# Patient Record
Sex: FEMALE | Race: WHITE | NOT HISPANIC OR LATINO | Employment: OTHER | URBAN - METROPOLITAN AREA
[De-identification: names, ages, dates, MRNs, and addresses within clinical notes are randomized per-mention and may not be internally consistent; named-entity substitution may affect disease eponyms.]

---

## 2017-03-09 PROBLEM — E78.5 HYPERLIPIDEMIA: Status: ACTIVE | Noted: 2017-03-09

## 2017-03-09 PROBLEM — R07.89 ATYPICAL CHEST PAIN: Status: ACTIVE | Noted: 2017-03-09

## 2017-03-09 PROBLEM — I10 HYPERTENSION: Status: ACTIVE | Noted: 2017-03-09

## 2017-05-18 ENCOUNTER — OFFICE VISIT (OUTPATIENT)
Dept: CARDIOLOGY | Facility: CLINIC | Age: 77
End: 2017-05-18

## 2017-05-18 VITALS
DIASTOLIC BLOOD PRESSURE: 90 MMHG | HEIGHT: 64 IN | BODY MASS INDEX: 28.58 KG/M2 | SYSTOLIC BLOOD PRESSURE: 180 MMHG | WEIGHT: 167.4 LBS | HEART RATE: 80 BPM

## 2017-05-18 DIAGNOSIS — R07.89 ATYPICAL CHEST PAIN: ICD-10-CM

## 2017-05-18 DIAGNOSIS — E78.5 HYPERLIPIDEMIA, UNSPECIFIED HYPERLIPIDEMIA TYPE: ICD-10-CM

## 2017-05-18 DIAGNOSIS — R03.0 ELEVATED BLOOD PRESSURE READING WITHOUT DIAGNOSIS OF HYPERTENSION: ICD-10-CM

## 2017-05-18 DIAGNOSIS — I10 ESSENTIAL HYPERTENSION: ICD-10-CM

## 2017-05-18 DIAGNOSIS — I10 ESSENTIAL HYPERTENSION: Primary | ICD-10-CM

## 2017-05-18 PROCEDURE — 99213 OFFICE O/P EST LOW 20 MIN: CPT | Performed by: INTERNAL MEDICINE

## 2017-05-18 RX ORDER — ASCORBIC ACID 500 MG
500 TABLET ORAL DAILY
COMMUNITY
End: 2017-09-07

## 2017-05-18 RX ORDER — AMLODIPINE BESYLATE 2.5 MG/1
2.5 TABLET ORAL DAILY
Qty: 90 TABLET | Refills: 3 | Status: SHIPPED | OUTPATIENT
Start: 2017-05-18 | End: 2017-09-07 | Stop reason: SDUPTHER

## 2017-05-18 RX ORDER — ALENDRONATE SODIUM 70 MG/1
70 TABLET ORAL WEEKLY
COMMUNITY
Start: 2017-03-01 | End: 2018-06-12

## 2017-05-18 RX ORDER — HYDROCHLOROTHIAZIDE 12.5 MG/1
12.5 CAPSULE, GELATIN COATED ORAL DAILY
Qty: 90 CAPSULE | Refills: 3 | Status: SHIPPED | OUTPATIENT
Start: 2017-05-18 | End: 2018-05-15 | Stop reason: SDUPTHER

## 2017-05-18 RX ORDER — LOSARTAN POTASSIUM 100 MG/1
TABLET ORAL DAILY
COMMUNITY
Start: 2017-04-19 | End: 2017-09-07

## 2017-05-19 DIAGNOSIS — I10 ESSENTIAL HYPERTENSION: ICD-10-CM

## 2017-05-19 DIAGNOSIS — R07.89 ATYPICAL CHEST PAIN: ICD-10-CM

## 2017-05-19 DIAGNOSIS — E78.5 HYPERLIPIDEMIA, UNSPECIFIED HYPERLIPIDEMIA TYPE: ICD-10-CM

## 2017-05-23 RX ORDER — PRAVASTATIN SODIUM 80 MG/1
80 TABLET ORAL DAILY
Qty: 90 TABLET | Refills: 3 | Status: SHIPPED | OUTPATIENT
Start: 2017-05-23 | End: 2017-09-07

## 2017-09-07 ENCOUNTER — OFFICE VISIT (OUTPATIENT)
Dept: CARDIOLOGY | Facility: CLINIC | Age: 77
End: 2017-09-07

## 2017-09-07 VITALS
SYSTOLIC BLOOD PRESSURE: 162 MMHG | HEART RATE: 97 BPM | BODY MASS INDEX: 26.98 KG/M2 | DIASTOLIC BLOOD PRESSURE: 88 MMHG | HEIGHT: 64 IN | WEIGHT: 158 LBS

## 2017-09-07 DIAGNOSIS — I10 ESSENTIAL HYPERTENSION: ICD-10-CM

## 2017-09-07 DIAGNOSIS — E78.5 DYSLIPIDEMIA: Primary | ICD-10-CM

## 2017-09-07 DIAGNOSIS — R07.89 ATYPICAL CHEST PAIN: ICD-10-CM

## 2017-09-07 PROCEDURE — 99214 OFFICE O/P EST MOD 30 MIN: CPT | Performed by: INTERNAL MEDICINE

## 2017-09-07 RX ORDER — LOSARTAN POTASSIUM 50 MG/1
50 TABLET ORAL DAILY
Qty: 90 TABLET | Refills: 1 | Status: SHIPPED | OUTPATIENT
Start: 2017-09-07 | End: 2017-09-07 | Stop reason: SDUPTHER

## 2017-09-07 RX ORDER — LOSARTAN POTASSIUM 50 MG/1
50 TABLET ORAL DAILY
Qty: 90 TABLET | Refills: 1 | Status: SHIPPED | OUTPATIENT
Start: 2017-09-07 | End: 2018-05-15 | Stop reason: SDUPTHER

## 2017-09-07 RX ORDER — AMLODIPINE BESYLATE 5 MG/1
5 TABLET ORAL DAILY
Qty: 90 TABLET | Refills: 1 | Status: SHIPPED | OUTPATIENT
Start: 2017-09-07 | End: 2017-09-07 | Stop reason: SDUPTHER

## 2017-09-07 RX ORDER — AMLODIPINE BESYLATE 5 MG/1
5 TABLET ORAL DAILY
Qty: 90 TABLET | Refills: 1 | Status: SHIPPED | OUTPATIENT
Start: 2017-09-07 | End: 2018-06-12

## 2017-09-07 RX ORDER — ROSUVASTATIN CALCIUM 10 MG/1
10 TABLET, COATED ORAL DAILY
Qty: 90 TABLET | Refills: 1 | Status: SHIPPED | OUTPATIENT
Start: 2017-09-07 | End: 2018-09-22 | Stop reason: SDUPTHER

## 2017-09-07 RX ORDER — ROSUVASTATIN CALCIUM 10 MG/1
10 TABLET, COATED ORAL DAILY
Qty: 90 TABLET | Refills: 1 | Status: SHIPPED | OUTPATIENT
Start: 2017-09-07 | End: 2018-06-12

## 2017-09-07 RX ORDER — MELATONIN
1000 DAILY
COMMUNITY
End: 2019-01-10 | Stop reason: SDUPTHER

## 2017-09-07 NOTE — PROGRESS NOTES
Subjective:     Encounter Date:09/07/2017 - [place] [facility]      Patient ID: Sheree Hernandez is a 77 y.o.  white female, housewife/retired , from [place], [place].     INTERNIST: Terell Burciaga MD  PREVIOUS CARDIOLOGIST: Efren Healy MD, [facility]    Chief Complaint:   Chief Complaint   Patient presents with   • Hypertension     Problem List:  1. 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 Remote normal coronary CTA calcium score (0), January 2010.  b. Remote acceptable echocardiogram with mild TR and diastolic LV dysfunction, November 2015.  c. Acceptable nuclear stress test with low probability of obstructive disease, LVEF of 0.79, February 2016.  d. Residual CCS class 1 angina pectoris/NYHA class 2 exertional dyspnea and fatigue.  2. Hypertension - probably essential with recent hypertensive blood pressure readings.  3. Hyperlipidemia with uncontrolled hypercholesterolemia; 10-year risk 46.1%, 16.6% with treatment.  4. Indigestion - probable GERD syndrome.  5. Remote presyncope.  a. Acceptable head CT scan without contrast and junctional rhythm (November 2015 with [facility] evaluation)  b. Negative acceptable tilt table test study.  c. Acceptable 24 hour ambulatory blood pressure monitor.  d. Acceptable 48 hour Holter monitor  e. No recurrence.  6. Past surgical history of hysterectomy.      Allergies   Allergen Reactions   • Pravastatin Myalgia         Current Outpatient Prescriptions:   •  alendronate (FOSAMAX) 70 MG tablet, 1 (One) Time Per Week., Disp: , Rfl:   •  amLODIPine (NORVASC) 2.5 MG tablet, Take 1 tablet by mouth Daily., Disp: 90 tablet, Rfl: 3  •  cholecalciferol (VITAMIN D3) 1000 units tablet, Take 1,000 Units by mouth Daily., Disp: , Rfl:   •  hydrochlorothiazide (MICROZIDE) 12.5 MG capsule, Take 1 capsule by mouth Daily., Disp: 90 capsule, Rfl: 3  •  magnesium oxide (MAGOX) 400 (241.3 MG) MG tablet tablet, Take 400 mg by mouth Daily., Disp: , Rfl:   •  Omega-3  "Fatty Acids (OMEGA 3 PO), Take  by mouth Daily., Disp: , Rfl:     History of Present Illness Patient returns for scheduled followup after a 4-month hiatus. She states that she has done well since last being seen in our office.  She notes that she checks her blood pressure at home, and she says that the bottom number has never been over 100, that it is usually around 75.  She notes that she forgot to take her blood pressure medicine for a few days, and her blood pressure was still approximately 130/70 even without the medicine.  She is told that that is why we wanted her to wear the 24-hour ambulatory blood pressure monitor.  She states that she exercises 3-5 times a week; she has been walking on the treadmill for 20 minutes, walking 1 mile.  She has no chest pain, tightness, or pressure or shortness of breath with her activities.  The patient states that she does not sleep well at night, but she thinks that \"it's because of my sleeping habits.\"  She will sometimes drink tea or water before she goes to bed.  She says that she will feel tired before going to bed, but then as soon as her head hits the pillow, she is wide awake.  She feels like taking a nap at about 5:30 or 6:00 in the afternoon, but she does not nap; she will typically go to bed around 10:30 or 11:00 PM.  We found lab studies in her chart that she had drawn in May 2017, and these are reviewed with her in the office today (see below).  Her lipids are elevated, and she states that she had trouble with her legs aching with the pravastatin, so she has not been taking it.  She is advised of her risk score and is told that there are multiple statins that she can take to try to reduce her lipid levels.  Patient otherwise denies chest pain, shortness of breath, PND, edema, palpitations, syncope or presyncope at this time.        ROS   Obtained and otherwise negative except as outlined in problem list and HPI.    Procedures       Objective:       Vitals:    " "09/07/17 1413 09/07/17 1418 09/07/17 1434   BP: 170/94 (!) 147/103 162/88   BP Location: Left arm Left arm Right arm   Patient Position: Sitting Standing Sitting   Pulse: 91 97    Weight: 158 lb (71.7 kg)     Height: 64\" (162.6 cm)       Body mass index is 27.12 kg/(m^2).   Last weight:  167 lbs.    Physical Exam   Constitutional: She is oriented to person, place, and time. She appears well-developed and well-nourished.   Neck: No JVD present. Carotid bruit is not present. No thyromegaly present.   Cardiovascular: Regular rhythm, S1 normal and S2 normal.  Exam reveals gallop and S4. Exam reveals no S3 and no friction rub.    Murmur heard.   Medium-pitched early systolic murmur is present with a grade of 2/6  at the lower left sternal border  Pulses:       Carotid pulses are 1+ on the right side, and 1+ on the left side.       Radial pulses are 1+ on the right side, and 1+ on the left side.        Femoral pulses are 1+ on the right side, and 1+ on the left side.       Popliteal pulses are 1+ on the right side, and 1+ on the left side.        Dorsalis pedis pulses are 1+ on the right side, and 1+ on the left side.        Posterior tibial pulses are 1+ on the right side, and 1+ on the left side.   Pulmonary/Chest: Effort normal and breath sounds normal. She has no wheezes. She has no rhonchi. She has no rales.   Abdominal: Soft. She exhibits no mass. There is no hepatosplenomegaly. There is no tenderness. There is no guarding.   Lymphadenopathy:     She has no cervical adenopathy.   Neurological: She is alert and oriented to person, place, and time.   Skin: Skin is warm, dry and intact. No rash noted.   Vitals reviewed.      Lab Review: 05/18/2017 (reviewed with patient by letter and in office today)  · CMP - sodium 143, potassium 3.8, chloride 110, CO2 - 29, glucose 93, BUN 19, creatinine 1.0, estimated GFR 57, albumin 3.7, calcium 8.5, total bilirubin 0.6, normal liver function tests  · FLP - total cholesterol 227, " triglycerides 118, HDL-C 78, LDL-C 125  · BNP - 273    Lab Results   Component Value Date    GLUCOSE 72 11/09/2015    BUN 24 (H) 11/09/2015    CREATININE 0.9 11/09/2015    CO2 29 11/09/2015    CALCIUM 9.9 11/09/2015    ALBUMIN 4.8 11/09/2015    AST 33 11/09/2015    ALT 34 11/09/2015       Lab Results   Component Value Date    WBC 12.17 (H) 11/09/2015    HGB 15.8 (H) 11/09/2015    HCT 47.2 (H) 11/09/2015    MCV 90.1 11/09/2015     11/09/2015         Assessment:   Overall continued acceptable course with no interim cardiopulmonary complaints with suboptimal functional status with uncontrolled blood pressure readings and uncontrolled dyslipidemia (46.1% ACC/AHA 10-year cardiovascular risk untreated; 16.6% treated). She did not undergo a 24-hour ambulatory blood pressure monitor and has been noncompliant with statin drug therapy.  We will defer additional diagnostic or therapeutic intervention from a cardiac perspective at this time other than to attempt to treat her dyslipidemia and hypertension.       Diagnosis Plan   1. Dyslipidemia  Initiate rosuvastatin 10 mg daily   2. Essential hypertension  amLODIPine (NORVASC) 5 MG tablet   3. Atypical chest pain  amLODIPine (NORVASC) 5 MG tablet          Plan:         1. Patient to continue current medications and close follow up with the above providers with the following changes:  A. Initiate rosuvastatin 10 mg daily  B. Initiate losartan 50 mg daily  C. Increase amlodipine dose from 2.5 mg to 5 mg daily  2. She is to follow up with Dr. Burciaga in November 2017 with strong consideration of influenza immunization.  3. Tentative cardiology follow up in January 2018, or patient may return sooner PRN.       Transcribed by Diana Frank for Dr. Davidson Sam at 2:24 PM on 09/07/2017    IDavidson MD, [facility], personally performed the services described in this documentation as scribed by the above named individual in my presence, and it is both accurate and  complete. At 2:53 PM on 09/07/2017

## 2018-05-11 ENCOUNTER — TELEPHONE (OUTPATIENT)
Dept: FAMILY MEDICINE CLINIC | Facility: CLINIC | Age: 78
End: 2018-05-11

## 2018-05-11 NOTE — TELEPHONE ENCOUNTER
Called because our notes say that has had a colonoscopy in 2011, but there are no records of it in her chart. Called patients Gastroenterologist and they stated that their records are sent off, so we would not be able to retreive those notes. Would like to know if she would possible have any records of the colonoscopy. Patient did not answer, so I left a voicemail.

## 2018-05-15 ENCOUNTER — LAB (OUTPATIENT)
Dept: LAB | Facility: HOSPITAL | Age: 78
End: 2018-05-15

## 2018-05-15 ENCOUNTER — TELEPHONE (OUTPATIENT)
Dept: FAMILY MEDICINE CLINIC | Facility: CLINIC | Age: 78
End: 2018-05-15

## 2018-05-15 ENCOUNTER — OFFICE VISIT (OUTPATIENT)
Dept: FAMILY MEDICINE CLINIC | Facility: CLINIC | Age: 78
End: 2018-05-15

## 2018-05-15 VITALS
HEART RATE: 82 BPM | BODY MASS INDEX: 29.02 KG/M2 | SYSTOLIC BLOOD PRESSURE: 164 MMHG | WEIGHT: 170 LBS | OXYGEN SATURATION: 98 % | HEIGHT: 64 IN | DIASTOLIC BLOOD PRESSURE: 92 MMHG

## 2018-05-15 DIAGNOSIS — E55.9 VITAMIN D DEFICIENCY: ICD-10-CM

## 2018-05-15 DIAGNOSIS — R53.83 FATIGUE, UNSPECIFIED TYPE: ICD-10-CM

## 2018-05-15 DIAGNOSIS — Z00.00 MEDICARE ANNUAL WELLNESS VISIT, SUBSEQUENT: Primary | ICD-10-CM

## 2018-05-15 DIAGNOSIS — R07.89 ATYPICAL CHEST PAIN: ICD-10-CM

## 2018-05-15 DIAGNOSIS — Z00.00 MEDICARE ANNUAL WELLNESS VISIT, SUBSEQUENT: ICD-10-CM

## 2018-05-15 DIAGNOSIS — Z12.11 SCREEN FOR COLON CANCER: ICD-10-CM

## 2018-05-15 DIAGNOSIS — I10 ESSENTIAL HYPERTENSION: ICD-10-CM

## 2018-05-15 DIAGNOSIS — M85.80 OSTEOPENIA, UNSPECIFIED LOCATION: ICD-10-CM

## 2018-05-15 DIAGNOSIS — E78.5 HYPERLIPIDEMIA, UNSPECIFIED HYPERLIPIDEMIA TYPE: ICD-10-CM

## 2018-05-15 DIAGNOSIS — Z78.0 MENOPAUSE: ICD-10-CM

## 2018-05-15 DIAGNOSIS — E78.5 DYSLIPIDEMIA: ICD-10-CM

## 2018-05-15 LAB
25(OH)D3 SERPL-MCNC: 27.6 NG/ML
ALBUMIN SERPL-MCNC: 4.5 G/DL (ref 3.2–4.8)
ALBUMIN/GLOB SERPL: 1.9 G/DL (ref 1.5–2.5)
ALP SERPL-CCNC: 93 U/L (ref 25–100)
ALT SERPL W P-5'-P-CCNC: 20 U/L (ref 7–40)
ANION GAP SERPL CALCULATED.3IONS-SCNC: 5 MMOL/L (ref 3–11)
ARTICHOKE IGE QN: 210 MG/DL (ref 0–130)
AST SERPL-CCNC: 19 U/L (ref 0–33)
BILIRUB SERPL-MCNC: 0.6 MG/DL (ref 0.3–1.2)
BUN BLD-MCNC: 17 MG/DL (ref 9–23)
BUN/CREAT SERPL: 21.3 (ref 7–25)
CALCIUM SPEC-SCNC: 9.4 MG/DL (ref 8.7–10.4)
CHLORIDE SERPL-SCNC: 109 MMOL/L (ref 99–109)
CHOLEST SERPL-MCNC: 299 MG/DL (ref 0–200)
CO2 SERPL-SCNC: 29 MMOL/L (ref 20–31)
CREAT BLD-MCNC: 0.8 MG/DL (ref 0.6–1.3)
DEPRECATED RDW RBC AUTO: 49.4 FL (ref 37–54)
ERYTHROCYTE [DISTWIDTH] IN BLOOD BY AUTOMATED COUNT: 15 % (ref 11.3–14.5)
GFR SERPL CREATININE-BSD FRML MDRD: 69 ML/MIN/1.73
GLOBULIN UR ELPH-MCNC: 2.4 GM/DL
GLUCOSE BLD-MCNC: 96 MG/DL (ref 70–100)
HCT VFR BLD AUTO: 45.3 % (ref 34.5–44)
HDLC SERPL-MCNC: 64 MG/DL (ref 40–60)
HGB BLD-MCNC: 14.8 G/DL (ref 11.5–15.5)
MCH RBC QN AUTO: 29.6 PG (ref 27–31)
MCHC RBC AUTO-ENTMCNC: 32.7 G/DL (ref 32–36)
MCV RBC AUTO: 90.6 FL (ref 80–99)
PLATELET # BLD AUTO: 256 10*3/MM3 (ref 150–450)
PMV BLD AUTO: 12.8 FL (ref 6–12)
POTASSIUM BLD-SCNC: 4.3 MMOL/L (ref 3.5–5.5)
PROT SERPL-MCNC: 6.9 G/DL (ref 5.7–8.2)
RBC # BLD AUTO: 5 10*6/MM3 (ref 3.89–5.14)
SODIUM BLD-SCNC: 143 MMOL/L (ref 132–146)
TRIGL SERPL-MCNC: 162 MG/DL (ref 0–150)
TSH SERPL DL<=0.05 MIU/L-ACNC: 2.72 MIU/ML (ref 0.35–5.35)
WBC NRBC COR # BLD: 7.34 10*3/MM3 (ref 3.5–10.8)

## 2018-05-15 PROCEDURE — 85027 COMPLETE CBC AUTOMATED: CPT

## 2018-05-15 PROCEDURE — 84443 ASSAY THYROID STIM HORMONE: CPT

## 2018-05-15 PROCEDURE — 90732 PPSV23 VACC 2 YRS+ SUBQ/IM: CPT | Performed by: INTERNAL MEDICINE

## 2018-05-15 PROCEDURE — 36415 COLL VENOUS BLD VENIPUNCTURE: CPT

## 2018-05-15 PROCEDURE — G0009 ADMIN PNEUMOCOCCAL VACCINE: HCPCS | Performed by: INTERNAL MEDICINE

## 2018-05-15 PROCEDURE — 82306 VITAMIN D 25 HYDROXY: CPT

## 2018-05-15 PROCEDURE — G0439 PPPS, SUBSEQ VISIT: HCPCS | Performed by: INTERNAL MEDICINE

## 2018-05-15 PROCEDURE — 80053 COMPREHEN METABOLIC PANEL: CPT

## 2018-05-15 PROCEDURE — 80061 LIPID PANEL: CPT

## 2018-05-15 RX ORDER — HYDROCHLOROTHIAZIDE 12.5 MG/1
12.5 CAPSULE, GELATIN COATED ORAL DAILY
Qty: 90 CAPSULE | Refills: 3 | Status: SHIPPED | OUTPATIENT
Start: 2018-05-15 | End: 2018-06-12

## 2018-05-15 RX ORDER — LOSARTAN POTASSIUM 50 MG/1
50 TABLET ORAL DAILY
Qty: 90 TABLET | Refills: 3 | Status: SHIPPED | OUTPATIENT
Start: 2018-05-15 | End: 2018-10-04 | Stop reason: SDUPTHER

## 2018-05-15 NOTE — TELEPHONE ENCOUNTER
Pt was asked when last Bone Density was and she is unsure.  Not noted in her chart.    Please call and advise patient.

## 2018-05-15 NOTE — PATIENT INSTRUCTIONS
Add back hydrochlorothiazide 12.5mg once daily  Continue losartan 50mg daily.  Bring readings and cuff to next visit in 4weeks.  Labs today  Schedule bone density.      Medicare Wellness  Personal Prevention Plan of Service     Date of Office Visit:  05/15/2018  Encounter Provider:  Tammy Rm DO  Place of Service:  [facility]  Patient Name: Sheree Hernandez  :  1940    As part of the Medicare Wellness portion of your visit today, we are providing you with this personalized preventive plan of services (PPPS). This plan is based upon recommendations of the [place] Preventive Services Task Force (USPSTF) and the Advisory Committee on Immunization Practices (ACIP).    This lists the preventive care services that should be considered, and provides dates of when you are due. Items listed as completed are up-to-date and do not require any further intervention.    Health Maintenance   Topic Date Due   • TDAP/TD VACCINES (1 - Tdap) 1958   • PNEUMOCOCCAL VACCINES (65+ LOW/MEDIUM RISK) (2 of 2 - PCV13) 2014   • MEDICARE ANNUAL WELLNESS  2017   • ZOSTER VACCINE  2017   • DXA SCAN  05/15/2018   • LIPID PANEL  2018   • INFLUENZA VACCINE  2018       Orders Placed This Encounter   Procedures   • DEXA Bone Density Axial     Standing Status:   Future     Standing Expiration Date:   5/15/2019     Order Specific Question:   Reason for Exam:     Answer:   screening for osteoporosis   • Comprehensive Metabolic Panel     Standing Status:   Future     Standing Expiration Date:   5/15/2019   • Lipid Panel     Standing Status:   Future     Standing Expiration Date:   5/15/2019   • Vitamin D 25 Hydroxy     Standing Status:   Future     Standing Expiration Date:   5/15/2019   • CBC No Differential     Standing Status:   Future     Standing Expiration Date:   5/15/2019   • TSH     Standing Status:   Future     Standing Expiration Date:   5/15/2019   • POCT FECAL  OCCULT BLOOD BY IMMUNOASSAY     Standing Status:   Future     Standing Expiration Date:   5/15/2019       Return in about 4 weeks (around 6/12/2018), or f/u bp.

## 2018-05-15 NOTE — PROGRESS NOTES
QUICK REFERENCE INFORMATION:  The ABCs of the Annual Wellness Visit    Subsequent Medicare Wellness Visit    HEALTH RISK ASSESSMENT    1940    Recent Hospitalizations:  No hospitalization(s) within the last year..        Current Medical Providers:  Patient Care Team:  Tammy Rm DO as PCP - General (Internal Medicine)  Davidson Sam MD as Consulting Physician (Cardiology)        Smoking Status:  History   Smoking Status   • Never Smoker   Smokeless Tobacco   • Never Used       Alcohol Consumption:  History   Alcohol Use   • Yes     Comment: occassionally        Depression Screen:   PHQ-2/PHQ-9 Depression Screening 5/15/2018   Little interest or pleasure in doing things 0   Feeling down, depressed, or hopeless 0   Total Score 0       Health Habits and Functional and Cognitive Screening:  Functional & Cognitive Status 5/15/2018   Do you have difficulty preparing food and eating? No   Do you have difficulty bathing yourself, getting dressed or grooming yourself? No   Do you have difficulty using the toilet? No   Do you have difficulty moving around from place to place? No   Do you have trouble with steps or getting out of a bed or a chair? No   In the past year have you fallen or experienced a near fall? No   Current Diet Well Balanced Diet   Dental Exam Up to date   Eye Exam Up to date   Exercise (times per week) 4 times per week   Current Exercise Activities Include Walking   Do you need help using the phone?  No   Are you deaf or do you have serious difficulty hearing?  No   Do you need help with transportation? No   Do you need help shopping? No   Do you need help preparing meals?  No   Do you need help with housework?  No   Do you need help with laundry? No   Do you need help taking your medications? No   Do you need help managing money? No   Do you ever drive or ride in a car without wearing a seat belt? No   Have you felt unusual stress, anger or loneliness in the last month? No   Who do you live with?  Alone   If you need help, do you have trouble finding someone available to you? No   Have you been bothered in the last four weeks by sexual problems? No   Do you have difficulty concentrating, remembering or making decisions? No           Does the patient have evidence of cognitive impairment? No    Aspirin use counseling: Does not need ASA (and currently is not on it)      Recent Lab Results:  CMP:  Lab Results   Component Value Date    BUN 24 (H) 11/09/2015    CREATININE 0.9 11/09/2015     11/09/2015    K 3.7 11/09/2015    CO2 29 11/09/2015    CALCIUM 9.9 11/09/2015    ALBUMIN 4.8 11/09/2015    BILITOT 0.4 11/09/2015    ALKPHOS 145 (H) 11/09/2015    AST 33 11/09/2015    ALT 34 11/09/2015   Visual Acuity:  No exam data present   Eye exam utd    Age-appropriate Screening Schedule:  Refer to the list below for future screening recommendations based on patient's age, sex and/or medical conditions. Orders for these recommended tests are listed in the plan section. The patient has been provided with a written plan.    Health Maintenance   Topic Date Due   • TDAP/TD VACCINES (1 - Tdap) 07/28/1959   • PNEUMOCOCCAL VACCINES (65+ LOW/MEDIUM RISK) (2 of 2 - PCV13) 05/17/2014   • ZOSTER VACCINE  05/18/2017   • DXA SCAN  05/15/2018   • LIPID PANEL  05/18/2018   • INFLUENZA VACCINE  08/01/2018        Subjective   History of Present Illness    Sheree Hernandez is a 77 y.o. female who presents for an Subsequent Wellness Visit.    The following portions of the patient's history were reviewed and updated as appropriate: allergies, current medications, past family history, past medical history, past social history, past surgical history and problem list.    Outpatient Medications Prior to Visit   Medication Sig Dispense Refill   • cholecalciferol (VITAMIN D3) 1000 units tablet Take 1,000 Units by mouth Daily.     • magnesium oxide (MAGOX) 400 (241.3 MG) MG tablet tablet Take 400 mg by mouth Daily.     • Omega-3 Fatty Acids  (OMEGA 3 PO) Take 1 tablet by mouth Daily.     • losartan (COZAAR) 50 MG tablet Take 1 tablet by mouth Daily. 90 tablet 1   • alendronate (FOSAMAX) 70 MG tablet Take 70 mg by mouth 1 (One) Time Per Week.     • amLODIPine (NORVASC) 5 MG tablet Take 1 tablet by mouth Daily. 90 tablet 1   • rosuvastatin (CRESTOR) 10 MG tablet Take 1 tablet by mouth Daily. 90 tablet 1   • hydrochlorothiazide (MICROZIDE) 12.5 MG capsule Take 1 capsule by mouth Daily. 90 capsule 3     No facility-administered medications prior to visit.        Patient Active Problem List   Diagnosis   • Hypertension   • Atypical chest pain   • Dyslipidemia   • Osteopenia   • MANAN (stress urinary incontinence, female)       Advance Care Planning:  has an advance directive - a copy HAS NOT been provided    Identification of Risk Factors:  Risk factors include: weight  and cardiovascular risk.    Review of Systems   Review of Systems   General: intermittent fatigue, no fever/chills, unintentional wt loss, malaise, night sweats  Skin: no rash, no hives, no lesions,   Eyes: no visual disturbance   Heme: no brusing, no bleeding  ENT: no hearing loss, no dizziness, no nosebleed, no hoarseness  Endocrine: , no polyuria, polyphagia, polydipsia, no heat or cold intolerance  GI: no nausea, no vomiting, no diarrhea, no constipation, no bleeding, no pain  : no dysuria, no urinary frequency,, no hematuria, or incontinence  Extremities: no edema, , no claudication  Cardiac: no chest pain, no palpitations, no orthopnea, no PND  Respiratory: no cough, no sputum, no wheezing, no sob , no hemoptysis  Neuro: no headache, no seizure,, no paresthesias or weakness  Psych: no anxiety, no depression  MS: intermittent right upper ext discomfort at rest, resolves on own, sporadic  No issues when shoveling/ exercising/ wts        Compared to one year ago, the patient feels her physical health is the same.  Compared to one year ago, the patient feels her mental health is the  "same.    Objective     Physical Exam    Vitals:    05/15/18 1015   BP: 164/92   Pulse: 82   SpO2: 98%   Weight: 77.1 kg (170 lb)   Height: 162.6 cm (64\")       Patient's Body mass index is 29.18 kg/m². BMI is above normal parameters. Recommendations include: exercise counseling and nutrition counseling.    Gen: well appearing in nad, no resp effort  Eyes: conjunctiva clear, perrl, eomi  ENT: mmm, no thyromegaly, no lymphadenopathy  CV: s1, s2 reg no m/r/g, no bruits, no jvd  No peripheral edema, pedal pulses intact  Resp:  clear b/l no w/r/r  Breast: no skin changes, nipple inversion, axillary adenopathy, or breast mass bilaterally  GI:  soft nt/nd  Pelvic exam: normal external genitalia, vulva, vagina.  Skin: no clubbing or cyanosis  Neuro: no focal deficits.      Assessment/Plan   Patient Self-Management and Personalized Health Advice  The patient has been provided with information about: diet, exercise and prevention of cardiac or vascular disease and preventive services including:   · Bone densitometry screening, Colorectal cancer screening, fecal occult blood test, Counseling for cardiovascular disease risk reduction, Diabetes screening, see lab orders, Exercise counseling provided, Fall Risk assessment done, Pneumococcal vaccine .    Visit Diagnoses:    ICD-10-CM ICD-9-CM   1. Medicare annual wellness visit, subsequent Z00.00 V70.0   2. Osteopenia, unspecified location M85.80 733.90   3. Vitamin D deficiency E55.9 268.9   4. Essential hypertension I10 401.9   5. Dyslipidemia E78.5 272.4   6. Hyperlipidemia, unspecified hyperlipidemia type E78.5 272.4   7. Atypical chest pain R07.89 786.59   8. Menopause Z78.0 627.2   9. Fatigue, unspecified type R53.83 780.79   10. Screen for colon cancer Z12.11 V76.51     Pneumovax given.  Labs and bmd ordered  Orders Placed This Encounter   Procedures   • DEXA Bone Density Axial     Standing Status:   Future     Standing Expiration Date:   5/15/2019     Order Specific " Question:   Reason for Exam:     Answer:   screening for osteoporosis   • Comprehensive Metabolic Panel     Standing Status:   Future     Standing Expiration Date:   5/15/2019   • Lipid Panel     Standing Status:   Future     Standing Expiration Date:   5/15/2019   • Vitamin D 25 Hydroxy     Standing Status:   Future     Standing Expiration Date:   5/15/2019   • CBC No Differential     Standing Status:   Future     Standing Expiration Date:   5/15/2019   • TSH     Standing Status:   Future     Standing Expiration Date:   5/15/2019   • POCT FECAL OCCULT BLOOD BY IMMUNOASSAY     Standing Status:   Future     Standing Expiration Date:   5/15/2019       Outpatient Encounter Prescriptions as of 5/15/2018   Medication Sig Dispense Refill   • cholecalciferol (VITAMIN D3) 1000 units tablet Take 1,000 Units by mouth Daily.     • losartan (COZAAR) 50 MG tablet Take 1 tablet by mouth Daily. 90 tablet 3   • magnesium oxide (MAGOX) 400 (241.3 MG) MG tablet tablet Take 400 mg by mouth Daily.     • Omega-3 Fatty Acids (OMEGA 3 PO) Take 1 tablet by mouth Daily.     • [DISCONTINUED] losartan (COZAAR) 50 MG tablet Take 1 tablet by mouth Daily. 90 tablet 1   • alendronate (FOSAMAX) 70 MG tablet Take 70 mg by mouth 1 (One) Time Per Week.     • amLODIPine (NORVASC) 5 MG tablet Take 1 tablet by mouth Daily. 90 tablet 1   • hydrochlorothiazide (MICROZIDE) 12.5 MG capsule Take 1 capsule by mouth Daily. 90 capsule 3   • rosuvastatin (CRESTOR) 10 MG tablet Take 1 tablet by mouth Daily. 90 tablet 1   • [DISCONTINUED] hydrochlorothiazide (MICROZIDE) 12.5 MG capsule Take 1 capsule by mouth Daily. 90 capsule 3     No facility-administered encounter medications on file as of 5/15/2018.        Reviewed use of high risk medication in the elderly: not applicable  Reviewed for potential of harmful drug interactions in the elderly: not applicable    Follow Up:  Return in about 4 weeks (around 6/12/2018), or f/u bp.     An After Visit Summary and PPPS  with all of these plans were given to the patient.

## 2018-05-22 ENCOUNTER — HOSPITAL ENCOUNTER (OUTPATIENT)
Dept: BONE DENSITY | Facility: HOSPITAL | Age: 78
Discharge: HOME | End: 2018-05-22
Admitting: INTERNAL MEDICINE

## 2018-05-22 DIAGNOSIS — Z78.0 MENOPAUSE: ICD-10-CM

## 2018-05-22 DIAGNOSIS — M85.80 OSTEOPENIA, UNSPECIFIED LOCATION: ICD-10-CM

## 2018-05-22 PROCEDURE — 77080 DXA BONE DENSITY AXIAL: CPT

## 2018-05-30 ENCOUNTER — CLINICAL SUPPORT (OUTPATIENT)
Dept: FAMILY MEDICINE CLINIC | Facility: CLINIC | Age: 78
End: 2018-05-30

## 2018-05-30 DIAGNOSIS — Z12.11 ENCOUNTER FOR SCREENING FECAL OCCULT BLOOD TESTING: Primary | ICD-10-CM

## 2018-05-30 LAB
DEVELOPER EXPIRATION DATE: NORMAL
DEVELOPER LOT NUMBER: NORMAL
EXPIRATION DATE: NORMAL
FECAL OCCULT BLOOD SCREEN, POC: NEGATIVE
Lab: NORMAL
NEGATIVE CONTROL: NEGATIVE
POSITIVE CONTROL: POSITIVE

## 2018-05-30 PROCEDURE — 82270 OCCULT BLOOD FECES: CPT | Performed by: INTERNAL MEDICINE

## 2018-06-12 ENCOUNTER — OFFICE VISIT (OUTPATIENT)
Dept: FAMILY MEDICINE CLINIC | Facility: CLINIC | Age: 78
End: 2018-06-12

## 2018-06-12 ENCOUNTER — HOSPITAL ENCOUNTER (OUTPATIENT)
Dept: GENERAL RADIOLOGY | Facility: HOSPITAL | Age: 78
Discharge: HOME | End: 2018-06-12
Attending: INTERNAL MEDICINE | Admitting: INTERNAL MEDICINE

## 2018-06-12 VITALS
OXYGEN SATURATION: 96 % | WEIGHT: 170.8 LBS | DIASTOLIC BLOOD PRESSURE: 92 MMHG | HEIGHT: 64 IN | BODY MASS INDEX: 29.16 KG/M2 | SYSTOLIC BLOOD PRESSURE: 142 MMHG | HEART RATE: 70 BPM

## 2018-06-12 DIAGNOSIS — I10 ESSENTIAL HYPERTENSION: Primary | ICD-10-CM

## 2018-06-12 DIAGNOSIS — E78.5 HYPERLIPIDEMIA, UNSPECIFIED HYPERLIPIDEMIA TYPE: ICD-10-CM

## 2018-06-12 DIAGNOSIS — R06.00 DYSPNEA, UNSPECIFIED TYPE: ICD-10-CM

## 2018-06-12 DIAGNOSIS — E78.5 DYSLIPIDEMIA: ICD-10-CM

## 2018-06-12 PROCEDURE — 71046 X-RAY EXAM CHEST 2 VIEWS: CPT

## 2018-06-12 PROCEDURE — 93000 ELECTROCARDIOGRAM COMPLETE: CPT | Performed by: INTERNAL MEDICINE

## 2018-06-12 PROCEDURE — 99214 OFFICE O/P EST MOD 30 MIN: CPT | Performed by: INTERNAL MEDICINE

## 2018-06-12 RX ORDER — HYDROCHLOROTHIAZIDE 25 MG/1
25 TABLET ORAL DAILY
Qty: 90 TABLET | Refills: 3 | Status: SHIPPED | OUTPATIENT
Start: 2018-06-12 | End: 2018-09-11 | Stop reason: SDUPTHER

## 2018-06-12 NOTE — PATIENT INSTRUCTIONS
Trial allergy pill: claritin, zyrtec, or allegra 1 tab daily for 2-3wks.    Increase your hydrochlorothiazide from 12.5mg to 25mg daily (you can take 2 tabs of your current dose until they are gone)  Continue losartan    email me in a week with readings.        Report to lab in about 4wks .  Fast.

## 2018-06-12 NOTE — PROGRESS NOTES
"77F here for f/u of htn.  Brings in cuff and home readings.  Cuff checked against ours and accurate.  Home readings variable: 130s/ 70s to 160s/ 90s.  Compliant with 2 meds: hctz 12.5mg daily and losartan 50mg daily.  Had been on amlodpine 5mg in past and felt it was not effective, so stopped this med at some point.      Pt denies h/a, dizziness, cp,  pnd, orthopnea, edema, palpitations, claudication.  Admits to dyspnea on exertion, her baseline.    The following portions of the patient's history were reviewed and updated as appropriate: allergies, current medications, past family history, past medical history, past social history, past surgical history and problem list.      /92 (BP Location: Left arm, Patient Position: Sitting)   Pulse 70   Ht 162.6 cm (64.02\")   Wt 77.5 kg (170 lb 12.8 oz)   SpO2 96%   BMI 29.30 kg/m²   Gen: well appearing in nad, no resp effort  Eyes: conjunctiva clear, perrl, eomi  ENT: mmm, no thyromegaly, no lymphadenopathy  CV: s1, s2 reg no m/r, + S4, no bruits, no jvd  No peripheral edema, pedal pulses intact  Resp:  clear b/l no w/r/r  GI:  soft nt/nd  Skin: no clubbing or cyanosis  Neuro: no focal deficits.      ECG 12 Lead  Date/Time: 6/18/2018 10:01 AM  Performed by: KWAME RIVAS  Authorized by: KWAME RIVAS   Rhythm: sinus bradycardia  Clinical impression: normal ECG              A/P    1. Essential hypertension    2. Dyspnea, unspecified type    3. Dyslipidemia    4. Hyperlipidemia, unspecified hyperlipidemia type    5. Atypical chest pain      Problems Addressed this Visit        Cardiovascular and Mediastinum    Hypertension - Primary    Relevant Medications    hydrochlorothiazide (HYDRODIURIL) 25 MG tablet    Other Relevant Orders    Basic metabolic panel       Nervous and Auditory    Atypical chest pain       Other    Dyslipidemia    Relevant Orders    Lipid panel      Other Visit Diagnoses     Dyspnea, unspecified type        Relevant Orders    XR Chest 2 View    " "CC: Left knee scope post op 6 weeks, doing well, pain improved, still having difficulty going up steps    DATE OF PROCEDURE: 8/23/17     PREOPERATIVE DIAGNOSES:   1. Left knee medial meniscus tear.   2. Left knee chondromalacia     POSTOPERATIVE DIAGNOSES:   1. Left knee medial meniscus tear.   2. Left knee chondromalacia     PROCEDURES:   1. Left knee arthroscopic partial medial meniscectomy  2. Left knee arthroscopic chondroplasty     SURGEON: Tr Hung M.D.      PE:    /73   Pulse 103   Ht 5' 4" (1.626 m)   Wt 93 kg (205 lb)   BMI 35.19 kg/m²      Left knee:    Incision clean/dry/intact  No sign of infection  Mild swelling  Compartments soft  Neurovascular status intact in extremity    FROM  Good quad strength, overall moderate quad and calf atrophy  No effusion  No tenderness over medial or lateral joint lines, mild ttp over incisions      Assessment:  6 weeks s/p left knee scope    Plan:    1.  Continue physical therapy and home exercise program for quad, VMO, hip abductor strengthening. 6 weeks additionally    2.  Refill tramadol.    3.  Return to clinic 6 weeks.    4. Work status - sedentary work only, no climbing or kneeling or crawling, no more than 10 lbs lifting    " Hyperlipidemia, unspecified hyperlipidemia type            Will check labs ordered and cxr  Increase hctz to 25mg  F/u 4wks  Pt to email me bp readings in a week

## 2018-09-11 ENCOUNTER — OFFICE VISIT (OUTPATIENT)
Dept: FAMILY MEDICINE CLINIC | Facility: CLINIC | Age: 78
End: 2018-09-11

## 2018-09-11 ENCOUNTER — LAB (OUTPATIENT)
Dept: LAB | Facility: HOSPITAL | Age: 78
End: 2018-09-11

## 2018-09-11 VITALS
HEIGHT: 64 IN | HEART RATE: 74 BPM | DIASTOLIC BLOOD PRESSURE: 88 MMHG | SYSTOLIC BLOOD PRESSURE: 152 MMHG | BODY MASS INDEX: 29.37 KG/M2 | WEIGHT: 172 LBS | OXYGEN SATURATION: 98 %

## 2018-09-11 DIAGNOSIS — E61.1 IRON DEFICIENCY: ICD-10-CM

## 2018-09-11 DIAGNOSIS — Z23 NEED FOR INFLUENZA VACCINATION: ICD-10-CM

## 2018-09-11 DIAGNOSIS — Z13.0 SCREENING, IRON DEFICIENCY ANEMIA: ICD-10-CM

## 2018-09-11 DIAGNOSIS — Z13.29 SCREENING FOR THYROID DISORDER: ICD-10-CM

## 2018-09-11 DIAGNOSIS — R53.83 FATIGUE, UNSPECIFIED TYPE: ICD-10-CM

## 2018-09-11 DIAGNOSIS — E55.9 VITAMIN D DEFICIENCY: ICD-10-CM

## 2018-09-11 DIAGNOSIS — E78.5 DYSLIPIDEMIA: ICD-10-CM

## 2018-09-11 DIAGNOSIS — R06.02 SHORTNESS OF BREATH: ICD-10-CM

## 2018-09-11 DIAGNOSIS — I10 ESSENTIAL HYPERTENSION: Primary | ICD-10-CM

## 2018-09-11 LAB
25(OH)D3 SERPL-MCNC: 20.7 NG/ML
ARTICHOKE IGE QN: 125 MG/DL (ref 0–130)
BASOPHILS # BLD AUTO: 0.04 10*3/MM3 (ref 0–0.2)
BASOPHILS NFR BLD AUTO: 0.5 % (ref 0–1)
CHOLEST SERPL-MCNC: 205 MG/DL (ref 0–200)
DEPRECATED RDW RBC AUTO: 50.1 FL (ref 37–54)
EOSINOPHIL # BLD AUTO: 0.18 10*3/MM3 (ref 0–0.3)
EOSINOPHIL NFR BLD AUTO: 2.3 % (ref 0–3)
ERYTHROCYTE [DISTWIDTH] IN BLOOD BY AUTOMATED COUNT: 14.7 % (ref 11.3–14.5)
HCT VFR BLD AUTO: 48.3 % (ref 34.5–44)
HDLC SERPL-MCNC: 63 MG/DL (ref 40–60)
HGB BLD-MCNC: 15.4 G/DL (ref 11.5–15.5)
IMM GRANULOCYTES # BLD: 0.03 10*3/MM3 (ref 0–0.03)
IMM GRANULOCYTES NFR BLD: 0.4 % (ref 0–0.6)
IRON 24H UR-MRATE: 99 MCG/DL (ref 50–175)
IRON SATN MFR SERPL: 32 % (ref 15–50)
LYMPHOCYTES # BLD AUTO: 1.83 10*3/MM3 (ref 0.6–4.8)
LYMPHOCYTES NFR BLD AUTO: 23.6 % (ref 24–44)
MCH RBC QN AUTO: 29.8 PG (ref 27–31)
MCHC RBC AUTO-ENTMCNC: 31.9 G/DL (ref 32–36)
MCV RBC AUTO: 93.4 FL (ref 80–99)
MONOCYTES # BLD AUTO: 0.54 10*3/MM3 (ref 0–1)
MONOCYTES NFR BLD AUTO: 7 % (ref 0–12)
NEUTROPHILS # BLD AUTO: 5.13 10*3/MM3 (ref 1.5–8.3)
NEUTROPHILS NFR BLD AUTO: 66.2 % (ref 41–71)
PLATELET # BLD AUTO: 249 10*3/MM3 (ref 150–450)
PMV BLD AUTO: 13.3 FL (ref 6–12)
RBC # BLD AUTO: 5.17 10*6/MM3 (ref 3.89–5.14)
TIBC SERPL-MCNC: 313 MCG/DL (ref 250–450)
TRIGL SERPL-MCNC: 167 MG/DL (ref 0–150)
TSH SERPL DL<=0.05 MIU/L-ACNC: 2.89 MIU/ML (ref 0.35–5.35)
VIT B12 BLD-MCNC: 1244 PG/ML (ref 211–911)
WBC NRBC COR # BLD: 7.75 10*3/MM3 (ref 3.5–10.8)

## 2018-09-11 PROCEDURE — 82306 VITAMIN D 25 HYDROXY: CPT

## 2018-09-11 PROCEDURE — 36415 COLL VENOUS BLD VENIPUNCTURE: CPT

## 2018-09-11 PROCEDURE — 84443 ASSAY THYROID STIM HORMONE: CPT

## 2018-09-11 PROCEDURE — 99214 OFFICE O/P EST MOD 30 MIN: CPT | Performed by: INTERNAL MEDICINE

## 2018-09-11 PROCEDURE — 90662 IIV NO PRSV INCREASED AG IM: CPT | Performed by: INTERNAL MEDICINE

## 2018-09-11 PROCEDURE — 82607 VITAMIN B-12: CPT

## 2018-09-11 PROCEDURE — 85025 COMPLETE CBC W/AUTO DIFF WBC: CPT

## 2018-09-11 PROCEDURE — 83550 IRON BINDING TEST: CPT

## 2018-09-11 PROCEDURE — 83540 ASSAY OF IRON: CPT

## 2018-09-11 PROCEDURE — G0008 ADMIN INFLUENZA VIRUS VAC: HCPCS | Performed by: INTERNAL MEDICINE

## 2018-09-11 PROCEDURE — 80061 LIPID PANEL: CPT

## 2018-09-11 RX ORDER — HYDROCHLOROTHIAZIDE 25 MG/1
12.5 TABLET ORAL DAILY
Qty: 90 TABLET | Refills: 3 | Status: SHIPPED | OUTPATIENT
Start: 2018-09-11 | End: 2018-09-11 | Stop reason: SDUPTHER

## 2018-09-11 NOTE — PROGRESS NOTES
78F w/ h/o htn, dyslipidemia is here for f/u .        Htn - on losartan and remained on hctz 12.5mg daily - home readings well controlled mostly.    Ongoing shortness of breath issue - 6mo stable - sporadic at rest or on exertion, sporadic.  Needs to take a deep breath and resolves on own.  No cp.  No palpitaitons/ racing heart, no lightheadedness, no cough.  CXR , labs , ekg within nl 6/18.  Trialed claritin and no difference.  No pnd, orthopnea, no peripheral edema.  -does exercise - walks daily 20-30 min at track at gym, sometimes on treadmill, works out on weight machines - with no sob symptoms.  Does admit to waking one night and feeling she needed to get a deep breath.  She has gained wt over past year.    Had normal/ negative echo, cardiac stress test with imaging in 2016.    Hyperlipidemia - started on rosuvastatin every other day and tolerating thus far.    Patient Active Problem List   Diagnosis   • Hypertension   • Atypical chest pain   • Dyslipidemia   • Osteopenia   • MANAN (stress urinary incontinence, female)      Past Surgical History:   Procedure Laterality Date   • HYSTERECTOMY      age 46 - ovarian cyst, endometriosis,  jorge/bso   • ROTATOR CUFF REPAIR Left         Current Outpatient Prescriptions:   •  cholecalciferol (VITAMIN D3) 1000 units tablet, Take 1,000 Units by mouth Daily., Disp: , Rfl:   •  hydrochlorothiazide (HYDRODIURIL) 25 MG tablet, Take 0.5 tablets by mouth Daily., Disp: 90 tablet, Rfl: 3  •  losartan (COZAAR) 50 MG tablet, Take 1 tablet by mouth Daily., Disp: 90 tablet, Rfl: 3  •  magnesium oxide (MAGOX) 400 (241.3 MG) MG tablet tablet, Take 400 mg by mouth Daily., Disp: , Rfl:   •  Omega-3 Fatty Acids (OMEGA 3 PO), Take 1 tablet by mouth Daily., Disp: , Rfl:    Allergies   Allergen Reactions   • Pravastatin Myalgia     Social History     Social History   • Marital status:      Spouse name: N/A   • Number of children: 2   • Years of education: N/A     Occupational History   •  "Not on file.     Social History Main Topics   • Smoking status: Never Smoker   • Smokeless tobacco: Never Used   • Alcohol use Yes      Comment: occassionally    • Drug use: No   • Sexual activity: Defer     Other Topics Concern   • Not on file     Social History Narrative    5/18:     since 2013    2 kids:    Best Hernandez - [place]    Tiana Olvera [place], [place] - medical POA    2 gk    Hobbies: volunteers [employer], involved in [employer]    Exercise: yes 2-5d/wk    Dental: utd    Eye: utd      Family History   Problem Relation Age of Onset   • Heart attack Mother       /88   Pulse 74   Ht 162.6 cm (64\")   Wt 78 kg (172 lb)   SpO2 98%   BMI 29.52 kg/m²   Rpt bp similar  Gen: well appearing in nad, no resp effort  Eyes: conjunctiva clear, perrl, eomi  ENT: mmm, no thyromegaly, no lymphadenopathy  CV: s1, s2 reg no m/r/g, no bruits, no jvd  No peripheral edema, pedal pulses intact  Resp:  clear b/l no w/r/r  GI:  soft nt/nd  Skin: no clubbing or cyanosis  Neuro: no focal deficits.    Lab Results   Component Value Date    GLUCOSE 96 05/15/2018    BUN 17 05/15/2018    CREATININE 0.80 05/15/2018    EGFRIFNONA 69 05/15/2018    BCR 21.3 05/15/2018    K 4.3 05/15/2018    CO2 29.0 05/15/2018    CALCIUM 9.4 05/15/2018    ALBUMIN 4.50 05/15/2018    AST 19 05/15/2018    ALT 20 05/15/2018     Lab Results   Component Value Date    WBC 7.34 05/15/2018    HGB 14.8 05/15/2018    HCT 45.3 (H) 05/15/2018    MCV 90.6 05/15/2018     05/15/2018     Lab Results   Component Value Date    CHOL 299 (H) 05/15/2018    TRIG 162 (H) 05/15/2018    HDL 64 (H) 05/15/2018     (H) 05/15/2018       Lab Results   Component Value Date    TSH 2.722 05/15/2018         A/P    Problems Addressed this Visit        Cardiovascular and Mediastinum    Hypertension - Primary - high in office, high at home past 2d, previously home readings at goal on losartan 50mg and hctz 12.5mg daily - no med change today, continue home monitoring " and f/u in 4wks    Relevant Medications    hydrochlorothiazide (HYDRODIURIL) 25 MG tablet       Other    Dyslipidemia - cont statin, advance to daily use, check lipids today    Relevant Orders    Lipid panel      Other Visit Diagnoses     Need for influenza vaccination    - given    Shortness of breath    - ? Related to wt gain - counseled on wt, bmi, exercise, diet, goals ofr wt loss; offered nutrition, pt declines and will focus/ work on wt loss on her own  -will trial antihistamine zyrtec or allergra in case allergy related  -advised f/u with her cardiologist, although doubt cardiac cause  -consider further pulmonary testing - pfts - would r/o obstructive disease and may be c/w restricitive disease related to wt.    Relevant Orders    TSH    CBC w AUTO Differential    Vitamin B12    Vitamin D 25 Hydroxy    Iron and TIBC    Screening for thyroid disorder        Relevant Orders    TSH    Vitamin D deficiency        Relevant Orders    Vitamin D 25 Hydroxy    Screening, iron deficiency anemia        Relevant Orders    Iron and TIBC    Fatigue, unspecified type        Relevant Orders    TSH    Iron and TIBC    Iron deficiency - rule out        Relevant Orders    Iron and TIBC

## 2018-10-04 ENCOUNTER — OFFICE VISIT (OUTPATIENT)
Dept: CARDIOLOGY | Facility: CLINIC | Age: 78
End: 2018-10-04

## 2018-10-04 VITALS
HEART RATE: 86 BPM | HEIGHT: 64 IN | DIASTOLIC BLOOD PRESSURE: 77 MMHG | WEIGHT: 165 LBS | BODY MASS INDEX: 28.17 KG/M2 | SYSTOLIC BLOOD PRESSURE: 156 MMHG

## 2018-10-04 DIAGNOSIS — R07.89 ATYPICAL CHEST PAIN: ICD-10-CM

## 2018-10-04 DIAGNOSIS — I10 ESSENTIAL HYPERTENSION: ICD-10-CM

## 2018-10-04 DIAGNOSIS — R06.02 SOB (SHORTNESS OF BREATH): Primary | ICD-10-CM

## 2018-10-04 DIAGNOSIS — E78.5 DYSLIPIDEMIA: ICD-10-CM

## 2018-10-04 PROCEDURE — 99214 OFFICE O/P EST MOD 30 MIN: CPT | Performed by: INTERNAL MEDICINE

## 2018-10-04 RX ORDER — ALBUTEROL SULFATE 90 UG/1
2 AEROSOL, METERED RESPIRATORY (INHALATION) EVERY 4 HOURS PRN
Qty: 1 INHALER | Refills: 5 | Status: SHIPPED | OUTPATIENT
Start: 2018-10-04 | End: 2019-05-31

## 2018-10-04 RX ORDER — ROSUVASTATIN CALCIUM 10 MG/1
10 TABLET, COATED ORAL EVERY OTHER DAY
Status: ON HOLD | COMMUNITY
End: 2019-01-18

## 2018-10-04 RX ORDER — LOSARTAN POTASSIUM 100 MG/1
100 TABLET ORAL DAILY
Qty: 90 TABLET | Refills: 3 | Status: SHIPPED | OUTPATIENT
Start: 2018-10-04 | End: 2019-03-29 | Stop reason: SDUPTHER

## 2018-10-04 NOTE — PROGRESS NOTES
Subjective:     Encounter Date:10/04/2018 - [facility]    Patient ID: Sheree Hernandez is a 78 y.o.  white female, housewife/retired , from [place], [place].     FORMER INTERNIST: Terell Burciaga MD  CURRENT INTERNIST:  Tammy Rm DO  PREVIOUS CARDIOLOGIST: Efren Healy MD, [facility]    Chief Complaint:   Chief Complaint   Patient presents with   • Atypical chest pain   • Shortness of Breath   • Hypertension     Problem List:  1. 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 Remote normal coronary CTA calcium score (0), January 2010.  b. Remote acceptable echocardiogram with mild TR and diastolic LV dysfunction, November 2015.  c. Acceptable nuclear stress test with low probability of obstructive disease, LVEF of 0.79, February 2016.  d. Residual CCS class 1 angina pectoris/NYHA class 2 exertional dyspnea and fatigue.  2. Hypertension - probably essential with recent hypertensive blood pressure readings.  3. Hyperlipidemia with uncontrolled hypercholesterolemia; 10-year risk 46.1%, 16.6% with treatment.  4. Indigestion - probable GERD syndrome.  5. Remote presyncope.  a. Acceptable head CT scan without contrast and junctional rhythm (November 2015 with [facility] evaluation)  b. Negative acceptable tilt table test study.  c. Acceptable 24 hour ambulatory blood pressure monitor.  d. Acceptable 48 hour Holter monitor  e. No recurrence.  6. Past surgical history of hysterectomy.      Allergies   Allergen Reactions   • Pravastatin Myalgia         Current Outpatient Prescriptions:   •  Calcium-Magnesium-Vitamin D (CALCIUM 500 PO), Take 1 tablet by mouth Daily., Disp: , Rfl:   •  cholecalciferol (VITAMIN D3) 1000 units tablet, Take 1,000 Units by mouth Daily., Disp: , Rfl:   •  hydrochlorothiazide (HYDRODIURIL) 25 MG tablet, Take 0.5 tablets by mouth Daily. (Patient taking differently: Take 25 mg by mouth Daily.), Disp: 90 tablet, Rfl: 3  •  losartan (COZAAR) 50 MG tablet, Take 1 tablet  "by mouth Daily., Disp: 90 tablet, Rfl: 3  •  magnesium oxide (MAGOX) 400 (241.3 MG) MG tablet tablet, Take 400 mg by mouth Daily., Disp: , Rfl:   •  Omega-3 Fatty Acids (OMEGA 3 PO), Take 1 tablet by mouth Daily., Disp: , Rfl:   •  rosuvastatin (CRESTOR) 10 MG tablet, Take 10 mg by mouth Every Other Day., Disp: , Rfl:     HISTORY OF PRESENT ILLNESS: Patient returns for followup after a 13-month hiatus. She states that over the past 6 months, she has noticed more shortness of breath.  At first, she was attributing the shortness of breath to allergies.  She denies any fever, chills, nasal congestion, edema, palpitations, presyncope, syncope, chest pressure, or sputum production.  She has felt some tightness around her ribs on both sides.  She denies any orthopnea or difficulties sleeping. She just had laboratory testing with her new physician with no signs of anemia.  Her chest x-ray was nominal. She brought her blood pressure log with her today for us to review.  Most of her values were around 130-140 systolic.  She has had intermittent episodes of systolic blood pressures in the 150s-160s.  Patient otherwise denies chest pain, unrelieved shortness of breath, PND, edema, palpitations, syncope or presyncope at this time on limited activity.      Review of Systems   Respiratory: Positive for shortness of breath, sleep disturbances due to breathing and wheezing.    Musculoskeletal: Positive for neck pain and stiffness.   Gastrointestinal: Positive for bloating.      Obtained and otherwise negative except as outlined in problem list and HPI.    Procedures       Objective:       Vitals:    10/04/18 1354 10/04/18 1402   BP: 156/70 156/77   BP Location: Left arm Left arm   Patient Position: Sitting Standing   Pulse: 73 86   Weight: 74.8 kg (165 lb)    Height: 162.6 cm (64\")    recheck blood pressure right arm sitting was 150/84  Body mass index is 28.32 kg/m².   Last weight:  158 lbs.    Physical Exam   Constitutional: She " is oriented to person, place, and time. She appears well-developed and well-nourished.   Neck: No JVD present. Carotid bruit is not present. No thyromegaly present.   Cardiovascular: Regular rhythm, S1 normal, S2 normal and normal heart sounds.  Exam reveals no gallop, no S3 and no friction rub.    No murmur heard.  Pulses:       Dorsalis pedis pulses are 2+ on the right side, and 2+ on the left side.        Posterior tibial pulses are 2+ on the right side, and 2+ on the left side.   Pulmonary/Chest: Effort normal. She has decreased breath sounds. She has no wheezes. She has no rhonchi. She has no rales.   Abdominal: Soft. She exhibits no mass. There is no hepatosplenomegaly. There is no tenderness. There is no guarding.   Bowel sounds audible x4   Musculoskeletal: Normal range of motion. She exhibits no edema.   Lymphadenopathy:     She has no cervical adenopathy.   Neurological: She is alert and oriented to person, place, and time.   Skin: Skin is warm, dry and intact. No rash noted.   Vitals reviewed.        Lab Review:   Lab Results   Component Value Date    GLUCOSE 96 05/15/2018    BUN 17 05/15/2018    CREATININE 0.80 05/15/2018    EGFRIFNONA 69 05/15/2018    BCR 21.3 05/15/2018    CO2 29.0 05/15/2018    CALCIUM 9.4 05/15/2018    ALBUMIN 4.50 05/15/2018    AST 19 05/15/2018    ALT 20 05/15/2018   Sodium - 143  Potassium - 4.3  Chloride - 109    Lab Results   Component Value Date    WBC 7.75 09/11/2018    HGB 15.4 09/11/2018    HCT 48.3 (H) 09/11/2018    MCV 93.4 09/11/2018     09/11/2018       Lab Results   Component Value Date    TSH 2.892 09/11/2018       Lab Results   Component Value Date    CHOL 205 (H) 09/11/2018    CHOL 299 (H) 05/15/2018     Lab Results   Component Value Date    TRIG 167 (H) 09/11/2018    TRIG 162 (H) 05/15/2018     Lab Results   Component Value Date    HDL 63 (H) 09/11/2018    HDL 64 (H) 05/15/2018     Lab Results   Component Value Date     09/11/2018     (H)  05/15/2018     09/11/2018:  · Iron - 99  · TIBC - 313  · Iron saturation - 32%  · Vitamin D - 20.7  · Vitamin B12 - 1,244    Chest x-ray 6/12/18: No evidence of active chest disease      Assessment:   Overall continued acceptable course with no interim cardiopulmonary complaints with fair functional status. We will order an echocardiogram to assess her shortness of breath and a BNP and d-dimer to rule out heart failure or clot formation as well as CPK and sed rate.  We will provide her with an albuterol inhaler to use when necessary for increased shortness of breath.  Her hypertension is uncontrolled, and we will increase her losartan to 100 mg daily. She had an acceptable stress test 2 years ago.     Diagnosis Plan   1. SOB (shortness of breath)  Echocardiogram, albuterol inhaler, BNP, d dimer, CPK, sed rate   2. Atypical chest pain  Stable   3. Essential hypertension  Increase losartan to 100 mg daily   4. Dyslipidemia  Improved in last 6 months          Plan:         1. Patient to continue current medications and close follow up with the above providers other than to alter her losartan to 100 mg daily and obtain the following results:  A. BNP, D-dimer, CPK, sed rate  B. Echocardiogram  2. Tentative cardiology follow up in January 2019, or patient may return sooner PRN.  3. Albuterol inhaler  4. Increase losartan to 100 mg daily       Scribed for Davidson Sam MD by Zayda Guevara, APRN. 10/4/2018  2:26 PM    IDavidson MD, [facility], personally performed the services described in this documentation as scribed by the above named individual in my presence, and it is both accurate and complete. At 3:29 PM on 10/04/2018

## 2018-10-09 ENCOUNTER — RESULTS ENCOUNTER (OUTPATIENT)
Dept: CARDIOLOGY | Facility: CLINIC | Age: 78
End: 2018-10-09

## 2018-10-09 DIAGNOSIS — R06.02 SOB (SHORTNESS OF BREATH): ICD-10-CM

## 2018-10-24 ENCOUNTER — LAB (OUTPATIENT)
Dept: LAB | Facility: HOSPITAL | Age: 78
End: 2018-10-24

## 2018-10-24 ENCOUNTER — HOSPITAL ENCOUNTER (OUTPATIENT)
Dept: CARDIOLOGY | Facility: HOSPITAL | Age: 78
Discharge: HOME | End: 2018-10-24
Attending: INTERNAL MEDICINE | Admitting: INTERNAL MEDICINE

## 2018-10-24 VITALS — WEIGHT: 170 LBS | HEIGHT: 64 IN | BODY MASS INDEX: 29.02 KG/M2

## 2018-10-24 DIAGNOSIS — R07.89 ATYPICAL CHEST PAIN: ICD-10-CM

## 2018-10-24 DIAGNOSIS — I10 ESSENTIAL HYPERTENSION: ICD-10-CM

## 2018-10-24 DIAGNOSIS — R06.02 SOB (SHORTNESS OF BREATH): ICD-10-CM

## 2018-10-24 DIAGNOSIS — E78.5 DYSLIPIDEMIA: ICD-10-CM

## 2018-10-24 LAB
BNP SERPL-MCNC: 58 PG/ML (ref 0–100)
CK SERPL-CCNC: 84 U/L (ref 26–174)
D DIMER PPP FEU-MCNC: 1.19 MG/L (FEU) (ref 0–0.5)
ERYTHROCYTE [SEDIMENTATION RATE] IN BLOOD: 23 MM/HR (ref 0–30)

## 2018-10-24 PROCEDURE — 83880 ASSAY OF NATRIURETIC PEPTIDE: CPT | Performed by: INTERNAL MEDICINE

## 2018-10-24 PROCEDURE — 93306 TTE W/DOPPLER COMPLETE: CPT

## 2018-10-24 PROCEDURE — 93306 TTE W/DOPPLER COMPLETE: CPT | Performed by: INTERNAL MEDICINE

## 2018-10-24 PROCEDURE — 85379 FIBRIN DEGRADATION QUANT: CPT

## 2018-10-24 PROCEDURE — 36415 COLL VENOUS BLD VENIPUNCTURE: CPT | Performed by: INTERNAL MEDICINE

## 2018-10-24 PROCEDURE — 82550 ASSAY OF CK (CPK): CPT

## 2018-10-24 PROCEDURE — 85652 RBC SED RATE AUTOMATED: CPT

## 2018-10-25 LAB
BH CV ECHO MEAS - AO ROOT AREA (BSA CORRECTED): 1.5
BH CV ECHO MEAS - AO ROOT AREA: 6.2 CM^2
BH CV ECHO MEAS - AO ROOT DIAM: 2.8 CM
BH CV ECHO MEAS - BSA(HAYCOCK): 1.9 M^2
BH CV ECHO MEAS - BSA: 1.8 M^2
BH CV ECHO MEAS - BZI_BMI: 29.2 KILOGRAMS/M^2
BH CV ECHO MEAS - BZI_METRIC_HEIGHT: 162.6 CM
BH CV ECHO MEAS - BZI_METRIC_WEIGHT: 77.1 KG
BH CV ECHO MEAS - EDV(CUBED): 35.9 ML
BH CV ECHO MEAS - EDV(MOD-SP2): 30 ML
BH CV ECHO MEAS - EDV(MOD-SP4): 38 ML
BH CV ECHO MEAS - EDV(TEICH): 44.1 ML
BH CV ECHO MEAS - EF(CUBED): 67.5 %
BH CV ECHO MEAS - EF(MOD-BP): 64 %
BH CV ECHO MEAS - EF(MOD-SP2): 63.3 %
BH CV ECHO MEAS - EF(MOD-SP4): 60.5 %
BH CV ECHO MEAS - EF(TEICH): 60.3 %
BH CV ECHO MEAS - ESV(CUBED): 11.7 ML
BH CV ECHO MEAS - ESV(MOD-SP2): 11 ML
BH CV ECHO MEAS - ESV(MOD-SP4): 15 ML
BH CV ECHO MEAS - ESV(TEICH): 17.5 ML
BH CV ECHO MEAS - FS: 31.2 %
BH CV ECHO MEAS - IVS/LVPW: 1
BH CV ECHO MEAS - IVSD: 1.2 CM
BH CV ECHO MEAS - LA DIMENSION: 3.2 CM
BH CV ECHO MEAS - LA/AO: 1.1
BH CV ECHO MEAS - LAD MAJOR: 5.2 CM
BH CV ECHO MEAS - LAT PEAK E' VEL: 11.6 CM/SEC
BH CV ECHO MEAS - LATERAL E/E' RATIO: 8.7
BH CV ECHO MEAS - LV DIASTOLIC VOL/BSA (35-75): 20.8 ML/M^2
BH CV ECHO MEAS - LV MASS(C)D: 116.8 GRAMS
BH CV ECHO MEAS - LV MASS(C)DI: 64 GRAMS/M^2
BH CV ECHO MEAS - LV SYSTOLIC VOL/BSA (12-30): 8.2 ML/M^2
BH CV ECHO MEAS - LVIDD: 3.3 CM
BH CV ECHO MEAS - LVIDS: 2.3 CM
BH CV ECHO MEAS - LVLD AP2: 6 CM
BH CV ECHO MEAS - LVLD AP4: 6.6 CM
BH CV ECHO MEAS - LVLS AP2: 5.6 CM
BH CV ECHO MEAS - LVLS AP4: 5.1 CM
BH CV ECHO MEAS - LVPWD: 1.2 CM
BH CV ECHO MEAS - MED PEAK E' VEL: 5.6 CM/SEC
BH CV ECHO MEAS - MEDIAL E/E' RATIO: 18.1
BH CV ECHO MEAS - MV A MAX VEL: 99.2 CM/SEC
BH CV ECHO MEAS - MV DEC SLOPE: 278 CM/SEC^2
BH CV ECHO MEAS - MV DEC TIME: 0.19 SEC
BH CV ECHO MEAS - MV E MAX VEL: 101 CM/SEC
BH CV ECHO MEAS - MV E/A: 1
BH CV ECHO MEAS - PA ACC SLOPE: 523.5 CM/SEC^2
BH CV ECHO MEAS - PA ACC TIME: 0.14 SEC
BH CV ECHO MEAS - PA PR(ACCEL): 15.8 MMHG
BH CV ECHO MEAS - PULM DIAS VEL: 48.9 CM/SEC
BH CV ECHO MEAS - PULM S/D: 1.4
BH CV ECHO MEAS - PULM SYS VEL: 67.6 CM/SEC
BH CV ECHO MEAS - RAP SYSTOLE: 5 MMHG
BH CV ECHO MEAS - RVDD: 2.3 CM
BH CV ECHO MEAS - RVSP: 24 MMHG
BH CV ECHO MEAS - SI(CUBED): 13.3 ML/M^2
BH CV ECHO MEAS - SI(MOD-SP2): 10.4 ML/M^2
BH CV ECHO MEAS - SI(MOD-SP4): 12.6 ML/M^2
BH CV ECHO MEAS - SI(TEICH): 14.6 ML/M^2
BH CV ECHO MEAS - SV(CUBED): 24.2 ML
BH CV ECHO MEAS - SV(MOD-SP2): 19 ML
BH CV ECHO MEAS - SV(MOD-SP4): 23 ML
BH CV ECHO MEAS - SV(TEICH): 26.6 ML
BH CV ECHO MEAS - TAPSE (>1.6): 2.1 CM2
BH CV ECHO MEAS - TR MAX PG: 19 MMHG
BH CV ECHO MEAS - TR MAX VEL: 220 CM/SEC
BH CV ECHO MEASUREMENTS AVERAGE E/E' RATIO: 11.74
BH CV VAS BP RIGHT ARM: NORMAL MMHG
BH CV XLRA - RV BASE: 2.9 CM
BH CV XLRA - RV LENGTH: 5.6 CM
BH CV XLRA - RV MID: 2.4 CM
BH CV XLRA - TDI S': 10.9 CM/SEC
LEFT ATRIUM VOLUME INDEX: 20.8 ML/M^2
LV EF 2D ECHO EST: 60 %
MAXIMAL PREDICTED HEART RATE: 141 BPM
STRESS TARGET HR: 120 BPM

## 2018-10-31 ENCOUNTER — LAB (OUTPATIENT)
Dept: LAB | Facility: HOSPITAL | Age: 78
End: 2018-10-31

## 2018-10-31 ENCOUNTER — HOSPITAL ENCOUNTER (OUTPATIENT)
Dept: CT IMAGING | Facility: HOSPITAL | Age: 78
Discharge: HOME | End: 2018-10-31
Attending: INTERNAL MEDICINE | Admitting: INTERNAL MEDICINE

## 2018-10-31 ENCOUNTER — OFFICE VISIT (OUTPATIENT)
Dept: FAMILY MEDICINE CLINIC | Facility: CLINIC | Age: 78
End: 2018-10-31

## 2018-10-31 VITALS
OXYGEN SATURATION: 98 % | DIASTOLIC BLOOD PRESSURE: 86 MMHG | WEIGHT: 171 LBS | HEART RATE: 71 BPM | BODY MASS INDEX: 29.19 KG/M2 | HEIGHT: 64 IN | SYSTOLIC BLOOD PRESSURE: 124 MMHG

## 2018-10-31 DIAGNOSIS — R06.02 SOB (SHORTNESS OF BREATH): Primary | ICD-10-CM

## 2018-10-31 DIAGNOSIS — R06.02 SOB (SHORTNESS OF BREATH): ICD-10-CM

## 2018-10-31 DIAGNOSIS — R79.89 ELEVATED D-DIMER: ICD-10-CM

## 2018-10-31 DIAGNOSIS — I10 ESSENTIAL HYPERTENSION: ICD-10-CM

## 2018-10-31 DIAGNOSIS — D75.1 POLYCYTHEMIA: ICD-10-CM

## 2018-10-31 LAB
ALBUMIN SERPL-MCNC: 4.57 G/DL (ref 3.2–4.8)
ALBUMIN/GLOB SERPL: 2.3 G/DL (ref 1.5–2.5)
ALP SERPL-CCNC: 121 U/L (ref 25–100)
ALT SERPL W P-5'-P-CCNC: 47 U/L (ref 7–40)
ANION GAP SERPL CALCULATED.3IONS-SCNC: 4 MMOL/L (ref 3–11)
AST SERPL-CCNC: 34 U/L (ref 0–33)
BASOPHILS # BLD AUTO: 0.05 10*3/MM3 (ref 0–0.2)
BASOPHILS NFR BLD AUTO: 0.7 % (ref 0–1)
BILIRUB SERPL-MCNC: 0.7 MG/DL (ref 0.3–1.2)
BUN BLD-MCNC: 17 MG/DL (ref 9–23)
BUN/CREAT SERPL: 20 (ref 7–25)
CALCIUM SPEC-SCNC: 9.4 MG/DL (ref 8.7–10.4)
CHLORIDE SERPL-SCNC: 107 MMOL/L (ref 99–109)
CO2 SERPL-SCNC: 29 MMOL/L (ref 20–31)
CREAT BLD-MCNC: 0.85 MG/DL (ref 0.6–1.3)
DEPRECATED RDW RBC AUTO: 50.1 FL (ref 37–54)
EOSINOPHIL # BLD AUTO: 0.16 10*3/MM3 (ref 0–0.3)
EOSINOPHIL NFR BLD AUTO: 2.3 % (ref 0–3)
ERYTHROCYTE [DISTWIDTH] IN BLOOD BY AUTOMATED COUNT: 14.8 % (ref 11.3–14.5)
GFR SERPL CREATININE-BSD FRML MDRD: 65 ML/MIN/1.73
GLOBULIN UR ELPH-MCNC: 2 GM/DL
GLUCOSE BLD-MCNC: 101 MG/DL (ref 70–100)
HCT VFR BLD AUTO: 46.3 % (ref 34.5–44)
HGB BLD-MCNC: 15.1 G/DL (ref 11.5–15.5)
IMM GRANULOCYTES # BLD: 0.03 10*3/MM3 (ref 0–0.03)
IMM GRANULOCYTES NFR BLD: 0.4 % (ref 0–0.6)
LYMPHOCYTES # BLD AUTO: 1.78 10*3/MM3 (ref 0.6–4.8)
LYMPHOCYTES NFR BLD AUTO: 25 % (ref 24–44)
MCH RBC QN AUTO: 30.2 PG (ref 27–31)
MCHC RBC AUTO-ENTMCNC: 32.6 G/DL (ref 32–36)
MCV RBC AUTO: 92.6 FL (ref 80–99)
MONOCYTES # BLD AUTO: 0.69 10*3/MM3 (ref 0–1)
MONOCYTES NFR BLD AUTO: 9.7 % (ref 0–12)
NEUTROPHILS # BLD AUTO: 4.43 10*3/MM3 (ref 1.5–8.3)
NEUTROPHILS NFR BLD AUTO: 62.3 % (ref 41–71)
PLATELET # BLD AUTO: 266 10*3/MM3 (ref 150–450)
PMV BLD AUTO: 13.3 FL (ref 6–12)
POTASSIUM BLD-SCNC: 4.3 MMOL/L (ref 3.5–5.5)
PROT SERPL-MCNC: 6.6 G/DL (ref 5.7–8.2)
RBC # BLD AUTO: 5 10*6/MM3 (ref 3.89–5.14)
SODIUM BLD-SCNC: 140 MMOL/L (ref 132–146)
WBC NRBC COR # BLD: 7.11 10*3/MM3 (ref 3.5–10.8)

## 2018-10-31 PROCEDURE — 99214 OFFICE O/P EST MOD 30 MIN: CPT | Performed by: INTERNAL MEDICINE

## 2018-10-31 PROCEDURE — 85025 COMPLETE CBC W/AUTO DIFF WBC: CPT | Performed by: INTERNAL MEDICINE

## 2018-10-31 PROCEDURE — 36415 COLL VENOUS BLD VENIPUNCTURE: CPT

## 2018-10-31 PROCEDURE — 71275 CT ANGIOGRAPHY CHEST: CPT

## 2018-10-31 PROCEDURE — 0 IOPAMIDOL PER 1 ML: Performed by: INTERNAL MEDICINE

## 2018-10-31 PROCEDURE — 80053 COMPREHEN METABOLIC PANEL: CPT | Performed by: INTERNAL MEDICINE

## 2018-10-31 RX ORDER — HYDROCHLOROTHIAZIDE 25 MG/1
25 TABLET ORAL DAILY
Qty: 90 TABLET | Refills: 1 | Status: SHIPPED | OUTPATIENT
Start: 2018-10-31 | End: 2019-10-22 | Stop reason: SDUPTHER

## 2018-10-31 RX ADMIN — IOPAMIDOL 75 ML: 755 INJECTION, SOLUTION INTRAVENOUS at 14:27

## 2018-10-31 NOTE — PATIENT INSTRUCTIONS
For the sob - labs today, ct chest ordered    For the elevated blood pressure - increase the hydrochlorothiazide  To 25mg daily.  Can use up what you have by doubling up.    Your red blood cell count is mildly elevated - polycythemia    Take flonase and claritin daily

## 2018-10-31 NOTE — PROGRESS NOTES
78F here for f/u sob.  Going on per pt x months, first discussed with me at our 9/11/18 appointment.    -6mo h/o feeling she can not get a full deep breath.  No worse with exercise or rest.  Now states constant.  But waxes and wanes.  No cough, no wheezing.  Albuterol inhaler did not make a difference.  Did try claritin without change.  Denies cp, palpitaitons/ reacing heart.    Denies congestion, pnd.  She has much anxiety over these sx.    -htn - losartan increase to 100mg , brings readings and 1/2 are still over goal 140s-150 systolic.    -seen by Dr. Sam - echo fairly normal, did show mild LVH  ddimer last week was positive      Answers for HPI/ROS submitted by the patient on 10/29/2018   Shortness of breath  Chronicity: chronic  Onset: more than 1 month ago  Frequency: every few minutes  Progression since onset: waxing and waning  Episode duration: 10 minutes  abdominal pain: No  chest pain: Yes  claudication: No  coryza: Yes  ear pain: No  fever: No  headaches: No  hemoptysis: No  leg pain: No  leg swelling: No  neck pain: Yes  orthopnea: Yes  PND: No  rash: No  rhinorrhea: Yes  sore throat: No  sputum production: Yes  swollen glands: No  syncope: No  vomiting: No  wheezing: Yes  Aggravating factors: occupational exposure, any activity      Patient Active Problem List   Diagnosis   • Hypertension   • Atypical chest pain   • Dyslipidemia   • Osteopenia   • MANAN (stress urinary incontinence, female)   • SOB (shortness of breath)      Past Surgical History:   Procedure Laterality Date   • HYSTERECTOMY      age 46 - ovarian cyst, endometriosis,  jorge/bso   • ROTATOR CUFF REPAIR Left         Current Outpatient Prescriptions:   •  albuterol (PROVENTIL HFA;VENTOLIN HFA) 108 (90 Base) MCG/ACT inhaler, Inhale 2 puffs Every 4 (Four) Hours As Needed for Wheezing., Disp: 1 inhaler, Rfl: 5  •  Calcium-Magnesium-Vitamin D (CALCIUM 500 PO), Take 1 tablet by mouth Daily., Disp: , Rfl:   •  cholecalciferol (VITAMIN D3) 1000  "units tablet, Take 1,000 Units by mouth Daily., Disp: , Rfl:   •  hydrochlorothiazide (HYDRODIURIL) 25 MG tablet, Take 1 tablet by mouth Daily., Disp: 90 tablet, Rfl: 1  •  losartan (COZAAR) 100 MG tablet, Take 1 tablet by mouth Daily., Disp: 90 tablet, Rfl: 3  •  magnesium oxide (MAGOX) 400 (241.3 MG) MG tablet tablet, Take 400 mg by mouth Daily., Disp: , Rfl:   •  Omega-3 Fatty Acids (OMEGA 3 PO), Take 1 tablet by mouth Daily., Disp: , Rfl:   •  rosuvastatin (CRESTOR) 10 MG tablet, Take 10 mg by mouth Every Other Day., Disp: , Rfl:   No current facility-administered medications for this visit.    Allergies   Allergen Reactions   • Pravastatin Myalgia     Social History     Social History   • Marital status:      Spouse name: N/A   • Number of children: 2   • Years of education: N/A     Occupational History   • Not on file.     Social History Main Topics   • Smoking status: Never Smoker   • Smokeless tobacco: Never Used   • Alcohol use Yes      Comment: occas   • Drug use: No   • Sexual activity: Defer     Other Topics Concern   • Not on file     Social History Narrative    5/18:     since 2013    2 kids:    [facility]    Tiana Olvera [place], [place] - [facility] POA    2 gk    Hobbies: volunteers [employer], involved in [employer]    Exercise: yes 2-5d/wk    Dental: utd    Eye: utd      Family History   Problem Relation Age of Onset   • Heart attack Mother    • No Known Problems Father       /86   Pulse 71   Ht 162 cm (63.78\")   Wt 77.6 kg (171 lb)   SpO2 98%   BMI 29.55 kg/m²   Gen: well appearing in nad, no resp effort  Eyes: conjunctiva clear, perrl, eomi  ENT: mmm, no thyromegaly, no lymphadenopathy  CV: s1, s2 reg no m/r/g, no bruits, no jvd  No peripheral edema, pedal pulses intact  Resp:  clear b/l no w/r/r  GI:  soft nt/nd  Skin: no clubbing or cyanosis  Neuro: no focal deficits.        A/P    1. SOB (shortness of breath)    2. Elevated d-dimer    3. Polycythemia        Ongoing " sob issue unclear cause  Given elevated ddimer will move on to cta chest stat  Cmp, cbc today  F/u with me in 1wk sooner as needed  If all negative, will get full pfts and pulmonary referral.    Htn - increase hctz to 25mg daily    F/u 1wk.

## 2018-11-01 DIAGNOSIS — R06.02 SOB (SHORTNESS OF BREATH): Primary | ICD-10-CM

## 2018-11-05 ENCOUNTER — HOSPITAL ENCOUNTER (OUTPATIENT)
Dept: PULMONOLOGY | Facility: HOSPITAL | Age: 78
Discharge: HOME | End: 2018-11-05
Attending: INTERNAL MEDICINE | Admitting: INTERNAL MEDICINE

## 2018-11-05 DIAGNOSIS — R06.02 SOB (SHORTNESS OF BREATH): ICD-10-CM

## 2018-11-05 PROCEDURE — 94729 DIFFUSING CAPACITY: CPT | Performed by: INTERNAL MEDICINE

## 2018-11-05 PROCEDURE — 94010 BREATHING CAPACITY TEST: CPT | Performed by: INTERNAL MEDICINE

## 2018-11-05 PROCEDURE — 94010 BREATHING CAPACITY TEST: CPT

## 2018-11-05 PROCEDURE — 94729 DIFFUSING CAPACITY: CPT

## 2018-11-05 PROCEDURE — 94727 GAS DIL/WSHOT DETER LNG VOL: CPT

## 2018-11-05 PROCEDURE — 94727 GAS DIL/WSHOT DETER LNG VOL: CPT | Performed by: INTERNAL MEDICINE

## 2018-11-12 ENCOUNTER — TELEPHONE (OUTPATIENT)
Dept: FAMILY MEDICINE CLINIC | Facility: CLINIC | Age: 78
End: 2018-11-12

## 2018-11-12 DIAGNOSIS — R06.02 SOB (SHORTNESS OF BREATH): Primary | ICD-10-CM

## 2018-11-13 NOTE — TELEPHONE ENCOUNTER
Pt called back to discuss lab results , to discuss lab results. Pt stated that she understood. Pt stated that Dr Sam considered wanting to do a bilateral lower extremity duplex venous study if she continued to have SOB. she states that is still having SOB and She wants to know if you recommend her to have the test.

## 2018-11-13 NOTE — TELEPHONE ENCOUNTER
Please read her the letters sent on 10/31/ and 11/7 regarding labs and pulmonary function testing.  She should have gotten notification in my chart and have been able to read letter.  Print letters and send them if she does not have access.  Overall labs stable and ok - a minimal elevation in one liver enzyme and slight elevation in her hematocrit (red blood cells) that are not concerning - but that I will suggest we follow up on in about 6 months.    Tammy

## 2018-11-13 NOTE — TELEPHONE ENCOUNTER
Called pt, explained that Dr. Rm has put the order in and that someone from referral will be in contact with her within the week. Pt understood.

## 2018-11-16 ENCOUNTER — HOSPITAL ENCOUNTER (OUTPATIENT)
Dept: CARDIOLOGY | Facility: HOSPITAL | Age: 78
Discharge: HOME | End: 2018-11-16
Attending: INTERNAL MEDICINE | Admitting: INTERNAL MEDICINE

## 2018-11-16 VITALS — WEIGHT: 171 LBS | BODY MASS INDEX: 29.19 KG/M2 | HEIGHT: 64 IN

## 2018-11-16 DIAGNOSIS — R06.02 SOB (SHORTNESS OF BREATH): ICD-10-CM

## 2018-11-16 LAB
BH CV ECHO MEAS - BSA(HAYCOCK): 1.9 M^2
BH CV ECHO MEAS - BSA: 1.8 M^2
BH CV ECHO MEAS - BZI_BMI: 29.4 KILOGRAMS/M^2
BH CV ECHO MEAS - BZI_METRIC_HEIGHT: 162.6 CM
BH CV ECHO MEAS - BZI_METRIC_WEIGHT: 77.6 KG
BH CV LOWER VASCULAR LEFT COMMON FEMORAL AUGMENT: NORMAL
BH CV LOWER VASCULAR LEFT COMMON FEMORAL COMPRESS: NORMAL
BH CV LOWER VASCULAR LEFT COMMON FEMORAL PHASIC: NORMAL
BH CV LOWER VASCULAR LEFT COMMON FEMORAL SPONT: NORMAL
BH CV LOWER VASCULAR LEFT DISTAL FEMORAL AUGMENT: NORMAL
BH CV LOWER VASCULAR LEFT DISTAL FEMORAL COMPRESS: NORMAL
BH CV LOWER VASCULAR LEFT DISTAL FEMORAL PHASIC: NORMAL
BH CV LOWER VASCULAR LEFT DISTAL FEMORAL SPONT: NORMAL
BH CV LOWER VASCULAR LEFT GASTRONEMIUS COMPRESS: NORMAL
BH CV LOWER VASCULAR LEFT GREATER SAPH AK COMPRESS: NORMAL
BH CV LOWER VASCULAR LEFT GREATER SAPH BK COMPRESS: NORMAL
BH CV LOWER VASCULAR LEFT LESSER SAPH COMPRESS: NORMAL
BH CV LOWER VASCULAR LEFT MID FEMORAL AUGMENT: NORMAL
BH CV LOWER VASCULAR LEFT MID FEMORAL COMPRESS: NORMAL
BH CV LOWER VASCULAR LEFT MID FEMORAL PHASIC: NORMAL
BH CV LOWER VASCULAR LEFT MID FEMORAL SPONT: NORMAL
BH CV LOWER VASCULAR LEFT PERONEAL AUGMENT: NORMAL
BH CV LOWER VASCULAR LEFT PERONEAL COMPRESS: NORMAL
BH CV LOWER VASCULAR LEFT POPLITEAL AUGMENT: NORMAL
BH CV LOWER VASCULAR LEFT POPLITEAL COMPRESS: NORMAL
BH CV LOWER VASCULAR LEFT POPLITEAL PHASIC: NORMAL
BH CV LOWER VASCULAR LEFT POPLITEAL SPONT: NORMAL
BH CV LOWER VASCULAR LEFT POSTERIOR TIBIAL AUGMENT: NORMAL
BH CV LOWER VASCULAR LEFT POSTERIOR TIBIAL COMPRESS: NORMAL
BH CV LOWER VASCULAR LEFT PROXIMAL FEMORAL AUGMENT: NORMAL
BH CV LOWER VASCULAR LEFT PROXIMAL FEMORAL COMPRESS: NORMAL
BH CV LOWER VASCULAR LEFT PROXIMAL FEMORAL PHASIC: NORMAL
BH CV LOWER VASCULAR LEFT PROXIMAL FEMORAL SPONT: NORMAL
BH CV LOWER VASCULAR LEFT SAPHENOFEMORAL JUNCTION AUGMENT: NORMAL
BH CV LOWER VASCULAR LEFT SAPHENOFEMORAL JUNCTION COMPRESS: NORMAL
BH CV LOWER VASCULAR LEFT SAPHENOFEMORAL JUNCTION PHASIC: NORMAL
BH CV LOWER VASCULAR LEFT SAPHENOFEMORAL JUNCTION SPONT: NORMAL
BH CV LOWER VASCULAR RIGHT COMMON FEMORAL AUGMENT: NORMAL
BH CV LOWER VASCULAR RIGHT COMMON FEMORAL COMPRESS: NORMAL
BH CV LOWER VASCULAR RIGHT COMMON FEMORAL PHASIC: NORMAL
BH CV LOWER VASCULAR RIGHT COMMON FEMORAL SPONT: NORMAL
BH CV LOWER VASCULAR RIGHT DISTAL FEMORAL AUGMENT: NORMAL
BH CV LOWER VASCULAR RIGHT DISTAL FEMORAL COMPRESS: NORMAL
BH CV LOWER VASCULAR RIGHT DISTAL FEMORAL PHASIC: NORMAL
BH CV LOWER VASCULAR RIGHT DISTAL FEMORAL SPONT: NORMAL
BH CV LOWER VASCULAR RIGHT GASTRONEMIUS COMPRESS: NORMAL
BH CV LOWER VASCULAR RIGHT GREATER SAPH AK COMPRESS: NORMAL
BH CV LOWER VASCULAR RIGHT GREATER SAPH BK COMPRESS: NORMAL
BH CV LOWER VASCULAR RIGHT LESSER SAPH COMPRESS: NORMAL
BH CV LOWER VASCULAR RIGHT MID FEMORAL AUGMENT: NORMAL
BH CV LOWER VASCULAR RIGHT MID FEMORAL COMPRESS: NORMAL
BH CV LOWER VASCULAR RIGHT MID FEMORAL PHASIC: NORMAL
BH CV LOWER VASCULAR RIGHT MID FEMORAL SPONT: NORMAL
BH CV LOWER VASCULAR RIGHT PERONEAL AUGMENT: NORMAL
BH CV LOWER VASCULAR RIGHT PERONEAL COMPRESS: NORMAL
BH CV LOWER VASCULAR RIGHT POPLITEAL AUGMENT: NORMAL
BH CV LOWER VASCULAR RIGHT POPLITEAL COMPRESS: NORMAL
BH CV LOWER VASCULAR RIGHT POPLITEAL PHASIC: NORMAL
BH CV LOWER VASCULAR RIGHT POPLITEAL SPONT: NORMAL
BH CV LOWER VASCULAR RIGHT POSTERIOR TIBIAL AUGMENT: NORMAL
BH CV LOWER VASCULAR RIGHT POSTERIOR TIBIAL COMPRESS: NORMAL
BH CV LOWER VASCULAR RIGHT PROXIMAL FEMORAL AUGMENT: NORMAL
BH CV LOWER VASCULAR RIGHT PROXIMAL FEMORAL COMPRESS: NORMAL
BH CV LOWER VASCULAR RIGHT PROXIMAL FEMORAL PHASIC: NORMAL
BH CV LOWER VASCULAR RIGHT PROXIMAL FEMORAL SPONT: NORMAL
BH CV LOWER VASCULAR RIGHT SAPHENOFEMORAL JUNCTION AUGMENT: NORMAL
BH CV LOWER VASCULAR RIGHT SAPHENOFEMORAL JUNCTION COMPRESS: NORMAL
BH CV LOWER VASCULAR RIGHT SAPHENOFEMORAL JUNCTION PHASIC: NORMAL
BH CV LOWER VASCULAR RIGHT SAPHENOFEMORAL JUNCTION SPONT: NORMAL

## 2018-11-16 PROCEDURE — 93970 EXTREMITY STUDY: CPT | Performed by: INTERNAL MEDICINE

## 2018-11-16 PROCEDURE — 93970 EXTREMITY STUDY: CPT

## 2018-12-06 ENCOUNTER — TELEPHONE (OUTPATIENT)
Dept: FAMILY MEDICINE CLINIC | Facility: CLINIC | Age: 78
End: 2018-12-06

## 2018-12-06 NOTE — TELEPHONE ENCOUNTER
Called and spoke to pt she advised that she was not sure what we had found out about it, she stated she talked to Dr Rm about doing one and one had already been done and that she has one scheduled in December and was advised it was the same test that and she is confused as to why she is repeating it a month, advised pt would send information to Dr Rm and as soon as she responds I will call her back, pt stated that if she doesn't get a chance to answer her phone that it is ok to leave a voicemail, advised pt I would do that for her, pt voiced understanding and appreciation

## 2018-12-06 NOTE — TELEPHONE ENCOUNTER
Pt called about confusion she was having related to a pulmonary test     Pt requesting a call back

## 2018-12-07 NOTE — TELEPHONE ENCOUNTER
Called pt advised her the appt on 12/10/18 is with the pulmonologist to assess her ongoing sob, pt voiced understanding and appreciation for following up with her

## 2018-12-07 NOTE — TELEPHONE ENCOUNTER
She had pulmonary function testing done that was normal  She was referred to meet with a pulmonologist because of her ongoing sob - that appt is in 12/10/18 - it is not for another test.    Tammy

## 2018-12-10 ENCOUNTER — OFFICE VISIT (OUTPATIENT)
Dept: PULMONOLOGY | Facility: CLINIC | Age: 78
End: 2018-12-10

## 2018-12-10 VITALS
OXYGEN SATURATION: 98 % | HEART RATE: 70 BPM | BODY MASS INDEX: 29.19 KG/M2 | TEMPERATURE: 98.5 F | DIASTOLIC BLOOD PRESSURE: 80 MMHG | HEIGHT: 64 IN | WEIGHT: 171 LBS | SYSTOLIC BLOOD PRESSURE: 140 MMHG

## 2018-12-10 DIAGNOSIS — J30.9 ALLERGIC RHINITIS, UNSPECIFIED SEASONALITY, UNSPECIFIED TRIGGER: ICD-10-CM

## 2018-12-10 DIAGNOSIS — I10 ESSENTIAL HYPERTENSION: ICD-10-CM

## 2018-12-10 DIAGNOSIS — R06.02 SOB (SHORTNESS OF BREATH): Primary | ICD-10-CM

## 2018-12-10 PROCEDURE — 99215 OFFICE O/P EST HI 40 MIN: CPT | Performed by: NURSE PRACTITIONER

## 2018-12-10 RX ORDER — FLUTICASONE PROPIONATE 50 MCG
2 SPRAY, SUSPENSION (ML) NASAL DAILY
Qty: 9.9 ML | Refills: 6 | Status: SHIPPED | OUTPATIENT
Start: 2018-12-10 | End: 2019-05-31

## 2018-12-10 NOTE — PROGRESS NOTES
[facility] Pulmonary Initial Evaluation    CHIEF COMPLAINT    Shortness of breath    Referral by:  Tammy Rm MD    HISTORY OF PRESENT ILLNESS    Sheree Hernandez is a 78 y.o.female here today for evaluation of her shortness of breath.  She states that her shortness of air started around February of this previous year.  She was in transition from primary care providers and saw Dr. Rm in May for the first time.  She has been followed by Dr. Rm and has had a thorough workup for her dyspnea.  She was referred to our office for further evaluation.  She is a lifetime nonsmoker.    She states that her dyspnea is worse with exertion, however quickly recovers at rest.  She is a very active person and works out 2-3 times per week at a local gym.  She states that she has not been able to do some of her normal physical activity due to her shortness of breath.  She states that she tires more easily with activity.  She denies any chest pain or palpitations with activity.  She denies hemoptysis.  She denies lower extremity edema.  She has had ongoing hypertension over the past year as well.  Her medications were adjusted by her PCP and she was recommended to follow up with her cardiologist.  She saw Dr. Sam in October.  He ordered an echocardiogram(results below), and stated that her blood pressure was running too high.  He adjusted her blood pressure medications.  She had a negative stress test in 2016.  She was given an albuterol inhaler to use with activity by Dr. Sam, and has uses occasionally but doesn't think it helps    She had a positive d-dimer at the end of October in a CTA of her chest which revealed no acute pulmonary process and no pulmonary embolism.  Her most recent lab work revealed no abnormalities as well.  She does complain of daily nasal drainage had been trialed on Claritin but she quit taking this due to it not being effective.  She denies sputum production.  She denies having any allergy  symptoms.    She denies having reflux symptoms.  She states she occasionally will have reflux but does not take anything on a daily basis.  She is up-to-date on her current vaccinations.  She states she is concerned about her shortness of breath with activity and states that it makes her anxious as well.  She denies fever, chills, sputum production, hemoptysis, chest pain or palpitations.  She does state that she is occasionally tired throughout the day, and could take frequent naps if she allowed herself to do so.  She states she does not have sleep apnea.  She states she had PFTs performed at the hospital a month ago and did not want to do full testing today.    Patient Active Problem List   Diagnosis   • Hypertension   • Atypical chest pain   • Dyslipidemia   • Osteopenia   • MANAN (stress urinary incontinence, female)   • SOB (shortness of breath)   • Allergic rhinitis       Allergies   Allergen Reactions   • Pravastatin Myalgia       Current Outpatient Medications:   •  albuterol (PROVENTIL HFA;VENTOLIN HFA) 108 (90 Base) MCG/ACT inhaler, Inhale 2 puffs Every 4 (Four) Hours As Needed for Wheezing., Disp: 1 inhaler, Rfl: 5  •  Calcium-Magnesium-Vitamin D (CALCIUM 500 PO), Take 1 tablet by mouth Daily., Disp: , Rfl:   •  Cholecalciferol (VITAMIN D) 1000 units tablet, Take 1,000 Units by mouth Daily., Disp: , Rfl:   •  cholecalciferol (VITAMIN D3) 1000 units tablet, Take 1,000 Units by mouth Daily., Disp: , Rfl:   •  hydrochlorothiazide (HYDRODIURIL) 25 MG tablet, Take 1 tablet by mouth Daily., Disp: 90 tablet, Rfl: 1  •  losartan (COZAAR) 100 MG tablet, Take 1 tablet by mouth Daily., Disp: 90 tablet, Rfl: 3  •  magnesium oxide (MAGOX) 400 (241.3 MG) MG tablet tablet, Take 400 mg by mouth Daily., Disp: , Rfl:   •  Omega-3 Fatty Acids (OMEGA 3 PO), Take 1 tablet by mouth Daily., Disp: , Rfl:   •  rosuvastatin (CRESTOR) 10 MG tablet, Take 10 mg by mouth Every Other Day., Disp: , Rfl:   •  fluticasone (FLONASE) 50  "MCG/ACT nasal spray, 2 sprays into the nostril(s) as directed by provider Daily., Disp: 9.9 mL, Rfl: 6  MEDICATION LIST AND ALLERGIES REVIEWED.    Social History     Tobacco Use   • Smoking status: Never Smoker   • Smokeless tobacco: Never Used   • Tobacco comment: Exposed to secondhand smoke   Substance Use Topics   • Alcohol use: Yes     Types: 3 Glasses of wine per week     Comment: Maybe every 6 months   • Drug use: No       FAMILY AND SOCIAL HISTORY REVIEWED.    Review of Systems   Constitutional: Negative for activity change, appetite change, fatigue, fever and unexpected weight change.   HENT: Positive for rhinorrhea. Negative for congestion, postnasal drip, sinus pressure, sore throat and voice change.    Eyes: Negative for visual disturbance.   Respiratory: Positive for cough and shortness of breath. Negative for chest tightness and wheezing.    Cardiovascular: Negative for chest pain, palpitations and leg swelling.   Gastrointestinal: Negative for abdominal distention, abdominal pain, nausea and vomiting.   Endocrine: Negative for cold intolerance and heat intolerance.   Genitourinary: Negative for difficulty urinating and urgency.   Musculoskeletal: Negative for arthralgias, back pain and neck pain.   Skin: Negative for color change and pallor.   Allergic/Immunologic: Negative for environmental allergies and food allergies.   Neurological: Negative for dizziness, syncope, weakness and light-headedness.   Hematological: Negative for adenopathy. Does not bruise/bleed easily.   Psychiatric/Behavioral: Negative for agitation and behavioral problems.   .    /80   Pulse 70   Temp 98.5 °F (36.9 °C)   Ht 162.6 cm (64\")   Wt 77.6 kg (171 lb)   SpO2 98% Comment: resting, room air  BMI 29.35 kg/m²     Immunization History   Administered Date(s) Administered   • Flu Vaccine High Dose PF 65YR+ 11/03/2017, 09/11/2018   • Pneumococcal Polysaccharide (PPSV23) 05/17/2013, 12/06/2016, 05/15/2018   • TD " Preservative Free 11/09/2016       Physical Exam   Constitutional: She is oriented to person, place, and time. She appears well-developed and well-nourished.   HENT:   Head: Normocephalic and atraumatic.   Eyes: Pupils are equal, round, and reactive to light.   Neck: Normal range of motion. Neck supple. No thyromegaly present.   Cardiovascular: Normal rate, regular rhythm, normal heart sounds and intact distal pulses. Exam reveals no gallop and no friction rub.   No murmur heard.  Pulmonary/Chest: Effort normal and breath sounds normal. No respiratory distress. She has no wheezes. She has no rales. She exhibits no tenderness.   Abdominal: Soft. Bowel sounds are normal. There is no tenderness.   Musculoskeletal: Normal range of motion.   Lymphadenopathy:     She has no cervical adenopathy.   Neurological: She is alert and oriented to person, place, and time.   Skin: Skin is warm and dry. Capillary refill takes less than 2 seconds. She is not diaphoretic.   Psychiatric: She has a normal mood and affect. Her behavior is normal.   Nursing note and vitals reviewed.        RESULTS    PFTS performed at the hospital on 11/5:  FVC 2.84 110% predicted, FEV1 2.13 115% predicted, FEV1/FVC 75% predicted, TLC 4.09 86% predicted, DLCO 88% predicted.  No obstruction, mild restriction, and slightly reduced DLCO.    Ct Angiogram Chest With & Without Contrast    Result Date: 10/31/2018  Negative CT angiogram of the chest. There is no evidence of pulmonary embolus.  D:  10/31/2018 E:  10/31/2018   This report was finalized on 10/31/2018 3:09 PM by Dr. Bunny Branch MD.      ECHO:  10/24/18  Interpretation Summary     · Left ventricular systolic function is normal. Estimated EF = 60%.  · Left ventricular wall thickness is consistent with mild concentric hypertrophy.  · Left ventricular diastolic dysfunction (grade I) consistent with impaired relaxation.  · RVSP(TR) 24 mmHg  · 4X3 cm liver cyst noted incidentally.            PROBLEM  LIST    Problem List Items Addressed This Visit        Cardiovascular and Mediastinum    Hypertension       Respiratory    SOB (shortness of breath) - Primary    Allergic rhinitis    Relevant Medications    fluticasone (FLONASE) 50 MCG/ACT nasal spray            DISCUSSION    Mrs. Hernandez was here for an initial evaluation of her shortness of breath.  She has had a thorough workup by her cardiologist and her primary care provider.  Her PFTs performed a month ago did not reveal any obstruction and mild restriction.  With these findings and this doesn't explain her shortness of breath.  Her allergy symptoms could be contributing to her shortness of breath, I advised her to start an allergy tablet and a nasal spray for at least 30 days.  She is willing to start both of these, she has Zyrtec at home and will start this today.  Her CTA did not show any interstitial lung disease, fibrosis or abnormal nodules.  I do not suspect dyspnea being related to her lung function.    Based on her echo report, her diastolic dysfunction could be causing her shortness of breath.  She could also have shortness of breath due to her hypertension and she follows Dr. Sam for this.  She has had a normal BNP, and does not show any anemia based on her recent testing.  I will send for an overnight oximetry will test to see how she will she oxygenates at night.  If she qualifies we will set her up for oxygen.  She may possibly need a sleep study in the future due to her daytime fatigue and hypersomnolence.    I feel that anxiety could be playing a role in her shortness of breath as well, as she recovers quickly with rest.  Anxiety could also be causing her blood pressure to be elevated.   I advised her to continue her exercise, and to not push herself too much.  Staying active and is very important for her at her age.    She will follow-up in one month or sooner if her symptoms worsen.  I will call her after I receive the overnight oximetry  test.  She will call if she has any additional questions.  I spent 45 minutes with the patient. I spent > 50% percent of this time counseling and discussing diagnosis, prognosis, diagnostic testing, evaluation, current status, treatment options and management.    Heidi Zelaya, APRN  12/10/56719:41 PM  Electronically signed     Please note that portions of this note were completed with a voice recognition program. Efforts were made to edit the dictations, but occasionally words are mistranscribed.      CC: Tammy Rm, DO

## 2019-01-08 ENCOUNTER — PATIENT MESSAGE (OUTPATIENT)
Dept: CARDIOLOGY | Facility: CLINIC | Age: 79
End: 2019-01-08

## 2019-01-10 ENCOUNTER — TRANSCRIBE ORDERS (OUTPATIENT)
Dept: ADMINISTRATIVE | Facility: HOSPITAL | Age: 79
End: 2019-01-10

## 2019-01-10 ENCOUNTER — OFFICE VISIT (OUTPATIENT)
Dept: PULMONOLOGY | Facility: CLINIC | Age: 79
End: 2019-01-10

## 2019-01-10 VITALS
HEIGHT: 64 IN | WEIGHT: 172.8 LBS | OXYGEN SATURATION: 97 % | DIASTOLIC BLOOD PRESSURE: 70 MMHG | BODY MASS INDEX: 29.5 KG/M2 | TEMPERATURE: 97.8 F | HEART RATE: 79 BPM | SYSTOLIC BLOOD PRESSURE: 128 MMHG

## 2019-01-10 DIAGNOSIS — J30.9 ALLERGIC RHINITIS, UNSPECIFIED SEASONALITY, UNSPECIFIED TRIGGER: ICD-10-CM

## 2019-01-10 DIAGNOSIS — R94.39 ABNORMAL STRESS TEST: Primary | ICD-10-CM

## 2019-01-10 DIAGNOSIS — R06.02 SOB (SHORTNESS OF BREATH): Primary | ICD-10-CM

## 2019-01-10 DIAGNOSIS — R94.39 ABNORMAL STRESS TEST: ICD-10-CM

## 2019-01-10 PROCEDURE — 99214 OFFICE O/P EST MOD 30 MIN: CPT | Performed by: INTERNAL MEDICINE

## 2019-01-10 RX ORDER — MONTELUKAST SODIUM 10 MG/1
10 TABLET ORAL NIGHTLY
Qty: 90 TABLET | Refills: 3 | Status: SHIPPED | OUTPATIENT
Start: 2019-01-10 | End: 2019-05-31

## 2019-01-10 NOTE — PROGRESS NOTES
Subjective   Sheree Hernandez is a 78 y.o. female is here today for follow-up.she is seen follow-up complaints of dyspnea on exertion.  Her primary care physician is Dr. Rm.  She is also been seen by Dr. Gonzalez.    History of Present Illness  Patient was last seen in this clinic December 10 by Ms. Zelaya.  Patient is complaining significant dyspnea particularly with exertion.  She had been seen by cardiology in October echocardiogram showed some slight diastolic dysfunction.  She had hypertension.  She had received an albuterol inhaler without benefit.  CT angiography of her chest in October also was negative for pulmonary embolism.  Since her last visit she's been doing fairly well.  She has been complaining occasional wheezing and dyspnea but says she's walking 1-2 miles per day.  She saw Dr. Gonzalez who did a stress test early in the week and apparently had an abnormality and she is to have a cardiac catheterization next week.    She complains of occasional snoring.  She has awakened gasping for breath.  She complains of having nasal congestion almost every morning on arising.  She says she's usually however feels rested in the morning.  In denies having morning headache.  She will fall asleep if sitting quietly during the day.  She denies any problems while driving.  She has dyspnea after climbing 2 flights of steps.  She sometimes has to stop and rest when walking but denies any chest pain.  She is not thought inhalers helped her symptoms previously.  Past Medical History:   Diagnosis Date   • Asthma    • Atypical chest pain 3/9/2017   • Chronic constipation    • Fracture, pelvis closed (CMS/[facility])    • Hyperlipidemia 3/9/2017   • Hypertension 3/9/2017   • Low bone mass     with elevated FRAX core   • Near syncope     negative cardiovascular workup    • Plantar fasciitis     Chronic   • PVC's (premature ventricular contractions)    • Senile keratosis     Multiple   • MANAN (stress urinary incontinence, female)    •  Syncope, near     with negative cardiovascular workup       Past Surgical History:   Procedure Laterality Date   • HYSTERECTOMY      age 46 - ovarian cyst, endometriosis,  jorge/bso   • ROTATOR CUFF REPAIR Left            Current Outpatient Medications:   •  albuterol (PROVENTIL HFA;VENTOLIN HFA) 108 (90 Base) MCG/ACT inhaler, Inhale 2 puffs Every 4 (Four) Hours As Needed for Wheezing., Disp: 1 inhaler, Rfl: 5  •  Calcium-Magnesium-Vitamin D (CALCIUM 500 PO), Take 1 tablet by mouth Daily., Disp: , Rfl:   •  Cholecalciferol (VITAMIN D) 1000 units tablet, Take 1,000 Units by mouth Daily., Disp: , Rfl:   •  fluticasone (FLONASE) 50 MCG/ACT nasal spray, 2 sprays into the nostril(s) as directed by provider Daily., Disp: 9.9 mL, Rfl: 6  •  hydrochlorothiazide (HYDRODIURIL) 25 MG tablet, Take 1 tablet by mouth Daily., Disp: 90 tablet, Rfl: 1  •  losartan (COZAAR) 100 MG tablet, Take 1 tablet by mouth Daily., Disp: 90 tablet, Rfl: 3  •  magnesium oxide (MAGOX) 400 (241.3 MG) MG tablet tablet, Take 400 mg by mouth Daily., Disp: , Rfl:   •  Omega-3 Fatty Acids (OMEGA 3 PO), Take 1 tablet by mouth Daily., Disp: , Rfl:   •  rosuvastatin (CRESTOR) 10 MG tablet, Take 10 mg by mouth Every Other Day., Disp: , Rfl:   •  montelukast (SINGULAIR) 10 MG tablet, Take 1 tablet by mouth Every Night., Disp: 90 tablet, Rfl: 3    Allergies   Allergen Reactions   • Pravastatin Myalgia       The following portions of the patient's history were reviewed and updated as appropriate: allergies, current medications and problem list.    Review of Systems   Constitutional: Negative.    HENT: Positive for congestion and voice change.    Eyes: Negative.    Respiratory: Positive for shortness of breath and wheezing.    Cardiovascular: Negative.    Gastrointestinal: Negative.    Endocrine: Negative.    Genitourinary: Negative.    Musculoskeletal: Positive for joint swelling and neck stiffness.   Skin: Negative.    Allergic/Immunologic: Negative.   "  Neurological: Negative.    Hematological: Negative.    Psychiatric/Behavioral: Negative.    [place] score today is only 6/24    Objective     /70   Pulse 79   Temp 97.8 °F (36.6 °C)   Ht 162.6 cm (64\")   Wt 78.4 kg (172 lb 12.8 oz)   SpO2 97% Comment: resting, room air  BMI 29.66 kg/m²     Physical Exam   Constitutional: She is oriented to person, place, and time. She appears well-developed and well-nourished.   She is overweight.   HENT:   Head: Normocephalic and atraumatic.   She has nasal airway narrowing and Mallampati class for anatomy.  She has mild retrognathia.   Eyes: EOM are normal. Pupils are equal, round, and reactive to light.   Neck: Normal range of motion. Neck supple.   Cardiovascular: Normal rate, regular rhythm and normal heart sounds.   Pulmonary/Chest: Effort normal and breath sounds normal.   Abdominal: Soft. Bowel sounds are normal.   Musculoskeletal: Normal range of motion. She exhibits no edema.   Neurological: She is alert and oriented to person, place, and time.   Skin: Skin is warm and dry.   Psychiatric: She has a normal mood and affect. Her behavior is normal.   Pulmonary function tests when previously checked were fairly unremarkable.      Assessment/Plan   Sheree was seen today for follow-up.    Diagnoses and all orders for this visit:    SOB (shortness of breath)    Allergic rhinitis, unspecified seasonality, unspecified trigger  -     montelukast (SINGULAIR) 10 MG tablet; Take 1 tablet by mouth Every Night.    Abnormal stress test    Patient has apparently been found to have an abnormal cardiac stress test..  She is scheduled for cardiac catheterization.  This may well be just to a cause for her dyspnea with exertion.  Pulmonary workup is been somewhat  Unremarkable.  SHe does seem to have allergic rhinitis.  We will try her on montelukast 10 mg by mouth in the evening see if this helps with a sensation.  She has a history of occasional snoring but denies being noted to " have apneas. The main wish to consider evaluation for sleep-disordered breathing on that later date. She is return then in 6 weeks.  We will reassess situation that time.  She is to contact us earlier symptoms worsen.             Mihir Damian MD [facility]  Sleep Medicine  Pulmonary and Critical Care Medicine      01/10/19  12:32 PM

## 2019-01-10 NOTE — PATIENT INSTRUCTIONS
Allergic Rhinitis, Adult  Allergic rhinitis is an allergic reaction that affects the mucous membrane inside the nose. It causes sneezing, a runny or stuffy nose, and the feeling of mucus going down the back of the throat (postnasal drip). Allergic rhinitis can be mild to severe.  There are two types of allergic rhinitis:  · Seasonal. This type is also called hay fever. It happens only during certain seasons.  · Perennial. This type can happen at any time of the year.    What are the causes?  This condition happens when the body's defense system (immune system) responds to certain harmless substances called allergens as though they were germs.   Seasonal allergic rhinitis is triggered by pollen, which can come from grasses, trees, and weeds. Perennial allergic rhinitis may be caused by:  · House dust mites.  · Pet dander.  · Mold spores.    What are the signs or symptoms?  Symptoms of this condition include:  · Sneezing.  · Runny or stuffy nose (nasal congestion).  · Postnasal drip.  · Itchy nose.  · Tearing of the eyes.  · Trouble sleeping.  · Daytime sleepiness.    How is this diagnosed?  This condition may be diagnosed based on:  · Your medical history.  · A physical exam.  · Tests to check for related conditions, such as:  ? Asthma.  ? Pink eye.  ? Ear infection.  ? Upper respiratory infection.  · Tests to find out which allergens trigger your symptoms. These may include skin or blood tests.    How is this treated?  There is no cure for this condition, but treatment can help control symptoms. Treatment may include:  · Taking medicines that block allergy symptoms, such as antihistamines. Medicine may be given as a shot, nasal spray, or pill.  · Avoiding the allergen.  · Desensitization. This treatment involves getting ongoing shots until your body becomes less sensitive to the allergen. This treatment may be done if other treatments do not help.  · If taking medicine and avoiding the allergen does not work, new,  stronger medicines may be prescribed.    Follow these instructions at home:  · Find out what you are allergic to. Common allergens include smoke, dust, and pollen.  · Avoid the things you are allergic to. These are some things you can do to help avoid allergens:  ? Replace carpet with wood, tile, or vinyl matthew. Carpet can trap dander and dust.  ? Do not smoke. Do not allow smoking in your home.  ? Change your heating and air conditioning filter at least once a month.  ? During allergy season:  § Keep windows closed as much as possible.  § Plan outdoor activities when pollen counts are lowest. This is usually during the evening hours.  § When coming indoors, change clothing and shower before sitting on furniture or bedding.  · Take over-the-counter and prescription medicines only as told by your health care provider.  · Keep all follow-up visits as told by your health care provider. This is important.  Contact a health care provider if:  · You have a fever.  · You develop a persistent cough.  · You make whistling sounds when you breathe (you wheeze).  · Your symptoms interfere with your normal daily activities.  Get help right away if:  · You have shortness of breath.  Summary  · This condition can be managed by taking medicines as directed and avoiding allergens.  · Contact your health care provider if you develop a persistent cough or fever.  · During allergy season, keep windows closed as much as possible.  This information is not intended to replace advice given to you by your health care provider. Make sure you discuss any questions you have with your health care provider.  Document Released: 09/12/2002 Document Revised: 01/25/2018 Document Reviewed: 01/25/2018  [employer]vier Interactive Patient Education © 2018 [employer]

## 2019-01-17 RX ORDER — ROSUVASTATIN CALCIUM 10 MG/1
TABLET, COATED ORAL
Qty: 90 TABLET | Refills: 0 | Status: SHIPPED | OUTPATIENT
Start: 2019-01-17 | End: 2019-04-07 | Stop reason: SDUPTHER

## 2019-01-18 ENCOUNTER — HOSPITAL ENCOUNTER (OUTPATIENT)
Facility: HOSPITAL | Age: 79
Setting detail: HOSPITAL OUTPATIENT SURGERY
Discharge: HOME | End: 2019-01-18
Attending: INTERNAL MEDICINE | Admitting: INTERNAL MEDICINE

## 2019-01-18 VITALS
TEMPERATURE: 98 F | HEART RATE: 70 BPM | HEIGHT: 64 IN | DIASTOLIC BLOOD PRESSURE: 68 MMHG | BODY MASS INDEX: 28.98 KG/M2 | RESPIRATION RATE: 16 BRPM | WEIGHT: 169.75 LBS | SYSTOLIC BLOOD PRESSURE: 139 MMHG | OXYGEN SATURATION: 97 %

## 2019-01-18 DIAGNOSIS — R94.39 ABNORMAL STRESS TEST: ICD-10-CM

## 2019-01-18 LAB
ANION GAP SERPL CALCULATED.3IONS-SCNC: 10 MMOL/L (ref 3–11)
BUN BLD-MCNC: 26 MG/DL (ref 9–23)
BUN/CREAT SERPL: 26.5 (ref 7–25)
CALCIUM SPEC-SCNC: 9.5 MG/DL (ref 8.7–10.4)
CHLORIDE SERPL-SCNC: 105 MMOL/L (ref 99–109)
CO2 SERPL-SCNC: 25 MMOL/L (ref 20–31)
CREAT BLD-MCNC: 0.98 MG/DL (ref 0.6–1.3)
DEPRECATED RDW RBC AUTO: 46.4 FL (ref 37–54)
ERYTHROCYTE [DISTWIDTH] IN BLOOD BY AUTOMATED COUNT: 14 % (ref 11.3–14.5)
GFR SERPL CREATININE-BSD FRML MDRD: 55 ML/MIN/1.73
GLUCOSE BLD-MCNC: 111 MG/DL (ref 70–100)
HCT VFR BLD AUTO: 43.8 % (ref 34.5–44)
HGB BLD-MCNC: 14.8 G/DL (ref 11.5–15.5)
MCH RBC QN AUTO: 30.6 PG (ref 27–31)
MCHC RBC AUTO-ENTMCNC: 33.8 G/DL (ref 32–36)
MCV RBC AUTO: 90.5 FL (ref 80–99)
PLATELET # BLD AUTO: 280 10*3/MM3 (ref 150–450)
PMV BLD AUTO: 12.5 FL (ref 6–12)
POTASSIUM BLD-SCNC: 3.4 MMOL/L (ref 3.5–5.5)
RBC # BLD AUTO: 4.84 10*6/MM3 (ref 3.89–5.14)
SODIUM BLD-SCNC: 140 MMOL/L (ref 132–146)
WBC NRBC COR # BLD: 10.19 10*3/MM3 (ref 3.5–10.8)

## 2019-01-18 PROCEDURE — 85027 COMPLETE CBC AUTOMATED: CPT | Performed by: INTERNAL MEDICINE

## 2019-01-18 PROCEDURE — C1769 GUIDE WIRE: HCPCS | Performed by: INTERNAL MEDICINE

## 2019-01-18 PROCEDURE — 80048 BASIC METABOLIC PNL TOTAL CA: CPT | Performed by: INTERNAL MEDICINE

## 2019-01-18 PROCEDURE — 36415 COLL VENOUS BLD VENIPUNCTURE: CPT

## 2019-01-18 PROCEDURE — 0 IOPAMIDOL PER 1 ML: Performed by: INTERNAL MEDICINE

## 2019-01-18 PROCEDURE — C1894 INTRO/SHEATH, NON-LASER: HCPCS | Performed by: INTERNAL MEDICINE

## 2019-01-18 PROCEDURE — 93458 L HRT ARTERY/VENTRICLE ANGIO: CPT | Performed by: INTERNAL MEDICINE

## 2019-01-18 PROCEDURE — 25010000002 HEPARIN (PORCINE) PER 1000 UNITS: Performed by: INTERNAL MEDICINE

## 2019-01-18 PROCEDURE — 25010000002 FENTANYL CITRATE (PF) 100 MCG/2ML SOLUTION: Performed by: INTERNAL MEDICINE

## 2019-01-18 PROCEDURE — 25010000002 MIDAZOLAM PER 1 MG: Performed by: INTERNAL MEDICINE

## 2019-01-18 RX ORDER — LIDOCAINE HYDROCHLORIDE 10 MG/ML
INJECTION, SOLUTION EPIDURAL; INFILTRATION; INTRACAUDAL; PERINEURAL AS NEEDED
Status: DISCONTINUED | OUTPATIENT
Start: 2019-01-18 | End: 2019-01-18 | Stop reason: HOSPADM

## 2019-01-18 RX ORDER — TEMAZEPAM 7.5 MG/1
7.5 CAPSULE ORAL NIGHTLY PRN
Status: DISCONTINUED | OUTPATIENT
Start: 2019-01-18 | End: 2019-01-18 | Stop reason: HOSPADM

## 2019-01-18 RX ORDER — MIDAZOLAM HYDROCHLORIDE 1 MG/ML
INJECTION INTRAMUSCULAR; INTRAVENOUS AS NEEDED
Status: DISCONTINUED | OUTPATIENT
Start: 2019-01-18 | End: 2019-01-18 | Stop reason: HOSPADM

## 2019-01-18 RX ORDER — MORPHINE SULFATE 2 MG/ML
1 INJECTION, SOLUTION INTRAMUSCULAR; INTRAVENOUS EVERY 4 HOURS PRN
Status: DISCONTINUED | OUTPATIENT
Start: 2019-01-18 | End: 2019-01-18 | Stop reason: HOSPADM

## 2019-01-18 RX ORDER — ALPRAZOLAM 0.25 MG/1
0.25 TABLET ORAL 3 TIMES DAILY PRN
Status: DISCONTINUED | OUTPATIENT
Start: 2019-01-18 | End: 2019-01-18 | Stop reason: HOSPADM

## 2019-01-18 RX ORDER — FENTANYL CITRATE 50 UG/ML
INJECTION, SOLUTION INTRAMUSCULAR; INTRAVENOUS AS NEEDED
Status: DISCONTINUED | OUTPATIENT
Start: 2019-01-18 | End: 2019-01-18 | Stop reason: HOSPADM

## 2019-01-18 RX ORDER — ACETAMINOPHEN 325 MG/1
650 TABLET ORAL EVERY 4 HOURS PRN
Status: DISCONTINUED | OUTPATIENT
Start: 2019-01-18 | End: 2019-01-18 | Stop reason: HOSPADM

## 2019-01-18 RX ORDER — HYDROCODONE BITARTRATE AND ACETAMINOPHEN 5; 325 MG/1; MG/1
1 TABLET ORAL EVERY 4 HOURS PRN
Status: DISCONTINUED | OUTPATIENT
Start: 2019-01-18 | End: 2019-01-18 | Stop reason: HOSPADM

## 2019-01-18 RX ORDER — SODIUM CHLORIDE 9 MG/ML
250 INJECTION, SOLUTION INTRAVENOUS CONTINUOUS
Status: ACTIVE | OUTPATIENT
Start: 2019-01-18 | End: 2019-01-18

## 2019-01-18 RX ORDER — ASPIRIN 325 MG
325 TABLET, DELAYED RELEASE (ENTERIC COATED) ORAL DAILY
Status: DISCONTINUED | OUTPATIENT
Start: 2019-01-18 | End: 2019-01-18 | Stop reason: HOSPADM

## 2019-01-18 RX ORDER — NALOXONE HCL 0.4 MG/ML
0.4 VIAL (ML) INJECTION
Status: DISCONTINUED | OUTPATIENT
Start: 2019-01-18 | End: 2019-01-18 | Stop reason: HOSPADM

## 2019-01-18 RX ADMIN — ASPIRIN 325 MG: 325 TABLET, DELAYED RELEASE ORAL at 07:08

## 2019-01-18 NOTE — H&P
Pre-Cardiac Catheterization Report  Cardiovascular Laboratory  [facility]      Patient:  Sheree Hernandez  :  1940  PCP:  Tammy Rm DO  PHONE:  [phone]    DATE: 2019    BRIEF HPI:  Sheree Hernandez is a 78 y.o. female with hypertension and hypercholesterolemia.  She is complaining of increasing shortness of breath which occurs daily for the past year.  She states it is moderate in severity lasting minutes and associated with dyspnea on exertion, fatigue, and edema.  She states that her symptoms increase with exertion and are decreased with rest.  She recently underwent a stress test in our office that was abnormal.  She now presents for left heart catheterization with possible intervention.    Cardiac Risk Factors:  advanced age (older than 55 for men, 65 for women), dyslipidemia, family history of premature cardiovascular disease, hypertension    Anginal class in last 2 weeks:  CCS class I    CHF Class in last 2 weeks:  NYHA Class II    Cardiogenic shock:  no    Cardiac arrest <24 hours:  yes    Stress test within last 6 months:   no   Details:    Previous cardiac catheterization:  no  Details:     Previous CABG:  no  Details:      Allergies:     IV contrast allergy:  no  Allergies   Allergen Reactions   • Pravastatin Myalgia       MEDICATIONS:  Prior to Admission medications    Medication Sig Start Date End Date Taking? Authorizing Provider   Calcium-Magnesium-Vitamin D (CALCIUM 500 PO) Take 1 tablet by mouth Daily.   Yes ProviderJami MD   Cholecalciferol (VITAMIN D) 1000 units tablet Take 1,000 Units by mouth Daily. 16  Yes ProviderJami MD   fluticasone (FLONASE) 50 MCG/ACT nasal spray 2 sprays into the nostril(s) as directed by provider Daily. 12/10/18  Yes Heidi Zelaya APRN   hydrochlorothiazide (HYDRODIURIL) 25 MG tablet Take 1 tablet by mouth Daily.  Patient taking differently: Take 50 mg by mouth Daily. 10/31/18  Yes Tammy Rm DO   losartan  (COZAAR) 100 MG tablet Take 1 tablet by mouth Daily. 10/4/18  Yes Davidson Sam MD   magnesium oxide (MAGOX) 400 (241.3 MG) MG tablet tablet Take 400 mg by mouth Daily.   Yes    montelukast (SINGULAIR) 10 MG tablet Take 1 tablet by mouth Every Night. 1/10/19  Yes Mihir Damian MD   Omega-3 Fatty Acids (OMEGA 3 PO) Take 1 tablet by mouth Daily.   Yes    rosuvastatin (CRESTOR) 10 MG tablet TAKE 1 TABLET DAILY 1/17/19  Yes Davidson Sam MD   rosuvastatin (CRESTOR) 10 MG tablet Take 10 mg by mouth Every Other Day.  1/18/19 Yes ProviderJami MD   albuterol (PROVENTIL HFA;VENTOLIN HFA) 108 (90 Base) MCG/ACT inhaler Inhale 2 puffs Every 4 (Four) Hours As Needed for Wheezing. 10/4/18   Davidson Sam MD       Past medical & surgical history, social and family history reviewed in the electronic medical record.    ROS:  Cardiovascular ROS: positive for - dyspnea on exertion, edema and shortness of breath    Physical Exam:    Vitals:   Vitals:    01/18/19 0620   BP: 143/65   Pulse:    Resp:    Temp:    SpO2:           01/18/19  0618   Weight: 77 kg (169 lb 12.1 oz)       General Appearance:    Alert, cooperative, in no acute distress   Head:    Normocephalic, without obvious abnormality, atraumatic   Eyes:            Lids and lashes normal, conjunctivae and sclerae normal, no   icterus, no pallor, corneas clear, PERRLA   Ears:    Ears appear intact with no abnormalities noted   Throat:      Neck:   No adenopathy, supple, trachea midline, no thyromegaly, no     carotid bruit, no JVD   Back:     No kyphosis present, no scoliosis present, range of motion normal   Lungs:     Clear to auscultation,respirations regular, even and                   unlabored    Heart:    Regular rhythm and normal rate, normal S1 and S2, no            murmur, no gallop, no rub, no click   Breast Exam:    Deferred   Abdomen:     Normal bowel sounds, no masses, no organomegaly, soft        non-tender, non-distended, no guarding, no  rebound                 tenderness   Genitalia:    Deferred   Extremities:   Moves all extremities well, no edema, no cyanosis, no              redness   Pulses:   Pulses palpable and equal bilaterally   Skin:   No bleeding, bruising or rash   Lymph nodes:      Neurologic:   Cranial nerves 2 - 12 grossly intact, sensation intact     Barbaeu Test:  Left: Normal  (oxymetric Allens) Right: Not Assessed         Results from last 7 days   Lab Units 01/18/19  0617   WBC 10*3/mm3 10.19   HEMOGLOBIN g/dL 14.8   HEMATOCRIT % 43.8   PLATELETS 10*3/mm3 280     Lab Results   Component Value Date    TRIG 167 (H) 09/11/2018    HDL 63 (H) 09/11/2018    AST 34 (H) 10/31/2018    ALT 47 (H) 10/31/2018           Impression      · Abnormal stress test    Plan     · Procedure to perform: [facility]  · Planned access:  Left radial artery      PETER Ceballos  01/18/19  6:52 AM

## 2019-02-14 ENCOUNTER — OFFICE VISIT (OUTPATIENT)
Dept: PULMONOLOGY | Facility: CLINIC | Age: 79
End: 2019-02-14

## 2019-02-14 VITALS
WEIGHT: 177.38 LBS | HEART RATE: 73 BPM | TEMPERATURE: 98.3 F | SYSTOLIC BLOOD PRESSURE: 156 MMHG | DIASTOLIC BLOOD PRESSURE: 90 MMHG | OXYGEN SATURATION: 98 % | HEIGHT: 64 IN | RESPIRATION RATE: 18 BRPM | BODY MASS INDEX: 30.28 KG/M2

## 2019-02-14 DIAGNOSIS — R06.02 SOB (SHORTNESS OF BREATH): Primary | ICD-10-CM

## 2019-02-14 DIAGNOSIS — J30.9 ALLERGIC RHINITIS, UNSPECIFIED SEASONALITY, UNSPECIFIED TRIGGER: ICD-10-CM

## 2019-02-14 PROCEDURE — 99213 OFFICE O/P EST LOW 20 MIN: CPT | Performed by: NURSE PRACTITIONER

## 2019-02-14 RX ORDER — AZITHROMYCIN 250 MG/1
TABLET, FILM COATED ORAL
Qty: 6 TABLET | Refills: 0 | Status: SHIPPED | OUTPATIENT
Start: 2019-02-14 | End: 2019-05-31

## 2019-02-14 NOTE — PROGRESS NOTES
[facility], [place] Pulmonary Follow up    CHIEF COMPLAINT    Shortness of breath    HISTORY OF PRESENT ILLNESS    Sheree Hernandez is a 78 y.o.female here today for follow up of shortness of breath.  She was last seen in our office in January by Dr. Damian.  She had been originally referred to our office for shortness of breath by her PCP.  She had an extensive work-up for shortness of breath in our office without any significant cause.  She had followed up with Dr. Gonzalez since her first visit and was found to have an abnormal stress test.  She was set up for a [facility] on January 18th and did not require any interventions.  She had normal EF of 60%, and mild CAD.      She denies fever, chills, sputum production, hemoptysis, weight loss, night sweats, chest pain or palpitations.  She continues to have shortness of breath with activity.  She recovers quickly with rest.  She remains active and goes to the gym regularly.  She has not been in the last couple of weeks due to feeling poorly.  She has been congested for two weeks and has sinus pressure.  She states she has noticed an increase in nasal drainage as well. She was started on Singulair at her last visit and has not noticed any change in her drainage.  She denies reflux symptoms.      She is concerned that she has a physical problem with her lungs.  She had an appointment scheduled for later but felt she couldn't wait for her appointment and wanted to be seen.      Patient Active Problem List   Diagnosis   • Hypertension   • Atypical chest pain   • Dyslipidemia   • Osteopenia   • MANAN (stress urinary incontinence, female)   • SOB (shortness of breath)   • Allergic rhinitis   • Abnormal stress test       Allergies   Allergen Reactions   • Pravastatin Myalgia       Current Outpatient Medications:   •  albuterol (PROVENTIL HFA;VENTOLIN HFA) 108 (90 Base) MCG/ACT inhaler, Inhale 2 puffs Every 4 (Four) Hours As Needed for Wheezing., Disp: 1 inhaler, Rfl: 5  •  Calcium-Magnesium-Vitamin  D (CALCIUM 500 PO), Take 1 tablet by mouth Daily., Disp: , Rfl:   •  Cholecalciferol (VITAMIN D) 1000 units tablet, Take 1,000 Units by mouth Daily., Disp: , Rfl:   •  fluticasone (FLONASE) 50 MCG/ACT nasal spray, 2 sprays into the nostril(s) as directed by provider Daily., Disp: 9.9 mL, Rfl: 6  •  hydrochlorothiazide (HYDRODIURIL) 25 MG tablet, Take 1 tablet by mouth Daily. (Patient taking differently: Take 50 mg by mouth Daily.), Disp: 90 tablet, Rfl: 1  •  losartan (COZAAR) 100 MG tablet, Take 1 tablet by mouth Daily., Disp: 90 tablet, Rfl: 3  •  magnesium oxide (MAGOX) 400 (241.3 MG) MG tablet tablet, Take 400 mg by mouth Daily., Disp: , Rfl:   •  montelukast (SINGULAIR) 10 MG tablet, Take 1 tablet by mouth Every Night., Disp: 90 tablet, Rfl: 3  •  Omega-3 Fatty Acids (OMEGA 3 PO), Take 1 tablet by mouth Daily., Disp: , Rfl:   •  rosuvastatin (CRESTOR) 10 MG tablet, TAKE 1 TABLET DAILY, Disp: 90 tablet, Rfl: 0  •  azithromycin (ZITHROMAX) 250 MG tablet, Take 2 by mouth today then 1 daily for 4 days, Disp: 6 tablet, Rfl: 0  MEDICATION LIST AND ALLERGIES REVIEWED.    Social History     Tobacco Use   • Smoking status: Never Smoker   • Smokeless tobacco: Never Used   • Tobacco comment: Exposed to secondhand smoke   Substance Use Topics   • Alcohol use: Yes     Types: 3 Glasses of wine per week     Comment: Maybe every 6 months   • Drug use: No       FAMILY AND SOCIAL HISTORY REVIEWED.    Review of Systems   Constitutional: Negative for activity change, appetite change, fatigue, fever and unexpected weight change.   HENT: Positive for congestion, postnasal drip and rhinorrhea. Negative for sinus pressure, sore throat and voice change.    Eyes: Negative for visual disturbance.   Respiratory: Positive for cough and shortness of breath. Negative for chest tightness and wheezing.    Cardiovascular: Negative for chest pain, palpitations and leg swelling.   Gastrointestinal: Negative for abdominal distention, abdominal  "pain, nausea and vomiting.   Endocrine: Negative for cold intolerance and heat intolerance.   Genitourinary: Negative for difficulty urinating and urgency.   Musculoskeletal: Negative for arthralgias, back pain and neck pain.   Skin: Negative for color change and pallor.   Allergic/Immunologic: Negative for environmental allergies and food allergies.   Neurological: Negative for dizziness, syncope, weakness and light-headedness.   Hematological: Negative for adenopathy. Does not bruise/bleed easily.   Psychiatric/Behavioral: Negative for agitation and behavioral problems.   .    /90 (BP Location: Left arm, Patient Position: Sitting, Cuff Size: Adult)   Pulse 73   Temp 98.3 °F (36.8 °C)   Resp 18   Ht 162.6 cm (64\")   Wt 80.5 kg (177 lb 6 oz)   SpO2 98% Comment: room air at rest  BMI 30.45 kg/m²     Immunization History   Administered Date(s) Administered   • Flu Vaccine High Dose PF 65YR+ 11/03/2017, 09/11/2018   • Pneumococcal Polysaccharide (PPSV23) 05/17/2013, 12/06/2016, 05/15/2018   • TD Preservative Free 11/09/2016       Physical Exam   Constitutional: She is oriented to person, place, and time. She appears well-developed and well-nourished.   HENT:   Head: Normocephalic and atraumatic.   Eyes: Pupils are equal, round, and reactive to light.   Neck: Normal range of motion. Neck supple. No thyromegaly present.   Cardiovascular: Normal rate, regular rhythm, normal heart sounds and intact distal pulses. Exam reveals no gallop and no friction rub.   No murmur heard.  Pulmonary/Chest: Effort normal and breath sounds normal. No respiratory distress. She has no wheezes. She has no rales. She exhibits no tenderness.   Abdominal: Soft. Bowel sounds are normal. There is no tenderness.   Musculoskeletal: Normal range of motion.   Lymphadenopathy:     She has no cervical adenopathy.   Neurological: She is alert and oriented to person, place, and time.   Skin: Skin is warm and dry. Capillary refill takes less " than 2 seconds. She is not diaphoretic.   Psychiatric: She has a normal mood and affect. Her behavior is normal.   Nursing note and vitals reviewed.        RESULTS    PROBLEM LIST    Problem List Items Addressed This Visit        Respiratory    SOB (shortness of breath) - Primary    Allergic rhinitis    Relevant Orders    Ambulatory Referral to ENT (Otolaryngology)            DISCUSSION    Mrs. Hernandez was here for a follow-up of her shortness of breath.  She has had a full work-up with cardiology and pulmonology.  I discussed with her that her PFT's are normal and she has a normal CT scan of her Chest.  She does not have a physical reason for her shortness of breath.  Her shortness of breath could be related to deconditioning.  She states she has gained about 20 pounds and is not as active as she was.  I encouraged her to increase her physical activity.      She states she has had difficulty with her sinuses and would like to see an ENT for a possible blockage in her sinuses.  I will place this referral today.      I encouraged her to use flonase and singulair together for 30 days and see if her congestion is better.  I will call in an antibiotic for her acute sinusitis.      She will follow-up 3 months or sooner if her symptoms worsen.  I spent 15 minutes with the patient. I spent > 50% percent of this time counseling and discussing diagnosis, prognosis, diagnostic testing, evaluation, current status, treatment options and management.    Heidi Zelaya, LEDY  02/14/201912:00 PM  Electronically signed     Please note that portions of this note were completed with a voice recognition program. Efforts were made to edit the dictations, but occasionally words are mistranscribed.      CC: Tammy Rm, DO

## 2019-03-29 ENCOUNTER — TELEPHONE (OUTPATIENT)
Dept: FAMILY MEDICINE CLINIC | Facility: CLINIC | Age: 79
End: 2019-03-29

## 2019-03-29 DIAGNOSIS — I10 ESSENTIAL HYPERTENSION: Primary | ICD-10-CM

## 2019-03-29 RX ORDER — LOSARTAN POTASSIUM 100 MG/1
100 TABLET ORAL DAILY
Qty: 90 TABLET | Refills: 3 | Status: SHIPPED | OUTPATIENT
Start: 2019-03-29 | End: 2019-10-22 | Stop reason: SDUPTHER

## 2019-03-29 NOTE — TELEPHONE ENCOUNTER
Pt's losartan was recalled, she confirmed it with the pharmacy.  It was a 90 day supply   Does the pt need an appointment or can we put in a prescription for the medication?

## 2019-04-08 RX ORDER — ROSUVASTATIN CALCIUM 10 MG/1
TABLET, COATED ORAL
Qty: 90 TABLET | Refills: 1 | Status: SHIPPED | OUTPATIENT
Start: 2019-04-08 | End: 2019-10-22 | Stop reason: SDUPTHER

## 2019-05-31 ENCOUNTER — OFFICE VISIT (OUTPATIENT)
Dept: PULMONOLOGY | Facility: CLINIC | Age: 79
End: 2019-05-31

## 2019-05-31 VITALS
HEIGHT: 64 IN | TEMPERATURE: 98.4 F | BODY MASS INDEX: 28.51 KG/M2 | HEART RATE: 80 BPM | SYSTOLIC BLOOD PRESSURE: 140 MMHG | OXYGEN SATURATION: 98 % | WEIGHT: 167 LBS | DIASTOLIC BLOOD PRESSURE: 80 MMHG

## 2019-05-31 DIAGNOSIS — J30.9 ALLERGIC RHINITIS, UNSPECIFIED SEASONALITY, UNSPECIFIED TRIGGER: ICD-10-CM

## 2019-05-31 DIAGNOSIS — R06.09 DYSPNEA ON EXERTION: Primary | ICD-10-CM

## 2019-05-31 PROBLEM — M85.80 OSTEOPENIA: Status: RESOLVED | Noted: 2018-05-15 | Resolved: 2019-05-31

## 2019-05-31 PROCEDURE — 99213 OFFICE O/P EST LOW 20 MIN: CPT | Performed by: INTERNAL MEDICINE

## 2019-05-31 PROCEDURE — 94618 PULMONARY STRESS TESTING: CPT | Performed by: INTERNAL MEDICINE

## 2019-05-31 RX ORDER — AZELASTINE 1 MG/ML
2 SPRAY, METERED NASAL 2 TIMES DAILY
COMMUNITY
End: 2019-10-15

## 2019-05-31 RX ORDER — GUAIFENESIN AND PSEUDOEPHEDRINE HYDROCHLORIDE 600; 60 MG/1; MG/1
TABLET, EXTENDED RELEASE ORAL DAILY
Refills: 3 | COMMUNITY
Start: 2019-05-06 | End: 2019-10-15

## 2019-05-31 NOTE — PROGRESS NOTES
Subjective:     Chief Complaint:   Chief Complaint   Patient presents with   • Follow-up   • Shortness of Breath       HPI:    Sheree Hernandez is a 78 y.o. female here for follow-up of dyspnea on exertion    She has been seen in the past in our office by Dr. Damian as well as by Heidi VILLAFANA.  She was evaluated for dyspnea on exertion.    She had a negative work-up including normal PFTs and a normal CT of the chest including angiograms for pulmonary embolism.  Cardiac catheterization revealed mild, nonobstructive CAD and a normal systolic ejection fraction of 60%.  Echocardiogram did reveal grade 1 diastolic dysfunction and mild LVH.    She does have chronic nasal congestion and has seen Dr. Pardo and is on Mucinex and inhaled antihistamines.    She previously was more active with a regular exercise regimen but has fallen off of that for several reasons including surgery.  She has gained some weight over the last year but most recently has lost about 5 pounds.    She has no chronic cough wheezing or chest symptoms.  She has no history of smoking.    Further details:    General symptoms:  - no fever  - weight loss of 5 lbs  - no edema    Chest symptoms:  - no chest pain  - normal/appropriate cough    Sputum:  - normal  - denies hemoptysis    Upper airway:  - chronic congestion    Reflux symptoms:  - no reflux symptoms    Exercise tolerance:  - exercise limitation at 2 flight(s) of stairs    Recent imaging:  - no additional    Current medications are:   Current Outpatient Medications:   •  azelastine (ASTELIN) 0.1 % nasal spray, 2 sprays into the nostril(s) as directed by provider 2 (Two) Times a Day. Use in each nostril as directed, Disp: , Rfl:   •  Calcium-Magnesium-Vitamin D (CALCIUM 500 PO), Take 1 tablet by mouth Daily., Disp: , Rfl:   •  hydrochlorothiazide (HYDRODIURIL) 25 MG tablet, Take 1 tablet by mouth Daily. (Patient taking differently: Take 50 mg by mouth Daily.), Disp: 90 tablet, Rfl: 1  •   losartan (COZAAR) 100 MG tablet, Take 1 tablet by mouth Daily., Disp: 90 tablet, Rfl: 3  •  MUCINEX D  MG per 12 hr tablet, Take  by mouth Daily., Disp: , Rfl: 3  •  rosuvastatin (CRESTOR) 10 MG tablet, TAKE 1 TABLET DAILY, Disp: 90 tablet, Rfl: 1  •  Omega-3 Fatty Acids (OMEGA 3 PO), Take 1 tablet by mouth Daily., Disp: , Rfl: .      The patient's relevant past medical, surgical, family and social history were reviewed and updated in Epic as appropriate.     ROS:    Review of Systems  ROS as documented in patient questionnaire unless as noted otherwise    Objective:    Physical Exam   Constitutional: She is oriented to person, place, and time. She appears well-developed and well-nourished.   HENT:   Head: Normocephalic and atraumatic.   Mouth/Throat: Oropharynx is clear and moist.   Neck: Neck supple. No thyromegaly present.   Cardiovascular: Normal rate and regular rhythm. Exam reveals no gallop and no friction rub.   No murmur heard.  Pulmonary/Chest: Effort normal. No respiratory distress. She has no wheezes. She has no rales.   Musculoskeletal: She exhibits no edema.   Neurological: She is alert and oriented to person, place, and time.   Skin: Skin is warm and dry.   Psychiatric: She has a normal mood and affect. Her behavior is normal.   Vitals reviewed.      Diagnostics:     PFT:  - no additional    CXR:  - no additional    Exercise oximetry: Normal saturations.  Able to walk 1300 feet with a Loulou scale of 2.    Assessment:    Problem List Items Addressed This Visit        Pulmonary Problems    Dyspnea on exertion - Primary    Relevant Orders    Walking Oximetry (Completed)    Allergic rhinitis          There is no evidence of pulmonary limitation to her exercise capacity.  She has normal PFTs, normal exercise oximetry, and a normal chest CT.  She has no bronchial or chest symptoms on a regular basis and is a lifelong non-smoker.    I think her exercise capacity is limited by deconditioning and some  degree of diastolic dysfunction.    Plan:     1. I went over all of her studies and reassured her that I do not think that she has a pulmonary limitation to exercise and, as such, would not be benefited by any pulmonary medication.  2. Her only option, I believe, to improve her exercise capacity is to redouble her efforts towards a judicious exercise regimen.  We talked about ways she could do that from an aerobic standpoint.  3. I told her I would be happy to see her in the future if the need arises.    Discussed in detail with the patient.  She will call prior to her follow up visit for any new problems.    Signed by  Edvin Tracy MD

## 2019-09-16 ENCOUNTER — OFFICE VISIT (OUTPATIENT)
Dept: FAMILY MEDICINE CLINIC | Facility: CLINIC | Age: 79
End: 2019-09-16

## 2019-09-16 ENCOUNTER — LAB (OUTPATIENT)
Dept: LAB | Facility: HOSPITAL | Age: 79
End: 2019-09-16

## 2019-09-16 VITALS
DIASTOLIC BLOOD PRESSURE: 80 MMHG | WEIGHT: 160 LBS | OXYGEN SATURATION: 98 % | SYSTOLIC BLOOD PRESSURE: 112 MMHG | BODY MASS INDEX: 27.31 KG/M2 | HEIGHT: 64 IN | TEMPERATURE: 98.1 F | HEART RATE: 85 BPM

## 2019-09-16 DIAGNOSIS — R06.02 SHORTNESS OF BREATH: ICD-10-CM

## 2019-09-16 DIAGNOSIS — R53.83 FATIGUE, UNSPECIFIED TYPE: Primary | ICD-10-CM

## 2019-09-16 DIAGNOSIS — E55.9 VITAMIN D DEFICIENCY: ICD-10-CM

## 2019-09-16 DIAGNOSIS — R53.83 FATIGUE, UNSPECIFIED TYPE: ICD-10-CM

## 2019-09-16 DIAGNOSIS — R79.9 ABNORMAL FINDING OF BLOOD CHEMISTRY: ICD-10-CM

## 2019-09-16 DIAGNOSIS — R10.13 EPIGASTRIC PAIN: ICD-10-CM

## 2019-09-16 DIAGNOSIS — G47.19 EXCESSIVE DAYTIME SLEEPINESS: ICD-10-CM

## 2019-09-16 DIAGNOSIS — I10 ESSENTIAL HYPERTENSION: ICD-10-CM

## 2019-09-16 LAB
25(OH)D3 SERPL-MCNC: 41.5 NG/ML (ref 30–100)
ALBUMIN SERPL-MCNC: 4.1 G/DL (ref 3.5–5.2)
ALBUMIN/GLOB SERPL: 1.3 G/DL
ALP SERPL-CCNC: 158 U/L (ref 39–117)
ALT SERPL W P-5'-P-CCNC: 119 U/L (ref 1–33)
ANION GAP SERPL CALCULATED.3IONS-SCNC: 13.8 MMOL/L (ref 5–15)
AST SERPL-CCNC: 108 U/L (ref 1–32)
BACTERIA UR QL AUTO: NORMAL /HPF
BASOPHILS # BLD AUTO: 0.03 10*3/MM3 (ref 0–0.2)
BASOPHILS NFR BLD AUTO: 0.4 % (ref 0–1.5)
BILIRUB SERPL-MCNC: 0.5 MG/DL (ref 0.2–1.2)
BILIRUB UR QL STRIP: NEGATIVE
BUN BLD-MCNC: 25 MG/DL (ref 8–23)
BUN/CREAT SERPL: 22.9 (ref 7–25)
CALCIUM SPEC-SCNC: 9.5 MG/DL (ref 8.6–10.5)
CHLORIDE SERPL-SCNC: 97 MMOL/L (ref 98–107)
CLARITY UR: CLEAR
CO2 SERPL-SCNC: 26.2 MMOL/L (ref 22–29)
COLOR UR: YELLOW
CREAT BLD-MCNC: 1.09 MG/DL (ref 0.57–1)
DEPRECATED RDW RBC AUTO: 49.3 FL (ref 37–54)
EOSINOPHIL # BLD AUTO: 0.22 10*3/MM3 (ref 0–0.4)
EOSINOPHIL NFR BLD AUTO: 3.1 % (ref 0.3–6.2)
ERYTHROCYTE [DISTWIDTH] IN BLOOD BY AUTOMATED COUNT: 14.4 % (ref 12.3–15.4)
GFR SERPL CREATININE-BSD FRML MDRD: 48 ML/MIN/1.73
GLOBULIN UR ELPH-MCNC: 3.1 GM/DL
GLUCOSE BLD-MCNC: 97 MG/DL (ref 65–99)
GLUCOSE UR STRIP-MCNC: NEGATIVE MG/DL
HBA1C MFR BLD: 5.93 % (ref 4.8–5.6)
HCT VFR BLD AUTO: 47.2 % (ref 34–46.6)
HGB BLD-MCNC: 15.1 G/DL (ref 12–15.9)
HGB UR QL STRIP.AUTO: NEGATIVE
HYALINE CASTS UR QL AUTO: NORMAL /LPF
IMM GRANULOCYTES # BLD AUTO: 0.03 10*3/MM3 (ref 0–0.05)
IMM GRANULOCYTES NFR BLD AUTO: 0.4 % (ref 0–0.5)
KETONES UR QL STRIP: NEGATIVE
LEUKOCYTE ESTERASE UR QL STRIP.AUTO: ABNORMAL
LIPASE SERPL-CCNC: 45 U/L (ref 13–60)
LYMPHOCYTES # BLD AUTO: 1.5 10*3/MM3 (ref 0.7–3.1)
LYMPHOCYTES NFR BLD AUTO: 21 % (ref 19.6–45.3)
MCH RBC QN AUTO: 29.8 PG (ref 26.6–33)
MCHC RBC AUTO-ENTMCNC: 32 G/DL (ref 31.5–35.7)
MCV RBC AUTO: 93.1 FL (ref 79–97)
MONOCYTES # BLD AUTO: 0.67 10*3/MM3 (ref 0.1–0.9)
MONOCYTES NFR BLD AUTO: 9.4 % (ref 5–12)
NEUTROPHILS # BLD AUTO: 4.69 10*3/MM3 (ref 1.7–7)
NEUTROPHILS NFR BLD AUTO: 65.7 % (ref 42.7–76)
NITRITE UR QL STRIP: NEGATIVE
NRBC BLD AUTO-RTO: 0 /100 WBC (ref 0–0.2)
PH UR STRIP.AUTO: 6 [PH] (ref 5–8)
PLATELET # BLD AUTO: 257 10*3/MM3 (ref 140–450)
PMV BLD AUTO: 13.4 FL (ref 6–12)
POTASSIUM BLD-SCNC: 4.5 MMOL/L (ref 3.5–5.2)
PROT SERPL-MCNC: 7.2 G/DL (ref 6–8.5)
PROT UR QL STRIP: NEGATIVE
RBC # BLD AUTO: 5.07 10*6/MM3 (ref 3.77–5.28)
RBC # UR: NORMAL /HPF
REF LAB TEST METHOD: NORMAL
SODIUM BLD-SCNC: 137 MMOL/L (ref 136–145)
SP GR UR STRIP: 1.01 (ref 1–1.03)
SQUAMOUS #/AREA URNS HPF: NORMAL /HPF
T4 FREE SERPL-MCNC: 1.42 NG/DL (ref 0.93–1.7)
TSH SERPL DL<=0.05 MIU/L-ACNC: 4.01 UIU/ML (ref 0.27–4.2)
UROBILINOGEN UR QL STRIP: ABNORMAL
WBC NRBC COR # BLD: 7.14 10*3/MM3 (ref 3.4–10.8)
WBC UR QL AUTO: NORMAL /HPF

## 2019-09-16 PROCEDURE — 83690 ASSAY OF LIPASE: CPT

## 2019-09-16 PROCEDURE — 83036 HEMOGLOBIN GLYCOSYLATED A1C: CPT

## 2019-09-16 PROCEDURE — 82306 VITAMIN D 25 HYDROXY: CPT

## 2019-09-16 PROCEDURE — 80053 COMPREHEN METABOLIC PANEL: CPT

## 2019-09-16 PROCEDURE — 84439 ASSAY OF FREE THYROXINE: CPT

## 2019-09-16 PROCEDURE — 83013 H PYLORI (C-13) BREATH: CPT

## 2019-09-16 PROCEDURE — 99214 OFFICE O/P EST MOD 30 MIN: CPT | Performed by: INTERNAL MEDICINE

## 2019-09-16 PROCEDURE — 84443 ASSAY THYROID STIM HORMONE: CPT

## 2019-09-16 PROCEDURE — 81001 URINALYSIS AUTO W/SCOPE: CPT

## 2019-09-16 PROCEDURE — 85025 COMPLETE CBC W/AUTO DIFF WBC: CPT

## 2019-09-16 RX ORDER — NAPROXEN 500 MG/1
TABLET ORAL
COMMUNITY
Start: 2019-07-29 | End: 2019-10-15

## 2019-09-16 NOTE — PROGRESS NOTES
Chief Complaint:  Shortness of breath    HPI:  Sheree Hernandez is a 79 y.o. female who presents today for establish care follow-up shortness of breath.  Patient reports he has had some testing done by previous doctors.  She has daily symptoms of shortness of breath and fatigue minimal activity.  She reports she does not sleep well at night.  She sometimes wakes herself up from snoring.  She reports she feels relatively okay in the morning but fatigued with any kind of physical activity.  She does feel excessively sleepy throughout the day most days.  Denies any morning headache.  The symptoms have been ongoing for several years.  She has a history of hypertension currently taking medication as prescribed.  She feels like her blood pressure is too low at times.  Blood pressure typically 110s to 120 systolic at home.  She reports epigastric pain ongoing for the last month.  Not typically associated with meals.  She rates the pain a 5-6 out of 10.  Denies any prior gallbladder surgery.    ROS:  Constitutional: no fevers, night sweats or unexplained weight loss  Eyes: no vision changes  ENT: no runny nose, ear pain, sore throat  Cardio: no chest pain, palpitations  Pulm: no shortness of breath, wheezing, or cough  GI: + abdominal pain - changes in bowel movements  : no difficulty urinating  MSK: no difficulty ambulating, no joint pain  Neuro: no weakness, dizziness or headache  Psych: no trouble sleeping  Endo: no change in appetite      Past Medical History:   Diagnosis Date   • Asthma    • Atypical chest pain 3/9/2017   • Chronic constipation    • Fracture, pelvis closed (CMS/HCC)    • Hyperlipidemia 3/9/2017   • Hypertension 3/9/2017   • Low bone mass     with elevated FRAX core   • Near syncope     negative cardiovascular workup    • Plantar fasciitis     Chronic   • PVC's (premature ventricular contractions)    • Senile keratosis     Multiple   • MANAN (stress urinary incontinence, female)    • Syncope, near     with  negative cardiovascular workup      Family History   Problem Relation Age of Onset   • Heart attack Mother    • Heart failure Mother    • No Known Problems Father       Social History     Socioeconomic History   • Marital status:      Spouse name: Not on file   • Number of children: 2   • Years of education: Not on file   • Highest education level: Not on file   Tobacco Use   • Smoking status: Never Smoker   • Smokeless tobacco: Never Used   • Tobacco comment: Exposed to secondhand smoke   Substance and Sexual Activity   • Alcohol use: Yes     Types: 3 Glasses of wine per week     Comment: Maybe every 6 months   • Drug use: No   • Sexual activity: No   Social History Narrative    5/18:     since 2013    2 kids:    Best Hernandez [facility]    Tiana Olvera [facility], [place] - medical POA    2 gk    Hobbies: volunteers [employer], involved in Sh[employer]    Exercise: yes 2-5d/wk    Dental: utd    Eye: utd      Allergies   Allergen Reactions   • Pravastatin Myalgia      Immunization History   Administered Date(s) Administered   • Flu Vaccine High Dose PF 65YR+ 11/03/2017, 09/11/2018   • Pneumococcal Polysaccharide (PPSV23) 05/17/2013, 12/06/2016, 05/15/2018   • TD Preservative Free 11/09/2016        PE:  Vitals:    09/16/19 0915   BP: 112/80   Pulse: 85   Temp: 98.1 °F (36.7 °C)   SpO2: 98%        Gen Appearance: NAD  HEENT: Normocephalic, PERRLA, no thyromegaly, trache midline  Heart: RRR, normal S1 and S2, no murmur  Lungs: CTA b/l, no wheezing, no crackles  Abdomen: Soft, non-tender, non-distended, no guarding and BSx4  MSK: Moves all extremities well, normal gait, no peripheral edema  Pulses: Palpable and equal b/l  Lymph nodes: No palpable lymphadenopathy   Neuro: No focal deficits      Current Outpatient Medications   Medication Sig Dispense Refill   • Calcium-Magnesium-Vitamin D (CALCIUM 500 PO) Take 1 tablet by mouth Daily.     • hydrochlorothiazide (HYDRODIURIL) 25 MG tablet Take 1 tablet by mouth Daily.  (Patient taking differently: Take 50 mg by mouth Daily.) 90 tablet 1   • losartan (COZAAR) 100 MG tablet Take 1 tablet by mouth Daily. 90 tablet 3   • naproxen (NAPROSYN) 500 MG tablet      • Omega-3 Fatty Acids (OMEGA 3 PO) Take 1 tablet by mouth Daily.     • rosuvastatin (CRESTOR) 10 MG tablet TAKE 1 TABLET DAILY 90 tablet 1   • azelastine (ASTELIN) 0.1 % nasal spray 2 sprays into the nostril(s) as directed by provider 2 (Two) Times a Day. Use in each nostril as directed     • MUCINEX D  MG per 12 hr tablet Take  by mouth Daily.  3     No current facility-administered medications for this visit.         Sheree was seen today for establish care, shortness of breath and abdominal pain.    Diagnoses and all orders for this visit:    Fatigue, unspecified type  -     CBC & Differential; Future  -     Comprehensive Metabolic Panel; Future  -     TSH+Free T4; Future  -     Urinalysis With Culture If Indicated -; Future  -     Vitamin D 25 Hydroxy; Future  I suspect this may be more fatigue rather than shortness of breath.  She has had extensive testing done including echocardiogram, stress test, cath, CT angiogram of chest, pulmonary function testing.  All of which were normal.  Checking sleep study for excessive daytime sleepiness.  Checking for metabolic cause as well.  If this normal could try reducing statin or reducing blood pressure medication.  Shortness of breath  I suspect this may be fatigue more than shortness of breath.  Checking sleep study to rule out sleep apnea.  Epigastric pain  -     Lipase; Future  -     H. Pylori Breath Test - Breath, Lung; Future  Nontender on exam today.  Checking blood work and explore testing.  She has a history of elevated liver enzymes and alkaline phosphatase.  Continues to be elevated today would recommend liver and gallbladder ultrasound.  Essential hypertension  -     CBC & Differential; Future  -     Comprehensive Metabolic Panel; Future  -     Hemoglobin A1c;  Future  -     TSH+Free T4; Future  -     Urinalysis With Culture If Indicated -; Future  Well-controlled today.  Continue current medication.  Abnormal finding of blood chemistry   -     Hemoglobin A1c; Future    Vitamin D deficiency  -     Vitamin D 25 Hydroxy; Future    Excessive daytime sleepiness  -     Home Sleep Study; Future         Return in about 4 weeks (around 10/14/2019).     Please note that portions of this document were completed with a voice recognition program. Efforts were made to edit the dictations, but occasionally words are mis-transcribed.

## 2019-09-17 LAB — UREA BREATH TEST QL: NEGATIVE

## 2019-09-20 DIAGNOSIS — R94.4 DECREASED CREATININE CLEARANCE: ICD-10-CM

## 2019-09-20 DIAGNOSIS — R74.8 ELEVATED LIVER ENZYMES: ICD-10-CM

## 2019-09-20 DIAGNOSIS — Z13.6 ENCOUNTER FOR LIPID SCREENING FOR CARDIOVASCULAR DISEASE: Primary | ICD-10-CM

## 2019-09-20 DIAGNOSIS — R10.11 RIGHT UPPER QUADRANT ABDOMINAL PAIN: ICD-10-CM

## 2019-09-20 DIAGNOSIS — Z13.220 ENCOUNTER FOR LIPID SCREENING FOR CARDIOVASCULAR DISEASE: Primary | ICD-10-CM

## 2019-09-26 ENCOUNTER — HOSPITAL ENCOUNTER (OUTPATIENT)
Dept: ULTRASOUND IMAGING | Facility: HOSPITAL | Age: 79
Discharge: HOME | End: 2019-09-26
Admitting: INTERNAL MEDICINE

## 2019-09-26 ENCOUNTER — LAB (OUTPATIENT)
Dept: LAB | Facility: HOSPITAL | Age: 79
End: 2019-09-26

## 2019-09-26 DIAGNOSIS — Z13.220 ENCOUNTER FOR LIPID SCREENING FOR CARDIOVASCULAR DISEASE: ICD-10-CM

## 2019-09-26 DIAGNOSIS — R94.4 DECREASED CREATININE CLEARANCE: ICD-10-CM

## 2019-09-26 DIAGNOSIS — Z13.6 ENCOUNTER FOR LIPID SCREENING FOR CARDIOVASCULAR DISEASE: ICD-10-CM

## 2019-09-26 DIAGNOSIS — R10.11 RIGHT UPPER QUADRANT ABDOMINAL PAIN: ICD-10-CM

## 2019-09-26 DIAGNOSIS — R74.8 ELEVATED LIVER ENZYMES: ICD-10-CM

## 2019-09-26 LAB
ALBUMIN SERPL-MCNC: 4.6 G/DL (ref 3.5–5.2)
ALBUMIN/GLOB SERPL: 1.9 G/DL
ALP SERPL-CCNC: 127 U/L (ref 39–117)
ALT SERPL W P-5'-P-CCNC: 25 U/L (ref 1–33)
ANION GAP SERPL CALCULATED.3IONS-SCNC: 13.8 MMOL/L (ref 5–15)
AST SERPL-CCNC: 25 U/L (ref 1–32)
BILIRUB SERPL-MCNC: 0.4 MG/DL (ref 0.2–1.2)
BUN BLD-MCNC: 19 MG/DL (ref 8–23)
BUN/CREAT SERPL: 20.2 (ref 7–25)
CALCIUM SPEC-SCNC: 9.2 MG/DL (ref 8.6–10.5)
CHLORIDE SERPL-SCNC: 105 MMOL/L (ref 98–107)
CHOLEST SERPL-MCNC: 239 MG/DL (ref 0–200)
CO2 SERPL-SCNC: 26.2 MMOL/L (ref 22–29)
CREAT BLD-MCNC: 0.94 MG/DL (ref 0.57–1)
GFR SERPL CREATININE-BSD FRML MDRD: 57 ML/MIN/1.73
GLOBULIN UR ELPH-MCNC: 2.4 GM/DL
GLUCOSE BLD-MCNC: 84 MG/DL (ref 65–99)
HDLC SERPL-MCNC: 56 MG/DL (ref 40–60)
LDLC SERPL CALC-MCNC: 161 MG/DL (ref 0–100)
LDLC/HDLC SERPL: 2.87 {RATIO}
POTASSIUM BLD-SCNC: 4.3 MMOL/L (ref 3.5–5.2)
PROT SERPL-MCNC: 7 G/DL (ref 6–8.5)
SODIUM BLD-SCNC: 145 MMOL/L (ref 136–145)
TRIGL SERPL-MCNC: 111 MG/DL (ref 0–150)
VLDLC SERPL-MCNC: 22.2 MG/DL (ref 5–40)

## 2019-09-26 PROCEDURE — 36415 COLL VENOUS BLD VENIPUNCTURE: CPT

## 2019-09-26 PROCEDURE — 80061 LIPID PANEL: CPT

## 2019-09-26 PROCEDURE — 80053 COMPREHEN METABOLIC PANEL: CPT

## 2019-09-26 PROCEDURE — 76705 ECHO EXAM OF ABDOMEN: CPT

## 2019-10-09 ENCOUNTER — HOSPITAL ENCOUNTER (OUTPATIENT)
Dept: SLEEP MEDICINE | Facility: HOSPITAL | Age: 79
Discharge: HOME | End: 2019-10-09
Admitting: INTERNAL MEDICINE

## 2019-10-09 VITALS
OXYGEN SATURATION: 98 % | DIASTOLIC BLOOD PRESSURE: 65 MMHG | WEIGHT: 167 LBS | BODY MASS INDEX: 28.51 KG/M2 | SYSTOLIC BLOOD PRESSURE: 139 MMHG | HEART RATE: 74 BPM | HEIGHT: 64 IN

## 2019-10-09 DIAGNOSIS — G47.19 EXCESSIVE DAYTIME SLEEPINESS: ICD-10-CM

## 2019-10-09 PROCEDURE — 95806 SLEEP STUDY UNATT&RESP EFFT: CPT | Performed by: INTERNAL MEDICINE

## 2019-10-09 PROCEDURE — 95806 SLEEP STUDY UNATT&RESP EFFT: CPT

## 2019-10-15 ENCOUNTER — OFFICE VISIT (OUTPATIENT)
Dept: FAMILY MEDICINE CLINIC | Facility: CLINIC | Age: 79
End: 2019-10-15

## 2019-10-15 VITALS
OXYGEN SATURATION: 97 % | DIASTOLIC BLOOD PRESSURE: 74 MMHG | WEIGHT: 163 LBS | HEART RATE: 75 BPM | SYSTOLIC BLOOD PRESSURE: 128 MMHG | RESPIRATION RATE: 16 BRPM | BODY MASS INDEX: 27.83 KG/M2 | HEIGHT: 64 IN

## 2019-10-15 DIAGNOSIS — R53.83 FATIGUE, UNSPECIFIED TYPE: ICD-10-CM

## 2019-10-15 DIAGNOSIS — I10 ESSENTIAL HYPERTENSION: ICD-10-CM

## 2019-10-15 DIAGNOSIS — E78.5 HYPERLIPIDEMIA, UNSPECIFIED HYPERLIPIDEMIA TYPE: ICD-10-CM

## 2019-10-15 DIAGNOSIS — R06.02 SHORTNESS OF BREATH: ICD-10-CM

## 2019-10-15 DIAGNOSIS — G47.33 OBSTRUCTIVE SLEEP APNEA: Primary | ICD-10-CM

## 2019-10-15 PROCEDURE — G0008 ADMIN INFLUENZA VIRUS VAC: HCPCS | Performed by: INTERNAL MEDICINE

## 2019-10-15 PROCEDURE — 99214 OFFICE O/P EST MOD 30 MIN: CPT | Performed by: INTERNAL MEDICINE

## 2019-10-15 PROCEDURE — 90662 IIV NO PRSV INCREASED AG IM: CPT | Performed by: INTERNAL MEDICINE

## 2019-10-15 NOTE — PROGRESS NOTES
Chief Complaint:  Shortness of breath    HPI:  Sheree Hernandez is a 79 y.o. female who presents today for follow-up of fatigue and shortness of breath and also chronic medical conditions.  Daytime sleepiness and shortness of breath that she would like to review recent sleep study results.  She continues to have shortness of breath on exertion.  She has chronic daytime fatigue as well.  History unchanged from prior visit.  Hypertension-well-controlled on losartan daily.  Taking medication as prescribed.  Hyperlipidemia-currently taking Crestor daily.  Denies myalgias.  ROS:  Constitutional: no fevers, night sweats or unexplained weight loss  Eyes: no vision changes  ENT: no runny nose, ear pain, sore throat  Cardio: no chest pain, palpitations  Pulm: no shortness of breath, wheezing, or cough  GI: no abdominal pain or changes in bowel movements  : no difficulty urinating  MSK: no difficulty ambulating, no joint pain  Neuro: no weakness, dizziness or headache  Psych: no trouble sleeping  Endo: no change in appetite      Past Medical History:   Diagnosis Date   • Asthma    • Atypical chest pain 3/9/2017   • Chronic constipation    • Fracture, pelvis closed (CMS/HCC)    • Hyperlipidemia 3/9/2017   • Hypertension 3/9/2017   • Low bone mass     with elevated FRAX core   • Near syncope     negative cardiovascular workup    • Plantar fasciitis     Chronic   • PVC's (premature ventricular contractions)    • Senile keratosis     Multiple   • MANAN (stress urinary incontinence, female)    • Syncope, near     with negative cardiovascular workup      Family History   Problem Relation Age of Onset   • Heart attack Mother    • Heart failure Mother    • No Known Problems Father       Social History     Socioeconomic History   • Marital status:      Spouse name: Not on file   • Number of children: 2   • Years of education: Not on file   • Highest education level: Not on file   Tobacco Use   • Smoking status: Never Smoker   •  Smokeless tobacco: Never Used   • Tobacco comment: Exposed to secondhand smoke   Substance and Sexual Activity   • Alcohol use: Yes     Types: 3 Glasses of wine per week     Comment: Maybe every 6 months   • Drug use: No   • Sexual activity: No   Social History Narrative    5/18:     since 2013    2 kids:    Best Hernandez - [place]    Tiana Olvera [place], [place] - medical POA    2 gk    Hobbies: volunteers [employer], involved in [employer]    Exercise: yes 2-5d/wk    Dental: utd    Eye: utd      Allergies   Allergen Reactions   • Pravastatin Myalgia      Immunization History   Administered Date(s) Administered   • Flu Vaccine High Dose PF 65YR+ 11/03/2017, 09/11/2018, 10/15/2019   • Pneumococcal Polysaccharide (PPSV23) 05/17/2013, 12/06/2016, 05/15/2018   • TD Preservative Free 11/09/2016        PE:  Vitals:    10/15/19 0838   BP: 128/74   Pulse: 75   Resp: 16   SpO2: 97%      Body mass index is 27.96 kg/m².    Gen Appearance: NAD  HEENT: Normocephalic, PERRLA, no thyromegaly, trache midline  Heart: RRR, normal S1 and S2, no murmur  Lungs: CTA b/l, no wheezing, no crackles  Abdomen: Soft, non-tender, non-distended, no guarding and BSx4  MSK: Moves all extremities well, normal gait, no peripheral edema  Pulses: Palpable and equal b/l  Lymph nodes: No palpable lymphadenopathy   Neuro: No focal deficits      Current Outpatient Medications   Medication Sig Dispense Refill   • Calcium-Magnesium-Vitamin D (CALCIUM 500 PO) Take 1 tablet by mouth Daily.     • hydrochlorothiazide (HYDRODIURIL) 25 MG tablet Take 1 tablet by mouth Daily. (Patient taking differently: Take 50 mg by mouth Daily.) 90 tablet 1   • losartan (COZAAR) 100 MG tablet Take 1 tablet by mouth Daily. 90 tablet 3   • Omega-3 Fatty Acids (OMEGA 3 PO) Take 1 tablet by mouth Daily.     • rosuvastatin (CRESTOR) 10 MG tablet TAKE 1 TABLET DAILY 90 tablet 1     No current facility-administered medications for this visit.         Sheree was seen today for  fatigue.    Diagnoses and all orders for this visit:    Obstructive sleep apnea  -     Ambulatory Referral to Sleep Medicine  Home sleep test consistent with obstructive sleep apnea.  This is likely the cause of her fatigue.  She has had an extensive work-up for shortness of breath in the past.  Recommend treatment for BONNIE.  Will refer to sleep medicine.  If she still has shortness of breath after being treated for sleep apnea will further work-up.  All signs stable here today.  97% on room air.  Fatigue, unspecified type  See above.  Shortness of breath  See above.  Essential hypertension  Well-controlled.  Continue current medication.  Hyperlipidemia, unspecified hyperlipidemia type  Discussed increased LDL with patient on previous blood work.  Recommend diet changes over the next year.  She has an allergy to pravastatin.  If her LDL is still elevated at next years check would recommend increasing Crestor.  Other orders  -     FluZone High Dose 65YR+ ([id number])         No Follow-up on file.     Please note that portions of this document were completed with a voice recognition program. Efforts were made to edit the dictations, but occasionally words are mis-transcribed.

## 2019-10-22 ENCOUNTER — TELEPHONE (OUTPATIENT)
Dept: FAMILY MEDICINE CLINIC | Facility: CLINIC | Age: 79
End: 2019-10-22

## 2019-10-22 DIAGNOSIS — I10 ESSENTIAL HYPERTENSION: ICD-10-CM

## 2019-10-22 RX ORDER — ROSUVASTATIN CALCIUM 10 MG/1
10 TABLET, COATED ORAL DAILY
Qty: 90 TABLET | Refills: 1 | Status: CANCELLED | OUTPATIENT
Start: 2019-10-22

## 2019-10-22 RX ORDER — ROSUVASTATIN CALCIUM 10 MG/1
TABLET, COATED ORAL
Qty: 90 TABLET | Refills: 4 | OUTPATIENT
Start: 2019-10-22

## 2019-10-22 RX ORDER — LOSARTAN POTASSIUM 100 MG/1
TABLET ORAL
Qty: 90 TABLET | Refills: 4 | OUTPATIENT
Start: 2019-10-22

## 2019-10-22 RX ORDER — ROSUVASTATIN CALCIUM 10 MG/1
10 TABLET, COATED ORAL DAILY
Qty: 90 TABLET | Refills: 3 | Status: SHIPPED | OUTPATIENT
Start: 2019-10-22 | End: 2021-11-05

## 2019-10-22 RX ORDER — LOSARTAN POTASSIUM 100 MG/1
100 TABLET ORAL DAILY
Qty: 90 TABLET | Refills: 3 | Status: CANCELLED | OUTPATIENT
Start: 2019-10-22

## 2019-10-22 RX ORDER — LOSARTAN POTASSIUM 100 MG/1
100 TABLET ORAL DAILY
Qty: 90 TABLET | Refills: 3 | Status: SHIPPED | OUTPATIENT
Start: 2019-10-22 | End: 2020-07-22 | Stop reason: SDUPTHER

## 2019-10-22 NOTE — TELEPHONE ENCOUNTER
Pt is being followed by Dr Gonzalez now, no longer with Dr Sam. Called pt, notified her that one of her current doctors would have to refill her RXs.

## 2019-10-22 NOTE — TELEPHONE ENCOUNTER
WERE YOU PLANNING ON SENDING PATIENT TO PULMONOLOGY?    ALL MEDICATIONS SENT TO PATIENT PHARMACY EXCEPT HCTZ, OKAY TO SEND IN TO PATIENT PHARMACY?

## 2019-10-22 NOTE — TELEPHONE ENCOUNTER
Patient called for a refill on Hydrochlorothiazide, also asking for refill on Losartan which was previously written by [facility] Dr. Sam but she no longer sees him and would like to see if  can continue this medication for her. She also thinks she is due for a refill on Rosuvastatin. All 3 medications to go through [facility].

## 2019-10-22 NOTE — TELEPHONE ENCOUNTER
Patient would also like to follow up on referral to Pulm. States Man told her he would work on getting her in with a Pulmonologist at her visit last week and has not heard back. I do not see an open referral to give her any information.

## 2019-10-24 RX ORDER — HYDROCHLOROTHIAZIDE 25 MG/1
25 TABLET ORAL DAILY
Qty: 90 TABLET | Refills: 1 | Status: SHIPPED | OUTPATIENT
Start: 2019-10-24 | End: 2020-07-22 | Stop reason: SDUPTHER

## 2020-01-30 ENCOUNTER — CONSULT (OUTPATIENT)
Dept: SLEEP MEDICINE | Facility: HOSPITAL | Age: 80
End: 2020-01-30

## 2020-01-30 VITALS
DIASTOLIC BLOOD PRESSURE: 70 MMHG | SYSTOLIC BLOOD PRESSURE: 148 MMHG | OXYGEN SATURATION: 98 % | HEART RATE: 79 BPM | WEIGHT: 165.4 LBS | HEIGHT: 64 IN | BODY MASS INDEX: 28.24 KG/M2

## 2020-01-30 DIAGNOSIS — G47.33 MILD OBSTRUCTIVE SLEEP APNEA: ICD-10-CM

## 2020-01-30 DIAGNOSIS — R53.82 CHRONIC FATIGUE: ICD-10-CM

## 2020-01-30 DIAGNOSIS — R06.09 DYSPNEA ON EXERTION: Primary | ICD-10-CM

## 2020-01-30 PROCEDURE — 99214 OFFICE O/P EST MOD 30 MIN: CPT | Performed by: INTERNAL MEDICINE

## 2020-01-30 NOTE — PROGRESS NOTES
Subjective:     Chief Complaint:   Chief Complaint   Patient presents with   • Sleeping Problem       HPI:    Sheree Hernandez is a 79 y.o. female here for follow-up of of a recent diagnosis of obstructive sleep apnea.    I have seen her in the past.  This was last year in the pulmonary clinic.  She was being evaluated for dyspnea on exertion.  She had a fairly comprehensive negative work-up including a negative CT angiogram, normal PFTs, and normal exercise oximetry.    She complains of excessive fatigue and has some daytime somnolence with an [place] sleepiness scale of 9.  She has difficulty falling asleep on occasion as well as a difficulty staying asleep on occasion.  She will typically awaken twice per night.    At the time of her last visit to the pulmonary clinic she was encouraged to continue a regular exercise regimen but she has not done this as she did not see the positive effects.    Current medications are:   Current Outpatient Medications:   •  Calcium-Magnesium-Vitamin D (CALCIUM 500 PO), Take 1 tablet by mouth Daily., Disp: , Rfl:   •  hydroCHLOROthiazide (HYDRODIURIL) 25 MG tablet, Take 1 tablet by mouth Daily., Disp: 90 tablet, Rfl: 1  •  losartan (COZAAR) 100 MG tablet, Take 1 tablet by mouth Daily., Disp: 90 tablet, Rfl: 3  •  Omega-3 Fatty Acids (OMEGA 3 PO), Take 1 tablet by mouth Daily., Disp: , Rfl:   •  rosuvastatin (CRESTOR) 10 MG tablet, Take 1 tablet by mouth Daily., Disp: 90 tablet, Rfl: 3.      The patient's relevant past medical, surgical, family and social history were reviewed and updated in Epic as appropriate.     ROS:    Review of Systems   Constitutional: Positive for fatigue.   Respiratory: Positive for shortness of breath.    Psychiatric/Behavioral: Positive for sleep disturbance.         Objective:    Physical Exam   Constitutional: She is oriented to person, place, and time. She appears well-developed and well-nourished.   HENT:   Head: Normocephalic and atraumatic.    Mouth/Throat: Oropharynx is clear and moist.   Neck: Neck supple. No thyromegaly present.   Cardiovascular: Normal rate and regular rhythm. Exam reveals no gallop and no friction rub.   No murmur heard.  Pulmonary/Chest: Effort normal. No respiratory distress. She has no wheezes. She has no rales.   Musculoskeletal: She exhibits no edema.   Neurological: She is alert and oriented to person, place, and time.   Skin: Skin is warm and dry.   Psychiatric: She has a normal mood and affect. Her behavior is normal.   Vitals reviewed.        Assessment:    Problem List Items Addressed This Visit        Pulmonary Problems    Dyspnea on exertion - Primary    Mild obstructive sleep apnea       Other    Chronic fatigue        79-year-old female with dyspnea on exertion and a comprehensive negative pulmonary work-up last year.  She does have chronic fatigue/daytime sleepiness and a home sleep apnea test was consistent with mild obstructive sleep apnea.  She was referred to me for consideration of treatment for this.    I discussed the results of the home sleep study with her in detail.  We discussed the potential relationship between untreated obstructive sleep apnea and her daytime fatigue.  She seemed rather unconvinced that a sleep disorder was the cause of her problems and did not think she would be able to use CPAP as she has multiple friends that could not tolerate it.      Plan:     1. I encouraged her to try CPAP on a trial basis and she was amenable to that.  We can then see if that has a positive effect on her daytime symptoms.  I have significant reservations about her ability to comply with therapy.  2. We will see her in follow-up in the sleep center once she has had a chance to try CPAP therapy    Discussed in detail with the patient.  She will call prior to her follow up visit for any new problems.    Level of Risk Moderate due to: two stable chronic illnesses    Signed by  Edvin Tracy MD

## 2020-03-31 ENCOUNTER — TELEMEDICINE (OUTPATIENT)
Dept: SLEEP MEDICINE | Facility: HOSPITAL | Age: 80
End: 2020-03-31

## 2020-03-31 DIAGNOSIS — G47.33 OSA (OBSTRUCTIVE SLEEP APNEA): Primary | ICD-10-CM

## 2020-03-31 PROCEDURE — 99212 OFFICE O/P EST SF 10 MIN: CPT | Performed by: NURSE PRACTITIONER

## 2020-03-31 NOTE — PROGRESS NOTES
Chief Complaint:   Chief Complaint   Patient presents with   • Follow-up       HPI:    Sheree Hernandez is a 79 y.o. female here for follow-up of sleep apnea.  Patient was seen here 1/30/2020 with complains of excessive fatigue, daytime somnolence, and recent diagnosis of sleep apnea.  Patient is sleeping 7 to 8 hours nightly and does not feel refreshed upon awakening.  Patient states she has been worked up recently for shortness of breath by pulmonary as well as cardiology with both giving benign exams.  Patient states she does not feel any better wearing CPAP and this does make her feel worse.  Her sleep is worse when wearing CPAP.  Patient has tried several different nasal mask was without relief.  Patient does wish to discontinue CPAP use but is amenable to trying oral mandibular advancement device.  I have reiterated the consequences of untreated sleep apnea.  This appointment was done by telemedicine.  [place] score 8/24.      Current medications are:   Current Outpatient Medications:   •  Calcium-Magnesium-Vitamin D (CALCIUM 500 PO), Take 1 tablet by mouth Daily., Disp: , Rfl:   •  hydroCHLOROthiazide (HYDRODIURIL) 25 MG tablet, Take 1 tablet by mouth Daily., Disp: 90 tablet, Rfl: 1  •  losartan (COZAAR) 100 MG tablet, Take 1 tablet by mouth Daily., Disp: 90 tablet, Rfl: 3  •  Omega-3 Fatty Acids (OMEGA 3 PO), Take 1 tablet by mouth Daily., Disp: , Rfl:   •  rosuvastatin (CRESTOR) 10 MG tablet, Take 1 tablet by mouth Daily., Disp: 90 tablet, Rfl: 3.      The patient's relevant past medical, surgical, family and social history were reviewed and updated in Epic as appropriate.       Review of Systems   Respiratory: Positive for apnea and shortness of breath.    Psychiatric/Behavioral: Positive for sleep disturbance.   All other systems reviewed and are negative.        Objective:    Physical Exam   Constitutional: She is oriented to person, place, and time. She appears well-developed and well-nourished.    HENT:   Head: Normocephalic and atraumatic.   Pulmonary/Chest: Effort normal.   Neurological: She is alert and oriented to person, place, and time.   Psychiatric: She has a normal mood and affect. Her behavior is normal. Judgment and thought content normal.     31/33 days of use.  Greater than 4-hour use 75.8%.  90% pressure 9.6.  AHI of 3.6.    ASSESSMENT/PLAN    Sheree was seen today for follow-up.    Diagnoses and all orders for this visit:    BONNIE (obstructive sleep apnea)            1. Counseled patient regarding multimodal approach with healthy nutrition, healthy sleep, regular physical activity, social activities, counseling, and medications. Encouraged to practice lateral  sleep position. Avoid alcohol and sedatives close to bedtime.  2. Discontinued CPAP therapy and will be mailing in order for oral mandibular advancement device to patient's home.  I have put patient back in 6 months due to coronavirus and dentists  3.  is being closed at this time.  Patient will call should she have this completed sooner.  After verbal consent obtained patient has been seen with using video visit.  I have reviewed the results of my evaluation and impression and discussed my recommendations in detail with the patient.      Signed by  LEDY Dawson    March 31, 2020      CC: Timi Conway,           No ref. provider found

## 2020-06-16 ENCOUNTER — OFFICE VISIT (OUTPATIENT)
Dept: FAMILY MEDICINE CLINIC | Facility: CLINIC | Age: 80
End: 2020-06-16

## 2020-06-16 ENCOUNTER — LAB (OUTPATIENT)
Dept: LAB | Facility: HOSPITAL | Age: 80
End: 2020-06-16

## 2020-06-16 VITALS
HEIGHT: 64 IN | HEART RATE: 66 BPM | SYSTOLIC BLOOD PRESSURE: 140 MMHG | DIASTOLIC BLOOD PRESSURE: 80 MMHG | WEIGHT: 167.5 LBS | OXYGEN SATURATION: 98 % | BODY MASS INDEX: 28.6 KG/M2

## 2020-06-16 DIAGNOSIS — I10 ESSENTIAL HYPERTENSION: ICD-10-CM

## 2020-06-16 DIAGNOSIS — R79.9 ABNORMAL FINDING OF BLOOD CHEMISTRY, UNSPECIFIED: ICD-10-CM

## 2020-06-16 DIAGNOSIS — E78.5 HYPERLIPIDEMIA, UNSPECIFIED HYPERLIPIDEMIA TYPE: ICD-10-CM

## 2020-06-16 DIAGNOSIS — R53.82 CHRONIC FATIGUE: ICD-10-CM

## 2020-06-16 DIAGNOSIS — G47.33 MILD OBSTRUCTIVE SLEEP APNEA: ICD-10-CM

## 2020-06-16 DIAGNOSIS — E55.9 VITAMIN D DEFICIENCY, UNSPECIFIED: ICD-10-CM

## 2020-06-16 DIAGNOSIS — R53.82 CHRONIC FATIGUE: Primary | ICD-10-CM

## 2020-06-16 DIAGNOSIS — R06.02 SHORTNESS OF BREATH: ICD-10-CM

## 2020-06-16 LAB
ALBUMIN SERPL-MCNC: 4.6 G/DL (ref 3.5–5.2)
ALBUMIN/GLOB SERPL: 1.6 G/DL
ALP SERPL-CCNC: 122 U/L (ref 39–117)
ALT SERPL W P-5'-P-CCNC: 20 U/L (ref 1–33)
ANION GAP SERPL CALCULATED.3IONS-SCNC: 12 MMOL/L (ref 5–15)
AST SERPL-CCNC: 22 U/L (ref 1–32)
BACTERIA UR QL AUTO: ABNORMAL /HPF
BILIRUB SERPL-MCNC: 0.5 MG/DL (ref 0.2–1.2)
BILIRUB UR QL STRIP: NEGATIVE
BUN BLD-MCNC: 20 MG/DL (ref 8–23)
BUN/CREAT SERPL: 21.5 (ref 7–25)
CALCIUM SPEC-SCNC: 9.4 MG/DL (ref 8.6–10.5)
CHLORIDE SERPL-SCNC: 104 MMOL/L (ref 98–107)
CHOLEST SERPL-MCNC: 194 MG/DL (ref 0–200)
CLARITY UR: CLEAR
CO2 SERPL-SCNC: 23 MMOL/L (ref 22–29)
COLOR UR: YELLOW
CREAT BLD-MCNC: 0.93 MG/DL (ref 0.57–1)
CRP SERPL-MCNC: 0.33 MG/DL (ref 0–0.5)
FERRITIN SERPL-MCNC: 246 NG/ML (ref 13–150)
GFR SERPL CREATININE-BSD FRML MDRD: 58 ML/MIN/1.73
GLOBULIN UR ELPH-MCNC: 2.9 GM/DL
GLUCOSE BLD-MCNC: 96 MG/DL (ref 65–99)
GLUCOSE UR STRIP-MCNC: NEGATIVE MG/DL
HDLC SERPL-MCNC: 59 MG/DL (ref 40–60)
HGB UR QL STRIP.AUTO: NEGATIVE
HYALINE CASTS UR QL AUTO: ABNORMAL /LPF
IRON 24H UR-MRATE: 88 MCG/DL (ref 37–145)
IRON SATN MFR SERPL: 24 % (ref 20–50)
KETONES UR QL STRIP: NEGATIVE
LDLC SERPL CALC-MCNC: 107 MG/DL (ref 0–100)
LDLC/HDLC SERPL: 1.81 {RATIO}
LEUKOCYTE ESTERASE UR QL STRIP.AUTO: ABNORMAL
NITRITE UR QL STRIP: NEGATIVE
PH UR STRIP.AUTO: 5.5 [PH] (ref 5–8)
POTASSIUM BLD-SCNC: 4.4 MMOL/L (ref 3.5–5.2)
PROT SERPL-MCNC: 7.5 G/DL (ref 6–8.5)
PROT UR QL STRIP: NEGATIVE
RBC # UR: ABNORMAL /HPF
REF LAB TEST METHOD: ABNORMAL
SODIUM BLD-SCNC: 139 MMOL/L (ref 136–145)
SP GR UR STRIP: 1.02 (ref 1–1.03)
SQUAMOUS #/AREA URNS HPF: ABNORMAL /HPF
T4 FREE SERPL-MCNC: 1.35 NG/DL (ref 0.93–1.7)
TIBC SERPL-MCNC: 368 MCG/DL (ref 298–536)
TRANSFERRIN SERPL-MCNC: 247 MG/DL (ref 200–360)
TRIGL SERPL-MCNC: 141 MG/DL (ref 0–150)
TSH SERPL DL<=0.05 MIU/L-ACNC: 2.68 UIU/ML (ref 0.27–4.2)
UROBILINOGEN UR QL STRIP: ABNORMAL
VLDLC SERPL-MCNC: 28.2 MG/DL (ref 5–40)
WBC UR QL AUTO: ABNORMAL /HPF

## 2020-06-16 PROCEDURE — 84439 ASSAY OF FREE THYROXINE: CPT

## 2020-06-16 PROCEDURE — 82306 VITAMIN D 25 HYDROXY: CPT

## 2020-06-16 PROCEDURE — 86140 C-REACTIVE PROTEIN: CPT

## 2020-06-16 PROCEDURE — 36415 COLL VENOUS BLD VENIPUNCTURE: CPT

## 2020-06-16 PROCEDURE — 85025 COMPLETE CBC W/AUTO DIFF WBC: CPT

## 2020-06-16 PROCEDURE — 80061 LIPID PANEL: CPT

## 2020-06-16 PROCEDURE — 82746 ASSAY OF FOLIC ACID SERUM: CPT

## 2020-06-16 PROCEDURE — 99214 OFFICE O/P EST MOD 30 MIN: CPT | Performed by: INTERNAL MEDICINE

## 2020-06-16 PROCEDURE — 82728 ASSAY OF FERRITIN: CPT

## 2020-06-16 PROCEDURE — 81001 URINALYSIS AUTO W/SCOPE: CPT

## 2020-06-16 PROCEDURE — 80053 COMPREHEN METABOLIC PANEL: CPT

## 2020-06-16 PROCEDURE — 84466 ASSAY OF TRANSFERRIN: CPT

## 2020-06-16 PROCEDURE — 82607 VITAMIN B-12: CPT

## 2020-06-16 PROCEDURE — 85652 RBC SED RATE AUTOMATED: CPT

## 2020-06-16 PROCEDURE — 84443 ASSAY THYROID STIM HORMONE: CPT

## 2020-06-16 PROCEDURE — 83036 HEMOGLOBIN GLYCOSYLATED A1C: CPT

## 2020-06-16 PROCEDURE — 83540 ASSAY OF IRON: CPT

## 2020-06-16 NOTE — PROGRESS NOTES
Chief Complaint   Patient presents with   • Fatigue   • Shortness of Breath       HPI:  Sheree Hernandez is a 79 y.o. female who presents today for follow-up fatigue and shortness of breath.  Patient is frustrated that she has had symptoms for the past 2 to 3 years without any answer.  She did recently established with sleep medicine due to obstructive sleep apnea.  She did not tolerate CPAP well and she reports that it did not improve her symptoms so she discontinued it.  She was advised to follow-up with an alternate mandibular device although she has not done this yet.  She continues to have daily symptoms.  She would like to review recent blood work and testing and discuss further management.    ROS:  Constitutional: no fevers, night sweats or unexplained weight loss  Eyes: no vision changes  ENT: no runny nose, ear pain, sore throat  Cardio: no chest pain, palpitations  Pulm: + shortness of breath, -wheezing, or cough  GI: no abdominal pain or changes in bowel movements  : no difficulty urinating  MSK: no difficulty ambulating, no joint pain  Neuro: no weakness, dizziness or headache  Psych: no trouble sleeping  Endo: no change in appetite      Past Medical History:   Diagnosis Date   • Asthma    • Atypical chest pain 3/9/2017   • Chronic constipation    • Fracture, pelvis closed (CMS/[facility])    • Hyperlipidemia 3/9/2017   • Hypertension 3/9/2017   • Low bone mass     with elevated FRAX core   • Mild obstructive sleep apnea 1/30/2020   • Near syncope     negative cardiovascular workup    • Plantar fasciitis     Chronic   • PVC's (premature ventricular contractions)    • Senile keratosis     Multiple   • MANAN (stress urinary incontinence, female)    • Syncope, near     with negative cardiovascular workup      Family History   Problem Relation Age of Onset   • Heart attack Mother    • Heart failure Mother    • No Known Problems Father       Social History     Socioeconomic History   • Marital status:       Spouse name: Not on file   • Number of children: 2   • Years of education: Not on file   • Highest education level: Not on file   Tobacco Use   • Smoking status: Never Smoker   • Smokeless tobacco: Never Used   • Tobacco comment: Exposed to secondhand smoke   Substance and Sexual Activity   • Alcohol use: Yes     Types: 3 Glasses of wine per week     Comment: Maybe every 6 months   • Drug use: No   • Sexual activity: Never   Social History Narrative    5/18:     since 2013    2 kids:    Best Hernandez [facility]    Tiana Olvera [place], [place] - medical POA    2     Hobbies: volunteers [employer], involved in [employer]    Exercise: yes 2-5d/wk    Dental: utd    Eye: utd      Allergies   Allergen Reactions   • Pravastatin Myalgia      Immunization History   Administered Date(s) Administered   • Fluzone High Dose =>65 Years ([employer] ONLY) 11/03/2017, 09/11/2018, 10/15/2019   • Pneumococcal Polysaccharide (PPSV23) 05/17/2013, 12/06/2016, 05/15/2018   • TD Preservative Free 11/09/2016        PE:  Vitals:    06/16/20 1108   BP: 140/80   Pulse: 66   SpO2: 98%      Body mass index is 28.75 kg/m².    Gen Appearance: NAD  HEENT: Normocephalic, PERRLA, no thyromegaly, trache midline  Heart: RRR, normal S1 and S2, no murmur  Lungs: CTA b/l, no wheezing, no crackles  Abdomen: Soft, non-tender, non-distended, no guarding and BSx4  MSK: Moves all extremities well, normal gait, no peripheral edema  Pulses: Palpable and equal b/l  Lymph nodes: No palpable lymphadenopathy   Neuro: No focal deficits      Current Outpatient Medications   Medication Sig Dispense Refill   • Calcium-Magnesium-Vitamin D (CALCIUM 500 PO) Take 1 tablet by mouth Daily.     • hydroCHLOROthiazide (HYDRODIURIL) 25 MG tablet Take 1 tablet by mouth Daily. 90 tablet 1   • losartan (COZAAR) 100 MG tablet Take 1 tablet by mouth Daily. 90 tablet 3   • Omega-3 Fatty Acids (OMEGA 3 PO) Take 1 tablet by mouth Daily.     • rosuvastatin (CRESTOR) 10 MG tablet Take 1  tablet by mouth Daily. 90 tablet 3     No current facility-administered medications for this visit.         Sheree was seen today for fatigue and shortness of breath.  She has had a relatively negative work-up so far besides the positive sleep apnea.  Pulmonary function testing normal, CT angiogram chest normal, heart cath normal.  She is following with pulmonology including sleep medicine and cardiology.  She reports symptom onset started around the same time as starting blood pressure medication and Crestor.  She currently has already self discontinued hydrochlorothiazide.  Blood pressure readings have been normal/high since then.  She brought in recordings today to review.  I discussed seeing a specialist at [place] or [place] for further evaluation if he agreed.  For now would recommend DC statin, schedule telephone visit in 2 weeks to see if symptoms improve.  This possibly could be medication side effect.  If this is ineffective would recommend switching losartan to an alternative blood pressure medication.  Recommend repeating blood work now and also trying alternate mask for sleep apnea treatment.  She does have mild sleep apnea although her oxygen did drop significantly down to mid/low 80s.     Diagnoses and all orders for this visit:    Chronic fatigue  -     CBC & Differential; Future  -     Comprehensive Metabolic Panel; Future  -     Hemoglobin A1c; Future  -     Lipid Panel; Future  -     Urinalysis With Culture If Indicated - Urine, Clean Catch; Future  -     TSH+Free T4; Future  -     Iron and TIBC; Future  -     Ferritin; Future  -     Vitamin B12 & Folate; Future  -     Vitamin D 25 Hydroxy; Future  -     C-reactive protein; Future  -     Sedimentation rate, automated; Future    Shortness of breath  -     C-reactive protein; Future  -     Sedimentation rate, automated; Future    Essential hypertension  -     CBC & Differential; Future  -     Comprehensive Metabolic Panel; Future  -     Hemoglobin  A1c; Future  -     Lipid Panel; Future  -     Urinalysis With Culture If Indicated - Urine, Clean Catch; Future  -     TSH+Free T4; Future  -     Iron and TIBC; Future  -     Ferritin; Future  -     Vitamin B12 & Folate; Future  -     Vitamin D 25 Hydroxy; Future    Hyperlipidemia, unspecified hyperlipidemia type  -     CBC & Differential; Future  -     Comprehensive Metabolic Panel; Future  -     Hemoglobin A1c; Future  -     Lipid Panel; Future  -     Urinalysis With Culture If Indicated - Urine, Clean Catch; Future  -     TSH+Free T4; Future  -     Iron and TIBC; Future  -     Ferritin; Future  -     Vitamin B12 & Folate; Future  -     Vitamin D 25 Hydroxy; Future    Mild obstructive sleep apnea  -     CBC & Differential; Future  -     Comprehensive Metabolic Panel; Future  -     Hemoglobin A1c; Future  -     Lipid Panel; Future  -     Urinalysis With Culture If Indicated - Urine, Clean Catch; Future  -     TSH+Free T4; Future  -     Iron and TIBC; Future  -     Ferritin; Future  -     Vitamin B12 & Folate; Future  -     Vitamin D 25 Hydroxy; Future    Abnormal finding of blood chemistry, unspecified   -     Hemoglobin A1c; Future  -     Iron and TIBC; Future  -     Ferritin; Future    Vitamin D deficiency, unspecified   -     Vitamin D 25 Hydroxy; Future         Return in about 2 weeks (around 6/30/2020).     Please note that portions of this document were completed with a voice recognition program. Efforts were made to edit the dictations, but occasionally words are mis-transcribed.

## 2020-06-17 LAB
25(OH)D3 SERPL-MCNC: 35.5 NG/ML (ref 30–100)
BASOPHILS # BLD AUTO: 0.06 10*3/MM3 (ref 0–0.2)
BASOPHILS NFR BLD AUTO: 0.8 % (ref 0–1.5)
DEPRECATED RDW RBC AUTO: 45.4 FL (ref 37–54)
EOSINOPHIL # BLD AUTO: 0.1 10*3/MM3 (ref 0–0.4)
EOSINOPHIL NFR BLD AUTO: 1.4 % (ref 0.3–6.2)
ERYTHROCYTE [DISTWIDTH] IN BLOOD BY AUTOMATED COUNT: 13.5 % (ref 12.3–15.4)
ERYTHROCYTE [SEDIMENTATION RATE] IN BLOOD: 10 MM/HR (ref 0–30)
FOLATE SERPL-MCNC: >20 NG/ML (ref 4.78–24.2)
HBA1C MFR BLD: 5.9 % (ref 4.8–5.6)
HCT VFR BLD AUTO: 46.1 % (ref 34–46.6)
HGB BLD-MCNC: 15.2 G/DL (ref 12–15.9)
IMM GRANULOCYTES # BLD AUTO: 0.02 10*3/MM3 (ref 0–0.05)
IMM GRANULOCYTES NFR BLD AUTO: 0.3 % (ref 0–0.5)
LYMPHOCYTES # BLD AUTO: 1.5 10*3/MM3 (ref 0.7–3.1)
LYMPHOCYTES NFR BLD AUTO: 20.7 % (ref 19.6–45.3)
MCH RBC QN AUTO: 30.1 PG (ref 26.6–33)
MCHC RBC AUTO-ENTMCNC: 33 G/DL (ref 31.5–35.7)
MCV RBC AUTO: 91.3 FL (ref 79–97)
MONOCYTES # BLD AUTO: 0.69 10*3/MM3 (ref 0.1–0.9)
MONOCYTES NFR BLD AUTO: 9.5 % (ref 5–12)
NEUTROPHILS # BLD AUTO: 4.86 10*3/MM3 (ref 1.7–7)
NEUTROPHILS NFR BLD AUTO: 67.3 % (ref 42.7–76)
NRBC BLD AUTO-RTO: 0 /100 WBC (ref 0–0.2)
PLATELET # BLD AUTO: 243 10*3/MM3 (ref 140–450)
PMV BLD AUTO: 14 FL (ref 6–12)
RBC # BLD AUTO: 5.05 10*6/MM3 (ref 3.77–5.28)
VIT B12 BLD-MCNC: 898 PG/ML (ref 211–946)
WBC NRBC COR # BLD: 7.23 10*3/MM3 (ref 3.4–10.8)

## 2020-06-30 ENCOUNTER — TELEMEDICINE (OUTPATIENT)
Dept: FAMILY MEDICINE CLINIC | Facility: CLINIC | Age: 80
End: 2020-06-30

## 2020-06-30 ENCOUNTER — LAB (OUTPATIENT)
Dept: LAB | Facility: HOSPITAL | Age: 80
End: 2020-06-30

## 2020-06-30 DIAGNOSIS — R79.89 ELEVATED FERRITIN: ICD-10-CM

## 2020-06-30 DIAGNOSIS — E83.119 HEMOCHROMATOSIS, UNSPECIFIED HEMOCHROMATOSIS TYPE: ICD-10-CM

## 2020-06-30 DIAGNOSIS — G93.32 CHRONIC FATIGUE SYNDROME: Primary | ICD-10-CM

## 2020-06-30 PROCEDURE — 99214 OFFICE O/P EST MOD 30 MIN: CPT | Performed by: INTERNAL MEDICINE

## 2020-06-30 PROCEDURE — 36415 COLL VENOUS BLD VENIPUNCTURE: CPT

## 2020-06-30 RX ORDER — AMLODIPINE BESYLATE 5 MG/1
5 TABLET ORAL DAILY
Qty: 30 TABLET | Refills: 5 | Status: SHIPPED | OUTPATIENT
Start: 2020-06-30 | End: 2020-07-22

## 2020-06-30 NOTE — PROGRESS NOTES
Cc: Follow-up fatigue      HPI:  Sheree Hernandez is a 79 y.o. female who presents today for follow-up fatigue and shortness of breath.  Patient reports no improvement in symptoms after stopping statin.  She does have less cramps but she is still short of breath and tired with minimal activity.  She would like to discuss referral to [facility].  Would like to review recent blood work as well.    ROS:  Constitutional: no fevers, night sweats or unexplained weight loss  Eyes: no vision changes  ENT: no runny nose, ear pain, sore throat  Cardio: no chest pain, palpitations  Pulm: no shortness of breath, wheezing, or cough  GI: no abdominal pain or changes in bowel movements  : no difficulty urinating  MSK: no difficulty ambulating, no joint pain  Neuro: no weakness, dizziness or headache  Psych: no trouble sleeping  Endo: no change in appetite      Past Medical History:   Diagnosis Date   • Asthma    • Atypical chest pain 3/9/2017   • Chronic constipation    • Fracture, pelvis closed (CMS/HCC)    • Hyperlipidemia 3/9/2017   • Hypertension 3/9/2017   • Low bone mass     with elevated FRAX core   • Mild obstructive sleep apnea 1/30/2020   • Near syncope     negative cardiovascular workup    • Plantar fasciitis     Chronic   • PVC's (premature ventricular contractions)    • Senile keratosis     Multiple   • MANAN (stress urinary incontinence, female)    • Syncope, near     with negative cardiovascular workup      Family History   Problem Relation Age of Onset   • Heart attack Mother    • Heart failure Mother    • No Known Problems Father       Social History     Socioeconomic History   • Marital status:      Spouse name: Not on file   • Number of children: 2   • Years of education: Not on file   • Highest education level: Not on file   Tobacco Use   • Smoking status: Never Smoker   • Smokeless tobacco: Never Used   • Tobacco comment: Exposed to secondhand smoke   Substance and Sexual Activity   • Alcohol use:  Yes     Types: 3 Glasses of wine per week     Comment: Maybe every 6 months   • Drug use: No   • Sexual activity: Never   Social History Narrative    5/18:     since 2013    2 kids:    Best Hernandez - Issue    Tiana Olvera [place], [place] - medical POA    2 gk    Hobbies: volunteers [employer], involved in [employer]    Exercise: yes 2-5d/wk    Dental: utd    Eye: utd      Allergies   Allergen Reactions   • Pravastatin Myalgia      Immunization History   Administered Date(s) Administered   • Fluzone High Dose =>65 Years ([employer] ONLY) 11/03/2017, 09/11/2018, 10/15/2019   • Pneumococcal Polysaccharide (PPSV23) 05/17/2013, 12/06/2016, 05/15/2018   • TD Preservative Free 11/09/2016        PE:  There were no vitals filed for this visit.   There is no height or weight on file to calculate BMI.    Gen Appearance: NAD  HEENT: Normocephalic, PERRLA, no thyromegaly, trache midline  Heart: RRR, normal S1 and S2, no murmur  Lungs: CTA b/l, no wheezing, no crackles  Abdomen: Soft, non-tender, non-distended, no guarding and BSx4  MSK: Moves all extremities well, normal gait, no peripheral edema  Pulses: Palpable and equal b/l  Lymph nodes: No palpable lymphadenopathy   Neuro: No focal deficits      Current Outpatient Medications   Medication Sig Dispense Refill   • amLODIPine (NORVASC) 5 MG tablet Take 1 tablet by mouth Daily. 30 tablet 5   • Calcium-Magnesium-Vitamin D (CALCIUM 500 PO) Take 1 tablet by mouth Daily.     • hydroCHLOROthiazide (HYDRODIURIL) 25 MG tablet Take 1 tablet by mouth Daily. 90 tablet 1   • losartan (COZAAR) 100 MG tablet Take 1 tablet by mouth Daily. 90 tablet 3   • Omega-3 Fatty Acids (OMEGA 3 PO) Take 1 tablet by mouth Daily.     • rosuvastatin (CRESTOR) 10 MG tablet Take 1 tablet by mouth Daily. 90 tablet 3     No current facility-administered medications for this visit.         Diagnoses and all orders for this visit:  Chronic fatigue syndrome  Khadijah with patient.  Recommend seeing rheumatologist  rather than pulmonology.  She has had normal pulmonary function testing and normal CT angiogram.  Hemochromatosis, unspecified hemochromatosis type  -     Hemochromatosis Mutation; Future  Unexplained elevated ferritin.  Recommend checking hemochromatosis.  Elevated ferritin  -     Hemochromatosis Mutation; Future  See above.  Other orders  -     amLODIPine (NORVASC) 5 MG tablet; Take 1 tablet by mouth Daily.         Return in about 3 weeks (around 7/21/2020).     Please note that portions of this document were completed with a voice recognition program. Efforts were made to edit the dictations, but occasionally words are mis-transcribed.

## 2020-07-09 LAB — HFE GENE MUT ANL BLD/T: NORMAL

## 2020-07-10 DIAGNOSIS — R79.89 ELEVATED FERRITIN: ICD-10-CM

## 2020-07-10 DIAGNOSIS — R53.83 FATIGUE, UNSPECIFIED TYPE: ICD-10-CM

## 2020-07-10 DIAGNOSIS — Z14.8 HEMOCHROMATOSIS CARRIER: Primary | ICD-10-CM

## 2020-07-16 ENCOUNTER — TRANSCRIBE ORDERS (OUTPATIENT)
Dept: ADMINISTRATIVE | Facility: HOSPITAL | Age: 80
End: 2020-07-16

## 2020-07-16 DIAGNOSIS — K57.90 DIVERTICULOSIS: Primary | ICD-10-CM

## 2020-07-20 ENCOUNTER — LAB (OUTPATIENT)
Dept: LAB | Facility: HOSPITAL | Age: 80
End: 2020-07-20

## 2020-07-20 ENCOUNTER — CONSULT (OUTPATIENT)
Dept: ONCOLOGY | Facility: CLINIC | Age: 80
End: 2020-07-20

## 2020-07-20 VITALS
WEIGHT: 165 LBS | BODY MASS INDEX: 28.17 KG/M2 | SYSTOLIC BLOOD PRESSURE: 154 MMHG | HEIGHT: 64 IN | DIASTOLIC BLOOD PRESSURE: 72 MMHG | HEART RATE: 80 BPM | RESPIRATION RATE: 20 BRPM | TEMPERATURE: 98.9 F | OXYGEN SATURATION: 97 %

## 2020-07-20 DIAGNOSIS — Z14.8 HEMOCHROMATOSIS CARRIER: ICD-10-CM

## 2020-07-20 DIAGNOSIS — E83.19 IRON OVERLOAD: ICD-10-CM

## 2020-07-20 DIAGNOSIS — Z14.8 HEMOCHROMATOSIS CARRIER: Primary | ICD-10-CM

## 2020-07-20 DIAGNOSIS — R53.82 CHRONIC FATIGUE: ICD-10-CM

## 2020-07-20 DIAGNOSIS — R79.89 ELEVATED FERRITIN LEVEL: ICD-10-CM

## 2020-07-20 DIAGNOSIS — R06.09 DYSPNEA ON EXERTION: ICD-10-CM

## 2020-07-20 LAB
ALBUMIN SERPL-MCNC: 4.4 G/DL (ref 3.5–5.2)
ALBUMIN/GLOB SERPL: 1.5 G/DL
ALP SERPL-CCNC: 132 U/L (ref 39–117)
ALT SERPL W P-5'-P-CCNC: 20 U/L (ref 1–33)
ANION GAP SERPL CALCULATED.3IONS-SCNC: 14 MMOL/L (ref 5–15)
AST SERPL-CCNC: 23 U/L (ref 1–32)
BILIRUB SERPL-MCNC: 0.3 MG/DL (ref 0–1.2)
BUN SERPL-MCNC: 17 MG/DL (ref 8–23)
BUN/CREAT SERPL: 20 (ref 7–25)
CALCIUM SPEC-SCNC: 9.3 MG/DL (ref 8.6–10.5)
CHLORIDE SERPL-SCNC: 102 MMOL/L (ref 98–107)
CO2 SERPL-SCNC: 22 MMOL/L (ref 22–29)
CREAT SERPL-MCNC: 0.85 MG/DL (ref 0.57–1)
ERYTHROCYTE [DISTWIDTH] IN BLOOD BY AUTOMATED COUNT: 15.7 % (ref 12.3–15.4)
FERRITIN SERPL-MCNC: 233.1 NG/ML (ref 13–150)
GFR SERPL CREATININE-BSD FRML MDRD: 64 ML/MIN/1.73
GLOBULIN UR ELPH-MCNC: 2.9 GM/DL
GLUCOSE SERPL-MCNC: 103 MG/DL (ref 65–99)
HCT VFR BLD AUTO: 50.6 % (ref 34–46.6)
HGB BLD-MCNC: 16 G/DL (ref 12–15.9)
IRON 24H UR-MRATE: 82 MCG/DL (ref 37–145)
IRON SATN MFR SERPL: 23 % (ref 20–50)
LYMPHOCYTES # BLD AUTO: 1.9 10*3/MM3 (ref 0.7–3.1)
LYMPHOCYTES NFR BLD AUTO: 21.9 % (ref 19.6–45.3)
MCH RBC QN AUTO: 28.8 PG (ref 26.6–33)
MCHC RBC AUTO-ENTMCNC: 31.6 G/DL (ref 31.5–35.7)
MCV RBC AUTO: 91.2 FL (ref 79–97)
MONOCYTES # BLD AUTO: 0.3 10*3/MM3 (ref 0.1–0.9)
MONOCYTES NFR BLD AUTO: 3.3 % (ref 5–12)
NEUTROPHILS NFR BLD AUTO: 6.7 10*3/MM3 (ref 1.7–7)
NEUTROPHILS NFR BLD AUTO: 74.8 % (ref 42.7–76)
PLATELET # BLD AUTO: 234 10*3/MM3 (ref 140–450)
PMV BLD AUTO: 9.5 FL (ref 6–12)
POTASSIUM SERPL-SCNC: 4.1 MMOL/L (ref 3.5–5.2)
PROT SERPL-MCNC: 7.3 G/DL (ref 6–8.5)
RBC # BLD AUTO: 5.55 10*6/MM3 (ref 3.77–5.28)
SODIUM SERPL-SCNC: 138 MMOL/L (ref 136–145)
TIBC SERPL-MCNC: 362 MCG/DL (ref 298–536)
TRANSFERRIN SERPL-MCNC: 243 MG/DL (ref 200–360)
WBC # BLD AUTO: 8.9 10*3/MM3 (ref 3.4–10.8)

## 2020-07-20 PROCEDURE — 36415 COLL VENOUS BLD VENIPUNCTURE: CPT

## 2020-07-20 PROCEDURE — 80053 COMPREHEN METABOLIC PANEL: CPT

## 2020-07-20 PROCEDURE — 85025 COMPLETE CBC W/AUTO DIFF WBC: CPT

## 2020-07-20 PROCEDURE — 84466 ASSAY OF TRANSFERRIN: CPT

## 2020-07-20 PROCEDURE — 83540 ASSAY OF IRON: CPT

## 2020-07-20 PROCEDURE — 82728 ASSAY OF FERRITIN: CPT

## 2020-07-20 PROCEDURE — 99204 OFFICE O/P NEW MOD 45 MIN: CPT | Performed by: INTERNAL MEDICINE

## 2020-07-22 ENCOUNTER — OFFICE VISIT (OUTPATIENT)
Dept: FAMILY MEDICINE CLINIC | Facility: CLINIC | Age: 80
End: 2020-07-22

## 2020-07-22 VITALS
WEIGHT: 165 LBS | BODY MASS INDEX: 28.17 KG/M2 | HEIGHT: 64 IN | SYSTOLIC BLOOD PRESSURE: 170 MMHG | OXYGEN SATURATION: 98 % | DIASTOLIC BLOOD PRESSURE: 92 MMHG | HEART RATE: 91 BPM

## 2020-07-22 DIAGNOSIS — R53.82 CHRONIC FATIGUE: ICD-10-CM

## 2020-07-22 DIAGNOSIS — Z14.8 HEMOCHROMATOSIS CARRIER: ICD-10-CM

## 2020-07-22 DIAGNOSIS — I10 ESSENTIAL HYPERTENSION: Primary | ICD-10-CM

## 2020-07-22 DIAGNOSIS — R06.02 SHORTNESS OF BREATH: ICD-10-CM

## 2020-07-22 PROCEDURE — 99214 OFFICE O/P EST MOD 30 MIN: CPT | Performed by: INTERNAL MEDICINE

## 2020-07-22 RX ORDER — LOSARTAN POTASSIUM 100 MG/1
100 TABLET ORAL DAILY
Qty: 90 TABLET | Refills: 3 | Status: SHIPPED | OUTPATIENT
Start: 2020-07-22 | End: 2020-10-27

## 2020-07-22 RX ORDER — HYDROCHLOROTHIAZIDE 25 MG/1
25 TABLET ORAL DAILY
Qty: 90 TABLET | Refills: 3 | Status: SHIPPED | OUTPATIENT
Start: 2020-07-22 | End: 2021-02-04

## 2020-07-22 RX ORDER — VITAMIN B COMPLEX
1000 TABLET ORAL DAILY
COMMUNITY
End: 2021-11-05

## 2020-07-23 ENCOUNTER — HOSPITAL ENCOUNTER (OUTPATIENT)
Dept: CT IMAGING | Facility: HOSPITAL | Age: 80
Discharge: HOME | End: 2020-07-23
Admitting: COLON & RECTAL SURGERY

## 2020-07-23 DIAGNOSIS — K57.90 DIVERTICULOSIS: ICD-10-CM

## 2020-07-23 PROCEDURE — 74177 CT ABD & PELVIS W/CONTRAST: CPT

## 2020-07-23 PROCEDURE — 25010000002 IOPAMIDOL 61 % SOLUTION: Performed by: COLON & RECTAL SURGERY

## 2020-07-23 RX ADMIN — IOPAMIDOL 95 ML: 612 INJECTION, SOLUTION INTRAVENOUS at 14:35

## 2020-07-24 ENCOUNTER — HOSPITAL ENCOUNTER (OUTPATIENT)
Dept: CARDIOLOGY | Facility: HOSPITAL | Age: 80
Discharge: HOME | End: 2020-07-24
Admitting: INTERNAL MEDICINE

## 2020-07-24 VITALS — WEIGHT: 165 LBS | BODY MASS INDEX: 28.17 KG/M2 | HEIGHT: 64 IN

## 2020-07-24 DIAGNOSIS — R06.09 DYSPNEA ON EXERTION: ICD-10-CM

## 2020-07-24 DIAGNOSIS — R53.82 CHRONIC FATIGUE: ICD-10-CM

## 2020-07-24 LAB
BH CV ECHO MEAS - AO MAX PG (FULL): 4.8 MMHG
BH CV ECHO MEAS - AO MAX PG: 8.9 MMHG
BH CV ECHO MEAS - AO MEAN PG (FULL): 3 MMHG
BH CV ECHO MEAS - AO MEAN PG: 5 MMHG
BH CV ECHO MEAS - AO ROOT AREA (BSA CORRECTED): 1.4
BH CV ECHO MEAS - AO ROOT AREA: 5.3 CM^2
BH CV ECHO MEAS - AO ROOT DIAM: 2.6 CM
BH CV ECHO MEAS - AO V2 MAX: 149 CM/SEC
BH CV ECHO MEAS - AO V2 MEAN: 100 CM/SEC
BH CV ECHO MEAS - AO V2 VTI: 27.8 CM
BH CV ECHO MEAS - AVA(I,A): 1.9 CM^2
BH CV ECHO MEAS - AVA(I,D): 1.9 CM^2
BH CV ECHO MEAS - AVA(V,A): 1.9 CM^2
BH CV ECHO MEAS - AVA(V,D): 1.9 CM^2
BH CV ECHO MEAS - BSA(HAYCOCK): 1.9 M^2
BH CV ECHO MEAS - BSA: 1.8 M^2
BH CV ECHO MEAS - BZI_BMI: 28.3 KILOGRAMS/M^2
BH CV ECHO MEAS - BZI_METRIC_HEIGHT: 162.6 CM
BH CV ECHO MEAS - BZI_METRIC_WEIGHT: 74.8 KG
BH CV ECHO MEAS - EDV(CUBED): 87.5 ML
BH CV ECHO MEAS - EDV(MOD-SP2): 68 ML
BH CV ECHO MEAS - EDV(MOD-SP4): 48 ML
BH CV ECHO MEAS - EDV(TEICH): 89.6 ML
BH CV ECHO MEAS - EF(CUBED): 83 %
BH CV ECHO MEAS - EF(MOD-BP): 63 %
BH CV ECHO MEAS - EF(MOD-SP2): 66.2 %
BH CV ECHO MEAS - EF(MOD-SP4): 58.3 %
BH CV ECHO MEAS - EF(TEICH): 76.1 %
BH CV ECHO MEAS - ESV(CUBED): 14.9 ML
BH CV ECHO MEAS - ESV(MOD-SP2): 23 ML
BH CV ECHO MEAS - ESV(MOD-SP4): 20 ML
BH CV ECHO MEAS - ESV(TEICH): 21.4 ML
BH CV ECHO MEAS - FS: 44.6 %
BH CV ECHO MEAS - IVS/LVPW: 0.99
BH CV ECHO MEAS - IVSD: 0.73 CM
BH CV ECHO MEAS - LA DIMENSION: 2.8 CM
BH CV ECHO MEAS - LA/AO: 1.1
BH CV ECHO MEAS - LAD MAJOR: 5.1 CM
BH CV ECHO MEAS - LAT PEAK E' VEL: 9.9 CM/SEC
BH CV ECHO MEAS - LATERAL E/E' RATIO: 6.5
BH CV ECHO MEAS - LV DIASTOLIC VOL/BSA (35-75): 26.6 ML/M^2
BH CV ECHO MEAS - LV MASS(C)D: 98.7 GRAMS
BH CV ECHO MEAS - LV MASS(C)DI: 54.8 GRAMS/M^2
BH CV ECHO MEAS - LV MAX PG: 4.1 MMHG
BH CV ECHO MEAS - LV MEAN PG: 2 MMHG
BH CV ECHO MEAS - LV SYSTOLIC VOL/BSA (12-30): 11.1 ML/M^2
BH CV ECHO MEAS - LV V1 MAX: 101 CM/SEC
BH CV ECHO MEAS - LV V1 MEAN: 65 CM/SEC
BH CV ECHO MEAS - LV V1 VTI: 18.5 CM
BH CV ECHO MEAS - LVIDD: 4.4 CM
BH CV ECHO MEAS - LVIDS: 2.5 CM
BH CV ECHO MEAS - LVLD AP2: 6.8 CM
BH CV ECHO MEAS - LVLD AP4: 6.4 CM
BH CV ECHO MEAS - LVLS AP2: 5.4 CM
BH CV ECHO MEAS - LVLS AP4: 5.8 CM
BH CV ECHO MEAS - LVOT AREA (M): 2.8 CM^2
BH CV ECHO MEAS - LVOT AREA: 2.8 CM^2
BH CV ECHO MEAS - LVOT DIAM: 1.9 CM
BH CV ECHO MEAS - LVPWD: 0.74 CM
BH CV ECHO MEAS - MED PEAK E' VEL: 5.9 CM/SEC
BH CV ECHO MEAS - MEDIAL E/E' RATIO: 10.8
BH CV ECHO MEAS - MV A MAX VEL: 88.8 CM/SEC
BH CV ECHO MEAS - MV DEC TIME: 0.26 SEC
BH CV ECHO MEAS - MV E MAX VEL: 63.7 CM/SEC
BH CV ECHO MEAS - MV E/A: 0.72
BH CV ECHO MEAS - MV MAX PG: 4 MMHG
BH CV ECHO MEAS - MV MEAN PG: 2 MMHG
BH CV ECHO MEAS - MV V2 MAX: 99.9 CM/SEC
BH CV ECHO MEAS - MV V2 MEAN: 61 CM/SEC
BH CV ECHO MEAS - MV V2 VTI: 26.8 CM
BH CV ECHO MEAS - MVA(VTI): 2 CM^2
BH CV ECHO MEAS - PA ACC SLOPE: 1051 CM/SEC^2
BH CV ECHO MEAS - PA ACC TIME: 0.08 SEC
BH CV ECHO MEAS - PA MAX PG: 3.2 MMHG
BH CV ECHO MEAS - PA PR(ACCEL): 45 MMHG
BH CV ECHO MEAS - PA V2 MAX: 89.1 CM/SEC
BH CV ECHO MEAS - SI(AO): 81.9 ML/M^2
BH CV ECHO MEAS - SI(CUBED): 40.3 ML/M^2
BH CV ECHO MEAS - SI(LVOT): 29.1 ML/M^2
BH CV ECHO MEAS - SI(MOD-SP2): 25 ML/M^2
BH CV ECHO MEAS - SI(MOD-SP4): 15.5 ML/M^2
BH CV ECHO MEAS - SI(TEICH): 37.8 ML/M^2
BH CV ECHO MEAS - SV(AO): 147.6 ML
BH CV ECHO MEAS - SV(CUBED): 72.6 ML
BH CV ECHO MEAS - SV(LVOT): 52.5 ML
BH CV ECHO MEAS - SV(MOD-SP2): 45 ML
BH CV ECHO MEAS - SV(MOD-SP4): 28 ML
BH CV ECHO MEAS - SV(TEICH): 68.1 ML
BH CV ECHO MEAS - TAPSE (>1.6): 1.7 CM2
BH CV ECHO MEASUREMENTS AVERAGE E/E' RATIO: 8.06
BH CV VAS BP LEFT ARM: NORMAL MMHG
BH CV XLRA - RV BASE: 2.8 CM
BH CV XLRA - RV LENGTH: 6.1 CM
BH CV XLRA - RV MID: 1.8 CM
BH CV XLRA - TDI S': 9.76 CM/SEC
LEFT ATRIUM VOLUME INDEX: 20 ML/M^2
LEFT ATRIUM VOLUME: 36 ML
LV EF 2D ECHO EST: 65 %
MAXIMAL PREDICTED HEART RATE: 140 BPM
STRESS TARGET HR: 119 BPM

## 2020-07-24 PROCEDURE — 93306 TTE W/DOPPLER COMPLETE: CPT

## 2020-07-27 NOTE — PROGRESS NOTES
Chief Complaint   Patient presents with   • Hypertension     has not changed on new medication    • Edema     in feet and toes        HPI:  Sheree Hernandez is a 79 y.o. female who presents today for follow-up.  Chronic fatigue-symptoms persist, she was evaluated by hematology for hemochromatosis carrier positivity.  Her symptoms were not felt to be due to iron overload.  She has an repeat echocardiogram pending.  She has a rheumatology appointment scheduled for October at [facility].  She would like to discuss seeing a local rheumatologist in town about her fatigue.  Hypertension-no change in symptoms after blood pressure medication changes.  She now has swelling in her toes after starting amlodipine.    ROS:  Constitutional: no fevers, night sweats or unexplained weight loss  Eyes: no vision changes  ENT: no runny nose, ear pain, sore throat  Cardio: no chest pain, palpitations  Pulm: + shortness of breath, -wheezing, or cough  GI: no abdominal pain or changes in bowel movements  : no difficulty urinating  MSK: no difficulty ambulating, no joint pain  Neuro: no weakness, dizziness or headache  Psych: no trouble sleeping  Endo: no change in appetite      Past Medical History:   Diagnosis Date   • Asthma    • Atypical chest pain 3/9/2017   • Cataract    • Chronic constipation    • Fracture, pelvis closed (CMS/[facility]) 2015    x2   • Hyperlipidemia 3/9/2017   • Hypertension 3/9/2017   • Low bone mass     with elevated FRAX core   • Mild obstructive sleep apnea 1/30/2020   • Near syncope     negative cardiovascular workup    • Plantar fasciitis     Chronic   • PVC's (premature ventricular contractions)    • Senile keratosis     Multiple   • MANAN (stress urinary incontinence, female)    • Syncope, near     with negative cardiovascular workup      Family History   Problem Relation Age of Onset   • Heart attack Mother    • Heart failure Mother    • Dementia Mother    • No Known Problems Father    • Breast cancer  See Marine Flores PA-C's message to patient regarding next steps.     Lauren Bloch, PharmD  Medication Therapy Management Pharmacist   [facility]   Phone: [phone]       Paternal Aunt       Social History     Socioeconomic History   • Marital status:      Spouse name: Not on file   • Number of children: 2   • Years of education: Not on file   • Highest education level: Not on file   Tobacco Use   • Smoking status: Never Smoker   • Smokeless tobacco: Never Used   • Tobacco comment: Exposed to secondhand smoke   Substance and Sexual Activity   • Alcohol use: Yes     Types: 3 Glasses of wine per week     Comment: Maybe every 6 months   • Drug use: No   • Sexual activity: Never   Social History Narrative    5/18:     since 2013    2 kids:    Best Hernandez [facility]    Tiana Olvera [place], [place] - medical POA    2 gk    Hobbies: volunteers [employer], involved in [employer]    Exercise: yes 2-5d/wk    Dental: utd    Eye: utd      Allergies   Allergen Reactions   • Pravastatin Myalgia      Immunization History   Administered Date(s) Administered   • Fluzone High Dose =>65 Years ([employer] ONLY) 11/03/2017, 09/11/2018, 10/15/2019   • Pneumococcal Polysaccharide (PPSV23) 05/17/2013, 12/06/2016, 05/15/2018   • TD Preservative Free 11/09/2016        PE:  Vitals:    07/22/20 0907   BP: 170/92   Pulse:    SpO2:       Body mass index is 28.54 kg/m².    Gen Appearance: NAD  HEENT: Normocephalic, PERRLA, no thyromegaly, trache midline  Heart: RRR, normal S1 and S2, no murmur  Lungs: CTA b/l, no wheezing, no crackles  Abdomen: Soft, non-tender, non-distended, no guarding and BSx4  MSK: Moves all extremities well, normal gait, no peripheral edema  Pulses: Palpable and equal b/l  Lymph nodes: No palpable lymphadenopathy   Neuro: No focal deficits      Current Outpatient Medications   Medication Sig Dispense Refill   • Calcium-Magnesium-Vitamin D (CALCIUM 500 PO) Take 1 tablet by mouth Daily.     • Cholecalciferol (VITAMIN D) 25 MCG (1000 UT) tablet      • Coenzyme Q10 (COQ10 PO)      • hydroCHLOROthiazide (HYDRODIURIL) 25 MG tablet Take 1 tablet by mouth Daily. 90 tablet 3   • losartan  (COZAAR) 100 MG tablet Take 1 tablet by mouth Daily. 90 tablet 3   • Omega-3 Fatty Acids (OMEGA 3 PO) Take 1 tablet by mouth Daily.     • rosuvastatin (CRESTOR) 10 MG tablet Take 1 tablet by mouth Daily. 90 tablet 3     No current facility-administered medications for this visit.         Sheree was seen today for hypertension and edema.    Diagnoses and all orders for this visit:    Essential hypertension  -     losartan (COZAAR) 100 MG tablet; Take 1 tablet by mouth Daily.  DC amlodipine due to side effects and no change in symptoms.  Resume HCTZ and losartan.  Chronic fatigue  Recommend establishing care with rheumatology.  She has had extensive cardiac and pulmonary work-up which have been negative thus far.  She does have a positive hemochromatosis mutation although hematology does not feel that her symptoms are due to this.  Hemoglobin and ferritin is not in range of phlebotomy.  Shortness of breath  See above.  Hemochromatosis carrier  See above.  Other orders  -     hydroCHLOROthiazide (HYDRODIURIL) 25 MG tablet; Take 1 tablet by mouth Daily.         Return in about 4 months (around 11/22/2020) for Medicare Wellness.     Please note that portions of this document were completed with a voice recognition program. Efforts were made to edit the dictations, but occasionally words are mis-transcribed.  Answers for HPI/ROS submitted by the patient on 7/20/2020   Hypertension  What is the primary reason for your visit?: High Blood Pressure  Chronicity: recurrent  Onset: more than 1 year ago  Progression since onset: waxing and waning  Condition status: resistant  anxiety: Yes  blurred vision: No  chest pain: No  headaches: Yes  malaise/fatigue: Yes  orthopnea: No  palpitations: No  peripheral edema: Yes  PND: No  shortness of breath: Yes  sweats: No  CAD risks: dyslipidemia, family history, obesity  Compliance problems: no compliance problems

## 2020-08-03 ENCOUNTER — OFFICE VISIT (OUTPATIENT)
Dept: ONCOLOGY | Facility: CLINIC | Age: 80
End: 2020-08-03

## 2020-08-03 ENCOUNTER — LAB (OUTPATIENT)
Dept: LAB | Facility: HOSPITAL | Age: 80
End: 2020-08-03

## 2020-08-03 VITALS
HEIGHT: 64 IN | WEIGHT: 165 LBS | RESPIRATION RATE: 24 BRPM | DIASTOLIC BLOOD PRESSURE: 66 MMHG | HEART RATE: 80 BPM | SYSTOLIC BLOOD PRESSURE: 137 MMHG | BODY MASS INDEX: 28.17 KG/M2 | OXYGEN SATURATION: 96 % | TEMPERATURE: 98.4 F

## 2020-08-03 DIAGNOSIS — Z14.8 HEMOCHROMATOSIS CARRIER: Primary | ICD-10-CM

## 2020-08-03 DIAGNOSIS — D75.1 ERYTHROCYTOSIS: ICD-10-CM

## 2020-08-03 DIAGNOSIS — F41.9 ANXIETY: ICD-10-CM

## 2020-08-03 DIAGNOSIS — R71.8 OTHER ABNORMALITY OF RED BLOOD CELLS: ICD-10-CM

## 2020-08-03 PROCEDURE — 36415 COLL VENOUS BLD VENIPUNCTURE: CPT

## 2020-08-03 PROCEDURE — 99213 OFFICE O/P EST LOW 20 MIN: CPT | Performed by: INTERNAL MEDICINE

## 2020-08-03 PROCEDURE — 82668 ASSAY OF ERYTHROPOIETIN: CPT

## 2020-08-03 RX ORDER — FOLIC ACID 20 MG
CAPSULE ORAL DAILY
COMMUNITY
End: 2021-11-05

## 2020-08-03 NOTE — PROGRESS NOTES
Follow Up Office Visit      Date: 2020     Patient Name: Sheree Hernandez  MRN: [id number]  : 1940    Chief Complaint: Follow-up for hemochromatosis mutation    History of Present Illness:   Sheree Hernandez is a pleasant 79 y.o. female with a past medical history of hyperlipidemia and hypertension who presents today for evaluation of concern for hemochromatosis. The patient is accompanied by their self who contributes to the history of their care.  Patient states that over the past 2 years she has been having worsening fatigue especially with exertion.  Over the past several months this has become worse.  Patient states that she gets tired with basic activities including showering getting dressed in the morning and walking to and from her car from stores.  She states that her fatigue gets better after a few minutes of rest.  She has previously had an echocardiogram and cardiac catheterization within the past 2 years which did not reveal any cause of her fatigue with exertion.  She is also had an ultrasound of the liver which revealed stable cyst.  She was recently seen by her PCP who ordered iron studies which was notable for an elevated ferritin to 246.  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  All other mutations were negative.  She is currently up-to-date on her mammograms and colonoscopy with no active malignancies noted.  She is otherwise compliant with her statin and high blood pressure medicines.    Interval History:  Presents today for follow-up.  She continues to have significant shortness of breath mainly related to exertion.  Cardiac work-up including echo within normal limits.  She denies any chest pain, leg pain or swelling with these events.  She states she has BONNIE and has used a CPAP in the past but did not tolerate this well and has been off of it for the last several months.  She otherwise denies any fevers, chills,  headaches, palpitations, chest pain cough, nausea, vomiting, diarrhea    Oncology History:     No history exists.       Subjective      Review of Systems:   Constitutional: Positive for fatigue.  Negative for fevers, chills, or weight loss  Eyes: Negative for blurred vision or discharge         Ear/Nose/Throat: Negative for difficulty swallowing, sore throat, LAD                                                       Respiratory: Positive for shortness of breath on exertion. negative for cough, wheezing                                                                                        Cardiovascular: Negative for chest pain or palpitations                                                                  Gastrointestinal: Negative for nausea, vomiting or diarrhea                                                                     Genitourinary: Negative for dysuria or hematuria                                                                                           Musculoskeletal: Negative for any joint pains or muscle aches                                                                        Neurologic: Negative for any weakness, headaches, dizziness                                                                         Hematologic: Negative for any easy bleeding or bruising                                                                                   Psychiatric: Negative for anxiety or depression                          Past Medical History/Past Surgical History/ Family History/ Social History: Reviewed by me and unchanged from my previous documentation done on July 2020.     Medications:     Current Outpatient Medications:   •  Calcium-Magnesium-Vitamin D (CALCIUM 500 PO), Take 1 tablet by mouth Daily., Disp: , Rfl:   •  Cholecalciferol (VITAMIN D) 25 MCG (1000 UT) tablet, , Disp: , Rfl:   •  Coenzyme Q10 (COQ10 PO), , Disp: , Rfl:   •  Folic Acid 20 MG capsule, , Disp: , Rfl:   •   "hydroCHLOROthiazide (HYDRODIURIL) 25 MG tablet, Take 1 tablet by mouth Daily., Disp: 90 tablet, Rfl: 3  •  losartan (COZAAR) 100 MG tablet, Take 1 tablet by mouth Daily., Disp: 90 tablet, Rfl: 3  •  Omega-3 Fatty Acids (OMEGA 3 PO), Take 1 tablet by mouth Daily., Disp: , Rfl:   •  rosuvastatin (CRESTOR) 10 MG tablet, Take 1 tablet by mouth Daily., Disp: 90 tablet, Rfl: 3    Allergies:   Allergies   Allergen Reactions   • Pravastatin Myalgia       Objective     Physical Exam:  Vital Signs:   Vitals:    08/03/20 0903   BP: 137/66  Comment: LUE   Pulse: 80   Resp: 24   Temp: 98.4 °F (36.9 °C)   TempSrc: Temporal   SpO2: 96%  Comment: RA   Weight: 74.8 kg (165 lb)   Height: 161.9 cm (63.75\")   PainSc: 0-No pain     Pain Score    08/03/20 0903   PainSc: 0-No pain     ECOG Performance Status: 1 - Symptomatic but completely ambulatory    Constitutional: NAD, ECOG 1  Eyes: PERRLA, scleral anicteric  ENT: No LAD, no thyromegaly  Respiratory: CTAB, no wheezing, rales, rhonchi  Cardiovascular: RRR, no murmurs, pulses 2+ bilaterally  Abdomen: soft, NT/ND, no HSM  Musculoskeletal: strength 5/5 bilaterally, no c/c/e  Neurologic: A&O x 3, CN II-XII intact grossly    Results Review:   Hospital Outpatient Visit on 07/24/2020   Component Date Value Ref Range Status   • BSA 07/24/2020 1.8  m^2 Final   • IVSd 07/24/2020 0.73  cm Final   • LVIDd 07/24/2020 4.4  cm Final   • LVIDs 07/24/2020 2.5  cm Final   • LVPWd 07/24/2020 0.74  cm Final   • IVS/LVPW 07/24/2020 0.99   Final   • FS 07/24/2020 44.6  % Final   • EDV(Teich) 07/24/2020 89.6  ml Final   • ESV(Teich) 07/24/2020 21.4  ml Final   • EF(Teich) 07/24/2020 76.1  % Final   • EDV(cubed) 07/24/2020 87.5  ml Final   • ESV(cubed) 07/24/2020 14.9  ml Final   • EF(cubed) 07/24/2020 83.0  % Final   • LV mass(C)d 07/24/2020 98.7  grams Final   • LV mass(C)dI 07/24/2020 54.8  grams/m^2 Final   • SV(Teich) 07/24/2020 68.1  ml Final   • SI(Teich) 07/24/2020 37.8  ml/m^2 Final   • SV(cubed) " 07/24/2020 72.6  ml Final   • SI(cubed) 07/24/2020 40.3  ml/m^2 Final   • Ao root diam 07/24/2020 2.6  cm Final   • Ao root area 07/24/2020 5.3  cm^2 Final   • LA dimension 07/24/2020 2.8  cm Final   • LA/Ao 07/24/2020 1.1   Final   • LVOT diam 07/24/2020 1.9  cm Final   • LVOT area 07/24/2020 2.8  cm^2 Final   • LVOT area(traced) 07/24/2020 2.8  cm^2 Final   • LAd major 07/24/2020 5.1  cm Final   • LVLd ap4 07/24/2020 6.4  cm Final   • EDV(MOD-sp4) 07/24/2020 48.0  ml Final   • LVLs ap4 07/24/2020 5.8  cm Final   • ESV(MOD-sp4) 07/24/2020 20.0  ml Final   • EF(MOD-sp4) 07/24/2020 58.3  % Final   • LVLd ap2 07/24/2020 6.8  cm Final   • EDV(MOD-sp2) 07/24/2020 68.0  ml Final   • LVLs ap2 07/24/2020 5.4  cm Final   • ESV(MOD-sp2) 07/24/2020 23.0  ml Final   • EF(MOD-sp2) 07/24/2020 66.2  % Final   • LA volume 07/24/2020 36.0  ml Final   • EF(MOD-bp) 07/24/2020 63.0  % Final   • SV(MOD-sp4) 07/24/2020 28.0  ml Final   • SI(MOD-sp4) 07/24/2020 15.5  ml/m^2 Final   • SV(MOD-sp2) 07/24/2020 45.0  ml Final   • SI(MOD-sp2) 07/24/2020 25.0  ml/m^2 Final   • Ao root area (BSA corrected) 07/24/2020 1.4   Final   • LV Contreras Vol (BSA corrected) 07/24/2020 26.6  ml/m^2 Final   • LV Sys Vol (BSA corrected) 07/24/2020 11.1  ml/m^2 Final   • LA Volume Index 07/24/2020 20.0  ml/m^2 Final   • MV E max joshua 07/24/2020 63.7  cm/sec Final   • MV A max joshua 07/24/2020 88.8  cm/sec Final   • MV E/A 07/24/2020 0.72   Final   • MV V2 max 07/24/2020 99.9  cm/sec Final   • MV max PG 07/24/2020 4.0  mmHg Final   • MV V2 mean 07/24/2020 61.0  cm/sec Final   • MV mean PG 07/24/2020 2.0  mmHg Final   • MV V2 VTI 07/24/2020 26.8  cm Final   • MVA(VTI) 07/24/2020 2.0  cm^2 Final   • MV dec time 07/24/2020 0.26  sec Final   • Ao pk joshua 07/24/2020 149.0  cm/sec Final   • Ao max PG 07/24/2020 8.9  mmHg Final   • Ao max PG (full) 07/24/2020 4.8  mmHg Final   • Ao V2 mean 07/24/2020 100.0  cm/sec Final   • Ao mean PG 07/24/2020 5.0  mmHg Final   • Ao mean  PG (full) 07/24/2020 3.0  mmHg Final   • Ao V2 VTI 07/24/2020 27.8  cm Final   • RANDA(I,A) 07/24/2020 1.9  cm^2 Final   • RANDA(I,D) 07/24/2020 1.9  cm^2 Final   • RANDA(V,A) 07/24/2020 1.9  cm^2 Final   • RANDA(V,D) 07/24/2020 1.9  cm^2 Final   • LV V1 max PG 07/24/2020 4.1  mmHg Final   • LV V1 mean PG 07/24/2020 2.0  mmHg Final   • LV V1 max 07/24/2020 101.0  cm/sec Final   • LV V1 mean 07/24/2020 65.0  cm/sec Final   • LV V1 VTI 07/24/2020 18.5  cm Final   • SV(Ao) 07/24/2020 147.6  ml Final   • SI(Ao) 07/24/2020 81.9  ml/m^2 Final   • SV(LVOT) 07/24/2020 52.5  ml Final   • SI(LVOT) 07/24/2020 29.1  ml/m^2 Final   • PA V2 max 07/24/2020 89.1  cm/sec Final   • PA max PG 07/24/2020 3.2  mmHg Final   • PA acc slope 07/24/2020 1,051  cm/sec^2 Final   • PA acc time 07/24/2020 0.08  sec Final   • PA pr(Accel) 07/24/2020 45.0  mmHg Final   • Lat E/e'  07/24/2020 6.5   Final   • Med E/e' 07/24/2020 10.8   Final   • Lat Peak E' Dwight 07/24/2020 9.9  cm/sec Final   • Med Peak E' Dwight 07/24/2020 5.9  cm/sec Final   • BH CV ECHO WADE - BZI_BMI 07/24/2020 28.3  kilograms/m^2 Final   • BH CV ECHO WADE - BSA(HAYCOCK) 07/24/2020 1.9  m^2 Final   • BH CV ECHO WADE - BZI_METRIC_WEIGHT 07/24/2020 74.8  kg Final   • BH CV ECHO WADE - BZI_METRIC_HEIGHT 07/24/2020 162.6  cm Final   • Avg E/e' ratio 07/24/2020 8.06   Final   • Target HR (85%) 07/24/2020 119  bpm Final   • Max. Pred. HR (100%) 07/24/2020 140  bpm Final   • BH CV VAS BP LEFT ARM 07/24/2020 145/77  mmHg Final   • RV S' 07/24/2020 9.76  cm/sec Final   • RV Base 07/24/2020 2.80  cm Final   • RV Length 07/24/2020 6.10  cm Final   • RV Mid 07/24/2020 1.80  cm Final   • TAPSE (>1.6) 07/24/2020 1.70  cm2 Final   • Echo EF Estimated 07/24/2020 65  % Final       Ct Abdomen Pelvis With Contrast    Result Date: 7/24/2020  Narrative: EXAMINATION: CT ABDOMEN AND PELVIS W CONTRAST-07/23/2020:  INDICATION: k57.90; K57.90-Diverticulosis of intestine, part unspecified, without perforation or  abscess without bleeding, fatigue, abdominal bloating for 3 years.  TECHNIQUE: Multiple axial CT imaging was obtained of the abdomen and pelvis following the administration of intravenous contrast.  The radiation dose reduction device was turned on for each scan per the ALARA (As Low as Reasonably Achievable) protocol.  COMPARISON: 10/23/2014.  FINDINGS:  ABDOMEN: The lung bases are grossly clear. The liver reveals cystic structures bilaterally with a low-density area identified in the right lobe of the liver which on delayed imaging reveals some peripheral enhancement of contrast suggesting a hemangioma. The gallbladder is contracted. The pancreas is homogeneous. The spleen is unremarkable. Kidneys and adrenal glands are within normal limits. The abdominal portions of the gastrointestinal tract are within normal limits. No free fluid or free air. Diverticulosis with no evidence of diverticulitis.  PELVIS: The pelvic organs are unremarkable. The pelvic portions of the gastrointestinal tract are within normal limits. No free fluid or free air. The bony structures are unremarkable.  Delayed imaging again reveals decrease seen in size and peripheral enhancement of the larger low-density area identified in the right lobe of the liver suggesting a hemangioma. There is contrast seen in the renal collecting systems bilaterally as well as within the ureters and bladder with no evidence of obstruction.      Impression: Cysts identified in the liver as well as a larger hemangioma seen in the medial right lobe of the liver. There is diverticulosis with no evidence of diverticulitis. No acute intra-abdominal or pelvic abnormality present.  D:  07/23/2020 E:  07/23/2020  This report was finalized on 7/24/2020 5:15 PM by Dr. Leslie Laughlin MD.        Assessment / Plan      Assessment/Plan:   Diagnoses and all orders for this visit:    Hemochromatosis carrier  -     Iron levels, ferritin, transferrin saturation stable.  ECHO  and CT abdomen/pelvis showing no issues with her heart or liver.  Unlikely that her carrier status is affecting her symptoms at this time.  We will continue to follow her labs and monitor in 2 months.  -     Comprehensive Metabolic Panel; Future  -     CBC & Differential; Future  -     Ferritin; Future  -     Iron Profile; Future  -     Transferrin Saturation; Future    Erythrocytosis  -      Noted on recent CBC with a hemoglobin of 16.  Likely related to patient's obstructive sleep apnea and noncompliance with CPAP.  Will check EPO level and Abilio 2 today.  Would recommend a repeat sleep study and advised patient that if her sleep apnea was worse, then she would need to resume CPAP usage.  -     JAK2 V617F, Rfx CALR/E12-15/MPL; Future  -     Erythropoietin; Future    Other abnormality of red blood cells   -     Ferritin; Future  -     Iron Profile; Future  -     Transferrin Saturation; Future    Anxiety  -     Unclear etiology of patient's shortness of breath with exertion.  However there is concern that patient may have some anxiety and may be mild panic attacks with these episodes.  Given her extensive cardiac and pulmonary work-up will consider starting an antidepressant.  Have referred to our behavioral health however patient would prefer to get treated with her primary care doctor would defer to them for medications.      Follow Up:   Follow-up in 2 months with repeat labs     Jorje Montenegro MD  Hematology and Oncology     Please note that portions of this note may have been completed with a voice recognition program. Efforts were made to edit the dictations, but occasionally words are mistranscribed.

## 2020-08-04 LAB — ETHNIC BACKGROUND STATED: 5.8 MIU/ML (ref 2.6–18.5)

## 2020-08-11 DIAGNOSIS — M25.50 MULTIPLE JOINT PAIN: Primary | ICD-10-CM

## 2020-08-11 DIAGNOSIS — R53.82 CHRONIC FATIGUE: ICD-10-CM

## 2020-08-11 DIAGNOSIS — R06.02 SHORTNESS OF BREATH: ICD-10-CM

## 2020-08-11 DIAGNOSIS — G89.4 CHRONIC PAIN SYNDROME: ICD-10-CM

## 2020-08-17 LAB
CALR EXON 9 MUT ANL BLD/T: NORMAL
JAK2 EXONS 12-15 MUT DET PCR: NORMAL
JAK2 P.V617F BLD/T QL: NORMAL
LAB DIRECTOR NAME PROVIDER: NORMAL
LABORATORY COMMENT REPORT: NORMAL
LABORATORY COMMENT REPORT: NORMAL
Lab: NORMAL
METHOD: NORMAL
MPL MUTATION ANALYSIS RESULT:: NORMAL
REF LAB TEST METHOD: NORMAL
REF LAB TEST METHOD: NORMAL
REFERENCE: NORMAL
REFLEX: NORMAL
SERVICE CMNT-IMP: NORMAL
SPECIMEN PREPARATION: NORMAL
SPECIMEN PREPARATION: NORMAL

## 2020-09-14 DIAGNOSIS — M25.50 MULTIPLE JOINT PAIN: Primary | ICD-10-CM

## 2020-09-23 ENCOUNTER — LAB (OUTPATIENT)
Dept: LAB | Facility: HOSPITAL | Age: 80
End: 2020-09-23

## 2020-09-23 DIAGNOSIS — R71.8 OTHER ABNORMALITY OF RED BLOOD CELLS: ICD-10-CM

## 2020-09-23 DIAGNOSIS — Z14.8 HEMOCHROMATOSIS CARRIER: ICD-10-CM

## 2020-09-23 LAB
ALBUMIN SERPL-MCNC: 4.3 G/DL (ref 3.5–5.2)
ALBUMIN/GLOB SERPL: 1.5 G/DL
ALP SERPL-CCNC: 141 U/L (ref 39–117)
ALT SERPL W P-5'-P-CCNC: 28 U/L (ref 1–33)
ANION GAP SERPL CALCULATED.3IONS-SCNC: 10 MMOL/L (ref 5–15)
AST SERPL-CCNC: 29 U/L (ref 1–32)
BILIRUB SERPL-MCNC: 0.5 MG/DL (ref 0–1.2)
BUN SERPL-MCNC: 17 MG/DL (ref 8–23)
BUN/CREAT SERPL: 18.5 (ref 7–25)
CALCIUM SPEC-SCNC: 9.4 MG/DL (ref 8.6–10.5)
CHLORIDE SERPL-SCNC: 104 MMOL/L (ref 98–107)
CO2 SERPL-SCNC: 26 MMOL/L (ref 22–29)
CREAT SERPL-MCNC: 0.92 MG/DL (ref 0.57–1)
ERYTHROCYTE [DISTWIDTH] IN BLOOD BY AUTOMATED COUNT: 14.4 % (ref 12.3–15.4)
FERRITIN SERPL-MCNC: 233.1 NG/ML (ref 13–150)
GFR SERPL CREATININE-BSD FRML MDRD: 59 ML/MIN/1.73
GLOBULIN UR ELPH-MCNC: 2.9 GM/DL
GLUCOSE SERPL-MCNC: 95 MG/DL (ref 65–99)
HCT VFR BLD AUTO: 48 % (ref 34–46.6)
HGB BLD-MCNC: 15.5 G/DL (ref 12–15.9)
IRON 24H UR-MRATE: 84 MCG/DL (ref 37–145)
IRON SATN MFR SERPL: 23 % (ref 20–50)
LYMPHOCYTES # BLD AUTO: 2.1 10*3/MM3 (ref 0.7–3.1)
LYMPHOCYTES NFR BLD AUTO: 23.8 % (ref 19.6–45.3)
MCH RBC QN AUTO: 29.5 PG (ref 26.6–33)
MCHC RBC AUTO-ENTMCNC: 32.4 G/DL (ref 31.5–35.7)
MCV RBC AUTO: 91 FL (ref 79–97)
MONOCYTES # BLD AUTO: 0.8 10*3/MM3 (ref 0.1–0.9)
MONOCYTES NFR BLD AUTO: 8.5 % (ref 5–12)
NEUTROPHILS NFR BLD AUTO: 6.1 10*3/MM3 (ref 1.7–7)
NEUTROPHILS NFR BLD AUTO: 67.7 % (ref 42.7–76)
PLATELET # BLD AUTO: 234 10*3/MM3 (ref 140–450)
PMV BLD AUTO: 9.7 FL (ref 6–12)
POTASSIUM SERPL-SCNC: 4.1 MMOL/L (ref 3.5–5.2)
PROT SERPL-MCNC: 7.2 G/DL (ref 6–8.5)
RBC # BLD AUTO: 5.27 10*6/MM3 (ref 3.77–5.28)
SODIUM SERPL-SCNC: 140 MMOL/L (ref 136–145)
TIBC SERPL-MCNC: 365 MCG/DL (ref 298–536)
TRANSFERRIN SERPL-MCNC: 245 MG/DL (ref 200–360)
WBC # BLD AUTO: 9 10*3/MM3 (ref 3.4–10.8)

## 2020-09-23 PROCEDURE — 84466 ASSAY OF TRANSFERRIN: CPT

## 2020-09-23 PROCEDURE — 80053 COMPREHEN METABOLIC PANEL: CPT

## 2020-09-23 PROCEDURE — 83540 ASSAY OF IRON: CPT

## 2020-09-23 PROCEDURE — 36415 COLL VENOUS BLD VENIPUNCTURE: CPT

## 2020-09-23 PROCEDURE — 85025 COMPLETE CBC W/AUTO DIFF WBC: CPT

## 2020-09-23 PROCEDURE — 82728 ASSAY OF FERRITIN: CPT

## 2020-09-28 ENCOUNTER — OFFICE VISIT (OUTPATIENT)
Dept: ONCOLOGY | Facility: CLINIC | Age: 80
End: 2020-09-28

## 2020-09-28 VITALS
HEIGHT: 64 IN | RESPIRATION RATE: 16 BRPM | BODY MASS INDEX: 28.68 KG/M2 | TEMPERATURE: 97.5 F | HEART RATE: 84 BPM | SYSTOLIC BLOOD PRESSURE: 181 MMHG | DIASTOLIC BLOOD PRESSURE: 84 MMHG | WEIGHT: 168 LBS | OXYGEN SATURATION: 97 %

## 2020-09-28 DIAGNOSIS — Z14.8 HEMOCHROMATOSIS CARRIER: Primary | ICD-10-CM

## 2020-09-28 DIAGNOSIS — R71.8 OTHER ABNORMALITY OF RED BLOOD CELLS: ICD-10-CM

## 2020-09-28 PROCEDURE — 99214 OFFICE O/P EST MOD 30 MIN: CPT | Performed by: INTERNAL MEDICINE

## 2020-09-28 NOTE — PROGRESS NOTES
Follow Up Office Visit      Date: 2020     Patient Name: Sheree Hernandez  MRN: [id number]  : 1940  Chief Complaint:  Follow-up for heterozygous hemochromatosis mutation     History of Present Illness: Sheree Hernandez is a pleasant 79 y.o. female with a past medical history of hyperlipidemia and hypertension who presents today for evaluation of concern for hemochromatosis. The patient is accompanied by their self who contributes to the history of their care.  Patient states that over the past 2 years she has been having worsening fatigue especially with exertion.  Over the past several months this has become worse.  Patient states that she gets tired with basic activities including showering getting dressed in the morning and walking to and from her car from stores.  She states that her fatigue gets better after a few minutes of rest.  She has previously had an echocardiogram and cardiac catheterization within the past 2 years which did not reveal any cause of her fatigue with exertion.  She is also had an ultrasound of the liver which revealed stable cyst.  She was recently seen by her PCP who ordered iron studies which was notable for an elevated ferritin to 246.  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  All other mutations were negative.  She is currently up-to-date on her mammograms and colonoscopy with no active malignancies noted.  She is otherwise compliant with her statin and high blood pressure medicines.    Interval History:  Presents to clinic for follow-up.  Overall stable.  Continues to have shortness of breath and fatigue especially with exertion.  Extensive pulmonary cardiac work-up without evidence of disease.  Patient states she is constantly worried about something being wrong.  Has some arthritic pains for which she is seeing rheumatology in the next week or 2.     Oncology History:    Oncology/Hematology History    No  history exists.       Subjective      Review of Systems:   Constitutional: Negative for fevers, chills, or weight loss  Eyes: Negative for blurred vision or discharge         Ear/Nose/Throat: Negative for difficulty swallowing, sore throat, LAD                                                       Respiratory: Negative for cough, SOA, wheezing                                                                                        Cardiovascular: Negative for chest pain or palpitations                                                                  Gastrointestinal: Negative for nausea, vomiting or diarrhea                                                                     Genitourinary: Negative for dysuria or hematuria                                                                                           Musculoskeletal: Negative for any joint pains or muscle aches                                                                        Neurologic: Negative for any weakness, headaches, dizziness                                                                         Hematologic: Negative for any easy bleeding or bruising                                                                                   Psychiatric: Negative for anxiety or depression                          Past Medical History/Past Surgical History/ Family History/ Social History: Reviewed by me and unchanged from my previous documentation done on July 2020.     Medications:     Current Outpatient Medications:   •  Calcium-Magnesium-Vitamin D (CALCIUM 500 PO), Take 1 tablet by mouth Daily., Disp: , Rfl:   •  Cholecalciferol (VITAMIN D) 25 MCG (1000 UT) tablet, , Disp: , Rfl:   •  Coenzyme Q10 (COQ10 PO), , Disp: , Rfl:   •  Folic Acid 20 MG capsule, , Disp: , Rfl:   •  hydroCHLOROthiazide (HYDRODIURIL) 25 MG tablet, Take 1 tablet by mouth Daily., Disp: 90 tablet, Rfl: 3  •  losartan (COZAAR) 100 MG tablet, Take 1 tablet by mouth Daily., Disp: 90  "tablet, Rfl: 3  •  Omega-3 Fatty Acids (OMEGA 3 PO), Take 1 tablet by mouth Daily., Disp: , Rfl:   •  rosuvastatin (CRESTOR) 10 MG tablet, Take 1 tablet by mouth Daily., Disp: 90 tablet, Rfl: 3    Allergies:   Allergies   Allergen Reactions   • Pravastatin Myalgia       Objective     Physical Exam:  Vital Signs:   Vitals:    09/28/20 0951   BP: (!) 181/84   Pulse: 84   Resp: 16   Temp: 97.5 °F (36.4 °C)   TempSrc: Temporal   SpO2: 97%   Weight: 76.2 kg (168 lb)   Height: 161.9 cm (63.74\")   PainSc: 0-No pain     Pain Score    09/28/20 0951   PainSc: 0-No pain     ECOG Performance Status: 0 - Asymptomatic    Constitutional: NAD, ECOG 0  Eyes: PERRLA, scleral anicteric  ENT: No LAD, no thyromegaly  Respiratory: CTAB, no wheezing, rales, rhonchi  Cardiovascular: RRR, no murmurs, pulses 2+ bilaterally  Abdomen: soft, NT/ND, no HSM  Musculoskeletal: strength 5/5 bilaterally, no c/c/e  Neurologic: A&O x 3, CN II-XII intact grossly    Results Review:   Lab on 09/23/2020   Component Date Value Ref Range Status   • Glucose 09/23/2020 95  65 - 99 mg/dL Final   • BUN 09/23/2020 17  8 - 23 mg/dL Final   • Creatinine 09/23/2020 0.92  0.57 - 1.00 mg/dL Final   • Sodium 09/23/2020 140  136 - 145 mmol/L Final   • Potassium 09/23/2020 4.1  3.5 - 5.2 mmol/L Final    Slight hemolysis detected by analyzer. Results may be affected.   • Chloride 09/23/2020 104  98 - 107 mmol/L Final   • CO2 09/23/2020 26.0  22.0 - 29.0 mmol/L Final   • Calcium 09/23/2020 9.4  8.6 - 10.5 mg/dL Final   • Total Protein 09/23/2020 7.2  6.0 - 8.5 g/dL Final   • Albumin 09/23/2020 4.30  3.50 - 5.20 g/dL Final   • ALT (SGPT) 09/23/2020 28  1 - 33 U/L Final   • AST (SGOT) 09/23/2020 29  1 - 32 U/L Final   • Alkaline Phosphatase 09/23/2020 141* 39 - 117 U/L Final   • Total Bilirubin 09/23/2020 0.5  0.0 - 1.2 mg/dL Final   • eGFR Non African Amer 09/23/2020 59* >60 mL/min/1.73 Final   • Globulin 09/23/2020 2.9  gm/dL Final   • A/G Ratio 09/23/2020 1.5  g/dL Final "   • BUN/Creatinine Ratio 09/23/2020 18.5  7.0 - 25.0 Final   • Anion Gap 09/23/2020 10.0  5.0 - 15.0 mmol/L Final   • Ferritin 09/23/2020 233.10* 13.00 - 150.00 ng/mL Final   • Iron 09/23/2020 84  37 - 145 mcg/dL Final   • Iron Saturation 09/23/2020 23  20 - 50 % Final   • Transferrin 09/23/2020 245  200 - 360 mg/dL Final   • TIBC 09/23/2020 365  298 - 536 mcg/dL Final   • WBC 09/23/2020 9.00  3.40 - 10.80 10*3/mm3 Final   • RBC 09/23/2020 5.27  3.77 - 5.28 10*6/mm3 Final   • Hemoglobin 09/23/2020 15.5  12.0 - 15.9 g/dL Final   • Hematocrit 09/23/2020 48.0* 34.0 - 46.6 % Final   • RDW 09/23/2020 14.4  12.3 - 15.4 % Final   • MCV 09/23/2020 91.0  79.0 - 97.0 fL Final   • MCH 09/23/2020 29.5  26.6 - 33.0 pg Final   • MCHC 09/23/2020 32.4  31.5 - 35.7 g/dL Final   • MPV 09/23/2020 9.7  6.0 - 12.0 fL Final   • Platelets 09/23/2020 234  140 - 450 10*3/mm3 Final   • Neutrophil % 09/23/2020 67.7  42.7 - 76.0 % Final   • Lymphocyte % 09/23/2020 23.8  19.6 - 45.3 % Final   • Monocyte % 09/23/2020 8.5  5.0 - 12.0 % Final   • Neutrophils, Absolute 09/23/2020 6.10  1.70 - 7.00 10*3/mm3 Final   • Lymphocytes, Absolute 09/23/2020 2.10  0.70 - 3.10 10*3/mm3 Final   • Monocytes, Absolute 09/23/2020 0.80  0.10 - 0.90 10*3/mm3 Final       No results found.    Assessment / Plan      Assessment/Plan:   Hemochromatosis carrier  -Noted on recent labs with an elevated ferritin to 246 hemochromatosis gene profile positive for heterozygosity of H63D.  Other genes negative.  Given patient's age and previous cardiac liver work-up showing no evidence of dysfunction, it is unlikely that she has had iron deposition all this time.  The heterozygosity of H63D makes her carrier for hemochromatosis however does not mean that she has active disease  - Repeat iron studies stable  - CT A/P with no liver dysfunction  - Echo within normal limits  - We will concern for iron deposition or iron overload at this time.  We will continue to monitor and repeat  labs in 6 months    Hypertension  - /84.  Previous check 137/66 in August  -Patient states she is compliant with her medications  -Advised patient to repeat blood pressure at home to notify PCP for blood pressure remains elevated    Follow Up:   Follow-up in 6 months     Jorje Montenegro MD  Hematology and Oncology     Please note that portions of this note may have been completed with a voice recognition program. Efforts were made to edit the dictations, but occasionally words are mistranscribed.

## 2020-10-27 DIAGNOSIS — I10 ESSENTIAL HYPERTENSION: ICD-10-CM

## 2020-10-28 RX ORDER — LOSARTAN POTASSIUM 100 MG/1
100 TABLET ORAL DAILY
Qty: 90 TABLET | Refills: 0 | Status: SHIPPED | OUTPATIENT
Start: 2020-10-28 | End: 2021-10-25

## 2021-02-04 ENCOUNTER — OFFICE VISIT (OUTPATIENT)
Dept: FAMILY MEDICINE CLINIC | Facility: CLINIC | Age: 81
End: 2021-02-04

## 2021-02-04 VITALS
WEIGHT: 168 LBS | HEIGHT: 64 IN | BODY MASS INDEX: 28.68 KG/M2 | OXYGEN SATURATION: 96 % | HEART RATE: 76 BPM | SYSTOLIC BLOOD PRESSURE: 158 MMHG | DIASTOLIC BLOOD PRESSURE: 98 MMHG

## 2021-02-04 DIAGNOSIS — I10 ESSENTIAL HYPERTENSION: ICD-10-CM

## 2021-02-04 DIAGNOSIS — R53.82 CHRONIC FATIGUE: ICD-10-CM

## 2021-02-04 DIAGNOSIS — J30.89 ALLERGIC RHINITIS DUE TO OTHER ALLERGIC TRIGGER, UNSPECIFIED SEASONALITY: ICD-10-CM

## 2021-02-04 DIAGNOSIS — F41.1 GENERALIZED ANXIETY DISORDER: Primary | ICD-10-CM

## 2021-02-04 DIAGNOSIS — Z14.8 HEMOCHROMATOSIS CARRIER: ICD-10-CM

## 2021-02-04 DIAGNOSIS — E78.5 HYPERLIPIDEMIA, UNSPECIFIED HYPERLIPIDEMIA TYPE: ICD-10-CM

## 2021-02-04 PROCEDURE — 99214 OFFICE O/P EST MOD 30 MIN: CPT | Performed by: INTERNAL MEDICINE

## 2021-02-04 RX ORDER — DULOXETIN HYDROCHLORIDE 30 MG/1
30 CAPSULE, DELAYED RELEASE ORAL DAILY
Qty: 30 CAPSULE | Refills: 1 | Status: SHIPPED | OUTPATIENT
Start: 2021-02-04 | End: 2021-06-03

## 2021-02-04 RX ORDER — DULOXETIN HYDROCHLORIDE 30 MG/1
30 CAPSULE, DELAYED RELEASE ORAL DAILY
Qty: 30 CAPSULE | Refills: 1 | Status: SHIPPED | OUTPATIENT
Start: 2021-02-04 | End: 2021-02-04 | Stop reason: SDUPTHER

## 2021-02-04 RX ORDER — HYDROCHLOROTHIAZIDE 12.5 MG/1
12.5 TABLET ORAL DAILY
Qty: 90 TABLET | Refills: 1 | Status: SHIPPED | OUTPATIENT
Start: 2021-02-04 | End: 2021-02-04 | Stop reason: SDUPTHER

## 2021-02-04 RX ORDER — MELOXICAM 7.5 MG/1
7.5 TABLET ORAL AS NEEDED
COMMUNITY
End: 2021-11-05

## 2021-02-04 RX ORDER — HYDROCHLOROTHIAZIDE 12.5 MG/1
12.5 TABLET ORAL DAILY
Qty: 90 TABLET | Refills: 1 | Status: SHIPPED | OUTPATIENT
Start: 2021-02-04 | End: 2021-11-05

## 2021-02-05 NOTE — PROGRESS NOTES
Chief Complaint   Patient presents with   • Fatigue       HPI:  Sheree Hernandez is a 80 y.o. female who presents today for follow-up chronic fatigue and hypertension.  She has had blood pressure medication changes recently, now only taking losartan.  She continues to have chronic fatigue a daily basis.  She has had extensive work-up in the past including autoimmune, pulmonary and cardiac.  It was recommended by her rheumatologist that she try an anxiety medicine.    ROS:  Constitutional: no fevers, night sweats or unexplained weight loss  Eyes: no vision changes  ENT: no runny nose, ear pain, sore throat  Cardio: no chest pain, palpitations  Pulm: no shortness of breath, wheezing, or cough  GI: no abdominal pain or changes in bowel movements  : no difficulty urinating  MSK: no difficulty ambulating, no joint pain  Neuro: no weakness, dizziness or headache  Psych: no trouble sleeping  Endo: no change in appetite      Past Medical History:   Diagnosis Date   • Asthma    • Atypical chest pain 3/9/2017   • Cataract    • Chronic constipation    • Fracture, pelvis closed (CMS/[facility]) 2015    x2   • Hyperlipidemia 3/9/2017   • Hypertension 3/9/2017   • Low bone mass     with elevated FRAX core   • Mild obstructive sleep apnea 1/30/2020   • Near syncope     negative cardiovascular workup    • Plantar fasciitis     Chronic   • PVC's (premature ventricular contractions)    • Senile keratosis     Multiple   • MANAN (stress urinary incontinence, female)    • Syncope, near     with negative cardiovascular workup      Family History   Problem Relation Age of Onset   • Heart attack Mother    • Heart failure Mother    • Dementia Mother    • No Known Problems Father    • Breast cancer Paternal Aunt       Social History     Socioeconomic History   • Marital status:      Spouse name: Not on file   • Number of children: 2   • Years of education: Not on file   • Highest education level: Not on file   Tobacco Use   • Smoking status:  Never Smoker   • Smokeless tobacco: Never Used   • Tobacco comment: Exposed to secondhand smoke   Substance and Sexual Activity   • Alcohol use: Yes     Types: 3 Glasses of wine per week     Comment: Maybe every 6 months   • Drug use: No   • Sexual activity: Never   Social History Narrative    5/18:     since 2013    2 kids:    Best Hernandez - [place]    Tiana Olvera [place], [place] - medical POA    2 gk    Hobbies: volunteers [employer], involved in [employer]    Exercise: yes 2-5d/wk    Dental: utd    Eye: utd      Allergies   Allergen Reactions   • Pravastatin Myalgia      Immunization History   Administered Date(s) Administered   • Fluzone High Dose =>65 Years ([employer] ONLY) 11/03/2017, 09/11/2018, 10/15/2019   • Pneumococcal Polysaccharide (PPSV23) 05/17/2013, 12/06/2016, 05/15/2018   • TD Preservative Free 11/09/2016        PE:  Vitals:    02/04/21 0942   BP: 158/98   Pulse:    SpO2:       Body mass index is 29.07 kg/m².    Gen Appearance: NAD  HEENT: Normocephalic, PERRLA, no thyromegaly, trache midline  Heart: RRR, normal S1 and S2, no murmur  Lungs: CTA b/l, no wheezing, no crackles  Abdomen: Soft, non-tender, non-distended, no guarding and BSx4  MSK: Moves all extremities well, normal gait, no peripheral edema  Pulses: Palpable and equal b/l  Lymph nodes: No palpable lymphadenopathy   Neuro: No focal deficits      Current Outpatient Medications   Medication Sig Dispense Refill   • Calcium-Magnesium-Vitamin D (CALCIUM 500 PO) Take 1 tablet by mouth Daily.     • Cholecalciferol (VITAMIN D) 25 MCG (1000 UT) tablet      • Coenzyme Q10 (COQ10 PO)      • Folic Acid 20 MG capsule      • losartan (COZAAR) 100 MG tablet TAKE 1 TABLET BY MOUTH DAILY 90 tablet 0   • meloxicam (MOBIC) 7.5 MG tablet Take 7.5 mg by mouth As Needed.     • Omega-3 Fatty Acids (OMEGA 3 PO) Take 1 tablet by mouth Daily.     • rosuvastatin (CRESTOR) 10 MG tablet Take 1 tablet by mouth Daily. 90 tablet 3   • DULoxetine (Cymbalta) 30 MG  capsule Take 1 capsule by mouth Daily. 30 capsule 1   • hydroCHLOROthiazide (HYDRODIURIL) 12.5 MG tablet Take 1 tablet by mouth Daily. 90 tablet 1     No current facility-administered medications for this visit.         Diagnoses and all orders for this visit:    1. Generalized anxiety disorder (Primary)  -     Discontinue: DULoxetine (Cymbalta) 30 MG capsule; Take 1 capsule by mouth Daily.  Dispense: 30 capsule; Refill: 1  -     DULoxetine (Cymbalta) 30 MG capsule; Take 1 capsule by mouth Daily.  Dispense: 30 capsule; Refill: 1  Start on low-dose Cymbalta.  Will increase at follow-up visit if needed.  2. Allergic rhinitis due to other allergic trigger, unspecified seasonality  Trial Flonase.  3. Hemochromatosis carrier  Established with hematology.  4. Essential hypertension  -     Discontinue: hydroCHLOROthiazide (HYDRODIURIL) 12.5 MG tablet; Take 1 tablet by mouth Daily.  Dispense: 90 tablet; Refill: 1  -     hydroCHLOROthiazide (HYDRODIURIL) 12.5 MG tablet; Take 1 tablet by mouth Daily.  Dispense: 90 tablet; Refill: 1  Blood pressure elevated today in office and on her home cuff.  Continue losartan and resume taking HCTZ at 12.5 mg.  See back in 4 weeks for recheck.  5. Hyperlipidemia, unspecified hyperlipidemia type  Patient reports she is no longer taking Crestor per cardiology.  6. Chronic fatigue  I suspect this is chronic fatigue syndrome versus fibromyalgia.  Encouraged routine physical activity as well as regular sleep schedule.  Would recommend [place] versus [place] evaluation due to persistent symptoms and extensive work-up with no etiology.       Return in about 4 weeks (around 3/4/2021) for htn.     Please note that portions of this document were completed with a voice recognition program. Efforts were made to edit the dictations, but occasionally words are mis-transcribed.

## 2021-04-02 ENCOUNTER — TELEPHONE (OUTPATIENT)
Dept: ONCOLOGY | Facility: CLINIC | Age: 81
End: 2021-04-02

## 2021-04-02 NOTE — TELEPHONE ENCOUNTER
Called to let patient know that she can come a few days before if she would like or she can come and have labs the same day. Patient understood and stated she would like to come a few days early for her labs

## 2021-04-02 NOTE — TELEPHONE ENCOUNTER
Caller: ORLANDO    Relationship: PATIENT    Best call back number: [phone]     What was the call regarding: ORLANDO WOULD LIKE TO KNOW WHEN SHE NEEDS TO COME IN FOR LABWORK NEXT    Do you require a callback: YES

## 2021-04-05 ENCOUNTER — LAB (OUTPATIENT)
Dept: LAB | Facility: HOSPITAL | Age: 81
End: 2021-04-05

## 2021-04-05 DIAGNOSIS — Z14.8 HEMOCHROMATOSIS CARRIER: ICD-10-CM

## 2021-04-05 DIAGNOSIS — R71.8 OTHER ABNORMALITY OF RED BLOOD CELLS: ICD-10-CM

## 2021-04-05 LAB
ALBUMIN SERPL-MCNC: 4.2 G/DL (ref 3.5–5.2)
ALBUMIN/GLOB SERPL: 1.6 G/DL
ALP SERPL-CCNC: 127 U/L (ref 39–117)
ALT SERPL W P-5'-P-CCNC: 20 U/L (ref 1–33)
ANION GAP SERPL CALCULATED.3IONS-SCNC: 11 MMOL/L (ref 5–15)
AST SERPL-CCNC: 25 U/L (ref 1–32)
BILIRUB SERPL-MCNC: 0.4 MG/DL (ref 0–1.2)
BUN SERPL-MCNC: 15 MG/DL (ref 8–23)
BUN/CREAT SERPL: 16.1 (ref 7–25)
CALCIUM SPEC-SCNC: 8.8 MG/DL (ref 8.6–10.5)
CHLORIDE SERPL-SCNC: 103 MMOL/L (ref 98–107)
CO2 SERPL-SCNC: 26 MMOL/L (ref 22–29)
CREAT SERPL-MCNC: 0.93 MG/DL (ref 0.57–1)
ERYTHROCYTE [DISTWIDTH] IN BLOOD BY AUTOMATED COUNT: 14.9 % (ref 12.3–15.4)
FERRITIN SERPL-MCNC: 229.3 NG/ML (ref 13–150)
GFR SERPL CREATININE-BSD FRML MDRD: 58 ML/MIN/1.73
GLOBULIN UR ELPH-MCNC: 2.7 GM/DL
GLUCOSE SERPL-MCNC: 87 MG/DL (ref 65–99)
HCT VFR BLD AUTO: 46 % (ref 34–46.6)
HGB BLD-MCNC: 15 G/DL (ref 12–15.9)
IRON 24H UR-MRATE: 100 MCG/DL (ref 37–145)
IRON SATN MFR SERPL: 27 % (ref 20–50)
LYMPHOCYTES # BLD AUTO: 1.9 10*3/MM3 (ref 0.7–3.1)
LYMPHOCYTES NFR BLD AUTO: 24.8 % (ref 19.6–45.3)
MCH RBC QN AUTO: 29 PG (ref 26.6–33)
MCHC RBC AUTO-ENTMCNC: 32.5 G/DL (ref 31.5–35.7)
MCV RBC AUTO: 89.2 FL (ref 79–97)
MONOCYTES # BLD AUTO: 0.4 10*3/MM3 (ref 0.1–0.9)
MONOCYTES NFR BLD AUTO: 5.3 % (ref 5–12)
NEUTROPHILS NFR BLD AUTO: 5.2 10*3/MM3 (ref 1.7–7)
NEUTROPHILS NFR BLD AUTO: 69.9 % (ref 42.7–76)
PLATELET # BLD AUTO: 236 10*3/MM3 (ref 140–450)
PMV BLD AUTO: 9.2 FL (ref 6–12)
POTASSIUM SERPL-SCNC: 4.3 MMOL/L (ref 3.5–5.2)
PROT SERPL-MCNC: 6.9 G/DL (ref 6–8.5)
RBC # BLD AUTO: 5.16 10*6/MM3 (ref 3.77–5.28)
SODIUM SERPL-SCNC: 140 MMOL/L (ref 136–145)
TIBC SERPL-MCNC: 377 MCG/DL (ref 298–536)
TRANSFERRIN SERPL-MCNC: 253 MG/DL (ref 200–360)
WBC # BLD AUTO: 7.5 10*3/MM3 (ref 3.4–10.8)

## 2021-04-05 PROCEDURE — 83540 ASSAY OF IRON: CPT

## 2021-04-05 PROCEDURE — 82728 ASSAY OF FERRITIN: CPT

## 2021-04-05 PROCEDURE — 80053 COMPREHEN METABOLIC PANEL: CPT

## 2021-04-05 PROCEDURE — 84466 ASSAY OF TRANSFERRIN: CPT

## 2021-04-05 PROCEDURE — 36415 COLL VENOUS BLD VENIPUNCTURE: CPT

## 2021-04-05 PROCEDURE — 85025 COMPLETE CBC W/AUTO DIFF WBC: CPT

## 2021-04-12 ENCOUNTER — OFFICE VISIT (OUTPATIENT)
Dept: ONCOLOGY | Facility: CLINIC | Age: 81
End: 2021-04-12

## 2021-04-12 VITALS
OXYGEN SATURATION: 96 % | TEMPERATURE: 97.8 F | BODY MASS INDEX: 28.42 KG/M2 | HEIGHT: 64 IN | SYSTOLIC BLOOD PRESSURE: 154 MMHG | WEIGHT: 166.5 LBS | RESPIRATION RATE: 20 BRPM | HEART RATE: 82 BPM | DIASTOLIC BLOOD PRESSURE: 85 MMHG

## 2021-04-12 DIAGNOSIS — E83.118 OTHER HEMOCHROMATOSIS: ICD-10-CM

## 2021-04-12 DIAGNOSIS — D75.1 ERYTHROCYTOSIS: ICD-10-CM

## 2021-04-12 DIAGNOSIS — Z14.8 HEMOCHROMATOSIS CARRIER: Primary | ICD-10-CM

## 2021-04-12 PROCEDURE — 99213 OFFICE O/P EST LOW 20 MIN: CPT | Performed by: INTERNAL MEDICINE

## 2021-04-12 NOTE — PROGRESS NOTES
Follow Up Office Visit      Date: 2021     Patient Name: Sheree Hernandez  MRN: [id number]  : 1940  Chief Complaint:  Follow-up for heterozygous hemochromatosis mutation     History of Present Illness: Sheree Hernandez is a pleasant 79 y.o. female with a past medical history of hyperlipidemia and hypertension who presents today for evaluation of concern for hemochromatosis. The patient is accompanied by their self who contributes to the history of their care.  Patient states that over the past 2 years she has been having worsening fatigue especially with exertion.  Over the past several months this has become worse.  Patient states that she gets tired with basic activities including showering getting dressed in the morning and walking to and from her car from stores.  She states that her fatigue gets better after a few minutes of rest.  She has previously had an echocardiogram and cardiac catheterization within the past 2 years which did not reveal any cause of her fatigue with exertion.  She is also had an ultrasound of the liver which revealed stable cyst.  She was recently seen by her PCP who ordered iron studies which was notable for an elevated ferritin to 246.  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  All other mutations were negative.  She is currently up-to-date on her mammograms and colonoscopy with no active malignancies noted.  She is otherwise compliant with her statin and high blood pressure medicines.     Interval History:  Presents to clinic for follow-up.  Overall stable.  Continues to mainly have fatigue with exertion.  Extensive pulmonary and cardiac work-up without evidence of disease.  Patient states she is constantly worried about something being wrong and has tried an Cymbalta for anxiety.  States she had significant vision changes with the medication and has since stopped this on her own.  Also has some increased sinus  pressure and congestion.  States that over-the-counter medications have not relieved this in the past.  Other than her fatigue and anxiety, she has no other major complaints today    Oncology History:    Oncology/Hematology History    No history exists.       Subjective      Review of Systems:   Constitutional: Positive for chronic fatigue.  Negative for fevers, chills, or weight loss  Eyes: Negative for blurred vision or discharge         Ear/Nose/Throat: Negative for difficulty swallowing, sore throat, LAD                                                       Respiratory: Negative for cough, SOA, wheezing                                                                                        Cardiovascular: Negative for chest pain or palpitations                                                                  Gastrointestinal: Negative for nausea, vomiting or diarrhea                                                                     Genitourinary: Negative for dysuria or hematuria                                                                                           Musculoskeletal: Negative for any joint pains or muscle aches                                                                        Neurologic: Negative for any weakness, headaches, dizziness                                                                         Hematologic: Negative for any easy bleeding or bruising                                                                                   Psychiatric: Positive for anxiety.  Negative for depression                          Past Medical History/Past Surgical History/ Family History/ Social History: Reviewed by me and unchanged from my previous documentation done on September 2020.     Medications:     Current Outpatient Medications:   •  Calcium-Magnesium-Vitamin D (CALCIUM 500 PO), Take 1 tablet by mouth Daily., Disp: , Rfl:   •  Cholecalciferol (VITAMIN D) 25 MCG (1000 UT) tablet, ,  "Disp: , Rfl:   •  Coenzyme Q10 (COQ10 PO), , Disp: , Rfl:   •  Folic Acid 20 MG capsule, , Disp: , Rfl:   •  hydroCHLOROthiazide (HYDRODIURIL) 12.5 MG tablet, Take 1 tablet by mouth Daily., Disp: 90 tablet, Rfl: 1  •  losartan (COZAAR) 100 MG tablet, TAKE 1 TABLET BY MOUTH DAILY, Disp: 90 tablet, Rfl: 0  •  meloxicam (MOBIC) 7.5 MG tablet, Take 7.5 mg by mouth As Needed., Disp: , Rfl:   •  Omega-3 Fatty Acids (OMEGA 3 PO), Take 1 tablet by mouth Daily., Disp: , Rfl:   •  rosuvastatin (CRESTOR) 10 MG tablet, Take 1 tablet by mouth Daily., Disp: 90 tablet, Rfl: 3  •  DULoxetine (Cymbalta) 30 MG capsule, Take 1 capsule by mouth Daily., Disp: 30 capsule, Rfl: 1    Allergies:   Allergies   Allergen Reactions   • Pravastatin Myalgia       Objective     Physical Exam:  Vital Signs:   Vitals:    04/12/21 1121   BP: 154/85   Pulse: 82   Resp: 20   Temp: 97.8 °F (36.6 °C)   TempSrc: Temporal   SpO2: 96%   Weight: 75.5 kg (166 lb 8 oz)   Height: 161.9 cm (63.75\")   PainSc: 0-No pain     Pain Score    04/12/21 1121   PainSc: 0-No pain     ECOG Performance Status: 1 - Symptomatic but completely ambulatory    Constitutional: NAD, ECOG 1  Eyes: PERRLA, scleral anicteric  ENT: No LAD, no thyromegaly  Respiratory: CTAB, no wheezing, rales, rhonchi  Cardiovascular: RRR, no murmurs, pulses 2+ bilaterally  Abdomen: soft, NT/ND, no HSM  Musculoskeletal: strength 5/5 bilaterally, no c/c/e  Neurologic: A&O x 3, CN II-XII intact grossly    Results Review:   Lab on 04/05/2021   Component Date Value Ref Range Status   • Glucose 04/05/2021 87  65 - 99 mg/dL Final   • BUN 04/05/2021 15  8 - 23 mg/dL Final   • Creatinine 04/05/2021 0.93  0.57 - 1.00 mg/dL Final   • Sodium 04/05/2021 140  136 - 145 mmol/L Final   • Potassium 04/05/2021 4.3  3.5 - 5.2 mmol/L Final   • Chloride 04/05/2021 103  98 - 107 mmol/L Final   • CO2 04/05/2021 26.0  22.0 - 29.0 mmol/L Final   • Calcium 04/05/2021 8.8  8.6 - 10.5 mg/dL Final   • Total Protein 04/05/2021 6.9  " 6.0 - 8.5 g/dL Final   • Albumin 04/05/2021 4.20  3.50 - 5.20 g/dL Final   • ALT (SGPT) 04/05/2021 20  1 - 33 U/L Final   • AST (SGOT) 04/05/2021 25  1 - 32 U/L Final   • Alkaline Phosphatase 04/05/2021 127* 39 - 117 U/L Final   • Total Bilirubin 04/05/2021 0.4  0.0 - 1.2 mg/dL Final   • eGFR Non African Amer 04/05/2021 58* >60 mL/min/1.73 Final   • Globulin 04/05/2021 2.7  gm/dL Final   • A/G Ratio 04/05/2021 1.6  g/dL Final   • BUN/Creatinine Ratio 04/05/2021 16.1  7.0 - 25.0 Final   • Anion Gap 04/05/2021 11.0  5.0 - 15.0 mmol/L Final   • Ferritin 04/05/2021 229.30* 13.00 - 150.00 ng/mL Final   • Iron 04/05/2021 100  37 - 145 mcg/dL Final   • Iron Saturation 04/05/2021 27  20 - 50 % Final   • Transferrin 04/05/2021 253  200 - 360 mg/dL Final   • TIBC 04/05/2021 377  298 - 536 mcg/dL Final   • WBC 04/05/2021 7.50  3.40 - 10.80 10*3/mm3 Final    Verified by repeat analysis.    • RBC 04/05/2021 5.16  3.77 - 5.28 10*6/mm3 Final   • Hemoglobin 04/05/2021 15.0  12.0 - 15.9 g/dL Final   • Hematocrit 04/05/2021 46.0  34.0 - 46.6 % Final   • RDW 04/05/2021 14.9  12.3 - 15.4 % Final   • MCV 04/05/2021 89.2  79.0 - 97.0 fL Final   • MCH 04/05/2021 29.0  26.6 - 33.0 pg Final   • MCHC 04/05/2021 32.5  31.5 - 35.7 g/dL Final   • MPV 04/05/2021 9.2  6.0 - 12.0 fL Final   • Platelets 04/05/2021 236  140 - 450 10*3/mm3 Final   • Neutrophil % 04/05/2021 69.9  42.7 - 76.0 % Final   • Lymphocyte % 04/05/2021 24.8  19.6 - 45.3 % Final   • Monocyte % 04/05/2021 5.3  5.0 - 12.0 % Final   • Neutrophils, Absolute 04/05/2021 5.20  1.70 - 7.00 10*3/mm3 Final   • Lymphocytes, Absolute 04/05/2021 1.90  0.70 - 3.10 10*3/mm3 Final   • Monocytes, Absolute 04/05/2021 0.40  0.10 - 0.90 10*3/mm3 Final       No results found.    Assessment / Plan      Assessment/Plan:   Hemochromatosis carrier  -Noted on recent labs with an elevated ferritin to 246 hemochromatosis gene profile positive for heterozygosity of H63D.  Other genes negative.  Given  patient's age and previous cardiac liver work-up showing no evidence of dysfunction, it is unlikely that she has had iron deposition all this time.  The heterozygosity of H63D makes her carrier for hemochromatosis however does not mean that she has active disease  - CT A/P in July 2020 with no liver dysfunction  - Echo in July 2020 within normal limits  - Repeat iron studies in April 2021 stable with a ferritin of 229, iron level 100, transferrin saturation 27%  -Low concern for iron overload at this time.  We will repeat iron studies in 1 year and if they remain stable will consider discharge from clinic     Hypertension  -Stable today with a BP of 154/85  -Patient states she is compliant with her medications  -Advised patient to repeat blood pressure at home to notify PCP for blood pressure remains elevated    Fatigue  -Unclear etiology at this time  -Low concern for hematologic process causing the fatigue  -Can continue to follow with PCP     Follow Up:   Follow-up in 1 year     Jorje Montenegro MD  Hematology and Oncology     Please note that portions of this note may have been completed with a voice recognition program. Efforts were made to edit the dictations, but occasionally words are mistranscribed.

## 2021-06-02 NOTE — PROGRESS NOTES
Subjective:     Encounter Date:06/03/2021    Patient ID: Sheree Hernandez is a 80 y.o.  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    SELF REFERRED  FORMER INTERNIST: Terell Burciaga MD  FORMER INTERNIST:  Tammy Rm DO  CURRENT INTERNIST: Timi Conway MD  PREVIOUS CARDIOLOGIST: Efren Healy MD, [facility], MIRANDA Gonzalez MD.  PULMONOLOGIST: Edvin Tracy MD  HEMATOLOGIST: Jorje Montenegro MD  ANGIOGRAPHER: MIRANDA Gonzalez MD  RHEUMATOLOGIST: Ricci Florentino MD  SLEEP PHYSICIAN: Mihir Damian MD, [facility]    Chief Complaint:   Chief Complaint   Patient presents with   • Shortness of Breath   • Dyslipidemia   • Essential hypertension     Problem List:  1. 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 Remote normal coronary CTA calcium score (0), January 2010.  b. Remote acceptable echocardiogram with mild TR and diastolic LV dysfunction, November 2015.  c. Acceptable nuclear stress test with low probability of obstructive disease, LVEF of 0.79, February 2016.  d. Residual CCS class 1 angina pectoris/NYHA class 2 exertional dyspnea and fatigue.  e. Echocardiogram 10/24/2018: LVEF 60%, LV wall thickness consistent with mild concentric hypertrophy, RVSP 24 mmHg, 4 x 3 cm liver cyst noted incidentally  f. Abnormal stress test showing ischemia-data deficit  g. Left heart catheterization 1/10/2019: LVEF 60%, LVEDP 8 mmHg, mild CAD, recommendations for evaluation for noncardiac causes of symptoms  h. Echocardiogram 7/24/2020: LVEF 65%  i. Pulmonary function test 11/5/2018: Normal spirometry, lung volumes normal, diffuse capacity is normal  j. CCS class 0 angina/NYHA class III fatigue with normal electrocardiogram, June 2021  2. Hypertension - probably essential with recent hypertensive blood pressure readings.  3. Hyperlipidemia with uncontrolled hypercholesterolemia; 10-year risk 46.1%, 16.6% with treatment, on rosuvastatin.  4. Indigestion - probable  GERD syndrome.  5. Remote presyncope.  a. Acceptable head CT scan without contrast and junctional rhythm (November 2015 with [facility] evaluation)  b. Negative acceptable tilt table test study.  c. Acceptable 24 hour ambulatory blood pressure monitor.  d. Acceptable 48 hour Holter monitor  e. No recurrence.  6. Plantar fasciitis  7. Past surgical history of hysterectomy.   8. Mild obstructive sleep apnea with abnormal home sleep study October 2019, noncompliant with CPAP  9. Hemochromatosis carrier  10. Intermittent palpitations    Allergies   Allergen Reactions   • Pravastatin Myalgia         Current Outpatient Medications:   •  Calcium-Magnesium-Vitamin D (CALCIUM 500 PO), Take 1 tablet by mouth Daily., Disp: , Rfl:   •  Cholecalciferol (VITAMIN D) 25 MCG (1000 UT) tablet, 1,000 Units Daily., Disp: , Rfl:   •  Coenzyme Q10 (COQ10 PO), 100 mg Daily., Disp: , Rfl:   •  Folic Acid 20 MG capsule, Daily., Disp: , Rfl:   •  hydroCHLOROthiazide (HYDRODIURIL) 12.5 MG tablet, Take 1 tablet by mouth Daily. (Patient taking differently: Take 50 mg by mouth Daily.), Disp: 90 tablet, Rfl: 1  •  losartan (COZAAR) 100 MG tablet, TAKE 1 TABLET BY MOUTH DAILY, Disp: 90 tablet, Rfl: 0  •  Magnesium 500 MG capsule, Take  by mouth Daily., Disp: , Rfl:   •  meloxicam (MOBIC) 7.5 MG tablet, Take 7.5 mg by mouth As Needed., Disp: , Rfl:   •  Omega-3 Fatty Acids (OMEGA 3 PO), Take 1 tablet by mouth Daily., Disp: , Rfl:   •  rosuvastatin (CRESTOR) 10 MG tablet, Take 1 tablet by mouth Daily., Disp: 90 tablet, Rfl: 3    History of Present Illness  This is an 80-year-old white [facility] and was last seen in office 10/04/2018.  In the interim she was diagnosed with obstructive sleep apnea and is also a hemochromatosis carrier.  She is noncompliant with CPAP.  She initially used it but did not feel like it helped with her daytime sleepiness so she discontinued the use of this.  She also had an abnormal stress test and  had a normal heart catheterization January 2019 with Dr. Gonzalez.  She has had some shortness of breath on exertion but had pulmonary function tests in 2018 which were normal.  She feels that she is excessively tired with all of her activities.  She is tired the majority of the day and takes frequent breaks in between doing her yard work or housework.  She denies any chest pain, presyncope, or syncope.  Occasionally she will feel a quick heart flutter for couple seconds but this does not happen on a daily basis.  She was seen by a rheumatologist with apparent negative [id number] deficit.  She states that she goes to doctors and they cannot find out why she is so fatigued.  She was told that her thyroid was normal and she is not anemic.  She denies any melena but sometimes has constipation every 3-4 days.  She has some mild arthritis.  She had an echocardiogram last year which was acceptable.  She feels that it does not make a difference whether she is walking on flat surfaces or up an incline for her shortness of breath on exertion or fatigue.  Before the quarantine she was walking on the treadmill for a mile and using an exercise bike for a mile but she has not done this in a long time. She states to walk 50 feet produces fatigue without additional cardiopulmonary complaints. She has had her covid vaccinations.  Her last laboratory testing was in April 2021.    Cardiovascular Disease Risk Factors  Hypertension, hyperlipidemia, increased age    Social History     Socioeconomic History   • Marital status:      Spouse name: Not on file   • Number of children: 2   • Years of education: Not on file   • Highest education level: Not on file   Tobacco Use   • Smoking status: Never Smoker   • Smokeless tobacco: Never Used   • Tobacco comment: Exposed to secondhand smoke   Vaping Use   • Vaping Use: Never used   Substance and Sexual Activity   • Alcohol use: Yes     Types: 3 Glasses of wine per week     Comment:  "Maybe every 6 months   • Drug use: No   • Sexual activity: Never       Family History   Problem Relation Age of Onset   • Heart attack Mother    • Heart failure Mother    • Dementia Mother    • No Known Problems Father    • Breast cancer Paternal Aunt        Review of Systems   Constitutional: Positive for malaise/fatigue.   HENT: Negative.    Eyes: Negative.    Cardiovascular: Positive for dyspnea on exertion, irregular heartbeat and palpitations. Negative for chest pain, claudication, leg swelling, near-syncope and orthopnea.   Respiratory: Positive for shortness of breath and sleep disturbances due to breathing.    Endocrine: Negative.    Hematologic/Lymphatic:        Hemochromatosis carrier   Skin: Negative.    Musculoskeletal: Positive for arthritis and joint pain.   Gastrointestinal: Negative for melena.   Genitourinary: Negative.    Neurological: Positive for excessive daytime sleepiness and weakness.   Psychiatric/Behavioral: Negative.    Allergic/Immunologic: Negative.       Obtained and negative except as outlined in problem list and HPI.      ECG 12 Lead    Date/Time: 6/3/2021 9:56 AM  Performed by: Davidson Sam MD  Authorized by: Davidson Sam MD   Rhythm comments: Normal sinus rhythm, normal ECG, 77 bpm, QRS 72 ms,  ms, normal sinus rhythm has replaced sinus bradycardia compared to ECG in June 2018                 Objective:       Vitals:    06/03/21 0906 06/03/21 0915 06/03/21 0916 06/03/21 0917   BP: 134/78 139/77 139/78 138/81   BP Location: Left arm Left arm Right arm Right arm   Patient Position: Sitting Standing Standing Sitting   Pulse: 80 79 85 82   SpO2: 99%      Weight: 74.7 kg (164 lb 9.6 oz)      Height: 165.1 cm (65\")      Recheck blood pressure right arm sitting was 132/58  Body mass index is 27.39 kg/m².  Last weight October 2018 was 165 pounds  Constitutional:       Appearance: Healthy appearance. Not in distress.   Eyes:      Funduscopic exam:     Right eye: AV nicking " present.         Left eye: AV nicking present.   HENT:    Mouth/Throat:      Lips: Pink. No lesions.      Mouth: Mucous membranes are moist. No injury, lacerations, oral lesions or angioedema.      Dentition: Normal dentition. Does not have dentures. No dental tenderness, gingival swelling, dental caries, dental abscesses or gum lesions.      Tongue: No lesions. Tongue does not deviate from midline.      Palate: No mass and lesions.      Pharynx: Oropharynx is clear. Uvula midline. No pharyngeal swelling, oropharyngeal exudate, posterior oropharyngeal erythema or uvula swelling.      Tonsils: No tonsillar exudate or tonsillar abscesses.   Neck:      Vascular: No JVR. JVD normal.   Pulmonary:      Effort: Pulmonary effort is normal.      Breath sounds: Normal breath sounds. No wheezing. No rhonchi. No rales.   Chest:      Chest wall: Not tender to palpatation.   Cardiovascular:      PMI at left midclavicular line. Normal rate. Regular rhythm. Normal S1. Normal S2.      Murmurs: There is a grade 1/6 mid frequency harsh early systolic murmur at the LLSB.      No gallop. No click. No rub.   Pulses:     Carotid: 2+ bilaterally.     Radial: 2+ bilaterally.     Femoral: 2+ bilaterally.     Dorsalis pedis: 2+ bilaterally.     Posterior tibial: 2+ bilaterally.  Edema:     Peripheral edema absent.   Abdominal:      General: Bowel sounds are normal.      Palpations: Abdomen is soft.      Tenderness: There is no abdominal tenderness.   Musculoskeletal: Normal range of motion.         General: No tenderness. Skin:     General: Skin is warm and dry.   Neurological:      General: No focal deficit present.      Mental Status: Alert and oriented to person, place and time.         Lab Review:   Results for orders placed or performed in visit on 04/05/21   Comprehensive Metabolic Panel    Specimen: Blood   Result Value Ref Range    Glucose 87 65 - 99 mg/dL    BUN 15 8 - 23 mg/dL    Creatinine 0.93 0.57 - 1.00 mg/dL    Sodium 140 136 -  145 mmol/L    Potassium 4.3 3.5 - 5.2 mmol/L    Chloride 103 98 - 107 mmol/L    CO2 26.0 22.0 - 29.0 mmol/L    Calcium 8.8 8.6 - 10.5 mg/dL    Total Protein 6.9 6.0 - 8.5 g/dL    Albumin 4.20 3.50 - 5.20 g/dL    ALT (SGPT) 20 1 - 33 U/L    AST (SGOT) 25 1 - 32 U/L    Alkaline Phosphatase 127 (H) 39 - 117 U/L    Total Bilirubin 0.4 0.0 - 1.2 mg/dL    eGFR Non African Amer 58 (L) >60 mL/min/1.73    Globulin 2.7 gm/dL    A/G Ratio 1.6 g/dL    BUN/Creatinine Ratio 16.1 7.0 - 25.0    Anion Gap 11.0 5.0 - 15.0 mmol/L   Ferritin    Specimen: Blood   Result Value Ref Range    Ferritin 229.30 (H) 13.00 - 150.00 ng/mL   Iron Profile    Specimen: Blood   Result Value Ref Range    Iron 100 37 - 145 mcg/dL    Iron Saturation 27 20 - 50 %    Transferrin 253 200 - 360 mg/dL    TIBC 377 298 - 536 mcg/dL   CBC Auto Differential    Specimen: Blood   Result Value Ref Range    WBC 7.50 3.40 - 10.80 10*3/mm3    RBC 5.16 3.77 - 5.28 10*6/mm3    Hemoglobin 15.0 12.0 - 15.9 g/dL    Hematocrit 46.0 34.0 - 46.6 %    RDW 14.9 12.3 - 15.4 %    MCV 89.2 79.0 - 97.0 fL    MCH 29.0 26.6 - 33.0 pg    MCHC 32.5 31.5 - 35.7 g/dL    MPV 9.2 6.0 - 12.0 fL    Platelets 236 140 - 450 10*3/mm3    Neutrophil % 69.9 42.7 - 76.0 %    Lymphocyte % 24.8 19.6 - 45.3 %    Monocyte % 5.3 5.0 - 12.0 %    Neutrophils, Absolute 5.20 1.70 - 7.00 10*3/mm3    Lymphocytes, Absolute 1.90 0.70 - 3.10 10*3/mm3    Monocytes, Absolute 0.40 0.10 - 0.90 10*3/mm3           Assessment:     The patient has persistent fatigue and dyspnea on exertion but had a normal heart catheterization in 2019.  On her echocardiogram she did not have any LVH or signs of amyloidosis and no minimal voltage on ECG.  We will order an E patch to assess for any arrhythmias, PAF, or pauses.  We will also order a proBNP and D-dimer to rule out heart failure or clot formation as a cause of her shortness of breath.  She is not anemic and thyroid function has been WNL.  We encouraged the patient to use  CPAP.  She had a normal ECG in office today.  We will make a referral for physical therapy to help with the patient's strength/endurance.  We will order a sed rate to assess for inflammation.     Diagnosis Plan   1. Dyspnea on exertion  proBNP, D-dimer, E patch, referral to PT   2. Essential hypertension  Controlled, continue current cardiac medications   3. Dyslipidemia  Abnormal lipid panel June 2020, continue rosuvastatin   4. Atypical chest pain  No recurrent angina pectoris or CHF on current activity schedule; continue current treatment   5. BONNIE (obstructive sleep apnea)  Encouraged CPAP compliance          Plan:       1. Patient to continue current medications and close follow up with the above providers with the following recommendations:   A. E patch   B. proBNP, D-dimer, sed rate   C. Encouraged the patient to use CPAP   D.  Referral for physical therapy  2. Tentative cardiology follow up in July 2021 or patient may return sooner PRN.  3. 1 800 card    Scribed for Davidson Sam MD by Zayda Guevara, APRN. 6/3/2021  09:52 EDT    I, Davidson Sam MD, FACC, personally performed the services described in this documentation as scribed by the above named individual in my presence, and it is both accurate and complete. At 10:59 EDT on 06/03/2021

## 2021-06-03 ENCOUNTER — OFFICE VISIT (OUTPATIENT)
Dept: CARDIOLOGY | Facility: CLINIC | Age: 81
End: 2021-06-03

## 2021-06-03 VITALS
HEIGHT: 65 IN | DIASTOLIC BLOOD PRESSURE: 81 MMHG | WEIGHT: 164.6 LBS | OXYGEN SATURATION: 99 % | HEART RATE: 82 BPM | SYSTOLIC BLOOD PRESSURE: 138 MMHG | BODY MASS INDEX: 27.42 KG/M2

## 2021-06-03 DIAGNOSIS — G47.33 OSA (OBSTRUCTIVE SLEEP APNEA): ICD-10-CM

## 2021-06-03 DIAGNOSIS — E78.5 DYSLIPIDEMIA: ICD-10-CM

## 2021-06-03 DIAGNOSIS — R07.89 ATYPICAL CHEST PAIN: ICD-10-CM

## 2021-06-03 DIAGNOSIS — I10 ESSENTIAL HYPERTENSION: ICD-10-CM

## 2021-06-03 DIAGNOSIS — R06.09 DYSPNEA ON EXERTION: Primary | ICD-10-CM

## 2021-06-03 DIAGNOSIS — R53.81 PHYSICAL DECONDITIONING: ICD-10-CM

## 2021-06-03 PROCEDURE — 93000 ELECTROCARDIOGRAM COMPLETE: CPT | Performed by: INTERNAL MEDICINE

## 2021-06-03 PROCEDURE — 99214 OFFICE O/P EST MOD 30 MIN: CPT | Performed by: INTERNAL MEDICINE

## 2021-06-03 RX ORDER — FOLIC ACID 0.8 MG
TABLET ORAL DAILY
COMMUNITY
End: 2021-11-05

## 2021-06-04 LAB
D DIMER PPP FEU-MCNC: 0.91 MG/L FEU (ref 0–0.49)
ERYTHROCYTE [SEDIMENTATION RATE] IN BLOOD BY WESTERGREN METHOD: 4 MM/HR (ref 0–40)
NT-PROBNP SERPL-MCNC: 126 PG/ML (ref 0–738)

## 2021-06-23 ENCOUNTER — TREATMENT (OUTPATIENT)
Dept: PHYSICAL THERAPY | Facility: CLINIC | Age: 81
End: 2021-06-23

## 2021-06-23 DIAGNOSIS — R53.81 PHYSICAL DECONDITIONING: Primary | ICD-10-CM

## 2021-06-23 PROCEDURE — 97162 PT EVAL MOD COMPLEX 30 MIN: CPT | Performed by: PHYSICAL THERAPIST

## 2021-06-23 PROCEDURE — 97530 THERAPEUTIC ACTIVITIES: CPT | Performed by: PHYSICAL THERAPIST

## 2021-06-23 NOTE — PROGRESS NOTES
Physical Therapy Initial Evaluation and Plan of Care      Subjective Evaluation    History of Present Illness  Mechanism of injury: Pt is a 80 year old female presenting to the clinic with physical deconditioning. She reports she is unsure of why she is at PT for this problem. She states for the last 3 years she has noticed that she will do work in the yard for a few minutes and then she will feel like she needs to rest. She states that she usually rests for about a minute and then feels fine again. She thinks her breathing is normal, and she can catch her breath fine. She just thinks she is getting fatigued easily and cannot figure out why. She reports that she can walk about 4 blocks in her neighborhood before she feels like she has to go back home and sit down. She was instructed to use her CPAP but she feels like it doesn't help. She says that she thinks she just needs to get back into the gym.    Quality of life: excellent    Pain  Relieving factors: rest  Aggravating factors: ambulation, repetitive movement and stairs  Progression: worsening    Social Support  Lives in: multiple-level home    Patient Goals  Patient goals for therapy: return to sport/leisure activities  Patient goal: walk through the store without needing to rest           Objective          Strength/Myotome Testing     Left Shoulder     Planes of Motion   Flexion: 4+   Abduction: 4+   External rotation at 0°: 4+   Internal rotation at 0°: 5     Right Shoulder     Planes of Motion   Flexion: 4+   Abduction: 4   External rotation at 0°: 4+   Internal rotation at 0°: 5     Left Elbow   Flexion: 5  Extension: 5    Right Elbow   Flexion: 5  Extension: 5    Left Wrist/Hand   Wrist extension: 5  Wrist flexion: 5    Right Wrist/Hand   Wrist extension: 5  Wrist flexion: 5    Left Hip   Planes of Motion   Flexion: 5  Abduction: 4    Right Hip   Planes of Motion   Flexion: 5  Abduction: 4+    Left Knee   Flexion: 5  Extension: 5    Right Knee   Flexion:  5  Extension: 5    Left Ankle/Foot   Dorsiflexion: 5  Plantar flexion: 5    Right Ankle/Foot   Dorsiflexion: 5  Plantar flexion: 5    Functional Assessment     Single Leg Stance   Left: 14 seconds  Right: 30 seconds    Comments  HR: 68 bpm  O2: 96%    FTSTS: 11 sec (HR: 87, O2: 97%)    TU sec    2 min walk test: 171 meters (HR: 99, O2: 100%)          Assessment & Plan     Assessment  Impairments: activity intolerance  Assessment details: Pt is an 80 year old female presenting to the clinic with physical deconditioning, however she did well with all testing in the clinic. Her TUG, FTSTS, and 2 min walk test are all better than the norms for her age group. Her strength and balance are good, with the exception of left single leg balance with was fair. She was educated extensively on getting back into a cardiovascular routine and slowly increased length/speed of walking. Pt is going to go back to the gym on her own for 2 weeks and report back how she feels. If needed, she will be seen by PT to continue working on endurance and strength. If she is doing fine on her own, she will continue independently.  Prognosis: good  Functional Limitations: walking and unable to perform repetitive tasks  Goals  Plan Goals: Short term goals (2 weeks):  1. Pt to report a 30% improvement in symptoms with walking at the gym.    Long term goals (4 weeks):  1. Pt to demonstrates single leg balance for 30 sec on each leg.  2. Pt to report an 80% improvement in symptoms with walking at the gym.    Plan  Therapy options: will be seen for skilled physical therapy services  Planned therapy interventions: abdominal trunk stabilization, ADL retraining, balance/weight-bearing training, home exercise program, gait training, manual therapy, neuromuscular re-education, postural training, strengthening, stretching, therapeutic activities and soft tissue mobilization  Duration in visits: 1  Duration in weeks: 4  Treatment plan discussed with:  patient        Manual Therapy:         mins  88023;  Therapeutic Exercise:         mins  62383;     Neuromuscular Lashawn:        mins  50827;    Therapeutic Activity:     10     mins  86295;     Gait Training:           mins  44469;     Ultrasound:          mins  44386;    Electrical Stimulation:         mins  52117 ( );  Dry Needling          mins self-pay    Timed Treatment:   10   mins   Total Treatment:     50   mins    PT SIGNATURE: Shikha Aman, PT   DATE TREATMENT INITIATED: 6/23/2021    Initial Certification  Certification Period: 9/21/2021  I certify that the therapy services are furnished while this patient is under my care.  The services outlined above are required by this patient, and will be reviewed every 90 days.     PHYSICIAN: Davidson Sam MD      DATE:     Please sign and return via fax to [phone].. Thank you, [facility].

## 2021-08-08 ENCOUNTER — HOSPITAL ENCOUNTER (INPATIENT)
Facility: HOSPITAL | Age: 81
LOS: 2 days | Discharge: HOME | End: 2021-08-10
Attending: EMERGENCY MEDICINE | Admitting: INTERNAL MEDICINE

## 2021-08-08 ENCOUNTER — APPOINTMENT (OUTPATIENT)
Dept: CT IMAGING | Facility: HOSPITAL | Age: 81
End: 2021-08-08

## 2021-08-08 ENCOUNTER — APPOINTMENT (OUTPATIENT)
Dept: MRI IMAGING | Facility: HOSPITAL | Age: 81
End: 2021-08-08

## 2021-08-08 ENCOUNTER — APPOINTMENT (OUTPATIENT)
Dept: GENERAL RADIOLOGY | Facility: HOSPITAL | Age: 81
End: 2021-08-08

## 2021-08-08 DIAGNOSIS — R10.11 RUQ ABDOMINAL PAIN: ICD-10-CM

## 2021-08-08 DIAGNOSIS — R16.0 LIVER MASSES: Primary | ICD-10-CM

## 2021-08-08 LAB
ALBUMIN SERPL-MCNC: 4.1 G/DL (ref 3.5–5.2)
ALBUMIN/GLOB SERPL: 1.4 G/DL
ALP SERPL-CCNC: 193 U/L (ref 39–117)
ALT SERPL W P-5'-P-CCNC: 18 U/L (ref 1–33)
ANION GAP SERPL CALCULATED.3IONS-SCNC: 13 MMOL/L (ref 5–15)
AST SERPL-CCNC: 21 U/L (ref 1–32)
BACTERIA UR QL AUTO: ABNORMAL /HPF
BASOPHILS # BLD AUTO: 0.07 10*3/MM3 (ref 0–0.2)
BASOPHILS NFR BLD AUTO: 0.7 % (ref 0–1.5)
BILIRUB SERPL-MCNC: 0.6 MG/DL (ref 0–1.2)
BILIRUB UR QL STRIP: NEGATIVE
BUN SERPL-MCNC: 16 MG/DL (ref 8–23)
BUN/CREAT SERPL: 18.8 (ref 7–25)
CALCIUM SPEC-SCNC: 9.5 MG/DL (ref 8.6–10.5)
CHLORIDE SERPL-SCNC: 101 MMOL/L (ref 98–107)
CLARITY UR: CLEAR
CO2 SERPL-SCNC: 24 MMOL/L (ref 22–29)
COLOR UR: YELLOW
CREAT SERPL-MCNC: 0.85 MG/DL (ref 0.57–1)
DEPRECATED RDW RBC AUTO: 47.4 FL (ref 37–54)
EOSINOPHIL # BLD AUTO: 0.11 10*3/MM3 (ref 0–0.4)
EOSINOPHIL NFR BLD AUTO: 1 % (ref 0.3–6.2)
ERYTHROCYTE [DISTWIDTH] IN BLOOD BY AUTOMATED COUNT: 14.1 % (ref 12.3–15.4)
FLUAV SUBTYP SPEC NAA+PROBE: NOT DETECTED
FLUBV RNA ISLT QL NAA+PROBE: NOT DETECTED
GFR SERPL CREATININE-BSD FRML MDRD: 64 ML/MIN/1.73
GLOBULIN UR ELPH-MCNC: 3 GM/DL
GLUCOSE SERPL-MCNC: 114 MG/DL (ref 65–99)
GLUCOSE UR STRIP-MCNC: NEGATIVE MG/DL
HCT VFR BLD AUTO: 47.9 % (ref 34–46.6)
HGB BLD-MCNC: 15.6 G/DL (ref 12–15.9)
HGB UR QL STRIP.AUTO: NEGATIVE
HOLD SPECIMEN: NORMAL
HYALINE CASTS UR QL AUTO: ABNORMAL /LPF
IMM GRANULOCYTES # BLD AUTO: 0.06 10*3/MM3 (ref 0–0.05)
IMM GRANULOCYTES NFR BLD AUTO: 0.6 % (ref 0–0.5)
KETONES UR QL STRIP: ABNORMAL
LEUKOCYTE ESTERASE UR QL STRIP.AUTO: ABNORMAL
LIPASE SERPL-CCNC: 42 U/L (ref 13–60)
LYMPHOCYTES # BLD AUTO: 1.96 10*3/MM3 (ref 0.7–3.1)
LYMPHOCYTES NFR BLD AUTO: 18.6 % (ref 19.6–45.3)
MAGNESIUM SERPL-MCNC: 2.1 MG/DL (ref 1.6–2.4)
MCH RBC QN AUTO: 29.5 PG (ref 26.6–33)
MCHC RBC AUTO-ENTMCNC: 32.6 G/DL (ref 31.5–35.7)
MCV RBC AUTO: 90.7 FL (ref 79–97)
MONOCYTES # BLD AUTO: 1.09 10*3/MM3 (ref 0.1–0.9)
MONOCYTES NFR BLD AUTO: 10.4 % (ref 5–12)
NEUTROPHILS NFR BLD AUTO: 68.7 % (ref 42.7–76)
NEUTROPHILS NFR BLD AUTO: 7.23 10*3/MM3 (ref 1.7–7)
NITRITE UR QL STRIP: NEGATIVE
NRBC BLD AUTO-RTO: 0 /100 WBC (ref 0–0.2)
PH UR STRIP.AUTO: 5.5 [PH] (ref 5–8)
PLATELET # BLD AUTO: 276 10*3/MM3 (ref 140–450)
PMV BLD AUTO: 12.3 FL (ref 6–12)
POTASSIUM SERPL-SCNC: 3.9 MMOL/L (ref 3.5–5.2)
PROT SERPL-MCNC: 7.1 G/DL (ref 6–8.5)
PROT UR QL STRIP: ABNORMAL
RBC # BLD AUTO: 5.28 10*6/MM3 (ref 3.77–5.28)
RBC # UR: ABNORMAL /HPF
REF LAB TEST METHOD: ABNORMAL
SARS-COV-2 RNA PNL SPEC NAA+PROBE: NOT DETECTED
SODIUM SERPL-SCNC: 138 MMOL/L (ref 136–145)
SP GR UR STRIP: 1.02 (ref 1–1.03)
SQUAMOUS #/AREA URNS HPF: ABNORMAL /HPF
T4 FREE SERPL-MCNC: 1.5 NG/DL (ref 0.93–1.7)
TROPONIN T SERPL-MCNC: <0.01 NG/ML (ref 0–0.03)
TSH SERPL DL<=0.05 MIU/L-ACNC: 3.63 UIU/ML (ref 0.27–4.2)
UROBILINOGEN UR QL STRIP: ABNORMAL
WBC # BLD AUTO: 10.52 10*3/MM3 (ref 3.4–10.8)
WBC UR QL AUTO: ABNORMAL /HPF
WHOLE BLOOD HOLD SPECIMEN: NORMAL

## 2021-08-08 PROCEDURE — 86301 IMMUNOASSAY TUMOR CA 19-9: CPT | Performed by: EMERGENCY MEDICINE

## 2021-08-08 PROCEDURE — 80053 COMPREHEN METABOLIC PANEL: CPT | Performed by: EMERGENCY MEDICINE

## 2021-08-08 PROCEDURE — 87636 SARSCOV2 & INF A&B AMP PRB: CPT | Performed by: INTERNAL MEDICINE

## 2021-08-08 PROCEDURE — 84443 ASSAY THYROID STIM HORMONE: CPT | Performed by: NURSE PRACTITIONER

## 2021-08-08 PROCEDURE — 81001 URINALYSIS AUTO W/SCOPE: CPT | Performed by: EMERGENCY MEDICINE

## 2021-08-08 PROCEDURE — 70553 MRI BRAIN STEM W/O & W/DYE: CPT

## 2021-08-08 PROCEDURE — 84484 ASSAY OF TROPONIN QUANT: CPT | Performed by: EMERGENCY MEDICINE

## 2021-08-08 PROCEDURE — 0 GADOBENATE DIMEGLUMINE 529 MG/ML SOLUTION: Performed by: INTERNAL MEDICINE

## 2021-08-08 PROCEDURE — 93005 ELECTROCARDIOGRAM TRACING: CPT | Performed by: EMERGENCY MEDICINE

## 2021-08-08 PROCEDURE — 83735 ASSAY OF MAGNESIUM: CPT | Performed by: NURSE PRACTITIONER

## 2021-08-08 PROCEDURE — 99285 EMERGENCY DEPT VISIT HI MDM: CPT

## 2021-08-08 PROCEDURE — A9577 INJ MULTIHANCE: HCPCS | Performed by: INTERNAL MEDICINE

## 2021-08-08 PROCEDURE — 82378 CARCINOEMBRYONIC ANTIGEN: CPT | Performed by: EMERGENCY MEDICINE

## 2021-08-08 PROCEDURE — 82105 ALPHA-FETOPROTEIN SERUM: CPT | Performed by: EMERGENCY MEDICINE

## 2021-08-08 PROCEDURE — 99223 1ST HOSP IP/OBS HIGH 75: CPT | Performed by: INTERNAL MEDICINE

## 2021-08-08 PROCEDURE — 85025 COMPLETE CBC W/AUTO DIFF WBC: CPT | Performed by: EMERGENCY MEDICINE

## 2021-08-08 PROCEDURE — 25010000002 IOPAMIDOL 61 % SOLUTION: Performed by: EMERGENCY MEDICINE

## 2021-08-08 PROCEDURE — 71045 X-RAY EXAM CHEST 1 VIEW: CPT

## 2021-08-08 PROCEDURE — 93005 ELECTROCARDIOGRAM TRACING: CPT

## 2021-08-08 PROCEDURE — 71260 CT THORAX DX C+: CPT

## 2021-08-08 PROCEDURE — 25010000002 IOPAMIDOL 61 % SOLUTION: Performed by: INTERNAL MEDICINE

## 2021-08-08 PROCEDURE — 25010000002 MORPHINE PER 10 MG: Performed by: EMERGENCY MEDICINE

## 2021-08-08 PROCEDURE — 83690 ASSAY OF LIPASE: CPT | Performed by: EMERGENCY MEDICINE

## 2021-08-08 PROCEDURE — 84439 ASSAY OF FREE THYROXINE: CPT | Performed by: NURSE PRACTITIONER

## 2021-08-08 PROCEDURE — 74177 CT ABD & PELVIS W/CONTRAST: CPT

## 2021-08-08 RX ORDER — BISACODYL 10 MG
10 SUPPOSITORY, RECTAL RECTAL DAILY PRN
Status: DISCONTINUED | OUTPATIENT
Start: 2021-08-08 | End: 2021-08-10 | Stop reason: HOSPADM

## 2021-08-08 RX ORDER — POLYETHYLENE GLYCOL 3350 17 G/17G
17 POWDER, FOR SOLUTION ORAL DAILY PRN
Status: DISCONTINUED | OUTPATIENT
Start: 2021-08-08 | End: 2021-08-10 | Stop reason: HOSPADM

## 2021-08-08 RX ORDER — AMOXICILLIN 250 MG
2 CAPSULE ORAL 2 TIMES DAILY
Status: DISCONTINUED | OUTPATIENT
Start: 2021-08-08 | End: 2021-08-10 | Stop reason: HOSPADM

## 2021-08-08 RX ORDER — MORPHINE SULFATE 2 MG/ML
2 INJECTION, SOLUTION INTRAMUSCULAR; INTRAVENOUS
Status: DISCONTINUED | OUTPATIENT
Start: 2021-08-08 | End: 2021-08-08

## 2021-08-08 RX ORDER — NALOXONE HCL 0.4 MG/ML
0.4 VIAL (ML) INJECTION AS NEEDED
Status: DISCONTINUED | OUTPATIENT
Start: 2021-08-08 | End: 2021-08-10 | Stop reason: HOSPADM

## 2021-08-08 RX ORDER — SODIUM CHLORIDE 9 MG/ML
75 INJECTION, SOLUTION INTRAVENOUS CONTINUOUS
Status: ACTIVE | OUTPATIENT
Start: 2021-08-08 | End: 2021-08-09

## 2021-08-08 RX ORDER — OXYCODONE AND ACETAMINOPHEN 7.5; 325 MG/1; MG/1
1 TABLET ORAL EVERY 4 HOURS PRN
Status: DISCONTINUED | OUTPATIENT
Start: 2021-08-08 | End: 2021-08-08

## 2021-08-08 RX ORDER — ONDANSETRON 4 MG/1
4 TABLET, FILM COATED ORAL EVERY 6 HOURS PRN
Status: DISCONTINUED | OUTPATIENT
Start: 2021-08-08 | End: 2021-08-10 | Stop reason: HOSPADM

## 2021-08-08 RX ORDER — SODIUM CHLORIDE 0.9 % (FLUSH) 0.9 %
10 SYRINGE (ML) INJECTION AS NEEDED
Status: DISCONTINUED | OUTPATIENT
Start: 2021-08-08 | End: 2021-08-10 | Stop reason: HOSPADM

## 2021-08-08 RX ORDER — HYDROCHLOROTHIAZIDE 12.5 MG/1
12.5 TABLET ORAL DAILY
Status: DISCONTINUED | OUTPATIENT
Start: 2021-08-09 | End: 2021-08-10 | Stop reason: HOSPADM

## 2021-08-08 RX ORDER — BISACODYL 5 MG/1
5 TABLET, DELAYED RELEASE ORAL DAILY PRN
Status: DISCONTINUED | OUTPATIENT
Start: 2021-08-08 | End: 2021-08-10 | Stop reason: HOSPADM

## 2021-08-08 RX ORDER — LOSARTAN POTASSIUM 50 MG/1
100 TABLET ORAL DAILY
Status: DISCONTINUED | OUTPATIENT
Start: 2021-08-09 | End: 2021-08-10 | Stop reason: HOSPADM

## 2021-08-08 RX ORDER — MORPHINE SULFATE 2 MG/ML
2 INJECTION, SOLUTION INTRAMUSCULAR; INTRAVENOUS EVERY 4 HOURS PRN
Status: DISCONTINUED | OUTPATIENT
Start: 2021-08-08 | End: 2021-08-10 | Stop reason: HOSPADM

## 2021-08-08 RX ORDER — ONDANSETRON 2 MG/ML
4 INJECTION INTRAMUSCULAR; INTRAVENOUS EVERY 6 HOURS PRN
Status: DISCONTINUED | OUTPATIENT
Start: 2021-08-08 | End: 2021-08-10 | Stop reason: HOSPADM

## 2021-08-08 RX ORDER — OXYCODONE HYDROCHLORIDE 15 MG/1
7.5 TABLET ORAL EVERY 6 HOURS PRN
Status: DISCONTINUED | OUTPATIENT
Start: 2021-08-08 | End: 2021-08-10 | Stop reason: HOSPADM

## 2021-08-08 RX ADMIN — GADOBENATE DIMEGLUMINE 14 ML: 529 INJECTION, SOLUTION INTRAVENOUS at 23:30

## 2021-08-08 RX ADMIN — IOPAMIDOL 75 ML: 612 INJECTION, SOLUTION INTRAVENOUS at 22:00

## 2021-08-08 RX ADMIN — MORPHINE SULFATE 2 MG: 2 INJECTION, SOLUTION INTRAMUSCULAR; INTRAVENOUS at 18:59

## 2021-08-08 RX ADMIN — IOPAMIDOL 90 ML: 612 INJECTION, SOLUTION INTRAVENOUS at 19:49

## 2021-08-08 NOTE — ED PROVIDER NOTES
Subjective   Patient is a very pleasant 81-year-old female who presents today with right upper quadrant abdominal pain.  She states that the pain became a significant yesterday.  It is worse with deep inspiration and it is also positional.  She states that lying on her side causes discomfort.  Her position of comfort is lying supine.  She denies other associated symptoms.  She denies fever, chills, chest pain, shortness of breath, abdominal pain, nausea, vomiting, diarrhea, or other acute complaints.      Abdominal Pain  Pain location:  RUQ  Pain quality: aching and sharp    Pain radiates to:  Does not radiate  Pain severity:  Severe  Onset quality:  Sudden  Timing:  Constant  Progression:  Waxing and waning  Chronicity:  New  Context: not diet changes, not eating, not recent illness, not suspicious food intake and not trauma    Ineffective treatments:  None tried  Associated symptoms: nausea    Associated symptoms: no chest pain, no constipation and no fever        Review of Systems   Constitutional: Negative for fever.   Cardiovascular: Negative for chest pain.   Gastrointestinal: Positive for abdominal pain and nausea. Negative for constipation.   All other systems reviewed and are negative.      Past Medical History:   Diagnosis Date   • Asthma    • Atypical chest pain 3/9/2017   • Cataract    • Chronic constipation    • Fracture, pelvis closed (CMS/[facility]) 2015    x2   • Hyperlipidemia 3/9/2017   • Hypertension 3/9/2017   • Low bone mass     with elevated FRAX core   • Mild obstructive sleep apnea 1/30/2020   • Near syncope     negative cardiovascular workup    • Plantar fasciitis     Chronic   • PVC's (premature ventricular contractions)    • Senile keratosis     Multiple   • MANAN (stress urinary incontinence, female)    • Syncope, near     with negative cardiovascular workup       Allergies   Allergen Reactions   • Pravastatin Myalgia       Past Surgical History:   Procedure Laterality Date   • CARDIAC  CATHETERIZATION N/A 1/18/2019    Procedure: Left Heart Cath;  Surgeon: Tucker Gonzalez MD;  Location: [facility] CATH INVASIVE LOCATION;  Service: Cardiovascular   • CATARACT EXTRACTION  04/2019   • COLONOSCOPY  07/15/2020   • HYSTERECTOMY  1984    age 46 - ovarian cyst, endometriosis,  jorge/bso   • ROTATOR CUFF REPAIR Left 1989    Collar Bone Repair       Family History   Problem Relation Age of Onset   • Heart attack Mother    • Heart failure Mother    • Dementia Mother    • No Known Problems Father    • Breast cancer Paternal Aunt        Social History     Socioeconomic History   • Marital status:      Spouse name: Not on file   • Number of children: 2   • Years of education: Not on file   • Highest education level: Not on file   Tobacco Use   • Smoking status: Never Smoker   • Smokeless tobacco: Never Used   • Tobacco comment: Exposed to secondhand smoke   Vaping Use   • Vaping Use: Never used   Substance and Sexual Activity   • Alcohol use: Yes     Types: 3 Glasses of wine per week     Comment: Maybe every 6 months   • Drug use: No   • Sexual activity: Never           Objective   Physical Exam  Vitals and nursing note reviewed.   Constitutional:       General: She is not in acute distress.     Appearance: She is well-developed.   HENT:      Head: Normocephalic and atraumatic.   Eyes:      Conjunctiva/sclera: Conjunctivae normal.      Pupils: Pupils are equal, round, and reactive to light.   Cardiovascular:      Rate and Rhythm: Normal rate and regular rhythm.      Heart sounds: Normal heart sounds.   Pulmonary:      Effort: Pulmonary effort is normal. No respiratory distress.      Breath sounds: Normal breath sounds.   Abdominal:      General: Bowel sounds are normal. There is no distension.      Palpations: Abdomen is soft. There is no mass.      Tenderness: There is abdominal tenderness in the right upper quadrant. There is no guarding or rebound.   Musculoskeletal:         General: Normal range of  motion.      Cervical back: Normal range of motion and neck supple.   Skin:     General: Skin is warm and dry.      Capillary Refill: Capillary refill takes less than 2 seconds.   Neurological:      General: No focal deficit present.      Mental Status: She is alert and oriented to person, place, and time.         Procedures           ED Course      Recent Results (from the past 24 hour(s))   COVID-19 and FLU A/B PCR - Swab, Nasopharynx    Collection Time: 08/08/21  9:13 PM    Specimen: Nasopharynx; Swab   Result Value Ref Range    COVID19 Not Detected Not Detected - Ref. Range    Influenza A PCR Not Detected Not Detected    Influenza B PCR Not Detected Not Detected   Hepatitis Panel, Acute    Collection Time: 08/09/21  6:04 AM    Specimen: Blood   Result Value Ref Range    Hepatitis B Surface Ag Non-Reactive Non-Reactive    Hep A IgM Non-Reactive Non-Reactive    Hep B C IgM Non-Reactive Non-Reactive    Hepatitis C Ab Non-Reactive Non-Reactive   Vitamin B12    Collection Time: 08/09/21  6:04 AM    Specimen: Blood   Result Value Ref Range    Vitamin B-12 891 211 - 946 pg/mL   Vitamin D 25 Hydroxy    Collection Time: 08/09/21  6:04 AM    Specimen: Blood   Result Value Ref Range    25 Hydroxy, Vitamin D 28.0 (L) 30.0 - 100.0 ng/ml   Protime-INR    Collection Time: 08/09/21  6:04 AM    Specimen: Blood   Result Value Ref Range    Protime 13.0 11.4 - 14.4 Seconds    INR 1.01 0.85 - 1.16   Basic Metabolic Panel    Collection Time: 08/09/21  6:04 AM    Specimen: Blood   Result Value Ref Range    Glucose 98 65 - 99 mg/dL    BUN 13 8 - 23 mg/dL    Creatinine 0.73 0.57 - 1.00 mg/dL    Sodium 135 (L) 136 - 145 mmol/L    Potassium 4.1 3.5 - 5.2 mmol/L    Chloride 103 98 - 107 mmol/L    CO2 19.0 (L) 22.0 - 29.0 mmol/L    Calcium 9.6 8.6 - 10.5 mg/dL    eGFR Non African Amer 76 >60 mL/min/1.73    BUN/Creatinine Ratio 17.8 7.0 - 25.0    Anion Gap 13.0 5.0 - 15.0 mmol/L   CBC Auto Differential    Collection Time: 08/09/21  6:04 AM     Specimen: Blood   Result Value Ref Range    WBC 9.71 3.40 - 10.80 10*3/mm3    RBC 4.95 3.77 - 5.28 10*6/mm3    Hemoglobin 14.5 12.0 - 15.9 g/dL    Hematocrit 45.1 34.0 - 46.6 %    MCV 91.1 79.0 - 97.0 fL    MCH 29.3 26.6 - 33.0 pg    MCHC 32.2 31.5 - 35.7 g/dL    RDW 14.0 12.3 - 15.4 %    RDW-SD 46.9 37.0 - 54.0 fl    MPV 12.3 (H) 6.0 - 12.0 fL    Platelets 221 140 - 450 10*3/mm3    Neutrophil % 72.3 42.7 - 76.0 %    Lymphocyte % 14.5 (L) 19.6 - 45.3 %    Monocyte % 11.4 5.0 - 12.0 %    Eosinophil % 0.8 0.3 - 6.2 %    Basophil % 0.6 0.0 - 1.5 %    Immature Grans % 0.4 0.0 - 0.5 %    Neutrophils, Absolute 7.01 (H) 1.70 - 7.00 10*3/mm3    Lymphocytes, Absolute 1.41 0.70 - 3.10 10*3/mm3    Monocytes, Absolute 1.11 (H) 0.10 - 0.90 10*3/mm3    Eosinophils, Absolute 0.08 0.00 - 0.40 10*3/mm3    Basophils, Absolute 0.06 0.00 - 0.20 10*3/mm3    Immature Grans, Absolute 0.04 0.00 - 0.05 10*3/mm3    nRBC 0.4 (H) 0.0 - 0.2 /100 WBC     Note: In addition to lab results from this visit, the labs listed above may include labs taken at another facility or during a different encounter within the last 24 hours. Please correlate lab times with ED admission and discharge times for further clarification of the services performed during this visit.    MRI Brain With & Without Contrast   Final Result      1. No evidence of acute stroke or hemorrhage.   2. Age-appropriate atrophy with mild chronic small vessel ischemic disease in the white matter.   3. No enhancing masses are identified to suggest intracranial metastatic disease.   4. 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      Signer Name: Marquez Barnes MD    Signed: 8/8/2021 11:59 PM    Workstation Name: [facility]     [facility]      CT Chest With Contrast Diagnostic   Final Result   Lungs are clear. Chest is negative. Please see prior CT abdomen and  pelvis for findings below the diaphragm      Signer Name: Ulises Bains MD    Signed: 8/8/2021 10:24 PM    Workstation Name: [facility]     [facility]      CT Abdomen Pelvis With Contrast   Final Result   7.4 cm malignant-appearing mass in the right lobe of the liver with an adjacent low-density lesion suggesting another focus of malignancy.      No evidence of extrahepatic spread of tumor.      Otherwise negative               Signer Name: Ulises Bains MD    Signed: 8/8/2021 8:11 PM    Workstation Name: [facility]     [facility]      XR Chest 1 View   Final Result   1. Minimal left basilar linear scarring or discoid atelectasis.   2. Questionable lucency of the right lateral seventh rib. Please   correlate with site of patient's complaint.        DICTATED:   08/08/2021   EDITED/ls :   08/08/2021       This report was finalized on 8/8/2021 9:53 PM by Dr. Salvador Gill MD.          CT Needle Biopsy Liver    (Results Pending)   CT Abdomen Pelvis With & Without Contrast    (Results Pending)     Vitals:    08/09/21 0058 08/09/21 0532 08/09/21 0700 08/09/21 1100   BP: 137/76 153/76 154/76    BP Location: Left arm Right arm Right arm    Patient Position: Lying Lying Lying    Pulse: 68 74 67    Resp: 18 16 18 19   Temp: 97.2 °F (36.2 °C) 96 °F (35.6 °C) 96.7 °F (35.9 °C) 97.2 °F (36.2 °C)   TempSrc: Oral Oral Oral Oral   SpO2: 98% 98% 97%    Weight: 74.7 kg (164 lb 9.6 oz)      Height:         Medications   sodium chloride 0.9 % flush 10 mL (has no administration in time range)   losartan (COZAAR) tablet 100 mg (100 mg Oral Given 8/9/21 1055)   rosuvastatin (CRESTOR) tablet 10 mg (10 mg Oral Given 8/9/21 1055)   sodium chloride 0.9 % flush 10 mL (10 mL Intravenous Given 8/9/21 1056)   sodium chloride 0.9 % flush 10 mL (has no administration in time range)   morphine injection 2 mg (has no administration in time range)   oxyCODONE (ROXICODONE) immediate release tablet 7.5 mg (7.5  mg Oral Given 8/9/21 1329)   hydroCHLOROthiazide (HYDRODIURIL) oral 12.5 mg (12.5 mg Oral Given 8/9/21 1056)   sennosides-docusate (PERICOLACE) 8.6-50 MG per tablet 2 tablet (2 tablets Oral Not Given 8/9/21 1051)     And   polyethylene glycol (MIRALAX) packet 17 g (has no administration in time range)     And   bisacodyl (DULCOLAX) EC tablet 5 mg (has no administration in time range)     And   bisacodyl (DULCOLAX) suppository 10 mg (has no administration in time range)   ondansetron (ZOFRAN) tablet 4 mg (has no administration in time range)     Or   ondansetron (ZOFRAN) injection 4 mg (has no administration in time range)   sodium chloride 0.9 % infusion (75 mL/hr Intravenous New Bag 8/9/21 1253)   naloxone (NARCAN) injection 0.4 mg (has no administration in time range)   iopamidol (ISOVUE-300) 61 % injection 100 mL (90 mL Intravenous Given 8/8/21 1949)   iopamidol (ISOVUE-300) 61 % injection 100 mL (75 mL Intravenous Given 8/8/21 2200)   gadobenate dimeglumine (MULTIHANCE) injection 14 mL (14 mL Intravenous Given 8/8/21 2330)     ECG/EMG Results (last 24 hours)     Procedure Component Value Units Date/Time    ECG 12 Lead [[id number]] Collected: 08/08/21 1449     Updated: 08/09/21 0651        ECG 12 Lead           Discussed the case with Dr. Farrar, oncology.  Given the patient's persistent discomfort she was admitted for pain control.  This will also help expedite her work-up and initiation of treatment should that be necessary.  Discussed case with hospitalist also who will admit for further evaluation and management.  The patient is appreciative and in agreement with this plan.            MDM    Final diagnoses:   Liver masses   RUQ abdominal pain       ED Disposition  ED Disposition     ED Disposition Condition Comment    Decision to Admit  Level of Care: Telemetry [5]   Diagnosis: Liver masses [[id number]]   Admitting Physician: JULIOCESAR SOLORZANO [[id number]]   Attending Physician: JULIOCESAR SOLORZANO [[id number]]   Bed Request  Comments: tele            No follow-up provider specified.       Medication List      No changes were made to your prescriptions during this visit.          Demetrius Santos,   08/09/21 1525

## 2021-08-09 ENCOUNTER — APPOINTMENT (OUTPATIENT)
Dept: CT IMAGING | Facility: HOSPITAL | Age: 81
End: 2021-08-09

## 2021-08-09 LAB
25(OH)D3 SERPL-MCNC: 28 NG/ML (ref 30–100)
ALPHA-FETOPROTEIN: 9.27 NG/ML (ref 0–8.3)
ANION GAP SERPL CALCULATED.3IONS-SCNC: 13 MMOL/L (ref 5–15)
BASOPHILS # BLD AUTO: 0.06 10*3/MM3 (ref 0–0.2)
BASOPHILS NFR BLD AUTO: 0.6 % (ref 0–1.5)
BUN SERPL-MCNC: 13 MG/DL (ref 8–23)
BUN/CREAT SERPL: 17.8 (ref 7–25)
CALCIUM SPEC-SCNC: 9.6 MG/DL (ref 8.6–10.5)
CANCER AG19-9 SERPL-ACNC: 84.7 U/ML
CEA SERPL-MCNC: 3.16 NG/ML
CHLORIDE SERPL-SCNC: 103 MMOL/L (ref 98–107)
CO2 SERPL-SCNC: 19 MMOL/L (ref 22–29)
CREAT SERPL-MCNC: 0.73 MG/DL (ref 0.57–1)
DEPRECATED RDW RBC AUTO: 46.9 FL (ref 37–54)
EOSINOPHIL # BLD AUTO: 0.08 10*3/MM3 (ref 0–0.4)
EOSINOPHIL NFR BLD AUTO: 0.8 % (ref 0.3–6.2)
ERYTHROCYTE [DISTWIDTH] IN BLOOD BY AUTOMATED COUNT: 14 % (ref 12.3–15.4)
GFR SERPL CREATININE-BSD FRML MDRD: 76 ML/MIN/1.73
GLUCOSE SERPL-MCNC: 98 MG/DL (ref 65–99)
HAV IGM SERPL QL IA: NORMAL
HBV CORE IGM SERPL QL IA: NORMAL
HBV SURFACE AG SERPL QL IA: NORMAL
HCT VFR BLD AUTO: 45.1 % (ref 34–46.6)
HCV AB SER DONR QL: NORMAL
HGB BLD-MCNC: 14.5 G/DL (ref 12–15.9)
IMM GRANULOCYTES # BLD AUTO: 0.04 10*3/MM3 (ref 0–0.05)
IMM GRANULOCYTES NFR BLD AUTO: 0.4 % (ref 0–0.5)
INR PPP: 1.01 (ref 0.85–1.16)
LYMPHOCYTES # BLD AUTO: 1.41 10*3/MM3 (ref 0.7–3.1)
LYMPHOCYTES NFR BLD AUTO: 14.5 % (ref 19.6–45.3)
MCH RBC QN AUTO: 29.3 PG (ref 26.6–33)
MCHC RBC AUTO-ENTMCNC: 32.2 G/DL (ref 31.5–35.7)
MCV RBC AUTO: 91.1 FL (ref 79–97)
MONOCYTES # BLD AUTO: 1.11 10*3/MM3 (ref 0.1–0.9)
MONOCYTES NFR BLD AUTO: 11.4 % (ref 5–12)
NEUTROPHILS NFR BLD AUTO: 7.01 10*3/MM3 (ref 1.7–7)
NEUTROPHILS NFR BLD AUTO: 72.3 % (ref 42.7–76)
NRBC BLD AUTO-RTO: 0.4 /100 WBC (ref 0–0.2)
PLATELET # BLD AUTO: 221 10*3/MM3 (ref 140–450)
PMV BLD AUTO: 12.3 FL (ref 6–12)
POTASSIUM SERPL-SCNC: 4.1 MMOL/L (ref 3.5–5.2)
PROTHROMBIN TIME: 13 SECONDS (ref 11.4–14.4)
RBC # BLD AUTO: 4.95 10*6/MM3 (ref 3.77–5.28)
SODIUM SERPL-SCNC: 135 MMOL/L (ref 136–145)
VIT B12 BLD-MCNC: 891 PG/ML (ref 211–946)
WBC # BLD AUTO: 9.71 10*3/MM3 (ref 3.4–10.8)

## 2021-08-09 PROCEDURE — 0 IOPAMIDOL PER 1 ML: Performed by: INTERNAL MEDICINE

## 2021-08-09 PROCEDURE — 99153 MOD SED SAME PHYS/QHP EA: CPT

## 2021-08-09 PROCEDURE — 85610 PROTHROMBIN TIME: CPT | Performed by: INTERNAL MEDICINE

## 2021-08-09 PROCEDURE — 80074 ACUTE HEPATITIS PANEL: CPT | Performed by: NURSE PRACTITIONER

## 2021-08-09 PROCEDURE — 25010000002 ONDANSETRON PER 1 MG: Performed by: NURSE PRACTITIONER

## 2021-08-09 PROCEDURE — 80048 BASIC METABOLIC PNL TOTAL CA: CPT | Performed by: NURSE PRACTITIONER

## 2021-08-09 PROCEDURE — 82306 VITAMIN D 25 HYDROXY: CPT | Performed by: NURSE PRACTITIONER

## 2021-08-09 PROCEDURE — 99223 1ST HOSP IP/OBS HIGH 75: CPT | Performed by: INTERNAL MEDICINE

## 2021-08-09 PROCEDURE — 74178 CT ABD&PLV WO CNTR FLWD CNTR: CPT

## 2021-08-09 PROCEDURE — 99233 SBSQ HOSP IP/OBS HIGH 50: CPT | Performed by: INTERNAL MEDICINE

## 2021-08-09 PROCEDURE — 0 DIATRIZOATE MEGLUMINE & SODIUM PER 1 ML

## 2021-08-09 PROCEDURE — 85025 COMPLETE CBC W/AUTO DIFF WBC: CPT | Performed by: NURSE PRACTITIONER

## 2021-08-09 PROCEDURE — 99152 MOD SED SAME PHYS/QHP 5/>YRS: CPT

## 2021-08-09 PROCEDURE — 82607 VITAMIN B-12: CPT | Performed by: NURSE PRACTITIONER

## 2021-08-09 RX ORDER — SODIUM CHLORIDE 0.9 % (FLUSH) 0.9 %
10 SYRINGE (ML) INJECTION EVERY 12 HOURS SCHEDULED
Status: DISCONTINUED | OUTPATIENT
Start: 2021-08-09 | End: 2021-08-10 | Stop reason: HOSPADM

## 2021-08-09 RX ORDER — SODIUM CHLORIDE 0.9 % (FLUSH) 0.9 %
10 SYRINGE (ML) INJECTION AS NEEDED
Status: DISCONTINUED | OUTPATIENT
Start: 2021-08-09 | End: 2021-08-10 | Stop reason: HOSPADM

## 2021-08-09 RX ORDER — ROSUVASTATIN CALCIUM 10 MG/1
10 TABLET, COATED ORAL DAILY
Status: DISCONTINUED | OUTPATIENT
Start: 2021-08-09 | End: 2021-08-10 | Stop reason: HOSPADM

## 2021-08-09 RX ADMIN — SODIUM CHLORIDE, PRESERVATIVE FREE 10 ML: 5 INJECTION INTRAVENOUS at 10:56

## 2021-08-09 RX ADMIN — DOCUSATE SODIUM 50MG AND SENNOSIDES 8.6MG 2 TABLET: 8.6; 5 TABLET, FILM COATED ORAL at 21:44

## 2021-08-09 RX ADMIN — DOCUSATE SODIUM 50MG AND SENNOSIDES 8.6MG 2 TABLET: 8.6; 5 TABLET, FILM COATED ORAL at 00:59

## 2021-08-09 RX ADMIN — ROSUVASTATIN CALCIUM 10 MG: 10 TABLET, COATED ORAL at 10:55

## 2021-08-09 RX ADMIN — Medication: at 17:46

## 2021-08-09 RX ADMIN — SODIUM CHLORIDE 75 ML/HR: 9 INJECTION, SOLUTION INTRAVENOUS at 00:33

## 2021-08-09 RX ADMIN — Medication: at 16:23

## 2021-08-09 RX ADMIN — LOSARTAN POTASSIUM 100 MG: 50 TABLET, FILM COATED ORAL at 10:55

## 2021-08-09 RX ADMIN — SODIUM CHLORIDE 75 ML/HR: 9 INJECTION, SOLUTION INTRAVENOUS at 12:53

## 2021-08-09 RX ADMIN — HYDROCHLOROTHIAZIDE 12.5 MG: 12.5 CAPSULE ORAL at 10:56

## 2021-08-09 RX ADMIN — SODIUM CHLORIDE, PRESERVATIVE FREE 10 ML: 5 INJECTION INTRAVENOUS at 00:34

## 2021-08-09 RX ADMIN — ONDANSETRON 4 MG: 2 INJECTION INTRAMUSCULAR; INTRAVENOUS at 16:22

## 2021-08-09 RX ADMIN — IOPAMIDOL 100 ML: 755 INJECTION, SOLUTION INTRAVENOUS at 20:44

## 2021-08-09 RX ADMIN — OXYCODONE HYDROCHLORIDE 7.5 MG: 15 TABLET ORAL at 00:59

## 2021-08-09 RX ADMIN — OXYCODONE HYDROCHLORIDE 7.5 MG: 15 TABLET ORAL at 21:00

## 2021-08-09 RX ADMIN — SODIUM CHLORIDE, PRESERVATIVE FREE 10 ML: 5 INJECTION INTRAVENOUS at 21:44

## 2021-08-09 RX ADMIN — OXYCODONE HYDROCHLORIDE 7.5 MG: 15 TABLET ORAL at 13:29

## 2021-08-09 NOTE — PLAN OF CARE
Goal Outcome Evaluation:  Plan of Care Reviewed With: patient        Progress: no change  Outcome Summary: VSS.  Complaints of pain treated with PRN analgesics with relief.  Pt arrived to room 632 from the ED around midnight last night.  Pt is A&Ox4, up ad shakir, NSR on the monitor, and on room air.  Pt endorses some RUQ pain that is worse with inspiration.  Plan for IR liver biopsy today.  NPO status maintained since midnight.  No acute overnight events, no further complaints at this time.  Continue POC.

## 2021-08-09 NOTE — CONSULTS
HEMATOLOGY/ONCOLOGY INPATIENT CONSULT    REASON FOR CONSULT: Liver mass    Subjective   HISTORY OF PRESENT ILLNESS;   Ms. Hernandez is a 81-year-old lady with past medical history of hyperlipidemia, hypertension, heterozygous hemochromatosis mutation who presented to [facility] with right upper extremity pain.  States that the pain had been lasting for about 1-2 days and was worse when laying on that side.  She denies any significant constipation, nausea, vomiting, diarrhea, fever or chills.  Does note some fatigue but is otherwise stable.  She had a CT abdomen/pelvis which was concerning for 7.4 cm lesion in the right lobe of the liver with associated liver cyst.  She previous had a CT scan in July 2020 with the liver lesion at that time be more consistent with a hemangioma.  She was given 1 dose of pain medication with significant improvement of her symptoms.  She is laying in bed comfortably today and is tolerating her diet well.  Scheduled for a CT-guided biopsy of her liver tomorrow      Past Medical History:   Diagnosis Date   • Asthma    • Atypical chest pain 3/9/2017   • Cataract    • Chronic constipation    • Fracture, pelvis closed (CMS/HCC) 2015    x2   • Hyperlipidemia 3/9/2017   • Hypertension 3/9/2017   • Low bone mass     with elevated FRAX core   • Mild obstructive sleep apnea 1/30/2020   • Near syncope     negative cardiovascular workup    • Plantar fasciitis     Chronic   • PVC's (premature ventricular contractions)    • Senile keratosis     Multiple   • MANAN (stress urinary incontinence, female)    • Syncope, near     with negative cardiovascular workup     Past Surgical History:   Procedure Laterality Date   • CARDIAC CATHETERIZATION N/A 1/18/2019    Procedure: Left Heart Cath;  Surgeon: Tucker Gonzalez MD;  Location: [facility] INVASIVE LOCATION;  Service: Cardiovascular   • CATARACT EXTRACTION  04/2019   • COLONOSCOPY  07/15/2020   • HYSTERECTOMY  1984    age 46 - ovarian cyst,  endometriosis,  jorge/bso   • ROTATOR CUFF REPAIR Left 1989    Collar Bone Repair       No current facility-administered medications on file prior to encounter.     Current Outpatient Medications on File Prior to Encounter   Medication Sig Dispense Refill   • losartan (COZAAR) 100 MG tablet TAKE 1 TABLET BY MOUTH DAILY 90 tablet 0   • Calcium-Magnesium-Vitamin D (CALCIUM 500 PO) Take 1 tablet by mouth Daily.     • Cholecalciferol (VITAMIN D) 25 MCG (1000 UT) tablet 1,000 Units Daily.     • Coenzyme Q10 (COQ10 PO) 100 mg Daily.     • Folic Acid 20 MG capsule Daily.     • hydroCHLOROthiazide (HYDRODIURIL) 12.5 MG tablet Take 1 tablet by mouth Daily. 90 tablet 1   • Magnesium 500 MG capsule Take  by mouth Daily.     • meloxicam (MOBIC) 7.5 MG tablet Take 7.5 mg by mouth As Needed.     • Omega-3 Fatty Acids (OMEGA 3 PO) Take 1 tablet by mouth Daily.     • rosuvastatin (CRESTOR) 10 MG tablet Take 1 tablet by mouth Daily. (Patient taking differently: Take 10 mg by mouth Daily. Every other night per pt) 90 tablet 3       Allergies   Allergen Reactions   • Pravastatin Myalgia       Social History     Socioeconomic History   • Marital status:      Spouse name: Not on file   • Number of children: 2   • Years of education: Not on file   • Highest education level: Not on file   Tobacco Use   • Smoking status: Never Smoker   • Smokeless tobacco: Never Used   • Tobacco comment: Exposed to secondhand smoke   Vaping Use   • Vaping Use: Never used   Substance and Sexual Activity   • Alcohol use: Yes     Types: 3 Glasses of wine per week     Comment: Maybe every 6 months   • Drug use: No   • Sexual activity: Never       Family History   Problem Relation Age of Onset   • Heart attack Mother    • Heart failure Mother    • Dementia Mother    • No Known Problems Father    • Breast cancer Paternal Aunt          REVIEW OF SYSTEMS:  A 12 point review of systems was performed and is negative except as noted above.    Objective  "  PHYSICAL EXAM:    /76 (BP Location: Right arm, Patient Position: Lying)   Pulse 67   Temp 97.9 °F (36.6 °C) (Oral)   Resp 19   Ht 162.6 cm (64\")   Wt 74.7 kg (164 lb 9.6 oz)   SpO2 97%   BMI 28.25 kg/m²     General: well appearing female in no acute distress  HEENT: sclerae anicteric, oropharynx clear  Lymphatics: no cervical, supraclavicular, inguinal, or axillary adenopathy  Neck: Supple. No thyromegaly.  Cardiovascular: regular rate and rhythm, no murmurs  Lungs: clear to auscultation bilaterally. No respiratory distress  Abdomen: soft, nontender, nondistended.  No palpable organomegaly  Extremities: no lower extremity edema, cyanosis, or clubbing  Skin: no rashes, lesions, bruising, or petechiae  Neuro: Alert and oriented x3. Moves all extremities.    Results:    Results from last 7 days   Lab Units 08/09/21  0604 08/08/21  1450   WBC 10*3/mm3 9.71 10.52   HEMOGLOBIN g/dL 14.5 15.6   PLATELETS 10*3/mm3 221 276     Results from last 7 days   Lab Units 08/09/21  0604 08/08/21  1450   SODIUM mmol/L 135* 138   POTASSIUM mmol/L 4.1 3.9   CO2 mmol/L 19.0* 24.0   BUN mg/dL 13 16   CREATININE mg/dL 0.73 0.85   GLUCOSE mg/dL 98 114*     Results from last 7 days   Lab Units 08/08/21  1450   AST (SGOT) U/L 21   ALT (SGPT) U/L 18   BILIRUBIN mg/dL 0.6   ALK PHOS U/L 193*         CT Chest With Contrast Diagnostic    Result Date: 8/8/2021  Lungs are clear. Chest is negative. Please see prior CT abdomen and pelvis for findings below the diaphragm Signer Name: Ulises Bains MD  Signed: 8/8/2021 10:24 PM  Workstation Name: [email or web address]  [facility]    MRI Brain With & Without Contrast    Result Date: 8/8/2021  1. No evidence of acute stroke or hemorrhage. 2. Age-appropriate atrophy with mild chronic small vessel ischemic disease in the white matter. 3. No enhancing masses are identified to suggest intracranial metastatic disease. 4. 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 Signer Name: Marquez Barnes MD  Signed: 8/8/2021 11:59 PM  Workstation Name: [facility]  [facility]    CT Abdomen Pelvis With Contrast    Result Date: 8/8/2021  7.4 cm malignant-appearing mass in the right lobe of the liver with an adjacent low-density lesion suggesting another focus of malignancy. No evidence of extrahepatic spread of tumor. Otherwise negative Signer Name: Ulises Bains MD  Signed: 8/8/2021 8:11 PM  Workstation Name: [email or web address]  [facility]    XR Chest 1 View    Result Date: 8/8/2021  1. Minimal left basilar linear scarring or discoid atelectasis. 2. Questionable lucency of the right lateral seventh rib. Please correlate with site of patient's complaint.   DICTATED:   08/08/2021 EDITED/ls :   08/08/2021  This report was finalized on 8/8/2021 9:53 PM by Dr. Salvador Gill MD.        Assessment    ASSESSMENT & PLAN:  Ms. Hernandez is a 81-year-old lady with past medical history of hyperlipidemia, hypertension, heterozygous hemochromatosis mutation who presented to [facility] with right upper extremity pain.    Liver lesion  -Noted 7.4 cm malignant appearing mass in the right lobe of the liver.  -Previously been observed in July 2020 with the lesion more consistent with hemangioma  -AFP mildly elevated 9.7  -CA 19-9 elevated 84.7  -CEA within normal limits  -Hepatitis panel negative  -We will plan for triple phase CT of the liver to rule out HCC vs hemangioma  -Intrahepatic cholangiocarcinoma on the differential as well  -If the lesion is not proven to be a hemangioma, would agree with a CT-guided liver biopsy for definitive diagnosis  -Okay to continue as needed pain medications for relief    Thank you for the consult.  Please do not hesitate to contact with any questions or concerns.  We will continue to follow while inpatient    Jorje Montenegro MD  Hematology and  Oncology    8/9/2021  16:12 EDT

## 2021-08-09 NOTE — H&P
[facility] Medicine Services  HISTORY AND PHYSICAL    Patient Name: Sheree Hernandez  : 1940  MRN: [id number]  Primary Care Physician: Timi Conway DO  Date of admission: 2021    Subjective   Subjective     Chief Complaint:  Abdominal pain    HPI:  Sheree Hernandez is a 81 y.o. female with PMH of heterozygous hemochromatosis mutation, HTN, HLD, GERD, and BONNIE who presents to [facility] ED for RUQ abdominal pain that started yesterday. The pain is worse when she lays on her side. Denies constipation, n/v/d, fever, chills. Describes chronic fatigue, dyspnea with exertion but these seem like chronic problems. She does report her  passed away ~ 1 year ago from liver cancer.    CT abd/pelvis w/ contrast w/ findings of 7.4 cm malignant appearing in the right lobe of the liver with an adjacent low density lesion suggesting another focus of malignancy, no evidence of extrahepatic spread of tumor.    COVID Details:        Symptoms: [] NONE [] Fever []  Cough [x] Shortness of breath [] Change in taste or smell  The patient qualifies to receive the vaccine, but they have not yet received it.     Covid Tests    Common Labsle 21   COVID19 Not Detected             Review of Systems   Constitutional: Positive for fatigue. Negative for appetite change, chills, fever and unexpected weight change.   HENT: Positive for voice change (intermittently loses her voice, chronic and ongoing for the past several years).    Eyes: Negative.    Respiratory: Positive for shortness of breath. Negative for cough and wheezing.    Cardiovascular: Negative.    Gastrointestinal: Positive for abdominal pain (RUQ). Negative for abdominal distention, constipation, diarrhea, nausea and vomiting.   Endocrine: Negative.    Genitourinary: Negative for dysuria, frequency and urgency.   Musculoskeletal: Negative.    Skin: Negative.    Allergic/Immunologic: Negative.    Neurological: Negative.    Hematological:  Negative.    Psychiatric/Behavioral: Negative.       All other systems reviewed and are negative.     Personal History     Past Medical History:   Diagnosis Date   • Asthma    • Atypical chest pain 3/9/2017   • Cataract    • Chronic constipation    • Fracture, pelvis closed (CMS/HCC) 2015    x2   • Hyperlipidemia 3/9/2017   • Hypertension 3/9/2017   • Low bone mass     with elevated FRAX core   • Mild obstructive sleep apnea 1/30/2020   • Near syncope     negative cardiovascular workup    • Plantar fasciitis     Chronic   • PVC's (premature ventricular contractions)    • Senile keratosis     Multiple   • MANAN (stress urinary incontinence, female)    • Syncope, near     with negative cardiovascular workup       Past Surgical History:   Procedure Laterality Date   • CARDIAC CATHETERIZATION N/A 1/18/2019    Procedure: Left Heart Cath;  Surgeon: Tucker Gonzalez MD;  Location: [facility] CATH INVASIVE LOCATION;  Service: Cardiovascular   • CATARACT EXTRACTION  04/2019   • COLONOSCOPY  07/15/2020   • HYSTERECTOMY  1984    age 46 - ovarian cyst, endometriosis,  jorge/bso   • ROTATOR CUFF REPAIR Left 1989    Collar Bone Repair       Family History:  family history includes Breast cancer in her paternal aunt; Dementia in her mother; Heart attack in her mother; Heart failure in her mother; No Known Problems in her father. Otherwise pertinent FHx was reviewed and unremarkable.     Social History:  reports that she has never smoked. She has never used smokeless tobacco. She reports current alcohol use. She reports that she does not use drugs.  Social History     Social History Narrative    5/18:     since 2013    2 kids:    Best Hernandez - [place]    Tiana Wade [place], [place] - medical POA    2 gk    Hobbies: volunteers [employer], involved in Spiritism    Exercise: yes 2-5d/wk    Dental: utd    Eye: utd       Medications:  Calcium Carbonate, Coenzyme Q10, Folic Acid, Magnesium, Omega-3 Fatty Acids, cholecalciferol,  hydroCHLOROthiazide, losartan, meloxicam, and rosuvastatin    Allergies   Allergen Reactions   • Pravastatin Myalgia       Objective   Objective     Vital Signs:   Temp:  [97.8 °F (36.6 °C)] 97.8 °F (36.6 °C)  Heart Rate:  [67-93] 77  Resp:  [18] 18  BP: (129-184)/() 184/85    Physical Exam  Constitutional:       General: She is not in acute distress.     Appearance: She is normal weight. She is not toxic-appearing.   HENT:      Head: Normocephalic and atraumatic.      Nose: Nose normal.      Mouth/Throat:      Mouth: Mucous membranes are moist.      Pharynx: Oropharynx is clear.   Eyes:      Extraocular Movements: Extraocular movements intact.      Conjunctiva/sclera: Conjunctivae normal.      Pupils: Pupils are equal, round, and reactive to light.   Cardiovascular:      Rate and Rhythm: Normal rate and regular rhythm.      Pulses: Normal pulses.      Heart sounds: Normal heart sounds. No murmur heard.     Pulmonary:      Effort: Pulmonary effort is normal. No respiratory distress.      Breath sounds: Normal breath sounds. No wheezing.   Abdominal:      General: Bowel sounds are normal. There is no distension.      Palpations: Abdomen is soft.      Tenderness: There is abdominal tenderness (RUQ). There is no guarding.   Musculoskeletal:         General: Normal range of motion.      Cervical back: Normal range of motion and neck supple.   Skin:     General: Skin is warm and dry.      Capillary Refill: Capillary refill takes less than 2 seconds.   Neurological:      General: No focal deficit present.      Mental Status: She is alert.   Psychiatric:         Mood and Affect: Mood normal.         Behavior: Behavior normal.         Thought Content: Thought content normal.         Judgment: Judgment normal.          Result Review:  I have personally reviewed the results from the time of this admission to 08/08/21 8:39 PM EDT and agree with these findings:  [x]  Laboratory  []  Microbiology  []  Radiology  [x]   EKG/Telemetry   []  Cardiology/Vascular   []  Pathology  [x]  Old records  []  Other:  Most notable findings include:     LAB RESULTS:      Lab 08/08/21  1450   WBC 10.52   HEMOGLOBIN 15.6   HEMATOCRIT 47.9*   PLATELETS 276   NEUTROS ABS 7.23*   IMMATURE GRANS (ABS) 0.06*   LYMPHS ABS 1.96   MONOS ABS 1.09*   EOS ABS 0.11   MCV 90.7         Lab 08/08/21  1450   SODIUM 138   POTASSIUM 3.9   CHLORIDE 101   CO2 24.0   ANION GAP 13.0   BUN 16   CREATININE 0.85   GLUCOSE 114*   CALCIUM 9.5         Lab 08/08/21  1450   TOTAL PROTEIN 7.1   ALBUMIN 4.10   GLOBULIN 3.0   ALT (SGPT) 18   AST (SGOT) 21   BILIRUBIN 0.6   ALK PHOS 193*   LIPASE 42         Lab 08/08/21  1450   TROPONIN T <0.010                 UA    Urinalysis 8/8/21 8/8/21    1449 1449   Squamous Epithelial Cells, UA  3-6 (A)   Specific Gravity, UA 1.016    Ketones, UA Trace (A)    Blood, UA Negative    Leukocytes, UA Moderate (2+) (A)    Nitrite, UA Negative    RBC, UA  3-6 (A)   WBC, UA  6-12 (A)   Bacteria, UA  None Seen   (A) Abnormal value            Microbiology Results (last 10 days)     Procedure Component Value - Date/Time    COVID PRE-OP / PRE-PROCEDURE SCREENING ORDER (NO ISOLATION) - Swab, Nasopharynx [[id number]]  (Normal) Collected: 08/08/21 2113    Lab Status: Final result Specimen: Swab from Nasopharynx Updated: 08/08/21 2152    Narrative:      The following orders were created for panel order COVID PRE-OP / PRE-PROCEDURE SCREENING ORDER (NO ISOLATION) - Swab, Nasopharynx.  Procedure                               Abnormality         Status                     ---------                               -----------         ------                     COVID-19 and FLU A/B PCR...[[id number]]  Normal              Final result                 Please view results for these tests on the individual orders.    COVID-19 and FLU A/B PCR - Swab, Nasopharynx [[id number]]  (Normal) Collected: 08/08/21 2113    Lab Status: Final result Specimen: Swab from Nasopharynx  Updated: 08/08/21 2152     COVID19 Not Detected     Influenza A PCR Not Detected     Influenza B PCR Not Detected    Narrative:      Fact sheet for providers: https://www.fda.gov/media/082678/download    Fact sheet for patients: https://www.fda.gov/media/548414/download    Test performed by PCR.          CT Abdomen Pelvis With Contrast    Result Date: 8/8/2021  CT Abdomen Pelvis W INDICATION: Right upper quadrant pain for one day with abdominal tightness TECHNIQUE: CT of the abdomen and pelvis with IV contrast. Coronal and sagittal reconstructions were obtained.  Radiation dose reduction techniques included automated exposure control or exposure modulation based on body size. Count of known CT and cardiac nuc med studies performed in previous 12 months: 0. COMPARISON: 7/23/2020 FINDINGS: Abdomen: Lung bases are clear. Liver contains a large low-density mass in the right lobe measuring 7.4 cm in maximum dimension. There is an adjacent 14 mm low-density lesion. There are some cysts in the anterior liver. The spleen, pancreas, adrenal glands and kidneys are normal. Aorta is normal in size. There is no adenopathy. Bowel is normal. Pelvis: Bladder is normal. Uterus is been removed. No adnexal masses are identified. Bones are unremarkable     Impression: 7.4 cm malignant-appearing mass in the right lobe of the liver with an adjacent low-density lesion suggesting another focus of malignancy. No evidence of extrahepatic spread of tumor. Otherwise negative Signer Name: Ulises Bains MD  Signed: 8/8/2021 8:11 PM  Workstation Name: [facility]  [facility]    XR Chest 1 View    Result Date: 8/8/2021  EXAMINATION: XR CHEST 1 VW - 08/08/2021  INDICATION: Upper abdominal pain.  COMPARISON: 06/12/2018  FINDINGS: Heart, mediastinum and pulmonary vasculature appear within normal limits. There is trace linear scarring in the left midlung. Lungs otherwise appear clear. No edema, effusion or pneumothorax is seen.  Note is made of previous left clavicle surgery. History indicates pain under right rib cage when inhaling.  Subjectively, there is a lucent appearance of the right lateral seventh rib, questionable for small fracture or possibly small lytic lesion, although this is a subtle finding. No acute bony changes are suspected elsewhere.      Impression: 1. Minimal left basilar linear scarring or discoid atelectasis. 2. Questionable lucency of the right lateral seventh rib. Please correlate with site of patient's complaint.   DICTATED:   08/08/2021 EDITED/ls :   08/08/2021  This report was finalized on 8/8/2021 9:53 PM by Dr. Salvador Gill MD.        Results for orders placed during the hospital encounter of 07/24/20    Adult Transthoracic Echo Complete W/ Cont if Necessary Per Protocol    Interpretation Summary  · Estimated EF = 65%.  · Left ventricular systolic function is normal.      Assessment/Plan   Assessment & Plan       Liver masses    Hypertension    Dyslipidemia    Dyspnea on exertion    Chronic fatigue    Hemochromatosis carrier    Liver masses  Hemochromatosis carrier  - check acute hepatitis panel  - hem/onc consult in am, Dr. Farrar  - obtain IR liver biopsy in am  - MRI brain w/w/o  - CT chest w/   - pain control w/ IV morphine and oral oxycodone PRN  - bowel regimen  - NPO after MN  - cbc, bmp, mag, pt/inr in am    HTN  Dyslipidemia  - continue HCTZ and ARB  - continue statin    Dyspnea on exertion  - evaluated by pulmonary in the past, chronic problem, not worse than baseline  - PT/OT evaluation    Chronic fatigue  - check thyroid, vitamin d, b12    DVT prophylaxis:  SCDS    CODE STATUS:   Code Status: CPR  Medical Interventions (Level of Support Prior to Arrest): Full      This note has been completed as part of a split-shared workflow.   Signature: Electronically signed by LEDY Flynn, 08/08/21, 10:08 PM EDT      Attending   Admission Attestation       I have seen and examined the patient, performing  an independent face-to-face diagnostic evaluation with plan of care reviewed and developed with the advanced practice clinician (APC).      Brief Summary Statement:   Sheree Hernandez is a 81 y.o. female with a PMH significant for heterozygous hemochromatosis mutation, HTN, HLD, GERD, BONNIE who presents to the ED due to right upper quadrant pain.  Patient reports onset of severe 9/10 right upper quadrant pain yesterday.  She says pain is alleviated when laying flat on her back and made worse when lying on her side and whenever she takes a deep breath.  She reports occasional bloated sensation.  Denies nausea, vomiting, fever.    Remainder of detailed HPI is as noted by APC and has been reviewed and/or edited by me for completeness.    Attending Physical Exam:  Constitutional: Awake, alert  Eyes: PERRLA, sclerae anicteric, no conjunctival injection  HENT: NCAT, mucous membranes moist  Neck: Supple, no thyromegaly, no lymphadenopathy, trachea midline  Respiratory: Clear to auscultation bilaterally, nonlabored respirations   Cardiovascular: RRR, no murmurs, rubs, or gallops, palpable pedal pulses bilaterally  Gastrointestinal: Positive bowel sounds, soft, nontender to palpation, nondistended  Musculoskeletal: No bilateral ankle edema, no clubbing or cyanosis to extremities  Psychiatric: Appropriate affect, cooperative  Neurologic: Oriented x 3, strength symmetric in all extremities, Cranial Nerves grossly intact to confrontation, speech clear  Skin: No rashes      Brief Assessment/Plan :  See detailed assessment and plan developed with APC which I have reviewed and/or edited for completeness.        Admission Status: I believe that this patient meets INPATIENT status due to intractable abdominal pain with imaging concerning for hepatocellular carcinoma.  I feel patient’s risk for adverse outcomes and need for care warrant INPATIENT evaluation and I predict the patient’s care encounter to likely last beyond 2  midnights.      Valorie Arroyo,   08/08/21                       62.4

## 2021-08-09 NOTE — PROGRESS NOTES
[facility] Medicine Services  PROGRESS NOTE    Patient Name: Sheree Hernandez  : 1940  MRN: [id number]    Date of Admission: 2021  Primary Care Physician: Timi Conway DO    Subjective   Subjective     CC:  Follow up RUQ pain     HPI:  No acute events overnight. Currently denies RUQ abdominal pain, nausea, vomiting, or diarrhea. Has decreased appetite, denies significant weight loss, night sweats.    ROS:  Gen- No fevers, chills  CV- No chest pain, palpitations  Resp- No cough, dyspnea    Objective   Objective     Vital Signs:   Temp:  [96 °F (35.6 °C)-97.8 °F (36.6 °C)] 96.7 °F (35.9 °C)  Heart Rate:  [67-93] 74  Resp:  [16-18] 18  BP: (129-192)/() 153/76     Physical Exam:  Constitutional: No acute distress, awake, alert  HENT: NCAT, mucous membranes moist  Respiratory: Clear to auscultation bilaterally, respiratory effort normal on room air   Cardiovascular: RRR, no murmurs  Gastrointestinal: Positive bowel sounds, soft, nontender, nondistended  Psychiatric: Appropriate affect, cooperative  Neurologic: Oriented x 3, moving extremities equally, Cranial Nerves grossly intact to confrontation, speech clear  Skin: No rashes    Results Reviewed:  LAB RESULTS:      Lab 21  0604 21  1450   WBC 9.71 10.52   HEMOGLOBIN 14.5 15.6   HEMATOCRIT 45.1 47.9*   PLATELETS 221 276   NEUTROS ABS 7.01* 7.23*   IMMATURE GRANS (ABS) 0.04 0.06*   LYMPHS ABS 1.41 1.96   MONOS ABS 1.11* 1.09*   EOS ABS 0.08 0.11   MCV 91.1 90.7   PROTIME 13.0  --          Lab 21  0604 21  1450   SODIUM 135* 138   POTASSIUM 4.1 3.9   CHLORIDE 103 101   CO2 19.0* 24.0   ANION GAP 13.0 13.0   BUN 13 16   CREATININE 0.73 0.85   GLUCOSE 98 114*   CALCIUM 9.6 9.5   MAGNESIUM  --  2.1   TSH  --  3.630         Lab 21  1450   TOTAL PROTEIN 7.1   ALBUMIN 4.10   GLOBULIN 3.0   ALT (SGPT) 18   AST (SGOT) 21   BILIRUBIN 0.6   ALK PHOS 193*   LIPASE 42         Lab 21  0604  08/08/21  1450   TROPONIN T  --  <0.010   PROTIME 13.0  --    INR 1.01  --                  Brief Urine Lab Results  (Last result in the past 365 days)      Color   Clarity   Blood   Leuk Est   Nitrite   Protein   CREAT   Urine HCG        08/08/21 1449 Yellow Clear Negative Moderate (2+) Negative Trace               Microbiology Results Abnormal     Procedure Component Value - Date/Time    COVID PRE-OP / PRE-PROCEDURE SCREENING ORDER (NO ISOLATION) - Swab, Nasopharynx [[id number]]  (Normal) Collected: 08/08/21 2113    Lab Status: Final result Specimen: Swab from Nasopharynx Updated: 08/08/21 2152    Narrative:      The following orders were created for panel order COVID PRE-OP / PRE-PROCEDURE SCREENING ORDER (NO ISOLATION) - Swab, Nasopharynx.  Procedure                               Abnormality         Status                     ---------                               -----------         ------                     COVID-19 and FLU A/B PCR...[[id number]]  Normal              Final result                 Please view results for these tests on the individual orders.    COVID-19 and FLU A/B PCR - Swab, Nasopharynx [[id number]]  (Normal) Collected: 08/08/21 2113    Lab Status: Final result Specimen: Swab from Nasopharynx Updated: 08/08/21 2152     COVID19 Not Detected     Influenza A PCR Not Detected     Influenza B PCR Not Detected    Narrative:      Fact sheet for providers: https://www.fda.gov/media/765401/download    Fact sheet for patients: https://www.fda.gov/media/825683/download    Test performed by PCR.          CT Chest With Contrast Diagnostic    Result Date: 8/8/2021  INDICATION: Malignant appearing liver mass seen on CT abdomen earlier. Evaluate for metastatic disease in the lungs TECHNIQUE: CT of the thorax with contrast. Coronal and sagittal reconstructions were obtained.  Radiation dose reduction techniques included automated exposure control or exposure modulation based on body size. Radiation audit for  number of CT and nuclear cardiology exams performed in the last year: 1.  COMPARISON: None available. FINDINGS: Lungs are clear. Thyroid gland contains a 6 mm low-density nodule is otherwise normal. Aorta is normal in size. There is no adenopathy. Bones are unremarkable.     Impression: Lungs are clear. Chest is negative. Please see prior CT abdomen and pelvis for findings below the diaphragm Signer Name: Ulises Bains MD  Signed: 8/8/2021 10:24 PM  Workstation Name: [facility]  [facility]    MRI Brain With & Without Contrast    Result Date: 8/8/2021  MRI Brain WO W INDICATION: Malignant appearing liver mass. Assess for brain metastases. TECHNIQUE: MRI of the brain with and without IV contrast. 14 cc of MultiHance was administered. COMPARISON:  None available. FINDINGS: Diffusion images reveal no acute or subacute infarct. Ventricular size and configuration are within normal limits. There is generalized age-appropriate atrophy. There are a few scattered small T2 hyperintense lesions in the white matter most consistent with chronic small vessel ischemic disease. No acute hemorrhage is identified. Craniovertebral junction is normal. No enhancing masses are seen to suggest intracranial metastatic disease. However, there is a blush of abnormal contrast enhancement in the right basal ganglia with a prominent associated blood vessel. This is likely a developmental venous anomaly or other vascular malformation rather than metastatic disease. No other abnormal enhancement is identified. Major intracranial flow voids are maintained.     Impression: 1. No evidence of acute stroke or hemorrhage. 2. Age-appropriate atrophy with mild chronic small vessel ischemic disease in the white matter. 3. No enhancing masses are identified to suggest intracranial metastatic disease. 4. Focus of abnormal enhancement in the right basal ganglia region, likely a large developmental venous anomaly or other vascular  malformation. Signer Name: Marquez Barnes MD  Signed: 8/8/2021 11:59 PM  Workstation [facility]: [facility]  [facility]    CT Abdomen Pelvis With Contrast    Result Date: 8/8/2021  CT Abdomen Pelvis W INDICATION: Right upper quadrant pain for one day with abdominal tightness TECHNIQUE: CT of the abdomen and pelvis with IV contrast. Coronal and sagittal reconstructions were obtained.  Radiation dose reduction techniques included automated exposure control or exposure modulation based on body size. Count of known CT and cardiac nuc med studies performed in previous 12 months: 0. COMPARISON: 7/23/2020 FINDINGS: Abdomen: Lung bases are clear. Liver contains a large low-density mass in the right lobe measuring 7.4 cm in maximum dimension. There is an adjacent 14 mm low-density lesion. There are some cysts in the anterior liver. The spleen, pancreas, adrenal glands and kidneys are normal. Aorta is normal in size. There is no adenopathy. Bowel is normal. Pelvis: Bladder is normal. Uterus is been removed. No adnexal masses are identified. Bones are unremarkable     Impression: 7.4 cm malignant-appearing mass in the right lobe of the liver with an adjacent low-density lesion suggesting another focus of malignancy. No evidence of extrahepatic spread of tumor. Otherwise negative Signer Name: Ulises Bains MD  Signed: 8/8/2021 8:11 PM  Workstation Name: [email or web address]  [facility]    XR Chest 1 View    Result Date: 8/8/2021  EXAMINATION: XR CHEST 1 VW - 08/08/2021  INDICATION: Upper abdominal pain.  COMPARISON: 06/12/2018  FINDINGS: Heart, mediastinum and pulmonary vasculature appear within normal limits. There is trace linear scarring in the left midlung. Lungs otherwise appear clear. No edema, effusion or pneumothorax is seen. Note is made of previous left clavicle surgery. History indicates pain under right rib cage when inhaling.  Subjectively, there is a lucent appearance of the right  lateral seventh rib, questionable for small fracture or possibly small lytic lesion, although this is a subtle finding. No acute bony changes are suspected elsewhere.      Impression: 1. Minimal left basilar linear scarring or discoid atelectasis. 2. Questionable lucency of the right lateral seventh rib. Please correlate with site of patient's complaint.   DICTATED:   08/08/2021 EDITED/ls :   08/08/2021  This report was finalized on 8/8/2021 9:53 PM by Dr. Salvador Gill MD.        Results for orders placed during the hospital encounter of 07/24/20    Adult Transthoracic Echo Complete W/ Cont if Necessary Per Protocol    Interpretation Summary  · Estimated EF = 65%.  · Left ventricular systolic function is normal.      I have reviewed the medications:  Scheduled Meds:hydroCHLOROthiazide, 12.5 mg, Oral, Daily  losartan, 100 mg, Oral, Daily  rosuvastatin, 10 mg, Oral, Daily  senna-docusate sodium, 2 tablet, Oral, BID  sodium chloride, 10 mL, Intravenous, Q12H      Continuous Infusions:sodium chloride, 75 mL/hr, Last Rate: 75 mL/hr (08/09/21 0033)      PRN Meds:.•  senna-docusate sodium **AND** polyethylene glycol **AND** bisacodyl **AND** bisacodyl  •  Morphine  •  naloxone  •  ondansetron **OR** ondansetron  •  oxyCODONE  •  sodium chloride  •  sodium chloride    Assessment/Plan   Assessment & Plan     Active Hospital Problems    Diagnosis  POA   • **Liver masses [R16.0]  Yes   • Hemochromatosis carrier [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        Brief Hospital Course to date:  Sheree Hernandez is a 81 y.o. female  With a PMH of hemochromatosis, HTN, HLD, GERD, and BONNIE who was admitted on 8/8 for worsening RUQ abdominal pain, imaging showed 7.4 cm malignant appearing lesion in right lobe of lover with adjacent low density lesion suggesting  another malignant focus.    Patient and problems new to me today     RUQ abdominal pain  Liver lesions   -Oncology consult   -Acute hepatitis panel pending   -IR liver biopsy ordered - No MD scheduled for today, will see if it can be done tomorrow   -CEA, CA 19-9, and AFP pending     HTN  HLD   -continue HCTZ, losartan, crestor     Chronic dyspnea on exertion  -at baseline     Chronic fatigue   -Vitamin B12, vitamin D pending  -TSH wnl    DVT prophylaxis:  Mechanical DVT prophylaxis orders are present.       Disposition: I expect the patient to be discharged pending IR liver biopsy.    CODE STATUS:   Code Status and Medical Interventions:   Ordered at: 08/08/21 2100     Code Status:    CPR     Medical Interventions (Level of Support Prior to Arrest):    Full       Darlene Lawson MD  08/09/21

## 2021-08-09 NOTE — CASE MANAGEMENT/SOCIAL WORK
Discharge Planning Assessment  [facility]     Patient Name: Sheree Hernandez  MRN: [id number]  Today's Date: 8/9/2021    Admit Date: 8/8/2021    Discharge Needs Assessment     Row Name 08/09/21 1446       Living Environment    Lives With  alone    Current Living Arrangements  home/apartment/condo    Primary Care Provided by  self    Provides Primary Care For  no one    Family Caregiver if Needed  child(lillian), adult;other (see comments)    Family Caregiver Names  Tiana Wade, Bobby Lino    Quality of Family Relationships  unable to assess    Able to Return to Prior Arrangements  yes       Resource/Environmental Concerns    Resource/Environmental Concerns  none       Transition Planning    Patient/Family Anticipates Transition to  home    Patient/Family Anticipated Services at Transition  none    Transportation Anticipated  family or friend will provide       Discharge Needs Assessment    Readmission Within the Last 30 Days  no previous admission in last 30 days    Equipment Currently Used at Home  none    Concerns to be Addressed  denies needs/concerns at this time;no discharge needs identified    Anticipated Changes Related to Illness  none    Equipment Needed After Discharge  none    Provided Post Acute Provider List?  N/A    N/A Provider List Comment  denies need    Discharge Coordination/Progress  Home        Discharge Plan     Row Name 08/09/21 144       Plan    Plan  Home    Patient/Family in Agreement with Plan  yes    Plan Comments  Per MDR, patient to have Liver Bx tomorrow.  Spoke with patient at bedside regarding discharge planning.  Patient denies use or need for HH and reports she has a Pulse Oximeter.  She has prescription coverage and her medications are affordable.  She lives alone in a mutlilevel house with her bedroom upstair and denies concerns regarding use of stairs or home safety.  She reports that she has had the COVID shot.  No discharge needs verbalized.  CM following.  Patient plan is to  discharge home via car with family to transport.    Final Discharge Disposition Code  01 - home or self-care        Continued Care and Services - Admitted Since 8/8/2021    Coordination has not been started for this encounter.       Expected Discharge Date and Time     Expected Discharge Date Expected Discharge Time    Aug 12, 2021         Demographic Summary     Row Name 08/09/21 1445       General Information    Admission Type  inpatient    Arrived From  home    Referral Source  admission list    Reason for Consult  discharge planning    Preferred Language  English     Used During This Interaction  no    General Information Comments  Timi Conway MD       Contact Information    Permission Granted to Share Info With      Contact Information Obtained for      Contact Information Comments  Tiana Olvera, daughter   [phone]   Bobby Huynh, friend  [phone]        Functional Status     Row Name 08/09/21 1446       Functional Status    Usual Activity Tolerance  good    Current Activity Tolerance  good       Functional Status, IADL    Medications  independent    Meal Preparation  independent    Housekeeping  independent    Laundry  independent    Shopping  independent       Employment/    Employment/ Comments  Medicare/[employer]        Psychosocial    No documentation.       Abuse/Neglect    No documentation.       Legal    No documentation.       Substance Abuse    No documentation.       Patient Forms    No documentation.           Mounika Gilmore RN

## 2021-08-10 ENCOUNTER — APPOINTMENT (OUTPATIENT)
Dept: CT IMAGING | Facility: HOSPITAL | Age: 81
End: 2021-08-10

## 2021-08-10 ENCOUNTER — READMISSION MANAGEMENT (OUTPATIENT)
Dept: CALL CENTER | Facility: HOSPITAL | Age: 81
End: 2021-08-10

## 2021-08-10 VITALS
RESPIRATION RATE: 18 BRPM | HEART RATE: 70 BPM | SYSTOLIC BLOOD PRESSURE: 125 MMHG | DIASTOLIC BLOOD PRESSURE: 66 MMHG | WEIGHT: 164.6 LBS | HEIGHT: 64 IN | TEMPERATURE: 97 F | BODY MASS INDEX: 28.1 KG/M2 | OXYGEN SATURATION: 95 %

## 2021-08-10 LAB
QT INTERVAL: 356 MS
QTC INTERVAL: 435 MS

## 2021-08-10 PROCEDURE — 99239 HOSP IP/OBS DSCHRG MGMT >30: CPT | Performed by: INTERNAL MEDICINE

## 2021-08-10 PROCEDURE — 25010000002 MIDAZOLAM PER 1 MG: Performed by: RADIOLOGY

## 2021-08-10 PROCEDURE — 88307 TISSUE EXAM BY PATHOLOGIST: CPT | Performed by: INTERNAL MEDICINE

## 2021-08-10 PROCEDURE — 99232 SBSQ HOSP IP/OBS MODERATE 35: CPT | Performed by: INTERNAL MEDICINE

## 2021-08-10 PROCEDURE — 88333 PATH CONSLTJ SURG CYTO XM 1: CPT | Performed by: INTERNAL MEDICINE

## 2021-08-10 PROCEDURE — 77012 CT SCAN FOR NEEDLE BIOPSY: CPT

## 2021-08-10 PROCEDURE — 25010000002 FENTANYL CITRATE (PF) 50 MCG/ML SOLUTION: Performed by: RADIOLOGY

## 2021-08-10 PROCEDURE — 25010000002 DIPHENHYDRAMINE PER 50 MG: Performed by: RADIOLOGY

## 2021-08-10 PROCEDURE — 88342 IMHCHEM/IMCYTCHM 1ST ANTB: CPT | Performed by: INTERNAL MEDICINE

## 2021-08-10 PROCEDURE — 25010000003 LIDOCAINE 1 % SOLUTION: Performed by: RADIOLOGY

## 2021-08-10 PROCEDURE — 0FB13ZX EXCISION OF RIGHT LOBE LIVER, PERCUTANEOUS APPROACH, DIAGNOSTIC: ICD-10-PCS | Performed by: RADIOLOGY

## 2021-08-10 PROCEDURE — 88341 IMHCHEM/IMCYTCHM EA ADD ANTB: CPT | Performed by: INTERNAL MEDICINE

## 2021-08-10 RX ORDER — FENTANYL CITRATE 50 UG/ML
INJECTION, SOLUTION INTRAMUSCULAR; INTRAVENOUS
Status: DISCONTINUED
Start: 2021-08-10 | End: 2021-08-10 | Stop reason: HOSPADM

## 2021-08-10 RX ORDER — MIDAZOLAM HYDROCHLORIDE 1 MG/ML
INJECTION INTRAMUSCULAR; INTRAVENOUS
Status: DISCONTINUED
Start: 2021-08-10 | End: 2021-08-10 | Stop reason: HOSPADM

## 2021-08-10 RX ORDER — MIDAZOLAM HYDROCHLORIDE 1 MG/ML
INJECTION INTRAMUSCULAR; INTRAVENOUS
Status: COMPLETED | OUTPATIENT
Start: 2021-08-10 | End: 2021-08-10

## 2021-08-10 RX ORDER — FENTANYL CITRATE 50 UG/ML
INJECTION, SOLUTION INTRAMUSCULAR; INTRAVENOUS
Status: COMPLETED | OUTPATIENT
Start: 2021-08-10 | End: 2021-08-10

## 2021-08-10 RX ORDER — DIPHENHYDRAMINE HYDROCHLORIDE 50 MG/ML
INJECTION INTRAMUSCULAR; INTRAVENOUS
Status: COMPLETED | OUTPATIENT
Start: 2021-08-10 | End: 2021-08-10

## 2021-08-10 RX ORDER — LIDOCAINE HYDROCHLORIDE 10 MG/ML
20 INJECTION, SOLUTION INFILTRATION; PERINEURAL ONCE
Status: COMPLETED | OUTPATIENT
Start: 2021-08-10 | End: 2021-08-10

## 2021-08-10 RX ORDER — HYDROCODONE BITARTRATE AND ACETAMINOPHEN 5; 325 MG/1; MG/1
1 TABLET ORAL EVERY 4 HOURS PRN
Status: CANCELLED | OUTPATIENT
Start: 2021-08-10 | End: 2021-08-17

## 2021-08-10 RX ORDER — DIPHENHYDRAMINE HYDROCHLORIDE 50 MG/ML
INJECTION INTRAMUSCULAR; INTRAVENOUS
Status: DISCONTINUED
Start: 2021-08-10 | End: 2021-08-10 | Stop reason: HOSPADM

## 2021-08-10 RX ADMIN — DIPHENHYDRAMINE HYDROCHLORIDE 50 MG: 50 INJECTION INTRAMUSCULAR; INTRAVENOUS at 09:10

## 2021-08-10 RX ADMIN — SODIUM CHLORIDE, PRESERVATIVE FREE 10 ML: 5 INJECTION INTRAVENOUS at 10:04

## 2021-08-10 RX ADMIN — FENTANYL CITRATE 25 MCG: 0.05 INJECTION, SOLUTION INTRAMUSCULAR; INTRAVENOUS at 09:33

## 2021-08-10 RX ADMIN — MIDAZOLAM HYDROCHLORIDE 0.5 MG: 1 INJECTION, SOLUTION INTRAMUSCULAR; INTRAVENOUS at 09:33

## 2021-08-10 RX ADMIN — FENTANYL CITRATE 25 MCG: 0.05 INJECTION, SOLUTION INTRAMUSCULAR; INTRAVENOUS at 09:12

## 2021-08-10 RX ADMIN — LOSARTAN POTASSIUM 100 MG: 50 TABLET, FILM COATED ORAL at 10:04

## 2021-08-10 RX ADMIN — ROSUVASTATIN CALCIUM 10 MG: 10 TABLET, COATED ORAL at 10:04

## 2021-08-10 RX ADMIN — HYDROCHLOROTHIAZIDE 12.5 MG: 12.5 CAPSULE ORAL at 10:04

## 2021-08-10 RX ADMIN — LIDOCAINE HYDROCHLORIDE 20 ML: 10 INJECTION, SOLUTION INFILTRATION; PERINEURAL at 09:30

## 2021-08-10 NOTE — NURSING NOTE
CT guided liver biopsy performed by Dr Rendon.  Pt tolerated well.  Report called to Jazmine on 6B.  Bedrest order placed.

## 2021-08-10 NOTE — PLAN OF CARE
Goal Outcome Evaluation:  Plan of Care Reviewed With: patient        Progress: improving  Outcome Summary: Slept most of the night. Kept NPO as ordered. PRN pain med given and verbalized relief. VSS. Will cont with current POC

## 2021-08-10 NOTE — PROGRESS NOTES
HEMATOLOGY/ONCOLOGY PROGRESS NOTE    Subjective      CC: Liver mass    SUBJECTIVE:   No acute events overnight.  Patient having some increased nausea this morning as she has been n.p.o.  Anxious about getting a biopsy today.  Denies any worsening abdominal pain or discomfort        Past Medical History, Past Surgical History, Social History, Family History have been reviewed and are without significant changes except as mentioned.      Medications:  The current medication list was reviewed in the EMR    ALLERGIES:   Allergies   Allergen Reactions   • Pravastatin Myalgia       ROS:  A comprehensive 10 point review of systems was performed and was negative except as mentioned.      Objective      Vitals:    08/10/21 0922 08/10/21 0926 08/10/21 0930 08/10/21 0934   BP: 148/65 141/61 128/56 122/56   BP Location:       Patient Position:       Pulse: 66 69 66 66   Resp: 20 18 20 18   Temp:       TempSrc:       SpO2: 100% 100% 100% 100%   Weight:       Height:           General: well appearing, in no acute distress  HEENT: sclerae anicteric, oropharynx clear  Lymphatics: no cervical, supraclavicular, inguinal, or axillary adenopathy  Cardiovascular: regular rate and rhythm, no murmurs  Lungs: clear to auscultation bilaterally  Abdomen: soft, nontender, nondistended.  No palpable organomegaly  Extremities: no lower extremity edema  Skin: no rashes, lesions, bruising, or petechiae  Neuro: Alert and oriented x 3. Moves all extremities.    RECENT LABS:    Results from last 7 days   Lab Units 08/09/21  0604 08/08/21  1450   WBC 10*3/mm3 9.71 10.52   HEMOGLOBIN g/dL 14.5 15.6   PLATELETS 10*3/mm3 221 276     Results from last 7 days   Lab Units 08/09/21  0604 08/08/21  1450   SODIUM mmol/L 135* 138   POTASSIUM mmol/L 4.1 3.9   CO2 mmol/L 19.0* 24.0   BUN mg/dL 13 16   CREATININE mg/dL 0.73 0.85   GLUCOSE mg/dL 98 114*     Results from last 7 days   Lab Units 08/08/21  1450   AST (SGOT) U/L 21   ALT (SGPT) U/L 18   BILIRUBIN  mg/dL 0.6   ALK PHOS U/L 193*         CT Chest With Contrast Diagnostic    Result Date: 8/8/2021  Lungs are clear. Chest is negative. Please see prior CT abdomen and pelvis for findings below the diaphragm Signer Name: Ulises Bains MD  Signed: 8/8/2021 10:24 PM  Workstation Name: [facility]    MRI Brain With & Without Contrast    Result Date: 8/8/2021  1. No evidence of acute stroke or hemorrhage. 2. Age-appropriate atrophy with mild chronic small vessel ischemic disease in the white matter. 3. No enhancing masses are identified to suggest intracranial metastatic disease. 4. 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 Signer Name: Marquez Barnes MD  Signed: 8/8/2021 11:59 PM  Workstation Name: [facility]    CT Abdomen Pelvis With Contrast    Result Date: 8/8/2021  7.4 cm malignant-appearing mass in the right lobe of the liver with an adjacent low-density lesion suggesting another focus of malignancy. No evidence of extrahepatic spread of tumor. Otherwise negative Signer Name: Ulises Bains MD  Signed: 8/8/2021 8:11 PM  Workstation Name: [facility]    XR Chest 1 View    Result Date: 8/8/2021  1. Minimal left basilar linear scarring or discoid atelectasis. 2. Questionable lucency of the right lateral seventh rib. Please correlate with site of patient's complaint.   DICTATED:   08/08/2021 EDITED/ls :   08/08/2021  This report was finalized on 8/8/2021 9:53 PM by Dr. Salvador Gill MD.            Assessment   ASSESSMENT & PLAN:  Ms. Hernandez is a 81-year-old lady with past medical history of hyperlipidemia, hypertension, heterozygous hemochromatosis mutation who presented to [facility] with right upper extremity pain.     Liver lesion  -Noted 7.4 cm malignant appearing mass in the right lobe of the  liver.  -Previously been observed in July 2020 with the lesion more consistent with hemangioma  -AFP mildly elevated 9.7  -CA 19-9 elevated 84.7  -CEA within normal limits  -Hepatitis panel negative  -Triple phase CT of the liver completed yesterday.  Radiology read pending  -If the lesion is not proven to be a hemangioma, would agree with a CT-guided liver biopsy for definitive diagnosis  -Okay to continue as needed pain medications for relief     Thank you for the consult.  Please do not hesitate to contact with any questions or concerns.  Once patient is medically stable, we will plan for outpatient follow-up in 1-2 weeks to discuss pathology results    Jorje Montenegro MD  Hematology and Oncology    8/10/2021  09:35 EDT

## 2021-08-10 NOTE — PLAN OF CARE
Goal Outcome Evaluation:              Outcome Summary: VSS on room air, NSR on monitor. Patient with one episode of nausea and some emesis, zofran PRN given with little relief. Complaint of RUQ pain addressed with PRN medication. Patient NPO since 1630 for CT abdomen with oral contrast. Anticipate NPO at midnight for interventional radiology biopsy of liver in AM, will continue to monitor.

## 2021-08-10 NOTE — PRE-PROCEDURE NOTE
An immediate patient assessment was done prior to the administration of moderate and/or deep conscious sedation.      Progress Note    Sheree Hernandez      Pre-op Diagnosis:   Liver bx   Post-op diagnosis:  Liver bx    Anesthesia: * No surgery found *     ASA SCALE ASSESSMENT:  2-Mild to moderate systemic disease, medically well controlled, with no functional limitation    MALLAMPATI CLASSIFICATION:  2-Able to visualize the soft palate, fauces, uvula. The anterior & posterior tonsilar pillars are hidden by the tongue.    Staff:   Nabeel Rendon Jr., MD    Estimated Blood Loss: less 5 cc    Urine Voided: * No surgery found *    Specimens:                Yes- sent to pathology      Drains: * No LDAs found *      Findings: 5 core bx obtained, 2 FNA obtained    Complications: None      Nabeel Rendon Jr., MD    Vascular Interventional Radiology    Date: 08/10/21   Time: 11:31 AM EDT

## 2021-08-10 NOTE — OUTREACH NOTE
Prep Survey      Responses   [facility] patient discharged from?  [place]   Is LACE score < 7 ?  No   Emergency Room discharge w/ pulse ox?  No   Eligibility  [facility]   Date of Admission  08/08/21   Date of Discharge  08/10/21   Discharge Disposition  Home or Self Care   Discharge diagnosis  IR biopsy of liver lesion done on 8/10.    Does the patient have one of the following disease processes/diagnoses(primary or secondary)?  Other   Does the patient have Home health ordered?  No   Is there a DME ordered?  No   Prep survey completed?  Yes          Melyssa Allan RN

## 2021-08-10 NOTE — DISCHARGE SUMMARY
[facility] Medicine [facility]  DISCHARGE SUMMARY    Patient Name: Sheree Hernandez  : 1940  MRN: [id number]    Date of Admission: 2021  5:07 PM  Date of Discharge:  8/10/2021  Primary Care Physician: Timi Conway DO    Consults     Date and Time Order Name Status Description    2021 12:35 AM Inpatient Hematology & Oncology Consult Completed           Hospital Course     Presenting Problem:   Liver masses [R16.0]    Active Hospital Problems    Diagnosis  POA   • **Liver masses [R16.0]  Yes   • Hemochromatosis carrier [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          Hospital Course:  Sheree Hernandez is a 81 y.o. female With a PMH of hemochromatosis, HTN, HLD, GERD, and BONNIE who was admitted on  for worsening RUQ abdominal pain, imaging showed 7.4 cm malignant appearing lesion in right lobe of lover with adjacent low density lesion suggesting another malignant focus. She had IR biopsy of liver lesion done on 8/10. Symptoms stable, and she was ready for discharge home after biopsy was done.   Dr Montenegro was consulted and will follow up with her 1-2 weeks to review pathology report.      Discharge Follow Up Recommendations for outpatient labs/diagnostics:  Follow up with PCP in 1 week if needed  Follow up with Dr. Montenegro in 1-2 weeks for pathology report     Day of Discharge     HPI:   No acute events overnight. Denies significant abdominal pain this morning. Has some soreness where biopsy was done, but it is tolerable. Denies n/v.    Review of Systems  Gen- No fevers, chills  CV- No chest pain, palpitations  Resp- No cough, dyspnea    Vital Signs:   Temp:  [96.8 °F (36 °C)-97.9 °F (36.6 °C)] 97 °F (36.1 °C)  Heart Rate:  [66-78] 70  Resp:  [18-20] 18  BP: (119-156)/(56-76) 141/62      Physical Exam:  Constitutional: No acute distress, awake, alert  HENT: NCAT, mucous membranes moist  Respiratory: Clear to auscultation bilaterally, respiratory effort normal on room air   Cardiovascular: RRR, no murmurs  Gastrointestinal: Positive bowel sounds, soft, nontender, nondistended  Psychiatric: Appropriate affect, cooperative  Neurologic: Oriented x 3, strength symmetric in all extremities, Cranial Nerves grossly intact to confrontation, speech clear  Skin: No rashes    Pertinent  and/or Most Recent Results     LAB RESULTS:      Lab 08/09/21  0604 08/08/21  1450   WBC 9.71 10.52   HEMOGLOBIN 14.5 15.6   HEMATOCRIT 45.1 47.9*   PLATELETS 221 276   NEUTROS ABS 7.01* 7.23*   IMMATURE GRANS (ABS) 0.04 0.06*   LYMPHS ABS 1.41 1.96   MONOS ABS 1.11* 1.09*   EOS ABS 0.08 0.11   MCV 91.1 90.7   PROTIME 13.0  --          Lab 08/09/21  0604 08/08/21  1450   SODIUM 135* 138   POTASSIUM 4.1 3.9   CHLORIDE 103 101   CO2 19.0* 24.0   ANION GAP 13.0 13.0   BUN 13 16   CREATININE 0.73 0.85   GLUCOSE 98 114*   CALCIUM 9.6 9.5   MAGNESIUM  --  2.1   TSH  --  3.630         Lab 08/08/21  1450   TOTAL PROTEIN 7.1   ALBUMIN 4.10   GLOBULIN 3.0   ALT (SGPT) 18   AST (SGOT) 21   BILIRUBIN 0.6   ALK PHOS 193*   LIPASE 42         Lab 08/09/21  0604 08/08/21  1450   TROPONIN T  --  <0.010   PROTIME 13.0  --    INR 1.01  --              Lab 08/09/21  0604   VITAMIN B 12 891         Brief Urine Lab Results  (Last result in the past 365 days)      Color   Clarity   Blood   Leuk Est   Nitrite   Protein   CREAT   Urine HCG        08/08/21 1449 Yellow Clear Negative Moderate (2+) Negative Trace             Microbiology Results (last 10 days)     Procedure Component Value - Date/Time    COVID PRE-OP / PRE-PROCEDURE SCREENING ORDER (NO ISOLATION) - Swab, Nasopharynx [[id number]]  (Normal) Collected: 08/08/21 2113    Lab Status: Final result Specimen: Swab from Nasopharynx Updated: 08/08/21 2152    Narrative:      The following orders  were created for panel order COVID PRE-OP / PRE-PROCEDURE SCREENING ORDER (NO ISOLATION) - Swab, Nasopharynx.  Procedure                               Abnormality         Status                     ---------                               -----------         ------                     COVID-19 and FLU A/B PCR...[[id number]]  Normal              Final result                 Please view results for these tests on the individual orders.    COVID-19 and FLU A/B PCR - Swab, Nasopharynx [[id number]]  (Normal) Collected: 08/08/21 2113    Lab Status: Final result Specimen: Swab from Nasopharynx Updated: 08/08/21 2152     COVID19 Not Detected     Influenza A PCR Not Detected     Influenza B PCR Not Detected    Narrative:      Fact sheet for providers: https://www.fda.gov/media/453761/download    Fact sheet for patients: https://www.fda.gov/media/269968/download    Test performed by PCR.          CT Chest With Contrast Diagnostic    Result Date: 8/8/2021  INDICATION: Malignant appearing liver mass seen on CT abdomen earlier. Evaluate for metastatic disease in the lungs TECHNIQUE: CT of the thorax with contrast. Coronal and sagittal reconstructions were obtained.  Radiation dose reduction techniques included automated exposure control or exposure modulation based on body size. Radiation audit for number of CT and nuclear cardiology exams performed in the last year: 1.  COMPARISON: None available. FINDINGS: Lungs are clear. Thyroid gland contains a 6 mm low-density nodule is otherwise normal. Aorta is normal in size. There is no adenopathy. Bones are unremarkable.     Lungs are clear. Chest is negative. Please see prior CT abdomen and pelvis for findings below the diaphragm Signer Name: Ulises Bains MD  Signed: 8/8/2021 10:24 PM  Workstation Name: [email or web address]  [facility]    MRI Brain With & Without Contrast    Result Date: 8/8/2021  MRI Brain WO W INDICATION: Malignant appearing liver mass. Assess for brain  metastases. TECHNIQUE: MRI of the brain with and without IV contrast. 14 cc of MultiHance was administered. COMPARISON:  None available. FINDINGS: Diffusion images reveal no acute or subacute infarct. Ventricular size and configuration are within normal limits. There is generalized age-appropriate atrophy. There are a few scattered small T2 hyperintense lesions in the white matter most consistent with chronic small vessel ischemic disease. No acute hemorrhage is identified. Craniovertebral junction is normal. No enhancing masses are seen to suggest intracranial metastatic disease. However, there is a blush of abnormal contrast enhancement in the right basal ganglia with a prominent associated blood vessel. This is likely a developmental venous anomaly or other vascular malformation rather than metastatic disease. No other abnormal enhancement is identified. Major intracranial flow voids are maintained.     1. No evidence of acute stroke or hemorrhage. 2. Age-appropriate atrophy with mild chronic small vessel ischemic disease in the white matter. 3. No enhancing masses are identified to suggest intracranial metastatic disease. 4. 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 Signer Name: Marquez Barnes MD  Signed: 8/8/2021 11:59 PM  Workstation Name: [email or web address]  [facility]    CT Abdomen Pelvis With Contrast    Result Date: 8/8/2021  CT Abdomen Pelvis W INDICATION: Right upper quadrant pain for one day with abdominal tightness TECHNIQUE: CT of the abdomen and pelvis with IV contrast. Coronal and sagittal reconstructions were obtained.  Radiation dose reduction techniques included automated exposure control or exposure modulation based on body size. Count of known CT and cardiac nuc med studies performed in previous 12 months: 0. COMPARISON: 7/23/2020 FINDINGS: Abdomen: Lung bases are clear. Liver  contains a large low-density mass in the right lobe measuring 7.4 cm in maximum dimension. There is an adjacent 14 mm low-density lesion. There are some cysts in the anterior liver. The spleen, pancreas, adrenal glands and kidneys are normal. Aorta is normal in size. There is no adenopathy. Bowel is normal. Pelvis: Bladder is normal. Uterus is been removed. No adnexal masses are identified. Bones are unremarkable     7.4 cm malignant-appearing mass in the right lobe of the liver with an adjacent low-density lesion suggesting another focus of malignancy. No evidence of extrahepatic spread of tumor. Otherwise negative Signer Name: Ulises Bains MD  Signed: 8/8/2021 8:11 PM  Workstation Name: [facility]  [facility]    XR Chest 1 View    Result Date: 8/8/2021  EXAMINATION: XR CHEST 1 VW - 08/08/2021  INDICATION: Upper abdominal pain.  COMPARISON: 06/12/2018  FINDINGS: Heart, mediastinum and pulmonary vasculature appear within normal limits. There is trace linear scarring in the left midlung. Lungs otherwise appear clear. No edema, effusion or pneumothorax is seen. Note is made of previous left clavicle surgery. History indicates pain under right rib cage when inhaling.  Subjectively, there is a lucent appearance of the right lateral seventh rib, questionable for small fracture or possibly small lytic lesion, although this is a subtle finding. No acute bony changes are suspected elsewhere.      1. Minimal left basilar linear scarring or discoid atelectasis. 2. Questionable lucency of the right lateral seventh rib. Please correlate with site of patient's complaint.   DICTATED:   08/08/2021 EDITED/ls :   08/08/2021  This report was finalized on 8/8/2021 9:53 PM by Dr. Salvador Gill MD.        Results for orders placed during the hospital encounter of 11/16/18    Duplex Venous Lower Extremity - Bilateral CAR    Interpretation Summary  · No evidence of deep or superficial venous thrombosis of the right or  left lower extremities.      Results for orders placed during the hospital encounter of 11/16/18    Duplex Venous Lower Extremity - Bilateral CAR    Interpretation Summary  · No evidence of deep or superficial venous thrombosis of the right or left lower extremities.      Results for orders placed during the hospital encounter of 07/24/20    Adult Transthoracic Echo Complete W/ Cont if Necessary Per Protocol    Interpretation Summary  · Estimated EF = 65%.  · Left ventricular systolic function is normal.      Plan for Follow-up of Pending Labs/Results:   Pending Labs     Order Current Status    Tissue Pathology Exam In process        Discharge Details        Discharge Medications      Changes to Medications      Instructions Start Date   rosuvastatin 10 MG tablet  Commonly known as: CRESTOR  What changed: additional instructions   10 mg, Oral, Daily         Continue These Medications      Instructions Start Date   CALCIUM 500 PO   1 tablet, Oral, Daily      cholecalciferol 25 MCG (1000 UT) tablet  Commonly known as: VITAMIN D3   1,000 Units, Daily      COQ10 PO   100 mg, Daily      Folic Acid 20 MG capsule   Daily      hydroCHLOROthiazide 12.5 MG tablet  Commonly known as: HYDRODIURIL   12.5 mg, Oral, Daily      losartan 100 MG tablet  Commonly known as: COZAAR   100 mg, Oral, Daily      Magnesium 500 MG capsule   Oral, Daily      meloxicam 7.5 MG tablet  Commonly known as: MOBIC   7.5 mg, Oral, As Needed      OMEGA 3 PO   1 tablet, Oral, Daily             Allergies   Allergen Reactions   • Pravastatin Myalgia         Discharge Disposition:  Home or Self Care    Diet:  Hospital:  Diet Order   Procedures   • Diet Clear Liquid       CODE STATUS:    Code Status and Medical Interventions:   Ordered at: 08/08/21 2100     Code Status:    CPR     Medical Interventions (Level of Support Prior to Arrest):    Full       Future Appointments   Date Time Provider Department Center   4/18/2022 10:30 AM Jorje Montenegro MD [facility]       Additional Instructions for the Follow-ups that You Need to Schedule     Discharge Follow-up with PCP   As directed       Currently Documented PCP:    Timi Conway DO    PCP Phone Number:    [phone]     Follow Up Details: in 1 week if needed         Discharge Follow-up with Specified Provider: Dr. Montenegro; 1 Week   As directed      To: Dr. Montenegro    Follow Up: 1 Week    Follow Up Details: f/u pathology report                     Darlene Lawson MD  08/10/21      Time Spent on Discharge:  I spent  35  minutes on this discharge activity which included: face-to-face encounter with the patient, reviewing the data in the system, coordination of the care with the nursing staff as well as consultants, documentation, and entering orders.

## 2021-08-11 ENCOUNTER — TRANSITIONAL CARE MANAGEMENT TELEPHONE ENCOUNTER (OUTPATIENT)
Dept: CALL CENTER | Facility: HOSPITAL | Age: 81
End: 2021-08-11

## 2021-08-11 NOTE — OUTREACH NOTE
Call Center TCM Note      Responses   [facility] patient discharged from?  [place]   Does the patient have one of the following disease processes/diagnoses(primary or secondary)?  Other   TCM attempt successful?  No   Unsuccessful attempts  Attempt 2          Bee Kelley MA    8/11/2021, 15:59 EDT

## 2021-08-11 NOTE — OUTREACH NOTE
Call Center TCM Note      Responses   [facility] patient discharged from?  [place]   Does the patient have one of the following disease processes/diagnoses(primary or secondary)?  Other   TCM attempt successful?  No   Unsuccessful attempts  Attempt 1          Bee Kelley MA    8/11/2021, 13:18 EDT

## 2021-08-12 ENCOUNTER — TRANSITIONAL CARE MANAGEMENT TELEPHONE ENCOUNTER (OUTPATIENT)
Dept: CALL CENTER | Facility: HOSPITAL | Age: 81
End: 2021-08-12

## 2021-08-12 NOTE — OUTREACH NOTE
Call Center TCM Note      Responses   [facility] patient discharged from?  [place]   Does the patient have one of the following disease processes/diagnoses(primary or secondary)?  Other   TCM attempt successful?  No [Tiana daughter on verbal release]   Unsuccessful attempts  Attempt 3          Oumou Richards RN    8/12/2021, 09:13 EDT

## 2021-08-17 ENCOUNTER — OFFICE VISIT (OUTPATIENT)
Dept: ONCOLOGY | Facility: CLINIC | Age: 81
End: 2021-08-17

## 2021-08-17 VITALS
HEART RATE: 107 BPM | SYSTOLIC BLOOD PRESSURE: 147 MMHG | TEMPERATURE: 98.1 F | WEIGHT: 156 LBS | DIASTOLIC BLOOD PRESSURE: 77 MMHG | BODY MASS INDEX: 26.63 KG/M2 | RESPIRATION RATE: 18 BRPM | HEIGHT: 64 IN | OXYGEN SATURATION: 96 %

## 2021-08-17 DIAGNOSIS — C22.1 INTRAHEPATIC CHOLANGIOCARCINOMA (HCC): Primary | ICD-10-CM

## 2021-08-17 PROCEDURE — 99214 OFFICE O/P EST MOD 30 MIN: CPT | Performed by: INTERNAL MEDICINE

## 2021-08-17 RX ORDER — ONDANSETRON 4 MG/1
4 TABLET, ORALLY DISINTEGRATING ORAL EVERY 8 HOURS PRN
Qty: 20 TABLET | Refills: 4 | Status: SHIPPED | OUTPATIENT
Start: 2021-08-17 | End: 2021-11-05

## 2021-08-17 NOTE — PROGRESS NOTES
Follow Up Office Visit      Date: 2021     Patient Name: Sheree Hernandez  MRN: [id number]  : 1940  Chief Complaint:  Follow-up for intrahepatic cholangiocarcinoma      History of Present Illness: Sheree Hernandez is a pleasant 79 y.o. female with a past medical history of hyperlipidemia and hypertension who presents today for evaluation of concern for hemochromatosis. The patient is accompanied by their self who contributes to the history of their care.  Patient states that over the past 2 years she has been having worsening fatigue especially with exertion.  Over the past several months this has become worse.  Patient states that she gets tired with basic activities including showering getting dressed in the morning and walking to and from her car from stores.  She states that her fatigue gets better after a few minutes of rest.  She has previously had an echocardiogram and cardiac catheterization within the past 2 years which did not reveal any cause of her fatigue with exertion.  She is also had an ultrasound of the liver which revealed stable cyst.  She was recently seen by her PCP who ordered iron studies which was notable for an elevated ferritin to 246.  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  All other mutations were negative.  She is currently up-to-date on her mammograms and colonoscopy with no active malignancies noted.  She is otherwise compliant with her statin and high blood pressure medicines.     Interval History:  Presents to clinic for follow-up.  Was recently hospitalized for abdominal pain as well as nausea and vomiting.  CT scans concerning for an enlarging liver lesion.  She underwent a biopsy which was positive for an adenocarcinoma.  CA 19-9 was elevated with concerns for intrahepatic cholangiocarcinoma.  She still has some fatigue as well as intermittent abdominal pain which is controlled with as needed Tylenol or  ibuprofen.  Does note some mild nausea as well    Oncology History:    Oncology/Hematology History   Intrahepatic cholangiocarcinoma (CMS/HCC)   8/17/2021 Initial Diagnosis    Intrahepatic cholangiocarcinoma (CMS/HCC)     8/17/2021 Cancer Staged    Staging form: Intrahepatic Bile Duct, AJCC 8th Edition  - Clinical: Stage IB (cT1b, cN0, cM0) - Signed by Jorje Montenegro MD on 8/17/2021         Subjective      Review of Systems:   Constitutional: Negative for fevers, chills, or weight loss  Eyes: Negative for blurred vision or discharge         Ear/Nose/Throat: Negative for difficulty swallowing, sore throat, LAD                                                       Respiratory: Negative for cough, SOA, wheezing                                                                                        Cardiovascular: Negative for chest pain or palpitations                                                                  Gastrointestinal: Negative for nausea, vomiting or diarrhea                                                                     Genitourinary: Negative for dysuria or hematuria                                                                                           Musculoskeletal: Negative for any joint pains or muscle aches                                                                        Neurologic: Negative for any weakness, headaches, dizziness                                                                         Hematologic: Negative for any easy bleeding or bruising                                                                                   Psychiatric: Negative for anxiety or depression                          Past Medical History/Past Surgical History/ Family History/ Social History: Reviewed by me and unchanged from my previous documentation done on April 2021.     Medications:     Current Outpatient Medications:   •  Calcium-Magnesium-Vitamin D (CALCIUM 500 PO), Take 1 tablet by  "mouth Daily., Disp: , Rfl:   •  Cholecalciferol (VITAMIN D) 25 MCG (1000 UT) tablet, 1,000 Units Daily., Disp: , Rfl:   •  Coenzyme Q10 (COQ10 PO), 100 mg Daily., Disp: , Rfl:   •  Folic Acid 20 MG capsule, Daily., Disp: , Rfl:   •  hydroCHLOROthiazide (HYDRODIURIL) 12.5 MG tablet, Take 1 tablet by mouth Daily., Disp: 90 tablet, Rfl: 1  •  losartan (COZAAR) 100 MG tablet, TAKE 1 TABLET BY MOUTH DAILY, Disp: 90 tablet, Rfl: 0  •  Magnesium 500 MG capsule, Take  by mouth Daily., Disp: , Rfl:   •  meloxicam (MOBIC) 7.5 MG tablet, Take 7.5 mg by mouth As Needed., Disp: , Rfl:   •  Omega-3 Fatty Acids (OMEGA 3 PO), Take 1 tablet by mouth Daily., Disp: , Rfl:   •  rosuvastatin (CRESTOR) 10 MG tablet, Take 1 tablet by mouth Daily. (Patient taking differently: Take 10 mg by mouth Daily. Every other night per pt), Disp: 90 tablet, Rfl: 3  •  ondansetron ODT (ZOFRAN-ODT) 4 MG disintegrating tablet, Place 1 tablet on the tongue Every 8 (Eight) Hours As Needed for Nausea or Vomiting., Disp: 20 tablet, Rfl: 4    Allergies:   Allergies   Allergen Reactions   • Pravastatin Myalgia       Objective     Physical Exam:  Vital Signs:   Vitals:    08/17/21 1123   BP: 147/77   Pulse: 107   Resp: 18   Temp: 98.1 °F (36.7 °C)   SpO2: 96%   Weight: 70.8 kg (156 lb)   Height: 162.6 cm (64\")   PainSc: 0-No pain     Pain Score    08/17/21 1123   PainSc: 0-No pain     ECOG Performance Status: 1 - Symptomatic but completely ambulatory    Constitutional: NAD, ECOG 1  Eyes: PERRLA, scleral anicteric  ENT: No LAD, no thyromegaly  Respiratory: CTAB, no wheezing, rales, rhonchi  Cardiovascular: RRR, no murmurs, pulses 2+ bilaterally  Abdomen: soft, NT/ND, no HSM  Musculoskeletal: strength 5/5 bilaterally, no c/c/e  Neurologic: A&O x 3, CN II-XII intact grossly    Results Review:   Admission on 08/08/2021, Discharged on 08/10/2021   Component Date Value Ref Range Status   • QT Interval 08/08/2021 356  ms Final   • QTC Interval 08/08/2021 435  ms Final "   • Glucose 08/08/2021 114* 65 - 99 mg/dL Final   • BUN 08/08/2021 16  8 - 23 mg/dL Final   • Creatinine 08/08/2021 0.85  0.57 - 1.00 mg/dL Final   • Sodium 08/08/2021 138  136 - 145 mmol/L Final   • Potassium 08/08/2021 3.9  3.5 - 5.2 mmol/L Final   • Chloride 08/08/2021 101  98 - 107 mmol/L Final   • CO2 08/08/2021 24.0  22.0 - 29.0 mmol/L Final   • Calcium 08/08/2021 9.5  8.6 - 10.5 mg/dL Final   • Total Protein 08/08/2021 7.1  6.0 - 8.5 g/dL Final   • Albumin 08/08/2021 4.10  3.50 - 5.20 g/dL Final   • ALT (SGPT) 08/08/2021 18  1 - 33 U/L Final   • AST (SGOT) 08/08/2021 21  1 - 32 U/L Final   • Alkaline Phosphatase 08/08/2021 193* 39 - 117 U/L Final   • Total Bilirubin 08/08/2021 0.6  0.0 - 1.2 mg/dL Final   • eGFR Non  Amer 08/08/2021 64  >60 mL/min/1.73 Final   • Globulin 08/08/2021 3.0  gm/dL Final   • A/G Ratio 08/08/2021 1.4  g/dL Final   • BUN/Creatinine Ratio 08/08/2021 18.8  7.0 - 25.0 Final   • Anion Gap 08/08/2021 13.0  5.0 - 15.0 mmol/L Final   • Lipase 08/08/2021 42  13 - 60 U/L Final   • Troponin T 08/08/2021 <0.010  0.000 - 0.030 ng/mL Final   • Color, UA 08/08/2021 Yellow  Yellow, Straw Final   • Appearance, UA 08/08/2021 Clear  Clear Final   • pH, UA 08/08/2021 5.5  5.0 - 8.0 Final   • Specific Gravity, UA 08/08/2021 1.016  1.001 - 1.030 Final   • Glucose, UA 08/08/2021 Negative  Negative Final   • Ketones, UA 08/08/2021 Trace* Negative Final   • Bilirubin, UA 08/08/2021 Negative  Negative Final   • Blood, UA 08/08/2021 Negative  Negative Final   • Protein, UA 08/08/2021 Trace* Negative Final   • Leuk Esterase, UA 08/08/2021 Moderate (2+)* Negative Final   • Nitrite, UA 08/08/2021 Negative  Negative Final   • Urobilinogen, UA 08/08/2021 0.2 E.U./dL  0.2 - 1.0 E.U./dL Final   • Extra Tube 08/08/2021 Hold for add-ons.   Final    Auto resulted.   • Extra Tube 08/08/2021 hold for add-on   Final    Auto resulted   • Extra Tube 08/08/2021 Hold for add-ons.   Final    Auto resulted.   • Extra  Tube 08/08/2021 Hold for add-ons.   Final    Auto resulted.   • WBC 08/08/2021 10.52  3.40 - 10.80 10*3/mm3 Final   • RBC 08/08/2021 5.28  3.77 - 5.28 10*6/mm3 Final   • Hemoglobin 08/08/2021 15.6  12.0 - 15.9 g/dL Final   • Hematocrit 08/08/2021 47.9* 34.0 - 46.6 % Final   • MCV 08/08/2021 90.7  79.0 - 97.0 fL Final   • MCH 08/08/2021 29.5  26.6 - 33.0 pg Final   • MCHC 08/08/2021 32.6  31.5 - 35.7 g/dL Final   • RDW 08/08/2021 14.1  12.3 - 15.4 % Final   • RDW-SD 08/08/2021 47.4  37.0 - 54.0 fl Final   • MPV 08/08/2021 12.3* 6.0 - 12.0 fL Final   • Platelets 08/08/2021 276  140 - 450 10*3/mm3 Final   • Neutrophil % 08/08/2021 68.7  42.7 - 76.0 % Final   • Lymphocyte % 08/08/2021 18.6* 19.6 - 45.3 % Final   • Monocyte % 08/08/2021 10.4  5.0 - 12.0 % Final   • Eosinophil % 08/08/2021 1.0  0.3 - 6.2 % Final   • Basophil % 08/08/2021 0.7  0.0 - 1.5 % Final   • Immature Grans % 08/08/2021 0.6* 0.0 - 0.5 % Final   • Neutrophils, Absolute 08/08/2021 7.23* 1.70 - 7.00 10*3/mm3 Final   • Lymphocytes, Absolute 08/08/2021 1.96  0.70 - 3.10 10*3/mm3 Final   • Monocytes, Absolute 08/08/2021 1.09* 0.10 - 0.90 10*3/mm3 Final   • Eosinophils, Absolute 08/08/2021 0.11  0.00 - 0.40 10*3/mm3 Final   • Basophils, Absolute 08/08/2021 0.07  0.00 - 0.20 10*3/mm3 Final   • Immature Grans, Absolute 08/08/2021 0.06* 0.00 - 0.05 10*3/mm3 Final   • nRBC 08/08/2021 0.0  0.0 - 0.2 /100 WBC Final   • RBC, UA 08/08/2021 3-6* None Seen, 0-2 /HPF Final   • WBC, UA 08/08/2021 6-12* None Seen, 0-2 /HPF Final   • Bacteria, UA 08/08/2021 None Seen  None Seen, Trace /HPF Final   • Squamous Epithelial Cells, UA 08/08/2021 3-6* None Seen, 0-2 /HPF Final   • Hyaline Casts, UA 08/08/2021 21-30  0 - 6 /LPF Final   • Methodology 08/08/2021 Automated Microscopy   Final   • CEA 08/08/2021 3.16  ng/mL Final   • ALPHA-FETOPROTEIN 08/08/2021 9.27* 0 - 8.3 ng/mL Final   • CA 19-9 08/08/2021 84.7* <=35.0 U/mL Final   • COVID19 08/08/2021 Not Detected  Not Detected  - Ref. Range Final   • Influenza A PCR 08/08/2021 Not Detected  Not Detected Final   • Influenza B PCR 08/08/2021 Not Detected  Not Detected Final   • Hepatitis B Surface Ag 08/09/2021 Non-Reactive  Non-Reactive Final   • Hep A IgM 08/09/2021 Non-Reactive  Non-Reactive Final   • Hep B C IgM 08/09/2021 Non-Reactive  Non-Reactive Final   • Hepatitis C Ab 08/09/2021 Non-Reactive  Non-Reactive Final   • TSH 08/08/2021 3.630  0.270 - 4.200 uIU/mL Final   • Free T4 08/08/2021 1.50  0.93 - 1.70 ng/dL Final   • Vitamin B-12 08/09/2021 891  211 - 946 pg/mL Final   • 25 Hydroxy, Vitamin D 08/09/2021 28.0* 30.0 - 100.0 ng/ml Final   • Magnesium 08/08/2021 2.1  1.6 - 2.4 mg/dL Final   • Protime 08/09/2021 13.0  11.4 - 14.4 Seconds Final   • INR 08/09/2021 1.01  0.85 - 1.16 Final   • Glucose 08/09/2021 98  65 - 99 mg/dL Final   • BUN 08/09/2021 13  8 - 23 mg/dL Final   • Creatinine 08/09/2021 0.73  0.57 - 1.00 mg/dL Final   • Sodium 08/09/2021 135* 136 - 145 mmol/L Final   • Potassium 08/09/2021 4.1  3.5 - 5.2 mmol/L Final    Slight hemolysis detected by analyzer. Results may be affected.   • Chloride 08/09/2021 103  98 - 107 mmol/L Final   • CO2 08/09/2021 19.0* 22.0 - 29.0 mmol/L Final   • Calcium 08/09/2021 9.6  8.6 - 10.5 mg/dL Final   • eGFR Non  Amer 08/09/2021 76  >60 mL/min/1.73 Final   • BUN/Creatinine Ratio 08/09/2021 17.8  7.0 - 25.0 Final   • Anion Gap 08/09/2021 13.0  5.0 - 15.0 mmol/L Final   • WBC 08/09/2021 9.71  3.40 - 10.80 10*3/mm3 Final   • RBC 08/09/2021 4.95  3.77 - 5.28 10*6/mm3 Final   • Hemoglobin 08/09/2021 14.5  12.0 - 15.9 g/dL Final   • Hematocrit 08/09/2021 45.1  34.0 - 46.6 % Final   • MCV 08/09/2021 91.1  79.0 - 97.0 fL Final   • MCH 08/09/2021 29.3  26.6 - 33.0 pg Final   • MCHC 08/09/2021 32.2  31.5 - 35.7 g/dL Final   • RDW 08/09/2021 14.0  12.3 - 15.4 % Final   • RDW-SD 08/09/2021 46.9  37.0 - 54.0 fl Final   • MPV 08/09/2021 12.3* 6.0 - 12.0 fL Final   • Platelets 08/09/2021 221  140 -  450 10*3/mm3 Final   • Neutrophil % 08/09/2021 72.3  42.7 - 76.0 % Final   • Lymphocyte % 08/09/2021 14.5* 19.6 - 45.3 % Final   • Monocyte % 08/09/2021 11.4  5.0 - 12.0 % Final   • Eosinophil % 08/09/2021 0.8  0.3 - 6.2 % Final   • Basophil % 08/09/2021 0.6  0.0 - 1.5 % Final   • Immature Grans % 08/09/2021 0.4  0.0 - 0.5 % Final   • Neutrophils, Absolute 08/09/2021 7.01* 1.70 - 7.00 10*3/mm3 Final   • Lymphocytes, Absolute 08/09/2021 1.41  0.70 - 3.10 10*3/mm3 Final   • Monocytes, Absolute 08/09/2021 1.11* 0.10 - 0.90 10*3/mm3 Final   • Eosinophils, Absolute 08/09/2021 0.08  0.00 - 0.40 10*3/mm3 Final   • Basophils, Absolute 08/09/2021 0.06  0.00 - 0.20 10*3/mm3 Final   • Immature Grans, Absolute 08/09/2021 0.04  0.00 - 0.05 10*3/mm3 Final   • nRBC 08/09/2021 0.4* 0.0 - 0.2 /100 WBC Final   • Case Report 08/10/2021    Final                    Value:Surgical Pathology Report                         Case: [id number]                                  Authorizing Provider:  Darlene Lawson MD         Collected:           08/10/2021 10:09 AM          Ordering Location:     [facility]   Received:            08/10/2021 10:08 AM                                 6B                                                                           Pathologist:           Deon Cuevas MD                                                            Specimen:    Liver, core                                                                               • Clinical Information 08/10/2021    Final                    Value:This result contains rich text formatting which cannot be displayed here.   • Final Diagnosis 08/10/2021    Final                    Value:This result contains rich text formatting which cannot be displayed here.   • Comment 08/10/2021    Final                    Value:This result contains rich text formatting which cannot be displayed here.   • Intraoperative Consultation 08/10/2021    Final                     Value:This result contains rich text formatting which cannot be displayed here.   • Gross Description 08/10/2021    Final                    Value:This result contains rich text formatting which cannot be displayed here.   • Special Stains 08/10/2021    Final                    Value:This result contains rich text formatting which cannot be displayed here.   • Microscopic Description 08/10/2021    Final                    Value:This result contains rich text formatting which cannot be displayed here.       CT Abdomen Pelvis With & Without Contrast    Result Date: 8/10/2021  Narrative: EXAMINATION: CT ABDOMEN AND PELVIS W WO CONTRAST-08/09/2021:  INDICATION: Triple phase CT to rule out HCC vs hemangioma; R16.0-Hepatomegaly, not elsewhere classified; R10.11-Right upper quadrant pain.  TECHNIQUE: 5 mm postoral contrast images through the abdomen and pelvis, subsequent 5 mm arterial venous and delayed venous phase images.  The radiation dose reduction device was turned on for each scan per the ALARA (As Low as Reasonably Achievable) protocol.  COMPARISON: 08/08/2021 abdomen and pelvis CT scan.  FINDINGS: Earlier abdominal CT scan by report showed 7.4 cm malignant-appearing mass in right liver lobe.  Multiple hepatic cysts are present on earlier studies in the left liver lobe. The large irregular right lobe liver lesion shows mild peripheral arterial phase enhancement and then gradual in-fill towards the center, slower than typically seen with giant cavernous hemangioma, and without the usual well-defined lakes and pools enhancement pattern. A slow flow hemangioma might still have this pattern, however, I do not see evidence of this lesion on a chest CT study from 10/31/2018, although that is only very very early arterial phase images. Accordingly, malignancy is favored, perhaps with a small satellite nodule lateral of the lesion.  No significant abnormalities are seen of the spleen, pancreas, adrenal glands, or kidneys.  There is normal variant vicarious excretion of contrast into the gallbladder. Large and small bowel loops are normal in caliber and grossly normal in appearance.  Regarding the lower abdomen and pelvis, no mass, adenopathy, ascites, or acute inflammatory focus is seen. Terminal ileum, cecum and appendix appear grossly normal. The bladder is nondistended. The bony structures appear to be intact. There are some degenerative cysts of the right acetabulum.      Impression: 1. Gradually progressive enhancement of the patient's right lobe liver lesion, more typical for solid tumor than for giant cavernous hemangioma. If a more definitive exam for hemangioma is indicated clinically, Technetium labeled RBC hemangioma scan with SPECT imaging could be considered.  2.  Stable hepatic cysts.  3.  No evidence of acute intra-abdominal or intrapelvic disease elsewhere.  D:  08/10/2021 E:  08/10/2021  This report was finalized on 8/10/2021 9:46 PM by Dr. Salvador Gill MD.      CT Chest With Contrast Diagnostic    Result Date: 8/8/2021  Narrative: INDICATION: Malignant appearing liver mass seen on CT abdomen earlier. Evaluate for metastatic disease in the lungs TECHNIQUE: CT of the thorax with contrast. Coronal and sagittal reconstructions were obtained.  Radiation dose reduction techniques included automated exposure control or exposure modulation based on body size. Radiation audit for number of CT and nuclear cardiology exams performed in the last year: 1.  COMPARISON: None available. FINDINGS: Lungs are clear. Thyroid gland contains a 6 mm low-density nodule is otherwise normal. Aorta is normal in size. There is no adenopathy. Bones are unremarkable.     Impression: Lungs are clear. Chest is negative. Please see prior CT abdomen and pelvis for findings below the diaphragm Signer Name: Ulises Bains MD  Signed: 8/8/2021 10:24 PM  Workstation Name: [facility]  [facility]    MRI Brain With & Without  Contrast    Result Date: 8/8/2021  Narrative: MRI Brain WO W INDICATION: Malignant appearing liver mass. Assess for brain metastases. TECHNIQUE: MRI of the brain with and without IV contrast. 14 cc of MultiHance was administered. COMPARISON:  None available. FINDINGS: Diffusion images reveal no acute or subacute infarct. Ventricular size and configuration are within normal limits. There is generalized age-appropriate atrophy. There are a few scattered small T2 hyperintense lesions in the white matter most consistent with chronic small vessel ischemic disease. No acute hemorrhage is identified. Craniovertebral junction is normal. No enhancing masses are seen to suggest intracranial metastatic disease. However, there is a blush of abnormal contrast enhancement in the right basal ganglia with a prominent associated blood vessel. This is likely a developmental venous anomaly or other vascular malformation rather than metastatic disease. No other abnormal enhancement is identified. Major intracranial flow voids are maintained.     Impression: 1. No evidence of acute stroke or hemorrhage. 2. Age-appropriate atrophy with mild chronic small vessel ischemic disease in the white matter. 3. No enhancing masses are identified to suggest intracranial metastatic disease. 4. 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 Signer Name: Marquez Barnes MD  Signed: 8/8/2021 11:59 PM  Workstation Name: [facility]  [facility]    CT Abdomen Pelvis With Contrast    Result Date: 8/8/2021  Narrative: CT Abdomen Pelvis W INDICATION: Right upper quadrant pain for one day with abdominal tightness TECHNIQUE: CT of the abdomen and pelvis with IV contrast. Coronal and sagittal reconstructions were obtained.  Radiation dose reduction techniques included automated exposure control or exposure modulation based on body size. Count of  known CT and cardiac nuc med studies performed in previous 12 months: 0. COMPARISON: 7/23/2020 FINDINGS: Abdomen: Lung bases are clear. Liver contains a large low-density mass in the right lobe measuring 7.4 cm in maximum dimension. There is an adjacent 14 mm low-density lesion. There are some cysts in the anterior liver. The spleen, pancreas, adrenal glands and kidneys are normal. Aorta is normal in size. There is no adenopathy. Bowel is normal. Pelvis: Bladder is normal. Uterus is been removed. No adnexal masses are identified. Bones are unremarkable     Impression: 7.4 cm malignant-appearing mass in the right lobe of the liver with an adjacent low-density lesion suggesting another focus of malignancy. No evidence of extrahepatic spread of tumor. Otherwise negative Signer Name: Ulises Bains MD  Signed: 8/8/2021 8:11 PM  Workstation Name: [facility]  [facility]    XR Chest 1 View    Result Date: 8/8/2021  Narrative: EXAMINATION: XR CHEST 1 VW - 08/08/2021  INDICATION: Upper abdominal pain.  COMPARISON: 06/12/2018  FINDINGS: Heart, mediastinum and pulmonary vasculature appear within normal limits. There is trace linear scarring in the left midlung. Lungs otherwise appear clear. No edema, effusion or pneumothorax is seen. Note is made of previous left clavicle surgery. History indicates pain under right rib cage when inhaling.  Subjectively, there is a lucent appearance of the right lateral seventh rib, questionable for small fracture or possibly small lytic lesion, although this is a subtle finding. No acute bony changes are suspected elsewhere.      Impression: 1. Minimal left basilar linear scarring or discoid atelectasis. 2. Questionable lucency of the right lateral seventh rib. Please correlate with site of patient's complaint.   DICTATED:   08/08/2021 EDITED/ls :   08/08/2021  This report was finalized on 8/8/2021 9:53 PM by Dr. Salvador Gill MD.      CT Needle Biopsy Liver    Result Date:  8/10/2021  Narrative: CT NEEDLE BIOPSY LIVER-  REASON FOR STUDY: liver mass; R16.0-Hepatomegaly, not elsewhere classified; R10.11-Right upper quadrant pain  PATIENT DEMOGRAPHICS:81 years, Female  DATE OF SERVICE:  8/10/2021 8:47 AM  CT Radiation Dose Optimization: CT radiation dose optimization techniques (automated exposure control, use of iterative reconstruction techniques or adjustments of the mA and /or kV according to patient size) were used to limit patient radiation dose  Procedure: 1. CT-guided biopsy of  the right hepatic lobe mass 2. CT guidance used for localization 3. Moderate IV conscious sedation for 1.0 hr  Moderate sedation services provided by the same physician performing the diagnostic or therapeutic service and was preformed in the presence of an independent trained observer to assist in the monitoring of the patient's level of consciousness and physiological status.  Procedure detail: The procedure risks, benefits, and alternative to treatments were fully explained to the patient and informed consent was obtained. The patient was placed supine on the CT table and sterilely prepped and draped in the usual manner. A time out was performed with the staff. After instillation of local lidocaine anesthesia, a #11 blade was used to make a 1-2 mm incision. A 17g coaxial needle was placed within the lesion, the inner stylette was removed and 2 fine-needle aspirations and 5 core biopsies were obtained with a 18-gauge needle. The pathologist was present for the exam and requested core biopsies as FNAs were non diagnostic. The specimens were given to the pathologist for evaluation . After the procedure, all the needles were removed. Manual pressure was used for hemostasis and appropriate dressings were applied.      Impression: Impression: 1. CT-guided right hepatic lobe mass biopsy as described above  This report was finalized on 8/10/2021 11:57 AM by Nabeel Rendon.        Assessment / Plan       Assessment/Plan:    Intrahepatic cholangiocarcinoma (CMS/HCC) (Primary)  -Noted during her most recent hospitalization with CT scans concerning for an enlarging liver lesion to 7.3 cm that was previously noted to be hemangioma  -Triple phase CT concerning for a solid tumor lesion  -Biopsy consistent with an adenocarcinoma  -CA 19-9 elevated to 84.7  -CT chest as well as the rest of the CT abdomen/pelvis not concerning for distant or metastatic disease  -As she likely has an early stage intrahepatic cholangiocarcinoma that may be resectable, will refer to Dr. Sharma at  for surgical evaluation  -May consider neoadjuvant chemotherapy depending on her visit with Dr. Sharma  -     Ambulatory Referral to General Surgery    Nausea/vomiting  -Patient with continued nausea at home  -We will order as needed Zofran today    Hemochromatosis carrier  -Noted on recent labs with an elevated ferritin to 246 hemochromatosis gene profile positive for heterozygosity of H63D.  Other genes negative.  Given patient's age and previous cardiac liver work-up showing no evidence of dysfunction, it is unlikely that she has had iron deposition all this time.  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  We will repeat iron studies in 1 year and if they remain stable will consider discharge from clinic       Follow Up:   Follow-up after her visit with Dr. Edith Montenegro MD  Hematology and Oncology     Please note that portions of this note may have been completed with a voice recognition program. Efforts were  made to edit the dictations, but occasionally words are mistranscribed.

## 2021-08-27 LAB
CYTO UR: NORMAL
LAB AP CASE REPORT: NORMAL
LAB AP CLINICAL INFORMATION: NORMAL
LAB AP DIAGNOSIS COMMENT: NORMAL
LAB AP OUTSIDE REPORT, ADDENDUM: NORMAL
LAB AP SPECIAL STAINS: NORMAL
Lab: NORMAL
PATH REPORT.FINAL DX SPEC: NORMAL
PATH REPORT.GROSS SPEC: NORMAL

## 2021-10-08 ENCOUNTER — TELEPHONE (OUTPATIENT)
Dept: ONCOLOGY | Facility: CLINIC | Age: 81
End: 2021-10-08

## 2021-10-08 NOTE — TELEPHONE ENCOUNTER
THIS IS AN FYI. I INDEXED EXTERNAL RECORDS. THEY RAN TOGETHER SO I COULD NOT SEPARATE. THEY ARE MULTI LABS, XRAY PELVIS, CT ABDOMEN, NOTES.  THEY ARE INDEXED UNDER EXT RECORDS- 10-07-21  THANK YOU

## 2021-10-20 ENCOUNTER — TELEPHONE (OUTPATIENT)
Dept: ONCOLOGY | Facility: CLINIC | Age: 81
End: 2021-10-20

## 2021-10-20 NOTE — TELEPHONE ENCOUNTER
Caller: Sheree Hernandez    Relationship to patient: Self    Best call back number: [phone]    Chief complaint: JUST SEEN DR TURNER 10/19 AND HE IS WANTING SHEREE TO F/U WITHIN 1 TO 2 WEEKS WITH DR TONY , CURRENTLY SCHEDULED FOR 11/10 WANTED TO SEE IF CAN GET IN SOONER FOR THE WEEK OF 11/3 AND ON FOR RADIATION CONSULTATION     Type of visit: F/U     Requested date: WEEK OF 11/03    If rescheduling, when is the original appointment: 11/10    Additional notes:  DR TURNER WAS SUPPOSED TO BE TALKING WITH DR TONY TO SEE IF PT CAN BE SEEN SOONER.         PLEASE CALL PATIENT  TO ADVISE IF APPT IS MOVED TO A SOONER DATE

## 2021-10-23 DIAGNOSIS — I10 ESSENTIAL HYPERTENSION: ICD-10-CM

## 2021-10-25 RX ORDER — LOSARTAN POTASSIUM 100 MG/1
100 TABLET ORAL DAILY
Qty: 90 TABLET | Refills: 0 | Status: SHIPPED | OUTPATIENT
Start: 2021-10-25 | End: 2021-12-21

## 2021-10-25 NOTE — TELEPHONE ENCOUNTER
Rx Refill Note  Requested Prescriptions     Pending Prescriptions Disp Refills   • losartan (COZAAR) 100 MG tablet [Pharmacy Med Name: LOSARTAN 100MG TABLETS] 90 tablet 0     Sig: TAKE 1 TABLET BY MOUTH DAILY      Last office visit with prescribing clinician: 2/4/2021      Next office visit with prescribing clinician: Visit date not found            JUAN R HENNESSY MA  10/25/21, 08:08 EDT

## 2021-11-05 ENCOUNTER — LAB (OUTPATIENT)
Dept: LAB | Facility: HOSPITAL | Age: 81
End: 2021-11-05

## 2021-11-05 ENCOUNTER — OFFICE VISIT (OUTPATIENT)
Dept: ONCOLOGY | Facility: CLINIC | Age: 81
End: 2021-11-05

## 2021-11-05 ENCOUNTER — SPECIALTY PHARMACY (OUTPATIENT)
Dept: ONCOLOGY | Facility: HOSPITAL | Age: 81
End: 2021-11-05

## 2021-11-05 VITALS
OXYGEN SATURATION: 98 % | TEMPERATURE: 98.6 F | HEART RATE: 97 BPM | BODY MASS INDEX: 25.49 KG/M2 | HEIGHT: 64 IN | SYSTOLIC BLOOD PRESSURE: 155 MMHG | DIASTOLIC BLOOD PRESSURE: 85 MMHG | WEIGHT: 149.3 LBS | RESPIRATION RATE: 16 BRPM

## 2021-11-05 DIAGNOSIS — C22.1 INTRAHEPATIC CHOLANGIOCARCINOMA (HCC): ICD-10-CM

## 2021-11-05 DIAGNOSIS — C22.1 INTRAHEPATIC CHOLANGIOCARCINOMA (HCC): Primary | ICD-10-CM

## 2021-11-05 LAB
ALBUMIN SERPL-MCNC: 3.8 G/DL (ref 3.5–5.2)
ALBUMIN/GLOB SERPL: 1.4 G/DL
ALP SERPL-CCNC: 193 U/L (ref 39–117)
ALT SERPL W P-5'-P-CCNC: 21 U/L (ref 1–33)
ANION GAP SERPL CALCULATED.3IONS-SCNC: 12 MMOL/L (ref 5–15)
AST SERPL-CCNC: 27 U/L (ref 1–32)
BILIRUB SERPL-MCNC: 0.4 MG/DL (ref 0–1.2)
BUN SERPL-MCNC: 12 MG/DL (ref 8–23)
BUN/CREAT SERPL: 16.2 (ref 7–25)
CALCIUM SPEC-SCNC: 9.2 MG/DL (ref 8.6–10.5)
CHLORIDE SERPL-SCNC: 105 MMOL/L (ref 98–107)
CO2 SERPL-SCNC: 24 MMOL/L (ref 22–29)
CREAT SERPL-MCNC: 0.74 MG/DL (ref 0.57–1)
ERYTHROCYTE [DISTWIDTH] IN BLOOD BY AUTOMATED COUNT: 15.8 % (ref 12.3–15.4)
GFR SERPL CREATININE-BSD FRML MDRD: 75 ML/MIN/1.73
GLOBULIN UR ELPH-MCNC: 2.7 GM/DL
GLUCOSE SERPL-MCNC: 101 MG/DL (ref 65–99)
HCT VFR BLD AUTO: 45.3 % (ref 34–46.6)
HGB BLD-MCNC: 15 G/DL (ref 12–15.9)
LYMPHOCYTES # BLD AUTO: 1.6 10*3/MM3 (ref 0.7–3.1)
LYMPHOCYTES NFR BLD AUTO: 23 % (ref 19.6–45.3)
MCH RBC QN AUTO: 30.7 PG (ref 26.6–33)
MCHC RBC AUTO-ENTMCNC: 33.1 G/DL (ref 31.5–35.7)
MCV RBC AUTO: 92.8 FL (ref 79–97)
MONOCYTES # BLD AUTO: 0.6 10*3/MM3 (ref 0.1–0.9)
MONOCYTES NFR BLD AUTO: 8 % (ref 5–12)
NEUTROPHILS NFR BLD AUTO: 4.9 10*3/MM3 (ref 1.7–7)
NEUTROPHILS NFR BLD AUTO: 69 % (ref 42.7–76)
PLATELET # BLD AUTO: 197 10*3/MM3 (ref 140–450)
PMV BLD AUTO: 10.1 FL (ref 6–12)
POTASSIUM SERPL-SCNC: 3.8 MMOL/L (ref 3.5–5.2)
PROT SERPL-MCNC: 6.5 G/DL (ref 6–8.5)
RBC # BLD AUTO: 4.88 10*6/MM3 (ref 3.77–5.28)
SODIUM SERPL-SCNC: 141 MMOL/L (ref 136–145)
WBC # BLD AUTO: 7.1 10*3/MM3 (ref 3.4–10.8)

## 2021-11-05 PROCEDURE — 99214 OFFICE O/P EST MOD 30 MIN: CPT | Performed by: INTERNAL MEDICINE

## 2021-11-05 PROCEDURE — 80053 COMPREHEN METABOLIC PANEL: CPT

## 2021-11-05 PROCEDURE — 85025 COMPLETE CBC W/AUTO DIFF WBC: CPT

## 2021-11-05 PROCEDURE — 86301 IMMUNOASSAY TUMOR CA 19-9: CPT

## 2021-11-05 PROCEDURE — 36415 COLL VENOUS BLD VENIPUNCTURE: CPT

## 2021-11-05 RX ORDER — CAPECITABINE 500 MG/1
TABLET, FILM COATED ORAL
Qty: 168 TABLET | Refills: 0 | Status: SHIPPED | OUTPATIENT
Start: 2021-11-14 | End: 2022-03-28

## 2021-11-05 RX ORDER — DOCUSATE SODIUM 100 MG/1
100 CAPSULE, LIQUID FILLED ORAL 2 TIMES DAILY
COMMUNITY
End: 2022-03-28

## 2021-11-05 RX ORDER — METHOCARBAMOL 500 MG/1
500 TABLET, FILM COATED ORAL AS NEEDED
COMMUNITY
Start: 2021-10-28 | End: 2022-06-06

## 2021-11-05 RX ORDER — PANTOPRAZOLE SODIUM 40 MG/1
40 TABLET, DELAYED RELEASE ORAL DAILY
COMMUNITY
End: 2021-11-09

## 2021-11-05 NOTE — PROGRESS NOTES
Follow Up Office Visit      Date: 2021     Patient Name: Sheree Hernandez  MRN: [id number]  : 1940  Chief Complaint:  Follow-up for intrahepatic cholangiocarcinoma      History of Present Illness: Sheree Hernandez is a pleasant 79 y.o. female with a past medical history of hyperlipidemia and hypertension who presents today for evaluation of concern for hemochromatosis. The patient is accompanied by their self who contributes to the history of their care.  Patient states that over the past 2 years she has been having worsening fatigue especially with exertion.  Over the past several months this has become worse.  Patient states that she gets tired with basic activities including showering getting dressed in the morning and walking to and from her car from stores.  She states that her fatigue gets better after a few minutes of rest.  She has previously had an echocardiogram and cardiac catheterization within the past 2 years which did not reveal any cause of her fatigue with exertion.  She is also had an ultrasound of the liver which revealed stable cyst.  She was recently seen by her PCP who ordered iron studies which was notable for an elevated ferritin to 246.  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  All other mutations were negative.  She is currently up-to-date on her mammograms and colonoscopy with no active malignancies noted.  She is otherwise compliant with her statin and high blood pressure medicines.     Interval History:  Presents to clinic for follow-up.  She is status post open right hepatectomy, cholecystectomy, right partial adrenalectomy on 2021.  Pathology showed a focal R1 resected margin.  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  0/4 lymph nodes were positive for disease.  LVI and PNI were present.  She is  recovering well from surgery.  She still has some slight discomfort in her right lower quadrant but is otherwise doing well.  Tolerating her diet and having good bowel movements with the help of docusate and MiraLAX.  She otherwise has no major complaints today is otherwise doing well    Oncology History:    Oncology/Hematology History   Intrahepatic cholangiocarcinoma (HCC)   8/17/2021 Initial Diagnosis    Intrahepatic cholangiocarcinoma (CMS/HCC)     8/17/2021 Cancer Staged    Staging form: Intrahepatic Bile Duct, AJCC 8th Edition  - Clinical: Stage IB (cT1b, cN0, cM0) - Signed by Jorje Montenegro MD on 8/17/2021 11/5/2021 Cancer Staged    Staging form: Intrahepatic Bile Duct, AJCC 8th Edition  - Pathologic: Stage IIIA (pT3, pN0, cM0) - Signed by Jorje Montenegro MD on 11/5/2021     11/15/2021 -  Chemotherapy    OP GALLBLADDER Capecitabine + XRT         Subjective      Review of Systems:   Constitutional: Negative for fevers, chills, or weight loss  Eyes: Negative for blurred vision or discharge         Ear/Nose/Throat: Negative for difficulty swallowing, sore throat, LAD                                                       Respiratory: Negative for cough, SOA, wheezing                                                                                        Cardiovascular: Negative for chest pain or palpitations                                                                  Gastrointestinal: Negative for nausea, vomiting or diarrhea                                                                     Genitourinary: Negative for dysuria or hematuria                                                                                           Musculoskeletal: Negative for any joint pains or muscle aches                                                                        Neurologic: Negative for any weakness, headaches, dizziness                                                                        "  Hematologic: Negative for any easy bleeding or bruising                                                                                   Psychiatric: Negative for anxiety or depression                          Past Medical History/Past Surgical History/ Family History/ Social History: Reviewed by me and unchanged from my previous documentation done on August 2021.     Medications:     Current Outpatient Medications:   •  docusate sodium (COLACE) 100 MG capsule, Take 100 mg by mouth 2 (Two) Times a Day., Disp: , Rfl:   •  losartan (COZAAR) 100 MG tablet, TAKE 1 TABLET BY MOUTH DAILY, Disp: 90 tablet, Rfl: 0  •  methocarbamol (ROBAXIN) 500 MG tablet, , Disp: , Rfl:   •  pantoprazole (PROTONIX) 40 MG EC tablet, Take 40 mg by mouth Daily., Disp: , Rfl:     Allergies:   Allergies   Allergen Reactions   • Pravastatin Myalgia       Objective     Physical Exam:  Vital Signs:   Vitals:    11/05/21 1356   BP: 155/85   Pulse: 97   Resp: 16   Temp: 98.6 °F (37 °C)   TempSrc: Temporal   SpO2: 98%   Weight: 67.7 kg (149 lb 4.8 oz)   Height: 162.6 cm (64\")   PainSc:   3   PainLoc: Abdomen  Comment: Right     Pain Score    11/05/21 1356   PainSc:   3   PainLoc: Abdomen  Comment: Right     ECOG Performance Status: 0 - Asymptomatic    Constitutional: NAD, ECOG 0  Eyes: PERRLA, scleral anicteric  ENT: No LAD, no thyromegaly  Respiratory: CTAB, no wheezing, rales, rhonchi  Cardiovascular: RRR, no murmurs, pulses 2+ bilaterally  Abdomen: soft, NT/ND, no HSM  Musculoskeletal: strength 5/5 bilaterally, no c/c/e  Neurologic: A&O x 3, CN II-XII intact grossly    Results Review:   No visits with results within 2 Week(s) from this visit.   Latest known visit with results is:   Admission on 08/08/2021, Discharged on 08/10/2021   Component Date Value Ref Range Status   • QT Interval 08/08/2021 356  ms Final   • QTC Interval 08/08/2021 435  ms Final   • Glucose 08/08/2021 114* 65 - 99 mg/dL Final   • BUN 08/08/2021 16  8 - 23 mg/dL Final   • " Creatinine 08/08/2021 0.85  0.57 - 1.00 mg/dL Final   • Sodium 08/08/2021 138  136 - 145 mmol/L Final   • Potassium 08/08/2021 3.9  3.5 - 5.2 mmol/L Final   • Chloride 08/08/2021 101  98 - 107 mmol/L Final   • CO2 08/08/2021 24.0  22.0 - 29.0 mmol/L Final   • Calcium 08/08/2021 9.5  8.6 - 10.5 mg/dL Final   • Total Protein 08/08/2021 7.1  6.0 - 8.5 g/dL Final   • Albumin 08/08/2021 4.10  3.50 - 5.20 g/dL Final   • ALT (SGPT) 08/08/2021 18  1 - 33 U/L Final   • AST (SGOT) 08/08/2021 21  1 - 32 U/L Final   • Alkaline Phosphatase 08/08/2021 193* 39 - 117 U/L Final   • Total Bilirubin 08/08/2021 0.6  0.0 - 1.2 mg/dL Final   • eGFR Non  Amer 08/08/2021 64  >60 mL/min/1.73 Final   • Globulin 08/08/2021 3.0  gm/dL Final   • A/G Ratio 08/08/2021 1.4  g/dL Final   • BUN/Creatinine Ratio 08/08/2021 18.8  7.0 - 25.0 Final   • Anion Gap 08/08/2021 13.0  5.0 - 15.0 mmol/L Final   • Lipase 08/08/2021 42  13 - 60 U/L Final   • Troponin T 08/08/2021 <0.010  0.000 - 0.030 ng/mL Final   • Color, UA 08/08/2021 Yellow  Yellow, Straw Final   • Appearance, UA 08/08/2021 Clear  Clear Final   • pH, UA 08/08/2021 5.5  5.0 - 8.0 Final   • Specific Gravity, UA 08/08/2021 1.016  1.001 - 1.030 Final   • Glucose, UA 08/08/2021 Negative  Negative Final   • Ketones, UA 08/08/2021 Trace* Negative Final   • Bilirubin, UA 08/08/2021 Negative  Negative Final   • Blood, UA 08/08/2021 Negative  Negative Final   • Protein, UA 08/08/2021 Trace* Negative Final   • Leuk Esterase, UA 08/08/2021 Moderate (2+)* Negative Final   • Nitrite, UA 08/08/2021 Negative  Negative Final   • Urobilinogen, UA 08/08/2021 0.2 E.U./dL  0.2 - 1.0 E.U./dL Final   • Extra Tube 08/08/2021 Hold for add-ons.   Final    Auto resulted.   • Extra Tube 08/08/2021 hold for add-on   Final    Auto resulted   • Extra Tube 08/08/2021 Hold for add-ons.   Final    Auto resulted.   • Extra Tube 08/08/2021 Hold for add-ons.   Final    Auto resulted.   • WBC 08/08/2021 10.52  3.40 -  10.80 10*3/mm3 Final   • RBC 08/08/2021 5.28  3.77 - 5.28 10*6/mm3 Final   • Hemoglobin 08/08/2021 15.6  12.0 - 15.9 g/dL Final   • Hematocrit 08/08/2021 47.9* 34.0 - 46.6 % Final   • MCV 08/08/2021 90.7  79.0 - 97.0 fL Final   • MCH 08/08/2021 29.5  26.6 - 33.0 pg Final   • MCHC 08/08/2021 32.6  31.5 - 35.7 g/dL Final   • RDW 08/08/2021 14.1  12.3 - 15.4 % Final   • RDW-SD 08/08/2021 47.4  37.0 - 54.0 fl Final   • MPV 08/08/2021 12.3* 6.0 - 12.0 fL Final   • Platelets 08/08/2021 276  140 - 450 10*3/mm3 Final   • Neutrophil % 08/08/2021 68.7  42.7 - 76.0 % Final   • Lymphocyte % 08/08/2021 18.6* 19.6 - 45.3 % Final   • Monocyte % 08/08/2021 10.4  5.0 - 12.0 % Final   • Eosinophil % 08/08/2021 1.0  0.3 - 6.2 % Final   • Basophil % 08/08/2021 0.7  0.0 - 1.5 % Final   • Immature Grans % 08/08/2021 0.6* 0.0 - 0.5 % Final   • Neutrophils, Absolute 08/08/2021 7.23* 1.70 - 7.00 10*3/mm3 Final   • Lymphocytes, Absolute 08/08/2021 1.96  0.70 - 3.10 10*3/mm3 Final   • Monocytes, Absolute 08/08/2021 1.09* 0.10 - 0.90 10*3/mm3 Final   • Eosinophils, Absolute 08/08/2021 0.11  0.00 - 0.40 10*3/mm3 Final   • Basophils, Absolute 08/08/2021 0.07  0.00 - 0.20 10*3/mm3 Final   • Immature Grans, Absolute 08/08/2021 0.06* 0.00 - 0.05 10*3/mm3 Final   • nRBC 08/08/2021 0.0  0.0 - 0.2 /100 WBC Final   • RBC, UA 08/08/2021 3-6* None Seen, 0-2 /HPF Final   • WBC, UA 08/08/2021 6-12* None Seen, 0-2 /HPF Final   • Bacteria, UA 08/08/2021 None Seen  None Seen, Trace /HPF Final   • Squamous Epithelial Cells, UA 08/08/2021 3-6* None Seen, 0-2 /HPF Final   • Hyaline Casts, UA 08/08/2021 21-30  0 - 6 /LPF Final   • Methodology 08/08/2021 Automated Microscopy   Final   • CEA 08/08/2021 3.16  ng/mL Final   • ALPHA-FETOPROTEIN 08/08/2021 9.27* 0 - 8.3 ng/mL Final   • CA 19-9 08/08/2021 84.7* <=35.0 U/mL Final   • COVID19 08/08/2021 Not Detected  Not Detected - Ref. Range Final   • Influenza A PCR 08/08/2021 Not Detected  Not Detected Final   •  Influenza B PCR 08/08/2021 Not Detected  Not Detected Final   • Hepatitis B Surface Ag 08/09/2021 Non-Reactive  Non-Reactive Final   • Hep A IgM 08/09/2021 Non-Reactive  Non-Reactive Final   • Hep B C IgM 08/09/2021 Non-Reactive  Non-Reactive Final   • Hepatitis C Ab 08/09/2021 Non-Reactive  Non-Reactive Final   • TSH 08/08/2021 3.630  0.270 - 4.200 uIU/mL Final   • Free T4 08/08/2021 1.50  0.93 - 1.70 ng/dL Final   • Vitamin B-12 08/09/2021 891  211 - 946 pg/mL Final   • 25 Hydroxy, Vitamin D 08/09/2021 28.0* 30.0 - 100.0 ng/ml Final   • Magnesium 08/08/2021 2.1  1.6 - 2.4 mg/dL Final   • Protime 08/09/2021 13.0  11.4 - 14.4 Seconds Final   • INR 08/09/2021 1.01  0.85 - 1.16 Final   • Glucose 08/09/2021 98  65 - 99 mg/dL Final   • BUN 08/09/2021 13  8 - 23 mg/dL Final   • Creatinine 08/09/2021 0.73  0.57 - 1.00 mg/dL Final   • Sodium 08/09/2021 135* 136 - 145 mmol/L Final   • Potassium 08/09/2021 4.1  3.5 - 5.2 mmol/L Final    Slight hemolysis detected by analyzer. Results may be affected.   • Chloride 08/09/2021 103  98 - 107 mmol/L Final   • CO2 08/09/2021 19.0* 22.0 - 29.0 mmol/L Final   • Calcium 08/09/2021 9.6  8.6 - 10.5 mg/dL Final   • eGFR Non  Amer 08/09/2021 76  >60 mL/min/1.73 Final   • BUN/Creatinine Ratio 08/09/2021 17.8  7.0 - 25.0 Final   • Anion Gap 08/09/2021 13.0  5.0 - 15.0 mmol/L Final   • WBC 08/09/2021 9.71  3.40 - 10.80 10*3/mm3 Final   • RBC 08/09/2021 4.95  3.77 - 5.28 10*6/mm3 Final   • Hemoglobin 08/09/2021 14.5  12.0 - 15.9 g/dL Final   • Hematocrit 08/09/2021 45.1  34.0 - 46.6 % Final   • MCV 08/09/2021 91.1  79.0 - 97.0 fL Final   • MCH 08/09/2021 29.3  26.6 - 33.0 pg Final   • MCHC 08/09/2021 32.2  31.5 - 35.7 g/dL Final   • RDW 08/09/2021 14.0  12.3 - 15.4 % Final   • RDW-SD 08/09/2021 46.9  37.0 - 54.0 fl Final   • MPV 08/09/2021 12.3* 6.0 - 12.0 fL Final   • Platelets 08/09/2021 221  140 - 450 10*3/mm3 Final   • Neutrophil % 08/09/2021 72.3  42.7 - 76.0 % Final   •  Lymphocyte % 08/09/2021 14.5* 19.6 - 45.3 % Final   • Monocyte % 08/09/2021 11.4  5.0 - 12.0 % Final   • Eosinophil % 08/09/2021 0.8  0.3 - 6.2 % Final   • Basophil % 08/09/2021 0.6  0.0 - 1.5 % Final   • Immature Grans % 08/09/2021 0.4  0.0 - 0.5 % Final   • Neutrophils, Absolute 08/09/2021 7.01* 1.70 - 7.00 10*3/mm3 Final   • Lymphocytes, Absolute 08/09/2021 1.41  0.70 - 3.10 10*3/mm3 Final   • Monocytes, Absolute 08/09/2021 1.11* 0.10 - 0.90 10*3/mm3 Final   • Eosinophils, Absolute 08/09/2021 0.08  0.00 - 0.40 10*3/mm3 Final   • Basophils, Absolute 08/09/2021 0.06  0.00 - 0.20 10*3/mm3 Final   • Immature Grans, Absolute 08/09/2021 0.04  0.00 - 0.05 10*3/mm3 Final   • nRBC 08/09/2021 0.4* 0.0 - 0.2 /100 WBC Final   • Case Report 08/10/2021    Final                    Value:Surgical Pathology Report                         Case: [id number]                                  Authorizing Provider:  Darlene Lawson MD         Collected:           08/10/2021 10:09 AM          Ordering Location:     [facility]   Received:            08/10/2021 10:08 AM                                 6B                                                                           Pathologist:           Deon Cuevas MD                                                            Specimen:    Liver, core                                                                               • Outside Report, Addendum 08/10/2021    Final                    Value:This result contains rich text formatting which cannot be displayed here.   • Clinical Information 08/10/2021    Final                    Value:This result contains rich text formatting which cannot be displayed here.   • Final Diagnosis 08/10/2021    Final                    Value:This result contains rich text formatting which cannot be displayed here.   • Comment 08/10/2021    Final                    Value:This result contains rich text formatting which cannot be displayed here.    • Intraoperative Consultation 08/10/2021    Final                    Value:This result contains rich text formatting which cannot be displayed here.   • Gross Description 08/10/2021    Final                    Value:This result contains rich text formatting which cannot be displayed here.   • Special Stains 08/10/2021    Final                    Value:This result contains rich text formatting which cannot be displayed here.   • Microscopic Description 08/10/2021    Final                    Value:This result contains rich text formatting which cannot be displayed here.       No results found.    Assessment / Plan      Assessment/Plan:   Intrahepatic cholangiocarcinoma (CMS/HCC) (Primary)  -Noted during her most recent hospitalization with CT scans concerning for an enlarging liver lesion to 7.3 cm that was previously noted to be hemangioma  -Triple phase CT concerning for a solid tumor lesion  -Biopsy consistent with an adenocarcinoma  -CA 19-9 elevated to 84.7  -CT chest as well as the rest of the CT abdomen/pelvis not concerning for distant or metastatic disease  -Status post open right hepatectomy, cholecystectomy, right partial adrenalectomy on 9/23/2021 with Dr. 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  0/4 lymph nodes were positive for disease.  LVI and PNI were present.  Focal R1 resection margin  -Discussed with patient and her daughter that she would benefit from adjuvant chemoXRT using Xeloda.  Discussed side effects including but not limited to immunosuppression, diarrhea, abdominal pain, nausea, vomiting, hand/foot syndrome  -They are agreeable to treatment and will plan to start within the next few weeks  -Refer to Dr. Billings with radiation oncology.  Patient has appointment with him on Monday  -We will plan to repeat CBC, CMP, CA 19-9 today     Hemochromatosis carrier  -Noted on recent labs  with an elevated ferritin to 246 hemochromatosis gene profile positive for heterozygosity of H63D.  Other genes negative.  Given patient's age and previous cardiac liver work-up showing no evidence of dysfunction, it is unlikely that she has had iron deposition all this time.  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     Follow Up:   Follow-up on day 1 of concurrent chemo XRT     Jorje Montenegro MD  Hematology and Oncology     Please note that portions of this note may have been completed with a voice recognition program. Efforts were made to edit the dictations, but occasionally words are mistranscribed.

## 2021-11-05 NOTE — PROGRESS NOTES
Oral Chemotherapy - New Referral    Received a referral from Dr. Montenegro     Medications: capecitabine (Xeloda)  Indication: Gallbladder cancer, adjuvant therapy  Relevant past treatments: None, s/p surgery - right hepatectomy, cholecystecomy, and right partial adrenalectomy  Is the therapy appropriate based on treatment guidelines and FDA labeling?: Yes  Therapeutic Goals: Take capecitabine with concurrent XRT for 42 days  Patient can self-administer oral medications: Yes    The current medication list was reviewed and drug-drug interactions of note include: pantoprazole can reduce the bioavailability of capecitabine. During education, I will see if the patient is willing to temporarily stop the PPI and use an H2RA like famotidine while she's taking capecitabine.    The patient has no relevant drug allergies.    The patient's most recent labs were reviewed and all are WNL to start treatment at this dose.    A prescription was released to [facility] retail pharmacy for   Drug: capecitabine  Strength: 500 mg  Directions: Take 2 tablets (1000 mg) by mouth twice daily on day 1-42 with radiation.  Quantity: 168  Refills: 0

## 2021-11-06 LAB — CANCER AG19-9 SERPL-ACNC: 22 U/ML

## 2021-11-08 ENCOUNTER — SPECIALTY PHARMACY (OUTPATIENT)
Dept: ONCOLOGY | Facility: HOSPITAL | Age: 81
End: 2021-11-08

## 2021-11-08 ENCOUNTER — HOSPITAL ENCOUNTER (OUTPATIENT)
Dept: RADIATION ONCOLOGY | Facility: HOSPITAL | Age: 81
Setting detail: RADIATION/ONCOLOGY SERIES
Discharge: HOME | End: 2021-11-08

## 2021-11-08 ENCOUNTER — OFFICE VISIT (OUTPATIENT)
Dept: RADIATION ONCOLOGY | Facility: HOSPITAL | Age: 81
End: 2021-11-08

## 2021-11-08 VITALS
WEIGHT: 149 LBS | OXYGEN SATURATION: 98 % | TEMPERATURE: 98 F | HEIGHT: 64 IN | DIASTOLIC BLOOD PRESSURE: 79 MMHG | RESPIRATION RATE: 16 BRPM | BODY MASS INDEX: 25.44 KG/M2 | HEART RATE: 88 BPM | SYSTOLIC BLOOD PRESSURE: 173 MMHG

## 2021-11-08 DIAGNOSIS — I10 PRIMARY HYPERTENSION: ICD-10-CM

## 2021-11-08 DIAGNOSIS — R53.82 CHRONIC FATIGUE: ICD-10-CM

## 2021-11-08 DIAGNOSIS — R10.13 DYSPEPSIA: ICD-10-CM

## 2021-11-08 DIAGNOSIS — C22.1 INTRAHEPATIC CHOLANGIOCARCINOMA (HCC): Primary | ICD-10-CM

## 2021-11-08 DIAGNOSIS — R11.0 NAUSEA: ICD-10-CM

## 2021-11-08 PROCEDURE — G0463 HOSPITAL OUTPT CLINIC VISIT: HCPCS

## 2021-11-08 RX ORDER — SODIUM POLYSTYRENE SULFONATE 15 G/60ML
SUSPENSION ORAL; RECTAL
Qty: 600 ML | Refills: 1 | Status: SHIPPED | OUTPATIENT
Start: 2021-11-08 | End: 2021-11-08

## 2021-11-08 RX ORDER — SUCRALFATE ORAL 1 G/10ML
1 SUSPENSION ORAL 4 TIMES DAILY
Qty: 420 ML | Refills: 1 | Status: SHIPPED | OUTPATIENT
Start: 2021-11-08 | End: 2022-03-28

## 2021-11-08 RX ORDER — ONDANSETRON HYDROCHLORIDE 8 MG/1
8 TABLET, FILM COATED ORAL 3 TIMES DAILY PRN
Qty: 30 TABLET | Refills: 5 | Status: SHIPPED | OUTPATIENT
Start: 2021-11-08 | End: 2022-03-28

## 2021-11-08 RX ORDER — METOCLOPRAMIDE 5 MG/1
5 TABLET ORAL
Qty: 50 TABLET | Refills: 1 | Status: SHIPPED | OUTPATIENT
Start: 2021-11-08 | End: 2022-03-28

## 2021-11-08 NOTE — PROGRESS NOTES
Specialty Pharmacy Refill Coordination Note     Sheree is a 81 y.o. female contacted today regarding refills of  Capecitabine 2 tablets PO AM AND PM specialty medication(s).    Reviewed and verified with patient:      Specialty medication(s) and dose(s) confirmed: yes                   Follow-up: 28 day(s)     Alice Branch, Pharmacy Technician  Specialty Pharmacy Technician

## 2021-11-08 NOTE — PROGRESS NOTES
Specialty Pharmacy Refill Coordination Note     Sheree is a 81 y.o. female contacted today regarding refills of  Capecitabine 1000mg PO AM and 1000mg PO PM specialty medication(s).    Reviewed and verified with patient:      Specialty medication(s) and dose(s) confirmed: yes, no                   Follow-up: 28 day(s) for the remainder of the 42 day supply     Alice Branch, Pharmacy Technician  Specialty Pharmacy Technician

## 2021-11-08 NOTE — PROGRESS NOTES
CONSULTATION NOTE    NAME:      Sheree Hernandez  :                                                          1940  DATE OF CONSULTATION:                       21  REQUESTING PHYSICIAN:                   Jorje Montenegro MD  REASON FOR CONSULTATION:           Further evaluation management of the patient's intrahepatic cholangiocarcinoma for consideration of postoperative concurrent chemotherapy and radiation  Intrahepatic cholangiocarcinoma (HCC)  Staging form: Intrahepatic Bile Duct, AJCC 8th Edition  - Clinical: Stage IB (cT1b, cN0, cM0) - Signed by Jorje Montenegro MD on 2021  - Pathologic: Stage IIIA (pT3, pN0, cM0) - Signed by Jorje Montenegro MD on 2021           BRIEF HISTORY:  Sheree Hernandez  is a very pleasant 81 y.o. female  who has previously suffered from a fatigue and was sent for initial work-up that consisted of a heart cath and multiple other interventions.  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  The patient was sent for biopsy of his liver lesion in the inferior right lobe of the liver on 8/10/2021 that was consistent with adenocarcinoma.  The patient was taken for right-sided hepatectomy cholecystectomy and partial adrenalectomy on 2021 with Dr. Sharma at .  The patient was found to have a T3 N0 M0 lesion measuring 8.5 cm.  0 of 4 lymph nodes were involved but the patient did have LVSI and PNI.  The tumor did extend to the extra pack soft tissue between the liver and adrenal gland.  There was tumor positive focally at the cauterized margin.  The adrenal gland was not involved with carcinoma.    The patient's CA 19-9 2021 was 22 which was in the range of normal.    Patient reports that she is improving from a shortness of breath standpoint and is able to be fairly active at this time point, but she still frustrated that she is not  back to completely normal from for her diagnosis.  She has managed to largely maintain her weight.  She is lost 6 pounds since her initial diagnosis.  The patient has since recovered.  She has some general queasiness and tightness of her abdomen.  This bothers her when she sits up abruptly or when she begins walking around.  She reports that she has no issues when she lays down.  She reports that sensation has improved somewhat since the end of her surgery but has not gone away completely.      BMI:  Body mass index is 25.58 kg/m².      Social History     Substance and Sexual Activity   Alcohol Use Yes   • Types: 3 Glasses of wine per week    Comment: Maybe every 6 months, rare       Allergies   Allergen Reactions   • Pravastatin Myalgia       Social History     Tobacco Use   • Smoking status: Never Smoker   • Smokeless tobacco: Never Used   • Tobacco comment: Exposed to secondhand smoke   Vaping Use   • Vaping Use: Never used   Substance Use Topics   • Alcohol use: Yes     Types: 3 Glasses of wine per week     Comment: Maybe every 6 months, rare   • Drug use: No         Past Medical History:   Diagnosis Date   • Asthma    • Atypical chest pain 3/9/2017   • Cataract    • Chronic constipation    • Fracture, pelvis closed (HCC) 2015    x2   • Hyperlipidemia 3/9/2017   • Hypertension 3/9/2017   • 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   • Low bone mass     with elevated FRAX core   • Mild obstructive sleep apnea 1/30/2020   • Near syncope     negative cardiovascular workup    • Plantar fasciitis     Chronic   • PVC's (premature ventricular contractions)    • Senile keratosis     Multiple   • MANAN (stress urinary incontinence, female)    • Syncope, near     with negative cardiovascular workup       family history includes Breast cancer in her paternal aunt; Dementia in her mother; Heart attack in her mother; Heart failure in her mother; No Known Problems in her father.     Past Surgical History:   Procedure  Laterality Date   • ADRENALECTOMY  09/22/2021   • CARDIAC CATHETERIZATION N/A 1/18/2019    Procedure: Left Heart Cath;  Surgeon: Tucker Gonzalez MD;  Location: [facility] INVASIVE LOCATION;  Service: Cardiovascular   • CATARACT EXTRACTION  04/2019   • CHOLECYSTECTOMY  09/22/2021   • COLONOSCOPY  07/15/2020   • HYSTERECTOMY  1984    age 46 - ovarian cyst, endometriosis,  jorge/bso   • LIVER SURGERY Right 09/22/2021    Removed   • ROTATOR CUFF REPAIR Left 1989    Collar Bone Repair       Current Outpatient Medications:   •  [START ON 11/14/2021] capecitabine (XELODA) 500 MG chemo tablet, Take 2 tablets by mouth Every Morning AND 2 tablets Every Evening on days 1-42 with radiation., Disp: 168 tablet, Rfl: 0  •  docusate sodium (COLACE) 100 MG capsule, Take 100 mg by mouth 2 (Two) Times a Day., Disp: , Rfl:   •  losartan (COZAAR) 100 MG tablet, TAKE 1 TABLET BY MOUTH DAILY, Disp: 90 tablet, Rfl: 0  •  methocarbamol (ROBAXIN) 500 MG tablet, , Disp: , Rfl:   •  ondansetron (ZOFRAN) 8 MG tablet, Take 1 tablet by mouth 3 (Three) Times a Day As Needed for Nausea or Vomiting., Disp: 30 tablet, Rfl: 5  •  pantoprazole (PROTONIX) 40 MG EC tablet, Take 40 mg by mouth Daily., Disp: , Rfl:   •  metoclopramide (Reglan) 5 MG tablet, Take 1 tablet by mouth 4 (Four) Times a Day Before Meals & at Bedtime., Disp: 50 tablet, Rfl: 1  •  sucralfate (Carafate) 1 GM/10ML suspension, Take 10 mL by mouth 4 (Four) Times a Day., Disp: 420 mL, Rfl: 1     Review of Systems   Constitutional: Positive for appetite change.   Respiratory: Positive for shortness of breath.    Gastrointestinal: Positive for constipation and nausea.   Psychiatric/Behavioral: Positive for sleep disturbance. The patient is nervous/anxious.     A full 14 point review of systems was performed and was negative except as noted in the HPI.         Objective   VITAL SIGNS:   Vitals:    11/08/21 0857   BP: 173/79   Pulse: 88   Resp: 16   Temp: 98 °F (36.7 °C)   SpO2:  "98%  Comment: RA   Weight: 67.6 kg (149 lb)   Height: 162.6 cm (64\")   PainSc:   3   PainLoc: Abdomen  Comment: S/P surgery        KPS       90%    Physical Exam  Vitals and nursing note reviewed.   Constitutional:       Appearance: She is well-developed.   HENT:      Head: Normocephalic and atraumatic.   Eyes:      Conjunctiva/sclera: Conjunctivae normal.      Pupils: Pupils are equal, round, and reactive to light.   Neck:      Comments: No obviously enlarged cervical or supraclavicular LAD.  Cardiovascular:      Comments: Patient well perfused. Non cyanotic. No prominent JVD. No pedal edema  Pulmonary:      Effort: Pulmonary effort is normal.      Breath sounds: Normal breath sounds. No stridor. No wheezing.   Abdominal:      General: There is no distension.      Palpations: Abdomen is soft.   Musculoskeletal:         General: Normal range of motion.      Cervical back: Normal range of motion and neck supple.      Comments: Patient moves all extremities spontaneously.    Skin:     General: Skin is warm and dry.   Neurological:      Mental Status: She is alert and oriented to person, place, and time.      Comments: Coordination intact.   Psychiatric:         Behavior: Behavior normal.         Thought Content: Thought content normal.         Judgment: Judgment normal.          The following portions of the patient's history were reviewed and updated as appropriate: allergies, current medications, past family history, past medical history, past surgical history and problem list.  I have personally requested reviewed and interpreted the patient's images and radiology reports and pathology reports listed below:  CT Abdomen Pelvis With & Without Contrast    Result Date: 8/10/2021  1. Gradually progressive enhancement of the patient's right lobe liver lesion, more typical for solid tumor than for giant cavernous hemangioma. If a more definitive exam for hemangioma is indicated clinically, Technetium labeled RBC " hemangioma scan with SPECT imaging could be considered.  2.  Stable hepatic cysts.  3.  No evidence of acute intra-abdominal or intrapelvic disease elsewhere.  D:  08/10/2021 E:  08/10/2021  This report was finalized on 8/10/2021 9:46 PM by Dr. Salvador Gill MD.      CT Chest With Contrast Diagnostic    Result Date: 8/8/2021  Lungs are clear. Chest is negative. Please see prior CT abdomen and pelvis for findings below the diaphragm Signer Name: Ulises Bains MD  Signed: 8/8/2021 10:24 PM  Workstation Name: [facility]    MRI Brain With & Without Contrast    Result Date: 8/8/2021  1. No evidence of acute stroke or hemorrhage. 2. Age-appropriate atrophy with mild chronic small vessel ischemic disease in the white matter. 3. No enhancing masses are identified to suggest intracranial metastatic disease. 4. 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 Signer Name: Marquez Barnes MD  Signed: 8/8/2021 11:59 PM  Workstation Name: [facility]    CT Abdomen Pelvis With Contrast    Result Date: 8/8/2021  7.4 cm malignant-appearing mass in the right lobe of the liver with an adjacent low-density lesion suggesting another focus of malignancy. No evidence of extrahepatic spread of tumor. Otherwise negative Signer Name: Ulises Bains MD  Signed: 8/8/2021 8:11 PM  Workstation Name: [facility]    CT Needle Biopsy Liver    Result Date: 8/10/2021  Impression: 1. CT-guided right hepatic lobe mass biopsy as described above  This report was finalized on 8/10/2021 11:57 AM by Nabeel Rendon.      I reviewed Dr. Malloy's last note.    I reviewed Dr. Montenegro's last note.    I reviewed the CT region the patient's postoperative CT scan.    I reviewed the patient's AFP from 8/8/2021 showing a value of 9.27.  The patient's CA 19-9 from the same date  was 85.  The peak value measured in September based on Dr. Sharma was 92.7.  Patient's most recent CA 19-9 11/15/2021 in the postoperative setting was 22, which is normal.  Assessment      IMPRESSION:     The patient is a highly functional 81-year-old female with a pT3 N0 M0 intrahepatic cholangiocarcinoma with focally positive margin on the cauterized border of the resected liver.  The patient's CA 19-9 had a nice decrease following surgery with Dr. Sharma on 9/23/2021 and fell from 85 to about 22 which is now normal.  The patient has recovered very nicely but still has some lingering symptoms, including some nausea and excess fullness in her stomach.  She takes PPIs for this.  The patient is interested in trying additional medicines for this I would recommend Carafate and Reglan as a trial to see if this does not help her.  The patient did have some focally positive margins as well as LVSI and her tumor extended into the extrahepatic soft tissue.  She would likely benefit from concurrent chemotherapy and radiation in an effort to decrease her risk of local recurrence.  We discussed the risk and benefits of proceeding with an adjuvant course of radiotherapy combined with Xeloda.  The patient is interested in pursuing these treatments.  I requested that she return for her planning and treatments with an empty stomach in an effort to minimize dose to the duodenum and stomach.  The patient has had no recent imaging following her surgery and I think would be helpful to send the patient for a CT scan of the abdomen and pelvis with contrast and have her return for CT simulation.  I recommend IGR T and IMRT to a dose of 54 Gray to the area of concern for positive margins and a lower dose of 45 Gray to the rest of the postoperative field in any adjacent colton basins.  Due to the large amount of mobility of the various organs in the area I recommend IGR T with a cone beam CT scan.  I discussed the case personally with   Lan.  I will discuss the case with Dr. Sharma when we are closer to planning for more direction on where he is concerned about the positive margin related to her planning for the boost.    Greater than 1 hour was spent preparing for and coordinating this visit. >50% of the time was spent in direct face to face conversation with the patient teaching, answering question, and providing explanations regarding the patient's case.  The decision to treat the patient with radiation is a complex one and carries the risk of long-term side effects and complications.  The patient's malignancy represents a complicated life threatening condition that requires complex multidisciplinary management for treatment and followup.    RECOMMENDATIONS:    PT 3 N0 M0 cholangiocarcinoma of the liver  -Status post resection with Dr. RILEY SAXENA at   -CA 19-9 85 initially  -CA 19-9 11/5/21: 22  -Focally positive margins  -Tumor extended to the extrapelvic soft tissues between the liver and the adrenal gland  -LVSI and PNI present  -0/4 lymph nodes involved  -Dr. Montenegro plans on Xeloda currently with radiation  -Recommend diagnostic CT scan of the abdomen pelvis with contrast  -Recommend CT simulation with empty stomach  -Recommend 45-54 Rainey to the resection bed, adjacent lymph nodes, and region involved with focally positive margins  -Recommend IGR T and IMRT    Nausea and dyspepsia  -On PPI  -Recommend Carafate  -Recommend trial of Reglan    Hyperlipidemia  -Continue present regimen    Hypertension  -Continue present regimen  -We will need to monitor and may have to adjust during treatments to the patient, hypotensive         Bunny Billings MD      Errors in dictation may reflect use of voice recognition software and not all errors in transcription may have been detected prior to signing.

## 2021-11-09 ENCOUNTER — OFFICE VISIT (OUTPATIENT)
Dept: ONCOLOGY | Facility: CLINIC | Age: 81
End: 2021-11-09

## 2021-11-09 ENCOUNTER — HOSPITAL ENCOUNTER (OUTPATIENT)
Dept: ONCOLOGY | Facility: HOSPITAL | Age: 81
Discharge: HOME | End: 2021-11-09
Admitting: INTERNAL MEDICINE

## 2021-11-09 ENCOUNTER — SPECIALTY PHARMACY (OUTPATIENT)
Dept: ONCOLOGY | Facility: HOSPITAL | Age: 81
End: 2021-11-09

## 2021-11-09 VITALS
BODY MASS INDEX: 25.42 KG/M2 | SYSTOLIC BLOOD PRESSURE: 145 MMHG | HEART RATE: 94 BPM | HEIGHT: 64 IN | OXYGEN SATURATION: 97 % | RESPIRATION RATE: 20 BRPM | WEIGHT: 148.9 LBS | DIASTOLIC BLOOD PRESSURE: 85 MMHG | TEMPERATURE: 97.1 F

## 2021-11-09 DIAGNOSIS — C22.1 INTRAHEPATIC CHOLANGIOCARCINOMA (HCC): Primary | ICD-10-CM

## 2021-11-09 PROCEDURE — G0463 HOSPITAL OUTPT CLINIC VISIT: HCPCS

## 2021-11-09 PROCEDURE — 99214 OFFICE O/P EST MOD 30 MIN: CPT | Performed by: NURSE PRACTITIONER

## 2021-11-09 NOTE — PROGRESS NOTES
Specialty Pharmacy - Oral Oncology     Subjective     Sheree Hernandez is a 81 y.o. female, who is followed by the specialty pharmacy service for capecitabine.    Cancer Staging  Intrahepatic cholangiocarcinoma (HCC)  Staging form: Intrahepatic Bile Duct, AJCC 8th Edition  - Clinical: Stage IB (cT1b, cN0, cM0) - Signed by Jorje Montenegro MD on 8/17/2021  - Pathologic: Stage IIIA (pT3, pN0, cM0) - Signed by Jorje Montenegro MD on 11/5/2021      Treatment Plan:  Treatment Plan Name:   OP GALLBLADDER Capecitabine + XRT      Treatment Plan Provider:   Jorje Montenegro MD      Treatment Plan Goal:   Control      Treatment Plan Status:   Active      Treatment Plan Start Date:   11/15/2021 (Planned)      Treatment Plan Specialty Medication:   capecitabine (XELODA) 500 MG chemo tablet, 1,000 mg, Oral, , 1 of 1 cycle, Start date: 11/14/2021, End date: --        Treatment Weight:   Weight type: Documented (effective on 11/5/2021), 67.7 kg (entered on 11/5/2021), 162.6 cm (entered on 11/5/2021), BSA 1.73 m2      Last Treatment Date:   12/20/2021 (Planned)      Treatment Discontinue Date:   [Plan is still active]      Treatment Discontinue Reason:   [Plan is still active]                Objective     Oral Chemotherapy Teaching  Oral Chemotherapy Regimen: Capecitabine + concurrent XRT for 42 days  Date Started Medication: When she starts radiation  Chemotherapy/Biotherapy Education Sheets Provided: Capecitabine, diarrhea, CINV, oral adherence, personalized treatment calendar  Expected Duration of Therapy: For days 1-42 with concurrent radiation    Initial Teaching Comments   Safety   Storage instructions (away from children; away from heat/cold, sunlight, or moisture), handling - use of gloves (caregivers), washing hands after touching pills, managing waste Storage: In a dry location, at room temperature, away from light.  Keep medication in the original container.  Discussed safe handling and what to do with any unused  medication.   Adherence    patient and/or caregiver on how to take medication, take with/without food, assess their adherence potential, stress importance of adherence, ways to manage adherence (pill boxes, phone reminders, calendars), what to do if miss a dose Dose/Frequency: 1000 mg PO BID on days 1-42 continuously during concomitant radiation.  Administration: By mouth within 30 minutes of a meal. Dr. Billings in radiation recommended the patient take this on an empty stomach before coming in for radiation.  Patient is likely to have good treatment adherence;  reinforced the importance of adherence. Reviewed how to address missed doses and to let us know of any missed doses.   Side Effects/Adverse Reactions    patient on potential side effects, s/s, ways to manage, when to call MD/seek help See full details below.   Miscellaneous   Food interactions, DDIs, financial issues Drug-drug interactions: The only drug-drug interaction with capecitabine is with pantoprazole. But, the patient indicated she has stopped taking that medication and she understands not to restart it.    Reminded the patient to let us know before making any changes or starting any new prescription or OTC medications so we can first assess drug interactions.  Drug-food interactions: None  Financial issues: None       TOPICS EDUCATION PROVIDED COMMENTS   ANEMIA:  role of RBC, cause, s/s, ways to manage, role of transfusion [x] Reviewed the role of RBC and the use of transfusions if hemoglobin decreases too much.  Patient to notify us if they experience shortness of breath, dizziness, or palpitations.  Also let patient know they could feel more tired than usual and to try to stay active, but rest if they need to.    THROMBOCYTOPENIA:  role of platelet, cause, s/s, ways to prevent bleeding, things to avoid, when to seek help [x] Reviewed the role of platelets in blood clotting and when to call clinic (bloody nose that bleeds for 5 mins  despite pressure, a cut that won't stop bleeding despite pressure, gums that bleed excessively with brushing or flossing). Recommended using an electric razor, soft bristle toothbrush, and blowing your nose gently.    NEUTROPENIA:  role of WBC, cause, infection precautions, s/s of infection, when to call MD [x] Reviewed the role of WBC, good infection prevention practices, and when to call the clinic (temperature 100.4F, sore throat, burning urination, etc)  Vaccines: She has had the annual flu shot and 2 COVID-19 vaccines.  She has not yet had her COVID-19 booster.   DIARRHEA:  causes, s/s of dehydration, ways to manage, dietary changes, when to call MD [x] Provided supplementary handout with instructions for use of loperamide and other OTC therapies to manage diarrhea.  Instructed to call us if medications aren't working.    NAUSEA & VOMITING:  cause, use of antiemetics, dietary changes, when to call MD [x] PRN meds: ondansetron 8 mg PO TID PRN N/V  Pharmacy PRN meds sent to: [facility]  Instructed the patient to take a dose of the PRN medication at the first onset of nausea and if it's not working to call us for additional medications.  Also provided non-drug measures to mitigate nausea. Recommended adequate hydration.   MOUTH SORES:  causes, oral care, ways to manage [x] Recommended use of a soft bristle toothbrush and to prevent mouth sores by using baking soda, salt, water mouth wash QID after meals and before bedtime (swish and spit).   SKIN & NAIL CHANGES:  cause, s/s, ways to manage [x] Hand-foot syndrome, described the symptoms, prevention methods, and how to treat if it occurs.  Also mentioned a rash can occur (different from HFS) and to let us know if that happens.   ORGAN TOXICITIES:  cause, s/s, need for diagnostic tests, labs, when to notify MD [x] Let patient know we would be monitoring the function of her liver and other organs with regular lab work during treatment.   HOME CARE:  use of spill kits,  storing of PO chemo, how to manage bodily fluids [x] Discussed safe management of body fluids.   MISCELLANEOUS:  drug interactions, administration, vesicant, et [x] Serious side effects to be aware of that were discussed:  -cardiotoxicity - seek medical attention immediately if you have chest pain or tightness  -kidney damage - tell your team if you notice leg/feet swelling or have a decreased amount of urination       Assessment/Plan   Indication, effectiveness, safety and convenience of her specialty medication was reviewed today.     Verito Aguillon, PharmD, BCOP  Specialty Pharmacist  [phone]

## 2021-11-09 NOTE — PROGRESS NOTES
CHEMOTHERAPY PREPARATION    Sheree Hernandez  [id number]  1940    Chief Complaint: Treatment preparation and needs assessment    History of present illness:  Sheree Hernandez is a 81 y.o. year old female who is here today for treatment preparation and needs assessment.  The patient has been diagnosed with intrahepatic cholangiocarcinoma and is scheduled to begin treatment with capecitabine concurrent with radiation.     Oncology History:    Oncology/Hematology History   Intrahepatic cholangiocarcinoma (HCC)   8/17/2021 Initial Diagnosis    Intrahepatic cholangiocarcinoma (CMS/HCC)     8/17/2021 Cancer Staged    Staging form: Intrahepatic Bile Duct, AJCC 8th Edition  - Clinical: Stage IB (cT1b, cN0, cM0) - Signed by Jorje Montenegro MD on 8/17/2021 11/5/2021 Cancer Staged    Staging form: Intrahepatic Bile Duct, AJCC 8th Edition  - Pathologic: Stage IIIA (pT3, pN0, cM0) - Signed by Jorje Montenegro MD on 11/5/2021     11/15/2021 -  Chemotherapy    OP GALLBLADDER Capecitabine + XRT         The current medication list and allergy list were reviewed and reconciled.     Past Medical History, Past Surgical History, Social History, Family History have been reviewed and are without significant changes except as mentioned.    Review of Systems:    Review of Systems   Constitutional: Positive for appetite change and fatigue. Negative for fever and unexpected weight change.   HENT: Negative for mouth sores, sore throat and trouble swallowing.    Respiratory: Negative for cough, shortness of breath and wheezing.    Cardiovascular: Negative for chest pain, palpitations and leg swelling.   Gastrointestinal: Positive for constipation. Negative for abdominal distention, abdominal pain, diarrhea, nausea and vomiting.   Genitourinary: Negative for difficulty urinating, dysuria and frequency.   Musculoskeletal: Negative for arthralgias.   Skin: Negative for pallor, rash and wound.   Neurological: Negative for dizziness and  weakness.   Hematological: Does not bruise/bleed easily.   Psychiatric/Behavioral: Positive for sleep disturbance. Negative for confusion. The patient is not nervous/anxious.        Physical Exam:    Vitals:    11/09/21 1458   BP: 145/85   Pulse: 94   Resp: 20   Temp: 97.1 °F (36.2 °C)   SpO2: 97%     Vitals:    11/09/21 1458   PainSc:   4   PainLoc: Abdomen   Sheree Hernandez reports a pain score of 4.  Given her pain assessment as noted, treatment options were discussed and the following options were decided upon as a follow-up plan to address the patient's pain: continuation of current treatment plan for pain.         ECOG: (1) Restricted in Physically Strenuous Activity, Ambulatory & Able to Do Work of Light Nature    General: well appearing, in no acute distress  Psych: Mood is stable            NEEDS ASSESSMENTS    Genetics  The patient's new diagnosis and family history have been reviewed for genetic counseling needs. A genetic referral is not recommended.     Psychosocial  The patient has completed a PHQ-9 Depression Screening and the Distress Thermometer (DT) today.   PHQ-9 results show 5-9 (Mild Depression). The patient scored their distress today as 1 on a scale of 0-10 with 0 being no distress and 10 being extreme distress.   Problems marked by the patient as being an issue for them within the last week include emotional problems and physical problems.   Results were reviewed along with psychosocial resources offered by our cancer center.  Our oncology social worker will be flagged for a DT score of 4 or above, and a same day call will be made for a score of 9 or 10.  A mental health referral is offered at this time. The patient is not interested in a referral to [facility].   Copies of patient's questionnaires will be scanned into EMR for details and further reference.    Barriers to care  A barriers form was also completed by the patient today. We discussed services offered by our facility to help  "her have adequate access to care. The patient was given the name and contact information for our Oncology Social Worker, Ruth Snow.  Based upon barriers assessment today, the patient will not require a follow-up call from the  to further discuss needs.   A copy of the barriers form will also be scanned into EMR for details and further reference.     VAD Assessment  The patient and I discussed planned intervenous chemotherapy as well as other IV treatments that are often needed throughout the course of treatment. These may include, but are not limited to blood transfusions, antibiotics, and IV hydration. The vasculature does appear to be adequate for multiple peripheral IVs throughout their treatment course.     Advanced Care Planning  The patient and I discussed advanced care planning, \"Conversations that Matter\".   This service was offered, free of charge, for development of advance directives with a certified ACP facilitator.  The patient does not have an up-to-date advanced directive. The patient is not interested in an appointment with one of our facilitators to create or update their advanced directives.      Palliative Care  The patient and I discussed palliative care services. Palliative care is not the same as Hospice care. This is specialized medical care for people living with serious illness with the goal of improving quality of life for the patient and their family. [facility] has partnered with [facility] to offer our patients outpatient palliative care early along with their treatment to assist in coordination of care, symptom management, pain management, and medical decision making.  Oncology criteria for palliative care referral is not met at this time. The patient is not interested in a palliative care consultation.     Additional Referral needs  none      CHEMOTHERAPY EDUCATION    Chemotherapy education completed and consent obtained per oncology pharmacist.  See " their documentation for further details.    Booklets Given: Chemotherapy and You []  Nutrition for the Patient with Cancer During Treatment []    Sexuality/Fertility Books []     Chemotherapy Regimen:   Treatment Plans     Name Type Plan Dates Plan Provider         Active    OP GALLBLADDER Capecitabine + XRT ONCOLOGY TREATMENT  11/8/2021 - Present Jorje Montenegro MD                    Chemotherapy education comprehension reviewed. Questions answered and additional information discussed on topics including:  Neutropenia, Nutrition and appetite changes, Constipation, Diarrhea and Nausea & vomiting      Assessment and Plan:    Diagnoses and all orders for this visit:    1. Intrahepatic cholangiocarcinoma (HCC) (Primary)      The patient and I have reviewed their cancer diagnosis and scheduled treatment plan. Needs assessment was completed including genetics, psychosocial needs, barriers to care, VAD evaluation, advanced care planning, and palliative care services. Referrals have been ordered as appropriate based upon our evaluation and patient desires.     Chemotherapy teaching was also completed today per pharmacy.  Adequate time was given to answer questions.  Patient and family are aware of their care team members and contact information if they have questions or problems throughout the treatment course. The patient is adequately prepared to begin treatment as scheduled.     Reviewed with patient education regarding Zofran prescriptions sent to pharmacy.       Patient will have pretreatment labs drawn next week.  She did have labs drawn on 11/5/2021.    I spent 30 minutes caring for Sheree on this date of service. This time includes time spent by me in the following activities: preparing for the visit, reviewing tests, performing a medically appropriate examination and/or evaluation, counseling and educating the patient/family/caregiver, documenting information in the medical record and care coordination.     Katie  Pal, [facility]  11/09/21

## 2021-11-10 ENCOUNTER — TELEPHONE (OUTPATIENT)
Dept: RADIATION ONCOLOGY | Facility: HOSPITAL | Age: 81
End: 2021-11-10

## 2021-11-10 DIAGNOSIS — Z14.8 HEMOCHROMATOSIS CARRIER: Primary | ICD-10-CM

## 2021-11-15 NOTE — PROGRESS NOTES
Drug: capecitabine  Strength: 500 mg tablet  Directions: Take 2 tablets PO BID Days 1-42 with RT  QTY: 168  RF:0    Released to pharmacy: [facility]    Completed independent double check on medication order/RX.

## 2021-11-16 ENCOUNTER — LAB (OUTPATIENT)
Dept: LAB | Facility: HOSPITAL | Age: 81
End: 2021-11-16

## 2021-11-16 ENCOUNTER — HOSPITAL ENCOUNTER (OUTPATIENT)
Dept: RADIATION ONCOLOGY | Facility: HOSPITAL | Age: 81
Discharge: HOME | End: 2021-11-16

## 2021-11-16 DIAGNOSIS — C22.1 INTRAHEPATIC CHOLANGIOCARCINOMA (HCC): ICD-10-CM

## 2021-11-16 LAB
ALBUMIN SERPL-MCNC: 3.9 G/DL (ref 3.5–5.2)
ALBUMIN/GLOB SERPL: 1.5 G/DL
ALP SERPL-CCNC: 180 U/L (ref 39–117)
ALT SERPL W P-5'-P-CCNC: 22 U/L (ref 1–33)
ANION GAP SERPL CALCULATED.3IONS-SCNC: 10 MMOL/L (ref 5–15)
AST SERPL-CCNC: 31 U/L (ref 1–32)
BILIRUB SERPL-MCNC: 0.4 MG/DL (ref 0–1.2)
BUN SERPL-MCNC: 13 MG/DL (ref 8–23)
BUN/CREAT SERPL: 17.3 (ref 7–25)
CALCIUM SPEC-SCNC: 9.1 MG/DL (ref 8.6–10.5)
CHLORIDE SERPL-SCNC: 107 MMOL/L (ref 98–107)
CO2 SERPL-SCNC: 25 MMOL/L (ref 22–29)
CREAT SERPL-MCNC: 0.75 MG/DL (ref 0.57–1)
ERYTHROCYTE [DISTWIDTH] IN BLOOD BY AUTOMATED COUNT: 15.8 % (ref 12.3–15.4)
GFR SERPL CREATININE-BSD FRML MDRD: 74 ML/MIN/1.73
GLOBULIN UR ELPH-MCNC: 2.6 GM/DL
GLUCOSE SERPL-MCNC: 98 MG/DL (ref 65–99)
HCT VFR BLD AUTO: 43.2 % (ref 34–46.6)
HGB BLD-MCNC: 14 G/DL (ref 12–15.9)
LYMPHOCYTES # BLD AUTO: 1.7 10*3/MM3 (ref 0.7–3.1)
LYMPHOCYTES NFR BLD AUTO: 31.5 % (ref 19.6–45.3)
MCH RBC QN AUTO: 29.6 PG (ref 26.6–33)
MCHC RBC AUTO-ENTMCNC: 32.5 G/DL (ref 31.5–35.7)
MCV RBC AUTO: 91.1 FL (ref 79–97)
MONOCYTES # BLD AUTO: 0.6 10*3/MM3 (ref 0.1–0.9)
MONOCYTES NFR BLD AUTO: 10.8 % (ref 5–12)
NEUTROPHILS NFR BLD AUTO: 3.1 10*3/MM3 (ref 1.7–7)
NEUTROPHILS NFR BLD AUTO: 57.7 % (ref 42.7–76)
PLATELET # BLD AUTO: 177 10*3/MM3 (ref 140–450)
PMV BLD AUTO: 9.5 FL (ref 6–12)
POTASSIUM SERPL-SCNC: 3.9 MMOL/L (ref 3.5–5.2)
PROT SERPL-MCNC: 6.5 G/DL (ref 6–8.5)
RBC # BLD AUTO: 4.74 10*6/MM3 (ref 3.77–5.28)
SODIUM SERPL-SCNC: 142 MMOL/L (ref 136–145)
WBC # BLD AUTO: 5.4 10*3/MM3 (ref 3.4–10.8)

## 2021-11-16 PROCEDURE — 80053 COMPREHEN METABOLIC PANEL: CPT

## 2021-11-16 PROCEDURE — 77332 RADIATION TREATMENT AID(S): CPT | Performed by: RADIOLOGY

## 2021-11-16 PROCEDURE — 77399 UNLISTED PX MED RADJ PHYSICS: CPT | Performed by: RADIOLOGY

## 2021-11-16 PROCEDURE — 77470 SPECIAL RADIATION TREATMENT: CPT | Performed by: RADIOLOGY

## 2021-11-16 PROCEDURE — 36415 COLL VENOUS BLD VENIPUNCTURE: CPT

## 2021-11-16 PROCEDURE — 85025 COMPLETE CBC W/AUTO DIFF WBC: CPT

## 2021-11-19 ENCOUNTER — HOSPITAL ENCOUNTER (OUTPATIENT)
Dept: CT IMAGING | Facility: HOSPITAL | Age: 81
Discharge: HOME | End: 2021-11-19
Admitting: RADIOLOGY

## 2021-11-19 ENCOUNTER — TELEPHONE (OUTPATIENT)
Dept: RADIATION ONCOLOGY | Facility: HOSPITAL | Age: 81
End: 2021-11-19

## 2021-11-19 DIAGNOSIS — Z14.8 HEMOCHROMATOSIS CARRIER: ICD-10-CM

## 2021-11-19 PROCEDURE — 25010000002 IOPAMIDOL 61 % SOLUTION: Performed by: RADIOLOGY

## 2021-11-19 PROCEDURE — 74177 CT ABD & PELVIS W/CONTRAST: CPT

## 2021-11-19 RX ADMIN — IOPAMIDOL 97 ML: 612 INJECTION, SOLUTION INTRAVENOUS at 15:37

## 2021-11-19 NOTE — TELEPHONE ENCOUNTER
Pt called requesting any change to carafate order per Dr. Billings, explained that there was not a substitute

## 2021-11-19 NOTE — TELEPHONE ENCOUNTER
Returned call to Mrs. Rothnandini explained radiation would not start this Sunday and we would call with a start time

## 2021-11-22 ENCOUNTER — TELEPHONE (OUTPATIENT)
Dept: RADIATION ONCOLOGY | Facility: HOSPITAL | Age: 81
End: 2021-11-22

## 2021-11-22 NOTE — TELEPHONE ENCOUNTER
Returned call to Mrs. Conklin re ct scan, explained that per Dr. Billings that cyst would not need to be drained. Pt inquired about start date for radiation explained that we would call with a start date.

## 2021-12-01 ENCOUNTER — HOSPITAL ENCOUNTER (OUTPATIENT)
Dept: RADIATION ONCOLOGY | Facility: HOSPITAL | Age: 81
Setting detail: RADIATION/ONCOLOGY SERIES
Discharge: HOME | End: 2021-12-01

## 2021-12-08 ENCOUNTER — NURSE NAVIGATOR (OUTPATIENT)
Dept: ONCOLOGY | Facility: CLINIC | Age: 81
End: 2021-12-08

## 2021-12-08 PROCEDURE — 77338 DESIGN MLC DEVICE FOR IMRT: CPT | Performed by: RADIOLOGY

## 2021-12-08 PROCEDURE — 77301 RADIOTHERAPY DOSE PLAN IMRT: CPT | Performed by: RADIOLOGY

## 2021-12-08 PROCEDURE — 77300 RADIATION THERAPY DOSE PLAN: CPT | Performed by: RADIOLOGY

## 2021-12-08 NOTE — PROGRESS NOTES
Spoke with patient on the phone regarding upcoming radiation appointment. Patient will start radiation tomorrow at 0920. She has her oral meds and will start them according to the instructions she received at her teaching appointment. Will follow up with her regarding her questions about lab work.

## 2021-12-08 NOTE — PROGRESS NOTES
Patient's appointment has been changed to 1020 for radiation. She will have labs drawn in AM and then will have labs drawn Q Mondays. Patient is agreeable with this plan and will call with any new concerns.

## 2021-12-09 ENCOUNTER — HOSPITAL ENCOUNTER (OUTPATIENT)
Dept: RADIATION ONCOLOGY | Facility: HOSPITAL | Age: 81
Discharge: HOME | End: 2021-12-09

## 2021-12-09 ENCOUNTER — LAB (OUTPATIENT)
Dept: LAB | Facility: HOSPITAL | Age: 81
End: 2021-12-09

## 2021-12-09 DIAGNOSIS — C22.1 INTRAHEPATIC CHOLANGIOCARCINOMA (HCC): ICD-10-CM

## 2021-12-09 LAB
ALBUMIN SERPL-MCNC: 3.8 G/DL (ref 3.5–5.2)
ALBUMIN/GLOB SERPL: 1.5 G/DL
ALP SERPL-CCNC: 207 U/L (ref 39–117)
ALT SERPL W P-5'-P-CCNC: 22 U/L (ref 1–33)
ANION GAP SERPL CALCULATED.3IONS-SCNC: 11 MMOL/L (ref 5–15)
AST SERPL-CCNC: 31 U/L (ref 1–32)
BILIRUB SERPL-MCNC: 0.3 MG/DL (ref 0–1.2)
BUN SERPL-MCNC: 14 MG/DL (ref 8–23)
BUN/CREAT SERPL: 18.9 (ref 7–25)
CALCIUM SPEC-SCNC: 9.2 MG/DL (ref 8.6–10.5)
CHLORIDE SERPL-SCNC: 107 MMOL/L (ref 98–107)
CO2 SERPL-SCNC: 24 MMOL/L (ref 22–29)
CREAT SERPL-MCNC: 0.74 MG/DL (ref 0.57–1)
ERYTHROCYTE [DISTWIDTH] IN BLOOD BY AUTOMATED COUNT: 15.6 % (ref 12.3–15.4)
GFR SERPL CREATININE-BSD FRML MDRD: 75 ML/MIN/1.73
GLOBULIN UR ELPH-MCNC: 2.6 GM/DL
GLUCOSE SERPL-MCNC: 83 MG/DL (ref 65–99)
HCT VFR BLD AUTO: 41.1 % (ref 34–46.6)
HGB BLD-MCNC: 13.5 G/DL (ref 12–15.9)
LYMPHOCYTES # BLD AUTO: 1.5 10*3/MM3 (ref 0.7–3.1)
LYMPHOCYTES NFR BLD AUTO: 29.3 % (ref 19.6–45.3)
MCH RBC QN AUTO: 30.1 PG (ref 26.6–33)
MCHC RBC AUTO-ENTMCNC: 33 G/DL (ref 31.5–35.7)
MCV RBC AUTO: 91.3 FL (ref 79–97)
MONOCYTES # BLD AUTO: 0.4 10*3/MM3 (ref 0.1–0.9)
MONOCYTES NFR BLD AUTO: 8.5 % (ref 5–12)
NEUTROPHILS NFR BLD AUTO: 3.1 10*3/MM3 (ref 1.7–7)
NEUTROPHILS NFR BLD AUTO: 62.2 % (ref 42.7–76)
PLATELET # BLD AUTO: 180 10*3/MM3 (ref 140–450)
PMV BLD AUTO: 9.9 FL (ref 6–12)
POTASSIUM SERPL-SCNC: 3.9 MMOL/L (ref 3.5–5.2)
PROT SERPL-MCNC: 6.4 G/DL (ref 6–8.5)
RBC # BLD AUTO: 4.5 10*6/MM3 (ref 3.77–5.28)
SODIUM SERPL-SCNC: 142 MMOL/L (ref 136–145)
WBC NRBC COR # BLD: 5 10*3/MM3 (ref 3.4–10.8)

## 2021-12-09 PROCEDURE — 80053 COMPREHEN METABOLIC PANEL: CPT

## 2021-12-09 PROCEDURE — 77336 RADIATION PHYSICS CONSULT: CPT | Performed by: RADIOLOGY

## 2021-12-09 PROCEDURE — 77386: CPT | Performed by: RADIOLOGY

## 2021-12-09 PROCEDURE — 36415 COLL VENOUS BLD VENIPUNCTURE: CPT

## 2021-12-09 PROCEDURE — 85025 COMPLETE CBC W/AUTO DIFF WBC: CPT

## 2021-12-10 PROCEDURE — 77386: CPT | Performed by: RADIOLOGY

## 2021-12-13 ENCOUNTER — HOSPITAL ENCOUNTER (OUTPATIENT)
Dept: RADIATION ONCOLOGY | Facility: HOSPITAL | Age: 81
Discharge: HOME | End: 2021-12-13

## 2021-12-13 ENCOUNTER — LAB (OUTPATIENT)
Dept: LAB | Facility: HOSPITAL | Age: 81
End: 2021-12-13

## 2021-12-13 DIAGNOSIS — C22.1 INTRAHEPATIC CHOLANGIOCARCINOMA (HCC): ICD-10-CM

## 2021-12-13 LAB
ALBUMIN SERPL-MCNC: 3.8 G/DL (ref 3.5–5.2)
ALBUMIN/GLOB SERPL: 1.5 G/DL
ALP SERPL-CCNC: 214 U/L (ref 39–117)
ALT SERPL W P-5'-P-CCNC: 21 U/L (ref 1–33)
ANION GAP SERPL CALCULATED.3IONS-SCNC: 11 MMOL/L (ref 5–15)
AST SERPL-CCNC: 28 U/L (ref 1–32)
BASOPHILS # BLD AUTO: 0.04 10*3/MM3 (ref 0–0.2)
BASOPHILS NFR BLD AUTO: 0.8 % (ref 0–1.5)
BILIRUB SERPL-MCNC: 0.4 MG/DL (ref 0–1.2)
BUN SERPL-MCNC: 14 MG/DL (ref 8–23)
BUN/CREAT SERPL: 19.7 (ref 7–25)
CALCIUM SPEC-SCNC: 9.2 MG/DL (ref 8.6–10.5)
CHLORIDE SERPL-SCNC: 103 MMOL/L (ref 98–107)
CO2 SERPL-SCNC: 23 MMOL/L (ref 22–29)
CREAT SERPL-MCNC: 0.71 MG/DL (ref 0.57–1)
DEPRECATED RDW RBC AUTO: 51.8 FL (ref 37–54)
EOSINOPHIL # BLD AUTO: 0.12 10*3/MM3 (ref 0–0.4)
EOSINOPHIL NFR BLD AUTO: 2.3 % (ref 0.3–6.2)
ERYTHROCYTE [DISTWIDTH] IN BLOOD BY AUTOMATED COUNT: 15.1 % (ref 12.3–15.4)
GFR SERPL CREATININE-BSD FRML MDRD: 79 ML/MIN/1.73
GLOBULIN UR ELPH-MCNC: 2.6 GM/DL
GLUCOSE SERPL-MCNC: 100 MG/DL (ref 65–99)
HCT VFR BLD AUTO: 45.1 % (ref 34–46.6)
HGB BLD-MCNC: 14.6 G/DL (ref 12–15.9)
IMM GRANULOCYTES # BLD AUTO: 0.02 10*3/MM3 (ref 0–0.05)
IMM GRANULOCYTES NFR BLD AUTO: 0.4 % (ref 0–0.5)
LYMPHOCYTES # BLD AUTO: 1.14 10*3/MM3 (ref 0.7–3.1)
LYMPHOCYTES NFR BLD AUTO: 21.9 % (ref 19.6–45.3)
MCH RBC QN AUTO: 29.9 PG (ref 26.6–33)
MCHC RBC AUTO-ENTMCNC: 32.4 G/DL (ref 31.5–35.7)
MCV RBC AUTO: 92.4 FL (ref 79–97)
MONOCYTES # BLD AUTO: 0.52 10*3/MM3 (ref 0.1–0.9)
MONOCYTES NFR BLD AUTO: 10 % (ref 5–12)
NEUTROPHILS NFR BLD AUTO: 3.36 10*3/MM3 (ref 1.7–7)
NEUTROPHILS NFR BLD AUTO: 64.6 % (ref 42.7–76)
NRBC BLD AUTO-RTO: 0 /100 WBC (ref 0–0.2)
PLATELET # BLD AUTO: 161 10*3/MM3 (ref 140–450)
PMV BLD AUTO: 12.8 FL (ref 6–12)
POTASSIUM SERPL-SCNC: 3.8 MMOL/L (ref 3.5–5.2)
PROT SERPL-MCNC: 6.4 G/DL (ref 6–8.5)
RBC # BLD AUTO: 4.88 10*6/MM3 (ref 3.77–5.28)
SODIUM SERPL-SCNC: 137 MMOL/L (ref 136–145)
WBC NRBC COR # BLD: 5.2 10*3/MM3 (ref 3.4–10.8)

## 2021-12-13 PROCEDURE — 36415 COLL VENOUS BLD VENIPUNCTURE: CPT

## 2021-12-13 PROCEDURE — 85025 COMPLETE CBC W/AUTO DIFF WBC: CPT

## 2021-12-13 PROCEDURE — 80053 COMPREHEN METABOLIC PANEL: CPT

## 2021-12-13 PROCEDURE — 77386: CPT | Performed by: RADIOLOGY

## 2021-12-14 ENCOUNTER — HOSPITAL ENCOUNTER (OUTPATIENT)
Dept: RADIATION ONCOLOGY | Facility: HOSPITAL | Age: 81
Discharge: HOME | End: 2021-12-14

## 2021-12-14 VITALS — WEIGHT: 144.5 LBS | BODY MASS INDEX: 24.8 KG/M2

## 2021-12-14 DIAGNOSIS — R16.0 LIVER MASSES: Primary | ICD-10-CM

## 2021-12-14 PROCEDURE — 77386: CPT | Performed by: RADIOLOGY

## 2021-12-15 ENCOUNTER — TELEPHONE (OUTPATIENT)
Dept: FAMILY MEDICINE CLINIC | Facility: CLINIC | Age: 81
End: 2021-12-15

## 2021-12-15 ENCOUNTER — DOCUMENTATION (OUTPATIENT)
Dept: NUTRITION | Facility: HOSPITAL | Age: 81
End: 2021-12-15

## 2021-12-15 ENCOUNTER — HOSPITAL ENCOUNTER (OUTPATIENT)
Dept: RADIATION ONCOLOGY | Facility: HOSPITAL | Age: 81
Discharge: HOME | End: 2021-12-15

## 2021-12-15 DIAGNOSIS — E78.5 HYPERLIPIDEMIA, UNSPECIFIED HYPERLIPIDEMIA TYPE: ICD-10-CM

## 2021-12-15 DIAGNOSIS — Z14.8 HEMOCHROMATOSIS CARRIER: ICD-10-CM

## 2021-12-15 DIAGNOSIS — R53.82 CHRONIC FATIGUE: ICD-10-CM

## 2021-12-15 DIAGNOSIS — R79.89 ELEVATED FERRITIN: ICD-10-CM

## 2021-12-15 DIAGNOSIS — E55.9 VITAMIN D DEFICIENCY, UNSPECIFIED: ICD-10-CM

## 2021-12-15 DIAGNOSIS — I10 ESSENTIAL HYPERTENSION: Primary | ICD-10-CM

## 2021-12-15 DIAGNOSIS — R06.02 SHORTNESS OF BREATH: ICD-10-CM

## 2021-12-15 DIAGNOSIS — F41.1 GENERALIZED ANXIETY DISORDER: ICD-10-CM

## 2021-12-15 PROCEDURE — 77386: CPT | Performed by: RADIOLOGY

## 2021-12-15 NOTE — PROGRESS NOTES
"   Outpatient Oncology Nutrition     Reason for Visit:     Oncology Nutrition Screening and Patient Education    Patient Name:  Sheree Hernandez    :  1940    MRN:  [id number]    Date of Encounter: 12/15/2021    Nutrition Assessment   Diagnosis:  Intra hepatic cholangiocarcinoma      Clinical: Stage IB (cT1b, cN0, cM0)   2021        Pathologic: Stage IIIA (pT3, pN0, cM0) - 2021      Surgery: Right-sided hepatectomy cholecystectomy and partial adrenalectomy on       2021 with Dr. Sharma at  - T3 N0 M0 lesion measuring 8.5 cm.  0 of 4 lymph nodes were involved but the patient did have LVSI and PNI - Focally positive margins      Chemotherapy: Capecitabine (XELODA) 150 MG chemo tablet - take 2 tables in the AM  and 2 tablets in the PM on days 1-42 concurrent with XRT      Radiation:  IGR T and IMRT to a dose of 54 Gray to the area of concern for positive margins and a lower dose of 45 Gray to the rest of the postoperative field in any adjacent colton basins    Patient Active Problem List   Diagnosis   • Hypertension   • Atypical chest pain   • Dyslipidemia   • Dyspnea on exertion   • Allergic rhinitis   • Abnormal stress test   • Idiopathic osteoarthritis   • BONNIE (obstructive sleep apnea)   • Chronic fatigue   • Hemochromatosis carrier   • Erythrocytosis   • Liver masses   • Intrahepatic cholangiocarcinoma (HCC)   • Nausea   • Dyspepsia       Food / Nutrition Related History       Hydration Status     Goal:  Approximately 72 ounces    Enteral Feeding   NA    Anthropometric Measurements     Height:    Ht Readings from Last 1 Encounters:   21 162.6 cm (64\")       Weight:    Wt Readings from Last 1 Encounters:   21 65.5 kg (144 lb 8 oz)       BMI: 24.8 / normal    Weight Change: 6 lbs weight loss from initial diagnosis  / patient states that  lbs    Review of Lab Data (Time Frame - 1 month / 2 month)   Reviewed 12/15/21    Medication Review   Reviewed 12/15/21    Nutrition Focused " Physical Findings       Nutrition Impact Symptoms     Appetite change  Constipation  Nausea / Excess stomach fullness - treated with PPI, Carafate and Reglan trial    Physical Activity      Not my normal self, but able to be up and about with fairly normal activities    Current Nutritional Intake     Oral diet:  Regular    Malnutrition Risk Assessment     Recent weight loss over the past 6 months:  Yes    How much weight loss:  1 = 2-13 lbs    Eating poorly because of a decreased appetite:  1 = Yes    Malnutrition Screening Score:     MST = 2 more Patient at risk for malnutrition     Nutrition Diagnosis     Problem    Etiology    Signs / Symptoms      Nutrition Intervention   Initial consultation visit with patient as she begins radiation.  She states that she is experiencing early satiety and able to only eat small amounts at a time.  To minimize dose to the duodenum and stomach, patient has been instructed per Dr. Billings to receive daily treatment with a fairly empty stomach; she is presently consuming only a small amount of pudding or yogurt in the morning prior to treatment.  She states that in doing this, she has decreased her overall daily normal intake consumption - will need to monitor weights through treatment to avoid excessive weight changes.  Discussed role of protein and tips for increasing, as well as power packing to boost intake.    She states that she has not built back up to her normal stamina level, but continues to work toward this goal.      Discussed varieties of ONS available and indication for use to supplement oral intake.    Goal       Monitoring / Evaluation     Will continue to follow.

## 2021-12-15 NOTE — TELEPHONE ENCOUNTER
PT MOTHER CALLED AND WOULD LIKE TO KNOW IF LAB ORDERS CAN BE PUT IN COUPLE DAYS BEFORE APPT.  12/21, PT WILL NOT BE ABLE TO FAST THE DAY OF APPT  BECAUSE SHE IS DOING CHEMO. AND WOULD LIKE TO COME IN COUPLE DAYS PRIOR TO APPT TO HAVE LAB ORDER DONE.    PLEASE ADVISE.  CALL BACK:[phone]

## 2021-12-15 NOTE — TELEPHONE ENCOUNTER
Caller: Tiana Olvera    Relationship: Emergency Contact    Best call back number:     What orders are you requesting (i.e. lab or imaging): LAB ORDER FOR A WELLNESS VISIT     In what timeframe would the patient need to come in: BEFORE HER APPOINTMENT IN 12/21    Where will you receive your lab/imaging services: WITHIN [facility]

## 2021-12-16 ENCOUNTER — HOSPITAL ENCOUNTER (OUTPATIENT)
Dept: RADIATION ONCOLOGY | Facility: HOSPITAL | Age: 81
Discharge: HOME | End: 2021-12-16

## 2021-12-16 PROCEDURE — 77386: CPT | Performed by: RADIOLOGY

## 2021-12-16 PROCEDURE — 77336 RADIATION PHYSICS CONSULT: CPT | Performed by: RADIOLOGY

## 2021-12-17 ENCOUNTER — OFFICE VISIT (OUTPATIENT)
Dept: ONCOLOGY | Facility: CLINIC | Age: 81
End: 2021-12-17

## 2021-12-17 ENCOUNTER — HOSPITAL ENCOUNTER (OUTPATIENT)
Dept: RADIATION ONCOLOGY | Facility: HOSPITAL | Age: 81
Discharge: HOME | End: 2021-12-17

## 2021-12-17 VITALS
HEART RATE: 89 BPM | WEIGHT: 145 LBS | BODY MASS INDEX: 24.75 KG/M2 | RESPIRATION RATE: 16 BRPM | OXYGEN SATURATION: 98 % | SYSTOLIC BLOOD PRESSURE: 121 MMHG | DIASTOLIC BLOOD PRESSURE: 85 MMHG | HEIGHT: 64 IN | TEMPERATURE: 96.4 F

## 2021-12-17 DIAGNOSIS — C22.1 INTRAHEPATIC CHOLANGIOCARCINOMA (HCC): Primary | ICD-10-CM

## 2021-12-17 PROCEDURE — 99214 OFFICE O/P EST MOD 30 MIN: CPT | Performed by: INTERNAL MEDICINE

## 2021-12-17 PROCEDURE — 77386: CPT | Performed by: RADIOLOGY

## 2021-12-17 RX ORDER — CYANOCOBALAMIN (VITAMIN B-12) 500 MCG
TABLET ORAL
COMMUNITY

## 2021-12-17 NOTE — PROGRESS NOTES
Follow Up Office Visit      Date: 2021     Patient Name: Sheree Hernandez  MRN: [id number]  : 1940  Chief Complaint:  Follow-up for intrahepatic cholangiocarcinoma      History of Present Illness: Sheree Hernandez is a pleasant 79 y.o. female with a past medical history of hyperlipidemia and hypertension who presents today for evaluation of concern for hemochromatosis. The patient is accompanied by their self who contributes to the history of their care.  Patient states that over the past 2 years she has been having worsening fatigue especially with exertion.  Over the past several months this has become worse.  Patient states that she gets tired with basic activities including showering getting dressed in the morning and walking to and from her car from stores.  She states that her fatigue gets better after a few minutes of rest.  She has previously had an echocardiogram and cardiac catheterization within the past 2 years which did not reveal any cause of her fatigue with exertion.  She is also had an ultrasound of the liver which revealed stable cyst.  She was recently seen by her PCP who ordered iron studies which was notable for an elevated ferritin to 246.  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  All other mutations were negative.  She is currently up-to-date on her mammograms and colonoscopy with no active malignancies noted.  She is otherwise compliant with her statin and high blood pressure medicines.  Repeat abdominal imaging in 2021 concerning for enlarging liver lesion.  She was seen by Dr. Sharma and  is status post open right hepatectomy, cholecystectomy, right partial adrenalectomy on 2021.  Pathology showed a focal R1 resected margin.  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  0/4 lymph nodes were  positive for disease.  LVI and PNI were present.     Interval History:  Presents to clinic for follow-up.   Started chemo XRT on 12/9/2021.  Tolerating treatments well thus far.  Denies any significant fatigue, nausea, vomiting, diarrhea.  Denies any hand/foot rashes at this time    Oncology History:    Oncology/Hematology History   Intrahepatic cholangiocarcinoma (HCC)   8/17/2021 Initial Diagnosis    Intrahepatic cholangiocarcinoma (CMS/HCC)     8/17/2021 Cancer Staged    Staging form: Intrahepatic Bile Duct, AJCC 8th Edition  - Clinical: Stage IB (cT1b, cN0, cM0) - Signed by Jorje Montenegro MD on 8/17/2021 11/5/2021 Cancer Staged    Staging form: Intrahepatic Bile Duct, AJCC 8th Edition  - Pathologic: Stage IIIA (pT3, pN0, cM0) - Signed by Jorje Montenegro MD on 11/5/2021 12/9/2021 -  Chemotherapy    OP GALLBLADDER Capecitabine + XRT         Subjective      Review of Systems:   Constitutional: Negative for fevers, chills, or weight loss  Eyes: Negative for blurred vision or discharge         Ear/Nose/Throat: Negative for difficulty swallowing, sore throat, LAD                                                       Respiratory: Negative for cough, SOA, wheezing                                                                                        Cardiovascular: Negative for chest pain or palpitations                                                                  Gastrointestinal: Negative for nausea, vomiting or diarrhea                                                                     Genitourinary: Negative for dysuria or hematuria                                                                                           Musculoskeletal: Negative for any joint pains or muscle aches                                                                        Neurologic: Negative for any weakness, headaches, dizziness                                                                         Hematologic:  "Negative for any easy bleeding or bruising                                                                                   Psychiatric: Negative for anxiety or depression                          Past Medical History/Past Surgical History/ Family History/ Social History: Reviewed by me and unchanged from my previous documentation done on November 2021.     Medications:     Current Outpatient Medications:   •  capecitabine (XELODA) 500 MG chemo tablet, Take 2 tablets by mouth Every Morning AND 2 tablets Every Evening on days 1-42 with radiation., Disp: 168 tablet, Rfl: 0  •  Cholecalciferol (Vitamin D3) 25 MCG (1000 UT) capsule, Take  by mouth., Disp: , Rfl:   •  Cyanocobalamin (VITAMIN B 12 PO), Take  by mouth., Disp: , Rfl:   •  docusate sodium (COLACE) 100 MG capsule, Take 100 mg by mouth 2 (Two) Times a Day., Disp: , Rfl:   •  Folic Acid 0.8 MG capsule, Take  by mouth., Disp: , Rfl:   •  ondansetron (ZOFRAN) 8 MG tablet, Take 1 tablet by mouth 3 (Three) Times a Day As Needed for Nausea or Vomiting., Disp: 30 tablet, Rfl: 5  •  sucralfate (Carafate) 1 GM/10ML suspension, Take 10 mL by mouth 4 (Four) Times a Day., Disp: 420 mL, Rfl: 1  •  Zinc 50 MG capsule, Take  by mouth., Disp: , Rfl:   •  hydroCHLOROthiazide (HYDRODIURIL) 12.5 MG tablet, Take 1 tablet by mouth Daily., Disp: 90 tablet, Rfl: 1  •  losartan (COZAAR) 100 MG tablet, TAKE 1 TABLET BY MOUTH DAILY, Disp: 90 tablet, Rfl: 0  •  methocarbamol (ROBAXIN) 500 MG tablet, Take 500 mg by mouth As Needed for Muscle Spasms., Disp: , Rfl:   •  metoclopramide (Reglan) 5 MG tablet, Take 1 tablet by mouth 4 (Four) Times a Day Before Meals & at Bedtime., Disp: 50 tablet, Rfl: 1    Allergies:   Allergies   Allergen Reactions   • Pravastatin Myalgia       Objective     Physical Exam:  Vital Signs:   Vitals:    12/17/21 0920   BP: 121/85   Pulse: 89   Resp: 16   Temp: 96.4 °F (35.8 °C)   TempSrc: Temporal   SpO2: 98%   Weight: 65.8 kg (145 lb)   Height: 162.6 cm (64\") "   PainSc: 0-No pain     Pain Score    12/17/21 0920   PainSc: 0-No pain     ECOG Performance Status: 0 - Asymptomatic    Constitutional: NAD, ECOG 0  Eyes: PERRLA, scleral anicteric  ENT: No LAD, no thyromegaly  Respiratory: CTAB, no wheezing, rales, rhonchi  Cardiovascular: RRR, no murmurs, pulses 2+ bilaterally  Abdomen: soft, NT/ND, no HSM  Musculoskeletal: strength 5/5 bilaterally, no c/c/e  Neurologic: A&O x 3, CN II-XII intact grossly    Results Review:   Lab on 12/13/2021   Component Date Value Ref Range Status   • Glucose 12/13/2021 100* 65 - 99 mg/dL Final   • BUN 12/13/2021 14  8 - 23 mg/dL Final   • Creatinine 12/13/2021 0.71  0.57 - 1.00 mg/dL Final   • Sodium 12/13/2021 137  136 - 145 mmol/L Final   • Potassium 12/13/2021 3.8  3.5 - 5.2 mmol/L Final   • Chloride 12/13/2021 103  98 - 107 mmol/L Final   • CO2 12/13/2021 23.0  22.0 - 29.0 mmol/L Final   • Calcium 12/13/2021 9.2  8.6 - 10.5 mg/dL Final   • Total Protein 12/13/2021 6.4  6.0 - 8.5 g/dL Final   • Albumin 12/13/2021 3.80  3.50 - 5.20 g/dL Final   • ALT (SGPT) 12/13/2021 21  1 - 33 U/L Final   • AST (SGOT) 12/13/2021 28  1 - 32 U/L Final   • Alkaline Phosphatase 12/13/2021 214* 39 - 117 U/L Final   • Total Bilirubin 12/13/2021 0.4  0.0 - 1.2 mg/dL Final   • eGFR Non  Amer 12/13/2021 79  >60 mL/min/1.73 Final   • Globulin 12/13/2021 2.6  gm/dL Final    Calculated Result   • A/G Ratio 12/13/2021 1.5  g/dL Final   • BUN/Creatinine Ratio 12/13/2021 19.7  7.0 - 25.0 Final   • Anion Gap 12/13/2021 11.0  5.0 - 15.0 mmol/L Final   • WBC 12/13/2021 5.20  3.40 - 10.80 10*3/mm3 Final   • RBC 12/13/2021 4.88  3.77 - 5.28 10*6/mm3 Final   • Hemoglobin 12/13/2021 14.6  12.0 - 15.9 g/dL Final   • Hematocrit 12/13/2021 45.1  34.0 - 46.6 % Final   • MCV 12/13/2021 92.4  79.0 - 97.0 fL Final   • MCH 12/13/2021 29.9  26.6 - 33.0 pg Final   • MCHC 12/13/2021 32.4  31.5 - 35.7 g/dL Final   • RDW 12/13/2021 15.1  12.3 - 15.4 % Final   • RDW-SD 12/13/2021 51.8   37.0 - 54.0 fl Final   • MPV 12/13/2021 12.8* 6.0 - 12.0 fL Final   • Platelets 12/13/2021 161  140 - 450 10*3/mm3 Final   • Neutrophil % 12/13/2021 64.6  42.7 - 76.0 % Final   • Lymphocyte % 12/13/2021 21.9  19.6 - 45.3 % Final   • Monocyte % 12/13/2021 10.0  5.0 - 12.0 % Final   • Eosinophil % 12/13/2021 2.3  0.3 - 6.2 % Final   • Basophil % 12/13/2021 0.8  0.0 - 1.5 % Final   • Immature Grans % 12/13/2021 0.4  0.0 - 0.5 % Final   • Neutrophils, Absolute 12/13/2021 3.36  1.70 - 7.00 10*3/mm3 Final   • Lymphocytes, Absolute 12/13/2021 1.14  0.70 - 3.10 10*3/mm3 Final   • Monocytes, Absolute 12/13/2021 0.52  0.10 - 0.90 10*3/mm3 Final   • Eosinophils, Absolute 12/13/2021 0.12  0.00 - 0.40 10*3/mm3 Final   • Basophils, Absolute 12/13/2021 0.04  0.00 - 0.20 10*3/mm3 Final   • Immature Grans, Absolute 12/13/2021 0.02  0.00 - 0.05 10*3/mm3 Final   • nRBC 12/13/2021 0.0  0.0 - 0.2 /100 WBC Final   Lab on 12/09/2021   Component Date Value Ref Range Status   • Glucose 12/09/2021 83  65 - 99 mg/dL Final   • BUN 12/09/2021 14  8 - 23 mg/dL Final   • Creatinine 12/09/2021 0.74  0.57 - 1.00 mg/dL Final   • Sodium 12/09/2021 142  136 - 145 mmol/L Final   • Potassium 12/09/2021 3.9  3.5 - 5.2 mmol/L Final    Slight hemolysis detected by analyzer. Results may be affected.   • Chloride 12/09/2021 107  98 - 107 mmol/L Final   • CO2 12/09/2021 24.0  22.0 - 29.0 mmol/L Final   • Calcium 12/09/2021 9.2  8.6 - 10.5 mg/dL Final   • Total Protein 12/09/2021 6.4  6.0 - 8.5 g/dL Final   • Albumin 12/09/2021 3.80  3.50 - 5.20 g/dL Final   • ALT (SGPT) 12/09/2021 22  1 - 33 U/L Final   • AST (SGOT) 12/09/2021 31  1 - 32 U/L Final   • Alkaline Phosphatase 12/09/2021 207* 39 - 117 U/L Final   • Total Bilirubin 12/09/2021 0.3  0.0 - 1.2 mg/dL Final   • eGFR Non  Amer 12/09/2021 75  >60 mL/min/1.73 Final   • Globulin 12/09/2021 2.6  gm/dL Final    Calculated Result   • A/G Ratio 12/09/2021 1.5  g/dL Final   • BUN/Creatinine Ratio  12/09/2021 18.9  7.0 - 25.0 Final   • Anion Gap 12/09/2021 11.0  5.0 - 15.0 mmol/L Final   • WBC 12/09/2021 5.00  3.40 - 10.80 10*3/mm3 Final    Verified by repeat analysis.    • RBC 12/09/2021 4.50  3.77 - 5.28 10*6/mm3 Final   • Hemoglobin 12/09/2021 13.5  12.0 - 15.9 g/dL Final   • Hematocrit 12/09/2021 41.1  34.0 - 46.6 % Final   • RDW 12/09/2021 15.6* 12.3 - 15.4 % Final   • MCV 12/09/2021 91.3  79.0 - 97.0 fL Final   • MCH 12/09/2021 30.1  26.6 - 33.0 pg Final   • MCHC 12/09/2021 33.0  31.5 - 35.7 g/dL Final   • MPV 12/09/2021 9.9  6.0 - 12.0 fL Final   • Platelets 12/09/2021 180  140 - 450 10*3/mm3 Final   • Neutrophil % 12/09/2021 62.2  42.7 - 76.0 % Final   • Lymphocyte % 12/09/2021 29.3  19.6 - 45.3 % Final   • Monocyte % 12/09/2021 8.5  5.0 - 12.0 % Final   • Neutrophils, Absolute 12/09/2021 3.10  1.70 - 7.00 10*3/mm3 Final   • Lymphocytes, Absolute 12/09/2021 1.50  0.70 - 3.10 10*3/mm3 Final   • Monocytes, Absolute 12/09/2021 0.40  0.10 - 0.90 10*3/mm3 Final       CT Abdomen Pelvis With Contrast    Result Date: 11/20/2021  Narrative: EXAMINATION: CT ABDOMEN/PELVIS W CONTRAST - 11/19/2021  INDICATION: Z14.8-Genetic carrier of other disease.  TECHNIQUE: 5 mm post oral and IV contrast portal venous phase and delayed venous phase images through the abdomen and pelvis  The radiation dose reduction device was turned on for each scan per the ALARA (As Low as Reasonably Achievable) protocol.  COMPARISON: 08/09/2021 CT scan of the abdomen and pelvis  FINDINGS: Previous exam report showed large right lobe liver lesion, suspicious for tumor. Subsequent diagnosis of cholangiocarcinoma.  Today's study shows expected postop changes of right lobe liver resection. There is a trace amount of fluid along the resection margin which otherwise appears smooth and uniform. Left lobe hepatic cysts appear stable. No new left lobe liver lesions are seen. There is normal contrast opacification of the remaining portal and hepatic  veins and other mesenteric vasculature.  Included lung bases appear clear. No significant abnormalities are seen of the spleen, left adrenal gland, or kidneys. Right adrenal gland is either obscured by post-op scarring or was resected along with the adjacent right lobe liver tumor. Pancreas appears unremarkable except for a small and apparently new round intermediate density but nonenhancing lesion, 9 mm in diameter, initial axial image #28 and delayed image #27, which remains approximately 48 Hounsfield unit units on both studies, perhaps a proteinaceous small pseudocyst. There is no visible associated enhancement. There is no apparent ductal dilatation or inflammation here.  Bowel loops are normal in caliber and normal in appearance. No mass, adenopathy, ascites or inflammatory change is seen in the pelvis. Bladder is decompressed. Delayed venous phase images show no evidence of obstructive uropathy. Bony structures appear to be intact.      Impression: 1. Generally expected postoperative changes of right lobe liver resection.  2. Interval development of small round 9 mm nonenhancing lesion of the pancreatic body, perhaps a minute pseudocyst.  3. No evidence of metastatic disease, acute inflammatory process, or other clearly acute intra-abdominal or intrapelvic disease.  DICTATED:   11/18/2021 EDITED/lfs:   11/18/2021  This report was finalized on 11/20/2021 8:24 AM by Dr. Salvador Gill MD.        Assessment / Plan      Assessment/Plan:   Intrahepatic cholangiocarcinoma (CMS/HCC) (Primary)  -Noted during her most recent hospitalization with CT scans concerning for an enlarging liver lesion to 7.3 cm that was previously noted to be hemangioma  -Triple phase CT concerning for a solid tumor lesion  -Biopsy consistent with an adenocarcinoma  -CA 19-9 elevated to 84.7  -CT chest as well as the rest of the CT abdomen/pelvis not concerning for distant or metastatic disease  -Status post open right hepatectomy,  cholecystectomy, right partial adrenalectomy on 9/23/2021 with Dr. 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  0/4 lymph nodes were positive for disease.  LVI and PNI were present.  Focal R1 resection margin  -Discussed with patient and her daughter that she would benefit from adjuvant chemoXRT using Xeloda.  Discussed side effects including but not limited to immunosuppression, diarrhea, abdominal pain, nausea, vomiting, hand/foot syndrome  -Repeat CA 19-9 22 in November 2021  -Started adjuvant chemo XRT with Xeloda (day 1-42) on 12/9/2021.  Scheduled to finish radiation on 1/19/2021     Hemochromatosis carrier  -Noted on recent labs with an elevated ferritin to 246 hemochromatosis gene profile positive for heterozygosity of H63D.  Other genes negative.  Given patient's age and previous cardiac liver work-up showing no evidence of dysfunction, it is unlikely that she has had iron deposition all this time.  The heterozygosity of H63D makes her carrier for hemochromatosis however does not mean that she has active disease  - CT A/P in July 2020 with no liver dysfunction but was notable for hemangioma which has now been confirmed to be a intrahepatic cholangiocarcinoma and a status post resection.  Treatment as above  -ECHO in July 2020 within normal limits  - Repeat iron studies in April 2021 stable with a ferritin of 229, iron level 100, transferrin saturation 27%  -Low concern for iron overload at this time.       Follow Up:   Follow-up in 3 weeks     Jorje Montenegro MD  Hematology and Oncology     Please note that portions of this note may have been completed with a voice recognition program. Efforts were made to edit the dictations, but occasionally words are mistranscribed.

## 2021-12-20 ENCOUNTER — LAB (OUTPATIENT)
Dept: LAB | Facility: HOSPITAL | Age: 81
End: 2021-12-20

## 2021-12-20 DIAGNOSIS — E55.9 VITAMIN D DEFICIENCY, UNSPECIFIED: ICD-10-CM

## 2021-12-20 DIAGNOSIS — E78.5 HYPERLIPIDEMIA, UNSPECIFIED HYPERLIPIDEMIA TYPE: ICD-10-CM

## 2021-12-20 DIAGNOSIS — F41.1 GENERALIZED ANXIETY DISORDER: ICD-10-CM

## 2021-12-20 DIAGNOSIS — R53.82 CHRONIC FATIGUE: ICD-10-CM

## 2021-12-20 DIAGNOSIS — I10 ESSENTIAL HYPERTENSION: ICD-10-CM

## 2021-12-20 DIAGNOSIS — Z14.8 HEMOCHROMATOSIS CARRIER: ICD-10-CM

## 2021-12-20 DIAGNOSIS — R79.89 ELEVATED FERRITIN: ICD-10-CM

## 2021-12-20 DIAGNOSIS — C22.1 INTRAHEPATIC CHOLANGIOCARCINOMA (HCC): ICD-10-CM

## 2021-12-20 DIAGNOSIS — R06.02 SHORTNESS OF BREATH: ICD-10-CM

## 2021-12-20 LAB
25(OH)D3 SERPL-MCNC: 30.7 NG/ML (ref 30–100)
ALBUMIN SERPL-MCNC: 3.9 G/DL (ref 3.5–5.2)
ALBUMIN/GLOB SERPL: 1.4 G/DL
ALP SERPL-CCNC: 198 U/L (ref 39–117)
ALT SERPL W P-5'-P-CCNC: 26 U/L (ref 1–33)
ANION GAP SERPL CALCULATED.3IONS-SCNC: 10 MMOL/L (ref 5–15)
AST SERPL-CCNC: 35 U/L (ref 1–32)
BACTERIA UR QL AUTO: ABNORMAL /HPF
BILIRUB SERPL-MCNC: 0.5 MG/DL (ref 0–1.2)
BILIRUB UR QL STRIP: NEGATIVE
BUN SERPL-MCNC: 11 MG/DL (ref 8–23)
BUN/CREAT SERPL: 17.2 (ref 7–25)
CALCIUM SPEC-SCNC: 9.1 MG/DL (ref 8.6–10.5)
CHLORIDE SERPL-SCNC: 105 MMOL/L (ref 98–107)
CHOLEST SERPL-MCNC: 251 MG/DL (ref 0–200)
CLARITY UR: CLEAR
CO2 SERPL-SCNC: 26 MMOL/L (ref 22–29)
COLOR UR: ABNORMAL
CREAT SERPL-MCNC: 0.64 MG/DL (ref 0.57–1)
ERYTHROCYTE [DISTWIDTH] IN BLOOD BY AUTOMATED COUNT: 15.8 % (ref 12.3–15.4)
GFR SERPL CREATININE-BSD FRML MDRD: 89 ML/MIN/1.73
GLOBULIN UR ELPH-MCNC: 2.7 GM/DL
GLUCOSE SERPL-MCNC: 100 MG/DL (ref 65–99)
GLUCOSE UR STRIP-MCNC: NEGATIVE MG/DL
HBA1C MFR BLD: 5.4 % (ref 4.8–5.6)
HCT VFR BLD AUTO: 47.3 % (ref 34–46.6)
HDLC SERPL-MCNC: 71 MG/DL (ref 40–60)
HGB BLD-MCNC: 15.1 G/DL (ref 12–15.9)
HGB UR QL STRIP.AUTO: NEGATIVE
HYALINE CASTS UR QL AUTO: ABNORMAL /LPF
KETONES UR QL STRIP: ABNORMAL
LDLC SERPL CALC-MCNC: 165 MG/DL (ref 0–100)
LDLC/HDLC SERPL: 2.28 {RATIO}
LEUKOCYTE ESTERASE UR QL STRIP.AUTO: ABNORMAL
LYMPHOCYTES # BLD AUTO: 1 10*3/MM3 (ref 0.7–3.1)
LYMPHOCYTES NFR BLD AUTO: 23.3 % (ref 19.6–45.3)
MCH RBC QN AUTO: 29.1 PG (ref 26.6–33)
MCHC RBC AUTO-ENTMCNC: 32 G/DL (ref 31.5–35.7)
MCV RBC AUTO: 90.9 FL (ref 79–97)
MONOCYTES # BLD AUTO: 0.2 10*3/MM3 (ref 0.1–0.9)
MONOCYTES NFR BLD AUTO: 5.9 % (ref 5–12)
NEUTROPHILS NFR BLD AUTO: 3 10*3/MM3 (ref 1.7–7)
NEUTROPHILS NFR BLD AUTO: 70.8 % (ref 42.7–76)
NITRITE UR QL STRIP: NEGATIVE
PH UR STRIP.AUTO: 5.5 [PH] (ref 5–8)
PLATELET # BLD AUTO: 181 10*3/MM3 (ref 140–450)
PMV BLD AUTO: 9.7 FL (ref 6–12)
POTASSIUM SERPL-SCNC: 4.3 MMOL/L (ref 3.5–5.2)
PROT SERPL-MCNC: 6.6 G/DL (ref 6–8.5)
PROT UR QL STRIP: NEGATIVE
RBC # BLD AUTO: 5.2 10*6/MM3 (ref 3.77–5.28)
RBC # UR STRIP: ABNORMAL /HPF
REF LAB TEST METHOD: ABNORMAL
SODIUM SERPL-SCNC: 141 MMOL/L (ref 136–145)
SP GR UR STRIP: 1.02 (ref 1–1.03)
SQUAMOUS #/AREA URNS HPF: ABNORMAL /HPF
T4 FREE SERPL-MCNC: 1.32 NG/DL (ref 0.93–1.7)
TRIGL SERPL-MCNC: 90 MG/DL (ref 0–150)
TSH SERPL DL<=0.05 MIU/L-ACNC: 3.96 UIU/ML (ref 0.27–4.2)
UROBILINOGEN UR QL STRIP: ABNORMAL
VLDLC SERPL-MCNC: 15 MG/DL (ref 5–40)
WBC # UR STRIP: ABNORMAL /HPF
WBC NRBC COR # BLD: 4.2 10*3/MM3 (ref 3.4–10.8)

## 2021-12-20 PROCEDURE — 85025 COMPLETE CBC W/AUTO DIFF WBC: CPT

## 2021-12-20 PROCEDURE — 84439 ASSAY OF FREE THYROXINE: CPT

## 2021-12-20 PROCEDURE — 80053 COMPREHEN METABOLIC PANEL: CPT

## 2021-12-20 PROCEDURE — 36415 COLL VENOUS BLD VENIPUNCTURE: CPT

## 2021-12-20 PROCEDURE — 84443 ASSAY THYROID STIM HORMONE: CPT

## 2021-12-20 PROCEDURE — 77301 RADIOTHERAPY DOSE PLAN IMRT: CPT | Performed by: RADIOLOGY

## 2021-12-20 PROCEDURE — 82306 VITAMIN D 25 HYDROXY: CPT

## 2021-12-20 PROCEDURE — 83036 HEMOGLOBIN GLYCOSYLATED A1C: CPT

## 2021-12-20 PROCEDURE — 81001 URINALYSIS AUTO W/SCOPE: CPT

## 2021-12-20 PROCEDURE — 80061 LIPID PANEL: CPT

## 2021-12-21 ENCOUNTER — HOSPITAL ENCOUNTER (OUTPATIENT)
Dept: GENERAL RADIOLOGY | Facility: HOSPITAL | Age: 81
Discharge: HOME | End: 2021-12-21
Admitting: INTERNAL MEDICINE

## 2021-12-21 ENCOUNTER — HOSPITAL ENCOUNTER (OUTPATIENT)
Dept: RADIATION ONCOLOGY | Facility: HOSPITAL | Age: 81
Discharge: HOME | End: 2021-12-21

## 2021-12-21 ENCOUNTER — OFFICE VISIT (OUTPATIENT)
Dept: FAMILY MEDICINE CLINIC | Facility: CLINIC | Age: 81
End: 2021-12-21

## 2021-12-21 ENCOUNTER — DOCUMENTATION (OUTPATIENT)
Dept: NUTRITION | Facility: HOSPITAL | Age: 81
End: 2021-12-21

## 2021-12-21 VITALS
OXYGEN SATURATION: 97 % | HEIGHT: 64 IN | WEIGHT: 147 LBS | SYSTOLIC BLOOD PRESSURE: 136 MMHG | DIASTOLIC BLOOD PRESSURE: 66 MMHG | BODY MASS INDEX: 25.1 KG/M2 | HEART RATE: 100 BPM

## 2021-12-21 VITALS — BODY MASS INDEX: 24.89 KG/M2 | WEIGHT: 145 LBS

## 2021-12-21 DIAGNOSIS — E78.5 HYPERLIPIDEMIA, UNSPECIFIED HYPERLIPIDEMIA TYPE: ICD-10-CM

## 2021-12-21 DIAGNOSIS — C22.1 INTRAHEPATIC CHOLANGIOCARCINOMA (HCC): ICD-10-CM

## 2021-12-21 DIAGNOSIS — I10 ESSENTIAL HYPERTENSION: ICD-10-CM

## 2021-12-21 DIAGNOSIS — M25.511 ACUTE PAIN OF RIGHT SHOULDER: ICD-10-CM

## 2021-12-21 DIAGNOSIS — Z00.00 MEDICARE ANNUAL WELLNESS VISIT, SUBSEQUENT: Primary | ICD-10-CM

## 2021-12-21 PROCEDURE — 1170F FXNL STATUS ASSESSED: CPT | Performed by: INTERNAL MEDICINE

## 2021-12-21 PROCEDURE — 77300 RADIATION THERAPY DOSE PLAN: CPT | Performed by: RADIOLOGY

## 2021-12-21 PROCEDURE — 77338 DESIGN MLC DEVICE FOR IMRT: CPT | Performed by: RADIOLOGY

## 2021-12-21 PROCEDURE — 73030 X-RAY EXAM OF SHOULDER: CPT

## 2021-12-21 PROCEDURE — 77386: CPT | Performed by: RADIOLOGY

## 2021-12-21 PROCEDURE — G0439 PPPS, SUBSEQ VISIT: HCPCS | Performed by: INTERNAL MEDICINE

## 2021-12-21 PROCEDURE — 1159F MED LIST DOCD IN RCRD: CPT | Performed by: INTERNAL MEDICINE

## 2021-12-21 PROCEDURE — 99397 PER PM REEVAL EST PAT 65+ YR: CPT | Performed by: INTERNAL MEDICINE

## 2021-12-21 NOTE — PROGRESS NOTES
ONC Nutrition    Diagnosis:  Intra hepatic cholangiocarcinoma      Clinical: Stage IB (cT1b, cN0, cM0)   8/17/2021        Pathologic: Stage IIIA (pT3, pN0, cM0) - 11/5/2021       Surgery: Right-sided hepatectomy cholecystectomy and partial adrenalectomy on       9/23/2021 with Dr. Sharma at  - T3 N0 M0 lesion measuring 8.5 cm.  0 of 4 lymph         nodes were involved but the patient did have LVSI and PNI - Focally positive margins       Chemotherapy: Capecitabine (XELODA) 150 MG chemo tablet - take 2 tables in the           AM  and 2 tablets in the PM on days 1-42 concurrent with XRT       Radiation:  IGR T and IMRT to a dose of 54 Gray to the area of concern for positive              margins and a lower dose of 45 Gray to the rest of the postoperative field in any        adjcent colton basins    Weight 145 lbs / stable    FU visit with patient during RAD ONC status checks.  Patient reports constipation following chemo; treated with stool softeners, Miralax and Ducolax with results a couple of days later.  Discussed prophylactic management of constipation with next scheduled chemo.    Patient also with decreased appetite and increasing fatigue with progression of treatment.  She states that she feels avoidance of po intake prior to treatment has had the most effect on her appetite. She is trying to walk to maintain stamina, but states it is difficult to find the energy. She has been able to maintain stable weight since starting treatment.

## 2021-12-22 ENCOUNTER — HOSPITAL ENCOUNTER (OUTPATIENT)
Dept: RADIATION ONCOLOGY | Facility: HOSPITAL | Age: 81
Discharge: HOME | End: 2021-12-22

## 2021-12-22 PROCEDURE — 77386: CPT | Performed by: RADIOLOGY

## 2021-12-22 NOTE — PROGRESS NOTES
QUICK REFERENCE INFORMATION:  The ABCs of the Annual Wellness Visit  Sheree Hernandez is a 81 y.o. female presenting forMedicare Subsequent Wellness visit and  Medicare Wellness-subsequent, Arm Pain (Right side - no injuries noted. ), Hypertension (Has not been taking medications), and Hyperlipidemia (Has not been taking medications. )  .     Medicare Subsequent Wellness Visit    Chief Complaint   Patient presents with   • Medicare Wellness-subsequent   • Arm Pain     Right side - no injuries noted.    • Hypertension     Has not been taking medications   • Hyperlipidemia     Has not been taking medications.     physical    HPI   Pt here for medicare wellness visit and right-sided shoulder pain for the past few weeks.  Blood pressure checks have been normal off of medication over the last few months.  ROS:  Constitutional: no fevers, night sweats or unexplained weight loss  Eyes: no vision changes  ENT: no runny nose, ear pain, sore throat  Cardio: no chest pain, palpitations  Pulm: no shortness of breath, wheezing, or cough  GI: no abdominal pain or changes in bowel movements  : no difficulty urinating  MSK: no difficulty ambulating, no joint pain  Neuro: no weakness, dizziness or headache  Psych: no trouble sleeping  Endo: no change in appetite     Past Medical History:   Diagnosis Date   • Asthma    • Atypical chest pain 3/9/2017   • Cataract    • Chronic constipation    • Fracture, pelvis closed (HCC) 2015    x2   • Hyperlipidemia 3/9/2017   • Hypertension 3/9/2017   • 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   • Low bone mass     with elevated FRAX core   • Mild obstructive sleep apnea 1/30/2020   • Near syncope     negative cardiovascular workup    • Plantar fasciitis     Chronic   • PVC's (premature ventricular contractions)    • Senile keratosis     Multiple   • MANAN (stress urinary incontinence, female)    • Syncope, near     with negative cardiovascular workup        Past Surgical History:    Procedure Laterality Date   • ADRENALECTOMY  09/22/2021   • CARDIAC CATHETERIZATION N/A 1/18/2019    Procedure: Left Heart Cath;  Surgeon: Tucker Gonzalez MD;  Location: [facility] INVASIVE LOCATION;  Service: Cardiovascular   • CATARACT EXTRACTION  04/2019   • CHOLECYSTECTOMY  09/22/2021   • COLONOSCOPY  07/15/2020   • HYSTERECTOMY  1984    age 46 - ovarian cyst, endometriosis,  jorge/bso   • LIVER SURGERY Right 09/22/2021    Removed   • ROTATOR CUFF REPAIR Left 1989    Collar Bone Repair       Family History   Problem Relation Age of Onset   • Heart attack Mother    • Heart failure Mother    • Dementia Mother    • No Known Problems Father    • Breast cancer Paternal Aunt         Social History     Socioeconomic History   • Marital status:    • Number of children: 2   Tobacco Use   • Smoking status: Never Smoker   • Smokeless tobacco: Never Used   • Tobacco comment: Exposed to secondhand smoke   Vaping Use   • Vaping Use: Never used   Substance and Sexual Activity   • Alcohol use: Yes     Types: 3 Glasses of wine per week     Comment: Maybe every 6 months, rare   • Drug use: No   • Sexual activity: Never        Current Outpatient Medications   Medication Sig Dispense Refill   • capecitabine (XELODA) 500 MG chemo tablet Take 2 tablets by mouth Every Morning AND 2 tablets Every Evening on days 1-42 with radiation. 168 tablet 0   • Cholecalciferol (Vitamin D3) 25 MCG (1000 UT) capsule Take  by mouth.     • Cyanocobalamin (VITAMIN B 12 PO) Take  by mouth.     • docusate sodium (COLACE) 100 MG capsule Take 100 mg by mouth 2 (Two) Times a Day.     • Folic Acid 0.8 MG capsule Take  by mouth.     • methocarbamol (ROBAXIN) 500 MG tablet Take 500 mg by mouth As Needed for Muscle Spasms.     • metoclopramide (Reglan) 5 MG tablet Take 1 tablet by mouth 4 (Four) Times a Day Before Meals & at Bedtime. 50 tablet 1   • ondansetron (ZOFRAN) 8 MG tablet Take 1 tablet by mouth 3 (Three) Times a Day As Needed for Nausea  "or Vomiting. 30 tablet 5   • sucralfate (Carafate) 1 GM/10ML suspension Take 10 mL by mouth 4 (Four) Times a Day. 420 mL 1   • Zinc 50 MG capsule Take  by mouth.       No current facility-administered medications for this visit.        Allergies   Allergen Reactions   • Pravastatin Myalgia        Immunization History   Administered Date(s) Administered   • COVID-19 (PFIZER) 02/15/2021, 03/06/2021   • Fluzone High Dose =>65 Years ([facility] ONLY) 11/03/2017, 09/11/2018, 10/15/2019   • Influenza, Unspecified 10/07/2021   • Pneumococcal Polysaccharide (PPSV23) 05/17/2013, 12/06/2016, 05/15/2018   • TD Preservative Free 11/09/2016        The following portions of the patient's history were reviewed and updated as appropriate: allergies, current medications, past family history, past medical history, past social history, past surgical history and problem list.      Objective    Visit Vitals  /66   Pulse 100   Ht 162.6 cm (64\")   Wt 66.7 kg (147 lb)   SpO2 97%   BMI 25.23 kg/m²        Physical Exam  Gen Appearance: NAD  HEENT: Normocephalic, PERRLA, no thyromegaly, trache midline  Heart: RRR, normal S1 and S2, no murmur  Lungs: CTA b/l, no wheezing, no crackles  Abdomen: Soft, non-tender, non-distended, no guarding and BSx4  MSK: Moves all extremities well, normal gait, no peripheral edema  Pulses: Palpable and equal b/l  Lymph nodes: No palpable lymphadenopathy   Neuro: No focal deficits       HEALTH RISK ASSESSMENT    1940    Recent Hospitalizations:  Recently treated at the following:  .      Current Medical Providers:  Patient Care Team:  Timi Conway DO as PCP - General (Internal Medicine)  Jorje Montenegro MD as Consulting Physician (Hematology and Oncology)  Ricci Florentino MD (Rheumatology)  Champ Guillen MD as Consulting Physician (Otolaryngology)  Mitzy Sepulveda APRN as Nurse Practitioner (Psychiatry)  Bunny Billings MD as Consulting Physician " (Radiation Oncology)  Tucker Gonzalez MD as Consulting Physician (Cardiology)      Smoking Status:  Social History     Tobacco Use   Smoking Status Never Smoker   Smokeless Tobacco Never Used   Tobacco Comment    Exposed to secondhand smoke       Alcohol Consumption:  Social History     Substance and Sexual Activity   Alcohol Use Yes   • Types: 3 Glasses of wine per week    Comment: Maybe every 6 months, rare       Depression Screen:   PHQ-2/PHQ-9 Depression Screening 12/21/2021   Little interest or pleasure in doing things 0   Feeling down, depressed, or hopeless 0   Trouble falling or staying asleep, or sleeping too much -   Feeling tired or having little energy -   Poor appetite or overeating -   Feeling bad about yourself - or that you are a failure or have let yourself or your family down -   Trouble concentrating on things, such as reading the newspaper or watching television -   Moving or speaking so slowly that other people could have noticed. Or the opposite - being so fidgety or restless that you have been moving around a lot more than usual -   Thoughts that you would be better off dead, or of hurting yourself in some way -   Total Score 0   If you checked off any problems, how difficult have these problems made it for you to do your work, take care of things at home, or get along with other people? -       Health Habits and Functional and Cognitive Screening:  Functional & Cognitive Status 12/21/2021   Do you have difficulty preparing food and eating? No   Do you have difficulty bathing yourself, getting dressed or grooming yourself? No   Do you have difficulty using the toilet? No   Do you have difficulty moving around from place to place? No   Do you have trouble with steps or getting out of a bed or a chair? No   Current Diet Well Balanced Diet   Dental Exam Not up to date   Eye Exam Not up to date   Exercise (times per week) 7 times per week   Current Exercises Include Walking   Current  Exercise Activities Include -   Do you need help using the phone?  No   Are you deaf or do you have serious difficulty hearing?  Yes   Do you need help with transportation? No   Do you need help shopping? No   Do you need help preparing meals?  No   Do you need help with housework?  No   Do you need help with laundry? No   Do you need help taking your medications? No   Do you need help managing money? No   Do you ever drive or ride in a car without wearing a seat belt? No   Have you felt unusual stress, anger or loneliness in the last month? No   Who do you live with? Alone   If you need help, do you have trouble finding someone available to you? No   Have you been bothered in the last four weeks by sexual problems? No   Do you have difficulty concentrating, remembering or making decisions? No         Does the patient have evidence of cognitive impairment? No    Aspirin use counseling? Does not need ASA (and currently is not on it)      Recent Lab Results:  CMP:  Lab Results   Component Value Date    BUN 11 12/20/2021    CREATININE 0.64 12/20/2021    EGFRIFNONA 89 12/20/2021    BCR 17.2 12/20/2021     12/20/2021    K 4.3 12/20/2021    CO2 26.0 12/20/2021    CALCIUM 9.1 12/20/2021    ALBUMIN 3.90 12/20/2021    BILITOT 0.5 12/20/2021    ALKPHOS 198 (H) 12/20/2021    AST 35 (H) 12/20/2021    ALT 26 12/20/2021     Lipid Panel:  Lab Results   Component Value Date    CHOL 251 (H) 12/20/2021    TRIG 90 12/20/2021    HDL 71 (H) 12/20/2021    VLDL 15 12/20/2021    LDLHDL 2.28 12/20/2021     HbA1c:  Lab Results   Component Value Date    HGBA1C 5.40 12/20/2021       Visual Acuity:  No exam data present    Age-appropriate Screening Schedule:  Refer to the list below for future screening recommendations based on patient's age, sex and/or medical conditions. Orders for these recommended tests are listed in the plan section. The patient has been provided with a written plan.    Health Maintenance   Topic Date Due   • ZOSTER  VACCINE (1 of 2) Never done   • DXA SCAN  05/22/2020   • LIPID PANEL  12/20/2022   • TDAP/TD VACCINES (2 - Tdap) 11/09/2026   • INFLUENZA VACCINE  Completed          Advance Care Planning:  ACP discussion was declined by the patient. Patient does not have an advance directive, declines further assistance.    Identification of Risk Factors:  Risk factors include: Advance Directive Discussion  Breast Cancer/Mammogram Screening  Cardiovascular risk  Colon Cancer Screening.    Compared to one year ago, the patient feels her physical health is the same.  Compared to one year ago, the patient feels her mental health is the same.  Reviewed use of high risk medication in the elderly: yes  Reviewed for potential of harmful drug interactions in the elderly: yes    Diagnoses and all orders for this visit:    1. Medicare annual wellness visit, subsequent (Primary)  Counseled on healthy weight, nutrition, physical activity, cancer screening, and immunizations.    2. Essential hypertension  Table off of medication.  DC losartan and HCTZ.  Continue to monitor at home.  3. Hyperlipidemia, unspecified hyperlipidemia type    4. Acute pain of right shoulder  -     XR Shoulder 2+ View Right; Future    5. Intrahepatic cholangiocarcinoma (HCC)  Established with oncology.        Patient Self-Management and Personalized Health Advice  The patient has been provided with information about: diet, exercise, prevention of cardiac or vascular disease and designing advance directives and preventive services including:   · Annual Wellness Visit (AWV)  · Cardiovascular Disease Screening Tests (may do this order every 5 years in beneficiaries without signs or symptoms of cardiovascular disease).      Follow Up:  Return in about 1 year (around 12/21/2022) for Medicare Wellness.     An After Visit Summary and PPPS with all of these plans were given to the patient.           Dictated Utilizing Dragon Dictation    Please note that portions of this note  were completed with a voice recognition program.    Part of this note may be an electronic transcription/translation of spoken language to printed text using the Dragon Dictation System.

## 2021-12-23 ENCOUNTER — HOSPITAL ENCOUNTER (OUTPATIENT)
Dept: RADIATION ONCOLOGY | Facility: HOSPITAL | Age: 81
Discharge: HOME | End: 2021-12-23

## 2021-12-23 PROCEDURE — 77386: CPT | Performed by: RADIOLOGY

## 2021-12-24 PROCEDURE — 77386: CPT | Performed by: RADIOLOGY

## 2021-12-27 ENCOUNTER — LAB (OUTPATIENT)
Dept: LAB | Facility: HOSPITAL | Age: 81
End: 2021-12-27

## 2021-12-27 DIAGNOSIS — C22.1 INTRAHEPATIC CHOLANGIOCARCINOMA (HCC): ICD-10-CM

## 2021-12-27 LAB
ALBUMIN SERPL-MCNC: 4 G/DL (ref 3.5–5.2)
ALBUMIN/GLOB SERPL: 1.6 G/DL
ALP SERPL-CCNC: 183 U/L (ref 39–117)
ALT SERPL W P-5'-P-CCNC: 32 U/L (ref 1–33)
ANION GAP SERPL CALCULATED.3IONS-SCNC: 8 MMOL/L (ref 5–15)
AST SERPL-CCNC: 42 U/L (ref 1–32)
BILIRUB SERPL-MCNC: 0.4 MG/DL (ref 0–1.2)
BUN SERPL-MCNC: 10 MG/DL (ref 8–23)
BUN/CREAT SERPL: 13 (ref 7–25)
CALCIUM SPEC-SCNC: 8.9 MG/DL (ref 8.6–10.5)
CHLORIDE SERPL-SCNC: 105 MMOL/L (ref 98–107)
CO2 SERPL-SCNC: 27 MMOL/L (ref 22–29)
CREAT SERPL-MCNC: 0.77 MG/DL (ref 0.57–1)
ERYTHROCYTE [DISTWIDTH] IN BLOOD BY AUTOMATED COUNT: 15.5 % (ref 12.3–15.4)
GFR SERPL CREATININE-BSD FRML MDRD: 72 ML/MIN/1.73
GLOBULIN UR ELPH-MCNC: 2.5 GM/DL
GLUCOSE SERPL-MCNC: 101 MG/DL (ref 65–99)
HCT VFR BLD AUTO: 42 % (ref 34–46.6)
HGB BLD-MCNC: 13.8 G/DL (ref 12–15.9)
LYMPHOCYTES # BLD AUTO: 0.7 10*3/MM3 (ref 0.7–3.1)
LYMPHOCYTES NFR BLD AUTO: 19.1 % (ref 19.6–45.3)
MCH RBC QN AUTO: 29.9 PG (ref 26.6–33)
MCHC RBC AUTO-ENTMCNC: 33 G/DL (ref 31.5–35.7)
MCV RBC AUTO: 90.6 FL (ref 79–97)
MONOCYTES # BLD AUTO: 0.3 10*3/MM3 (ref 0.1–0.9)
MONOCYTES NFR BLD AUTO: 9.4 % (ref 5–12)
NEUTROPHILS NFR BLD AUTO: 2.6 10*3/MM3 (ref 1.7–7)
NEUTROPHILS NFR BLD AUTO: 71.5 % (ref 42.7–76)
PLATELET # BLD AUTO: 144 10*3/MM3 (ref 140–450)
PMV BLD AUTO: 9 FL (ref 6–12)
POTASSIUM SERPL-SCNC: 4.3 MMOL/L (ref 3.5–5.2)
PROT SERPL-MCNC: 6.5 G/DL (ref 6–8.5)
RBC # BLD AUTO: 4.63 10*6/MM3 (ref 3.77–5.28)
SODIUM SERPL-SCNC: 140 MMOL/L (ref 136–145)
WBC NRBC COR # BLD: 3.7 10*3/MM3 (ref 3.4–10.8)

## 2021-12-27 PROCEDURE — 80053 COMPREHEN METABOLIC PANEL: CPT

## 2021-12-27 PROCEDURE — 36415 COLL VENOUS BLD VENIPUNCTURE: CPT

## 2021-12-27 PROCEDURE — 85025 COMPLETE CBC W/AUTO DIFF WBC: CPT

## 2021-12-27 PROCEDURE — 77386: CPT | Performed by: RADIOLOGY

## 2021-12-28 ENCOUNTER — HOSPITAL ENCOUNTER (OUTPATIENT)
Dept: RADIATION ONCOLOGY | Facility: HOSPITAL | Age: 81
Discharge: HOME | End: 2021-12-28

## 2021-12-28 PROCEDURE — 77386: CPT | Performed by: RADIOLOGY

## 2021-12-29 ENCOUNTER — HOSPITAL ENCOUNTER (OUTPATIENT)
Dept: RADIATION ONCOLOGY | Facility: HOSPITAL | Age: 81
Discharge: HOME | End: 2021-12-29

## 2021-12-29 PROCEDURE — 77386: CPT | Performed by: RADIOLOGY

## 2021-12-30 ENCOUNTER — HOSPITAL ENCOUNTER (OUTPATIENT)
Dept: RADIATION ONCOLOGY | Facility: HOSPITAL | Age: 81
Discharge: HOME | End: 2021-12-30

## 2021-12-30 PROCEDURE — 77336 RADIATION PHYSICS CONSULT: CPT | Performed by: RADIOLOGY

## 2021-12-30 PROCEDURE — 77386: CPT | Performed by: RADIOLOGY

## 2022-01-03 ENCOUNTER — LAB (OUTPATIENT)
Dept: LAB | Facility: HOSPITAL | Age: 82
End: 2022-01-03

## 2022-01-03 ENCOUNTER — HOSPITAL ENCOUNTER (OUTPATIENT)
Dept: RADIATION ONCOLOGY | Facility: HOSPITAL | Age: 82
Setting detail: RADIATION/ONCOLOGY SERIES
Discharge: HOME | End: 2022-01-03

## 2022-01-03 ENCOUNTER — HOSPITAL ENCOUNTER (OUTPATIENT)
Dept: RADIATION ONCOLOGY | Facility: HOSPITAL | Age: 82
Discharge: HOME | End: 2022-01-03

## 2022-01-03 DIAGNOSIS — C22.1 INTRAHEPATIC CHOLANGIOCARCINOMA: ICD-10-CM

## 2022-01-03 LAB
ALBUMIN SERPL-MCNC: 3.9 G/DL (ref 3.5–5.2)
ALBUMIN/GLOB SERPL: 1.6 G/DL
ALP SERPL-CCNC: 163 U/L (ref 39–117)
ALT SERPL W P-5'-P-CCNC: 23 U/L (ref 1–33)
ANION GAP SERPL CALCULATED.3IONS-SCNC: 9 MMOL/L (ref 5–15)
AST SERPL-CCNC: 29 U/L (ref 1–32)
BASOPHILS # BLD AUTO: 0.01 10*3/MM3 (ref 0–0.2)
BASOPHILS NFR BLD AUTO: 0.2 % (ref 0–1.5)
BILIRUB SERPL-MCNC: 0.5 MG/DL (ref 0–1.2)
BUN SERPL-MCNC: 16 MG/DL (ref 8–23)
BUN/CREAT SERPL: 22.2 (ref 7–25)
CALCIUM SPEC-SCNC: 9.1 MG/DL (ref 8.6–10.5)
CHLORIDE SERPL-SCNC: 103 MMOL/L (ref 98–107)
CO2 SERPL-SCNC: 26 MMOL/L (ref 22–29)
CREAT SERPL-MCNC: 0.72 MG/DL (ref 0.57–1)
DEPRECATED RDW RBC AUTO: 48.8 FL (ref 37–54)
EOSINOPHIL # BLD AUTO: 0.1 10*3/MM3 (ref 0–0.4)
EOSINOPHIL NFR BLD AUTO: 2.5 % (ref 0.3–6.2)
ERYTHROCYTE [DISTWIDTH] IN BLOOD BY AUTOMATED COUNT: 17.1 % (ref 12.3–15.4)
GFR SERPL CREATININE-BSD FRML MDRD: 78 ML/MIN/1.73
GLOBULIN UR ELPH-MCNC: 2.5 GM/DL
GLUCOSE SERPL-MCNC: 104 MG/DL (ref 65–99)
HCT VFR BLD AUTO: 42.6 % (ref 34–46.6)
HGB BLD-MCNC: 14 G/DL (ref 12–15.9)
IMM GRANULOCYTES # BLD AUTO: 0.01 10*3/MM3 (ref 0–0.05)
IMM GRANULOCYTES NFR BLD AUTO: 0.2 % (ref 0–0.5)
LYMPHOCYTES # BLD AUTO: 0.58 10*3/MM3 (ref 0.7–3.1)
LYMPHOCYTES NFR BLD AUTO: 14.3 % (ref 19.6–45.3)
MCH RBC QN AUTO: 29.8 PG (ref 26.6–33)
MCHC RBC AUTO-ENTMCNC: 32.9 G/DL (ref 31.5–35.7)
MCV RBC AUTO: 90.6 FL (ref 79–97)
MONOCYTES # BLD AUTO: 0.63 10*3/MM3 (ref 0.1–0.9)
MONOCYTES NFR BLD AUTO: 15.5 % (ref 5–12)
NEUTROPHILS NFR BLD AUTO: 2.74 10*3/MM3 (ref 1.7–7)
NEUTROPHILS NFR BLD AUTO: 67.3 % (ref 42.7–76)
NRBC BLD AUTO-RTO: 0 /100 WBC (ref 0–0.2)
PLATELET # BLD AUTO: 140 10*3/MM3 (ref 140–450)
PMV BLD AUTO: 12.3 FL (ref 6–12)
POTASSIUM SERPL-SCNC: 4 MMOL/L (ref 3.5–5.2)
PROT SERPL-MCNC: 6.4 G/DL (ref 6–8.5)
RBC # BLD AUTO: 4.7 10*6/MM3 (ref 3.77–5.28)
SODIUM SERPL-SCNC: 138 MMOL/L (ref 136–145)
WBC NRBC COR # BLD: 4.07 10*3/MM3 (ref 3.4–10.8)

## 2022-01-03 PROCEDURE — 77386: CPT | Performed by: RADIOLOGY

## 2022-01-03 PROCEDURE — 36415 COLL VENOUS BLD VENIPUNCTURE: CPT

## 2022-01-03 PROCEDURE — 80053 COMPREHEN METABOLIC PANEL: CPT

## 2022-01-03 PROCEDURE — 85025 COMPLETE CBC W/AUTO DIFF WBC: CPT

## 2022-01-04 ENCOUNTER — DOCUMENTATION (OUTPATIENT)
Dept: NUTRITION | Facility: HOSPITAL | Age: 82
End: 2022-01-04

## 2022-01-04 ENCOUNTER — HOSPITAL ENCOUNTER (OUTPATIENT)
Dept: RADIATION ONCOLOGY | Facility: HOSPITAL | Age: 82
Discharge: HOME | End: 2022-01-04

## 2022-01-04 VITALS — BODY MASS INDEX: 24.46 KG/M2 | WEIGHT: 142.5 LBS

## 2022-01-04 PROCEDURE — 77386: CPT | Performed by: RADIOLOGY

## 2022-01-04 NOTE — PROGRESS NOTES
Diagnosis:  Intra hepatic cholangiocarcinoma      Clinical: Stage IB (cT1b, cN0, cM0)   8/17/2021        Pathologic: Stage IIIA (pT3, pN0, cM0) - 11/5/2021     Surgery: Right-sided hepatectomy cholecystectomy and partial adrenalectomy on 9/23/2021 with Dr. Sharma at  - T3 N0 M0 lesion measuring 8.5 cm.  0 of 4 lymph nodes were involved but the patient did have LVSI and PNI - Focally positive margins     Chemotherapy: Capecitabine (XELODA) 150 MG chemo tablet - take 2 tables in the AM  and 2 tablets in the PM on days 1-42 concurrent with XRT     Radiation:  IGR T and IMRT to a dose of 54 Gray to the area of concern for positive margins and a lower dose of 45 Gray to the rest of the postoperative field in any adjcent colton basins     Weight 142.5 lbs / 2 lbs weight loss since initial start of tx    Patient with more constipation over the past week; has used Miralax, Mag Citrate for resolution.  Advised patient that she try daily Miralax to maintain normal regularity and hopefully avoid recurring issues with constipation.  She states that with progression of treatment, she is experiencing more fatigue and finding it difficult to get the energy to walk or do the daily exercise she had been committed to prior to treatment start.    Will follow.

## 2022-01-05 ENCOUNTER — HOSPITAL ENCOUNTER (OUTPATIENT)
Dept: RADIATION ONCOLOGY | Facility: HOSPITAL | Age: 82
Discharge: HOME | End: 2022-01-05

## 2022-01-05 PROCEDURE — 77386: CPT | Performed by: RADIOLOGY

## 2022-01-06 ENCOUNTER — HOSPITAL ENCOUNTER (OUTPATIENT)
Dept: RADIATION ONCOLOGY | Facility: HOSPITAL | Age: 82
Discharge: HOME | End: 2022-01-06

## 2022-01-06 PROCEDURE — 77336 RADIATION PHYSICS CONSULT: CPT | Performed by: RADIOLOGY

## 2022-01-06 PROCEDURE — 77386: CPT | Performed by: RADIOLOGY

## 2022-01-07 ENCOUNTER — HOSPITAL ENCOUNTER (OUTPATIENT)
Dept: RADIATION ONCOLOGY | Facility: HOSPITAL | Age: 82
Discharge: HOME | End: 2022-01-07

## 2022-01-07 PROCEDURE — 77386: CPT | Performed by: RADIOLOGY

## 2022-01-10 ENCOUNTER — HOSPITAL ENCOUNTER (OUTPATIENT)
Dept: RADIATION ONCOLOGY | Facility: HOSPITAL | Age: 82
Discharge: HOME | End: 2022-01-10

## 2022-01-10 ENCOUNTER — LAB (OUTPATIENT)
Dept: LAB | Facility: HOSPITAL | Age: 82
End: 2022-01-10

## 2022-01-10 DIAGNOSIS — C22.1 INTRAHEPATIC CHOLANGIOCARCINOMA: ICD-10-CM

## 2022-01-10 DIAGNOSIS — C22.1 INTRAHEPATIC CHOLANGIOCARCINOMA: Primary | ICD-10-CM

## 2022-01-10 LAB
ALBUMIN SERPL-MCNC: 3.8 G/DL (ref 3.5–5.2)
ALBUMIN/GLOB SERPL: 1.5 G/DL
ALP SERPL-CCNC: 154 U/L (ref 39–117)
ALT SERPL W P-5'-P-CCNC: 20 U/L (ref 1–33)
ANION GAP SERPL CALCULATED.3IONS-SCNC: 11 MMOL/L (ref 5–15)
AST SERPL-CCNC: 32 U/L (ref 1–32)
BILIRUB SERPL-MCNC: 0.4 MG/DL (ref 0–1.2)
BUN SERPL-MCNC: 14 MG/DL (ref 8–23)
BUN/CREAT SERPL: 21.2 (ref 7–25)
CALCIUM SPEC-SCNC: 9 MG/DL (ref 8.6–10.5)
CHLORIDE SERPL-SCNC: 102 MMOL/L (ref 98–107)
CO2 SERPL-SCNC: 24 MMOL/L (ref 22–29)
CREAT SERPL-MCNC: 0.66 MG/DL (ref 0.57–1)
ERYTHROCYTE [DISTWIDTH] IN BLOOD BY AUTOMATED COUNT: 16.6 % (ref 12.3–15.4)
GFR SERPL CREATININE-BSD FRML MDRD: 86 ML/MIN/1.73
GLOBULIN UR ELPH-MCNC: 2.6 GM/DL
GLUCOSE SERPL-MCNC: 108 MG/DL (ref 65–99)
HCT VFR BLD AUTO: 41.8 % (ref 34–46.6)
HGB BLD-MCNC: 13.6 G/DL (ref 12–15.9)
LYMPHOCYTES # BLD AUTO: 0.6 10*3/MM3 (ref 0.7–3.1)
LYMPHOCYTES NFR BLD AUTO: 13.7 % (ref 19.6–45.3)
MCH RBC QN AUTO: 29.9 PG (ref 26.6–33)
MCHC RBC AUTO-ENTMCNC: 32.6 G/DL (ref 31.5–35.7)
MCV RBC AUTO: 91.9 FL (ref 79–97)
MONOCYTES # BLD AUTO: 0.5 10*3/MM3 (ref 0.1–0.9)
MONOCYTES NFR BLD AUTO: 11.5 % (ref 5–12)
NEUTROPHILS NFR BLD AUTO: 3.1 10*3/MM3 (ref 1.7–7)
NEUTROPHILS NFR BLD AUTO: 74.8 % (ref 42.7–76)
PLATELET # BLD AUTO: 143 10*3/MM3 (ref 140–450)
PMV BLD AUTO: 9.1 FL (ref 6–12)
POTASSIUM SERPL-SCNC: 3.8 MMOL/L (ref 3.5–5.2)
PROT SERPL-MCNC: 6.4 G/DL (ref 6–8.5)
RBC # BLD AUTO: 4.55 10*6/MM3 (ref 3.77–5.28)
SODIUM SERPL-SCNC: 137 MMOL/L (ref 136–145)
WBC NRBC COR # BLD: 4.2 10*3/MM3 (ref 3.4–10.8)

## 2022-01-10 PROCEDURE — 80053 COMPREHEN METABOLIC PANEL: CPT

## 2022-01-10 PROCEDURE — 85025 COMPLETE CBC W/AUTO DIFF WBC: CPT

## 2022-01-10 PROCEDURE — 77386: CPT | Performed by: RADIOLOGY

## 2022-01-10 PROCEDURE — 36415 COLL VENOUS BLD VENIPUNCTURE: CPT

## 2022-01-11 ENCOUNTER — HOSPITAL ENCOUNTER (OUTPATIENT)
Dept: RADIATION ONCOLOGY | Facility: HOSPITAL | Age: 82
Discharge: HOME | End: 2022-01-11

## 2022-01-11 VITALS — BODY MASS INDEX: 24.43 KG/M2 | WEIGHT: 142.3 LBS

## 2022-01-11 PROCEDURE — 77386: CPT | Performed by: RADIOLOGY

## 2022-01-12 ENCOUNTER — OFFICE VISIT (OUTPATIENT)
Dept: ONCOLOGY | Facility: CLINIC | Age: 82
End: 2022-01-12

## 2022-01-12 ENCOUNTER — HOSPITAL ENCOUNTER (OUTPATIENT)
Dept: RADIATION ONCOLOGY | Facility: HOSPITAL | Age: 82
Discharge: HOME | End: 2022-01-12

## 2022-01-12 VITALS
OXYGEN SATURATION: 99 % | TEMPERATURE: 97.1 F | BODY MASS INDEX: 24.41 KG/M2 | HEART RATE: 95 BPM | WEIGHT: 143 LBS | SYSTOLIC BLOOD PRESSURE: 142 MMHG | RESPIRATION RATE: 16 BRPM | HEIGHT: 64 IN | DIASTOLIC BLOOD PRESSURE: 67 MMHG

## 2022-01-12 DIAGNOSIS — C22.1 INTRAHEPATIC CHOLANGIOCARCINOMA: Primary | ICD-10-CM

## 2022-01-12 PROCEDURE — 99214 OFFICE O/P EST MOD 30 MIN: CPT | Performed by: INTERNAL MEDICINE

## 2022-01-12 PROCEDURE — 77386: CPT | Performed by: RADIOLOGY

## 2022-01-12 NOTE — PROGRESS NOTES
Follow Up Office Visit      Date: 2022     Patient Name: Sheree Hernandez  MRN: [id number]  : 1940  Chief Complaint:  Follow-up for intrahepatic cholangiocarcinoma      History of Present Illness: Sheree Hernandez is a pleasant 79 y.o. female with a past medical history of hyperlipidemia and hypertension who presents today for evaluation of concern for hemochromatosis. The patient is accompanied by their self who contributes to the history of their care.  Patient states that over the past 2 years she has been having worsening fatigue especially with exertion.  Over the past several months this has become worse.  Patient states that she gets tired with basic activities including showering getting dressed in the morning and walking to and from her car from stores.  She states that her fatigue gets better after a few minutes of rest.  She has previously had an echocardiogram and cardiac catheterization within the past 2 years which did not reveal any cause of her fatigue with exertion.  She is also had an ultrasound of the liver which revealed stable cyst.  She was recently seen by her PCP who ordered iron studies which was notable for an elevated ferritin to 246.  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  All other mutations were negative.  She is currently up-to-date on her mammograms and colonoscopy with no active malignancies noted.  She is otherwise compliant with her statin and high blood pressure medicines.  Repeat abdominal imaging in 2021 concerning for enlarging liver lesion.  She was seen by Dr. Sharma and  is status post open right hepatectomy, cholecystectomy, right partial adrenalectomy on 2021.  Pathology showed a focal R1 resected margin.  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  0/4 lymph nodes were  positive for disease.  LVI and PNI were present.     Interval History:  Presents to clinic for follow-up. Started chemo XRT on 12/9/2021.  Continues to tolerate treatments well.  Does have some mild nausea which is well controlled with Zofran.  Weight remains stable at this time.  Has some increased fatigue but is tolerable at this time.  Able to complete all her ADLs independently still    Oncology History:    Oncology/Hematology History   Intrahepatic cholangiocarcinoma (HCC)   8/17/2021 Initial Diagnosis    Intrahepatic cholangiocarcinoma (CMS/HCC)     8/17/2021 Cancer Staged    Staging form: Intrahepatic Bile Duct, AJCC 8th Edition  - Clinical: Stage IB (cT1b, cN0, cM0) - Signed by Jorje Montenegro MD on 8/17/2021 11/5/2021 Cancer Staged    Staging form: Intrahepatic Bile Duct, AJCC 8th Edition  - Pathologic: Stage IIIA (pT3, pN0, cM0) - Signed by Jorje Montenegro MD on 11/5/2021 12/9/2021 -  Chemotherapy    OP GALLBLADDER Capecitabine + XRT         Subjective      Review of Systems:   Constitutional: Negative for fevers, chills, or weight loss  Eyes: Negative for blurred vision or discharge         Ear/Nose/Throat: Negative for difficulty swallowing, sore throat, LAD                                                       Respiratory: Negative for cough, SOA, wheezing                                                                                        Cardiovascular: Negative for chest pain or palpitations                                                                  Gastrointestinal: Negative for nausea, vomiting or diarrhea                                                                     Genitourinary: Negative for dysuria or hematuria                                                                                           Musculoskeletal: Negative for any joint pains or muscle aches                                                                        Neurologic: Negative for any  "weakness, headaches, dizziness                                                                         Hematologic: Negative for any easy bleeding or bruising                                                                                   Psychiatric: Negative for anxiety or depression                          Past Medical History/Past Surgical History/ Family History/ Social History: Reviewed by me and unchanged from my previous documentation done on December 2021.     Medications:     Current Outpatient Medications:   •  capecitabine (XELODA) 500 MG chemo tablet, Take 2 tablets by mouth Every Morning AND 2 tablets Every Evening on days 1-42 with radiation., Disp: 168 tablet, Rfl: 0  •  Cholecalciferol (Vitamin D3) 25 MCG (1000 UT) capsule, Take  by mouth., Disp: , Rfl:   •  Cyanocobalamin (VITAMIN B 12 PO), Take  by mouth., Disp: , Rfl:   •  docusate sodium (COLACE) 100 MG capsule, Take 100 mg by mouth 2 (Two) Times a Day., Disp: , Rfl:   •  Folic Acid 0.8 MG capsule, Take  by mouth., Disp: , Rfl:   •  methocarbamol (ROBAXIN) 500 MG tablet, Take 500 mg by mouth As Needed for Muscle Spasms., Disp: , Rfl:   •  metoclopramide (Reglan) 5 MG tablet, Take 1 tablet by mouth 4 (Four) Times a Day Before Meals & at Bedtime., Disp: 50 tablet, Rfl: 1  •  ondansetron (ZOFRAN) 8 MG tablet, Take 1 tablet by mouth 3 (Three) Times a Day As Needed for Nausea or Vomiting., Disp: 30 tablet, Rfl: 5  •  sucralfate (Carafate) 1 GM/10ML suspension, Take 10 mL by mouth 4 (Four) Times a Day., Disp: 420 mL, Rfl: 1  •  Zinc 50 MG capsule, Take  by mouth., Disp: , Rfl:     Allergies:   Allergies   Allergen Reactions   • Pravastatin Myalgia       Objective     Physical Exam:  Vital Signs:   Vitals:    01/12/22 1034   BP: 142/67   Pulse: 95   Resp: 16   Temp: 97.1 °F (36.2 °C)   TempSrc: Temporal   SpO2: 99%   Weight: 64.9 kg (143 lb)   Height: 162.6 cm (64.02\")   PainSc: 0-No pain     Pain Score    01/12/22 1034   PainSc: 0-No pain     ECOG " Performance Status: 0 - Asymptomatic    Constitutional: NAD, ECOG 0  Eyes: PERRLA, scleral anicteric  ENT: No LAD, no thyromegaly  Respiratory: CTAB, no wheezing, rales, rhonchi  Cardiovascular: RRR, no murmurs, pulses 2+ bilaterally  Abdomen: soft, NT/ND, no HSM  Musculoskeletal: strength 5/5 bilaterally, no c/c/e  Neurologic: A&O x 3, CN II-XII intact grossly    Results Review:   Lab on 01/10/2022   Component Date Value Ref Range Status   • Glucose 01/10/2022 108* 65 - 99 mg/dL Final   • BUN 01/10/2022 14  8 - 23 mg/dL Final   • Creatinine 01/10/2022 0.66  0.57 - 1.00 mg/dL Final   • Sodium 01/10/2022 137  136 - 145 mmol/L Final   • Potassium 01/10/2022 3.8  3.5 - 5.2 mmol/L Final    Slight hemolysis detected by analyzer. Results may be affected.   • Chloride 01/10/2022 102  98 - 107 mmol/L Final   • CO2 01/10/2022 24.0  22.0 - 29.0 mmol/L Final   • Calcium 01/10/2022 9.0  8.6 - 10.5 mg/dL Final   • Total Protein 01/10/2022 6.4  6.0 - 8.5 g/dL Final   • Albumin 01/10/2022 3.80  3.50 - 5.20 g/dL Final   • ALT (SGPT) 01/10/2022 20  1 - 33 U/L Final   • AST (SGOT) 01/10/2022 32  1 - 32 U/L Final   • Alkaline Phosphatase 01/10/2022 154* 39 - 117 U/L Final   • Total Bilirubin 01/10/2022 0.4  0.0 - 1.2 mg/dL Final   • eGFR Non  Amer 01/10/2022 86  >60 mL/min/1.73 Final   • Globulin 01/10/2022 2.6  gm/dL Final    Calculated Result   • A/G Ratio 01/10/2022 1.5  g/dL Final   • BUN/Creatinine Ratio 01/10/2022 21.2  7.0 - 25.0 Final   • Anion Gap 01/10/2022 11.0  5.0 - 15.0 mmol/L Final   • WBC 01/10/2022 4.20  3.40 - 10.80 10*3/mm3 Final   • RBC 01/10/2022 4.55  3.77 - 5.28 10*6/mm3 Final   • Hemoglobin 01/10/2022 13.6  12.0 - 15.9 g/dL Final   • Hematocrit 01/10/2022 41.8  34.0 - 46.6 % Final   • RDW 01/10/2022 16.6* 12.3 - 15.4 % Final   • MCV 01/10/2022 91.9  79.0 - 97.0 fL Final   • MCH 01/10/2022 29.9  26.6 - 33.0 pg Final   • MCHC 01/10/2022 32.6  31.5 - 35.7 g/dL Final   • MPV 01/10/2022 9.1  6.0 - 12.0 fL  Final   • Platelets 01/10/2022 143  140 - 450 10*3/mm3 Final   • Neutrophil % 01/10/2022 74.8  42.7 - 76.0 % Final   • Lymphocyte % 01/10/2022 13.7* 19.6 - 45.3 % Final   • Monocyte % 01/10/2022 11.5  5.0 - 12.0 % Final   • Neutrophils, Absolute 01/10/2022 3.10  1.70 - 7.00 10*3/mm3 Final   • Lymphocytes, Absolute 01/10/2022 0.60* 0.70 - 3.10 10*3/mm3 Final   • Monocytes, Absolute 01/10/2022 0.50  0.10 - 0.90 10*3/mm3 Final   Lab on 01/03/2022   Component Date Value Ref Range Status   • Glucose 01/03/2022 104* 65 - 99 mg/dL Final   • BUN 01/03/2022 16  8 - 23 mg/dL Final   • Creatinine 01/03/2022 0.72  0.57 - 1.00 mg/dL Final   • Sodium 01/03/2022 138  136 - 145 mmol/L Final   • Potassium 01/03/2022 4.0  3.5 - 5.2 mmol/L Final    Slight hemolysis detected by analyzer. Results may be affected.   • Chloride 01/03/2022 103  98 - 107 mmol/L Final   • CO2 01/03/2022 26.0  22.0 - 29.0 mmol/L Final   • Calcium 01/03/2022 9.1  8.6 - 10.5 mg/dL Final   • Total Protein 01/03/2022 6.4  6.0 - 8.5 g/dL Final   • Albumin 01/03/2022 3.90  3.50 - 5.20 g/dL Final   • ALT (SGPT) 01/03/2022 23  1 - 33 U/L Final   • AST (SGOT) 01/03/2022 29  1 - 32 U/L Final   • Alkaline Phosphatase 01/03/2022 163* 39 - 117 U/L Final   • Total Bilirubin 01/03/2022 0.5  0.0 - 1.2 mg/dL Final   • eGFR Non African Amer 01/03/2022 78  >60 mL/min/1.73 Final   • Globulin 01/03/2022 2.5  gm/dL Final    Calculated Result   • A/G Ratio 01/03/2022 1.6  g/dL Final   • BUN/Creatinine Ratio 01/03/2022 22.2  7.0 - 25.0 Final   • Anion Gap 01/03/2022 9.0  5.0 - 15.0 mmol/L Final   • WBC 01/03/2022 4.07  3.40 - 10.80 10*3/mm3 Final   • RBC 01/03/2022 4.70  3.77 - 5.28 10*6/mm3 Final   • Hemoglobin 01/03/2022 14.0  12.0 - 15.9 g/dL Final   • Hematocrit 01/03/2022 42.6  34.0 - 46.6 % Final   • MCV 01/03/2022 90.6  79.0 - 97.0 fL Final   • MCH 01/03/2022 29.8  26.6 - 33.0 pg Final   • MCHC 01/03/2022 32.9  31.5 - 35.7 g/dL Final   • RDW 01/03/2022 17.1* 12.3 - 15.4 %  Final   • RDW-SD 01/03/2022 48.8  37.0 - 54.0 fl Final   • MPV 01/03/2022 12.3* 6.0 - 12.0 fL Final   • Platelets 01/03/2022 140  140 - 450 10*3/mm3 Final   • Neutrophil % 01/03/2022 67.3  42.7 - 76.0 % Final   • Lymphocyte % 01/03/2022 14.3* 19.6 - 45.3 % Final   • Monocyte % 01/03/2022 15.5* 5.0 - 12.0 % Final   • Eosinophil % 01/03/2022 2.5  0.3 - 6.2 % Final   • Basophil % 01/03/2022 0.2  0.0 - 1.5 % Final   • Immature Grans % 01/03/2022 0.2  0.0 - 0.5 % Final   • Neutrophils, Absolute 01/03/2022 2.74  1.70 - 7.00 10*3/mm3 Final   • Lymphocytes, Absolute 01/03/2022 0.58* 0.70 - 3.10 10*3/mm3 Final   • Monocytes, Absolute 01/03/2022 0.63  0.10 - 0.90 10*3/mm3 Final   • Eosinophils, Absolute 01/03/2022 0.10  0.00 - 0.40 10*3/mm3 Final   • Basophils, Absolute 01/03/2022 0.01  0.00 - 0.20 10*3/mm3 Final   • Immature Grans, Absolute 01/03/2022 0.01  0.00 - 0.05 10*3/mm3 Final   • nRBC 01/03/2022 0.0  0.0 - 0.2 /100 WBC Final       XR Shoulder 2+ View Right    Result Date: 12/23/2021  Narrative: EXAMINATION: XR SHOULDER 2+ VW RIGHT-  INDICATION: shoulder pain; M25.511-Pain in right shoulder  COMPARISON: NONE  FINDINGS: Mild acromioclavicular and glenohumeral arthrosis changes are present with narrowing, sclerosis and small osteophytes. There is otherwise no evidence of fracture or dislocation. Mild irregularity is noted involving the humerus footprint, possibly reflecting underlying cuff pathology.      Impression: Mild acromioclavicular and glenohumeral arthrosis changes are present with narrowing, sclerosis and small osteophytes. There is otherwise no evidence of fracture or dislocation. Mild irregularity is noted involving the humerus footprint, possibly reflecting underlying cuff pathology.  This report was finalized on 12/23/2021 10:12 AM by Tucker Stewart.        Assessment / Plan      Assessment/Plan:   Intrahepatic cholangiocarcinoma (CMS/HCC) (Primary)  -Noted during her most recent hospitalization with CT  scans concerning for an enlarging liver lesion to 7.3 cm that was previously noted to be hemangioma  -Triple phase CT concerning for a solid tumor lesion  -Biopsy consistent with an adenocarcinoma  -CA 19-9 elevated to 84.7  -CT chest as well as the rest of the CT abdomen/pelvis not concerning for distant or metastatic disease  -Status post open right hepatectomy, cholecystectomy, right partial adrenalectomy on 9/23/2021 with Dr. 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  0/4 lymph nodes were positive for disease.  LVI and PNI were present.  Focal R1 resection margin  -Discussed with patient and her daughter that she would benefit from adjuvant chemoXRT using Xeloda.  Discussed side effects including but not limited to immunosuppression, diarrhea, abdominal pain, nausea, vomiting, hand/foot syndrome  -Repeat CA 19-9 (22) in November 2021  -Started adjuvant chemo XRT with Xeloda (day 1-42) on 12/9/2021.  Scheduled to finish radiation on 1/19/2021.  Tolerating treatments well with no major side effects  -Plan for repeat CT scan in March 2022.  This has been ordered by Dr. Billings  - We will plan for repeat CBC, CMP, CA 19-9 at that time as well     Hemochromatosis carrier  -Noted on recent labs with an elevated ferritin to 246 hemochromatosis gene profile positive for heterozygosity of H63D.  Other genes negative.  Given patient's age and previous cardiac liver work-up showing no evidence of dysfunction, it is unlikely that she has had iron deposition all this time.  The heterozygosity of H63D makes her carrier for hemochromatosis however does not mean that she has active disease  - CT A/P in July 2020 with no liver dysfunction but was notable for hemangioma which has now been confirmed to be a intrahepatic cholangiocarcinoma and a status post resection.  Treatment as above  -ECHO in July 2020 within normal  limits  - Repeat iron studies in April 2021 stable with a ferritin of 229, iron level 100, transferrin saturation 27%  -Low concern for iron overload at this time.          Follow Up:   Follow-up in 2 months after her CT abdomen/pelvis     Jorje Montenegro MD  Hematology and Oncology     Please note that portions of this note may have been completed with a voice recognition program. Efforts were made to edit the dictations, but occasionally words are mistranscribed.

## 2022-01-13 ENCOUNTER — HOSPITAL ENCOUNTER (OUTPATIENT)
Dept: RADIATION ONCOLOGY | Facility: HOSPITAL | Age: 82
Discharge: HOME | End: 2022-01-13

## 2022-01-13 PROCEDURE — 77386: CPT | Performed by: RADIOLOGY

## 2022-01-14 ENCOUNTER — HOSPITAL ENCOUNTER (OUTPATIENT)
Dept: RADIATION ONCOLOGY | Facility: HOSPITAL | Age: 82
Discharge: HOME | End: 2022-01-14

## 2022-01-14 PROCEDURE — 77336 RADIATION PHYSICS CONSULT: CPT | Performed by: RADIOLOGY

## 2022-01-14 PROCEDURE — 77386: CPT | Performed by: RADIOLOGY

## 2022-01-17 ENCOUNTER — LAB (OUTPATIENT)
Dept: LAB | Facility: HOSPITAL | Age: 82
End: 2022-01-17

## 2022-01-17 ENCOUNTER — TELEPHONE (OUTPATIENT)
Dept: ONCOLOGY | Facility: CLINIC | Age: 82
End: 2022-01-17

## 2022-01-17 ENCOUNTER — HOSPITAL ENCOUNTER (OUTPATIENT)
Dept: RADIATION ONCOLOGY | Facility: HOSPITAL | Age: 82
Discharge: HOME | End: 2022-01-17

## 2022-01-17 VITALS — WEIGHT: 142.3 LBS | BODY MASS INDEX: 24.41 KG/M2

## 2022-01-17 DIAGNOSIS — C22.1 INTRAHEPATIC CHOLANGIOCARCINOMA: ICD-10-CM

## 2022-01-17 DIAGNOSIS — C22.1 INTRAHEPATIC CHOLANGIOCARCINOMA: Primary | ICD-10-CM

## 2022-01-17 LAB
ALBUMIN SERPL-MCNC: 3.7 G/DL (ref 3.5–5.2)
ALBUMIN/GLOB SERPL: 1.4 G/DL
ALP SERPL-CCNC: 132 U/L (ref 39–117)
ALT SERPL W P-5'-P-CCNC: 14 U/L (ref 1–33)
ANION GAP SERPL CALCULATED.3IONS-SCNC: 12 MMOL/L (ref 5–15)
AST SERPL-CCNC: 24 U/L (ref 1–32)
BILIRUB SERPL-MCNC: 0.6 MG/DL (ref 0–1.2)
BUN SERPL-MCNC: 14 MG/DL (ref 8–23)
BUN/CREAT SERPL: 18.9 (ref 7–25)
CALCIUM SPEC-SCNC: 8.9 MG/DL (ref 8.6–10.5)
CHLORIDE SERPL-SCNC: 100 MMOL/L (ref 98–107)
CO2 SERPL-SCNC: 26 MMOL/L (ref 22–29)
CREAT SERPL-MCNC: 0.74 MG/DL (ref 0.57–1)
ERYTHROCYTE [DISTWIDTH] IN BLOOD BY AUTOMATED COUNT: 16 % (ref 12.3–15.4)
GFR SERPL CREATININE-BSD FRML MDRD: 75 ML/MIN/1.73
GLOBULIN UR ELPH-MCNC: 2.6 GM/DL
GLUCOSE SERPL-MCNC: 140 MG/DL (ref 65–99)
HCT VFR BLD AUTO: 42.2 % (ref 34–46.6)
HGB BLD-MCNC: 14.1 G/DL (ref 12–15.9)
LYMPHOCYTES # BLD AUTO: 0.6 10*3/MM3 (ref 0.7–3.1)
LYMPHOCYTES NFR BLD AUTO: 10 % (ref 19.6–45.3)
MCH RBC QN AUTO: 31.1 PG (ref 26.6–33)
MCHC RBC AUTO-ENTMCNC: 33.5 G/DL (ref 31.5–35.7)
MCV RBC AUTO: 93 FL (ref 79–97)
MONOCYTES # BLD AUTO: 0.6 10*3/MM3 (ref 0.1–0.9)
MONOCYTES NFR BLD AUTO: 10.3 % (ref 5–12)
NEUTROPHILS NFR BLD AUTO: 4.5 10*3/MM3 (ref 1.7–7)
NEUTROPHILS NFR BLD AUTO: 79.7 % (ref 42.7–76)
PLATELET # BLD AUTO: 138 10*3/MM3 (ref 140–450)
PMV BLD AUTO: 9.6 FL (ref 6–12)
POTASSIUM SERPL-SCNC: 3.2 MMOL/L (ref 3.5–5.2)
PROT SERPL-MCNC: 6.3 G/DL (ref 6–8.5)
RBC # BLD AUTO: 4.54 10*6/MM3 (ref 3.77–5.28)
SODIUM SERPL-SCNC: 138 MMOL/L (ref 136–145)
WBC NRBC COR # BLD: 5.7 10*3/MM3 (ref 3.4–10.8)

## 2022-01-17 PROCEDURE — 85025 COMPLETE CBC W/AUTO DIFF WBC: CPT

## 2022-01-17 PROCEDURE — 80053 COMPREHEN METABOLIC PANEL: CPT

## 2022-01-17 PROCEDURE — 36415 COLL VENOUS BLD VENIPUNCTURE: CPT

## 2022-01-17 PROCEDURE — 77386: CPT | Performed by: RADIOLOGY

## 2022-01-17 NOTE — TELEPHONE ENCOUNTER
Patient is advised she will not continue the Xeloda once XRT is complete. Patient verbalized understanding.

## 2022-01-17 NOTE — TELEPHONE ENCOUNTER
Patient is asking for a call back from Dr. Miller team. She wants to know if she can quit taking her chemo medication due to her radiation treatment with Dr. Billings. Advised pt, Luanne can follow up on this.

## 2022-01-18 ENCOUNTER — TELEPHONE (OUTPATIENT)
Dept: RADIATION ONCOLOGY | Facility: HOSPITAL | Age: 82
End: 2022-01-18

## 2022-01-18 ENCOUNTER — HOSPITAL ENCOUNTER (OUTPATIENT)
Dept: RADIATION ONCOLOGY | Facility: HOSPITAL | Age: 82
Discharge: HOME | End: 2022-01-18

## 2022-01-18 DIAGNOSIS — C22.1 INTRAHEPATIC CHOLANGIOCARCINOMA: Primary | ICD-10-CM

## 2022-01-18 DIAGNOSIS — R19.7 DIARRHEA, UNSPECIFIED TYPE: ICD-10-CM

## 2022-01-18 PROCEDURE — 77386: CPT | Performed by: RADIOLOGY

## 2022-01-18 RX ORDER — DIPHENOXYLATE HYDROCHLORIDE AND ATROPINE SULFATE 2.5; .025 MG/1; MG/1
1 TABLET ORAL 4 TIMES DAILY PRN
Qty: 60 TABLET | Refills: 0 | Status: SHIPPED | OUTPATIENT
Start: 2022-01-18 | End: 2022-03-28

## 2022-01-18 NOTE — TELEPHONE ENCOUNTER
Mrs. Hernandez called with complaints of diarrhea, weakness and not wanting to take last treatment on 1/19/22, Per Dr. Herring instructed patient to drink fluids and take meds as prescribed and make a decision in the morning, instructed her it would be ok to miss last treatment if still feels unwell in the morning.

## 2022-01-19 ENCOUNTER — HOSPITAL ENCOUNTER (OUTPATIENT)
Dept: RADIATION ONCOLOGY | Facility: HOSPITAL | Age: 82
Discharge: HOME | End: 2022-01-19

## 2022-01-19 ENCOUNTER — APPOINTMENT (OUTPATIENT)
Dept: CT IMAGING | Facility: HOSPITAL | Age: 82
End: 2022-01-19

## 2022-01-19 PROCEDURE — 77386: CPT | Performed by: RADIOLOGY

## 2022-01-19 PROCEDURE — 77336 RADIATION PHYSICS CONSULT: CPT | Performed by: RADIOLOGY

## 2022-01-24 ENCOUNTER — APPOINTMENT (OUTPATIENT)
Dept: CT IMAGING | Facility: HOSPITAL | Age: 82
End: 2022-01-24

## 2022-01-26 NOTE — PROGRESS NOTES
DATE OF COMPLETION: 1/19/2022  DIAGNOSIS: Cholangiocarcinoma    REFERRING: Dr. Sharma and Dr. Montenegro          Dear  and David Montenegro Carol Lee completed radiation therapy today.      BACKGROUND: Sheree Hernandez  is a very pleasant 81 y.o. female  who has previously suffered from a fatigue and was sent for initial work-up that consisted of a heart cath and multiple other interventions.  The patient was only found to be a carrier for hemochromatosis was found to have an enlarging liver lesion and an AFP that was 9.27 and a CA 19-9 that was 85 8/8/2021.  The patient was sent for biopsy of his liver lesion in the inferior right lobe of the liver on 8/10/2021 that was consistent with adenocarcinoma.  The patient was taken for right-sided hepatectomy cholecystectomy and partial adrenalectomy on 9/23/2021 with Dr. Sharma at .  The patient was found to have a T3 N0 M0 lesion measuring 8.5 cm.  0 of 4 lymph nodes were involved but the patient did have LVSI and PNI.  The tumor did extend to the extra pack soft tissue between the liver and adrenal gland.  There was tumor positive focally at the cauterized margin.  The adrenal gland was not involved with carcinoma.     The patient's CA 19-9 11/5/2021 was 22 which was in the range of normal.  The patient underwent a course of concurrent chemotherapy radiotherapy with Xeloda as below    Treatment Summary     Dates of Therapy: 12/9/2021-1/19/2022  Treatment Site: Liver bed  Dose: 50.4 Gy in 28 fractions of 1.8 Gy each  Technique: Helical Xiang therapy 6X MV photons    Treatment Course and Tolerance: Mrs. Gonzales he tolerated treatment well.  She did have some difficulties with her bowels.    The initial follow up visit will be in 1 month telehealth with Naz.  I will then see the patient back after her next CT scan.  She will continue to follow with Dr. Montenegro and Dr. Sharma.    Sheree Herson Hernandez knows to call if any problems or concerns develop in the meantime.      Electronically signed by: Bunny Billings MD                    Cc: Timi Conway, DO

## 2022-02-28 ENCOUNTER — LAB (OUTPATIENT)
Dept: LAB | Facility: HOSPITAL | Age: 82
End: 2022-02-28

## 2022-02-28 DIAGNOSIS — C22.1 INTRAHEPATIC CHOLANGIOCARCINOMA: ICD-10-CM

## 2022-02-28 LAB
ALBUMIN SERPL-MCNC: 3.7 G/DL (ref 3.5–5.2)
ALBUMIN/GLOB SERPL: 1.2 G/DL
ALP SERPL-CCNC: 290 U/L (ref 39–117)
ALT SERPL W P-5'-P-CCNC: 31 U/L (ref 1–33)
ANION GAP SERPL CALCULATED.3IONS-SCNC: 10 MMOL/L (ref 5–15)
AST SERPL-CCNC: 49 U/L (ref 1–32)
BILIRUB SERPL-MCNC: 0.7 MG/DL (ref 0–1.2)
BUN SERPL-MCNC: 14 MG/DL (ref 8–23)
BUN/CREAT SERPL: 17.1 (ref 7–25)
CALCIUM SPEC-SCNC: 9.5 MG/DL (ref 8.6–10.5)
CANCER AG19-9 SERPL-ACNC: 26 U/ML
CHLORIDE SERPL-SCNC: 106 MMOL/L (ref 98–107)
CO2 SERPL-SCNC: 26 MMOL/L (ref 22–29)
CREAT SERPL-MCNC: 0.82 MG/DL (ref 0.57–1)
EGFRCR SERPLBLD CKD-EPI 2021: 71.5 ML/MIN/1.73
ERYTHROCYTE [DISTWIDTH] IN BLOOD BY AUTOMATED COUNT: 22.9 % (ref 12.3–15.4)
GLOBULIN UR ELPH-MCNC: 3.1 GM/DL
GLUCOSE SERPL-MCNC: 89 MG/DL (ref 65–99)
HCT VFR BLD AUTO: 44.4 % (ref 34–46.6)
HGB BLD-MCNC: 14.7 G/DL (ref 12–15.9)
LYMPHOCYTES # BLD AUTO: 0.8 10*3/MM3 (ref 0.7–3.1)
LYMPHOCYTES NFR BLD AUTO: 17.5 % (ref 19.6–45.3)
MCH RBC QN AUTO: 31.6 PG (ref 26.6–33)
MCHC RBC AUTO-ENTMCNC: 33.1 G/DL (ref 31.5–35.7)
MCV RBC AUTO: 95.3 FL (ref 79–97)
MONOCYTES # BLD AUTO: 0.3 10*3/MM3 (ref 0.1–0.9)
MONOCYTES NFR BLD AUTO: 6.4 % (ref 5–12)
NEUTROPHILS NFR BLD AUTO: 3.5 10*3/MM3 (ref 1.7–7)
NEUTROPHILS NFR BLD AUTO: 76.1 % (ref 42.7–76)
PLATELET # BLD AUTO: 127 10*3/MM3 (ref 140–450)
PMV BLD AUTO: 9.9 FL (ref 6–12)
POTASSIUM SERPL-SCNC: 4.3 MMOL/L (ref 3.5–5.2)
PROT SERPL-MCNC: 6.8 G/DL (ref 6–8.5)
RBC # BLD AUTO: 4.66 10*6/MM3 (ref 3.77–5.28)
SODIUM SERPL-SCNC: 142 MMOL/L (ref 136–145)
WBC NRBC COR # BLD: 4.6 10*3/MM3 (ref 3.4–10.8)

## 2022-02-28 PROCEDURE — 80053 COMPREHEN METABOLIC PANEL: CPT

## 2022-02-28 PROCEDURE — 85025 COMPLETE CBC W/AUTO DIFF WBC: CPT

## 2022-02-28 PROCEDURE — 86301 IMMUNOASSAY TUMOR CA 19-9: CPT

## 2022-02-28 PROCEDURE — 36415 COLL VENOUS BLD VENIPUNCTURE: CPT

## 2022-03-03 ENCOUNTER — HOSPITAL ENCOUNTER (OUTPATIENT)
Dept: CT IMAGING | Facility: HOSPITAL | Age: 82
Discharge: HOME | End: 2022-03-03
Admitting: RADIOLOGY

## 2022-03-03 DIAGNOSIS — R16.0 LIVER MASSES: ICD-10-CM

## 2022-03-03 PROCEDURE — 74177 CT ABD & PELVIS W/CONTRAST: CPT

## 2022-03-03 PROCEDURE — 71260 CT THORAX DX C+: CPT

## 2022-03-03 PROCEDURE — 25010000002 IOPAMIDOL 61 % SOLUTION: Performed by: RADIOLOGY

## 2022-03-03 RX ADMIN — IOPAMIDOL 90 ML: 612 INJECTION, SOLUTION INTRAVENOUS at 11:53

## 2022-03-04 ENCOUNTER — SPECIALTY PHARMACY (OUTPATIENT)
Dept: ONCOLOGY | Facility: HOSPITAL | Age: 82
End: 2022-03-04

## 2022-03-04 ENCOUNTER — OFFICE VISIT (OUTPATIENT)
Dept: RADIATION ONCOLOGY | Facility: HOSPITAL | Age: 82
End: 2022-03-04

## 2022-03-04 ENCOUNTER — HOSPITAL ENCOUNTER (OUTPATIENT)
Dept: RADIATION ONCOLOGY | Facility: HOSPITAL | Age: 82
Setting detail: RADIATION/ONCOLOGY SERIES
Discharge: HOME | End: 2022-03-04

## 2022-03-04 VITALS
BODY MASS INDEX: 24.36 KG/M2 | TEMPERATURE: 98.6 F | OXYGEN SATURATION: 96 % | WEIGHT: 142 LBS | DIASTOLIC BLOOD PRESSURE: 86 MMHG | RESPIRATION RATE: 18 BRPM | HEART RATE: 86 BPM | SYSTOLIC BLOOD PRESSURE: 188 MMHG

## 2022-03-04 DIAGNOSIS — C22.1 INTRAHEPATIC CHOLANGIOCARCINOMA: Primary | ICD-10-CM

## 2022-03-04 PROCEDURE — G0463 HOSPITAL OUTPT CLINIC VISIT: HCPCS

## 2022-03-04 RX ORDER — UBIDECARENONE 50 MG
CAPSULE ORAL DAILY
COMMUNITY

## 2022-03-04 RX ORDER — CHLORAL HYDRATE 500 MG
CAPSULE ORAL DAILY
COMMUNITY

## 2022-03-04 NOTE — PROGRESS NOTES
FOLLOW UP NOTE    PATIENT:                                                      Sheree Hernandez  MEDICAL RECORD #:                        [id number]  :                                                          1940  COMPLETION DATE:   2022  DIAGNOSIS:     Intrahepatic cholangiocarcinoma (HCC)  - Stage IB (cT1b, cN0, cM0)  - Stage IIIA (pT3, pN0, cM0)        BRIEF HISTORY:     Sheree Hernandez  is a very pleasant 81 y.o. female  who has previously suffered from a fatigue and was sent for initial work-up that consisted of a heart cath and multiple other interventions.  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  The patient was sent for biopsy of his liver lesion in the inferior right lobe of the liver on 8/10/2021 that was consistent with adenocarcinoma.  The patient was taken for right-sided hepatectomy cholecystectomy and partial adrenalectomy on 2021 with Dr. Sharma at .  The patient was found to have a T3 N0 M0 lesion measuring 8.5 cm.  0 of 4 lymph nodes were involved but the patient did have LVSI and PNI.  The tumor did extend to the extrahepatic soft tissue between the liver and adrenal gland.  There was tumor positive focally at the cauterized margin.  The adrenal gland was not involved with carcinoma.     The patient's CA 19-9 2021 was 22 which was in the range of normal.  The patient underwent a course of concurrent chemotherapy radiotherapy on 2022.    The patient continues to report some fatigue and nausea.  The patient reports that she had a CT scan yesterday.    MEDICATIONS: Medication reconciliation for the patient was reviewed and confirmed in the electronic medical record.    Review of Systems:   Review of Systems   Neurological: Positive for headaches and light-headedness.   A full 14 point review of systems was performed and was negative except as  noted in the HPI.      Physical Exam:   Physical Exam  Vitals and nursing note reviewed.   Constitutional:       Appearance: She is well-developed.   HENT:      Head: Normocephalic and atraumatic.   Eyes:      Conjunctiva/sclera: Conjunctivae normal.      Pupils: Pupils are equal, round, and reactive to light.   Neck:      Comments: No obviously enlarged cervical or supraclavicular LAD.  Cardiovascular:      Comments: Patient well perfused. Non cyanotic. No prominent JVD. No pedal edema  Pulmonary:      Effort: Pulmonary effort is normal.      Breath sounds: Normal breath sounds. No stridor. No wheezing.   Abdominal:      General: There is no distension.      Palpations: Abdomen is soft.   Musculoskeletal:         General: Normal range of motion.      Cervical back: Normal range of motion and neck supple.      Comments: Patient moves all extremities spontaneously.    Skin:     General: Skin is warm and dry.   Neurological:      Mental Status: She is alert and oriented to person, place, and time.      Comments: Coordination intact.   Psychiatric:         Behavior: Behavior normal.         Thought Content: Thought content normal.         Judgment: Judgment normal.            VITAL SIGNS:   Vitals:    03/04/22 1004   BP: (!) 188/86   Pulse: 86   Resp: 18   Temp: 98.6 °F (37 °C)   SpO2: 96%   Weight: 64.4 kg (142 lb)   PainSc: 0-No pain             Karnofsky score: 90       The following portions of the patient's history were reviewed and updated as appropriate: allergies, current medications, past family history, past medical history, past social history, past surgical history and problem list.            IMPRESSION/RECOMMENDATIONS:      PT 3 N0 M0 cholangiocarcinoma of the liver  -Status post resection with Dr. RILEY SAXENA at   -CA 19-9 85 initially  -CA 19-9 11/5/21: 22  -Focally positive margins  -Tumor extended to the extrapelvic soft tissues between the liver and the adrenal gland  -LVSI and PNI present  -0/4 lymph  nodes involved  -Completed Xeloda currently with radiation 1/19/2022  -Status post CT scan for staging 3/3/2022: No evidence of residual carcinoma  -Patient will continue to follow Dr. Lima or Dr. Montenegro  -Patient wishes to follow with us on as-needed basis.     Hyperlipidemia  -Continue present regimen     Hypertension  -Continue present regimen  -We will need to monitor and may have to adjust during treatments to the patient, hypotensive               Bunny Billings MD    Errors in dictation may reflect use of voice recognition software and not all errors in transcription may have been detected prior to signing.

## 2022-03-07 ENCOUNTER — TELEPHONE (OUTPATIENT)
Dept: RADIATION ONCOLOGY | Facility: HOSPITAL | Age: 82
End: 2022-03-07

## 2022-03-07 NOTE — TELEPHONE ENCOUNTER
I called the patient briefly to discuss more details of her CT scan.  Patient's indication listed non-small cell lung cancer, and I reassured the patient that she does not have lung cancer.  I am uncertain as to how this became listed as one of her indications.  Patient was also concerned that she may be still has an ovary because she had undergone hysterectomy.  I informed the patient that a hysterectomy is removal of the uterus.  I told her that removal of the uterus and ovaries is a different procedure called a hysterectomy with bilateral salpingo-oophorectomy.  I told her that I am uncertain as to what her surgeon did at the time of her hysterectomy but that is not uncommon for a ovary to be left at the time of the surgeries to help with hormonal normality.  I recommended that if she was much more curious he could reach out to her surgeon to determine what exactly was removed at the time of that procedure, and why.  She was concerned the scan images may represent those of a different person.  I assured her that the patient has a very unique anatomy that corresponds very well to the daily imaging that I reviewed 25 times during her treatments.    Thank you for allowing me to be involved in the care of the patient. If you have any questions or concerns, please feel free to call or contact me at your earliest convenience.     Bunny Billings  Radiation Oncology

## 2022-03-28 ENCOUNTER — OFFICE VISIT (OUTPATIENT)
Dept: ONCOLOGY | Facility: CLINIC | Age: 82
End: 2022-03-28

## 2022-03-28 VITALS
HEIGHT: 64 IN | DIASTOLIC BLOOD PRESSURE: 76 MMHG | WEIGHT: 142.6 LBS | HEART RATE: 74 BPM | TEMPERATURE: 96.4 F | OXYGEN SATURATION: 98 % | RESPIRATION RATE: 16 BRPM | BODY MASS INDEX: 24.34 KG/M2 | SYSTOLIC BLOOD PRESSURE: 135 MMHG

## 2022-03-28 DIAGNOSIS — C22.1 INTRAHEPATIC CHOLANGIOCARCINOMA: Primary | ICD-10-CM

## 2022-03-28 PROCEDURE — 99214 OFFICE O/P EST MOD 30 MIN: CPT | Performed by: INTERNAL MEDICINE

## 2022-03-28 RX ORDER — ROSUVASTATIN CALCIUM 10 MG/1
10 TABLET, COATED ORAL
COMMUNITY
End: 2022-06-16 | Stop reason: SDUPTHER

## 2022-03-28 NOTE — PROGRESS NOTES
Follow Up Office Visit      Date: 2022     Patient Name: Sheree Hernandez  MRN: [id number]  : 1940  Chief Complaint:  Follow-up for intrahepatic cholangiocarcinoma      History of Present Illness: Sheree Hernandez is a pleasant 79 y.o. female with a past medical history of hyperlipidemia and hypertension who presents today for evaluation of concern for hemochromatosis. The patient is accompanied by their self who contributes to the history of their care.  Patient states that over the past 2 years she has been having worsening fatigue especially with exertion.  Over the past several months this has become worse.  Patient states that she gets tired with basic activities including showering getting dressed in the morning and walking to and from her car from stores.  She states that her fatigue gets better after a few minutes of rest.  She has previously had an echocardiogram and cardiac catheterization within the past 2 years which did not reveal any cause of her fatigue with exertion.  She is also had an ultrasound of the liver which revealed stable cyst.  She was recently seen by her PCP who ordered iron studies which was notable for an elevated ferritin to 246.  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  All other mutations were negative.  She is currently up-to-date on her mammograms and colonoscopy with no active malignancies noted.  She is otherwise compliant with her statin and high blood pressure medicines.  Repeat abdominal imaging in 2021 concerning for enlarging liver lesion.  She was seen by Dr. Sharma and  is status post open right hepatectomy, cholecystectomy, right partial adrenalectomy on 2021.  Pathology showed a focal R1 resected margin.  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  0/4 lymph nodes were  positive for disease.  LVI and PNI were present.     Interval History:  Presents to clinic for follow-up.  Finished chemoXRT in January 2021.  She is doing much better today.  Still having some issues with stamina and some mild shortness of breath with significant exertion.  She otherwise denies any abdominal pain or discomfort.  Denies any nausea or vomiting    Oncology History:    Oncology/Hematology History   Intrahepatic cholangiocarcinoma (HCC)   8/17/2021 Initial Diagnosis    Intrahepatic cholangiocarcinoma (CMS/HCC)     8/17/2021 Cancer Staged    Staging form: Intrahepatic Bile Duct, AJCC 8th Edition  - Clinical: Stage IB (cT1b, cN0, cM0) - Signed by Jorje Montenegro MD on 8/17/2021 11/5/2021 Cancer Staged    Staging form: Intrahepatic Bile Duct, AJCC 8th Edition  - Pathologic: Stage IIIA (pT3, pN0, cM0) - Signed by Jorje Montenegro MD on 11/5/2021 12/9/2021 -  Chemotherapy    OP GALLBLADDER Capecitabine + XRT     12/9/2021 - 1/19/2022 Radiation    Radiation OncologyTreatment Course:  Sheree Hernandez received 5040 cGy in 28 fractions to liver via External Beam Radiation - EBRT.         Subjective      Review of Systems:   Constitutional: Negative for fevers, chills, or weight loss  Eyes: Negative for blurred vision or discharge         Ear/Nose/Throat: Negative for difficulty swallowing, sore throat, LAD                                                       Respiratory: Negative for cough, SOA, wheezing                                                                                        Cardiovascular: Negative for chest pain or palpitations                                                                  Gastrointestinal: Negative for nausea, vomiting or diarrhea                                                                     Genitourinary: Negative for dysuria or hematuria                                                                                           Musculoskeletal: Negative  "for any joint pains or muscle aches                                                                        Neurologic: Negative for any weakness, headaches, dizziness                                                                         Hematologic: Negative for any easy bleeding or bruising                                                                                   Psychiatric: Negative for anxiety or depression                          Past Medical History/Past Surgical History/ Family History/ Social History: Reviewed by me and unchanged from my previous documentation done on January 2022.     Medications:     Current Outpatient Medications:   •  Cholecalciferol (Vitamin D3) 25 MCG (1000 UT) capsule, Take  by mouth., Disp: , Rfl:   •  coenzyme Q10 50 MG capsule capsule, Take  by mouth Daily., Disp: , Rfl:   •  Cyanocobalamin (VITAMIN B 12 PO), Take  by mouth., Disp: , Rfl:   •  Folic Acid 0.8 MG capsule, Take  by mouth., Disp: , Rfl:   •  methocarbamol (ROBAXIN) 500 MG tablet, Take 500 mg by mouth As Needed for Muscle Spasms., Disp: , Rfl:   •  Omega-3 1000 MG capsule, Take  by mouth., Disp: , Rfl:   •  rosuvastatin (CRESTOR) 10 MG tablet, Take 10 mg by mouth., Disp: , Rfl:   •  Zinc 50 MG capsule, Take  by mouth., Disp: , Rfl:     Allergies:   Allergies   Allergen Reactions   • Pravastatin Myalgia       Objective     Physical Exam:  Vital Signs:   Vitals:    03/28/22 1001   BP: 135/76   Pulse: 74   Resp: 16   Temp: 96.4 °F (35.8 °C)   TempSrc: Temporal   SpO2: 98%   Weight: 64.7 kg (142 lb 9.6 oz)   Height: 162.6 cm (64\")   PainSc: 0-No pain     Pain Score    03/28/22 1001   PainSc: 0-No pain     ECOG Performance Status: 0 - Asymptomatic    Constitutional: NAD, ECOG 0  Eyes: PERRLA, scleral anicteric  ENT: No LAD, no thyromegaly  Respiratory: CTAB, no wheezing, rales, rhonchi  Cardiovascular: RRR, no murmurs, pulses 2+ bilaterally  Abdomen: soft, NT/ND, no HSM  Musculoskeletal: strength 5/5 bilaterally, " no c/c/e  Neurologic: A&O x 3, CN II-XII intact grossly    Results Review:   No visits with results within 2 Week(s) from this visit.   Latest known visit with results is:   Lab on 02/28/2022   Component Date Value Ref Range Status   • Glucose 02/28/2022 89  65 - 99 mg/dL Final   • BUN 02/28/2022 14  8 - 23 mg/dL Final   • Creatinine 02/28/2022 0.82  0.57 - 1.00 mg/dL Final   • Sodium 02/28/2022 142  136 - 145 mmol/L Final   • Potassium 02/28/2022 4.3  3.5 - 5.2 mmol/L Final   • Chloride 02/28/2022 106  98 - 107 mmol/L Final   • CO2 02/28/2022 26.0  22.0 - 29.0 mmol/L Final   • Calcium 02/28/2022 9.5  8.6 - 10.5 mg/dL Final   • Total Protein 02/28/2022 6.8  6.0 - 8.5 g/dL Final   • Albumin 02/28/2022 3.70  3.50 - 5.20 g/dL Final   • ALT (SGPT) 02/28/2022 31  1 - 33 U/L Final   • AST (SGOT) 02/28/2022 49 (A) 1 - 32 U/L Final   • Alkaline Phosphatase 02/28/2022 290 (A) 39 - 117 U/L Final   • Total Bilirubin 02/28/2022 0.7  0.0 - 1.2 mg/dL Final   • Globulin 02/28/2022 3.1  gm/dL Final    Calculated Result   • A/G Ratio 02/28/2022 1.2  g/dL Final   • BUN/Creatinine Ratio 02/28/2022 17.1  7.0 - 25.0 Final   • Anion Gap 02/28/2022 10.0  5.0 - 15.0 mmol/L Final   • eGFR 02/28/2022 71.5  >60.0 mL/min/1.73 Final    National Kidney Foundation and American Society of Nephrology (ASN) Task Force recommended calculation based on the Chronic Kidney Disease Epidemiology Collaboration (CKD-EPI) equation refit without adjustment for race.   • CA 19-9 02/28/2022 26.0  <=35.0 U/mL Final   • WBC 02/28/2022 4.60  3.40 - 10.80 10*3/mm3 Final    Verified by repeat analysis.    • RBC 02/28/2022 4.66  3.77 - 5.28 10*6/mm3 Final   • Hemoglobin 02/28/2022 14.7  12.0 - 15.9 g/dL Final   • Hematocrit 02/28/2022 44.4  34.0 - 46.6 % Final   • RDW 02/28/2022 22.9 (A) 12.3 - 15.4 % Final   • MCV 02/28/2022 95.3  79.0 - 97.0 fL Final   • MCH 02/28/2022 31.6  26.6 - 33.0 pg Final   • MCHC 02/28/2022 33.1  31.5 - 35.7 g/dL Final   • MPV 02/28/2022  9.9  6.0 - 12.0 fL Final   • Platelets 02/28/2022 127 (A) 140 - 450 10*3/mm3 Final   • Neutrophil % 02/28/2022 76.1 (A) 42.7 - 76.0 % Final   • Lymphocyte % 02/28/2022 17.5 (A) 19.6 - 45.3 % Final   • Monocyte % 02/28/2022 6.4  5.0 - 12.0 % Final   • Neutrophils, Absolute 02/28/2022 3.50  1.70 - 7.00 10*3/mm3 Final   • Lymphocytes, Absolute 02/28/2022 0.80  0.70 - 3.10 10*3/mm3 Final   • Monocytes, Absolute 02/28/2022 0.30  0.10 - 0.90 10*3/mm3 Final       CT Chest With Contrast Diagnostic, CT Abdomen Pelvis With Contrast    Addendum Date: 3/8/2022 Addendum:   ADDENDUM: Additional information from the patient's physician indicates that history entered on the order request was incorrect. The patient does not have history of lung cancer, but does have history of cholangiocarcinoma.  This report was finalized on 3/8/2022 3:09 PM by Dr. Salvador Gill MD.      Result Date: 3/8/2022  Narrative: EXAMINATION: CT CHEST W CONTRAST DIAGNOSTIC-, CT ABDOMEN PELVIS W CONTRAST-  INDICATION: Non-small cell lung cancer, metastatic, assess treatment response; R16.0-Hepatomegaly, not elsewhere classified  TECHNIQUE: Spiral acquisition 3 mm post IV contrast images through the chest with 3 mm post IV contrast and post oral contrast portal venous phase and delayed venous phase images through the abdomen and pelvis. A total of 90 mL of [id number]-300 was administered IV.  The radiation dose reduction device was turned on for each scan per the ALARA (As Low as Reasonably Achievable) protocol.  COMPARISON: 08/08/2021 chest CT scan. 11/19/2021 abdomen and pelvis CT scan  FINDINGS: History indicates metastatic non-small cell lung cancer.  CT SCAN OF THE CHEST WITH IV CONTRAST: Small left lobe thyroid nodule is stable. No new or increasing mediastinal or hilar adenopathy or mass is seen. No pericardial or pleural effusion is seen. No significant axillary or lower neck adenopathy is identified. Pulmonary arteries and thoracic aorta appear within  normal limits. No significant coronary artery calcifications appreciated.  Lung window imaging, no evidence of pulmonary parenchymal mass or other active disease. A 2 mm micronodule within the right major fissure is unchanged and apparently incidental. Bony structures appear to be intact.      Impression: No evidence of active chest disease.    ABDOMEN AND PELVIS CT SCAN WITH IV CONTRAST: Right lobe liver resection margin is stable. Hypertrophic left lobe appears unremarkable except for a couple of hepatic cysts. Spleen is not enlarged. Adrenal glands appear within normal limits. Previously noted small anterior exophytic cyst of the pancreas is difficult to identify today except in retrospect compared to the prior study, with a faintly asymmetric low density area 9 x 5.5 mm as measured on today's study, up to 11 x 9 mm on prior study. No renal masses are identified. No upper abdominal adenopathy, ascites or acute inflammatory change is seen. Bowel loops are normal in caliber and normal in appearance.  Regarding the lower abdomen and pelvis, slightly asymmetric vaginal cuff is unchanged. Uterus is not identified. At least the left ovary appears to remain, and no evidence of ovarian enlargement is seen. No mass, adenopathy or inflammatory change or free fluid is identified. Delayed venous phase images show no evidence of obstructive uropathy. Bony structures appear to be intact..    IMPRESSION: 1. Stable postoperative appearance of the liver with hypertrophic left liver lobe and small liver cysts. 2. Previously noted small pancreatic cyst is less well seen today and appears smaller than on the prior study. 3. No evidence of malignancy/metastasis or other acute intra-abdominal or intrapelvic disease.   This report was finalized on 3/4/2022 9:43 AM by Dr. Salvador Gill MD.        Assessment / Plan      Assessment/Plan:   Intrahepatic cholangiocarcinoma (CMS/HCC) (Primary)  -Noted during her most recent hospitalization with  CT scans concerning for an enlarging liver lesion to 7.3 cm that was previously noted to be hemangioma  -Triple phase CT concerning for a solid tumor lesion  -Biopsy consistent with an adenocarcinoma  -CA 19-9 elevated to 84.7  -CT chest as well as the rest of the CT abdomen/pelvis not concerning for distant or metastatic disease  -Status post open right hepatectomy, cholecystectomy, right partial adrenalectomy on 9/23/2021 with Dr. 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  0/4 lymph nodes were positive for disease.  LVI and PNI were present.  Focal R1 resection margin  -Discussed with patient and her daughter that she would benefit from adjuvant chemoXRT using Xeloda.  Discussed side effects including but not limited to immunosuppression, diarrhea, abdominal pain, nausea, vomiting, hand/foot syndrome  -Repeat CA 19-9 (22) in November 2021  -Completed chemo XRT with Xeloda in January 2021  -Repeat scans in March 2022 without any evidence of recurrent or metastatic disease  -Repeat CA 19-9 26 in March 2022  -Plan to repeat labs and scans in June 2022     Hemochromatosis carrier  -Noted on recent labs with an elevated ferritin to 246 hemochromatosis gene profile positive for heterozygosity of H63D.  Other genes negative.  Given patient's age and previous cardiac liver work-up showing no evidence of dysfunction, it is unlikely that she has had iron deposition all this time.  The heterozygosity of H63D makes her carrier for hemochromatosis however does not mean that she has active disease  -CT A/P in July 2020 with no liver dysfunction but was notable for hemangioma which has now been confirmed to be a intrahepatic cholangiocarcinoma and a status post resection.  Treatment as above  -ECHO in July 2020 within normal limits  -Repeat iron studies in April 2021 stable with a ferritin of 229, iron level 100,  transferrin saturation 27%  -Low concern for iron overload at this time.          Follow Up:   Follow-up in 2 months with repeat labs and scans     Jorje Montenegro MD  Hematology and Oncology     Please note that portions of this note may have been completed with a voice recognition program. Efforts were made to edit the dictations, but occasionally words are mistranscribed.

## 2022-06-02 ENCOUNTER — LAB (OUTPATIENT)
Dept: LAB | Facility: HOSPITAL | Age: 82
End: 2022-06-02

## 2022-06-02 DIAGNOSIS — C22.1 INTRAHEPATIC CHOLANGIOCARCINOMA: ICD-10-CM

## 2022-06-02 DIAGNOSIS — C22.1 INTRAHEPATIC CHOLANGIOCARCINOMA: Primary | ICD-10-CM

## 2022-06-02 DIAGNOSIS — K87 DISORDERS OF GALLBLADDER, BILIARY TRACT AND PANCREAS IN DISEASES CLASSIFIED ELSEWHERE: ICD-10-CM

## 2022-06-02 LAB
ALBUMIN SERPL-MCNC: 3.8 G/DL (ref 3.5–5.2)
ALBUMIN/GLOB SERPL: 1.5 G/DL
ALP SERPL-CCNC: 321 U/L (ref 39–117)
ALT SERPL W P-5'-P-CCNC: 33 U/L (ref 1–33)
ANION GAP SERPL CALCULATED.3IONS-SCNC: 9 MMOL/L (ref 5–15)
AST SERPL-CCNC: 43 U/L (ref 1–32)
BILIRUB SERPL-MCNC: 0.5 MG/DL (ref 0–1.2)
BUN SERPL-MCNC: 17 MG/DL (ref 8–23)
BUN/CREAT SERPL: 23.3 (ref 7–25)
CALCIUM SPEC-SCNC: 8.9 MG/DL (ref 8.6–10.5)
CANCER AG19-9 SERPL-ACNC: 18.6 U/ML
CHLORIDE SERPL-SCNC: 108 MMOL/L (ref 98–107)
CHOLEST SERPL-MCNC: 186 MG/DL (ref 0–200)
CO2 SERPL-SCNC: 26 MMOL/L (ref 22–29)
CREAT SERPL-MCNC: 0.73 MG/DL (ref 0.57–1)
EGFRCR SERPLBLD CKD-EPI 2021: 82.2 ML/MIN/1.73
ERYTHROCYTE [DISTWIDTH] IN BLOOD BY AUTOMATED COUNT: 15.3 % (ref 12.3–15.4)
GLOBULIN UR ELPH-MCNC: 2.5 GM/DL
GLUCOSE SERPL-MCNC: 99 MG/DL (ref 65–99)
HCT VFR BLD AUTO: 44.7 % (ref 34–46.6)
HDLC SERPL-MCNC: 82 MG/DL (ref 40–60)
HGB BLD-MCNC: 14.2 G/DL (ref 12–15.9)
LDLC SERPL CALC-MCNC: 90 MG/DL (ref 0–100)
LDLC/HDLC SERPL: 1.09 {RATIO}
LYMPHOCYTES # BLD AUTO: 0.9 10*3/MM3 (ref 0.7–3.1)
LYMPHOCYTES NFR BLD AUTO: 21 % (ref 19.6–45.3)
MCH RBC QN AUTO: 28.9 PG (ref 26.6–33)
MCHC RBC AUTO-ENTMCNC: 31.8 G/DL (ref 31.5–35.7)
MCV RBC AUTO: 90.9 FL (ref 79–97)
MONOCYTES # BLD AUTO: 0.3 10*3/MM3 (ref 0.1–0.9)
MONOCYTES NFR BLD AUTO: 6.7 % (ref 5–12)
NEUTROPHILS NFR BLD AUTO: 3.2 10*3/MM3 (ref 1.7–7)
NEUTROPHILS NFR BLD AUTO: 72.3 % (ref 42.7–76)
PLATELET # BLD AUTO: 96 10*3/MM3 (ref 140–450)
PMV BLD AUTO: 10.3 FL (ref 6–12)
POTASSIUM SERPL-SCNC: 4.1 MMOL/L (ref 3.5–5.2)
PROT SERPL-MCNC: 6.3 G/DL (ref 6–8.5)
RBC # BLD AUTO: 4.91 10*6/MM3 (ref 3.77–5.28)
SODIUM SERPL-SCNC: 143 MMOL/L (ref 136–145)
TRIGL SERPL-MCNC: 74 MG/DL (ref 0–150)
VLDLC SERPL-MCNC: 14 MG/DL (ref 5–40)
WBC NRBC COR # BLD: 4.4 10*3/MM3 (ref 3.4–10.8)

## 2022-06-02 PROCEDURE — 80053 COMPREHEN METABOLIC PANEL: CPT

## 2022-06-02 PROCEDURE — 86301 IMMUNOASSAY TUMOR CA 19-9: CPT

## 2022-06-02 PROCEDURE — 36415 COLL VENOUS BLD VENIPUNCTURE: CPT

## 2022-06-02 PROCEDURE — 85025 COMPLETE CBC W/AUTO DIFF WBC: CPT

## 2022-06-02 PROCEDURE — 80061 LIPID PANEL: CPT

## 2022-06-04 ENCOUNTER — HOSPITAL ENCOUNTER (OUTPATIENT)
Dept: CT IMAGING | Facility: HOSPITAL | Age: 82
Discharge: HOME | End: 2022-06-04
Admitting: INTERNAL MEDICINE

## 2022-06-04 DIAGNOSIS — C22.1 INTRAHEPATIC CHOLANGIOCARCINOMA: ICD-10-CM

## 2022-06-04 PROCEDURE — 25010000002 IOPAMIDOL 61 % SOLUTION: Performed by: INTERNAL MEDICINE

## 2022-06-04 PROCEDURE — 74177 CT ABD & PELVIS W/CONTRAST: CPT

## 2022-06-04 PROCEDURE — 71260 CT THORAX DX C+: CPT

## 2022-06-04 RX ADMIN — IOPAMIDOL 100 ML: 612 INJECTION, SOLUTION INTRAVENOUS at 10:24

## 2022-06-06 ENCOUNTER — TELEPHONE (OUTPATIENT)
Dept: ONCOLOGY | Facility: CLINIC | Age: 82
End: 2022-06-06

## 2022-06-06 ENCOUNTER — LAB (OUTPATIENT)
Dept: LAB | Facility: HOSPITAL | Age: 82
End: 2022-06-06

## 2022-06-06 ENCOUNTER — OFFICE VISIT (OUTPATIENT)
Dept: ONCOLOGY | Facility: CLINIC | Age: 82
End: 2022-06-06

## 2022-06-06 VITALS
BODY MASS INDEX: 24.63 KG/M2 | SYSTOLIC BLOOD PRESSURE: 174 MMHG | OXYGEN SATURATION: 98 % | TEMPERATURE: 97.3 F | DIASTOLIC BLOOD PRESSURE: 81 MMHG | WEIGHT: 143.5 LBS | HEART RATE: 70 BPM

## 2022-06-06 DIAGNOSIS — C22.1 INTRAHEPATIC CHOLANGIOCARCINOMA: ICD-10-CM

## 2022-06-06 DIAGNOSIS — R53.83 OTHER FATIGUE: ICD-10-CM

## 2022-06-06 DIAGNOSIS — C22.1 INTRAHEPATIC CHOLANGIOCARCINOMA: Primary | ICD-10-CM

## 2022-06-06 DIAGNOSIS — R79.89 OTHER SPECIFIED ABNORMAL FINDINGS OF BLOOD CHEMISTRY: ICD-10-CM

## 2022-06-06 LAB
FERRITIN SERPL-MCNC: 84.74 NG/ML (ref 13–150)
FOLATE SERPL-MCNC: 19.7 NG/ML (ref 4.78–24.2)
IRON 24H UR-MRATE: 132 MCG/DL (ref 37–145)
IRON SATN MFR SERPL: 32 % (ref 20–50)
T4 FREE SERPL-MCNC: 1.26 NG/DL (ref 0.93–1.7)
TIBC SERPL-MCNC: 416 MCG/DL (ref 298–536)
TRANSFERRIN SERPL-MCNC: 279 MG/DL (ref 200–360)
TSH SERPL DL<=0.05 MIU/L-ACNC: 3.42 UIU/ML (ref 0.27–4.2)
VIT B12 BLD-MCNC: 637 PG/ML (ref 211–946)

## 2022-06-06 PROCEDURE — 82607 VITAMIN B-12: CPT

## 2022-06-06 PROCEDURE — 99214 OFFICE O/P EST MOD 30 MIN: CPT | Performed by: INTERNAL MEDICINE

## 2022-06-06 PROCEDURE — 82728 ASSAY OF FERRITIN: CPT

## 2022-06-06 PROCEDURE — 84443 ASSAY THYROID STIM HORMONE: CPT

## 2022-06-06 PROCEDURE — 84439 ASSAY OF FREE THYROXINE: CPT

## 2022-06-06 PROCEDURE — 36415 COLL VENOUS BLD VENIPUNCTURE: CPT

## 2022-06-06 PROCEDURE — 82746 ASSAY OF FOLIC ACID SERUM: CPT

## 2022-06-06 PROCEDURE — 84466 ASSAY OF TRANSFERRIN: CPT

## 2022-06-06 PROCEDURE — 83540 ASSAY OF IRON: CPT

## 2022-06-06 NOTE — PROGRESS NOTES
Follow Up Office Visit      Date: 2022     Patient Name: Sheree Hernandez  MRN: [id number]  : 1940  Chief Complaint:  Follow-up for intrahepatic cholangiocarcinoma      History of Present Illness: Sheree Hernandez is a pleasant 79 y.o. female with a past medical history of hyperlipidemia and hypertension who presents today for evaluation of concern for hemochromatosis. The patient is accompanied by their self who contributes to the history of their care.  Patient states that over the past 2 years she has been having worsening fatigue especially with exertion.  Over the past several months this has become worse.  Patient states that she gets tired with basic activities including showering getting dressed in the morning and walking to and from her car from stores.  She states that her fatigue gets better after a few minutes of rest.  She has previously had an echocardiogram and cardiac catheterization within the past 2 years which did not reveal any cause of her fatigue with exertion.  She is also had an ultrasound of the liver which revealed stable cyst.  She was recently seen by her PCP who ordered iron studies which was notable for an elevated ferritin to 246.  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  All other mutations were negative.  She is currently up-to-date on her mammograms and colonoscopy with no active malignancies noted.  She is otherwise compliant with her statin and high blood pressure medicines.  Repeat abdominal imaging in 2021 concerning for enlarging liver lesion.  She was seen by Dr. Sharma and  is status post open right hepatectomy, cholecystectomy, right partial adrenalectomy on 2021.  Pathology showed a focal R1 resected margin.  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  0/4 lymph nodes were  positive for disease.  LVI and PNI were present.     Interval History:  Presents to clinic for follow-up.  Finished chemoXRT in January 2021.    Overall stable at this time.  Still notes some mild shortness of breath with significant exertion.  Denies any abdominal pain or discomfort.  Denies any nausea, vomiting, diarrhea    Oncology History:    Oncology/Hematology History   Intrahepatic cholangiocarcinoma (HCC)   8/17/2021 Initial Diagnosis    Intrahepatic cholangiocarcinoma (CMS/HCC)     8/17/2021 Cancer Staged    Staging form: Intrahepatic Bile Duct, AJCC 8th Edition  - Clinical: Stage IB (cT1b, cN0, cM0) - Signed by Jroje Montenegro MD on 8/17/2021 11/5/2021 Cancer Staged    Staging form: Intrahepatic Bile Duct, AJCC 8th Edition  - Pathologic: Stage IIIA (pT3, pN0, cM0) - Signed by Jorje Montenegro MD on 11/5/2021 12/9/2021 -  Chemotherapy    OP GALLBLADDER Capecitabine + XRT     12/9/2021 - 1/19/2022 Radiation    Radiation OncologyTreatment Course:  Sheree Hernandez received 5040 cGy in 28 fractions to liver via External Beam Radiation - EBRT.         Subjective      Review of Systems:   Constitutional: Negative for fevers, chills, or weight loss  Eyes: Negative for blurred vision or discharge         Ear/Nose/Throat: Negative for difficulty swallowing, sore throat, LAD                                                       Respiratory: Negative for cough, SOA, wheezing                                                                                        Cardiovascular: Negative for chest pain or palpitations                                                                  Gastrointestinal: Negative for nausea, vomiting or diarrhea                                                                     Genitourinary: Negative for dysuria or hematuria                                                                                           Musculoskeletal: Negative for any joint pains or muscle aches                                                                         Neurologic: Negative for any weakness, headaches, dizziness                                                                         Hematologic: Negative for any easy bleeding or bruising                                                                                   Psychiatric: Negative for anxiety or depression                          Past Medical History/Past Surgical History/ Family History/ Social History: Reviewed by me and unchanged from my previous documentation done on March 2022.     Medications:     Current Outpatient Medications:   •  Cholecalciferol (Vitamin D3) 25 MCG (1000 UT) capsule, Take  by mouth., Disp: , Rfl:   •  coenzyme Q10 50 MG capsule capsule, Take  by mouth Daily., Disp: , Rfl:   •  Cyanocobalamin (VITAMIN B 12 PO), Take  by mouth., Disp: , Rfl:   •  Folic Acid 0.8 MG capsule, Take  by mouth., Disp: , Rfl:   •  Omega-3 1000 MG capsule, Take  by mouth., Disp: , Rfl:   •  rosuvastatin (CRESTOR) 10 MG tablet, Take 10 mg by mouth., Disp: , Rfl:   •  Turmeric Curcumin 500 MG capsule, Take 750 mg by mouth., Disp: , Rfl:   •  Zinc 50 MG capsule, Take  by mouth., Disp: , Rfl:   •  methocarbamol (ROBAXIN) 500 MG tablet, Take 500 mg by mouth As Needed for Muscle Spasms., Disp: , Rfl:   •  methocarbamol (ROBAXIN) 500 MG tablet, Take 1 tablet by mouth 3 (Three) Times a Day As Needed for muscle spasms, Disp: 42 tablet, Rfl: 2    Allergies:   Allergies   Allergen Reactions   • Pravastatin Myalgia       Objective     Physical Exam:  Vital Signs:   Vitals:    06/06/22 1010   BP: 174/81   Pulse: 70   Temp: 97.3 °F (36.3 °C)   SpO2: 98%   Weight: 65.1 kg (143 lb 8 oz)     There were no vitals filed for this visit.  ECOG Performance Status: 0 - Asymptomatic    Constitutional: NAD, ECOG 0  Eyes: PERRLA, scleral anicteric  ENT: No LAD, no thyromegaly  Respiratory: CTAB, no wheezing, rales, rhonchi  Cardiovascular: RRR, no murmurs, pulses  2+ bilaterally  Abdomen: soft, NT/ND, no HSM  Musculoskeletal: strength 5/5 bilaterally, no c/c/e  Neurologic: A&O x 3, CN II-XII intact grossly    Results Review:   Lab on 06/02/2022   Component Date Value Ref Range Status   • Glucose 06/02/2022 99  65 - 99 mg/dL Final   • BUN 06/02/2022 17  8 - 23 mg/dL Final   • Creatinine 06/02/2022 0.73  0.57 - 1.00 mg/dL Final   • Sodium 06/02/2022 143  136 - 145 mmol/L Final   • Potassium 06/02/2022 4.1  3.5 - 5.2 mmol/L Final   • Chloride 06/02/2022 108 (A) 98 - 107 mmol/L Final   • CO2 06/02/2022 26.0  22.0 - 29.0 mmol/L Final   • Calcium 06/02/2022 8.9  8.6 - 10.5 mg/dL Final   • Total Protein 06/02/2022 6.3  6.0 - 8.5 g/dL Final   • Albumin 06/02/2022 3.80  3.50 - 5.20 g/dL Final   • ALT (SGPT) 06/02/2022 33  1 - 33 U/L Final   • AST (SGOT) 06/02/2022 43 (A) 1 - 32 U/L Final   • Alkaline Phosphatase 06/02/2022 321 (A) 39 - 117 U/L Final   • Total Bilirubin 06/02/2022 0.5  0.0 - 1.2 mg/dL Final   • Globulin 06/02/2022 2.5  gm/dL Final    Calculated Result   • A/G Ratio 06/02/2022 1.5  g/dL Final   • BUN/Creatinine Ratio 06/02/2022 23.3  7.0 - 25.0 Final   • Anion Gap 06/02/2022 9.0  5.0 - 15.0 mmol/L Final   • eGFR 06/02/2022 82.2  >60.0 mL/min/1.73 Final    National Kidney Foundation and American Society of Nephrology (ASN) Task Force recommended calculation based on the Chronic Kidney Disease Epidemiology Collaboration (CKD-EPI) equation refit without adjustment for race.   • CA 19-9 06/02/2022 18.6  <=35.0 U/mL Final   • WBC 06/02/2022 4.40  3.40 - 10.80 10*3/mm3 Final    Verified by repeat analysis.    • RBC 06/02/2022 4.91  3.77 - 5.28 10*6/mm3 Final   • Hemoglobin 06/02/2022 14.2  12.0 - 15.9 g/dL Final   • Hematocrit 06/02/2022 44.7  34.0 - 46.6 % Final   • RDW 06/02/2022 15.3  12.3 - 15.4 % Final   • MCV 06/02/2022 90.9  79.0 - 97.0 fL Final   • MCH 06/02/2022 28.9  26.6 - 33.0 pg Final   • MCHC 06/02/2022 31.8  31.5 - 35.7 g/dL Final   • MPV 06/02/2022 10.3  6.0 -  12.0 fL Final   • Platelets 06/02/2022 96 (A) 140 - 450 10*3/mm3 Final    Verified by repeat analysis.    • Neutrophil % 06/02/2022 72.3  42.7 - 76.0 % Final   • Lymphocyte % 06/02/2022 21.0  19.6 - 45.3 % Final   • Monocyte % 06/02/2022 6.7  5.0 - 12.0 % Final   • Neutrophils, Absolute 06/02/2022 3.20  1.70 - 7.00 10*3/mm3 Final   • Lymphocytes, Absolute 06/02/2022 0.90  0.70 - 3.10 10*3/mm3 Final   • Monocytes, Absolute 06/02/2022 0.30  0.10 - 0.90 10*3/mm3 Final   • Total Cholesterol 06/02/2022 186  0 - 200 mg/dL Final   • Triglycerides 06/02/2022 74  0 - 150 mg/dL Final   • HDL Cholesterol 06/02/2022 82 (A) 40 - 60 mg/dL Final   • LDL Cholesterol  06/02/2022 90  0 - 100 mg/dL Final   • VLDL Cholesterol 06/02/2022 14  5 - 40 mg/dL Final   • LDL/HDL Ratio 06/02/2022 1.09   Final       CT Chest With Contrast Diagnostic, CT Abdomen Pelvis With Contrast    Result Date: 6/4/2022  Narrative: CT CHEST W CONTRAST DIAGNOSTIC-, CT ABDOMEN PELVIS W CONTRAST-  Date of Exam: 6/4/2022 10:17 AM  Indication: cholangiocarcnioma maintenance scan; C22.1-Intrahepatic bile duct carcinoma.  Comparison: 3/3/2022  Technique: Serial and axial CT images of the chest were obtained following the uneventful intravenous administration of 100 cc Isovue-370. contrast. Reconstructions in the coronal and sagittal planes were also performed.  Automated exposure control and iterative reconstruction methods were used.  FINDINGS:  Chest:  There is scarring in the lung apices. There is a stable 5 mm nodule in the right upper lobe seen best on image #25 the axial series. There is a new area of scarring and associated bronchiectasis in the medial aspect of the right lower lobe suggesting chronic or recurrent infectious process. Stable 2 mm nodule adjacent to the major fissure on image #41 of the axial series. Stable 2 mm subpleural nodules in the right upper lobe on images #41 and 42. There are scattered areas of bullous change or pneumatocele is noted  in the right lung. No other pulmonary nodules are identified. No pleural fluid is seen. There is trace fluid in the superior pericardial recess. There is a stable borderline prominent high right peritracheal lymph. There is an 11 mm indeterminate hypodense lesion in the left thyroid lobe. No supraclavicular or axillary adenopathy is identified. There is degenerative change in spine. No aggressive osseous lesions are identified. No aggressive osseous lesions are identified. Pulmonary arteries are normal caliber and appearance. Scattered aortic and coronary calcifications present.  Abdomen and pelvis:  The spleen, pancreas, left adrenal have a grossly normal appearance. Right adrenal is not definitely visualized. There is postsurgical change from right hepatic lobe resection. There is hypertrophy of the left hepatic lobes. No definite findings to suggest residual or recurrent disease within the liver. There are stable probable cysts in the left hepatic lobes, similar to prior. No intra or extrahepatic biliary ductal dilatation is seen. Left portal branches appear patent. There does appear to be short segment narrowing of the inferior vena cava as it passes adjacent to the site of resection. Degree of stenosis appears to be at least 50%. There is stable probable renal cysts. There is a small hiatal hernia. The stomach and small bowel have a grossly normal appearance on this limited unopacified exam. There is no evidence of bowel obstruction, free air or free fluid. There is scattered colonic diverticulosis prominently involving the descending sigmoid portions without findings to suggest diverticulitis. Patient is status post hysterectomy. Urinary bladder is decompressed. Widespread atherosclerotic vascular calcification is present. Scattered coronary calcification is also noted. There is a small hiatal hernia. There is degenerative change in the spine, hips and SI joints. No aggressive osseous lesions are identified.  No gross adenopathy is identified in the abdomen or pelvis.      Impression:   CHEST:  1. Stable subcentimeter pulmonary nodules, as above. 2. Stable borderline enlarged high right paratracheal lymph node. Attention on follow-up recommended 3. Additional findings as given above.  Abdomen and pelvis: 1. Postsurgical changes from right hepatic lobe resection, with hypertrophic changes in the left lobes. Stable probable cysts in the residual liver. 2. There is narrowing of the inferior vena cava as it passes adjacent to the hepatic resection site. Degree of stenosis estimated to be greater than 50%. Correlate clinically. This could be further evaluated by cavogram and differential pressure measurements to determine hemodynamic significance, if clinically warranted. 3. No definite findings to suggest recurrent or metastatic disease within the abdomen or pelvis. 4. Additional findings as given above.     This report was finalized on 6/4/2022 11:52 AM by Davidson Damian MD.        Assessment / Plan      Assessment/Plan:   Intrahepatic cholangiocarcinoma (CMS/HCC) (Primary)  -Noted during her most recent hospitalization with CT scans concerning for an enlarging liver lesion to 7.3 cm that was previously noted to be hemangioma  -Triple phase CT concerning for a solid tumor lesion  -Biopsy consistent with an adenocarcinoma  -CA 19-9 elevated to 84.7  -CT chest as well as the rest of the CT abdomen/pelvis not concerning for distant or metastatic disease  -Status post open right hepatectomy, cholecystectomy, right partial adrenalectomy on 9/23/2021 with Dr. 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  0/4 lymph nodes were positive for disease.  LVI and PNI were present.  Focal R1 resection margin  -Discussed with patient and her daughter that she would benefit from adjuvant chemoXRT using Xeloda.  Discussed side effects  including but not limited to immunosuppression, diarrhea, abdominal pain, nausea, vomiting, hand/foot syndrome  -Repeat CA 19-9 (22) in November 2021  -Completed chemo XRT with Xeloda in January 2021  -Repeat scans in March 2022 without any evidence of recurrent or metastatic disease  -Repeat CA 19-9 26 in March 2022  -Repeat CA 19-9 18 in June 2022  - Repeat CT C/A/P in June 2022 without evidence of recurrent or metastatic disease.  Did note some IVC stenosis which we will monitor with her next scans     Hemochromatosis carrier  -Noted on recent labs with an elevated ferritin to 246 hemochromatosis gene profile positive for heterozygosity of H63D.  Other genes negative.  Given patient's age and previous cardiac liver work-up showing no evidence of dysfunction, it is unlikely that she has had iron deposition all this time.  The heterozygosity of H63D makes her carrier for hemochromatosis however does not mean that she has active disease  -CT A/P in July 2020 with no liver dysfunction but was notable for hemangioma which has now been confirmed to be a intrahepatic cholangiocarcinoma and a status post resection.  Treatment as above  -ECHO in July 2020 within normal limits  -Repeat iron studies in April 2021 stable with a ferritin of 229, iron level 100, transferrin saturation 27%  -Low concern for iron overload at this time.     Thyroid nodule  -Incidentally noted on CT scan but enlarging from previous CT  -We will check ultrasound of the thyroid and thyroid studies today    2. Other fatigue   - Unclear etiology, will check labs as below  -     TSH; Future  -     T4, Free; Future  -     Ferritin; Future  -     Iron Profile; Future  -     Vitamin B12; Future  -     Folate; Future    3. Other specified abnormal findings of blood chemistry   -     Ferritin; Future  -     Iron Profile; Future         Follow Up:   Follow-up in 3 months or sooner if needed     Jorje Montenegro MD  Hematology and Oncology     Please note  that portions of this note may have been completed with a voice recognition program. Efforts were made to edit the dictations, but occasionally words are mistranscribed.

## 2022-06-06 NOTE — TELEPHONE ENCOUNTER
Call to Sheree to report that Dr Montenegro reviewed her lab work and all looks okay.  Follow up as scheduled in September.  Sheree states understood

## 2022-06-08 ENCOUNTER — HOSPITAL ENCOUNTER (OUTPATIENT)
Dept: ULTRASOUND IMAGING | Facility: HOSPITAL | Age: 82
Discharge: HOME | End: 2022-06-08
Admitting: INTERNAL MEDICINE

## 2022-06-08 DIAGNOSIS — C22.1 INTRAHEPATIC CHOLANGIOCARCINOMA: ICD-10-CM

## 2022-06-08 PROCEDURE — 76536 US EXAM OF HEAD AND NECK: CPT

## 2022-06-13 DIAGNOSIS — E04.9 GOITER: Primary | ICD-10-CM

## 2022-06-13 DIAGNOSIS — E04.1 THYROID NODULE: ICD-10-CM

## 2022-06-16 DIAGNOSIS — E78.5 DYSLIPIDEMIA: Primary | ICD-10-CM

## 2022-06-16 RX ORDER — ROSUVASTATIN CALCIUM 10 MG/1
10 TABLET, COATED ORAL DAILY
Qty: 90 TABLET | Refills: 3 | Status: SHIPPED | OUTPATIENT
Start: 2022-06-16

## 2022-08-24 DIAGNOSIS — E04.1 THYROID NODULE: Primary | ICD-10-CM

## 2022-09-06 ENCOUNTER — HOSPITAL ENCOUNTER (OUTPATIENT)
Dept: CT IMAGING | Facility: HOSPITAL | Age: 82
Discharge: HOME | End: 2022-09-06

## 2022-09-06 ENCOUNTER — LAB (OUTPATIENT)
Dept: LAB | Facility: HOSPITAL | Age: 82
End: 2022-09-06

## 2022-09-06 DIAGNOSIS — E04.1 THYROID NODULE: ICD-10-CM

## 2022-09-06 DIAGNOSIS — C22.1 INTRAHEPATIC CHOLANGIOCARCINOMA: ICD-10-CM

## 2022-09-06 LAB
ALBUMIN SERPL-MCNC: 4.2 G/DL (ref 3.5–5.2)
ALBUMIN/GLOB SERPL: 1.4 G/DL
ALP SERPL-CCNC: 309 U/L (ref 39–117)
ALT SERPL W P-5'-P-CCNC: 34 U/L (ref 1–33)
ANION GAP SERPL CALCULATED.3IONS-SCNC: 11 MMOL/L (ref 5–15)
AST SERPL-CCNC: 41 U/L (ref 1–32)
BASOPHILS # BLD AUTO: 0.03 10*3/MM3 (ref 0–0.2)
BASOPHILS NFR BLD AUTO: 0.7 % (ref 0–1.5)
BILIRUB SERPL-MCNC: 0.6 MG/DL (ref 0–1.2)
BUN SERPL-MCNC: 19 MG/DL (ref 8–23)
BUN/CREAT SERPL: 23.8 (ref 7–25)
CALCIUM SPEC-SCNC: 9.2 MG/DL (ref 8.6–10.5)
CANCER AG19-9 SERPL-ACNC: 21.4 U/ML
CHLORIDE SERPL-SCNC: 105 MMOL/L (ref 98–107)
CO2 SERPL-SCNC: 26 MMOL/L (ref 22–29)
CREAT BLDA-MCNC: 0.9 MG/DL (ref 0.6–1.3)
CREAT SERPL-MCNC: 0.8 MG/DL (ref 0.57–1)
DEPRECATED RDW RBC AUTO: 55.7 FL (ref 37–54)
EGFRCR SERPLBLD CKD-EPI 2021: 73.2 ML/MIN/1.73
EOSINOPHIL # BLD AUTO: 0.15 10*3/MM3 (ref 0–0.4)
EOSINOPHIL NFR BLD AUTO: 3.3 % (ref 0.3–6.2)
ERYTHROCYTE [DISTWIDTH] IN BLOOD BY AUTOMATED COUNT: 15.7 % (ref 12.3–15.4)
GLOBULIN UR ELPH-MCNC: 3.1 GM/DL
GLUCOSE SERPL-MCNC: 86 MG/DL (ref 65–99)
HCT VFR BLD AUTO: 44.7 % (ref 34–46.6)
HGB BLD-MCNC: 14.6 G/DL (ref 12–15.9)
IMM GRANULOCYTES # BLD AUTO: 0.01 10*3/MM3 (ref 0–0.05)
IMM GRANULOCYTES NFR BLD AUTO: 0.2 % (ref 0–0.5)
LYMPHOCYTES # BLD AUTO: 0.63 10*3/MM3 (ref 0.7–3.1)
LYMPHOCYTES NFR BLD AUTO: 13.7 % (ref 19.6–45.3)
MCH RBC QN AUTO: 31.1 PG (ref 26.6–33)
MCHC RBC AUTO-ENTMCNC: 32.7 G/DL (ref 31.5–35.7)
MCV RBC AUTO: 95.3 FL (ref 79–97)
MONOCYTES # BLD AUTO: 0.43 10*3/MM3 (ref 0.1–0.9)
MONOCYTES NFR BLD AUTO: 9.3 % (ref 5–12)
NEUTROPHILS NFR BLD AUTO: 3.36 10*3/MM3 (ref 1.7–7)
NEUTROPHILS NFR BLD AUTO: 72.8 % (ref 42.7–76)
PLATELET # BLD AUTO: 134 10*3/MM3 (ref 140–450)
PMV BLD AUTO: 12.1 FL (ref 6–12)
POTASSIUM SERPL-SCNC: 4.2 MMOL/L (ref 3.5–5.2)
PROT SERPL-MCNC: 7.3 G/DL (ref 6–8.5)
RBC # BLD AUTO: 4.69 10*6/MM3 (ref 3.77–5.28)
SODIUM SERPL-SCNC: 142 MMOL/L (ref 136–145)
T4 FREE SERPL-MCNC: 1.28 NG/DL (ref 0.93–1.7)
TSH SERPL DL<=0.05 MIU/L-ACNC: 3.16 UIU/ML (ref 0.27–4.2)
WBC NRBC COR # BLD: 4.61 10*3/MM3 (ref 3.4–10.8)

## 2022-09-06 PROCEDURE — 71260 CT THORAX DX C+: CPT

## 2022-09-06 PROCEDURE — 86301 IMMUNOASSAY TUMOR CA 19-9: CPT

## 2022-09-06 PROCEDURE — 84443 ASSAY THYROID STIM HORMONE: CPT

## 2022-09-06 PROCEDURE — 80053 COMPREHEN METABOLIC PANEL: CPT

## 2022-09-06 PROCEDURE — 36415 COLL VENOUS BLD VENIPUNCTURE: CPT

## 2022-09-06 PROCEDURE — 84439 ASSAY OF FREE THYROXINE: CPT

## 2022-09-06 PROCEDURE — 25010000002 IOPAMIDOL 61 % SOLUTION: Performed by: INTERNAL MEDICINE

## 2022-09-06 PROCEDURE — 74177 CT ABD & PELVIS W/CONTRAST: CPT

## 2022-09-06 PROCEDURE — 85025 COMPLETE CBC W/AUTO DIFF WBC: CPT

## 2022-09-06 PROCEDURE — 82565 ASSAY OF CREATININE: CPT

## 2022-09-06 RX ADMIN — IOPAMIDOL 95 ML: 612 INJECTION, SOLUTION INTRAVENOUS at 11:34

## 2022-09-12 ENCOUNTER — OFFICE VISIT (OUTPATIENT)
Dept: ONCOLOGY | Facility: CLINIC | Age: 82
End: 2022-09-12

## 2022-09-12 VITALS
OXYGEN SATURATION: 98 % | BODY MASS INDEX: 24.75 KG/M2 | HEIGHT: 64 IN | SYSTOLIC BLOOD PRESSURE: 149 MMHG | WEIGHT: 145 LBS | HEART RATE: 64 BPM | TEMPERATURE: 97.5 F | DIASTOLIC BLOOD PRESSURE: 83 MMHG | RESPIRATION RATE: 18 BRPM

## 2022-09-12 DIAGNOSIS — C22.1 INTRAHEPATIC CHOLANGIOCARCINOMA: Primary | ICD-10-CM

## 2022-09-12 PROCEDURE — 99214 OFFICE O/P EST MOD 30 MIN: CPT | Performed by: INTERNAL MEDICINE

## 2022-09-12 NOTE — PROGRESS NOTES
Follow Up Office Visit      Date: 2022     Patient Name: Sheree Hernandez  MRN: [id number]  : 1940  Chief Complaint:  Follow-up for intrahepatic cholangiocarcinoma      History of Present Illness: Sheree Hernandez is a pleasant 79 y.o. female with a past medical history of hyperlipidemia and hypertension who presents today for evaluation of concern for hemochromatosis. The patient is accompanied by their self who contributes to the history of their care.  Patient states that over the past 2 years she has been having worsening fatigue especially with exertion.  Over the past several months this has become worse.  Patient states that she gets tired with basic activities including showering getting dressed in the morning and walking to and from her car from stores.  She states that her fatigue gets better after a few minutes of rest.  She has previously had an echocardiogram and cardiac catheterization within the past 2 years which did not reveal any cause of her fatigue with exertion.  She is also had an ultrasound of the liver which revealed stable cyst.  She was recently seen by her PCP who ordered iron studies which was notable for an elevated ferritin to 246.  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  All other mutations were negative.  She is currently up-to-date on her mammograms and colonoscopy with no active malignancies noted.  She is otherwise compliant with her statin and high blood pressure medicines.  Repeat abdominal imaging in 2021 concerning for enlarging liver lesion.  She was seen by Dr. Sharma and  is status post open right hepatectomy, cholecystectomy, right partial adrenalectomy on 2021.  Pathology showed a focal R1 resected margin.  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  0/4 lymph nodes were  positive for disease.  LVI and PNI were present.     Interval History:  Presents to clinic for follow-up.  Finished chemoXRT in January 2021.   Overall stable at this time.  Does note some right eye discomfort occasionally but denies any significant pain.  Notes continued mild shortness of breath and decreased stamina with exertion    Oncology History:    Oncology/Hematology History   Intrahepatic cholangiocarcinoma (HCC)   8/17/2021 Initial Diagnosis    Intrahepatic cholangiocarcinoma (CMS/HCC)     8/17/2021 Cancer Staged    Staging form: Intrahepatic Bile Duct, AJCC 8th Edition  - Clinical: Stage IB (cT1b, cN0, cM0) - Signed by Jorje Montenegro MD on 8/17/2021 11/5/2021 Cancer Staged    Staging form: Intrahepatic Bile Duct, AJCC 8th Edition  - Pathologic: Stage IIIA (pT3, pN0, cM0) - Signed by Jorje Montenegro MD on 11/5/2021 12/9/2021 -  Chemotherapy    OP GALLBLADDER Capecitabine + XRT     12/9/2021 - 1/19/2022 Radiation    Radiation OncologyTreatment Course:  Sheree Hernandez received 5040 cGy in 28 fractions to liver via External Beam Radiation - EBRT.         Subjective      Review of Systems:   Constitutional: Negative for fevers, chills, or weight loss  Eyes: Negative for blurred vision or discharge         Ear/Nose/Throat: Negative for difficulty swallowing, sore throat, LAD                                                       Respiratory: Negative for cough, SOA, wheezing                                                                                        Cardiovascular: Negative for chest pain or palpitations                                                                  Gastrointestinal: Negative for nausea, vomiting or diarrhea                                                                     Genitourinary: Negative for dysuria or hematuria                                                                                           Musculoskeletal: Negative for any joint pains or muscle  "aches                                                                        Neurologic: Negative for any weakness, headaches, dizziness                                                                         Hematologic: Negative for any easy bleeding or bruising                                                                                   Psychiatric: Negative for anxiety or depression                          Past Medical History/Past Surgical History/ Family History/ Social History: Reviewed by me and unchanged from my previous documentation done on June 2022.     Medications:     Current Outpatient Medications:   •  Cholecalciferol (Vitamin D3) 25 MCG (1000 UT) capsule, Take  by mouth., Disp: , Rfl:   •  coenzyme Q10 50 MG capsule capsule, Take  by mouth Daily., Disp: , Rfl:   •  Cyanocobalamin (VITAMIN B 12 PO), Take  by mouth., Disp: , Rfl:   •  Folic Acid 0.8 MG capsule, Take  by mouth., Disp: , Rfl:   •  Omega-3 1000 MG capsule, Take  by mouth., Disp: , Rfl:   •  rosuvastatin (CRESTOR) 10 MG tablet, Take 1 tablet by mouth Daily., Disp: 90 tablet, Rfl: 3  •  Turmeric Curcumin 500 MG capsule, Take 750 mg by mouth., Disp: , Rfl:   •  Zinc 50 MG capsule, Take  by mouth., Disp: , Rfl:     Allergies:   Allergies   Allergen Reactions   • Pravastatin Myalgia       Objective     Physical Exam:  Vital Signs:   Vitals:    09/12/22 1025   BP: 149/83  Comment: LUE   Pulse: 64   Resp: 18   Temp: 97.5 °F (36.4 °C)   TempSrc: Infrared   SpO2: 98%  Comment: RA   Weight: 65.8 kg (145 lb)   Height: 162.6 cm (64\")   PainSc: 2  Comment: Right Flank     Pain Score    09/12/22 1025   PainSc: 2  Comment: Right Flank     ECOG Performance Status: 1 - Symptomatic but completely ambulatory    Constitutional: NAD, ECOG 1  Eyes: PERRLA, scleral anicteric  ENT: No LAD, no thyromegaly  Respiratory: CTAB, no wheezing, rales, rhonchi  Cardiovascular: RRR, no murmurs, pulses 2+ bilaterally  Abdomen: soft, NT/ND, no HSM  Musculoskeletal: " strength 5/5 bilaterally, no c/c/e  Neurologic: A&O x 3, CN II-XII intact grossly    Results Review:   Hospital Outpatient Visit on 09/06/2022   Component Date Value Ref Range Status   • Creatinine 09/06/2022 0.90  0.60 - 1.30 mg/dL Final    Serial Number: [id number]:  [id number]   Lab on 09/06/2022   Component Date Value Ref Range Status   • Glucose 09/06/2022 86  65 - 99 mg/dL Final   • BUN 09/06/2022 19  8 - 23 mg/dL Final   • Creatinine 09/06/2022 0.80  0.57 - 1.00 mg/dL Final   • Sodium 09/06/2022 142  136 - 145 mmol/L Final   • Potassium 09/06/2022 4.2  3.5 - 5.2 mmol/L Final   • Chloride 09/06/2022 105  98 - 107 mmol/L Final   • CO2 09/06/2022 26.0  22.0 - 29.0 mmol/L Final   • Calcium 09/06/2022 9.2  8.6 - 10.5 mg/dL Final   • Total Protein 09/06/2022 7.3  6.0 - 8.5 g/dL Final   • Albumin 09/06/2022 4.20  3.50 - 5.20 g/dL Final   • ALT (SGPT) 09/06/2022 34 (A) 1 - 33 U/L Final   • AST (SGOT) 09/06/2022 41 (A) 1 - 32 U/L Final   • Alkaline Phosphatase 09/06/2022 309 (A) 39 - 117 U/L Final   • Total Bilirubin 09/06/2022 0.6  0.0 - 1.2 mg/dL Final   • Globulin 09/06/2022 3.1  gm/dL Final    Calculated Result   • A/G Ratio 09/06/2022 1.4  g/dL Final   • BUN/Creatinine Ratio 09/06/2022 23.8  7.0 - 25.0 Final   • Anion Gap 09/06/2022 11.0  5.0 - 15.0 mmol/L Final   • eGFR 09/06/2022 73.2  >60.0 mL/min/1.73 Final    National Kidney Foundation and American Society of Nephrology (ASN) Task Force recommended calculation based on the Chronic Kidney Disease Epidemiology Collaboration (CKD-EPI) equation refit without adjustment for race.   • CA 19-9 09/06/2022 21.4  <=35.0 U/mL Final   • TSH 09/06/2022 3.160  0.270 - 4.200 uIU/mL Final   • Free T4 09/06/2022 1.28  0.93 - 1.70 ng/dL Final   • WBC 09/06/2022 4.61  3.40 - 10.80 10*3/mm3 Final   • RBC 09/06/2022 4.69  3.77 - 5.28 10*6/mm3 Final   • Hemoglobin 09/06/2022 14.6  12.0 - 15.9 g/dL Final   • Hematocrit 09/06/2022 44.7  34.0 - 46.6 % Final   • MCV 09/06/2022 95.3   79.0 - 97.0 fL Final   • MCH 09/06/2022 31.1  26.6 - 33.0 pg Final   • MCHC 09/06/2022 32.7  31.5 - 35.7 g/dL Final   • RDW 09/06/2022 15.7 (A) 12.3 - 15.4 % Final   • RDW-SD 09/06/2022 55.7 (A) 37.0 - 54.0 fl Final   • MPV 09/06/2022 12.1 (A) 6.0 - 12.0 fL Final   • Platelets 09/06/2022 134 (A) 140 - 450 10*3/mm3 Final   • Neutrophil % 09/06/2022 72.8  42.7 - 76.0 % Final   • Lymphocyte % 09/06/2022 13.7 (A) 19.6 - 45.3 % Final   • Monocyte % 09/06/2022 9.3  5.0 - 12.0 % Final   • Eosinophil % 09/06/2022 3.3  0.3 - 6.2 % Final   • Basophil % 09/06/2022 0.7  0.0 - 1.5 % Final   • Immature Grans % 09/06/2022 0.2  0.0 - 0.5 % Final   • Neutrophils, Absolute 09/06/2022 3.36  1.70 - 7.00 10*3/mm3 Final   • Lymphocytes, Absolute 09/06/2022 0.63 (A) 0.70 - 3.10 10*3/mm3 Final   • Monocytes, Absolute 09/06/2022 0.43  0.10 - 0.90 10*3/mm3 Final   • Eosinophils, Absolute 09/06/2022 0.15  0.00 - 0.40 10*3/mm3 Final   • Basophils, Absolute 09/06/2022 0.03  0.00 - 0.20 10*3/mm3 Final   • Immature Grans, Absolute 09/06/2022 0.01  0.00 - 0.05 10*3/mm3 Final       CT Abdomen Pelvis With Contrast, CT Chest With Contrast Diagnostic    Result Date: 9/8/2022  Narrative: DATE OF EXAM: 9/6/2022 10:58 AM  PROCEDURE: CT ABDOMEN PELVIS W CONTRAST-, CT CHEST W CONTRAST DIAGNOSTIC-  INDICATIONS: cholangiocarcinoma; C22.1-Intrahepatic bile duct carcinoma  COMPARISON: 6/4/2022 chest, abdomen and pelvis CT scan  TECHNIQUE: Routine transaxial slices were obtained through the abdomen and pelvis after the intravenous administration of 95 mL of Isovue 300. Reconstructed coronal and sagittal images were also obtained. Automated exposure control and iterative construction methods were used.  The radiation dose reduction device was turned on for each scan per the ALARA (As Low as Reasonably Achievable) protocol.  FINDINGS: Previous exam reports from 6/4/2022 indicated stable small pulmonary nodules, no evidence of metastatic disease in the abdomen or  pelvis.  CT SCAN OF THE CHEST WITH IV CONTRAST: 11 mm left thyroid nodule appears stable to slightly smaller today. No significant mediastinal, hilar axillary or lower cervical lymphadenopathy is seen. Breast tissue and superficial soft tissues elsewhere appear symmetric. No pericardial or pleural effusion is seen. Thoracic aorta and pulmonary arteries appear grossly normal. Lungs appear clear of active disease. Trace bibasilar lung scarring appears stable. No new pulmonary parenchymal disease is appreciated. Small right upper lobe pneumatocele is unchanged. Bony structures appear to be intact.      Impression: No evidence of metastatic disease in the chest or other new chest disease.    CT SCAN OF THE ABDOMEN AND PELVIS WITH IV CONTRAST: There is stable postoperative appearance of the liver, with resection of the right parietal lobe and hypertrophy of the remaining left lobe, which extends to partly wrap around the anterior margin of the spleen. Multiple hepatic cysts are again seen and appear stable. No new hepatic lesions are identified. Narrowed appearance of the IVC immediately inferior of the right hemidiaphragm is similar to the prior study. Pancreas, adrenal glands, and kidneys appear within normal limits. No upper abdominal adenopathy, ascites or acute inflammatory change is seen. Large and small bowel loops are normal in caliber and appearance. Regarding the lower abdomen and pelvis, no mass, adenopathy, ascites or acute inflammatory change is seen. Uterus and ovaries by history surgically absent. Mild soft tissue thickening with calcification in the adnexal regions is unchanged.  Delayed venous phase images show no evidence of obstructive uropathy. Bony structures appear to be intact.  IMPRESSION: 1. Stable postoperative appearance of the liver, with right lobe resection, left lobe hypertrophy and multiple nonenhancing liver cysts. 2. No evidence of intra-abdominal metastatic disease or other new disease.   This report was finalized on 9/8/2022 10:08 AM by Dr. Salvador Gill MD.        Assessment / Plan      Assessment/Plan:   Intrahepatic cholangiocarcinoma (CMS/HCC) (Primary)  -Noted during her most recent hospitalization with CT scans concerning for an enlarging liver lesion to 7.3 cm that was previously noted to be hemangioma  -Triple phase CT concerning for a solid tumor lesion  -Biopsy consistent with an adenocarcinoma  -CA 19-9 elevated to 84.7  -CT chest as well as the rest of the CT abdomen/pelvis not concerning for distant or metastatic disease  -Status post open right hepatectomy, cholecystectomy, right partial adrenalectomy on 9/23/2021 with Dr. 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  0/4 lymph nodes were positive for disease.  LVI and PNI were present.  Focal R1 resection margin  -Discussed with patient and her daughter that she would benefit from adjuvant chemoXRT using Xeloda.  Discussed side effects including but not limited to immunosuppression, diarrhea, abdominal pain, nausea, vomiting, hand/foot syndrome  -Repeat CA 19-9 (22) in November 2021  -Completed chemo XRT with Xeloda in January 2021  -Repeat scans in March 2022 without any evidence of recurrent or metastatic disease  -Repeat CA 19-9 26 in March 2022  -Repeat CA 19-9 18 in June 2022  -Repeat CT C/A/P in June 2022 without evidence of recurrent or metastatic disease.  Did note some IVC stenosis which we will monitor with her next scans  -Repeat CT C/A/P in September 2022 reviewed without evidence of recurrent disease or metastatic disease.  IVC stenosis overall stable  -Repeat CA 19-9 21 in September 2022    Hemochromatosis carrier  -Noted on recent labs with an elevated ferritin to 246 hemochromatosis gene profile positive for heterozygosity of H63D.  Other genes negative.  Given patient's age and previous cardiac liver work-up showing  no evidence of dysfunction, it is unlikely that she has had iron deposition all this time.  The heterozygosity of H63D makes her carrier for hemochromatosis however does not mean that she has active disease  -CT A/P in July 2020 with no liver dysfunction but was notable for hemangioma which has now been confirmed to be a intrahepatic cholangiocarcinoma and a status post resection.  Treatment as above  -ECHO in July 2020 within normal limits  -Repeat iron studies in April 2021 stable with a ferritin of 229, iron level 100, transferrin saturation 27%  -Low concern for iron overload at this time.      Thyroid nodule  -Incidentally noted on CT scan but enlarging from previous CT  -Thyroid ultrasound in June 2022 only notable for goiter and small nodules nonconcerning for malignancy  -Refer to endocrinology per patient preference.  Seeing him later this month     Other fatigue   -Unclear etiology  -Iron studies, ivette B12, folate and thyroid studies within normal limits  -May need to consider cardiac work-up in the future      Follow Up:   Follow-up in 3 months     Jorje Montenegro MD  Hematology and Oncology     Please note that portions of this note may have been completed with a voice recognition program. Efforts were made to edit the dictations, but occasionally words are mistranscribed.

## 2022-09-19 ENCOUNTER — OFFICE VISIT (OUTPATIENT)
Dept: ENDOCRINOLOGY | Facility: CLINIC | Age: 82
End: 2022-09-19

## 2022-09-19 VITALS
WEIGHT: 144 LBS | HEIGHT: 64 IN | DIASTOLIC BLOOD PRESSURE: 78 MMHG | OXYGEN SATURATION: 97 % | BODY MASS INDEX: 24.59 KG/M2 | SYSTOLIC BLOOD PRESSURE: 140 MMHG | HEART RATE: 80 BPM

## 2022-09-19 DIAGNOSIS — E04.2 NONTOXIC MULTINODULAR GOITER: Primary | ICD-10-CM

## 2022-09-19 PROCEDURE — 99203 OFFICE O/P NEW LOW 30 MIN: CPT | Performed by: INTERNAL MEDICINE

## 2022-09-19 NOTE — PROGRESS NOTES
"     Office Note      Date: 2022  Patient Name: Sheree Hernandez  MRN: [id number]  : 1940    Chief Complaint   Patient presents with   • Goiter       History of Present Illness:   Sheree Hernandez is a 82 y.o. female who presents for Goiter  she is seen as a new patient   -------------------------------  She had CT for follow up of cancer. It showed irregularity of thyroid  Dedicated thyroid ultrasound followed- 9 mm solid nodule on right. 14 mm cyst on left  tft's were normal  She has no compressive symptosm     Subjective      Patient was born where: [place].  Facial radiation exposure: No.  High iodine intake: No  Family hx of thyroid disease: Yes, describe: daughter is on thyroid meds. No one with thyroid cancer.    Review of Systems:   Review of Systems   Constitutional: Positive for fatigue. Negative for unexpected weight change.   HENT: Positive for rhinorrhea and voice change. Negative for trouble swallowing.    Eyes: Negative for visual disturbance.   Endocrine: Negative for cold intolerance and heat intolerance.   Musculoskeletal: Negative for neck pain.       The following portions of the patient's history were reviewed and updated as appropriate: allergies, current medications, past family history, past medical history, past social history, past surgical history and problem list.    Objective     Visit Vitals  /78   Pulse 80   Ht 162.6 cm (64\")   Wt 65.3 kg (144 lb)   SpO2 97%   BMI 24.72 kg/m²       Labs:    CBC w/DIFF  Lab Results   Component Value Date    WBC 4.61 2022    RBC 4.69 2022    HGB 14.6 2022    HCT 44.7 2022    MCV 95.3 2022    MCH 31.1 2022    MCHC 32.7 2022    RDW 15.7 (H) 2022    RDWSD 55.7 (H) 2022    MPV 12.1 (H) 2022     (L) 2022    NEUTRORELPCT 72.8 2022    LYMPHORELPCT 13.7 (L) 2022    MONORELPCT 9.3 2022    EOSRELPCT 3.3 2022    BASORELPCT 0.7 2022    AUTOIGPER " 0.2 09/06/2022    NEUTROABS 3.36 09/06/2022    LYMPHSABS 0.63 (L) 09/06/2022    MONOSABS 0.43 09/06/2022    EOSABS 0.15 09/06/2022    BASOSABS 0.03 09/06/2022    AUTOIGNUM 0.01 09/06/2022    NRBC 0.0 03/17/2022       T4  Free T4   Date Value Ref Range Status   09/06/2022 1.28 0.93 - 1.70 ng/dL Final       TSH  No results found for: TSHBASE     Physical Exam:  Physical Exam  Vitals reviewed.   Constitutional:       Appearance: Normal appearance.   HENT:      Head: Normocephalic and atraumatic.   Eyes:      Extraocular Movements: Extraocular movements intact.   Neck:      Comments: No palpable thyroid abnormality  Lymphadenopathy:      Cervical: No cervical adenopathy.   Neurological:      Mental Status: She is alert.   Psychiatric:         Mood and Affect: Mood normal.         Thought Content: Thought content normal.         Judgment: Judgment normal.         Assessment / Plan      Assessment & Plan:  Problem List Items Addressed This Visit        Other    Nontoxic multinodular goiter - Primary    Overview     14 mm cyst left lobe  9 mm isoechoic nodule right lobe  Multiple sub cm nodules .  tft's normal  ================  Took thyroid pills while pregnant  Daughter has thyroid issues.  No facial radiation exposure  Born and raised in [place]            Current Assessment & Plan     She is euthyroid  As these nodules are small, no intervention is needed at this time.   A repeat ultrasound after a year- June 2023- would be warranted.  If they are stable at that point, no further intervention would be needed.                 Alexsander Peng MD   09/19/2022

## 2022-09-19 NOTE — ASSESSMENT & PLAN NOTE
She is euthyroid  As these nodules are small, no intervention is needed at this time.   A repeat ultrasound after a year- June 2023- would be warranted.  If they are stable at that point, no further intervention would be needed.

## 2022-11-29 ENCOUNTER — LAB (OUTPATIENT)
Dept: LAB | Facility: HOSPITAL | Age: 82
End: 2022-11-29

## 2022-11-29 DIAGNOSIS — C22.1 INTRAHEPATIC CHOLANGIOCARCINOMA: ICD-10-CM

## 2022-11-29 LAB
ALBUMIN SERPL-MCNC: 4 G/DL (ref 3.5–5.2)
ALBUMIN/GLOB SERPL: 1.4 G/DL
ALP SERPL-CCNC: 222 U/L (ref 39–117)
ALT SERPL W P-5'-P-CCNC: 34 U/L (ref 1–33)
ANION GAP SERPL CALCULATED.3IONS-SCNC: 11 MMOL/L (ref 5–15)
AST SERPL-CCNC: 42 U/L (ref 1–32)
BASOPHILS # BLD AUTO: 0.02 10*3/MM3 (ref 0–0.2)
BASOPHILS NFR BLD AUTO: 0.4 % (ref 0–1.5)
BILIRUB SERPL-MCNC: 0.5 MG/DL (ref 0–1.2)
BUN SERPL-MCNC: 20 MG/DL (ref 8–23)
BUN/CREAT SERPL: 23.5 (ref 7–25)
CALCIUM SPEC-SCNC: 9 MG/DL (ref 8.6–10.5)
CHLORIDE SERPL-SCNC: 104 MMOL/L (ref 98–107)
CO2 SERPL-SCNC: 26 MMOL/L (ref 22–29)
CREAT SERPL-MCNC: 0.85 MG/DL (ref 0.57–1)
DEPRECATED RDW RBC AUTO: 51 FL (ref 37–54)
EGFRCR SERPLBLD CKD-EPI 2021: 68.1 ML/MIN/1.73
EOSINOPHIL # BLD AUTO: 0.1 10*3/MM3 (ref 0–0.4)
EOSINOPHIL NFR BLD AUTO: 1.9 % (ref 0.3–6.2)
ERYTHROCYTE [DISTWIDTH] IN BLOOD BY AUTOMATED COUNT: 14.6 % (ref 12.3–15.4)
GLOBULIN UR ELPH-MCNC: 2.9 GM/DL
GLUCOSE SERPL-MCNC: 103 MG/DL (ref 65–99)
HCT VFR BLD AUTO: 43.1 % (ref 34–46.6)
HGB BLD-MCNC: 14.3 G/DL (ref 12–15.9)
IMM GRANULOCYTES # BLD AUTO: 0.01 10*3/MM3 (ref 0–0.05)
IMM GRANULOCYTES NFR BLD AUTO: 0.2 % (ref 0–0.5)
LYMPHOCYTES # BLD AUTO: 0.8 10*3/MM3 (ref 0.7–3.1)
LYMPHOCYTES NFR BLD AUTO: 15.6 % (ref 19.6–45.3)
MCH RBC QN AUTO: 31.3 PG (ref 26.6–33)
MCHC RBC AUTO-ENTMCNC: 33.2 G/DL (ref 31.5–35.7)
MCV RBC AUTO: 94.3 FL (ref 79–97)
MONOCYTES # BLD AUTO: 0.48 10*3/MM3 (ref 0.1–0.9)
MONOCYTES NFR BLD AUTO: 9.3 % (ref 5–12)
NEUTROPHILS NFR BLD AUTO: 3.73 10*3/MM3 (ref 1.7–7)
NEUTROPHILS NFR BLD AUTO: 72.6 % (ref 42.7–76)
PLATELET # BLD AUTO: 136 10*3/MM3 (ref 140–450)
PMV BLD AUTO: 12.1 FL (ref 6–12)
POTASSIUM SERPL-SCNC: 3.8 MMOL/L (ref 3.5–5.2)
PROT SERPL-MCNC: 6.9 G/DL (ref 6–8.5)
RBC # BLD AUTO: 4.57 10*6/MM3 (ref 3.77–5.28)
SODIUM SERPL-SCNC: 141 MMOL/L (ref 136–145)
WBC NRBC COR # BLD: 5.14 10*3/MM3 (ref 3.4–10.8)

## 2022-11-29 PROCEDURE — 86301 IMMUNOASSAY TUMOR CA 19-9: CPT

## 2022-11-29 PROCEDURE — 85025 COMPLETE CBC W/AUTO DIFF WBC: CPT

## 2022-11-29 PROCEDURE — 36415 COLL VENOUS BLD VENIPUNCTURE: CPT

## 2022-11-29 PROCEDURE — 80053 COMPREHEN METABOLIC PANEL: CPT

## 2022-11-30 LAB — CANCER AG19-9 SERPL-ACNC: 17.8 U/ML

## 2022-12-05 ENCOUNTER — HOSPITAL ENCOUNTER (OUTPATIENT)
Dept: CT IMAGING | Facility: HOSPITAL | Age: 82
Discharge: HOME | End: 2022-12-05
Admitting: INTERNAL MEDICINE

## 2022-12-05 DIAGNOSIS — C22.1 INTRAHEPATIC CHOLANGIOCARCINOMA: ICD-10-CM

## 2022-12-05 PROCEDURE — 74177 CT ABD & PELVIS W/CONTRAST: CPT

## 2022-12-05 PROCEDURE — 71260 CT THORAX DX C+: CPT

## 2022-12-05 PROCEDURE — 25010000002 IOPAMIDOL 61 % SOLUTION: Performed by: INTERNAL MEDICINE

## 2022-12-05 RX ADMIN — IOPAMIDOL 85 ML: 612 INJECTION, SOLUTION INTRAVENOUS at 10:07

## 2022-12-12 ENCOUNTER — OFFICE VISIT (OUTPATIENT)
Dept: ONCOLOGY | Facility: CLINIC | Age: 82
End: 2022-12-12

## 2022-12-12 VITALS
DIASTOLIC BLOOD PRESSURE: 79 MMHG | HEART RATE: 78 BPM | RESPIRATION RATE: 18 BRPM | SYSTOLIC BLOOD PRESSURE: 147 MMHG | TEMPERATURE: 96.4 F | WEIGHT: 150 LBS | HEIGHT: 64 IN | OXYGEN SATURATION: 98 % | BODY MASS INDEX: 25.61 KG/M2

## 2022-12-12 DIAGNOSIS — C22.1 INTRAHEPATIC CHOLANGIOCARCINOMA: Primary | ICD-10-CM

## 2022-12-12 PROCEDURE — 99214 OFFICE O/P EST MOD 30 MIN: CPT | Performed by: INTERNAL MEDICINE

## 2022-12-12 NOTE — PROGRESS NOTES
Follow Up Office Visit      Date: 2022     Patient Name: Sheree Hernandez  MRN: [id number]  : 1940  Chief Complaint:  Follow-up for intrahepatic cholangiocarcinoma      History of Present Illness: Sheree Hernandez is a pleasant 79 y.o. female with a past medical history of hyperlipidemia and hypertension who presents today for evaluation of concern for hemochromatosis. The patient is accompanied by their self who contributes to the history of their care.  Patient states that over the past 2 years she has been having worsening fatigue especially with exertion.  Over the past several months this has become worse.  Patient states that she gets tired with basic activities including showering getting dressed in the morning and walking to and from her car from stores.  She states that her fatigue gets better after a few minutes of rest.  She has previously had an echocardiogram and cardiac catheterization within the past 2 years which did not reveal any cause of her fatigue with exertion.  She is also had an ultrasound of the liver which revealed stable cyst.  She was recently seen by her PCP who ordered iron studies which was notable for an elevated ferritin to 246.  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  All other mutations were negative.  She is currently up-to-date on her mammograms and colonoscopy with no active malignancies noted.  She is otherwise compliant with her statin and high blood pressure medicines.  Repeat abdominal imaging in 2021 concerning for enlarging liver lesion.  She was seen by Dr. Sharma and  is status post open right hepatectomy, cholecystectomy, right partial adrenalectomy on 2021.  Pathology showed a focal R1 resected margin.  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  0/4 lymph nodes were  positive for disease.  LVI and PNI were present.     Interval History:  Presents to clinic for follow-up.  Finished chemoXRT in January 2021.  Continue to do well.  Denies any significant pain or discomfort.  Denies any worsening weakness or fatigue.  Scheduled to see cardiology in the coming weeks for further work-up    Oncology History:    Oncology/Hematology History   Intrahepatic cholangiocarcinoma (HCC)   8/17/2021 Initial Diagnosis    Intrahepatic cholangiocarcinoma (CMS/HCC)     8/17/2021 Cancer Staged    Staging form: Intrahepatic Bile Duct, AJCC 8th Edition  - Clinical: Stage IB (cT1b, cN0, cM0) - Signed by Jorje Montenegro MD on 8/17/2021 11/5/2021 Cancer Staged    Staging form: Intrahepatic Bile Duct, AJCC 8th Edition  - Pathologic: Stage IIIA (pT3, pN0, cM0) - Signed by Jorje Montenegro MD on 11/5/2021 12/9/2021 -  Chemotherapy    OP GALLBLADDER Capecitabine + XRT     12/9/2021 - 1/19/2022 Radiation    Radiation OncologyTreatment Course:  Sheree Hernandez received 5040 cGy in 28 fractions to liver via External Beam Radiation - EBRT.         Subjective      Review of Systems:   Constitutional: Negative for fevers, chills, or weight loss  Eyes: Negative for blurred vision or discharge         Ear/Nose/Throat: Negative for difficulty swallowing, sore throat, LAD                                                       Respiratory: Negative for cough, SOA, wheezing                                                                                        Cardiovascular: Negative for chest pain or palpitations                                                                  Gastrointestinal: Negative for nausea, vomiting or diarrhea                                                                     Genitourinary: Negative for dysuria or hematuria                                                                                           Musculoskeletal: Negative for any joint pains or muscle aches           "                                                              Neurologic: Negative for any weakness, headaches, dizziness                                                                         Hematologic: Negative for any easy bleeding or bruising                                                                                   Psychiatric: Negative for anxiety or depression                          Past Medical History/Past Surgical History/ Family History/ Social History: Reviewed by me and unchanged from my previous documentation done on September 2022.     Medications:     Current Outpatient Medications:   •  Cholecalciferol (Vitamin D3) 25 MCG (1000 UT) capsule, Take  by mouth., Disp: , Rfl:   •  coenzyme Q10 50 MG capsule capsule, Take  by mouth Daily., Disp: , Rfl:   •  Cyanocobalamin (VITAMIN B 12 PO), Take  by mouth., Disp: , Rfl:   •  Folic Acid 0.8 MG capsule, Take  by mouth., Disp: , Rfl:   •  ipratropium (ATROVENT) 0.06 % nasal spray, Instill 2 sprays into the nostril(s) as directed by provider 2 (Two) Times a Day., Disp: 15 mL, Rfl: 6  •  Omega-3 1000 MG capsule, Take  by mouth., Disp: , Rfl:   •  rosuvastatin (CRESTOR) 10 MG tablet, Take 1 tablet by mouth Daily., Disp: 90 tablet, Rfl: 3  •  Turmeric Curcumin 500 MG capsule, Take 750 mg by mouth., Disp: , Rfl:   •  Zinc 50 MG capsule, Take  by mouth., Disp: , Rfl:     Allergies:   Allergies   Allergen Reactions   • Pravastatin Myalgia       Objective     Physical Exam:  Vital Signs:   Vitals:    12/12/22 0931   BP: 147/79  Comment: BOWEN   Pulse: 78   Resp: 18   Temp: 96.4 °F (35.8 °C)   TempSrc: Infrared   SpO2: 98%  Comment: RA   Weight: 68 kg (150 lb)   Height: 162.6 cm (64\")   PainSc:   1   PainLoc: Abdomen  Comment: Right Mid     Pain Score    12/12/22 0931   PainSc:   1   PainLoc: Abdomen  Comment: Right Mid     ECOG Performance Status: 0 - Asymptomatic    Constitutional: NAD, ECOG 0  Eyes: PERRLA, scleral anicteric  ENT: No LAD, no " thyromegaly  Respiratory: CTAB, no wheezing, rales, rhonchi  Cardiovascular: RRR, no murmurs, pulses 2+ bilaterally  Abdomen: soft, NT/ND, no HSM  Musculoskeletal: strength 5/5 bilaterally, no c/c/e  Neurologic: A&O x 3, CN II-XII intact grossly    Results Review:   Lab on 11/29/2022   Component Date Value Ref Range Status   • Glucose 11/29/2022 103 (H)  65 - 99 mg/dL Final   • BUN 11/29/2022 20  8 - 23 mg/dL Final   • Creatinine 11/29/2022 0.85  0.57 - 1.00 mg/dL Final   • Sodium 11/29/2022 141  136 - 145 mmol/L Final   • Potassium 11/29/2022 3.8  3.5 - 5.2 mmol/L Final    Slight hemolysis detected by analyzer. Results may be affected.   • Chloride 11/29/2022 104  98 - 107 mmol/L Final   • CO2 11/29/2022 26.0  22.0 - 29.0 mmol/L Final   • Calcium 11/29/2022 9.0  8.6 - 10.5 mg/dL Final   • Total Protein 11/29/2022 6.9  6.0 - 8.5 g/dL Final   • Albumin 11/29/2022 4.00  3.50 - 5.20 g/dL Final   • ALT (SGPT) 11/29/2022 34 (H)  1 - 33 U/L Final   • AST (SGOT) 11/29/2022 42 (H)  1 - 32 U/L Final   • Alkaline Phosphatase 11/29/2022 222 (H)  39 - 117 U/L Final   • Total Bilirubin 11/29/2022 0.5  0.0 - 1.2 mg/dL Final   • Globulin 11/29/2022 2.9  gm/dL Final    Calculated Result   • A/G Ratio 11/29/2022 1.4  g/dL Final   • BUN/Creatinine Ratio 11/29/2022 23.5  7.0 - 25.0 Final   • Anion Gap 11/29/2022 11.0  5.0 - 15.0 mmol/L Final   • eGFR 11/29/2022 68.1  >60.0 mL/min/1.73 Final    National Kidney Foundation and American Society of Nephrology (ASN) Task Force recommended calculation based on the Chronic Kidney Disease Epidemiology Collaboration (CKD-EPI) equation refit without adjustment for race.   • CA 19-9 11/29/2022 17.8  <=35.0 U/mL Final   • WBC 11/29/2022 5.14  3.40 - 10.80 10*3/mm3 Final   • RBC 11/29/2022 4.57  3.77 - 5.28 10*6/mm3 Final   • Hemoglobin 11/29/2022 14.3  12.0 - 15.9 g/dL Final   • Hematocrit 11/29/2022 43.1  34.0 - 46.6 % Final   • MCV 11/29/2022 94.3  79.0 - 97.0 fL Final   • MCH 11/29/2022 31.3   26.6 - 33.0 pg Final   • MCHC 11/29/2022 33.2  31.5 - 35.7 g/dL Final   • RDW 11/29/2022 14.6  12.3 - 15.4 % Final   • RDW-SD 11/29/2022 51.0  37.0 - 54.0 fl Final   • MPV 11/29/2022 12.1 (H)  6.0 - 12.0 fL Final   • Platelets 11/29/2022 136 (L)  140 - 450 10*3/mm3 Final   • Neutrophil % 11/29/2022 72.6  42.7 - 76.0 % Final   • Lymphocyte % 11/29/2022 15.6 (L)  19.6 - 45.3 % Final   • Monocyte % 11/29/2022 9.3  5.0 - 12.0 % Final   • Eosinophil % 11/29/2022 1.9  0.3 - 6.2 % Final   • Basophil % 11/29/2022 0.4  0.0 - 1.5 % Final   • Immature Grans % 11/29/2022 0.2  0.0 - 0.5 % Final   • Neutrophils, Absolute 11/29/2022 3.73  1.70 - 7.00 10*3/mm3 Final   • Lymphocytes, Absolute 11/29/2022 0.80  0.70 - 3.10 10*3/mm3 Final   • Monocytes, Absolute 11/29/2022 0.48  0.10 - 0.90 10*3/mm3 Final   • Eosinophils, Absolute 11/29/2022 0.10  0.00 - 0.40 10*3/mm3 Final   • Basophils, Absolute 11/29/2022 0.02  0.00 - 0.20 10*3/mm3 Final   • Immature Grans, Absolute 11/29/2022 0.01  0.00 - 0.05 10*3/mm3 Final       CT Chest With Contrast Diagnostic    Result Date: 12/5/2022  Narrative: CT ABDOMEN PELVIS W CONTRAST, CT CHEST W CONTRAST DIAGNOSTIC Date of Exam: 12/5/2022 10:01 AM EST Indication: HCC maintenance scan. Comparison: CT chest, abdomen and pelvis 9/6/2022. Technique: Axial CT images were obtained of the abdomen and pelvis following the uneventful intravenous administration of 85 cc the 100. Reconstructed coronal and sagittal images were also obtained. Automated exposure control and iterative construction methods were used. Findings: CHEST: Multiple small 2 to 3 mm noncalcified nodules in both lungs, appears stable in size and number since 9/6/2022, including: Right upper lobe (images 13, 29, 37, 51), right middle lobe (image 61), right lower lobe (image 43, 58), left upper lobe (image 14, 27, 29). No new pulmonary nodules are identified. These nodules are also thought to be stable compared to a more remote CT chest of  6/4/2022. A 4 mm right upper lobe nodule (image 20), is stable since 3/3/2022. There is no acute airspace disease. Heart size is normal. There are no pathologically enlarged lymph nodes within the chest. Trace fluid is seen within the pericardial recesses. Heart size is normal without significant coronary artery calcification. There is mild calcific atherosclerosis in the thoracic aorta. A low-density left thyroid nodule measuring approximately 9 mm is stable. There is no pleural effusion. There are no acute or suspicious osseous abnormalities. ABDOMEN AND PELVIS: Right hepatectomy changes are redemonstrated. Benign cysts in the medial left hepatic segment appear unchanged. No new or suspicious liver lesions are identified. The spleen, pancreas, adrenals, and kidneys are within normal limits. No pathologically enlarged nodes are identified. No gross ascites is seen. Urinary bladder and rectum are normal. Hysterectomy changes are present. The bowel does not appear abnormally thickened, dilated or inflamed. There are advanced degenerative changes of the right hip. Degenerative facet changes are present in the lumbar spine. No acute or suspicious osseous abnormalities.     Impression: 1. Stable 4 mm less noncalcified tiny nodular densities in both lungs. No evidence of new or progressive malignancy within the chest. 2. Right hepatectomy with stable benign cysts in the residual left hepatic lobe. No evidence of metastatic disease or malignant disease recurrence in the abdomen or pelvis. Electronically Signed: Bere Marques  12/5/2022 8:37 PM EST  Workstation ID: [id number]    CT Abdomen Pelvis With Contrast    Result Date: 12/5/2022  Narrative: CT ABDOMEN PELVIS W CONTRAST, CT CHEST W CONTRAST DIAGNOSTIC Date of Exam: 12/5/2022 10:01 AM EST Indication: HCC maintenance scan. Comparison: CT chest, abdomen and pelvis 9/6/2022. Technique: Axial CT images were obtained of the abdomen and pelvis following the uneventful  intravenous administration of 85 cc the 100. Reconstructed coronal and sagittal images were also obtained. Automated exposure control and iterative construction methods were used. Findings: CHEST: Multiple small 2 to 3 mm noncalcified nodules in both lungs, appears stable in size and number since 9/6/2022, including: Right upper lobe (images 13, 29, 37, 51), right middle lobe (image 61), right lower lobe (image 43, 58), left upper lobe (image 14, 27, 29). No new pulmonary nodules are identified. These nodules are also thought to be stable compared to a more remote CT chest of 6/4/2022. A 4 mm right upper lobe nodule (image 20), is stable since 3/3/2022. There is no acute airspace disease. Heart size is normal. There are no pathologically enlarged lymph nodes within the chest. Trace fluid is seen within the pericardial recesses. Heart size is normal without significant coronary artery calcification. There is mild calcific atherosclerosis in the thoracic aorta. A low-density left thyroid nodule measuring approximately 9 mm is stable. There is no pleural effusion. There are no acute or suspicious osseous abnormalities. ABDOMEN AND PELVIS: Right hepatectomy changes are redemonstrated. Benign cysts in the medial left hepatic segment appear unchanged. No new or suspicious liver lesions are identified. The spleen, pancreas, adrenals, and kidneys are within normal limits. No pathologically enlarged nodes are identified. No gross ascites is seen. Urinary bladder and rectum are normal. Hysterectomy changes are present. The bowel does not appear abnormally thickened, dilated or inflamed. There are advanced degenerative changes of the right hip. Degenerative facet changes are present in the lumbar spine. No acute or suspicious osseous abnormalities.     Impression: 1. Stable 4 mm less noncalcified tiny nodular densities in both lungs. No evidence of new or progressive malignancy within the chest. 2. Right hepatectomy with  stable benign cysts in the residual left hepatic lobe. No evidence of metastatic disease or malignant disease recurrence in the abdomen or pelvis. Electronically Signed: Bere Marques  12/5/2022 8:37 PM EST  Workstation ID: [id number]      Assessment / Plan      Assessment/Plan:   Intrahepatic cholangiocarcinoma (CMS/HCC) (Primary)  -Noted during her most recent hospitalization with CT scans concerning for an enlarging liver lesion to 7.3 cm that was previously noted to be hemangioma  -Triple phase CT concerning for a solid tumor lesion  -Biopsy consistent with an adenocarcinoma  -CA 19-9 elevated to 84.7  -CT chest as well as the rest of the CT abdomen/pelvis not concerning for distant or metastatic disease  -Status post open right hepatectomy, cholecystectomy, right partial adrenalectomy on 9/23/2021 with Dr. 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  0/4 lymph nodes were positive for disease.  LVI and PNI were present.  Focal R1 resection margin  -Discussed with patient and her daughter that she would benefit from adjuvant chemoXRT using Xeloda.  Discussed side effects including but not limited to immunosuppression, diarrhea, abdominal pain, nausea, vomiting, hand/foot syndrome  -Repeat CA 19-9 (22) in November 2021  -Completed chemo XRT with Xeloda in January 2021  -Repeat scans in March 2022 without any evidence of recurrent or metastatic disease  -Repeat CT C/A/P in June 2022 without evidence of recurrent or metastatic disease.  Did note some IVC stenosis which we will monitor with her next scans  -Repeat CT C/A/P in September 2022 reviewed without evidence of recurrent disease or metastatic disease.  IVC stenosis overall stable  -Repeat CT C/A/P in December 2022 reviewed without evidence of recurrent or metastatic disease.  CA 19-9 within normal limits     Hemochromatosis carrier  -Noted on recent labs  with an elevated ferritin to 246 hemochromatosis gene profile positive for heterozygosity of H63D.  Other genes negative.  Given patient's age and previous cardiac liver work-up showing no evidence of dysfunction, it is unlikely that she has had iron deposition all this time.  The heterozygosity of H63D makes her carrier for hemochromatosis however does not mean that she has active disease  -CT A/P in July 2020 with no liver dysfunction but was notable for hemangioma which has now been confirmed to be a intrahepatic cholangiocarcinoma and a status post resection.  Treatment as above  -ECHO in July 2020 within normal limits  -Repeat iron studies in April 2021 stable with a ferritin of 229, iron level 100, transferrin saturation 27%  -Low concern for iron overload at this time.      Thyroid nodule  -Incidentally noted on CT scan but enlarging from previous CT  -Thyroid ultrasound in June 2022 only notable for goiter and small nodules nonconcerning for malignancy  -Has been seen by endocrinology with plan for repeat ultrasound in the summer of next year     Other fatigue   -Unclear etiology  -Iron studies, ivette B12, folate and thyroid studies within normal limits  -Scheduled see cardiology in the coming weeks         Follow Up:   Follow-up in 3 months     Jorje Montenegro MD  Hematology and Oncology     Please note that portions of this note may have been completed with a voice recognition program. Efforts were made to edit the dictations, but occasionally words are mistranscribed.

## 2022-12-19 NOTE — PROGRESS NOTES
[facility] Cardiology   Consult  Sheree Hernandez  1940    VISIT DATE:  12/20/22    PCP:   Timi Conway,   [id number]  [facility] [id number]        CC:  Fatigue and dyspnea with exertion      Problem List:  1.  Hypertension  2.  Hyperlipidemia  3.  Intrahepatic cholangiocarcinoma  -Status post right hepatectomy cholecystectomy right partial adrenalectomy  -Status post chemo and radiation  -She has completed Xeloda    4.  Hemochromatosis carrier   5. BONNIE -not using CPAP    Cardiac testing:    Cardiac catheterization 2019.   RCA 20% stenosis otherwise normal coronaries, EF 60%    Echo 2020:   • Estimated EF = 65%.  • Left ventricular systolic function is normal.      History of Present Illness:  Sheree Hernandez  Is a 82 y.o. female with pertinent cardiac history detailed above.  Patient has been endorsing weakness and increasing fatigue.  She has had persistent symptoms since 2017.  She has not noticed any significant change after her cancer treatment.  When  She walks she experiences mainly generalized weakness.  She had a cardiac workup in [id number] with no significant abnormalities.  She had a reported abnormal stress test prior to her [facility] in 2019.    She is a hemochromatosis carrier according to her chart.  She had iron levels checked in the summer that were within normal limits.  She previously took losartan and HCTZ for hypertension but has been off of these recently.  Last couple healthcare encounters show systolic blood pressures in the 140s.  She does not note specific pain with exertion but just feels completely drained and has to have periods of prolonged rest.  Her EKG in the office today was within normal limits..        Patient Active Problem List    Diagnosis Date Noted   • 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     Note Last Updated: 9/19/2022     14 mm cyst left lobe  9 mm isoechoic nodule right lobe  Multiple sub cm nodules .  tft's normal  ================  Took  thyroid pills while pregnant  Daughter has thyroid issues.  No facial radiation exposure  Born and raised in [place]        • Nausea 11/08/2021   • Dyspepsia 11/08/2021   • Intrahepatic cholangiocarcinoma (HCC) 08/17/2021   • Liver masses 08/08/2021   • Erythrocytosis 08/03/2020   • Hemochromatosis carrier 07/20/2020   • BONNIE (obstructive sleep apnea) 01/30/2020   • Chronic fatigue 01/30/2020   • Abnormal stress test 01/10/2019   • Allergic rhinitis 12/10/2018   • Dyspnea on exertion 10/04/2018   • Dyslipidemia 09/07/2017   • Hypertension 03/09/2017   • Atypical chest pain 03/09/2017   • Idiopathic osteoarthritis 11/11/2016       Allergies   Allergen Reactions   • Pravastatin Myalgia       Social History     Socioeconomic History   • Marital status:    • Number of children: 2   Tobacco Use   • Smoking status: Never   • Smokeless tobacco: Never   • Tobacco comments:     Exposed to secondhand smoke   Vaping Use   • Vaping Use: Never used   Substance and Sexual Activity   • Alcohol use: Not Currently     Comment: Maybe every 6 months, rare   • Drug use: Never   • Sexual activity: Not Currently     Partners: Male     Birth control/protection: Natural family planning/Rhythm, Hysterectomy     Comment: Complete Hysterectomy       Family History   Problem Relation Age of Onset   • Heart attack Mother    • Heart failure Mother    • Dementia Mother    • Stroke Mother    • Heart disease Father    • Arrhythmia Brother    • Breast cancer Paternal Aunt        Current Medications:    Current Outpatient Medications:   •  Cholecalciferol (Vitamin D3) 25 MCG (1000 UT) capsule, Take  by mouth Daily., Disp: , Rfl:   •  coenzyme Q10 50 MG capsule capsule, Take  by mouth Daily., Disp: , Rfl:   •  ipratropium (ATROVENT) 0.06 % nasal spray, Instill 2 sprays into the nostril(s) as directed by provider 2 (Two) Times a Day., Disp: 15 mL, Rfl: 6  •  magnesium oxide (MAG-OX) 400 MG tablet, Take 400 mg by mouth Daily., Disp: , Rfl:   •   "Omega-3 1000 MG capsule, Take  by mouth Daily., Disp: , Rfl:   •  rosuvastatin (CRESTOR) 10 MG tablet, Take 1 tablet by mouth Daily., Disp: 90 tablet, Rfl: 3  •  Turmeric Curcumin 500 MG capsule, Take 750 mg by mouth Daily., Disp: , Rfl:   •  Zinc 50 MG capsule, Take  by mouth Daily., Disp: , Rfl:   •  Cyanocobalamin (VITAMIN B 12 PO), Take  by mouth., Disp: , Rfl:   •  Folic Acid 0.8 MG capsule, Take  by mouth., Disp: , Rfl:      Review of Systems   Constitutional: Positive for malaise/fatigue.   Cardiovascular: Positive for dyspnea on exertion. Negative for chest pain, irregular heartbeat, leg swelling and palpitations.   Respiratory: Negative for shortness of breath.        Vitals:    12/20/22 0959   BP: 146/80   BP Location: Left arm   Patient Position: Sitting   Cuff Size: Adult   Pulse: 68   SpO2: 98%   Weight: 67.6 kg (149 lb)   Height: 162.6 cm (64\")       Physical Exam  Constitutional:       Appearance: Normal appearance.   Neck:      Vascular: No carotid bruit.   Cardiovascular:      Rate and Rhythm: Normal rate and regular rhythm.      Pulses: Normal pulses.      Heart sounds: Normal heart sounds.   Pulmonary:      Effort: Pulmonary effort is normal.      Breath sounds: Normal breath sounds.   Musculoskeletal:      Right lower leg: No edema.      Left lower leg: No edema.   Neurological:      General: No focal deficit present.      Mental Status: She is alert.         Diagnostic Data:    ECG 12 Lead    Date/Time: 12/20/2022 10:12 AM  Performed by: Carlos Kay MD  Authorized by: Carlos Kay MD   Comparison: compared with previous ECG from 8/8/2021  Similar to previous ECG  Rhythm: sinus rhythm  Rate: normal  BPM: 68  QRS axis: normal    Clinical impression: normal ECG          Lab Results   Component Value Date    TRIG 74 06/02/2022    HDL 82 (H) 06/02/2022     Lab Results   Component Value Date    GLUCOSE 103 (H) 11/29/2022    BUN 20 11/29/2022    CREATININE 0.85 11/29/2022    NA " 141 11/29/2022    K 3.8 11/29/2022     11/29/2022    CO2 26.0 11/29/2022     Lab Results   Component Value Date    HGBA1C 5.40 12/20/2021     Lab Results   Component Value Date    WBC 5.14 11/29/2022    HGB 14.3 11/29/2022    HCT 43.1 11/29/2022     (L) 11/29/2022       Assessment:   Diagnosis Plan   1. Dyspnea on exertion  ECG 12 Lead    Adult Transthoracic Echo Complete W/ Cont if Necessary Per Protocol    Stress Test With Myocardial Perfusion One Day          Plan:        1.  Weakness and fatigue  -EKG in office normal  -Previous cardiac catheterization 2019 normal coronary arteries  -She did have cancer with Xeloda as part of her treatment regimen.  -We will reevaluate for any change in perfusion and cardiac function with stress myocardial perfusion study and echocardiogram    2.  HLD  -LDL 90 on crestor 10mg daily    3.  HTN  -previously on losartan and HCTZ  -not on currently, BP mildly elevated  -monitor for now, further recommendations after her echo and stress test    4.  Hemochromatosis carrier  -iron levels WNL  June 2022    5.  Mild sleep apnea  -Currently not using CPAP.    Further recommendations pending stress and echo results.    Carlos Kay MD [facility]

## 2022-12-20 ENCOUNTER — OFFICE VISIT (OUTPATIENT)
Dept: CARDIOLOGY | Facility: CLINIC | Age: 82
End: 2022-12-20

## 2022-12-20 VITALS
BODY MASS INDEX: 25.44 KG/M2 | HEIGHT: 64 IN | SYSTOLIC BLOOD PRESSURE: 146 MMHG | WEIGHT: 149 LBS | DIASTOLIC BLOOD PRESSURE: 80 MMHG | HEART RATE: 68 BPM | OXYGEN SATURATION: 98 %

## 2022-12-20 DIAGNOSIS — R06.09 DYSPNEA ON EXERTION: Primary | ICD-10-CM

## 2022-12-20 PROCEDURE — 93000 ELECTROCARDIOGRAM COMPLETE: CPT | Performed by: INTERNAL MEDICINE

## 2022-12-20 PROCEDURE — 99214 OFFICE O/P EST MOD 30 MIN: CPT | Performed by: INTERNAL MEDICINE

## 2022-12-20 RX ORDER — MAGNESIUM OXIDE 400 MG/1
400 TABLET ORAL DAILY
COMMUNITY

## 2023-01-04 ENCOUNTER — HOSPITAL ENCOUNTER (OUTPATIENT)
Dept: CARDIOLOGY | Facility: HOSPITAL | Age: 83
Discharge: HOME | End: 2023-01-04
Admitting: INTERNAL MEDICINE
Payer: MEDICARE

## 2023-01-04 VITALS
DIASTOLIC BLOOD PRESSURE: 96 MMHG | WEIGHT: 149 LBS | SYSTOLIC BLOOD PRESSURE: 154 MMHG | BODY MASS INDEX: 25.44 KG/M2 | HEIGHT: 64 IN

## 2023-01-04 DIAGNOSIS — R06.09 DYSPNEA ON EXERTION: ICD-10-CM

## 2023-01-04 LAB
BH CV REST NUCLEAR ISOTOPE DOSE: 9.9 MCI
BH CV STRESS DURATION MIN STAGE 1: 4
BH CV STRESS DURATION SEC STAGE 1: 0
BH CV STRESS GRADE STAGE 1: 5
BH CV STRESS HR STAGE 1: 131
BH CV STRESS METS STAGE 1: 5
BH CV STRESS NUCLEAR ISOTOPE DOSE: 32.8 MCI
BH CV STRESS O2 STAGE 1: 99
BH CV STRESS PROTOCOL 1: NORMAL
BH CV STRESS RECOVERY BP: NORMAL MMHG
BH CV STRESS RECOVERY HR: 86 BPM
BH CV STRESS RECOVERY O2: 99 %
BH CV STRESS SPEED STAGE 1: 1.5
BH CV STRESS STAGE 1: 1
LV EF NUC BP: 74 %
MAXIMAL PREDICTED HEART RATE: 137 BPM
PERCENT MAX PREDICTED HR: 95.62 %
STRESS BASELINE BP: NORMAL MMHG
STRESS BASELINE HR: 71 BPM
STRESS O2 SAT REST: 99 %
STRESS PERCENT HR: 112 %
STRESS POST ESTIMATED WORKLOAD: 4.1 METS
STRESS POST EXERCISE DUR MIN: 4 MIN
STRESS POST EXERCISE DUR SEC: 0 SEC
STRESS POST O2 SAT PEAK: 99 %
STRESS POST PEAK BP: NORMAL MMHG
STRESS POST PEAK HR: 131 BPM
STRESS TARGET HR: 116 BPM

## 2023-01-04 PROCEDURE — 0 TECHNETIUM SESTAMIBI: Performed by: INTERNAL MEDICINE

## 2023-01-04 PROCEDURE — 78452 HT MUSCLE IMAGE SPECT MULT: CPT | Performed by: INTERNAL MEDICINE

## 2023-01-04 PROCEDURE — A9500 TC99M SESTAMIBI: HCPCS | Performed by: INTERNAL MEDICINE

## 2023-01-04 PROCEDURE — 93018 CV STRESS TEST I&R ONLY: CPT | Performed by: INTERNAL MEDICINE

## 2023-01-04 PROCEDURE — 93017 CV STRESS TEST TRACING ONLY: CPT

## 2023-01-04 PROCEDURE — 78452 HT MUSCLE IMAGE SPECT MULT: CPT

## 2023-01-04 RX ADMIN — TECHNETIUM TC 99M SESTAMIBI 1 DOSE: 1 INJECTION INTRAVENOUS at 09:35

## 2023-01-04 RX ADMIN — TECHNETIUM TC 99M SESTAMIBI 1 DOSE: 1 INJECTION INTRAVENOUS at 07:50

## 2023-01-05 ENCOUNTER — TELEPHONE (OUTPATIENT)
Dept: CARDIOLOGY | Facility: CLINIC | Age: 83
End: 2023-01-05
Payer: MEDICARE

## 2023-01-05 NOTE — TELEPHONE ENCOUNTER
----- Message from Carlos Kay MD sent at 1/4/2023  1:13 PM EST -----  Patient stress test looks good.  Normal blood flow to the heart and normal pumping function of the heart.  No signs of significant heart artery disease

## 2023-01-05 NOTE — TELEPHONE ENCOUNTER
Called patient regarding NSK results above. All questions answered at this time. Patient verbalizes understanding and agreeable to plan.

## 2023-01-13 ENCOUNTER — HOSPITAL ENCOUNTER (OUTPATIENT)
Dept: CARDIOLOGY | Facility: HOSPITAL | Age: 83
Discharge: HOME | End: 2023-01-13
Admitting: INTERNAL MEDICINE
Payer: MEDICARE

## 2023-01-13 VITALS
WEIGHT: 149 LBS | BODY MASS INDEX: 25.44 KG/M2 | DIASTOLIC BLOOD PRESSURE: 75 MMHG | HEIGHT: 64 IN | SYSTOLIC BLOOD PRESSURE: 154 MMHG

## 2023-01-13 DIAGNOSIS — R06.09 DYSPNEA ON EXERTION: ICD-10-CM

## 2023-01-13 LAB
BH CV ECHO MEAS - AO MAX PG: 9.5 MMHG
BH CV ECHO MEAS - AO MEAN PG: 5 MMHG
BH CV ECHO MEAS - AO ROOT DIAM: 2.6 CM
BH CV ECHO MEAS - AO V2 MAX: 154 CM/SEC
BH CV ECHO MEAS - AO V2 VTI: 38.9 CM
BH CV ECHO MEAS - AVA(I,D): 2.34 CM2
BH CV ECHO MEAS - EDV(CUBED): 39.3 ML
BH CV ECHO MEAS - EDV(MOD-SP2): 60.8 ML
BH CV ECHO MEAS - EDV(MOD-SP4): 63.9 ML
BH CV ECHO MEAS - EF(MOD-BP): 67.1 %
BH CV ECHO MEAS - EF(MOD-SP2): 55.8 %
BH CV ECHO MEAS - EF(MOD-SP4): 74 %
BH CV ECHO MEAS - ESV(CUBED): 12.2 ML
BH CV ECHO MEAS - ESV(MOD-SP2): 26.9 ML
BH CV ECHO MEAS - ESV(MOD-SP4): 16.6 ML
BH CV ECHO MEAS - FS: 32.4 %
BH CV ECHO MEAS - IVS/LVPW: 0.89 CM
BH CV ECHO MEAS - IVSD: 0.8 CM
BH CV ECHO MEAS - LA DIMENSION: 2.7 CM
BH CV ECHO MEAS - LAT PEAK E' VEL: 11.9 CM/SEC
BH CV ECHO MEAS - LV DIASTOLIC VOL/BSA (35-75): 37 CM2
BH CV ECHO MEAS - LV MASS(C)D: 78.3 GRAMS
BH CV ECHO MEAS - LV MAX PG: 5.8 MMHG
BH CV ECHO MEAS - LV MEAN PG: 2 MMHG
BH CV ECHO MEAS - LV SYSTOLIC VOL/BSA (12-30): 9.6 CM2
BH CV ECHO MEAS - LV V1 MAX: 120 CM/SEC
BH CV ECHO MEAS - LV V1 VTI: 29 CM
BH CV ECHO MEAS - LVIDD: 3.4 CM
BH CV ECHO MEAS - LVIDS: 2.3 CM
BH CV ECHO MEAS - LVOT AREA: 3.1 CM2
BH CV ECHO MEAS - LVOT DIAM: 2 CM
BH CV ECHO MEAS - LVPWD: 0.9 CM
BH CV ECHO MEAS - MED PEAK E' VEL: 7.4 CM/SEC
BH CV ECHO MEAS - MV A MAX VEL: 108 CM/SEC
BH CV ECHO MEAS - MV DEC SLOPE: 191 CM/SEC2
BH CV ECHO MEAS - MV DEC TIME: 0.38 MSEC
BH CV ECHO MEAS - MV E MAX VEL: 73.2 CM/SEC
BH CV ECHO MEAS - MV E/A: 0.68
BH CV ECHO MEAS - PA ACC TIME: 0.16 SEC
BH CV ECHO MEAS - PA PR(ACCEL): 8.3 MMHG
BH CV ECHO MEAS - PA V2 MAX: 115 CM/SEC
BH CV ECHO MEAS - SI(MOD-SP2): 19.6 ML/M2
BH CV ECHO MEAS - SI(MOD-SP4): 27.4 ML/M2
BH CV ECHO MEAS - SV(LVOT): 91.1 ML
BH CV ECHO MEAS - SV(MOD-SP2): 33.9 ML
BH CV ECHO MEAS - SV(MOD-SP4): 47.3 ML
BH CV ECHO MEASUREMENTS AVERAGE E/E' RATIO: 7.59
BH CV XLRA - RV BASE: 2.6 CM
BH CV XLRA - RV LENGTH: 5.4 CM
BH CV XLRA - RV MID: 2.5 CM
BH CV XLRA - TDI S': 15 CM/SEC
LEFT ATRIUM VOLUME INDEX: 13.2 ML/M2
MAXIMAL PREDICTED HEART RATE: 137 BPM
STRESS TARGET HR: 116 BPM

## 2023-01-13 PROCEDURE — 93306 TTE W/DOPPLER COMPLETE: CPT | Performed by: INTERNAL MEDICINE

## 2023-01-13 PROCEDURE — 93306 TTE W/DOPPLER COMPLETE: CPT

## 2023-03-06 ENCOUNTER — HOSPITAL ENCOUNTER (OUTPATIENT)
Dept: CT IMAGING | Facility: HOSPITAL | Age: 83
Discharge: HOME | End: 2023-03-06
Payer: MEDICARE

## 2023-03-06 ENCOUNTER — LAB (OUTPATIENT)
Dept: LAB | Facility: HOSPITAL | Age: 83
End: 2023-03-06
Payer: MEDICARE

## 2023-03-06 DIAGNOSIS — C22.1 INTRAHEPATIC CHOLANGIOCARCINOMA: ICD-10-CM

## 2023-03-06 LAB
ALBUMIN SERPL-MCNC: 4.2 G/DL (ref 3.5–5.2)
ALBUMIN/GLOB SERPL: 1.3 G/DL
ALP SERPL-CCNC: 248 U/L (ref 39–117)
ALT SERPL W P-5'-P-CCNC: 43 U/L (ref 1–33)
ANION GAP SERPL CALCULATED.3IONS-SCNC: 8 MMOL/L (ref 5–15)
AST SERPL-CCNC: 43 U/L (ref 1–32)
BASOPHILS # BLD AUTO: 0.03 10*3/MM3 (ref 0–0.2)
BASOPHILS NFR BLD AUTO: 0.6 % (ref 0–1.5)
BILIRUB SERPL-MCNC: 0.6 MG/DL (ref 0–1.2)
BUN SERPL-MCNC: 16 MG/DL (ref 8–23)
BUN/CREAT SERPL: 16.7 (ref 7–25)
CALCIUM SPEC-SCNC: 9.1 MG/DL (ref 8.6–10.5)
CANCER AG19-9 SERPL-ACNC: 21 U/ML
CHLORIDE SERPL-SCNC: 103 MMOL/L (ref 98–107)
CO2 SERPL-SCNC: 28 MMOL/L (ref 22–29)
CREAT SERPL-MCNC: 0.96 MG/DL (ref 0.57–1)
DEPRECATED RDW RBC AUTO: 51.7 FL (ref 37–54)
EGFRCR SERPLBLD CKD-EPI 2021: 58.8 ML/MIN/1.73
EOSINOPHIL # BLD AUTO: 0.19 10*3/MM3 (ref 0–0.4)
EOSINOPHIL NFR BLD AUTO: 3.7 % (ref 0.3–6.2)
ERYTHROCYTE [DISTWIDTH] IN BLOOD BY AUTOMATED COUNT: 14.5 % (ref 12.3–15.4)
GLOBULIN UR ELPH-MCNC: 3.3 GM/DL
GLUCOSE SERPL-MCNC: 89 MG/DL (ref 65–99)
HCT VFR BLD AUTO: 48.8 % (ref 34–46.6)
HGB BLD-MCNC: 15.8 G/DL (ref 12–15.9)
IMM GRANULOCYTES # BLD AUTO: 0.01 10*3/MM3 (ref 0–0.05)
IMM GRANULOCYTES NFR BLD AUTO: 0.2 % (ref 0–0.5)
LYMPHOCYTES # BLD AUTO: 0.95 10*3/MM3 (ref 0.7–3.1)
LYMPHOCYTES NFR BLD AUTO: 18.5 % (ref 19.6–45.3)
MCH RBC QN AUTO: 30.7 PG (ref 26.6–33)
MCHC RBC AUTO-ENTMCNC: 32.4 G/DL (ref 31.5–35.7)
MCV RBC AUTO: 94.9 FL (ref 79–97)
MONOCYTES # BLD AUTO: 0.62 10*3/MM3 (ref 0.1–0.9)
MONOCYTES NFR BLD AUTO: 12.1 % (ref 5–12)
NEUTROPHILS NFR BLD AUTO: 3.33 10*3/MM3 (ref 1.7–7)
NEUTROPHILS NFR BLD AUTO: 64.9 % (ref 42.7–76)
PLATELET # BLD AUTO: 178 10*3/MM3 (ref 140–450)
PMV BLD AUTO: 11.8 FL (ref 6–12)
POTASSIUM SERPL-SCNC: 4.2 MMOL/L (ref 3.5–5.2)
PROT SERPL-MCNC: 7.5 G/DL (ref 6–8.5)
RBC # BLD AUTO: 5.14 10*6/MM3 (ref 3.77–5.28)
SODIUM SERPL-SCNC: 139 MMOL/L (ref 136–145)
WBC NRBC COR # BLD: 5.13 10*3/MM3 (ref 3.4–10.8)

## 2023-03-06 PROCEDURE — 74177 CT ABD & PELVIS W/CONTRAST: CPT

## 2023-03-06 PROCEDURE — 71260 CT THORAX DX C+: CPT

## 2023-03-06 PROCEDURE — 86301 IMMUNOASSAY TUMOR CA 19-9: CPT

## 2023-03-06 PROCEDURE — 80053 COMPREHEN METABOLIC PANEL: CPT

## 2023-03-06 PROCEDURE — 82565 ASSAY OF CREATININE: CPT

## 2023-03-06 PROCEDURE — 85025 COMPLETE CBC W/AUTO DIFF WBC: CPT

## 2023-03-06 PROCEDURE — 36415 COLL VENOUS BLD VENIPUNCTURE: CPT

## 2023-03-06 PROCEDURE — 25510000001 IOPAMIDOL PER 1 ML: Performed by: INTERNAL MEDICINE

## 2023-03-06 RX ADMIN — IOPAMIDOL 85 ML: 755 INJECTION, SOLUTION INTRAVENOUS at 10:30

## 2023-03-13 ENCOUNTER — OFFICE VISIT (OUTPATIENT)
Dept: ONCOLOGY | Facility: CLINIC | Age: 83
End: 2023-03-13
Payer: MEDICARE

## 2023-03-13 VITALS
RESPIRATION RATE: 18 BRPM | DIASTOLIC BLOOD PRESSURE: 81 MMHG | SYSTOLIC BLOOD PRESSURE: 154 MMHG | OXYGEN SATURATION: 98 % | HEART RATE: 73 BPM | BODY MASS INDEX: 26.46 KG/M2 | TEMPERATURE: 97.1 F | HEIGHT: 64 IN | WEIGHT: 155 LBS

## 2023-03-13 DIAGNOSIS — C22.1 INTRAHEPATIC CHOLANGIOCARCINOMA: Primary | ICD-10-CM

## 2023-03-13 PROCEDURE — 3077F SYST BP >= 140 MM HG: CPT | Performed by: INTERNAL MEDICINE

## 2023-03-13 PROCEDURE — 99214 OFFICE O/P EST MOD 30 MIN: CPT | Performed by: INTERNAL MEDICINE

## 2023-03-13 PROCEDURE — 1126F AMNT PAIN NOTED NONE PRSNT: CPT | Performed by: INTERNAL MEDICINE

## 2023-03-13 PROCEDURE — 3079F DIAST BP 80-89 MM HG: CPT | Performed by: INTERNAL MEDICINE

## 2023-03-13 NOTE — PROGRESS NOTES
Follow Up Office Visit      Date: 2023     Patient Name: Sheree Hernandez  MRN: [id number]  : 1940  Chief Complaint:  Follow-up for intrahepatic cholangiocarcinoma      History of Present Illness: Sheree Hernandez is a pleasant 79 y.o. female with a past medical history of hyperlipidemia and hypertension who presents today for evaluation of concern for hemochromatosis. The patient is accompanied by their self who contributes to the history of their care.  Patient states that over the past 2 years she has been having worsening fatigue especially with exertion.  Over the past several months this has become worse.  Patient states that she gets tired with basic activities including showering getting dressed in the morning and walking to and from her car from stores.  She states that her fatigue gets better after a few minutes of rest.  She has previously had an echocardiogram and cardiac catheterization within the past 2 years which did not reveal any cause of her fatigue with exertion.  She is also had an ultrasound of the liver which revealed stable cyst.  She was recently seen by her PCP who ordered iron studies which was notable for an elevated ferritin to 246.  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  All other mutations were negative.  She is currently up-to-date on her mammograms and colonoscopy with no active malignancies noted.  She is otherwise compliant with her statin and high blood pressure medicines.  Repeat abdominal imaging in 2021 concerning for enlarging liver lesion.  She was seen by Dr. Sharma and  is status post open right hepatectomy, cholecystectomy, right partial adrenalectomy on 2021.  Pathology showed a focal R1 resected margin.  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  0/4 lymph nodes were  positive for disease.  LVI and PNI were present.     Interval History:  Presents to clinic for follow-up.  Finished chemoXRT in January 2021.   Has noted some slight abdominal pain and discomfort over the past several weeks.  Denies any significant nausea or vomiting or diarrhea.  Denies any dysuria, fevers or chills.  Is gained 6 pounds since her last visit with me    Oncology History:    Oncology/Hematology History   Intrahepatic cholangiocarcinoma (HCC)   8/17/2021 Initial Diagnosis    Intrahepatic cholangiocarcinoma (CMS/HCC)     8/17/2021 Cancer Staged    Staging form: Intrahepatic Bile Duct, AJCC 8th Edition  - Clinical: Stage IB (cT1b, cN0, cM0) - Signed by Jorje Montenegro MD on 8/17/2021 11/5/2021 Cancer Staged    Staging form: Intrahepatic Bile Duct, AJCC 8th Edition  - Pathologic: Stage IIIA (pT3, pN0, cM0) - Signed by Jorje Montenegro MD on 11/5/2021 12/9/2021 -  Chemotherapy    OP GALLBLADDER Capecitabine + XRT     12/9/2021 - 1/19/2022 Radiation    Radiation OncologyTreatment Course:  Sheree Hernandez received 5040 cGy in 28 fractions to liver via External Beam Radiation - EBRT.         Subjective      Review of Systems:   Constitutional: Negative for fevers, chills, or weight loss  Eyes: Negative for blurred vision or discharge         Ear/Nose/Throat: Negative for difficulty swallowing, sore throat, LAD                                                       Respiratory: Negative for cough, SOA, wheezing                                                                                        Cardiovascular: Negative for chest pain or palpitations                                                                  Gastrointestinal: Negative for nausea, vomiting or diarrhea                                                                     Genitourinary: Negative for dysuria or hematuria                                                                                           Musculoskeletal:  "Negative for any joint pains or muscle aches                                                                        Neurologic: Negative for any weakness, headaches, dizziness                                                                         Hematologic: Negative for any easy bleeding or bruising                                                                                   Psychiatric: Negative for anxiety or depression                          Past Medical History/Past Surgical History/ Family History/ Social History: Reviewed by me and unchanged from my previous documentation done on December 2022.     Medications:     Current Outpatient Medications:   •  Cholecalciferol (Vitamin D3) 25 MCG (1000 UT) capsule, Take  by mouth Daily., Disp: , Rfl:   •  coenzyme Q10 50 MG capsule capsule, Take  by mouth Daily., Disp: , Rfl:   •  Cyanocobalamin (VITAMIN B 12 PO), Take  by mouth., Disp: , Rfl:   •  Folic Acid 0.8 MG capsule, Take  by mouth., Disp: , Rfl:   •  ipratropium (ATROVENT) 0.06 % nasal spray, Instill 2 sprays into both nostrils as directed by provider 2 (Two) Times a Day., Disp: 15 mL, Rfl: 6  •  magnesium oxide (MAG-OX) 400 MG tablet, Take 400 mg by mouth Daily., Disp: , Rfl:   •  Omega-3 1000 MG capsule, Take  by mouth Daily., Disp: , Rfl:   •  rosuvastatin (CRESTOR) 10 MG tablet, Take 1 tablet by mouth Daily., Disp: 90 tablet, Rfl: 3  •  Turmeric Curcumin 500 MG capsule, Take 750 mg by mouth Daily., Disp: , Rfl:   •  Zinc 50 MG capsule, Take  by mouth Daily., Disp: , Rfl:     Allergies:   Allergies   Allergen Reactions   • Pravastatin Myalgia       Objective     Physical Exam:  Vital Signs:   Vitals:    03/13/23 1012   BP: 154/81   Pulse: 73   Resp: 18   Temp: 97.1 °F (36.2 °C)   TempSrc: Infrared   SpO2: 98%   Weight: 70.3 kg (155 lb)   Height: 162.6 cm (64.02\")   PainSc: 0-No pain     Pain Score    03/13/23 1012   PainSc: 0-No pain     ECOG Performance Status: 0 - Asymptomatic    Constitutional: " NAD, ECOG 0  Eyes: PERRLA, scleral anicteric  ENT: No LAD, no thyromegaly  Respiratory: CTAB, no wheezing, rales, rhonchi  Cardiovascular: RRR, no murmurs, pulses 2+ bilaterally  Abdomen: soft, NT/ND, no HSM  Musculoskeletal: strength 5/5 bilaterally, no c/c/e  Neurologic: A&O x 3, CN II-XII intact grossly    Results Review:   Lab on 03/06/2023   Component Date Value Ref Range Status   • Glucose 03/06/2023 89  65 - 99 mg/dL Final   • BUN 03/06/2023 16  8 - 23 mg/dL Final   • Creatinine 03/06/2023 0.96  0.57 - 1.00 mg/dL Final   • Sodium 03/06/2023 139  136 - 145 mmol/L Final   • Potassium 03/06/2023 4.2  3.5 - 5.2 mmol/L Final   • Chloride 03/06/2023 103  98 - 107 mmol/L Final   • CO2 03/06/2023 28.0  22.0 - 29.0 mmol/L Final   • Calcium 03/06/2023 9.1  8.6 - 10.5 mg/dL Final   • Total Protein 03/06/2023 7.5  6.0 - 8.5 g/dL Final   • Albumin 03/06/2023 4.2  3.5 - 5.2 g/dL Final   • ALT (SGPT) 03/06/2023 43 (H)  1 - 33 U/L Final   • AST (SGOT) 03/06/2023 43 (H)  1 - 32 U/L Final   • Alkaline Phosphatase 03/06/2023 248 (H)  39 - 117 U/L Final   • Total Bilirubin 03/06/2023 0.6  0.0 - 1.2 mg/dL Final   • Globulin 03/06/2023 3.3  gm/dL Final    Calculated Result   • A/G Ratio 03/06/2023 1.3  g/dL Final   • BUN/Creatinine Ratio 03/06/2023 16.7  7.0 - 25.0 Final   • Anion Gap 03/06/2023 8.0  5.0 - 15.0 mmol/L Final   • eGFR 03/06/2023 58.8 (L)  >60.0 mL/min/1.73 Final   • CA 19-9 03/06/2023 21.0  <=35.0 U/mL Final   • WBC 03/06/2023 5.13  3.40 - 10.80 10*3/mm3 Final   • RBC 03/06/2023 5.14  3.77 - 5.28 10*6/mm3 Final   • Hemoglobin 03/06/2023 15.8  12.0 - 15.9 g/dL Final   • Hematocrit 03/06/2023 48.8 (H)  34.0 - 46.6 % Final   • MCV 03/06/2023 94.9  79.0 - 97.0 fL Final   • MCH 03/06/2023 30.7  26.6 - 33.0 pg Final   • MCHC 03/06/2023 32.4  31.5 - 35.7 g/dL Final   • RDW 03/06/2023 14.5  12.3 - 15.4 % Final   • RDW-SD 03/06/2023 51.7  37.0 - 54.0 fl Final   • MPV 03/06/2023 11.8  6.0 - 12.0 fL Final   • Platelets  03/06/2023 178  140 - 450 10*3/mm3 Final   • Neutrophil % 03/06/2023 64.9  42.7 - 76.0 % Final   • Lymphocyte % 03/06/2023 18.5 (L)  19.6 - 45.3 % Final   • Monocyte % 03/06/2023 12.1 (H)  5.0 - 12.0 % Final   • Eosinophil % 03/06/2023 3.7  0.3 - 6.2 % Final   • Basophil % 03/06/2023 0.6  0.0 - 1.5 % Final   • Immature Grans % 03/06/2023 0.2  0.0 - 0.5 % Final   • Neutrophils, Absolute 03/06/2023 3.33  1.70 - 7.00 10*3/mm3 Final   • Lymphocytes, Absolute 03/06/2023 0.95  0.70 - 3.10 10*3/mm3 Final   • Monocytes, Absolute 03/06/2023 0.62  0.10 - 0.90 10*3/mm3 Final   • Eosinophils, Absolute 03/06/2023 0.19  0.00 - 0.40 10*3/mm3 Final   • Basophils, Absolute 03/06/2023 0.03  0.00 - 0.20 10*3/mm3 Final   • Immature Grans, Absolute 03/06/2023 0.01  0.00 - 0.05 10*3/mm3 Final       CT Chest With Contrast Diagnostic    Result Date: 3/6/2023  Narrative: CT CHEST W CONTRAST DIAGNOSTIC, CT ABDOMEN PELVIS W CONTRAST Date of Exam: 3/6/2023 10:17 AM EST Indication: Intrahepatic cholangiocarcinoma. Comparison: 12/5/2022, 9/6/2022, 6/4/2022. Technique: Axial CT images were obtained of the chest after the uneventful intravenous administration of 85 mL Isovue-370.  Reconstructed coronal and sagittal images were also obtained. Automated exposure control and iterative construction methods were used. Findings: Chest: Several tiny lung nodules measuring up to 4 mm appear stable in size and number. Index right upper lobe nodule on axial image 22 measures 4 mm, previously 4 mm. No new or enlarging lung nodules are seen. There is no pleural or pericardial effusion. Heart size is not enlarged. 9 mm low-density left thyroid nodule is stable. Mildly prominent upper right paratracheal lymph node is also stable. No new or enlarging lymphadenopathy is seen in the chest. No acute or worrisome osseous abnormality  is identified. Abdomen/pelvis: Postoperative changes are redemonstrated from right hepatectomy. Benign cysts in the left hepatic lobe  appears stable. No evidence of biliary dilatation. Retroperitoneal lymph nodes have increased in size. Index retrocaval node with central low density on axial image 40 measures 1.6 x 1.0 cm, previously 1.2 x 1.0 cm. Index aortocaval lymph node measures 1.7 x 1.0 cm on axial image 49, previously 1.4 x 1.1 cm. Index aortocaval lymph node measures 1.2 x 0.9 cm on axial image 69, previously 1.0 x 0.6 cm. Spleen, pancreas, adrenal glands, and kidneys appear unremarkable. No ascites is visualized. No abnormal bowel distention. Stool noted throughout most of the colon. Uterus is not visualized. No acute or worrisome osseous abnormality is identified.     Impression: Impression: 1.Increased size of mildly enlarged retroperitoneal lymph nodes. 2.Stable postoperative changes from right hepatectomy. 3.Stable tiny lung nodules. Electronically Signed: Edel Denny  3/6/2023 11:34 AM EST  Workstation ID: [id number]    CT Abdomen Pelvis With Contrast    Result Date: 3/6/2023  Narrative: CT CHEST W CONTRAST DIAGNOSTIC, CT ABDOMEN PELVIS W CONTRAST Date of Exam: 3/6/2023 10:17 AM EST Indication: Intrahepatic cholangiocarcinoma. Comparison: 12/5/2022, 9/6/2022, 6/4/2022. Technique: Axial CT images were obtained of the chest after the uneventful intravenous administration of 85 mL Isovue-370.  Reconstructed coronal and sagittal images were also obtained. Automated exposure control and iterative construction methods were used. Findings: Chest: Several tiny lung nodules measuring up to 4 mm appear stable in size and number. Index right upper lobe nodule on axial image 22 measures 4 mm, previously 4 mm. No new or enlarging lung nodules are seen. There is no pleural or pericardial effusion. Heart size is not enlarged. 9 mm low-density left thyroid nodule is stable. Mildly prominent upper right paratracheal lymph node is also stable. No new or enlarging lymphadenopathy is seen in the chest. No acute or worrisome osseous abnormality  is  identified. Abdomen/pelvis: Postoperative changes are redemonstrated from right hepatectomy. Benign cysts in the left hepatic lobe appears stable. No evidence of biliary dilatation. Retroperitoneal lymph nodes have increased in size. Index retrocaval node with central low density on axial image 40 measures 1.6 x 1.0 cm, previously 1.2 x 1.0 cm. Index aortocaval lymph node measures 1.7 x 1.0 cm on axial image 49, previously 1.4 x 1.1 cm. Index aortocaval lymph node measures 1.2 x 0.9 cm on axial image 69, previously 1.0 x 0.6 cm. Spleen, pancreas, adrenal glands, and kidneys appear unremarkable. No ascites is visualized. No abnormal bowel distention. Stool noted throughout most of the colon. Uterus is not visualized. No acute or worrisome osseous abnormality is identified.     Impression: Impression: 1.Increased size of mildly enlarged retroperitoneal lymph nodes. 2.Stable postoperative changes from right hepatectomy. 3.Stable tiny lung nodules. Electronically Signed: Edel Denny  3/6/2023 11:34 AM EST  Workstation ID: [id number]      Assessment / Plan      Assessment/Plan:   Intrahepatic cholangiocarcinoma (CMS/HCC) (Primary)  -Noted during her most recent hospitalization with CT scans concerning for an enlarging liver lesion to 7.3 cm that was previously noted to be hemangioma  -Triple phase CT concerning for a solid tumor lesion  -Biopsy consistent with an adenocarcinoma  -CA 19-9 elevated to 84.7  -CT chest as well as the rest of the CT abdomen/pelvis not concerning for distant or metastatic disease  -Status post open right hepatectomy, cholecystectomy, right partial adrenalectomy on 9/23/2021 with Dr. 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  0/4 lymph nodes were positive for disease.  LVI and PNI were present.  Focal R1 resection margin  -Discussed with patient and her daughter that she would benefit  from adjuvant chemoXRT using Xeloda.  Discussed side effects including but not limited to immunosuppression, diarrhea, abdominal pain, nausea, vomiting, hand/foot syndrome  -Repeat CA 19-9 (22) in November 2021  -Completed chemo XRT with Xeloda in January 2021  -Repeat scans in March 2022 without any evidence of recurrent or metastatic disease  -Repeat CT C/A/P in June 2022 without evidence of recurrent or metastatic disease.  Did note some IVC stenosis which we will monitor with her next scans  -Repeat CT C/A/P in September 2022 reviewed without evidence of recurrent disease or metastatic disease.  IVC stenosis overall stable  -Repeat CT C/A/P in December 2022 reviewed without evidence of recurrent or metastatic disease.  CA 19-9 within normal limits  -Repeat CT C/A/P in March 2023 reviewed and notable for some slight enlarging retroperitoneal adenopathy.  CA 19-9 within normal limits  -Given concerns for possible progression of disease, will get PET/CT and consider for biopsy based on results     Hemochromatosis carrier  -Noted on recent labs with an elevated ferritin to 246 hemochromatosis gene profile positive for heterozygosity of H63D.  Other genes negative.  Given patient's age and previous cardiac liver work-up showing no evidence of dysfunction, it is unlikely that she has had iron deposition all this time.  The heterozygosity of H63D makes her carrier for hemochromatosis however does not mean that she has active disease  -CT A/P in July 2020 with no liver dysfunction but was notable for hemangioma which has now been confirmed to be a intrahepatic cholangiocarcinoma and a status post resection.  Treatment as above  -ECHO in July 2020 within normal limits  -Repeat iron studies in April 2021 stable with a ferritin of 229, iron level 100, transferrin saturation 27%  -Low concern for iron overload at this time.      Thyroid nodule  -Incidentally noted on CT scan but enlarging from previous CT  -Thyroid  ultrasound in June 2022 only notable for goiter and small nodules nonconcerning for malignancy  -Has been seen by endocrinology with plan for repeat ultrasound in the summer of next year     Other fatigue   -Unclear etiology  -Iron studies, ivette B12, folate and thyroid studies within normal limits  -Following with cardiology  -ECHO and stress test in 2023 overall stable         Follow Up:   Follow-up after PET/CT     Jorje Montenegro MD  Hematology and Oncology     Please note that portions of this note may have been completed with a voice recognition program. Efforts were made to edit the dictations, but occasionally words are mistranscribed.

## 2023-03-27 ENCOUNTER — HOSPITAL ENCOUNTER (OUTPATIENT)
Dept: PET IMAGING | Facility: HOSPITAL | Age: 83
Discharge: HOME | End: 2023-03-27
Payer: MEDICARE

## 2023-03-27 DIAGNOSIS — C22.1 INTRAHEPATIC CHOLANGIOCARCINOMA: ICD-10-CM

## 2023-03-27 LAB — GLUCOSE BLDC GLUCOMTR-MCNC: 76 MG/DL (ref 70–130)

## 2023-03-27 PROCEDURE — A9552 F18 FDG: HCPCS | Performed by: INTERNAL MEDICINE

## 2023-03-27 PROCEDURE — 0 FLUDEOXYGLUCOSE F18 SOLUTION: Performed by: INTERNAL MEDICINE

## 2023-03-27 PROCEDURE — 78815 PET IMAGE W/CT SKULL-THIGH: CPT

## 2023-03-27 PROCEDURE — 82962 GLUCOSE BLOOD TEST: CPT

## 2023-03-27 RX ADMIN — FLUDEOXYGLUCOSE F18 1 DOSE: 300 INJECTION INTRAVENOUS at 09:50

## 2023-03-28 ENCOUNTER — OFFICE VISIT (OUTPATIENT)
Dept: ONCOLOGY | Facility: CLINIC | Age: 83
End: 2023-03-28
Payer: MEDICARE

## 2023-03-28 VITALS
WEIGHT: 152 LBS | HEART RATE: 78 BPM | BODY MASS INDEX: 25.95 KG/M2 | DIASTOLIC BLOOD PRESSURE: 82 MMHG | RESPIRATION RATE: 16 BRPM | SYSTOLIC BLOOD PRESSURE: 188 MMHG | OXYGEN SATURATION: 98 % | TEMPERATURE: 97.6 F | HEIGHT: 64 IN

## 2023-03-28 DIAGNOSIS — C22.1 INTRAHEPATIC CHOLANGIOCARCINOMA: Primary | ICD-10-CM

## 2023-03-28 PROCEDURE — 3077F SYST BP >= 140 MM HG: CPT | Performed by: INTERNAL MEDICINE

## 2023-03-28 PROCEDURE — 3079F DIAST BP 80-89 MM HG: CPT | Performed by: INTERNAL MEDICINE

## 2023-03-28 PROCEDURE — 1126F AMNT PAIN NOTED NONE PRSNT: CPT | Performed by: INTERNAL MEDICINE

## 2023-03-28 PROCEDURE — 99214 OFFICE O/P EST MOD 30 MIN: CPT | Performed by: INTERNAL MEDICINE

## 2023-03-28 NOTE — PROGRESS NOTES
Follow Up Office Visit      Date: 2023     Patient Name: Sheree Hernandez  MRN: [id number]  : 1940  Chief Complaint:  Follow-up for intrahepatic cholangiocarcinoma      History of Present Illness: Sheree Hernandez is a pleasant 79 y.o. female with a past medical history of hyperlipidemia and hypertension who presents today for evaluation of concern for hemochromatosis. The patient is accompanied by their self who contributes to the history of their care.  Patient states that over the past 2 years she has been having worsening fatigue especially with exertion.  Over the past several months this has become worse.  Patient states that she gets tired with basic activities including showering getting dressed in the morning and walking to and from her car from stores.  She states that her fatigue gets better after a few minutes of rest.  She has previously had an echocardiogram and cardiac catheterization within the past 2 years which did not reveal any cause of her fatigue with exertion.  She is also had an ultrasound of the liver which revealed stable cyst.  She was recently seen by her PCP who ordered iron studies which was notable for an elevated ferritin to 246.  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  All other mutations were negative.  She is currently up-to-date on her mammograms and colonoscopy with no active malignancies noted.  She is otherwise compliant with her statin and high blood pressure medicines.  Repeat abdominal imaging in 2021 concerning for enlarging liver lesion.  She was seen by Dr. Sharma and  is status post open right hepatectomy, cholecystectomy, right partial adrenalectomy on 2021.  Pathology showed a focal R1 resected margin.  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  0/4 lymph nodes were  positive for disease.  LVI and PNI were present.  Finished chemoXRT in January 2021.      Interval History:  Presents to clinic for follow-up.  Denies any significant abdominal pain, nausea, vomiting, diarrhea today.  Still anxious about possible disease progression    Oncology History:    Oncology/Hematology History   Intrahepatic cholangiocarcinoma (HCC)   8/17/2021 Initial Diagnosis    Intrahepatic cholangiocarcinoma (CMS/HCC)     8/17/2021 Cancer Staged    Staging form: Intrahepatic Bile Duct, AJCC 8th Edition  - Clinical: Stage IB (cT1b, cN0, cM0) - Signed by Jorje Montenegro MD on 8/17/2021 11/5/2021 Cancer Staged    Staging form: Intrahepatic Bile Duct, AJCC 8th Edition  - Pathologic: Stage IIIA (pT3, pN0, cM0) - Signed by Jorje Montenegro MD on 11/5/2021 12/9/2021 -  Chemotherapy    OP GALLBLADDER Capecitabine + XRT     12/9/2021 - 1/19/2022 Radiation    Radiation OncologyTreatment Course:  Sheree Hernandez received 5040 cGy in 28 fractions to liver via External Beam Radiation - EBRT.         Subjective      Review of Systems:   Constitutional: Negative for fevers, chills, or weight loss  Eyes: Negative for blurred vision or discharge         Ear/Nose/Throat: Negative for difficulty swallowing, sore throat, LAD                                                       Respiratory: Negative for cough, SOA, wheezing                                                                                        Cardiovascular: Negative for chest pain or palpitations                                                                  Gastrointestinal: Negative for nausea, vomiting or diarrhea                                                                     Genitourinary: Negative for dysuria or hematuria                                                                                           Musculoskeletal: Negative for any joint pains or muscle aches                                                                "         Neurologic: Negative for any weakness, headaches, dizziness                                                                         Hematologic: Negative for any easy bleeding or bruising                                                                                   Psychiatric: Negative for anxiety or depression                          Past Medical History/Past Surgical History/ Family History/ Social History: Reviewed by me and unchanged from my previous documentation done on March 2023.     Medications:     Current Outpatient Medications:   •  Cholecalciferol (Vitamin D3) 25 MCG (1000 UT) capsule, Take  by mouth Daily., Disp: , Rfl:   •  coenzyme Q10 50 MG capsule capsule, Take  by mouth Daily., Disp: , Rfl:   •  Cyanocobalamin (VITAMIN B 12 PO), Take  by mouth., Disp: , Rfl:   •  Folic Acid 0.8 MG capsule, Take  by mouth., Disp: , Rfl:   •  ipratropium (ATROVENT) 0.06 % nasal spray, Instill 2 sprays into both nostrils as directed by provider 2 (Two) Times a Day., Disp: 15 mL, Rfl: 6  •  magnesium oxide (MAG-OX) 400 MG tablet, Take 1 tablet by mouth Daily., Disp: , Rfl:   •  Omega-3 1000 MG capsule, Take  by mouth Daily., Disp: , Rfl:   •  rosuvastatin (CRESTOR) 10 MG tablet, Take 1 tablet by mouth Daily., Disp: 90 tablet, Rfl: 3  •  Turmeric Curcumin 500 MG capsule, Take 750 mg by mouth Daily., Disp: , Rfl:   •  Zinc 50 MG capsule, Take  by mouth Daily., Disp: , Rfl:     Allergies:   Allergies   Allergen Reactions   • Pravastatin Myalgia       Objective     Physical Exam:  Vital Signs:   Vitals:    03/28/23 1536   BP: (!) 188/82   Pulse: 78   Resp: 16   Temp: 97.6 °F (36.4 °C)   SpO2: 98%   Weight: 68.9 kg (152 lb)   Height: 162.6 cm (64\")   PainSc: 0-No pain     Pain Score    03/28/23 1536   PainSc: 0-No pain     ECOG Performance Status: 0 - Asymptomatic    Constitutional: NAD, ECOG 0  Eyes: PERRLA, scleral anicteric  ENT: No LAD, no thyromegaly  Respiratory: CTAB, no wheezing, rales, " rhonchi  Cardiovascular: RRR, no murmurs, pulses 2+ bilaterally  Abdomen: soft, NT/ND, no HSM  Musculoskeletal: strength 5/5 bilaterally, no c/c/e  Neurologic: A&O x 3, CN II-XII intact grossly    Results Review:   Hospital Outpatient Visit on 03/27/2023   Component Date Value Ref Range Status   • Glucose 03/27/2023 76  70 - 130 mg/dL Final    Meter: [id number] : [id number] Blackburn       CT Chest With Contrast Diagnostic    Result Date: 3/6/2023  Narrative: CT CHEST W CONTRAST DIAGNOSTIC, CT ABDOMEN PELVIS W CONTRAST Date of Exam: 3/6/2023 10:17 AM EST Indication: Intrahepatic cholangiocarcinoma. Comparison: 12/5/2022, 9/6/2022, 6/4/2022. Technique: Axial CT images were obtained of the chest after the uneventful intravenous administration of 85 mL Isovue-370.  Reconstructed coronal and sagittal images were also obtained. Automated exposure control and iterative construction methods were used. Findings: Chest: Several tiny lung nodules measuring up to 4 mm appear stable in size and number. Index right upper lobe nodule on axial image 22 measures 4 mm, previously 4 mm. No new or enlarging lung nodules are seen. There is no pleural or pericardial effusion. Heart size is not enlarged. 9 mm low-density left thyroid nodule is stable. Mildly prominent upper right paratracheal lymph node is also stable. No new or enlarging lymphadenopathy is seen in the chest. No acute or worrisome osseous abnormality  is identified. Abdomen/pelvis: Postoperative changes are redemonstrated from right hepatectomy. Benign cysts in the left hepatic lobe appears stable. No evidence of biliary dilatation. Retroperitoneal lymph nodes have increased in size. Index retrocaval node with central low density on axial image 40 measures 1.6 x 1.0 cm, previously 1.2 x 1.0 cm. Index aortocaval lymph node measures 1.7 x 1.0 cm on axial image 49, previously 1.4 x 1.1 cm. Index aortocaval lymph node measures 1.2 x 0.9 cm on axial image 69, previously  1.0 x 0.6 cm. Spleen, pancreas, adrenal glands, and kidneys appear unremarkable. No ascites is visualized. No abnormal bowel distention. Stool noted throughout most of the colon. Uterus is not visualized. No acute or worrisome osseous abnormality is identified.     Impression: Impression: 1.Increased size of mildly enlarged retroperitoneal lymph nodes. 2.Stable postoperative changes from right hepatectomy. 3.Stable tiny lung nodules. Electronically Signed: Edel Denny  3/6/2023 11:34 AM EST  Workstation ID: [id number]    CT Abdomen Pelvis With Contrast    Result Date: 3/6/2023  Narrative: CT CHEST W CONTRAST DIAGNOSTIC, CT ABDOMEN PELVIS W CONTRAST Date of Exam: 3/6/2023 10:17 AM EST Indication: Intrahepatic cholangiocarcinoma. Comparison: 12/5/2022, 9/6/2022, 6/4/2022. Technique: Axial CT images were obtained of the chest after the uneventful intravenous administration of 85 mL Isovue-370.  Reconstructed coronal and sagittal images were also obtained. Automated exposure control and iterative construction methods were used. Findings: Chest: Several tiny lung nodules measuring up to 4 mm appear stable in size and number. Index right upper lobe nodule on axial image 22 measures 4 mm, previously 4 mm. No new or enlarging lung nodules are seen. There is no pleural or pericardial effusion. Heart size is not enlarged. 9 mm low-density left thyroid nodule is stable. Mildly prominent upper right paratracheal lymph node is also stable. No new or enlarging lymphadenopathy is seen in the chest. No acute or worrisome osseous abnormality  is identified. Abdomen/pelvis: Postoperative changes are redemonstrated from right hepatectomy. Benign cysts in the left hepatic lobe appears stable. No evidence of biliary dilatation. Retroperitoneal lymph nodes have increased in size. Index retrocaval node with central low density on axial image 40 measures 1.6 x 1.0 cm, previously 1.2 x 1.0 cm. Index aortocaval lymph node measures 1.7 x 1.0  cm on axial image 49, previously 1.4 x 1.1 cm. Index aortocaval lymph node measures 1.2 x 0.9 cm on axial image 69, previously 1.0 x 0.6 cm. Spleen, pancreas, adrenal glands, and kidneys appear unremarkable. No ascites is visualized. No abnormal bowel distention. Stool noted throughout most of the colon. Uterus is not visualized. No acute or worrisome osseous abnormality is identified.     Impression: Impression: 1.Increased size of mildly enlarged retroperitoneal lymph nodes. 2.Stable postoperative changes from right hepatectomy. 3.Stable tiny lung nodules. Electronically Signed: Edel Denny  3/6/2023 11:34 AM EST  Workstation ID: [id number]    NM PET/CT Skull Base to Mid Thigh    Result Date: 3/27/2023  Narrative: NM PET/CT SKULL BASE TO MID THIGH Date of Exam: 3/27/2023 9:40 AM EDT Indication: Cholangiocarcinoma status post liver resection 2021. History of oral chemotherapy. History of radiation therapy. Observation for metastatic disease, restaging.. Comparison: CT chest, abdomen and pelvis 3/6/2023. Technique:  Whole body PET/CT imaging was performed with positron emission tomography (PET with concurrently acquired computed tomography (CT) for attenuation correction and anatomical localization); field of view imaged was the skull base to midthigh.  Fasting Blood glucose level: 76 mg/dl.  FDG dosage: 12.63 mCi F-18 Intravenous administration. Images were obtained after 60 minutes of equilibrium.  SUV values were normalized using body weight. Reference uptake values: Mediastinum: 2.26 SUVmax Liver: 2.68 SUVmax Findings: HEAD AND NECK: No abnormal FDG accumulation is seen within the head or neck. Physiologic uptake is demonstrated in the brain parenchyma. Uptake within the oropharynx and hypopharynx is thought to be related to physiologic uptake from phonation. Low level  FDG uptake in the parotid glands without mass, may reflect changes of mild inflammatory inflammation. CHEST: No abnormal hypermetabolic activity  within the chest. A mildly prominent right upper paratracheal lymph node measuring 8 mm short axis is not hypermetabolic, and is stable since 9/6/2022, with a benign, reactive findings. Right upper lobe 4 mm noncalcified nodule is unchanged from prior CT chest, and below the threshold for PET resolution. ABDOMEN AND PELVIS: Right hepatectomy. Focal hypermetabolic activity is demonstrated within the left hepatic lobe, anterior to the IVC, and adjacent to the surgical suture margin, measuring approximately 1.1 x 1.7 cm, suspicious for residual or recurrent  malignancy (SUV max 4.4).. Hypermetabolic 1 cm short axis aortocaval node deep to the right renal vein (SUV max 4.0), consistent with viable colton metastasis. Additional retrocaval node at the level of the right renal vein measuring 1 cm short axis demonstrates low to intermediate  level FDG uptake (SUV max 3.1), consistent with viable colton metastasis. A third aortocaval lymph node more inferiorly measuring about 1 6 m short axis near the L3 level demonstrates low level FDG uptake (SUV max 2.4), is suspicious for residual viable malignancy. Additional findings include cholecystectomy, common located colonic diverticulosis. SKELETAL: No abnormal FDG accumulation is seen within the skeleton. No suspicious osteolytic or osteoblastic lesions are identified. Surgical fixation of the distal left clavicle is noted. There is grade 1 anterolisthesis L3 upon L4, thought to be related to degenerative change.     Impression: 1. 1.1 x 1.7 cm hypermetabolic lesion within the left hepatic lobe is suspicious for residual recurrent hepatic malignancy. 2. Mildly enlarged aortocaval and retrocaval nodes in the abdomen are mildly moderately hypermetabolic, and suspicious for residual recurrent malignancy. 3. No evidence of metastatic disease within the neck, chest, or skeleton. Electronically Signed: Bere Marques  3/27/2023 11:42 AM EDT  Workstation ID: [id number]      Assessment /  Plan      Assessment/Plan:   Intrahepatic cholangiocarcinoma (CMS/HCC) (Primary)  -Noted during her most recent hospitalization with CT scans concerning for an enlarging liver lesion to 7.3 cm that was previously noted to be hemangioma  -Triple phase CT concerning for a solid tumor lesion  -Biopsy consistent with an adenocarcinoma  -CA 19-9 elevated to 84.7  -CT chest as well as the rest of the CT abdomen/pelvis not concerning for distant or metastatic disease  -Status post open right hepatectomy, cholecystectomy, right partial adrenalectomy on 9/23/2021 with Dr. 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  0/4 lymph nodes were positive for disease.  LVI and PNI were present.  Focal R1 resection margin  -Discussed with patient and her daughter that she would benefit from adjuvant chemoXRT using Xeloda.  Discussed side effects including but not limited to immunosuppression, diarrhea, abdominal pain, nausea, vomiting, hand/foot syndrome  -Repeat CA 19-9 (22) in November 2021  -Completed chemo XRT with Xeloda in January 2021  -Repeat scans in March 2022 without any evidence of recurrent or metastatic disease  -Repeat CT C/A/P in June 2022 without evidence of recurrent or metastatic disease.  Did note some IVC stenosis which we will monitor with her next scans  -Repeat CT C/A/P in September 2022 reviewed without evidence of recurrent disease or metastatic disease.  IVC stenosis overall stable  -Repeat CT C/A/P in December 2022 reviewed without evidence of recurrent or metastatic disease.  CA 19-9 within normal limits  -Repeat CT C/A/P in March 2023 reviewed and notable for some slight enlarging retroperitoneal adenopathy.  CA 19-9 within normal limits  -PET/CT concerning for 1 cm left liver lesion that was only slightly PET avid as well as 2 lymph nodes that were also slightly PET avid  -We will plan to discuss her  case at tumor board on Friday given the discordant PET/CT and tumor markers results.  We will plan to discuss her case with Dr. Sharma as well  -We will send her surgical pathology from September 2021 for next-generation sequencing today     Hemochromatosis carrier  -Noted on recent labs with an elevated ferritin to 246 hemochromatosis gene profile positive for heterozygosity of H63D.  Other genes negative.  Given patient's age and previous cardiac liver work-up showing no evidence of dysfunction, it is unlikely that she has had iron deposition all this time.  The heterozygosity of H63D makes her carrier for hemochromatosis however does not mean that she has active disease  -CT A/P in July 2020 with no liver dysfunction but was notable for hemangioma which has now been confirmed to be a intrahepatic cholangiocarcinoma and a status post resection.  Treatment as above  -ECHO in July 2020 within normal limits  -Repeat iron studies in April 2021 stable with a ferritin of 229, iron level 100, transferrin saturation 27%  -Low concern for iron overload at this time.      Thyroid nodule  -Incidentally noted on CT scan but enlarging from previous CT  -Thyroid ultrasound in June 2022 only notable for goiter and small nodules nonconcerning for malignancy  -Has been seen by endocrinology with plan for repeat ultrasound in the summer of next year     Other fatigue   -Unclear etiology  -Iron studies, ivette B12, folate and thyroid studies within normal limits  -Following with cardiology  -ECHO and stress test in 2023 overall stable         Follow Up:   Follow-up pending tumor board conference     Jorje Montenegro MD  Hematology and Oncology     Please note that portions of this note may have been completed with a voice recognition program. Efforts were made to edit the dictations, but occasionally words are mistranscribed.

## 2023-03-31 ENCOUNTER — TELEPHONE (OUTPATIENT)
Dept: ONCOLOGY | Facility: CLINIC | Age: 83
End: 2023-03-31
Payer: MEDICARE

## 2023-03-31 NOTE — TELEPHONE ENCOUNTER
Call to Sheree to notify that she is scheduled on Monday morning at 0900 with Dr Billings.  Sheree states understood

## 2023-04-03 ENCOUNTER — OFFICE VISIT (OUTPATIENT)
Dept: RADIATION ONCOLOGY | Facility: HOSPITAL | Age: 83
End: 2023-04-03
Payer: MEDICARE

## 2023-04-03 ENCOUNTER — HOSPITAL ENCOUNTER (OUTPATIENT)
Dept: RADIATION ONCOLOGY | Facility: HOSPITAL | Age: 83
Setting detail: RADIATION/ONCOLOGY SERIES
Discharge: HOME | End: 2023-04-03
Payer: MEDICARE

## 2023-04-03 VITALS
RESPIRATION RATE: 18 BRPM | TEMPERATURE: 96.6 F | OXYGEN SATURATION: 98 % | BODY MASS INDEX: 26.49 KG/M2 | HEART RATE: 90 BPM | DIASTOLIC BLOOD PRESSURE: 83 MMHG | WEIGHT: 154.3 LBS | SYSTOLIC BLOOD PRESSURE: 186 MMHG

## 2023-04-03 DIAGNOSIS — C22.1 INTRAHEPATIC CHOLANGIOCARCINOMA: ICD-10-CM

## 2023-04-03 LAB — CREAT BLDA-MCNC: 0.9 MG/DL (ref 0.6–1.3)

## 2023-04-03 PROCEDURE — G0463 HOSPITAL OUTPT CLINIC VISIT: HCPCS

## 2023-04-03 PROCEDURE — G0463 HOSPITAL OUTPT CLINIC VISIT: HCPCS | Performed by: RADIOLOGY

## 2023-04-03 NOTE — PROGRESS NOTES
CONSULTATION NOTE      :                                                          1940  DATE OF RE-CONSULTATION:                       4/3/2023   REQUESTING PHYSICIAN:                   Jorje Montenegro MD  REASON FOR RE-CONSULTATION:           Intrahepatic cholangiocarcinoma  - Stage IB (cT1b, cN0, cM0)  - Stage IIIA (pT3, pN0, cM0)         BRIEF HISTORY:    Sheree Hernandez  is a very pleasant 82 y.o. female  who has previously suffered from a fatigue and was sent for initial work-up that consisted of a heart cath and multiple other interventions.  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  The patient was sent for biopsy of his liver lesion in the inferior right lobe of the liver on 8/10/2021 that was consistent with adenocarcinoma.  The patient was taken for right-sided hepatectomy cholecystectomy and partial adrenalectomy on 2021 with Dr. Sharma at .  The patient was found to have a T3 N0 M0 lesion measuring 8.5 cm.  0 of 4 lymph nodes were involved but the patient did have LVSI and PNI.  The tumor did extend to the extrahepatic soft tissue between the liver and adrenal gland.  There was tumor positive focally at the cauterized margin.  The adrenal gland was not involved with carcinoma.     The patient's CA 19-9 2021 was 22 which was in the range of normal.  The patient underwent a course of concurrent chemotherapy radiotherapy on 2022.    The patient is undergone several series of scans.  She has had a lymph node that is increasing in size.  The patient was then sent for a PET scan that showed some hypermetabolism in the liver at the area adjacent to the resection and in lymph nodes lateral to the vena cava.,  But the avidity was not terribly convincing.    The patient was discussed at tumor board and felt that she did in fact have a recurrence.  She preferred  to discuss the case again with Dr. Sharma at .  She has seen Dr. Montenegro who is sending her original pathology for next duration sequencing.    The patient presents today to discuss potential radiation options.    She reports that she has chronic fatigue but has no new pain or complaints.  Allergy:   Allergies   Allergen Reactions   • Pravastatin Myalgia       Social History:   Social History     Socioeconomic History   • Marital status:    • Number of children: 2   Tobacco Use   • Smoking status: Never   • Smokeless tobacco: Never   • Tobacco comments:     Exposed to secondhand smoke   Vaping Use   • Vaping Use: Never used   Substance and Sexual Activity   • Alcohol use: Not Currently     Comment: Maybe every 6 months, rare   • Drug use: Never   • Sexual activity: Not Currently     Partners: Male     Birth control/protection: Natural family planning/Rhythm, Hysterectomy     Comment: Complete Hysterectomy       Past Medical History:   Past Medical History:   Diagnosis Date   • Asthma    • Atypical chest pain 03/09/2017   • Cataract    • Chronic constipation    • Fracture, pelvis closed 2015    x2   • Hyperlipidemia 03/09/2017   • Hypertension 03/09/2017   • 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   • Low bone mass     with elevated FRAX core   • Mild obstructive sleep apnea 01/30/2020   • Near syncope     negative cardiovascular workup    • 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   • Plantar fasciitis     Chronic   • PVC's (premature ventricular contractions)    • Senile keratosis     Multiple   • MANAN (stress urinary incontinence, female)    • Syncope, near     with negative cardiovascular workup       Family History: family history includes Arrhythmia in her brother; Breast cancer in her paternal aunt; Dementia in her mother; Heart attack in her mother; Heart disease in her father; Heart failure in her mother; Stroke in her mother.     Surgical History:   Past Surgical History:   Procedure Laterality  Date   • ADRENAL GLAND SURGERY  09/22/2021   • ADRENALECTOMY  09/22/2021   • CARDIAC CATHETERIZATION N/A 01/18/2019    Procedure: Left Heart Cath;  Surgeon: Tucker Gonzalez MD;  Location: [facility] CATH INVASIVE LOCATION;  Service: Cardiovascular   • CARDIAC CATHETERIZATION  1/18/2019   • CATARACT EXTRACTION  04/2019   • CHOLECYSTECTOMY  09/22/2021   • COLONOSCOPY  07/15/2020   • HYSTERECTOMY  1984    age 46 - ovarian cyst, endometriosis,  jorge/bso   • LIVER SURGERY Right 09/22/2021    Removed   • ROTATOR CUFF REPAIR Left 1989    Collar Bone Repair        Review of Systems:   Review of Systems   Constitutional: Positive for fatigue.   Gastrointestinal: Positive for abdominal pain (RUQ intermittent pain).   All other systems reviewed and are negative.   A full 14 point review of systems was performed and was negative except as noted in the HPI.         Objective   VITAL SIGNS:   Vitals:    04/03/23 0849   BP: (!) 186/83  Comment: states always elevated at the Dr. Office   Pulse: 90   Resp: 18   Temp: 96.6 °F (35.9 °C)   TempSrc: Temporal   SpO2: 98%   Weight: 70 kg (154 lb 4.8 oz)   PainSc: 0-No pain        Karnofsky score: 80       Physical Exam:   Physical Exam  Vitals and nursing note reviewed.   Constitutional:       Appearance: She is well-developed.   HENT:      Head: Normocephalic and atraumatic.   Eyes:      Conjunctiva/sclera: Conjunctivae normal.      Pupils: Pupils are equal, round, and reactive to light.   Neck:      Comments: No obviously enlarged cervical or supraclavicular LAD.  Cardiovascular:      Comments: Patient well perfused. Non cyanotic. No prominent JVD. No pedal edema  Pulmonary:      Effort: Pulmonary effort is normal.      Breath sounds: Normal breath sounds. No stridor. No wheezing.   Abdominal:      General: There is no distension.      Palpations: Abdomen is soft.   Musculoskeletal:         General: Normal range of motion.      Cervical back: Normal range of motion and neck supple.       Comments: Patient moves all extremities spontaneously.    Skin:     General: Skin is warm and dry.   Neurological:      Mental Status: She is alert and oriented to person, place, and time.      Comments: Coordination intact.   Psychiatric:         Behavior: Behavior normal.         Thought Content: Thought content normal.         Judgment: Judgment normal.              The following portions of the patient's history were reviewed and updated as appropriate: allergies, current medications, past family history, past medical history, past social history, past surgical history and problem list.  I have personally requested reviewed and interpreted the patient's images and radiology reports and pathology reports listed below:  CT Chest With Contrast Diagnostic    Result Date: 3/6/2023  Impression: 1.Increased size of mildly enlarged retroperitoneal lymph nodes. 2.Stable postoperative changes from right hepatectomy. 3.Stable tiny lung nodules. Electronically Signed: Edel Denny  3/6/2023 11:34 AM EST  Workstation ID: [id number]    CT Chest With Contrast Diagnostic    Result Date: 12/5/2022  1. Stable 4 mm less noncalcified tiny nodular densities in both lungs. No evidence of new or progressive malignancy within the chest. 2. Right hepatectomy with stable benign cysts in the residual left hepatic lobe. No evidence of metastatic disease or malignant disease recurrence in the abdomen or pelvis. Electronically Signed: Bere Marques  12/5/2022 8:37 PM EST  Workstation ID: [id number]    CT Abdomen Pelvis With Contrast    Result Date: 3/6/2023  Impression: 1.Increased size of mildly enlarged retroperitoneal lymph nodes. 2.Stable postoperative changes from right hepatectomy. 3.Stable tiny lung nodules. Electronically Signed: Edel Denny  3/6/2023 11:34 AM EST  Workstation ID: [id number]    CT Abdomen Pelvis With Contrast    Result Date: 12/5/2022  1. Stable 4 mm less noncalcified tiny nodular densities in both lungs. No evidence  of new or progressive malignancy within the chest. 2. Right hepatectomy with stable benign cysts in the residual left hepatic lobe. No evidence of metastatic disease or malignant disease recurrence in the abdomen or pelvis. Electronically Signed: Bere Marques  12/5/2022 8:37 PM EST  Workstation ID: [id number]    NM PET/CT Skull Base to Mid Thigh    Result Date: 3/27/2023  1. 1.1 x 1.7 cm hypermetabolic lesion within the left hepatic lobe is suspicious for residual recurrent hepatic malignancy. 2. Mildly enlarged aortocaval and retrocaval nodes in the abdomen are mildly moderately hypermetabolic, and suspicious for residual recurrent malignancy. 3. No evidence of metastatic disease within the neck, chest, or skeleton. Electronically Signed: Bere Marques  3/27/2023 11:42 AM EDT  Workstation ID: [id number]    Patient's CA 19-9 is in the low 20s.  Her previous high CA 19-9 was 85.    Assessment:   Assessment      The patient does appear to have recurrence local and regional recurrence based on the imaging.  She does appear to have a lesion in the lateral aspect of her remaining liver which would have been inside of her previous radiation field.  She also has some lymph nodes been increasing in size lateral to the vena cava which were outside of her previous radiation ports.    We discussed potential options for treatment.  We discussed options such as surgery, microwave ablation, chemotherapy, and radiation therapy.    The patient would be candidate for palliative CyberKnife SBRT treatments.  She does appear to have enough distance between her esophagus and small bowel/stomach and right kidney to allow for treatments.    We discussed the risk of late toxicity with SBRT.  Patient would likely require 5 radiation treatments.  We discussed that this may help to control the areas that we see however the patient is at great risk for further dissemination of her disease and failures outside of our targetable areas.    We  discussed the potential risk for bleeding or necrosis of any of the many nearby organs.    The patient will discuss options with Dr. Turner.    Greater than 1 hour was spent preparing for and coordinating this visit. >50% of the time was spent in direct face to face conversation with the patient teaching, answering question, and providing explanations regarding the patient's case.  The decision to treat the patient with radiation is a complex one and carries the risk of long-term side effects and complications.  The patient's malignancy represents a complicated life threatening condition that requires complex multidisciplinary management for treatment and followup.    RECOMMENDATIONS:     Tumor recurrence  -On lateral aspect of residual left liver  -Also appears to be along the lateral vena cava lymph nodes  -Below previous radiation field.  -Kidney is adjacent   -Complicates delivery  -Awaiting NexGen ration sequencing with Dr. Montenegro  -We will discuss options with Dr. Turner  -Would be minimal with palliative SBRT treatments 5 fractions  -Would involve reirradiation of previous field  -The patient will discuss options Dr. Turner I would like phone call with her on Friday to check on her progress.    PT 3 N0 M0 cholangiocarcinoma of the liver  -Status post resection with Dr. TURNER at   -CA 19-9 85 initially  -CA 19-9 11/5/21: 22  -Focally positive margins  -Tumor extended to the extrapelvic soft tissues between the liver and the adrenal gland  -LVSI and PNI present  -0/4 lymph nodes involved  -Completed Xeloda currently with radiation 1/19/2022  -Status post CT scan for staging 3/3/2022: No evidence of residual carcinoma  -Patient will continue to follow Dr. Lima or Dr. Montenegro  -Patient wishes to follow with us on as-needed basis.     Hyperlipidemia  -Continue present regimen     Hypertension  -Continue present regimen  -We will need to monitor and may have to adjust during treatments to the patient,  hypotensive       Follow Up:   No follow-ups on file.  Diagnoses and all orders for this visit:    1. Intrahepatic cholangiocarcinoma         Bunny Billings MD

## 2023-04-05 ENCOUNTER — TELEPHONE (OUTPATIENT)
Dept: ONCOLOGY | Facility: CLINIC | Age: 83
End: 2023-04-05
Payer: MEDICARE

## 2023-04-05 NOTE — TELEPHONE ENCOUNTER
----- Message from Sheree Hernandez sent at 4/5/2023  3:47 PM EDT -----  Regarding: Dr Sharma/radiation   Contact: [phone]  Dr Montenegro, Dr Sharma called me and said he would be back in touch with you to see about radiation procedure for me. Will you or do I need to get in touch with Dr Billings? I just want to be on top of things. Thanks again.

## 2023-04-06 ENCOUNTER — TELEPHONE (OUTPATIENT)
Dept: ONCOLOGY | Facility: CLINIC | Age: 83
End: 2023-04-06
Payer: MEDICARE

## 2023-04-06 NOTE — TELEPHONE ENCOUNTER
Call to Sheree to notify that Dr Billings can call her tomorrow at 0830.  Sheree states understood

## 2023-04-07 ENCOUNTER — APPOINTMENT (OUTPATIENT)
Dept: RADIATION ONCOLOGY | Facility: HOSPITAL | Age: 83
End: 2023-04-07
Payer: MEDICARE

## 2023-04-07 ENCOUNTER — OFFICE VISIT (OUTPATIENT)
Dept: RADIATION ONCOLOGY | Facility: HOSPITAL | Age: 83
End: 2023-04-07
Payer: MEDICARE

## 2023-04-07 DIAGNOSIS — C22.1 INTRAHEPATIC CHOLANGIOCARCINOMA: Primary | ICD-10-CM

## 2023-04-07 NOTE — PROGRESS NOTES
FOLLOW UP NOTE    PATIENT:                                                      Sheree Hernandez  MEDICAL RECORD #:                        [id number]  :                                                          1940  DIAGNOSIS:     Intrahepatic cholangiocarcinoma  - Stage IB (cT1b, cN0, cM0)  - Stage IIIA (pT3, pN0, cM0)    This was a telehealth visit today at the request of the patient.  The patient preferred method of communication was the phone.      BRIEF HISTORY:    Sheree Hernandez  is a very pleasant 82 y.o. female  who has previously suffered from a fatigue and was sent for initial work-up that consisted of a heart cath and multiple other interventions.  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  The patient was sent for biopsy of his liver lesion in the inferior right lobe of the liver on 8/10/2021 that was consistent with adenocarcinoma.  The patient was taken for right-sided hepatectomy cholecystectomy and partial adrenalectomy on 2021 with Dr. Sharma at .  The patient was found to have a T3 N0 M0 lesion measuring 8.5 cm.  0 of 4 lymph nodes were involved but the patient did have LVSI and PNI.  The tumor did extend to the extrahepatic soft tissue between the liver and adrenal gland.  There was tumor positive focally at the cauterized margin.  The adrenal gland was not involved with carcinoma.     The patient's CA 19-9 2021 was 22 which was in the range of normal.  The patient underwent a course of concurrent chemotherapy radiotherapy on 2022.     The patient is undergone several series of scans.  She has had a lymph node that is increasing in size.  The patient was then sent for a PET scan that showed some hypermetabolism in the liver at the area adjacent to the resection and in lymph nodes lateral to the vena cava.,  But the avidity was not terribly  convincing.     The patient was discussed at tumor board and felt that she did in fact have a recurrence.  The patient discussed treatment options with Dr. Sharma and Dr. Montenegro since our last appointment.    The patient reports that she feels anxious to start treatments.    I was able to connect with Dr. Montenegro and Dr. Sharma to discuss her case in the meantime.      MEDICATIONS: Medication reconciliation for the patient was reviewed and confirmed in the electronic medical record.    Review of Systems:   Review of Systems - Oncology   A full 14 point review of systems was performed and was negative except as noted in the HPI.      Physical Exam:   Physical Exam  Constitutional:       Appearance: She is well-developed.   Pulmonary:      Effort: Pulmonary effort is normal.      Breath sounds: Normal breath sounds. No stridor. No wheezing.   Musculoskeletal:      Comments: Patient moves all extremities spontaneously.    Neurological:      Mental Status: She is alert and oriented to person, place, and time.      Comments: Coordination intact.   Psychiatric:         Behavior: Behavior normal.         Thought Content: Thought content normal.         Judgment: Judgment normal.          VITAL SIGNS: There were no vitals filed for this visit.          KPS: 80    The following portions of the patient's history were reviewed and updated as appropriate: allergies, current medications, past family history, past medical history, past social history, past surgical history and problem list.  CT Chest With Contrast Diagnostic    Result Date: 3/6/2023  Impression: 1.Increased size of mildly enlarged retroperitoneal lymph nodes. 2.Stable postoperative changes from right hepatectomy. 3.Stable tiny lung nodules. Electronically Signed: Edel Denny  3/6/2023 11:34 AM EST  Workstation ID: [id number]    CT Abdomen Pelvis With Contrast    Result Date: 3/6/2023  Impression: 1.Increased size of mildly enlarged retroperitoneal lymph nodes.  2.Stable postoperative changes from right hepatectomy. 3.Stable tiny lung nodules. Electronically Signed: Edel Denny  3/6/2023 11:34 AM EST  Workstation ID: [id number]    NM PET/CT Skull Base to Mid Thigh    Result Date: 3/27/2023  1. 1.1 x 1.7 cm hypermetabolic lesion within the left hepatic lobe is suspicious for residual recurrent hepatic malignancy. 2. Mildly enlarged aortocaval and retrocaval nodes in the abdomen are mildly moderately hypermetabolic, and suspicious for residual recurrent malignancy. 3. No evidence of metastatic disease within the neck, chest, or skeleton. Electronically Signed: Bere Marques  3/27/2023 11:42 AM EDT  Workstation ID: [id number]            IMPRESSION:    The patient does appear to have recurrence local and regional recurrence based on the imaging.  She does appear to have a lesion in the lateral aspect of her remaining liver which would have been inside of her previous radiation field.  She also has some lymph nodes been increasing in size lateral to the vena cava which were outside of her previous radiation ports.    I discussed her case with Dr. Sharma today.  We simultaneously reviewed the patient's images.  And we did agree the patient is appear to have recurrence in the liver and the previously treated area adjacent to her resection and in the lymph nodes mostly along the cava.  There were 3 lymph nodes identified that we will try to target.    We discussed this with Mrs. Hernandez and I would recommend  CyberKnife SBRT for her.  Recommend 5 fractions between 7 and 6 Gray per fraction  We discussed that in the absence of aggressive chemotherapy that it is unlikely that this would cure her of her disease however given that is going very slowly may buy her quite a lot of time.  We discussed that there is a high possibility that she will recur in the future distantly.    I spent 30 minutes on the patient's case today.    RECOMMENDATIONS:      Tumor recurrence  -On lateral aspect of  residual left liver  -Also appears to be along the lateral vena cava lymph nodes  -Below previous radiation field.  -Kidney is adjacent              -Complicates delivery  -Awaiting NexGen ration sequencing with Dr. Montenegro  -Recommend SBRT after consulting with Dr. Pagan  -Recommend dose between 30 and 35 Gray to the liver lesion and 2-3 lymph nodes along the vena cava that are enlarged.  -Return for CT simulation and reeval.     PT 3 N0 M0 cholangiocarcinoma of the liver  -Status post resection with Dr. PAGAN at   -CA 19-9 85 initially  -CA 19-9 11/5/21: 22  -Focally positive margins  -Tumor extended to the extrapelvic soft tissues between the liver and the adrenal gland  -LVSI and PNI present  -0/4 lymph nodes involved  -Completed Xeloda currently with radiation 1/19/2022  -Status post CT scan for staging 3/3/2022: No evidence of residual carcinoma  -Patient will continue to follow Dr. Pagan and Dr. Montenegro  -Patient wishes to follow with us on as-needed basis.     Hyperlipidemia  -Continue present regimen     Hypertension  -Continue present regimen  -We will need to monitor and may have to adjust during treatments to the patient, hypotensive                 Bunny Billings MD    Errors in dictation may reflect use of voice recognition software and not all errors in transcription may have been detected prior to signing.

## 2023-04-12 ENCOUNTER — HOSPITAL ENCOUNTER (OUTPATIENT)
Dept: RADIATION ONCOLOGY | Facility: HOSPITAL | Age: 83
Discharge: HOME | End: 2023-04-12

## 2023-04-12 ENCOUNTER — OFFICE VISIT (OUTPATIENT)
Dept: RADIATION ONCOLOGY | Facility: HOSPITAL | Age: 83
End: 2023-04-12
Payer: MEDICARE

## 2023-04-12 VITALS
TEMPERATURE: 98.3 F | BODY MASS INDEX: 26.33 KG/M2 | OXYGEN SATURATION: 97 % | DIASTOLIC BLOOD PRESSURE: 82 MMHG | HEART RATE: 68 BPM | WEIGHT: 153.4 LBS | RESPIRATION RATE: 16 BRPM | SYSTOLIC BLOOD PRESSURE: 181 MMHG

## 2023-04-12 DIAGNOSIS — C22.1 INTRAHEPATIC CHOLANGIOCARCINOMA: Primary | ICD-10-CM

## 2023-04-12 PROCEDURE — 77332 RADIATION TREATMENT AID(S): CPT | Performed by: RADIOLOGY

## 2023-04-12 PROCEDURE — 77399 UNLISTED PX MED RADJ PHYSICS: CPT | Performed by: RADIOLOGY

## 2023-04-12 PROCEDURE — G0463 HOSPITAL OUTPT CLINIC VISIT: HCPCS

## 2023-04-12 PROCEDURE — 77290 THER RAD SIMULAJ FIELD CPLX: CPT | Performed by: RADIOLOGY

## 2023-04-12 PROCEDURE — 77470 SPECIAL RADIATION TREATMENT: CPT | Performed by: RADIOLOGY

## 2023-04-12 NOTE — PROGRESS NOTES
RE-EVALUATION    PATIENT:                                                      Sheree Hernandez  :                                                          1940  DATE:                          2023   DIAGNOSIS:     Intrahepatic cholangiocarcinoma  - Stage IB (cT1b, cN0, cM0)  - Stage IIIA (pT3, pN0, cM0)         BRIEF HISTORY:  Sheree Hernandez  is a very pleasant 82 y.o. female  who has previously suffered from a fatigue and was sent for initial work-up that consisted of a heart cath and multiple other interventions.  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  The patient was sent for biopsy of his liver lesion in the inferior right lobe of the liver on 8/10/2021 that was consistent with adenocarcinoma.  The patient was taken for right-sided hepatectomy cholecystectomy and partial adrenalectomy on 2021 with Dr. Sharma at .  The patient was found to have a T3 N0 M0 lesion measuring 8.5 cm.  0 of 4 lymph nodes were involved but the patient did have LVSI and PNI.  The tumor did extend to the extrahepatic soft tissue between the liver and adrenal gland.  There was tumor positive focally at the cauterized margin.  The adrenal gland was not involved with carcinoma.     The patient's CA 19-9 2021 was 22 which was in the range of normal.  The patient underwent a course of concurrent chemotherapy radiotherapy on 2022.     The patient is undergone several series of scans.  She has had a lymph node that is increasing in size.  The patient was then sent for a PET scan that showed some hypermetabolism in the liver at the area adjacent to the resection and in lymph nodes lateral to the vena cava.    The patient was discussed at tumor board and felt that she did in fact have a recurrence.    The patient discussed the options with Dr. Sharma in Dr. Montenegro again.  I discussed  the patient's case with Dr. Lima personally.  We reviewed her images in tandem.    We had another conversation with the patient regarding treatments and she was interested in radiation treatments.    Patient presents today for final discussion prior to planning for her treatments with radiation.  The patient has  Patient sequencing pending with Dr. Montenegro.  Allergies   Allergen Reactions   • Pravastatin Myalgia       Review of Systems - Oncology      A full 14 point review of systems was performed and was negative except as noted in the HPI.    Objective   VITAL SIGNS:   Vitals:    04/12/23 0843   BP: (!) 181/82   Pulse: 68   Resp: 16   Temp: 98.3 °F (36.8 °C)   TempSrc: Temporal   SpO2: 97%   Weight: 69.6 kg (153 lb 6.4 oz)   PainSc:   2            Kps: 90    Physical Exam  Vitals and nursing note reviewed.   Constitutional:       Appearance: She is well-developed.   HENT:      Head: Normocephalic and atraumatic.   Eyes:      Conjunctiva/sclera: Conjunctivae normal.      Pupils: Pupils are equal, round, and reactive to light.   Neck:      Comments: No obviously enlarged cervical or supraclavicular LAD.  Cardiovascular:      Comments: Patient well perfused. Non cyanotic. No prominent JVD. No pedal edema  Pulmonary:      Effort: Pulmonary effort is normal.      Breath sounds: Normal breath sounds. No stridor. No wheezing.   Abdominal:      General: There is no distension.      Palpations: Abdomen is soft.   Musculoskeletal:         General: Normal range of motion.      Cervical back: Normal range of motion and neck supple.      Comments: Patient moves all extremities spontaneously.    Skin:     General: Skin is warm and dry.   Neurological:      Mental Status: She is alert and oriented to person, place, and time.      Comments: Coordination intact.   Psychiatric:         Behavior: Behavior normal.         Thought Content: Thought content normal.         Judgment: Judgment normal.              The following  portions of the patient's history were reviewed and updated as appropriate: allergies, current medications, past family history, past medical history, past social history, past surgical history and problem list.  CT Chest With Contrast Diagnostic    Result Date: 3/6/2023  Impression: 1.Increased size of mildly enlarged retroperitoneal lymph nodes. 2.Stable postoperative changes from right hepatectomy. 3.Stable tiny lung nodules. Electronically Signed: Edel Denny  3/6/2023 11:34 AM EST  Workstation ID: [id number]    CT Abdomen Pelvis With Contrast    Result Date: 3/6/2023  Impression: 1.Increased size of mildly enlarged retroperitoneal lymph nodes. 2.Stable postoperative changes from right hepatectomy. 3.Stable tiny lung nodules. Electronically Signed: Edel Denny  3/6/2023 11:34 AM EST  Workstation ID: [id number]    NM PET/CT Skull Base to Mid Thigh    Result Date: 3/27/2023  1. 1.1 x 1.7 cm hypermetabolic lesion within the left hepatic lobe is suspicious for residual recurrent hepatic malignancy. 2. Mildly enlarged aortocaval and retrocaval nodes in the abdomen are mildly moderately hypermetabolic, and suspicious for residual recurrent malignancy. 3. No evidence of metastatic disease within the neck, chest, or skeleton. Electronically Signed: Bere Marques  3/27/2023 11:42 AM EDT  Workstation ID: [id number]    I reviewed the patient's images with the patient and her daughter today.  We reviewed her CT scans and PET scans.  We also reviewed the patient's CA 19-9 values.    There are no diagnoses linked to this encounter.  IMPRESSION:   The patient does appear to have recurrence local and regional recurrence based on the imaging.  She does appear to have a lesion in the lateral aspect of her remaining liver which would have been inside of her previous radiation field.  She also has some lymph nodes been increasing in size lateral to the vena cava which were outside of her previous radiation ports.    We discussed  options for treatment today.  Given that she did have failure in the field and this does appear to be a fairly slow-growing process I would recommend CyberKnife SBRT for her with spine tracking.  We will perform a 4D CT scan to manage for respiratory motion.  We discussed potential risks and side effects with the patient today primarily related back to biliary tree stenosis as well as fatigue.  We discussed the potential for kidney damage given how close the lesions are to her right kidney.  We discussed potential for nausea and fatigue.  I tried to explain the anatomy of the tumor and we discussed options for future treatments and interventions for problems that may develop as a component of her treatment.    In total I spent 1 hour with the patient and her daughter today on her case in addition to the patient's planning session and the work related to that.    We discussed that these treatments are unlikely to cure her of her cancer but may slow down the progression of disease.  We discussed that the most likely potential for further development of lesions would be in the lymph nodes.    Otherwise the patient has an excellent performance status and performs all of her activities of daily living.  She is extremely functional and is very likely to withstand the radiation treatments.    I recommend dose between 6 Gray in 7 Gray per fraction to the tumor plus a small margin.    RECOMMENDATIONS:    Tumor recurrence  -On lateral aspect of residual left liver  -Also appears to be along the lateral vena cava lymph nodes  -Below previous radiation field.  -Kidney is adjacent              -Complicates delivery  -Awaiting NexGen ration sequencing with Dr. Montenegro  -Recommend SBRT after consulting with Dr. Pagan  -Recommend dose between 30 and 35 Gray to the liver lesion and 2-3 lymph nodes along the vena cava that are enlarged.  -Patient is ready to proceed with radiation planning today.  -Recommend CT simulation with 4D  scan and p.o. contrast     PT 3 N0 M0 cholangiocarcinoma of the liver  -Status post resection with Dr. TURNER at   -CA 19-9 85 initially  -CA 19-9 11/5/21: 22  -Focally positive margins  -Tumor extended to the extrapelvic soft tissues between the liver and the adrenal gland  -LVSI and PNI present  -0/4 lymph nodes involved  -Completed Xeloda currently with radiation 1/19/2022  -Status post CT scan for staging 3/3/2022: No evidence of residual carcinoma  -Patient will continue to follow Dr. Turner and Dr. Montenegro  -Patient wishes to follow with us on as-needed basis.     Hyperlipidemia  -Continue present regimen     Hypertension  -Continue present regimen           Bunny Billings MD

## 2023-04-21 PROCEDURE — 77370 RADIATION PHYSICS CONSULT: CPT | Performed by: RADIOLOGY

## 2023-04-24 PROCEDURE — 77301 RADIOTHERAPY DOSE PLAN IMRT: CPT | Performed by: RADIOLOGY

## 2023-04-24 PROCEDURE — 77338 DESIGN MLC DEVICE FOR IMRT: CPT | Performed by: RADIOLOGY

## 2023-04-24 PROCEDURE — 77293 RESPIRATOR MOTION MGMT SIMUL: CPT | Performed by: RADIOLOGY

## 2023-04-24 PROCEDURE — 77300 RADIATION THERAPY DOSE PLAN: CPT | Performed by: RADIOLOGY

## 2023-04-25 ENCOUNTER — HOSPITAL ENCOUNTER (OUTPATIENT)
Dept: RADIATION ONCOLOGY | Facility: HOSPITAL | Age: 83
Discharge: HOME | End: 2023-04-25

## 2023-04-25 PROCEDURE — 77373 STRTCTC BDY RAD THER TX DLVR: CPT | Performed by: RADIOLOGY

## 2023-04-25 RX ORDER — ONDANSETRON HYDROCHLORIDE 8 MG/1
4 TABLET, FILM COATED ORAL EVERY 8 HOURS PRN
Qty: 30 TABLET | Refills: 1 | Status: SHIPPED | OUTPATIENT
Start: 2023-04-25

## 2023-04-27 ENCOUNTER — HOSPITAL ENCOUNTER (OUTPATIENT)
Dept: RADIATION ONCOLOGY | Facility: HOSPITAL | Age: 83
Discharge: HOME | End: 2023-04-27

## 2023-04-27 PROCEDURE — 77373 STRTCTC BDY RAD THER TX DLVR: CPT | Performed by: RADIOLOGY

## 2023-05-01 ENCOUNTER — HOSPITAL ENCOUNTER (OUTPATIENT)
Dept: RADIATION ONCOLOGY | Facility: HOSPITAL | Age: 83
Setting detail: RADIATION/ONCOLOGY SERIES
Discharge: HOME | End: 2023-05-01
Payer: MEDICARE

## 2023-05-01 PROCEDURE — 77373 STRTCTC BDY RAD THER TX DLVR: CPT | Performed by: RADIOLOGY

## 2023-05-04 ENCOUNTER — HOSPITAL ENCOUNTER (OUTPATIENT)
Dept: RADIATION ONCOLOGY | Facility: HOSPITAL | Age: 83
Discharge: HOME | End: 2023-05-04

## 2023-05-04 PROCEDURE — 77373 STRTCTC BDY RAD THER TX DLVR: CPT | Performed by: RADIOLOGY

## 2023-05-08 ENCOUNTER — PATIENT MESSAGE (OUTPATIENT)
Dept: ONCOLOGY | Facility: CLINIC | Age: 83
End: 2023-05-08
Payer: MEDICARE

## 2023-05-08 ENCOUNTER — HOSPITAL ENCOUNTER (OUTPATIENT)
Dept: RADIATION ONCOLOGY | Facility: HOSPITAL | Age: 83
Discharge: HOME | End: 2023-05-08

## 2023-05-08 DIAGNOSIS — R16.0 LIVER MASSES: Primary | ICD-10-CM

## 2023-05-08 DIAGNOSIS — C22.1 INTRAHEPATIC CHOLANGIOCARCINOMA: ICD-10-CM

## 2023-05-08 PROCEDURE — 77336 RADIATION PHYSICS CONSULT: CPT | Performed by: RADIOLOGY

## 2023-05-08 PROCEDURE — 77373 STRTCTC BDY RAD THER TX DLVR: CPT | Performed by: RADIOLOGY

## 2023-05-09 ENCOUNTER — TELEPHONE (OUTPATIENT)
Dept: RADIATION ONCOLOGY | Facility: HOSPITAL | Age: 83
End: 2023-05-09
Payer: MEDICARE

## 2023-05-09 DIAGNOSIS — C22.1 INTRAHEPATIC CHOLANGIOCARCINOMA: Primary | ICD-10-CM

## 2023-06-08 ENCOUNTER — LAB (OUTPATIENT)
Dept: LAB | Facility: HOSPITAL | Age: 83
End: 2023-06-08
Payer: MEDICARE

## 2023-06-08 DIAGNOSIS — C22.1 INTRAHEPATIC CHOLANGIOCARCINOMA: ICD-10-CM

## 2023-06-08 LAB
ALBUMIN SERPL-MCNC: 3.9 G/DL (ref 3.5–5.2)
ALBUMIN/GLOB SERPL: 1.6 G/DL
ALP SERPL-CCNC: 222 U/L (ref 39–117)
ALT SERPL W P-5'-P-CCNC: 30 U/L (ref 1–33)
ANION GAP SERPL CALCULATED.3IONS-SCNC: 10 MMOL/L (ref 5–15)
AST SERPL-CCNC: 35 U/L (ref 1–32)
BILIRUB SERPL-MCNC: 0.4 MG/DL (ref 0–1.2)
BUN SERPL-MCNC: 19 MG/DL (ref 8–23)
BUN/CREAT SERPL: 23.8 (ref 7–25)
CALCIUM SPEC-SCNC: 9.1 MG/DL (ref 8.6–10.5)
CANCER AG19-9 SERPL-ACNC: 17.4 U/ML
CHLORIDE SERPL-SCNC: 107 MMOL/L (ref 98–107)
CO2 SERPL-SCNC: 25 MMOL/L (ref 22–29)
CREAT SERPL-MCNC: 0.8 MG/DL (ref 0.57–1)
EGFRCR SERPLBLD CKD-EPI 2021: 73.2 ML/MIN/1.73
GLOBULIN UR ELPH-MCNC: 2.4 GM/DL
GLUCOSE SERPL-MCNC: 86 MG/DL (ref 65–99)
POTASSIUM SERPL-SCNC: 4.6 MMOL/L (ref 3.5–5.2)
PROT SERPL-MCNC: 6.3 G/DL (ref 6–8.5)
SODIUM SERPL-SCNC: 142 MMOL/L (ref 136–145)

## 2023-06-08 PROCEDURE — 80053 COMPREHEN METABOLIC PANEL: CPT

## 2023-06-08 PROCEDURE — 36415 COLL VENOUS BLD VENIPUNCTURE: CPT

## 2023-06-08 PROCEDURE — 86301 IMMUNOASSAY TUMOR CA 19-9: CPT

## 2023-06-13 ENCOUNTER — OFFICE VISIT (OUTPATIENT)
Dept: ONCOLOGY | Facility: CLINIC | Age: 83
End: 2023-06-13
Payer: MEDICARE

## 2023-06-13 ENCOUNTER — OFFICE VISIT (OUTPATIENT)
Dept: RADIATION ONCOLOGY | Facility: HOSPITAL | Age: 83
End: 2023-06-13
Payer: MEDICARE

## 2023-06-13 VITALS
SYSTOLIC BLOOD PRESSURE: 166 MMHG | HEART RATE: 75 BPM | TEMPERATURE: 98.2 F | BODY MASS INDEX: 25.76 KG/M2 | RESPIRATION RATE: 16 BRPM | WEIGHT: 150.1 LBS | DIASTOLIC BLOOD PRESSURE: 75 MMHG | OXYGEN SATURATION: 97 %

## 2023-06-13 VITALS
SYSTOLIC BLOOD PRESSURE: 165 MMHG | WEIGHT: 152 LBS | DIASTOLIC BLOOD PRESSURE: 75 MMHG | BODY MASS INDEX: 25.95 KG/M2 | RESPIRATION RATE: 18 BRPM | TEMPERATURE: 98.3 F | OXYGEN SATURATION: 97 % | HEIGHT: 64 IN | HEART RATE: 78 BPM

## 2023-06-13 DIAGNOSIS — C22.1 INTRAHEPATIC CHOLANGIOCARCINOMA: Primary | ICD-10-CM

## 2023-06-13 PROCEDURE — 1126F AMNT PAIN NOTED NONE PRSNT: CPT | Performed by: INTERNAL MEDICINE

## 2023-06-13 PROCEDURE — 99214 OFFICE O/P EST MOD 30 MIN: CPT | Performed by: INTERNAL MEDICINE

## 2023-06-13 PROCEDURE — 3078F DIAST BP <80 MM HG: CPT | Performed by: INTERNAL MEDICINE

## 2023-06-13 PROCEDURE — 3077F SYST BP >= 140 MM HG: CPT | Performed by: INTERNAL MEDICINE

## 2023-06-13 NOTE — PROGRESS NOTES
FOLLOW UP NOTE    PATIENT:                                                      Sheree Hernandez  MEDICAL RECORD #:                        [id number]  :                                                          1940  COMPLETION DATE:   2023  DIAGNOSIS:     Recurrent intrahepatic cholangiocarcinoma  - Clinical Stage IB (cT1b, cN0, cM0)  - Pathologic Stage IIIA (pT3, pN0, cM0)      BRIEF HISTORY:   Sheree Hernandez is a very pleasant 82 y.o. female who has previously suffered from chronic fatigue and was sent for initial work-up that consisted of a heart cath and multiple other interventions.  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  The patient was then found to have an enlarging liver lesion and an AFP that was 9.27 and a CA 19-9 that was 85 on 2021.  The patient was sent for biopsy of her liver lesion in the inferior right lobe of the liver on 8/10/2021 that was consistent with adenocarcinoma.    The patient was taken for right-sided hepatectomy cholecystectomy and partial adrenalectomy on 2021 with Dr. Sharma at .  Pathology revealed a moderate to poorly differentiated intrahepatic cholangiocarcinoma.  Her tumor measured 8.5 cm.  0/4 lymph nodes were involved but the patient did have LVSI and PNI.  The tumor did extend to the extrahepatic soft tissue between the liver and adrenal gland.  There was tumor positive focally at the cauterized margin.  The adrenal gland was not involved with carcinoma.  This was staged as pT3, pN0, pM0 disease.       The patient's CA 19-9 on 2021 was 22 which was in the range of normal.  The patient completed a course of adjuvant, concurrent chemoradiotherapy with Xeloda on 2022.     The patient did well, with surveillance CT scans showing no evidence of recurrent or metastatic disease for some time.  More recently, CT scans performed 3/6/2023 revealed some slightly enlarged retroperitoneal adenopathy.  CA 19-9 was within normal  limits.  The patient was then sent for a PET scan on 3/27/2023 that showed some hypermetabolism in the liver at the area adjacent to the resection, which would have been inside of her previous radiation field.  There was also uptake in a few lymph nodes lateral to the vena cava which were outside of her previous radiation ports.  The patient was discussed at tumor board and it was mutually felt that she did in fact have a local and regional recurrence based on the imaging.    The patient underwent a course of CyberKnife stereotactic body radiotherapy to a dose of 35 Gray in 5 fractions delivered to the recurrent liver lesion and involved lymph nodes, completing 5/8/2023.  The patient tolerated treatment well.  The patient denies nausea or vomiting, and states she did not take her prescribed Zofran.  The patient does note occasional abdominal fullness and early satiety.  She reports good appetite and steady weight.  The patient denies abdominal pain.  The patient does note some chronic issues with constipation, which she manages with diet modifications and MiraLAX as needed.  The patient did experience exacerbation of acute on chronic fatigue following treatment.  The patient otherwise denies additional acute concerns today.      MEDICATIONS: Medication reconciliation for the patient was reviewed and confirmed in the electronic medical record.    Review of Systems   Constitutional: Positive for fatigue.   Gastrointestinal: Positive for abdominal distention (occasional fullness) and constipation.   Psychiatric/Behavioral: The patient is nervous/anxious.    All other systems reviewed and are negative.      KPS 90%      Physical Exam  Vitals and nursing note reviewed.   Constitutional:       General: She is not in acute distress.     Appearance: She is well-developed.   HENT:      Head: Normocephalic and atraumatic.   Eyes:      General: No scleral icterus.     Conjunctiva/sclera: Conjunctivae normal.      Pupils:  Pupils are equal, round, and reactive to light.   Neck:      Comments: No obviously enlarged cervical or supraclavicular LAD.  Cardiovascular:      Rate and Rhythm: Normal rate and regular rhythm.      Heart sounds: No murmur heard.    No friction rub.      Comments: Patient well perfused. Non cyanotic. No prominent JVD. No pedal edema  Pulmonary:      Effort: Pulmonary effort is normal.      Breath sounds: Normal breath sounds. No wheezing.   Abdominal:      General: Bowel sounds are normal. There is no distension.      Palpations: Abdomen is soft. There is no mass.      Tenderness: There is no abdominal tenderness.      Comments: No palpable organomegaly.  No tenderness with palpation.   Musculoskeletal:         General: Normal range of motion.      Cervical back: Normal range of motion and neck supple.      Comments: Patient moves all extremities spontaneously.     Lymphadenopathy:      Cervical: No cervical adenopathy.   Skin:     General: Skin is warm and dry.      Coloration: Skin is not jaundiced.   Neurological:      Mental Status: She is alert and oriented to person, place, and time.      Comments: Coordination intact.   Psychiatric:         Behavior: Behavior normal.         Thought Content: Thought content normal.         Judgment: Judgment normal.         VITAL SIGNS:   Vitals:    06/13/23 1322   BP: 166/75   Pulse: 75   Resp: 16   Temp: 98.2 °F (36.8 °C)   TempSrc: Temporal   SpO2: 97%   Weight: 68.1 kg (150 lb 1.6 oz)   PainSc: 0-No pain         The following portions of the patient's history were reviewed and updated as appropriate: allergies, current medications, past family history, past medical history, past social history, past surgical history and problem list.         Diagnoses and all orders for this visit:    1. Intrahepatic cholangiocarcinoma (Primary)         IMPRESSION:  Sheree Hernandez is an 82 y.o. female with local and regional recurrence of her previously resected cholangiocarcinoma.  She  completed adjuvant chemoradiation therapy in 1/2021 and has been under surveillance with serial CT scans since that time.  More recently, she was found to have a recurrence in the liver in the previously treated area adjacent to her resection, as well as several lymph nodes mostly along the vena cava.  Despite these findings on imaging, her CA 19-9 has remained within normal limits.  Given that she did have failure in the field and this does appear to be a fairly slow-growing process, CyberKnife SBRT was recommended for the recurrent liver lesion and 3 enlarged/PET positive lymph nodes.  She completed CyberKnife SBRT on 5/8/2023.  The patient tolerated treatment well.  The patient did develop some posttreatment fatigue and no significant acute radiation-related toxicities otherwise.  The patient is scheduled for repeat CT scans of the chest, abdomen pelvis next month for ongoing surveillance and to evaluate response to treatment.  The patient's CA 19-9 is improving/within normal limits and liver function tests are improving.  The patient and I again reviewed that these treatments are unlikely to cure her cancer but may slow down the progression of disease.  We discussed that there is a high possibility that she will recur distantly in the future, most likely in the lymph nodes.  We reviewed follow-up intervals, serial imaging and lab work including tumor markers, and expectations for response to treatment.  The patient continues routine oncologic surveillance under the care of Dr. Montenegro, and is scheduled to return to the radiation oncology clinic in 1 month following her repeat scans.    RECOMMENDATIONS:    Tumor recurrence  -On lateral aspect of residual left liver  -Also appears to be along the lateral vena cava lymph nodes  -Below previous radiation field  -Kidney is adjacent              -Complicates delivery   -Recent BUN/creatinine within normal limits  -NexGeneration sequencing with Dr. Montenegro  -Case  discussed at multidisciplinary tumor board and with Dr. Sharma at   -Status post CyberKnife SBRT 35 Gray in 3 fractions   -Completed 5/8/2023  -Repeat CT C/A/P 7/10/2023 pending  -RTC at that time  -Continues to follow with Dr. Montenegro     Cholangiocarcinoma of the liver  -CA 19-9 8/8/2021: 84.7  -Status post resection with Dr. Sharma at  9/2021  -pT3, pN0, pM0   -Focally positive margins  -Tumor extended to the extrapelvic soft tissues between the liver and the adrenal gland  -LVSI and PNI present  -0/4 lymph nodes involved  -CA 19-9 11/5/2021: 22  -Completed Xeloda currently with radiation 1/19/2022  -Surveillance CT scans 3/2022-12/2022 showing no evidence of residual or metastatic carcinoma  -CT C/A/P 3/6/2023 showing some enlarging lymph nodes concerning for recurrence  -CA 19-9 3/6/2023: 21.0  -PET/CT scan 3/27/2023 concerning for 1 cm left liver lesion and 3 lymph nodes along the vena cava that were slightly hypermetabolic and concerning for recurrence  -Status post CyberKnife SBRT as above  -LFTs 6/13/2023 improving  -CA 19-9 6/13/2023: 17.4   -Continues to follow with Dr. Montenegro    Hyperlipidemia  -Continue present regimen     Hypertension  -Continue present regimen    Constipation  -Increase fluid intake  -Continue MiraLAX as needed, add daily stool softener    Early satiety  -Encourage smaller, more frequent meals  -Improving      Return in about 4 weeks (around 7/11/2023) for Office Visit.    LEDY De Leon      I spent a total of 40 minutes on today's visit, with more than 20 minutes in direct face to face communication, and the remainder of the time spent in reviewing the relevant history, records, available imaging, and for documentation.

## 2023-06-13 NOTE — PROGRESS NOTES
Follow Up Office Visit      Date: 2023     Patient Name: Sheree Hernandez  MRN: [id number]  : 1940  Chief Complaint:  Follow-up for intrahepatic cholangiocarcinoma      History of Present Illness: Sheree Hernandez is a pleasant 79 y.o. female with a past medical history of hyperlipidemia and hypertension who presents today for evaluation of concern for hemochromatosis. The patient is accompanied by their self who contributes to the history of their care.  Patient states that over the past 2 years she has been having worsening fatigue especially with exertion.  Over the past several months this has become worse.  Patient states that she gets tired with basic activities including showering getting dressed in the morning and walking to and from her car from stores.  She states that her fatigue gets better after a few minutes of rest.  She has previously had an echocardiogram and cardiac catheterization within the past 2 years which did not reveal any cause of her fatigue with exertion.  She is also had an ultrasound of the liver which revealed stable cyst.  She was recently seen by her PCP who ordered iron studies which was notable for an elevated ferritin to 246.  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  All other mutations were negative.  She is currently up-to-date on her mammograms and colonoscopy with no active malignancies noted.  She is otherwise compliant with her statin and high blood pressure medicines.  Repeat abdominal imaging in 2021 concerning for enlarging liver lesion.  She was seen by Dr. Sharma and  is status post open right hepatectomy, cholecystectomy, right partial adrenalectomy on 2021.  Pathology showed a focal R1 resected margin.  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  0/4 lymph nodes were  positive for disease.  LVI and PNI were present.  Finished chemoXRT in January 2021.      Interval History:  Presents to clinic for follow-up.  Completed radiation in May 2023.  Tolerated well.  Denies any significant abdominal pain or discomfort today.  Notes continued fatigue as well as anxiety about potential disease recurrence at some point    Oncology History:    Oncology/Hematology History   Intrahepatic cholangiocarcinoma   8/17/2021 Initial Diagnosis    Intrahepatic cholangiocarcinoma (CMS/HCC)     8/17/2021 Cancer Staged    Staging form: Intrahepatic Bile Duct, AJCC 8th Edition  - Clinical: Stage IB (cT1b, cN0, cM0) - Signed by Jorje Montenegro MD on 8/17/2021 11/5/2021 Cancer Staged    Staging form: Intrahepatic Bile Duct, AJCC 8th Edition  - Pathologic: Stage IIIA (pT3, pN0, cM0) - Signed by Jorje Montenegro MD on 11/5/2021 12/9/2021 -  Chemotherapy    OP GALLBLADDER Capecitabine + XRT       12/9/2021 - 1/19/2022 Radiation    Radiation OncologyTreatment Course:  Sheree Hernandez received 5040 cGy in 28 fractions to liver via External Beam Radiation - EBRT.     4/25/2023 - 5/8/2023 Radiation    Radiation OncologyTreatment Course:  Sheree Hernandez received 3500 cGy in 5 fractions to liver mass and lymph nodes via Stereotactic Radiation Therapy - SRT.         Subjective      Review of Systems:   Constitutional: Negative for fevers, chills, or weight loss  Eyes: Negative for blurred vision or discharge         Ear/Nose/Throat: Negative for difficulty swallowing, sore throat, LAD                                                       Respiratory: Negative for cough, SOA, wheezing                                                                                        Cardiovascular: Negative for chest pain or palpitations                                                                  Gastrointestinal: Negative for nausea, vomiting or diarrhea                                                                      Genitourinary: Negative for dysuria or hematuria                                                                                           Musculoskeletal: Negative for any joint pains or muscle aches                                                                        Neurologic: Negative for any weakness, headaches, dizziness                                                                         Hematologic: Negative for any easy bleeding or bruising                                                                                   Psychiatric: Negative for anxiety or depression                          Past Medical History/Past Surgical History/ Family History/ Social History: Reviewed by me and unchanged from my previous documentation done on March 2023.     Medications:     Current Outpatient Medications:     Cholecalciferol (Vitamin D3) 25 MCG (1000 UT) capsule, Take  by mouth Daily., Disp: , Rfl:     coenzyme Q10 50 MG capsule capsule, Take  by mouth Daily., Disp: , Rfl:     Cyanocobalamin (VITAMIN B 12 PO), Take  by mouth., Disp: , Rfl:     Folic Acid 0.8 MG capsule, Take  by mouth., Disp: , Rfl:     ipratropium (ATROVENT) 0.06 % nasal spray, Instill 2 sprays into both nostrils as directed by provider 2 (Two) Times a Day., Disp: 15 mL, Rfl: 6    lactulose (CHRONULAC) 10 GM/15ML solution, Take 30 mL by mouth 2 (two) times a day as needed for constipation., Disp: 150 mL, Rfl: 3    magnesium oxide (MAG-OX) 400 MG tablet, Take 1 tablet by mouth Daily., Disp: , Rfl:     Omega-3 1000 MG capsule, Take  by mouth Daily., Disp: , Rfl:     rosuvastatin (CRESTOR) 10 MG tablet, Take 1 tablet by mouth Daily., Disp: 90 tablet, Rfl: 3    Turmeric Curcumin 500 MG capsule, Take 750 mg by mouth Daily., Disp: , Rfl:     Zinc 50 MG capsule, Take  by mouth Daily., Disp: , Rfl:     Allergies:   Allergies   Allergen Reactions    Pravastatin Myalgia       Objective     Physical Exam:  Vital Signs:   Vitals:    06/13/23  "1419   BP: 165/75   Pulse: 78   Resp: 18   Temp: 98.3 °F (36.8 °C)   SpO2: 97%   Weight: 68.9 kg (152 lb)   Height: 162.6 cm (64\")   PainSc: 0-No pain     Pain Score    06/13/23 1419   PainSc: 0-No pain     ECOG Performance Status: 0 - Asymptomatic    Constitutional: NAD, ECOG 0  Eyes: PERRLA, scleral anicteric  ENT: No LAD, no thyromegaly  Respiratory: CTAB, no wheezing, rales, rhonchi  Cardiovascular: RRR, no murmurs, pulses 2+ bilaterally  Abdomen: soft, NT/ND, no HSM  Musculoskeletal: strength 5/5 bilaterally, no c/c/e  Neurologic: A&O x 3, CN II-XII intact grossly    Results Review:   Lab on 06/08/2023   Component Date Value Ref Range Status    Glucose 06/08/2023 86  65 - 99 mg/dL Final    BUN 06/08/2023 19  8 - 23 mg/dL Final    Creatinine 06/08/2023 0.80  0.57 - 1.00 mg/dL Final    Sodium 06/08/2023 142  136 - 145 mmol/L Final    Potassium 06/08/2023 4.6  3.5 - 5.2 mmol/L Final    Chloride 06/08/2023 107  98 - 107 mmol/L Final    CO2 06/08/2023 25.0  22.0 - 29.0 mmol/L Final    Calcium 06/08/2023 9.1  8.6 - 10.5 mg/dL Final    Total Protein 06/08/2023 6.3  6.0 - 8.5 g/dL Final    Albumin 06/08/2023 3.9  3.5 - 5.2 g/dL Final    ALT (SGPT) 06/08/2023 30  1 - 33 U/L Final    AST (SGOT) 06/08/2023 35 (H)  1 - 32 U/L Final    Alkaline Phosphatase 06/08/2023 222 (H)  39 - 117 U/L Final    Total Bilirubin 06/08/2023 0.4  0.0 - 1.2 mg/dL Final    Globulin 06/08/2023 2.4  gm/dL Final    Calculated Result    A/G Ratio 06/08/2023 1.6  g/dL Final    BUN/Creatinine Ratio 06/08/2023 23.8  7.0 - 25.0 Final    Anion Gap 06/08/2023 10.0  5.0 - 15.0 mmol/L Final    eGFR 06/08/2023 73.2  >60.0 mL/min/1.73 Final    CA 19-9 06/08/2023 17.4  <=35.0 U/mL Final       MM digital mammo screen with diamond bilateral    Result Date: 5/23/2023  Narrative: PROCEDURE: Digital screening mammogram with Digital Breast Tomosynthesis (DBT). REASON FOR EXAM: Routine screening. FAMILY HISTORY:  Weak family history of breast cancer. COMPARISON STUDY: "  2022 through 2019 from [facility] FINDINGS: Craniocaudal and mediolateral oblique images of both breasts were obtained in 2D and DBT modes. Synthesized views were reconstructed from DBT data. The breast tissue is heterogeneously dense, which may obscure small masses. There is no evidence of dominant mass, architectural distortion, or suspicious calcifications. The mammogram was interpreted with the benefit of computer aided detection (CAD).    Impression: FINAL IMPRESSION: ACR BI-RADS 1: Negative. RECOMMENDATIONS:  Annual screening mammography. A letter including results and recommendations was sent to the patient. Density notification was included for patients with pattern 3 or 4 breast tissue.  Patient information was entered into a reminder system with a target due date for the next mammogram. At our facility, a [place] marker is positioned over a visible skin lesion and a linear marker is used to indicate a scar. A triangular marker is placed on a self reported palpable finding. Note: Mammography does not detect approximately 10-15% of breast cancers. An annual clinical breast exam by the patient's breast care physician and regular monthly self breast exams by the patient are integral parts of breast cancer screening, in addition to annual mammography. A normal mammogram does not completely exclude the presence of breast cancer, especially if there is an abnormal finding on physical exam. When clinically indicated, a biopsy should not be deferred because of a normal mammogram report.     Assessment / Plan      Assessment/Plan:   Intrahepatic cholangiocarcinoma (CMS/HCC) (Primary)  -Noted during her most recent hospitalization with CT scans concerning for an enlarging liver lesion to 7.3 cm that was previously noted to be hemangioma  -Triple phase CT concerning for a solid tumor lesion  -Biopsy consistent with an adenocarcinoma  -CA 19-9 elevated to 84.7  -CT chest as well as the rest of the CT  abdomen/pelvis not concerning for distant or metastatic disease  -Status post open right hepatectomy, cholecystectomy, right partial adrenalectomy on 9/23/2021 with Dr. 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  0/4 lymph nodes were positive for disease.  LVI and PNI were present.  Focal R1 resection margin  -Discussed with patient and her daughter that she would benefit from adjuvant chemoXRT using Xeloda.  Discussed side effects including but not limited to immunosuppression, diarrhea, abdominal pain, nausea, vomiting, hand/foot syndrome  -Repeat CA 19-9 (22) in November 2021  -Completed chemo XRT with Xeloda in January 2021  -Repeat scans in March 2022 without any evidence of recurrent or metastatic disease  -Repeat CT C/A/P in June 2022 without evidence of recurrent or metastatic disease.  Did note some IVC stenosis which we will monitor with her next scans  -Repeat CT C/A/P in September 2022 reviewed without evidence of recurrent disease or metastatic disease.  IVC stenosis overall stable  -Repeat CT C/A/P in December 2022 reviewed without evidence of recurrent or metastatic disease.  CA 19-9 within normal limits  -Repeat CT C/A/P in March 2023 reviewed and notable for some slight enlarging retroperitoneal adenopathy.  CA 19-9 within normal limits  -PET/CT concerning for 1 cm left liver lesion that was only slightly PET avid as well as 2 lymph nodes that were also slightly PET avid  - Discussed her case at tumor board and with Dr. Sharma we agreed that she likely had disease progression  -Status post radiation completed in May 2023  -CA 19-9 in June 2022 within normal limits.  CT scan scheduled for July 2023     Hemochromatosis carrier  -Noted on recent labs with an elevated ferritin to 246 hemochromatosis gene profile positive for heterozygosity of H63D.  Other genes negative.  Given patient's age and  previous cardiac liver work-up showing no evidence of dysfunction, it is unlikely that she has had iron deposition all this time.  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  Treatment as above  -ECHO in July 2020 within normal limits  -Repeat iron studies in April 2021 stable with a ferritin of 229, iron level 100, transferrin saturation 27%  -Low concern for iron overload at this time.      Thyroid nodule  -Incidentally noted on CT scan but enlarging from previous CT  -Thyroid ultrasound in June 2022 only notable for goiter and small nodules nonconcerning for malignancy  -Has been seen by endocrinology with plan for repeat ultrasound in the summer of next year     Other fatigue   -Continued at this time  -Previously checked iron studies, vitamin B12, folate, thyroid studies within normal limits  -Was previously been seen by cardiology with a normal ECHO and stress test  -Likely related to all her cancer related treatments  -Advised patient to continue activity as tolerated         Follow Up:   Follow-up in 1 month       Jorje Montenegro MD  Hematology and Oncology     Please note that portions of this note may have been completed with a voice recognition program. Efforts were made to edit the dictations, but occasionally words are mistranscribed.

## 2023-07-12 ENCOUNTER — HOSPITAL ENCOUNTER (OUTPATIENT)
Dept: RADIATION ONCOLOGY | Facility: HOSPITAL | Age: 83
Setting detail: RADIATION/ONCOLOGY SERIES
Discharge: HOME | End: 2023-07-12
Payer: MEDICARE

## 2023-07-12 PROBLEM — R91.1 LUNG NODULE: Status: ACTIVE | Noted: 2023-07-12

## 2023-07-13 PROCEDURE — 77399 UNLISTED PX MED RADJ PHYSICS: CPT | Performed by: RADIOLOGY

## 2023-07-13 PROCEDURE — 77332 RADIATION TREATMENT AID(S): CPT | Performed by: RADIOLOGY

## 2023-07-13 PROCEDURE — 77470 SPECIAL RADIATION TREATMENT: CPT | Performed by: RADIOLOGY

## 2023-07-13 PROCEDURE — 77290 THER RAD SIMULAJ FIELD CPLX: CPT | Performed by: RADIOLOGY

## 2023-07-25 PROCEDURE — 77293 RESPIRATOR MOTION MGMT SIMUL: CPT | Performed by: RADIOLOGY

## 2023-07-25 PROCEDURE — 77334 RADIATION TREATMENT AID(S): CPT | Performed by: RADIOLOGY

## 2023-07-25 PROCEDURE — 77300 RADIATION THERAPY DOSE PLAN: CPT | Performed by: RADIOLOGY

## 2023-07-25 PROCEDURE — 77295 3-D RADIOTHERAPY PLAN: CPT | Performed by: RADIOLOGY

## 2023-07-26 PROCEDURE — 77370 RADIATION PHYSICS CONSULT: CPT | Performed by: RADIOLOGY

## 2023-08-01 ENCOUNTER — HOSPITAL ENCOUNTER (OUTPATIENT)
Dept: RADIATION ONCOLOGY | Facility: HOSPITAL | Age: 83
Setting detail: RADIATION/ONCOLOGY SERIES
Discharge: HOME | End: 2023-08-01
Payer: MEDICARE

## 2023-08-01 PROCEDURE — 77373 STRTCTC BDY RAD THER TX DLVR: CPT | Performed by: RADIOLOGY

## 2023-08-01 PROCEDURE — 77280 THER RAD SIMULAJ FIELD SMPL: CPT | Performed by: RADIOLOGY

## 2023-08-02 ENCOUNTER — TELEPHONE (OUTPATIENT)
Dept: FAMILY MEDICINE CLINIC | Facility: CLINIC | Age: 83
End: 2023-08-02
Payer: MEDICARE

## 2023-08-03 ENCOUNTER — HOSPITAL ENCOUNTER (OUTPATIENT)
Dept: RADIATION ONCOLOGY | Facility: HOSPITAL | Age: 83
Setting detail: RADIATION/ONCOLOGY SERIES
Discharge: HOME | End: 2023-08-03
Payer: MEDICARE

## 2023-08-03 ENCOUNTER — HOSPITAL ENCOUNTER (OUTPATIENT)
Dept: RADIATION ONCOLOGY | Facility: HOSPITAL | Age: 83
Discharge: HOME | End: 2023-08-03
Payer: MEDICARE

## 2023-08-03 PROCEDURE — 77373 STRTCTC BDY RAD THER TX DLVR: CPT | Performed by: RADIOLOGY

## 2023-08-05 DIAGNOSIS — E78.5 HYPERLIPIDEMIA, UNSPECIFIED HYPERLIPIDEMIA TYPE: ICD-10-CM

## 2023-08-05 DIAGNOSIS — Z14.8 HEMOCHROMATOSIS CARRIER: ICD-10-CM

## 2023-08-05 DIAGNOSIS — E55.9 VITAMIN D DEFICIENCY: ICD-10-CM

## 2023-08-05 DIAGNOSIS — R79.89 ELEVATED FERRITIN: ICD-10-CM

## 2023-08-05 DIAGNOSIS — I10 ESSENTIAL HYPERTENSION: ICD-10-CM

## 2023-08-05 DIAGNOSIS — E78.5 DYSLIPIDEMIA: Primary | ICD-10-CM

## 2023-08-05 DIAGNOSIS — R79.9 ABNORMAL FINDING OF BLOOD CHEMISTRY, UNSPECIFIED: ICD-10-CM

## 2023-08-07 ENCOUNTER — LAB (OUTPATIENT)
Dept: LAB | Facility: HOSPITAL | Age: 83
End: 2023-08-07
Payer: MEDICARE

## 2023-08-07 ENCOUNTER — HOSPITAL ENCOUNTER (OUTPATIENT)
Dept: RADIATION ONCOLOGY | Facility: HOSPITAL | Age: 83
Discharge: HOME | End: 2023-08-07
Payer: MEDICARE

## 2023-08-07 DIAGNOSIS — R79.9 ABNORMAL FINDING OF BLOOD CHEMISTRY, UNSPECIFIED: ICD-10-CM

## 2023-08-07 DIAGNOSIS — E78.5 DYSLIPIDEMIA: ICD-10-CM

## 2023-08-07 DIAGNOSIS — E55.9 VITAMIN D DEFICIENCY: ICD-10-CM

## 2023-08-07 DIAGNOSIS — E78.5 HYPERLIPIDEMIA, UNSPECIFIED HYPERLIPIDEMIA TYPE: ICD-10-CM

## 2023-08-07 DIAGNOSIS — Z14.8 HEMOCHROMATOSIS CARRIER: ICD-10-CM

## 2023-08-07 DIAGNOSIS — R79.89 ELEVATED FERRITIN: ICD-10-CM

## 2023-08-07 DIAGNOSIS — I10 ESSENTIAL HYPERTENSION: ICD-10-CM

## 2023-08-07 LAB
25(OH)D3 SERPL-MCNC: 87.3 NG/ML (ref 30–100)
ALBUMIN SERPL-MCNC: 4.2 G/DL (ref 3.5–5.2)
ALBUMIN/GLOB SERPL: 1.4 G/DL
ALP SERPL-CCNC: 196 U/L (ref 39–117)
ALT SERPL W P-5'-P-CCNC: 26 U/L (ref 1–33)
ANION GAP SERPL CALCULATED.3IONS-SCNC: 11.2 MMOL/L (ref 5–15)
AST SERPL-CCNC: 33 U/L (ref 1–32)
BASOPHILS # BLD AUTO: 0.03 10*3/MM3 (ref 0–0.2)
BASOPHILS NFR BLD AUTO: 0.6 % (ref 0–1.5)
BILIRUB SERPL-MCNC: 0.6 MG/DL (ref 0–1.2)
BILIRUB UR QL STRIP: NEGATIVE
BUN SERPL-MCNC: 24 MG/DL (ref 8–23)
BUN/CREAT SERPL: 23.3 (ref 7–25)
CALCIUM SPEC-SCNC: 9.9 MG/DL (ref 8.6–10.5)
CHLORIDE SERPL-SCNC: 104 MMOL/L (ref 98–107)
CHOLEST SERPL-MCNC: 207 MG/DL (ref 0–200)
CLARITY UR: ABNORMAL
CO2 SERPL-SCNC: 26.8 MMOL/L (ref 22–29)
COLOR UR: YELLOW
CREAT SERPL-MCNC: 1.03 MG/DL (ref 0.57–1)
DEPRECATED RDW RBC AUTO: 46.4 FL (ref 37–54)
EGFRCR SERPLBLD CKD-EPI 2021: 53.7 ML/MIN/1.73
EOSINOPHIL # BLD AUTO: 0.06 10*3/MM3 (ref 0–0.4)
EOSINOPHIL NFR BLD AUTO: 1.2 % (ref 0.3–6.2)
ERYTHROCYTE [DISTWIDTH] IN BLOOD BY AUTOMATED COUNT: 13.7 % (ref 12.3–15.4)
FERRITIN SERPL-MCNC: 179 NG/ML (ref 13–150)
GLOBULIN UR ELPH-MCNC: 2.9 GM/DL
GLUCOSE SERPL-MCNC: 84 MG/DL (ref 65–99)
GLUCOSE UR STRIP-MCNC: NEGATIVE MG/DL
HCT VFR BLD AUTO: 45.4 % (ref 34–46.6)
HDLC SERPL-MCNC: 79 MG/DL (ref 40–60)
HGB BLD-MCNC: 15.1 G/DL (ref 12–15.9)
HGB UR QL STRIP.AUTO: ABNORMAL
IRON 24H UR-MRATE: 149 MCG/DL (ref 37–145)
IRON SATN MFR SERPL: 39 % (ref 20–50)
KETONES UR QL STRIP: NEGATIVE
LDLC SERPL CALC-MCNC: 111 MG/DL (ref 0–100)
LDLC/HDLC SERPL: 1.38 {RATIO}
LEUKOCYTE ESTERASE UR QL STRIP.AUTO: ABNORMAL
LYMPHOCYTES # BLD AUTO: 0.59 10*3/MM3 (ref 0.7–3.1)
LYMPHOCYTES NFR BLD AUTO: 11.4 % (ref 19.6–45.3)
MCH RBC QN AUTO: 30.8 PG (ref 26.6–33)
MCHC RBC AUTO-ENTMCNC: 33.3 G/DL (ref 31.5–35.7)
MCV RBC AUTO: 92.7 FL (ref 79–97)
MONOCYTES # BLD AUTO: 0.6 10*3/MM3 (ref 0.1–0.9)
MONOCYTES NFR BLD AUTO: 11.6 % (ref 5–12)
NEUTROPHILS NFR BLD AUTO: 3.86 10*3/MM3 (ref 1.7–7)
NEUTROPHILS NFR BLD AUTO: 74.8 % (ref 42.7–76)
NITRITE UR QL STRIP: NEGATIVE
PH UR STRIP.AUTO: 6.5 [PH] (ref 5–8)
PLATELET # BLD AUTO: 133 10*3/MM3 (ref 140–450)
PMV BLD AUTO: 12.8 FL (ref 6–12)
POTASSIUM SERPL-SCNC: 4.6 MMOL/L (ref 3.5–5.2)
PROT SERPL-MCNC: 7.1 G/DL (ref 6–8.5)
PROT UR QL STRIP: ABNORMAL
RBC # BLD AUTO: 4.9 10*6/MM3 (ref 3.77–5.28)
SODIUM SERPL-SCNC: 142 MMOL/L (ref 136–145)
SP GR UR STRIP: 1.02 (ref 1–1.03)
T4 FREE SERPL-MCNC: 1.3 NG/DL (ref 0.93–1.7)
TIBC SERPL-MCNC: 384 MCG/DL (ref 298–536)
TRANSFERRIN SERPL-MCNC: 258 MG/DL (ref 200–360)
TRIGL SERPL-MCNC: 96 MG/DL (ref 0–150)
TSH SERPL DL<=0.05 MIU/L-ACNC: 3.12 UIU/ML (ref 0.27–4.2)
UROBILINOGEN UR QL STRIP: ABNORMAL
VLDLC SERPL-MCNC: 17 MG/DL (ref 5–40)
WBC NRBC COR # BLD: 5.16 10*3/MM3 (ref 3.4–10.8)

## 2023-08-07 PROCEDURE — 83036 HEMOGLOBIN GLYCOSYLATED A1C: CPT

## 2023-08-07 PROCEDURE — 82306 VITAMIN D 25 HYDROXY: CPT

## 2023-08-07 PROCEDURE — 84443 ASSAY THYROID STIM HORMONE: CPT

## 2023-08-07 PROCEDURE — 80061 LIPID PANEL: CPT

## 2023-08-07 PROCEDURE — 77280 THER RAD SIMULAJ FIELD SMPL: CPT | Performed by: RADIOLOGY

## 2023-08-07 PROCEDURE — 82728 ASSAY OF FERRITIN: CPT

## 2023-08-07 PROCEDURE — 87086 URINE CULTURE/COLONY COUNT: CPT

## 2023-08-07 PROCEDURE — 84466 ASSAY OF TRANSFERRIN: CPT

## 2023-08-07 PROCEDURE — 81001 URINALYSIS AUTO W/SCOPE: CPT

## 2023-08-07 PROCEDURE — 80053 COMPREHEN METABOLIC PANEL: CPT

## 2023-08-07 PROCEDURE — 85025 COMPLETE CBC W/AUTO DIFF WBC: CPT

## 2023-08-07 PROCEDURE — 84439 ASSAY OF FREE THYROXINE: CPT

## 2023-08-07 PROCEDURE — 77373 STRTCTC BDY RAD THER TX DLVR: CPT | Performed by: RADIOLOGY

## 2023-08-07 PROCEDURE — 83540 ASSAY OF IRON: CPT

## 2023-08-08 LAB
BACTERIA UR QL AUTO: ABNORMAL /HPF
HBA1C MFR BLD: 5.6 % (ref 4.8–5.6)
HYALINE CASTS UR QL AUTO: ABNORMAL /LPF
RBC # UR STRIP: ABNORMAL /HPF
REF LAB TEST METHOD: ABNORMAL
SQUAMOUS #/AREA URNS HPF: ABNORMAL /HPF
WBC # UR STRIP: ABNORMAL /HPF

## 2023-08-09 ENCOUNTER — HOSPITAL ENCOUNTER (OUTPATIENT)
Dept: RADIATION ONCOLOGY | Facility: HOSPITAL | Age: 83
Discharge: HOME | End: 2023-08-09
Payer: MEDICARE

## 2023-08-09 ENCOUNTER — OFFICE VISIT (OUTPATIENT)
Dept: FAMILY MEDICINE CLINIC | Facility: CLINIC | Age: 83
End: 2023-08-09
Payer: MEDICARE

## 2023-08-09 VITALS
HEIGHT: 64 IN | BODY MASS INDEX: 26.8 KG/M2 | WEIGHT: 157 LBS | DIASTOLIC BLOOD PRESSURE: 76 MMHG | HEART RATE: 69 BPM | OXYGEN SATURATION: 96 % | SYSTOLIC BLOOD PRESSURE: 126 MMHG

## 2023-08-09 DIAGNOSIS — Z78.0 POSTMENOPAUSAL: ICD-10-CM

## 2023-08-09 DIAGNOSIS — K59.00 CONSTIPATION, UNSPECIFIED CONSTIPATION TYPE: ICD-10-CM

## 2023-08-09 DIAGNOSIS — Z00.00 MEDICARE ANNUAL WELLNESS VISIT, SUBSEQUENT: Primary | ICD-10-CM

## 2023-08-09 DIAGNOSIS — I10 PRIMARY HYPERTENSION: ICD-10-CM

## 2023-08-09 LAB — BACTERIA SPEC AEROBE CULT: NORMAL

## 2023-08-09 PROCEDURE — 3074F SYST BP LT 130 MM HG: CPT | Performed by: INTERNAL MEDICINE

## 2023-08-09 PROCEDURE — G0439 PPPS, SUBSEQ VISIT: HCPCS | Performed by: INTERNAL MEDICINE

## 2023-08-09 PROCEDURE — 1170F FXNL STATUS ASSESSED: CPT | Performed by: INTERNAL MEDICINE

## 2023-08-09 PROCEDURE — 1160F RVW MEDS BY RX/DR IN RCRD: CPT | Performed by: INTERNAL MEDICINE

## 2023-08-09 PROCEDURE — 77373 STRTCTC BDY RAD THER TX DLVR: CPT | Performed by: RADIOLOGY

## 2023-08-09 PROCEDURE — 1159F MED LIST DOCD IN RCRD: CPT | Performed by: INTERNAL MEDICINE

## 2023-08-09 PROCEDURE — 77280 THER RAD SIMULAJ FIELD SMPL: CPT | Performed by: RADIOLOGY

## 2023-08-09 PROCEDURE — 3078F DIAST BP <80 MM HG: CPT | Performed by: INTERNAL MEDICINE

## 2023-08-09 PROCEDURE — 99397 PER PM REEVAL EST PAT 65+ YR: CPT | Performed by: INTERNAL MEDICINE

## 2023-08-09 NOTE — PROGRESS NOTES
QUICK REFERENCE INFORMATION:  The ABCs of the Annual Wellness Visit  Sheree Hernandez is a 83 y.o. female presenting forMedicare Subsequent Wellness visit and  Medicare Wellness-subsequent, Fatigue (Had radiation treatment this morning. ), and Hypertension (BP readings )  .     Medicare Subsequent Wellness Visit    Chief Complaint   Patient presents with    Medicare Wellness-subsequent    Fatigue     Had radiation treatment this morning.     Hypertension     BP readings     Annual exam    HPI   Pt here for medicare wellness visit.  She would like to discuss chronic fatigue syndrome.  Reports progressive weakness throughout the day and exercise intolerance ongoing for several years.  She has seen multiple specialist in the area.    ROS:  Constitutional: no fevers, night sweats or unexplained weight loss  Eyes: no vision changes  ENT: no runny nose, ear pain, sore throat  Cardio: no chest pain, palpitations  Pulm: no shortness of breath, wheezing, or cough  GI: no abdominal pain or changes in bowel movements  : no difficulty urinating  MSK: no difficulty ambulating, no joint pain  Neuro: no weakness, dizziness or headache  Psych: no trouble sleeping  Endo: no change in appetite     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    Plantar fasciitis     Chronic    PVC's (premature ventricular contractions)     Senile keratosis     Multiple    MANAN (stress urinary incontinence, female)     Syncope, near     with negative  cardiovascular workup        Past Surgical History:   Procedure Laterality Date    ADRENAL GLAND SURGERY  09/22/2021    ADRENALECTOMY  09/22/2021    CARDIAC CATHETERIZATION N/A 01/18/2019    Procedure: Left Heart Cath;  Surgeon: Tucker Gonzalez MD;  [facility];  Service: Cardiovascular    CARDIAC CATHETERIZATION  1/18/2019    CATARACT EXTRACTION  04/2019    CHOLECYSTECTOMY  09/22/2021    COLONOSCOPY  07/15/2020    CYBERKNIFE  05/08/2023    Recurrent liver mass/involved LNs    HYSTERECTOMY  1984    age 46 - ovarian cyst, endometriosis,  jorge/bso    LIVER SURGERY Right 09/22/2021    Removed    ROTATOR CUFF REPAIR Left 1989    Collar Bone Repair       Family History   Problem Relation Age of Onset    Heart attack Mother     Heart failure Mother     Dementia Mother     Stroke Mother     Arthritis Mother         Heart Attack    Heart disease Father     Hearing loss Father     Arrhythmia Brother     Breast cancer Paternal Aunt         Social History     Socioeconomic History    Marital status:     Number of children: 2   Tobacco Use    Smoking status: Never    Smokeless tobacco: Never    Tobacco comments:     Exposed to secondhand smoke   Vaping Use    Vaping Use: Never used   Substance and Sexual Activity    Alcohol use: Not Currently     Comment: Very seldom    Drug use: Never    Sexual activity: Not Currently     Partners: Male     Birth control/protection: Hysterectomy     Comment: Complete Hysterectomy        Current Outpatient Medications   Medication Sig Dispense Refill    Cholecalciferol (Vitamin D3) 25 MCG (1000 UT) capsule Take  by mouth Daily.      coenzyme Q10 50 MG capsule capsule Take  by mouth Daily.      Cyanocobalamin (VITAMIN B 12 PO) Take  by mouth.      Folic Acid 0.8 MG capsule Take  by mouth.      ipratropium (ATROVENT) 0.06 % nasal spray Instill 2 sprays into both nostrils as directed by provider 2 (Two) Times a Day. 15 mL 6    lactulose (CHRONULAC) 10  "GM/15ML solution Take 30 mL by mouth 2 (two) times a day as needed for constipation. 150 mL 3    magnesium oxide (MAG-OX) 400 MG tablet Take 1 tablet by mouth Daily.      Omega-3 1000 MG capsule Take  by mouth Daily.      rosuvastatin (CRESTOR) 10 MG tablet Take 1 tablet by mouth Daily. 90 tablet 3    Turmeric Curcumin 500 MG capsule Take 750 mg by mouth Daily.      Zinc 50 MG capsule Take  by mouth Daily.       No current facility-administered medications for this visit.        Allergies   Allergen Reactions    Pravastatin Myalgia        Immunization History   Administered Date(s) Administered    COVID-19 (PFIZER) Purple Cap Monovalent 02/15/2021, 03/06/2021    Fluzone High Dose =>65 Years ([employer] ONLY) 11/03/2017, 09/11/2018, 10/15/2019    Influenza, Unspecified 10/10/2020, 10/07/2021    Pneumococcal Polysaccharide (PPSV23) 05/17/2013, 12/06/2016, 05/15/2018    TD Preservative Free (Tenivac) 11/09/2016    Td (TDVAX) 05/18/1997        The following portions of the patient's history were reviewed and updated as appropriate: allergies, current medications, past family history, past medical history, past social history, past surgical history, and problem list.      Objective    Visit Vitals  /76   Pulse 69   Ht 162.6 cm (64.02\")   Wt 71.2 kg (157 lb)   SpO2 96%   BMI 26.93 kg/mý        Physical Exam  Gen Appearance: NAD  HEENT: Normocephalic, PERRLA, no thyromegaly, trache midline  Heart: RRR, normal S1 and S2, no murmur  Lungs: CTA b/l, no wheezing, no crackles  Abdomen: Soft, non-tender, non-distended, no guarding and BSx4  MSK: Moves all extremities well, normal gait, no peripheral edema  Pulses: Palpable and equal b/l  Lymph nodes: No palpable lymphadenopathy   Neuro: No focal deficits       HEALTH RISK ASSESSMENT    1940    Recent Hospitalizations:  No hospitalization(s) within the last year..      Current Medical Providers:  Patient Care Team:  Timi Conway DO as PCP - General (Internal " Medicine)  Jorje Montenegro MD as Referring Physician (Hematology and Oncology)  Ricci lForentino MD (Rheumatology)  Champ Guillen MD as Consulting Physician (Otolaryngology)  Mitzy Sepulveda APRN as Nurse Practitioner (Psychiatry)  Bunny Billings MD as Consulting Physician (Radiation Oncology)  Tucker Gonzalez MD as Consulting Physician (Cardiology)  Alexsander Peng MD as Consulting Physician (Endocrinology)  Carlos Kay MD as Consulting Physician (Cardiology)      Smoking Status:  Social History     Tobacco Use   Smoking Status Never   Smokeless Tobacco Never   Tobacco Comments    Exposed to secondhand smoke       Alcohol Consumption:  Social History     Substance and Sexual Activity   Alcohol Use Not Currently    Comment: Very seldom       Depression Screen:       2023     2:25 PM   PHQ-2/PHQ-9 Depression Screening   Little Interest or Pleasure in Doing Things 0-->not at all   Feeling Down, Depressed or Hopeless 0-->not at all   PHQ-9: Brief Depression Severity Measure Score 0       Health Habits and Functional and Cognitive Screenin/9/2023     2:25 PM   Functional & Cognitive Status   Do you have difficulty preparing food and eating? No   Do you have difficulty bathing yourself, getting dressed or grooming yourself? No   Do you have difficulty using the toilet? No   Do you have difficulty moving around from place to place? No   Do you have trouble with steps or getting out of a bed or a chair? No   Current Diet Well Balanced Diet   Dental Exam Up to date   Eye Exam Up to date   Exercise (times per week) 4 times per week   Current Exercises Include Stationary Bicycling/Spin Class;Cardiovascular Workout;Weightlifting   Do you need help using the phone?  No   Are you deaf or do you have serious difficulty hearing?  No   Do you need help to go to places out of walking distance? No   Do you need help shopping? No   Do you need help preparing meals?  No    Do you need help with housework?  No   Do you need help with laundry? No   Do you need help taking your medications? No   Do you need help managing money? No   Do you ever drive or ride in a car without wearing a seat belt? No   Have you felt unusual stress, anger or loneliness in the last month? No   Who do you live with? Alone   If you need help, do you have trouble finding someone available to you? No   Have you been bothered in the last four weeks by sexual problems? No   Do you have difficulty concentrating, remembering or making decisions? No         Does the patient have evidence of cognitive impairment? No    Aspirin use counseling? Does not need ASA (and currently is not on it)      Recent Lab Results:  CMP:  Lab Results   Component Value Date    GLU 76 03/17/2022    BUN 24 (H) 08/07/2023    CREATININE 1.03 (H) 08/07/2023    EGFRIFNONA >60 03/17/2022    EGFRIFAFRI >60 03/17/2022    BCR 23.3 08/07/2023     08/07/2023    K 4.6 08/07/2023    CO2 26.8 08/07/2023    CALCIUM 9.9 08/07/2023    ALBUMIN 4.2 08/07/2023    BILITOT 0.6 08/07/2023    ALKPHOS 196 (H) 08/07/2023    AST 33 (H) 08/07/2023    ALT 26 08/07/2023     Lipid Panel:  Lab Results   Component Value Date    CHOL 207 (H) 08/07/2023    TRIG 96 08/07/2023    HDL 79 (H) 08/07/2023    VLDL 17 08/07/2023    LDLHDL 1.38 08/07/2023     HbA1c:  Lab Results   Component Value Date    HGBA1C 5.60 08/07/2023       Visual Acuity:  No results found.    Age-appropriate Screening Schedule:  Refer to the list below for future screening recommendations based on patient's age, sex and/or medical conditions. Orders for these recommended tests are listed in the plan section. The patient has been provided with a written plan.    Health Maintenance   Topic Date Due    ZOSTER VACCINE (1 of 2) Never done    Pneumococcal Vaccine 65+ (3 - PCV) 05/15/2019    DXA SCAN  05/22/2020    COVID-19 Vaccine (3 - Pfizer risk series) 04/03/2021    INFLUENZA VACCINE  10/01/2023     LIPID PANEL  08/07/2024    ANNUAL WELLNESS VISIT  08/09/2024    TDAP/TD VACCINES (3 - Tdap) 11/09/2026          Advance Care Planning:  ACP discussion was held with the patient during this visit. Patient does not have an advance directive, declines further assistance.    Identification of Risk Factors:  Risk factors include: Advance Directive Discussion  Breast Cancer/Mammogram Screening  Cardiovascular risk  Colon Cancer Screening  Osteoporosis Risk.    Compared to one year ago, the patient feels her physical health is the same.  Compared to one year ago, the patient feels her mental health is the same.  Reviewed use of high risk medication in the elderly: yes  Reviewed for potential of harmful drug interactions in the elderly: yes    Diagnoses and all orders for this visit:    1. Wellness visit (Primary)  Counseled on healthy weight, nutrition, physical activity, cancer screening, and immunizations.  Patient is asking about referral for [facility] versus [facility] for chronic fatigue syndrome.  She will call back if she decides to proceed with referral.    2. Primary hypertension  Blood pressure well-controlled in office, occasional high readings at home.  Typically 130/60-70s. Occasional 140s systolic.   3. Constipation, unspecified constipation type  MiraLAX as needed  4. Postmenopausal  -     DEXA Bone Density Axial; Future          Patient Self-Management and Personalized Health Advice  The patient has been provided with information about: diet, exercise, weight management, prevention of cardiac or vascular disease, and designing advance directives and preventive services including:   Annual Wellness Visit (AWV)  Bone Density Measurements  Cardiovascular Disease Screening Tests (may do this order every 5 years in beneficiaries without signs or symptoms of cardiovascular disease).      Follow Up:  Return in about 1 year (around 8/9/2024) for Medicare Wellness.     An After Visit Summary and PPPS with  all of these plans were given to the patient.           Dictated Utilizing Dragon Dictation    Please note that portions of this note were completed with a voice recognition program.    Part of this note may be an electronic transcription/translation of spoken language to printed text using the Dragon Dictation System.

## 2023-08-11 ENCOUNTER — HOSPITAL ENCOUNTER (OUTPATIENT)
Dept: RADIATION ONCOLOGY | Facility: HOSPITAL | Age: 83
Discharge: HOME | End: 2023-08-11
Payer: MEDICARE

## 2023-08-11 PROCEDURE — 77336 RADIATION PHYSICS CONSULT: CPT | Performed by: RADIOLOGY

## 2023-08-11 PROCEDURE — 77373 STRTCTC BDY RAD THER TX DLVR: CPT | Performed by: RADIOLOGY

## 2023-09-06 NOTE — PROGRESS NOTES
Subjective:     Encounter Date:09/19/2023      Patient ID: Sheree Hernandez is a 83 y.o.  white female from [place], [place].    PHYSICIAN: Timi Conway DO    Chief Complaint:   Chief Complaint   Patient presents with    Follow-up     6 month follow up     Problem List:  Hypertension  Abnormal stress test-data deficit  Left heart catheterization 2019: RCA 20% stenosis, otherwise normal coronaries, EF 60%  Echocardiogram 7/24/2020: EF 65%  Exercise stress test 1/4/2023: LVEF greater than 70%, normal myocardial perfusion study with no evidence of ischemia, CT portion of the exam with no significant coronary artery calcification.  Incidentally detected dilated loops of bowel noted, surgical clips noted in the liver.  Echocardiogram 1/13/2023: LVEF 61-65%, mild calcification of the aortic valve, hypertrophy of the intra-atrial septum noted, IVSD 0.8 cm  Residual class I symptoms  Hyperlipidemia; intolerant to pravastatin  Intrahepatic cholangiocarcinoma   Status post right hepatectomy cholecystectomy right partial adrenalectomy   Status post chemotherapy and radiation, patient completed Xeloda  CT chest/abdomen/pelvis July 2023 showing mild increase in retroperitoneal lymph nodes, and new small 6mm LLL nodule and minimal enlargement of nodule in RML. Additional numerous tiny pulmonary nodules stable. CT concerning for metastatic disease  Hemochromatosis carrier  Obstructive sleep apnea, not on CPAP  PVCs with Holter monitor June 2021 showing occasional PACs, no atrial arrhythmias, 5 episodes of SVT    Allergies   Allergen Reactions    Pravastatin Myalgia       Current Outpatient Medications   Medication Instructions    Cholecalciferol (Vitamin D3) 25 MCG (1000 UT) capsule Oral, Daily    coenzyme Q10 50 MG capsule capsule Oral, Daily    Cyanocobalamin (VITAMIN B 12 PO) Oral    Folic Acid 0.8 MG capsule Oral    ipratropium (ATROVENT) 0.06 % nasal spray Instill 2 sprays into both nostrils as directed by  provider 2 (Two) Times a Day.    lactulose (CHRONULAC) 10 GM/15ML solution Take 30 mL by mouth 2 (two) times a day as needed for constipation.    magnesium oxide (MAG-OX) 400 mg, Oral, Daily    Omega-3 1000 MG capsule Oral, Daily    rosuvastatin (CRESTOR) 10 mg, Oral, Daily    Turmeric Curcumin 750 mg, Oral, Daily    Zinc 50 MG capsule Oral, Daily         HISTORY OF PRESENT ILLNESS:  The patient is here after a 9-month hiatus.  She had an acceptable stress test and echocardiogram in January 2023.  She had a CT chest/abdomen/pelvis July 2023 showing mild increase in retroperitoneal lymph nodes, and new small 6mm LLL nodule and minimal enlargement of nodule in RML. Additional numerous tiny pulmonary nodules stable. CT concerning for metastatic disease.  She denies any chest pain, shortness of breath, palpitations, dizziness, presyncope, or syncope.  Her only complaint is of chronic fatigue.  She says she has had fatigue for years and it is not worsening but is not necessarily getting any better either.  She tries to do some walking for activity.  She brought her blood pressure log with her and today for me to review and her blood pressures at home have been more elevated in the 140s-150s systolic.  She used to be on losartan but then her blood pressures improved and she was taken off of this remotely.  She has seen a rheumatologist before and apparently has had laboratory testing that ruled out any autoimmune disorders as a cause of her chronic fatigue-data deficit.  She does not use CPAP and says that her remote sleep study was borderline/mild.  The patient wore a heart monitor in 2021 showing no atrial arrhythmias.  The patient just had labs with her primary care physician last month.  She had a thyroid ultrasound today.      ROS   All other systems reviewed and otherwise negative.      ECG 12 Lead    Date/Time: 9/19/2023 11:56 AM  Performed by: Zayda Guevara APRN  Authorized by: Zayda Guevara APRN   Rhythm  "comments: Sinus rhythm with occasional PVCs, nonspecific ST abnormality, abnormal ECG, 81 bpm, QRS 72 ms, QTc 436 ms,  ms, PVCs present compared to ECG in December 2022           Objective:       Vitals:    09/19/23 1125 09/19/23 1128   BP: 130/80 138/89   BP Location: Left arm Left arm   Patient Position: Sitting Standing   Pulse: 82 80   SpO2: 99% 98%   Weight: 70.8 kg (156 lb)    Height: 162.6 cm (64\")      Body mass index is 26.78 kg/m².    Constitutional:       Appearance: Healthy appearance. Not in distress.   Neck:      Vascular: No JVR. JVD normal.   Pulmonary:      Effort: Pulmonary effort is normal.      Breath sounds: Normal breath sounds. No wheezing. No rhonchi. No rales.   Chest:      Chest wall: Not tender to palpatation.   Cardiovascular:      PMI at left midclavicular line. Normal rate. Regular rhythm. Normal S1. Normal S2.       Murmurs: There is no murmur.      No gallop.  No click. No rub.   Pulses:     Intact distal pulses.   Edema:     Peripheral edema absent.   Abdominal:      General: Bowel sounds are normal.      Palpations: Abdomen is soft.      Tenderness: There is no abdominal tenderness.   Musculoskeletal: Normal range of motion.         General: No tenderness. Skin:     General: Skin is warm and dry.   Neurological:      General: No focal deficit present.      Mental Status: Alert and oriented to person, place and time.         Lab Review:   Lab Results   Component Value Date    GLUCOSE 84 08/07/2023    BUN 24 (H) 08/07/2023    CREATININE 1.03 (H) 08/07/2023    EGFRIFNONA >60 03/17/2022    EGFRIFAFRI >60 03/17/2022    BCR 23.3 08/07/2023    CO2 26.8 08/07/2023    CALCIUM 9.9 08/07/2023    ALBUMIN 4.2 08/07/2023    AST 33 (H) 08/07/2023    ALT 26 08/07/2023       Lab Results   Component Value Date    WBC 5.16 08/07/2023    HGB 15.1 08/07/2023    HCT 45.4 08/07/2023    MCV 92.7 08/07/2023     (L) 08/07/2023       Lab Results   Component Value Date    HGBA1C 5.60 08/07/2023 "       Lab Results   Component Value Date    TSH 3.120 08/07/2023       Lab Results   Component Value Date    CHOL 207 (H) 08/07/2023    CHOL 186 06/02/2022     Lab Results   Component Value Date    TRIG 96 08/07/2023    TRIG 74 06/02/2022     Lab Results   Component Value Date    HDL 79 (H) 08/07/2023    HDL 82 (H) 06/02/2022     Lab Results   Component Value Date     (H) 08/07/2023    LDL 90 06/02/2022           Lab Results   Component Value Date    BNP 58.0 10/24/2018           Assessment:     Overall continued acceptable course with no new interim cardiopulmonary complaints with acceptable functional status. We will defer additional diagnostic or therapeutic intervention from a cardiac perspective at this time. She had an acceptable stress test and echocardiogram in January 2023.  If patient continues to have worsening fatigue, would consider a repeat Holter monitor; patient had occasional PVCs on ECG in office today but on auscultation she had none.  Blood pressure is elevated so I will add telmisartan 20 mg daily.  The patient call back in 1.5-2 weeks with blood pressure and heart rate readings.       Diagnosis Plan   1. Dyspnea on exertion  No recurrent angina pectoris or CHF on current activity schedule; continue current treatment       2. Primary hypertension  Uncontrolled with pressures in the 140s-150s at home, start telmisartan 20 mg daily, patient to call back in 1.5-2 weeks with blood pressure and heart rate readings.      3. Dyslipidemia  Abnormal acceptable lipid panel August 2023, continue rosuvastatin      4. BONNIE (obstructive sleep apnea)  Noncompliant with CPAP             Plan:         Patient to continue current medications and close follow up with the above providers.  Tentative cardiology follow up in March 2024 with Dr. Kay Or patient may return sooner PRN.  Telmisartan 20 mg daily  Patient to call back in 1.5-2 weeks with blood pressure and heart rate readings      Electronically  signed by Zayda Guevara, APRN, 09/19/23, 12:35 PM EDT.

## 2023-09-15 ENCOUNTER — OFFICE VISIT (OUTPATIENT)
Dept: RADIATION ONCOLOGY | Facility: HOSPITAL | Age: 83
End: 2023-09-15
Payer: MEDICARE

## 2023-09-15 ENCOUNTER — HOSPITAL ENCOUNTER (OUTPATIENT)
Dept: RADIATION ONCOLOGY | Facility: HOSPITAL | Age: 83
Setting detail: RADIATION/ONCOLOGY SERIES
Discharge: HOME | End: 2023-09-15
Payer: MEDICARE

## 2023-09-15 VITALS
HEART RATE: 76 BPM | OXYGEN SATURATION: 99 % | TEMPERATURE: 97.4 F | WEIGHT: 157.6 LBS | BODY MASS INDEX: 27.04 KG/M2 | DIASTOLIC BLOOD PRESSURE: 93 MMHG | RESPIRATION RATE: 16 BRPM | SYSTOLIC BLOOD PRESSURE: 154 MMHG

## 2023-09-15 DIAGNOSIS — C22.1 INTRAHEPATIC CHOLANGIOCARCINOMA: Primary | ICD-10-CM

## 2023-09-15 PROCEDURE — G0463 HOSPITAL OUTPT CLINIC VISIT: HCPCS | Performed by: NURSE PRACTITIONER

## 2023-09-15 NOTE — PROGRESS NOTES
FOLLOW UP NOTE    PATIENT:                                                      Sheree Hernandez  MEDICAL RECORD #:                        [id number]  :                                                          1940  COMPLETION DATE:   2023  DIAGNOSIS:     Recurrent intrahepatic cholangiocarcinoma  - Clinical Stage IB (cT1b, cN0, cM0)  - Pathologic Stage IIIA (pT3, pN0, cM0)      BRIEF HISTORY:  Sheree Hernandez is a very pleasant 83 y.o. female who has previously suffered from chronic fatigue and was sent for initial work-up that consisted of a heart cath and multiple other interventions.  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  The patient was then found to have an enlarging liver lesion and an AFP that was 9.27 and a CA 19-9 that was 85 on 2021.  The patient was sent for biopsy of her liver lesion in the inferior right lobe of the liver on 8/10/2021 that was consistent with adenocarcinoma.     The patient was taken for right-sided hepatectomy cholecystectomy and partial adrenalectomy on 2021 with Dr. Sharma at .  Pathology revealed a moderate to poorly differentiated intrahepatic cholangiocarcinoma.  Her tumor measured 8.5 cm.  0/4 lymph nodes were involved but the patient did have LVSI and PNI.  The tumor did extend to the extrahepatic soft tissue between the liver and adrenal gland.  There was tumor positive focally at the cauterized margin.  The adrenal gland was not involved with carcinoma.  This was staged as pT3, pN0, pM0 disease.       The patient's CA 19-9 on 2021 was 22 which was in the range of normal.  The patient completed a course of adjuvant, concurrent chemoradiotherapy with Xeloda on 2022.     The patient did well, with surveillance CT scans showing no evidence of recurrent or metastatic disease for some time.  More recently, CT scans performed 3/6/2023 revealed some slightly enlarged retroperitoneal adenopathy.  CA 19-9 was within normal  limits.  The patient was then sent for a PET scan on 3/27/2023 that showed some hypermetabolism in the liver at the area adjacent to the resection, which would have been inside of her previous radiation field.  There was also uptake in a few lymph nodes lateral to the vena cava which were outside of her previous radiation ports.  The patient was discussed at tumor board and it was mutually felt that she did in fact have a local and regional recurrence based on the imaging.  The patient underwent a course of CyberKnife stereotactic body radiotherapy to a dose of 35 Gray in 5 fractions delivered to the recurrent liver lesion and involved lymph nodes, completing 5/8/2023.      Repeat labs showed improvement in both CA 19-9 and liver function tests.  Repeat CT scans of the chest, abdomen pelvis performed 7/10/2023 showed mixed response to treatment.  On one hand, there was clear interval improvement within the treated liver and lymph nodes.  On the other hand, there was evidence of progression just outside of the treatment field involving 2 lymph nodes along the pelvic brim at the lower portion of the aorta, right before the bifurcation to the comments.  This region was not previously treated as it was not very PET avid and technically would have been very difficult with the length of the field.  The patient underwent another course of CyberKnife stereotactic body radiotherapy to a dose of 35 Gray in 5 fractions delivered to the 2 enlarging lymph nodes that abut her previous radiation field, completing 8/11/2023.  Of note, a very small lung nodule in the left lung appeared somewhat new on imaging but was below the threshold for a PET scan or biopsy.    The patient tolerated treatment well.  She describes acute exacerbation of chronic fatigue, but has increased physical activity and is again going to the gym.  The patient was premedicated with Zofran and did not experience any nausea or vomiting related to treatment.   She did report some increased constipation shortly following, which was alleviated with MiraLAX and lactulose as needed.  At this point, bowel function is reportedly normal.  No new or worsening abdominal pain.  No bloating or early satiety.  Appetite is good, weight is stable.  No dyspnea, cough, or chest pain.        MEDICATIONS: Medication reconciliation for the patient was reviewed and confirmed in the electronic medical record.    Review of Systems   Constitutional:  Positive for fatigue.   Psychiatric/Behavioral:  The patient is nervous/anxious.    All other systems reviewed and are negative.        KPS 90%      Physical Exam  Vitals and nursing note reviewed.   Constitutional:       General: She is not in acute distress.     Appearance: She is well-developed.   HENT:      Head: Normocephalic and atraumatic.   Eyes:      General: No scleral icterus.     Conjunctiva/sclera: Conjunctivae normal.      Pupils: Pupils are equal, round, and reactive to light.   Neck:      Comments: No obviously enlarged cervical or supraclavicular LAD.  Cardiovascular:      Rate and Rhythm: Normal rate and regular rhythm.      Heart sounds: No murmur heard.    No friction rub.      Comments: Patient well perfused. Non cyanotic. No prominent JVD. No pedal edema  Pulmonary:      Effort: Pulmonary effort is normal.      Breath sounds: Normal breath sounds. No wheezing.   Abdominal:      General: Bowel sounds are normal. There is no distension.      Palpations: Abdomen is soft. There is no mass.      Tenderness: There is no abdominal tenderness.      Comments: No palpable organomegaly.  No tenderness with palpation.   Musculoskeletal:         General: Normal range of motion.      Cervical back: Normal range of motion and neck supple.      Comments: Patient moves all extremities spontaneously.     Lymphadenopathy:      Cervical: No cervical adenopathy.   Skin:     General: Skin is warm and dry.      Coloration: Skin is not jaundiced.    Neurological:      Mental Status: She is alert and oriented to person, place, and time.      Comments: Coordination intact.   Psychiatric:         Behavior: Behavior normal.         Thought Content: Thought content normal.         Judgment: Judgment normal.     VITAL SIGNS:   Vitals:    09/15/23 1000   BP: 154/93   Pulse: 76   Resp: 16   Temp: 97.4 °F (36.3 °C)   TempSrc: Temporal   SpO2: 99%   Weight: 71.5 kg (157 lb 9.6 oz)   PainSc: 0-No pain               The following portions of the patient's history were reviewed and updated as appropriate: allergies, current medications, past family history, past medical history, past social history, past surgical history and problem list.         Diagnoses and all orders for this visit:    1. Intrahepatic cholangiocarcinoma (Primary)         IMPRESSION:  Sheree Hernandez is an 83 y.o. female with local and regional recurrence of her previously resected cholangiocarcinoma.  She completed adjuvant chemoradiation therapy in 1/2021 and has been under surveillance with serial CT scans since that time.  More recently, she was found to have a recurrence in the liver in the previously treated area adjacent to her resection, as well as several lymph nodes mostly along the vena cava.  Despite these findings on imaging, her CA 19-9 has remained within normal limits.  Given that she did have failure in the field and this does appear to be a fairly slow-growing process, CyberKnife SBRT was recommended for the recurrent liver lesion and 3 enlarged/PET positive lymph nodes.  She completed CyberKnife SBRT on 5/8/2023.  The patient was noted to have interval improvement within the treated areas on subsequent CT imaging, despite now progressing just out of field within 2 lymph nodes along the pelvic brim.  The patient again underwent localized treatment with CyberKnife SBRT to the new colton recurrence which she completed on 8/11/2023.  The patient tolerated treatment well.  She did develop some  posttreatment fatigue and GI disturbance with constipation, and no significant acute radiation-related toxicities otherwise.  Clinically, she is doing well and without new or worsening concerns today.  Patient is scheduled for repeat CT scans of the chest, abdomen pelvis next month for ongoing surveillance and to evaluate response to treatment.  We discussed continuing to trend her CA 19-9, as well as follow CMP to make sure she does not have evidence of obstructive biliary issues.  The patient and I again reviewed that these treatments are unlikely to cure her cancer but may slow down the progression of disease.  We discussed that there is a high probability that she will recur distantly in the future, most likely in the lymph nodes.  We again reviewed follow-up intervals, serial imaging and lab work including tumor markers, and expectations for response to treatment.  The patient continues routine oncologic surveillance under the care of Dr. Montenegro and is scheduled to return to the radiation oncology clinic in 1 month following her repeat scans.      RECOMMENDATIONS:      Pelvic brim colton recurrence  -2 nodes at the bifurcation of the aorta enlarging on CT scan 7/10/2023  -Abuts previous radiation field  -Not treated previously as this area was PET negative              -Albeit the patient's recurrence was largely displaying very minimal PET avidity  -Status post CyberKnife SBRT 35 Gray in 5 fractions to these 2 lymph nodes with a small amount of elective coverage around the vessels in the area not previously treated   -Completed 8/11/2023  -Does not appear to have any additional recurrences in the pelvis              -Does have some chronically enlarged lymph nodes in the external iliac lymph node chains predating cancer diagnosis  -Follows with Dr. Montenegro  -Repeat CT CAP 10/9/2023  -RTC at that time       New lung nodule  -Patient has multiple small stable lung nodules  -New left lower lobe 6 mm nodule               -Was present on CT images from PET scan at the end of March 2023 but not at the beginning of March 2023  -Inflammatory versus neoplastic versus granulomatous with infectious etiology  -Too small for PET or biopsy  -Recommend close attention on upcoming CT scan       Liver and high RP Tumor recurrence  -On lateral aspect of residual left liver  -Also appears to be along the lateral vena cava lymph nodes  -Below previous radiation field  -Kidney is adjacent              -Complicates delivery              -Recent BUN/creatinine within normal limits  -NexGeneration sequencing with Dr. Montenegro  -Case discussed at multidisciplinary tumor board and with Dr. Sharma at   -Status post CyberKnife SBRT 35 Gray in 3 fractions              -Completed 5/8/2023  -Initial scans show good response in the liver and the nodes that were treated              -However she did have an early out of field recurrence inferiorly       Cholangiocarcinoma of the liver  -CA 19-9 8/8/2021: 84.7  -Status post resection with Dr. Sharma at  9/2021  -pT3, pN0, pM0   -Focally positive margins  -Tumor extended to the extrapelvic soft tissues between the liver and the adrenal gland  -LVSI and PNI present  -0/4 lymph nodes involved  -CA 19-9 11/5/2021: 22  -Completed Xeloda currently with radiation 1/19/2022  -Surveillance CT scans 3/2022-12/2022 showing no evidence of residual or metastatic carcinoma  -CT C/A/P 3/6/2023 showing some enlarging lymph nodes concerning for recurrence  -CA 19-9 3/6/2023: 21.0  -PET/CT scan 3/27/2023 concerning for 1 cm left liver lesion and 3 lymph nodes along the vena cava that were slightly hypermetabolic and concerning for recurrence  -Status post CyberKnife SBRT as above  -LFTs 6/13/2023 improving  -CA 19-9 6/13/2023: 17.4   -Continues to follow with Dr. Montenegro         Return in about 1 month (around 10/15/2023) for Office Visit.    LEDY De Leon    I spent a total of 40 minutes on today's visit,  with more than 20 minutes in direct face to face communication, and the remainder of the time spent in reviewing the relevant history, records, available imaging, and for documentation.

## 2023-09-19 ENCOUNTER — OFFICE VISIT (OUTPATIENT)
Dept: ENDOCRINOLOGY | Facility: CLINIC | Age: 83
End: 2023-09-19
Payer: MEDICARE

## 2023-09-19 ENCOUNTER — OFFICE VISIT (OUTPATIENT)
Dept: CARDIOLOGY | Facility: CLINIC | Age: 83
End: 2023-09-19
Payer: MEDICARE

## 2023-09-19 VITALS
BODY MASS INDEX: 26.63 KG/M2 | WEIGHT: 156 LBS | DIASTOLIC BLOOD PRESSURE: 89 MMHG | HEIGHT: 64 IN | HEART RATE: 80 BPM | SYSTOLIC BLOOD PRESSURE: 138 MMHG | OXYGEN SATURATION: 98 %

## 2023-09-19 VITALS
DIASTOLIC BLOOD PRESSURE: 74 MMHG | HEIGHT: 64 IN | HEART RATE: 72 BPM | BODY MASS INDEX: 26.98 KG/M2 | OXYGEN SATURATION: 99 % | WEIGHT: 158 LBS | SYSTOLIC BLOOD PRESSURE: 132 MMHG

## 2023-09-19 DIAGNOSIS — R06.09 DYSPNEA ON EXERTION: Primary | ICD-10-CM

## 2023-09-19 DIAGNOSIS — I10 PRIMARY HYPERTENSION: ICD-10-CM

## 2023-09-19 DIAGNOSIS — G47.33 OSA (OBSTRUCTIVE SLEEP APNEA): ICD-10-CM

## 2023-09-19 DIAGNOSIS — E78.5 DYSLIPIDEMIA: ICD-10-CM

## 2023-09-19 DIAGNOSIS — E04.2 NONTOXIC MULTINODULAR GOITER: Primary | ICD-10-CM

## 2023-09-19 PROCEDURE — 1159F MED LIST DOCD IN RCRD: CPT | Performed by: NURSE PRACTITIONER

## 2023-09-19 PROCEDURE — 3078F DIAST BP <80 MM HG: CPT | Performed by: INTERNAL MEDICINE

## 2023-09-19 PROCEDURE — 1160F RVW MEDS BY RX/DR IN RCRD: CPT | Performed by: NURSE PRACTITIONER

## 2023-09-19 PROCEDURE — 99213 OFFICE O/P EST LOW 20 MIN: CPT | Performed by: INTERNAL MEDICINE

## 2023-09-19 PROCEDURE — 93000 ELECTROCARDIOGRAM COMPLETE: CPT | Performed by: NURSE PRACTITIONER

## 2023-09-19 PROCEDURE — 76536 US EXAM OF HEAD AND NECK: CPT | Performed by: INTERNAL MEDICINE

## 2023-09-19 PROCEDURE — 3079F DIAST BP 80-89 MM HG: CPT | Performed by: NURSE PRACTITIONER

## 2023-09-19 PROCEDURE — 3075F SYST BP GE 130 - 139MM HG: CPT | Performed by: INTERNAL MEDICINE

## 2023-09-19 PROCEDURE — 99214 OFFICE O/P EST MOD 30 MIN: CPT | Performed by: NURSE PRACTITIONER

## 2023-09-19 PROCEDURE — 3075F SYST BP GE 130 - 139MM HG: CPT | Performed by: NURSE PRACTITIONER

## 2023-09-19 RX ORDER — TELMISARTAN 20 MG/1
20 TABLET ORAL DAILY
Qty: 90 TABLET | Refills: 3 | Status: SHIPPED | OUTPATIENT
Start: 2023-09-19

## 2023-09-19 NOTE — ASSESSMENT & PLAN NOTE
These nodules are stable and of no real concern. No intervention is needed. No routine repeat imaging is needed. An annual neck exam should suffice for follow up and if a palpable nodule is noted a repeat ultrasound could be considered

## 2023-09-19 NOTE — PROGRESS NOTES
"     Office Note      Date: 2023  Patient Name: Sheree Hernandez  MRN: [id number]  : 1940    Chief Complaint   Patient presents with    Thyroid Problem     goiter       History of Present Illness:   Sheree Hernandez is a 83 y.o. female who presents for Thyroid Problem (goiter)  .   Current rx:NONE     Changes in history:HAD TO HAVE RADIATION TO  HER  LIVER FOR RECURRENT CANCER  Questions /problems:NOTES NO TROUBLE SWALLOWING OR SWELLING IN HER NECK     Subjective          Review of Systems:   Review of Systems   Constitutional:  Positive for fatigue.   HENT:  Negative for trouble swallowing.      The following portions of the patient's history were reviewed and updated as appropriate: allergies, current medications, past family history, past medical history, past social history, past surgical history, and problem list.    Objective     Visit Vitals  /74 (BP Location: Right arm, Patient Position: Sitting, Cuff Size: Adult)   Pulse 72   Ht 162.6 cm (64\")   Wt 71.7 kg (158 lb)   SpO2 99%   BMI 27.12 kg/m²           Physical Exam:  Physical Exam  Vitals reviewed.   Constitutional:       Appearance: Normal appearance.   Neck:      Comments: THYROID IS IRREGULAR BUT I DON'T FEEL ANY DOMINANT NODULES  Lymphadenopathy:      Cervical: No cervical adenopathy.   Neurological:      Mental Status: She is alert.   Psychiatric:         Mood and Affect: Mood normal.         Behavior: Behavior normal.         Thought Content: Thought content normal.         Judgment: Judgment normal.     Assessment / Plan      Assessment & Plan:  Problem List Items Addressed This Visit          Other    Nontoxic multinodular goiter - Primary    Overview     14 mm cyst left lobe  9 mm isoechoic nodule right lobe  Multiple sub cm nodules .  tft's normal  ================  Took thyroid pills while pregnant  Daughter has thyroid issues.  No facial radiation exposure  Born and raised in [place]     22  Procedure: thyroid " ultrasound  Indication for procedure: thyroid nodule  Results. The overall size of the thyroid is normal. The right lobe has multiple tiny cysts. No concerning nodules are seen. There is a 1.2 cm cyst in the left lobe anteriorally and smaller 9 mm cystic/solid lesion posteriorally             Current Assessment & Plan     These nodules are stable and of no real concern. No intervention is needed. No routine repeat imaging is needed. An annual neck exam should suffice for follow up and if a palpable nodule is noted a repeat ultrasound could be considered          Relevant Orders    US Thyroid (Completed)        Alexsander Peng MD   09/19/2023

## 2023-09-27 ENCOUNTER — HOSPITAL ENCOUNTER (OUTPATIENT)
Dept: BONE DENSITY | Facility: HOSPITAL | Age: 83
Discharge: HOME | End: 2023-09-27
Admitting: INTERNAL MEDICINE
Payer: MEDICARE

## 2023-09-27 DIAGNOSIS — Z78.0 POSTMENOPAUSAL: ICD-10-CM

## 2023-09-27 PROCEDURE — 77080 DXA BONE DENSITY AXIAL: CPT

## 2023-10-09 ENCOUNTER — HOSPITAL ENCOUNTER (OUTPATIENT)
Dept: CT IMAGING | Facility: HOSPITAL | Age: 83
Discharge: HOME | End: 2023-10-09
Payer: MEDICARE

## 2023-10-09 ENCOUNTER — LAB (OUTPATIENT)
Dept: LAB | Facility: HOSPITAL | Age: 83
End: 2023-10-09
Payer: MEDICARE

## 2023-10-09 DIAGNOSIS — C22.1 INTRAHEPATIC CHOLANGIOCARCINOMA: ICD-10-CM

## 2023-10-09 LAB
ALBUMIN SERPL-MCNC: 3.8 G/DL (ref 3.5–5.2)
ALBUMIN/GLOB SERPL: 1.3 G/DL
ALP SERPL-CCNC: 178 U/L (ref 39–117)
ALT SERPL W P-5'-P-CCNC: 21 U/L (ref 1–33)
ANION GAP SERPL CALCULATED.3IONS-SCNC: 8 MMOL/L (ref 5–15)
AST SERPL-CCNC: 30 U/L (ref 1–32)
BASOPHILS # BLD AUTO: 0.02 10*3/MM3 (ref 0–0.2)
BASOPHILS NFR BLD AUTO: 0.5 % (ref 0–1.5)
BILIRUB SERPL-MCNC: 0.4 MG/DL (ref 0–1.2)
BUN SERPL-MCNC: 18 MG/DL (ref 8–23)
BUN/CREAT SERPL: 21.7 (ref 7–25)
CALCIUM SPEC-SCNC: 8.7 MG/DL (ref 8.6–10.5)
CANCER AG19-9 SERPL-ACNC: 15.4 U/ML
CHLORIDE SERPL-SCNC: 106 MMOL/L (ref 98–107)
CO2 SERPL-SCNC: 27 MMOL/L (ref 22–29)
CREAT BLDA-MCNC: 0.9 MG/DL (ref 0.6–1.3)
CREAT SERPL-MCNC: 0.83 MG/DL (ref 0.57–1)
DEPRECATED RDW RBC AUTO: 54.3 FL (ref 37–54)
EGFRCR SERPLBLD CKD-EPI 2021: 69.6 ML/MIN/1.73
EOSINOPHIL # BLD AUTO: 0.13 10*3/MM3 (ref 0–0.4)
EOSINOPHIL NFR BLD AUTO: 3.2 % (ref 0.3–6.2)
ERYTHROCYTE [DISTWIDTH] IN BLOOD BY AUTOMATED COUNT: 15 % (ref 12.3–15.4)
GLOBULIN UR ELPH-MCNC: 3 GM/DL
GLUCOSE SERPL-MCNC: 89 MG/DL (ref 65–99)
HCT VFR BLD AUTO: 42.3 % (ref 34–46.6)
HGB BLD-MCNC: 13.7 G/DL (ref 12–15.9)
IMM GRANULOCYTES # BLD AUTO: 0.01 10*3/MM3 (ref 0–0.05)
IMM GRANULOCYTES NFR BLD AUTO: 0.2 % (ref 0–0.5)
LYMPHOCYTES # BLD AUTO: 0.55 10*3/MM3 (ref 0.7–3.1)
LYMPHOCYTES NFR BLD AUTO: 13.6 % (ref 19.6–45.3)
MCH RBC QN AUTO: 31.6 PG (ref 26.6–33)
MCHC RBC AUTO-ENTMCNC: 32.4 G/DL (ref 31.5–35.7)
MCV RBC AUTO: 97.5 FL (ref 79–97)
MONOCYTES # BLD AUTO: 0.6 10*3/MM3 (ref 0.1–0.9)
MONOCYTES NFR BLD AUTO: 14.8 % (ref 5–12)
NEUTROPHILS NFR BLD AUTO: 2.74 10*3/MM3 (ref 1.7–7)
NEUTROPHILS NFR BLD AUTO: 67.7 % (ref 42.7–76)
NRBC BLD AUTO-RTO: 0 /100 WBC (ref 0–0.2)
PLATELET # BLD AUTO: 125 10*3/MM3 (ref 140–450)
PMV BLD AUTO: 13.3 FL (ref 6–12)
POTASSIUM SERPL-SCNC: 4.5 MMOL/L (ref 3.5–5.2)
PROT SERPL-MCNC: 6.8 G/DL (ref 6–8.5)
RBC # BLD AUTO: 4.34 10*6/MM3 (ref 3.77–5.28)
SODIUM SERPL-SCNC: 141 MMOL/L (ref 136–145)
WBC NRBC COR # BLD: 4.05 10*3/MM3 (ref 3.4–10.8)

## 2023-10-09 PROCEDURE — 71260 CT THORAX DX C+: CPT

## 2023-10-09 PROCEDURE — 74177 CT ABD & PELVIS W/CONTRAST: CPT

## 2023-10-09 PROCEDURE — 85025 COMPLETE CBC W/AUTO DIFF WBC: CPT

## 2023-10-09 PROCEDURE — 25510000001 IOPAMIDOL 61 % SOLUTION: Performed by: INTERNAL MEDICINE

## 2023-10-09 PROCEDURE — 86301 IMMUNOASSAY TUMOR CA 19-9: CPT

## 2023-10-09 PROCEDURE — 80053 COMPREHEN METABOLIC PANEL: CPT

## 2023-10-09 PROCEDURE — 82565 ASSAY OF CREATININE: CPT

## 2023-10-09 PROCEDURE — 36415 COLL VENOUS BLD VENIPUNCTURE: CPT

## 2023-10-09 RX ADMIN — IOPAMIDOL 85 ML: 612 INJECTION, SOLUTION INTRAVENOUS at 09:28

## 2023-10-10 ENCOUNTER — OFFICE VISIT (OUTPATIENT)
Dept: FAMILY MEDICINE CLINIC | Facility: CLINIC | Age: 83
End: 2023-10-10
Payer: MEDICARE

## 2023-10-10 VITALS
HEART RATE: 68 BPM | DIASTOLIC BLOOD PRESSURE: 84 MMHG | HEIGHT: 64 IN | BODY MASS INDEX: 27.14 KG/M2 | SYSTOLIC BLOOD PRESSURE: 140 MMHG | OXYGEN SATURATION: 96 % | WEIGHT: 159 LBS

## 2023-10-10 DIAGNOSIS — M81.0 OSTEOPOROSIS, UNSPECIFIED OSTEOPOROSIS TYPE, UNSPECIFIED PATHOLOGICAL FRACTURE PRESENCE: Primary | ICD-10-CM

## 2023-10-10 PROCEDURE — 3079F DIAST BP 80-89 MM HG: CPT | Performed by: INTERNAL MEDICINE

## 2023-10-10 PROCEDURE — G0008 ADMIN INFLUENZA VIRUS VAC: HCPCS | Performed by: INTERNAL MEDICINE

## 2023-10-10 PROCEDURE — 1159F MED LIST DOCD IN RCRD: CPT | Performed by: INTERNAL MEDICINE

## 2023-10-10 PROCEDURE — 90662 IIV NO PRSV INCREASED AG IM: CPT | Performed by: INTERNAL MEDICINE

## 2023-10-10 PROCEDURE — 99214 OFFICE O/P EST MOD 30 MIN: CPT | Performed by: INTERNAL MEDICINE

## 2023-10-10 PROCEDURE — 1160F RVW MEDS BY RX/DR IN RCRD: CPT | Performed by: INTERNAL MEDICINE

## 2023-10-10 PROCEDURE — 3077F SYST BP >= 140 MM HG: CPT | Performed by: INTERNAL MEDICINE

## 2023-10-10 NOTE — PROGRESS NOTES
Chief Complaint   Patient presents with    Imaging Only     Bone scan results    Osteopenia    HPI:  Sheree Hernandez is a 83 y.o. female who presents today for follow-up DEXA scan.  She would like to discuss resuming osteoporosis medication.    ROS:  Constitutional: no fevers, night sweats or unexplained weight loss  Eyes: no vision changes  ENT: no runny nose, ear pain, sore throat  Cardio: no chest pain, palpitations  Pulm: no shortness of breath, wheezing, or cough  GI: no abdominal pain or changes in bowel movements  : no difficulty urinating  MSK: no difficulty ambulating, no joint pain  Neuro: no weakness, dizziness or headache  Psych: no trouble sleeping  Endo: no change in appetite      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    Plantar fasciitis     Chronic    PVC's (premature ventricular contractions)     Senile keratosis     Multiple    MANAN (stress urinary incontinence, female)     Syncope, near     with negative cardiovascular workup      Family History   Problem Relation Age of Onset    Heart attack Mother     Heart failure Mother     Dementia Mother     Stroke Mother     Arthritis Mother         Heart Attack    Heart disease Father     Hearing loss Father     Arrhythmia Brother     Breast cancer Paternal Aunt       Social History     Socioeconomic History    Marital status:     Number of children: 2   Tobacco Use    Smoking status: Never    Smokeless tobacco: Never    Tobacco comments:      Exposed to secondhand smoke   Vaping Use    Vaping Use: Never used   Substance and Sexual Activity    Alcohol use: Not Currently     Comment: Very seldom    Drug use: Never    Sexual activity: Not Currently     Partners: Male     Birth control/protection: Hysterectomy     Comment: Complete Hysterectomy      Allergies   Allergen Reactions    Pravastatin Myalgia      Immunization History   Administered Date(s) Administered    COVID-19 (PFIZER) Purple Cap Monovalent 02/15/2021, 03/06/2021    Fluzone High Dose =>65 Years ([employer] ONLY) 11/03/2017, 09/11/2018, 10/15/2019    Fluzone High-Dose 65+yrs 10/10/2023    Influenza, Unspecified 10/10/2020, 10/07/2021    Pneumococcal Polysaccharide (PPSV23) 05/17/2013, 12/06/2016, 05/15/2018    TD Preservative Free (Tenivac) 11/09/2016    Td (TDVAX) 05/18/1997        PE:  Vitals:    10/10/23 0915   BP: 140/84   Pulse: 68   SpO2: 96%      Body mass index is 27.29 kg/mý.    Gen Appearance: NAD  HEENT: Normocephalic, PERRLA, no thyromegaly, trache midline  Heart: RRR, normal S1 and S2, no murmur  Lungs: CTA b/l, no wheezing, no crackles  Abdomen: Soft, non-tender, non-distended, no guarding and BSx4  MSK: Moves all extremities well, normal gait, no peripheral edema  Pulses: Palpable and equal b/l  Lymph nodes: No palpable lymphadenopathy   Neuro: No focal deficits      Current Outpatient Medications   Medication Sig Dispense Refill    Cholecalciferol (Vitamin D3) 25 MCG (1000 UT) capsule Take  by mouth Daily.      coenzyme Q10 50 MG capsule capsule Take  by mouth Daily.      Cyanocobalamin (VITAMIN B 12 PO) Take  by mouth.      Folic Acid 0.8 MG capsule Take  by mouth.      ipratropium (ATROVENT) 0.06 % nasal spray Instill 2 sprays into both nostrils as directed by provider 2 (Two) Times a Day. 15 mL 6    lactulose (CHRONULAC) 10 GM/15ML solution Take 30 mL by mouth 2 (two) times a day as needed for constipation. 150 mL 3    magnesium oxide (MAG-OX) 400 MG tablet Take 1 tablet by  mouth Daily.      Omega-3 1000 MG capsule Take  by mouth Daily.      rosuvastatin (CRESTOR) 10 MG tablet Take 1 tablet by mouth Daily. 90 tablet 3    telmisartan (MICARDIS) 20 MG tablet Take 1 tablet by mouth Daily. 90 tablet 3    Turmeric Curcumin 500 MG capsule Take 750 mg by mouth Daily.      Zinc 50 MG capsule Take  by mouth Daily.       No current facility-administered medications for this visit.      Referring to  [facility] mineral clinic for bisphosphonate infusions.  Increased FRAX score with osteopenia on recent DEXA scan.  Reviewed results with patient.    Counseling was given to patient for the following topics: diagnostic results, risks and benefits of treatment options, and importance of treatment compliance . Total time of the encounter was 30 minutes and 15 minutes was spent face to face counseling.      Diagnoses and all orders for this visit:    1. Osteoporosis, unspecified osteoporosis type, unspecified pathological fracture presence (Primary)  -     Ambulatory Referral to Nephrology    Other orders  -     Fluzone High-Dose 65+yrs ([id number])         No follow-ups on file.     Dictated Utilizing Dragon Dictation    Please note that portions of this note were completed with a voice recognition program.    Part of this note may be an electronic transcription/translation of spoken language to printed text using the Dragon Dictation System.

## 2023-10-13 ENCOUNTER — HOSPITAL ENCOUNTER (OUTPATIENT)
Dept: RADIATION ONCOLOGY | Facility: HOSPITAL | Age: 83
Setting detail: RADIATION/ONCOLOGY SERIES
Discharge: HOME | End: 2023-10-13
Payer: MEDICARE

## 2023-10-13 ENCOUNTER — OFFICE VISIT (OUTPATIENT)
Dept: ONCOLOGY | Facility: CLINIC | Age: 83
End: 2023-10-13
Payer: MEDICARE

## 2023-10-13 ENCOUNTER — OFFICE VISIT (OUTPATIENT)
Dept: RADIATION ONCOLOGY | Facility: HOSPITAL | Age: 83
End: 2023-10-13
Payer: MEDICARE

## 2023-10-13 VITALS
BODY MASS INDEX: 27.22 KG/M2 | WEIGHT: 158.6 LBS | OXYGEN SATURATION: 97 % | TEMPERATURE: 97 F | SYSTOLIC BLOOD PRESSURE: 177 MMHG | HEART RATE: 75 BPM | DIASTOLIC BLOOD PRESSURE: 74 MMHG | RESPIRATION RATE: 16 BRPM

## 2023-10-13 VITALS
DIASTOLIC BLOOD PRESSURE: 74 MMHG | SYSTOLIC BLOOD PRESSURE: 177 MMHG | HEIGHT: 64 IN | RESPIRATION RATE: 16 BRPM | TEMPERATURE: 97 F | WEIGHT: 158.51 LBS | BODY MASS INDEX: 27.06 KG/M2 | HEART RATE: 75 BPM | OXYGEN SATURATION: 97 %

## 2023-10-13 DIAGNOSIS — C22.1 INTRAHEPATIC CHOLANGIOCARCINOMA: Primary | ICD-10-CM

## 2023-10-13 DIAGNOSIS — R91.1 LUNG NODULE: ICD-10-CM

## 2023-10-13 PROCEDURE — 1126F AMNT PAIN NOTED NONE PRSNT: CPT | Performed by: INTERNAL MEDICINE

## 2023-10-13 PROCEDURE — 3077F SYST BP >= 140 MM HG: CPT | Performed by: INTERNAL MEDICINE

## 2023-10-13 PROCEDURE — 99214 OFFICE O/P EST MOD 30 MIN: CPT | Performed by: INTERNAL MEDICINE

## 2023-10-13 PROCEDURE — 3078F DIAST BP <80 MM HG: CPT | Performed by: INTERNAL MEDICINE

## 2023-10-13 PROCEDURE — G0463 HOSPITAL OUTPT CLINIC VISIT: HCPCS

## 2023-10-13 NOTE — PROGRESS NOTES
Follow Up Office Visit      Date: 10/13/2023     Patient Name: Sheree Hernandez  MRN: [id number]  : 1940  Chief Complaint:  Follow-up for intrahepatic cholangiocarcinoma      History of Present Illness: Sheree Hernandez is a pleasant 79 y.o. female with a past medical history of hyperlipidemia and hypertension who presents today for evaluation of concern for hemochromatosis. The patient is accompanied by their self who contributes to the history of their care.  Patient states that over the past 2 years she has been having worsening fatigue especially with exertion.  Over the past several months this has become worse.  Patient states that she gets tired with basic activities including showering getting dressed in the morning and walking to and from her car from stores.  She states that her fatigue gets better after a few minutes of rest.  She has previously had an echocardiogram and cardiac catheterization within the past 2 years which did not reveal any cause of her fatigue with exertion.  She is also had an ultrasound of the liver which revealed stable cyst.  She was recently seen by her PCP who ordered iron studies which was notable for an elevated ferritin to 246.  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  All other mutations were negative.  She is currently up-to-date on her mammograms and colonoscopy with no active malignancies noted.  She is otherwise compliant with her statin and high blood pressure medicines.  Repeat abdominal imaging in 2021 concerning for enlarging liver lesion.  She was seen by Dr. Sharma and  is status post open right hepatectomy, cholecystectomy, right partial adrenalectomy on 2021.  Pathology showed a focal R1 resected margin.  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  0/4 lymph nodes were  positive for disease.  LVI and PNI were present.  Finished chemoXRT in January 2021.      Interval History:  Presents to clinic for follow-up.  Completed radiation in May 2023 and again in August 2023.  Denies any abdominal pain or discomfort.  Eating and drinking well and weight overall stable    Oncology History:    Oncology/Hematology History   Intrahepatic cholangiocarcinoma   8/17/2021 Initial Diagnosis    Intrahepatic cholangiocarcinoma (CMS/HCC)     8/17/2021 Cancer Staged    Staging form: Intrahepatic Bile Duct, AJCC 8th Edition  - Clinical: Stage IB (cT1b, cN0, cM0) - Signed by Jorje Montenegro MD on 8/17/2021 11/5/2021 Cancer Staged    Staging form: Intrahepatic Bile Duct, AJCC 8th Edition  - Pathologic: Stage IIIA (pT3, pN0, cM0) - Signed by Jorje Montenegro MD on 11/5/2021 12/9/2021 -  Chemotherapy    OP GALLBLADDER Capecitabine + XRT     12/9/2021 - 1/19/2022 Radiation    Radiation OncologyTreatment Course:  Sheree Hernandez received 5040 cGy in 28 fractions to liver via External Beam Radiation - EBRT.     4/25/2023 - 5/8/2023 Radiation    Radiation OncologyTreatment Course:  Sheree Hernandez received 3500 cGy in 5 fractions to liver mass and lymph nodes via Stereotactic Radiation Therapy - SRT.     8/1/2023 - 8/11/2023 Radiation    Radiation OncologyTreatment Course:  Sheree Hernandez received 3500 cGy in 5 fractions to abdomen via Stereotactic Radiation Therapy - SRT.         Subjective      Review of Systems:   Constitutional: Negative for fevers, chills, or weight loss  Eyes: Negative for blurred vision or discharge         Ear/Nose/Throat: Negative for difficulty swallowing, sore throat, LAD                                                       Respiratory: Negative for cough, SOA, wheezing                                                                                        Cardiovascular: Negative for chest pain or palpitations                                                                   Gastrointestinal: Negative for nausea, vomiting or diarrhea                                                                     Genitourinary: Negative for dysuria or hematuria                                                                                           Musculoskeletal: Negative for any joint pains or muscle aches                                                                        Neurologic: Negative for any weakness, headaches, dizziness                                                                         Hematologic: Negative for any easy bleeding or bruising                                                                                   Psychiatric: Negative for anxiety or depression                          Past Medical History/Past Surgical History/ Family History/ Social History: Reviewed by me and unchanged from my previous documentation done on July 2023.     Medications:     Current Outpatient Medications:     Cholecalciferol (Vitamin D3) 25 MCG (1000 UT) capsule, Take  by mouth Daily., Disp: , Rfl:     coenzyme Q10 50 MG capsule capsule, Take  by mouth Daily., Disp: , Rfl:     Cyanocobalamin (VITAMIN B 12 PO), Take  by mouth., Disp: , Rfl:     Folic Acid 0.8 MG capsule, Take  by mouth., Disp: , Rfl:     ipratropium (ATROVENT) 0.06 % nasal spray, Instill 2 sprays into both nostrils as directed by provider 2 (Two) Times a Day., Disp: 15 mL, Rfl: 6    lactulose (CHRONULAC) 10 GM/15ML solution, Take 30 mL by mouth 2 (two) times a day as needed for constipation., Disp: 150 mL, Rfl: 3    magnesium oxide (MAG-OX) 400 MG tablet, Take 1 tablet by mouth Daily., Disp: , Rfl:     Omega-3 1000 MG capsule, Take  by mouth Daily., Disp: , Rfl:     rosuvastatin (CRESTOR) 10 MG tablet, Take 1 tablet by mouth Daily., Disp: 90 tablet, Rfl: 3    telmisartan (MICARDIS) 20 MG tablet, Take 1 tablet by mouth Daily., Disp: 90 tablet, Rfl: 3    Turmeric Curcumin 500 MG capsule, Take 750 mg by mouth Daily.,  "Disp: , Rfl:     Zinc 50 MG capsule, Take  by mouth Daily., Disp: , Rfl:     Allergies:   Allergies   Allergen Reactions    Pravastatin Myalgia       Objective     Physical Exam:  Vital Signs:   Vitals:    10/13/23 0947   BP: 177/74   Pulse: 75   Resp: 16   Temp: 97 øF (36.1 øC)   TempSrc: Temporal   SpO2: 97%   Weight: 71.9 kg (158 lb 8.2 oz)   Height: 162.6 cm (64\")  Comment: per pt   PainSc: 0-No pain     Pain Score    10/13/23 0947   PainSc: 0-No pain     ECOG Performance Status: 0 - Asymptomatic    Constitutional: NAD, ECOG 0  Eyes: PERRLA, scleral anicteric  ENT: No LAD, no thyromegaly  Respiratory: CTAB, no wheezing, rales, rhonchi  Cardiovascular: RRR, no murmurs, pulses 2+ bilaterally  Abdomen: soft, NT/ND, no HSM  Musculoskeletal: strength 5/5 bilaterally, no c/c/e  Neurologic: A&O x 3, CN II-XII intact grossly    Results Review:   Hospital Outpatient Visit on 10/09/2023   Component Date Value Ref Range Status    Creatinine 10/09/2023 0.90  0.60 - 1.30 mg/dL Final    Serial Number: [id number]:  [id number]   Lab on 10/09/2023   Component Date Value Ref Range Status    Glucose 10/09/2023 89  65 - 99 mg/dL Final    BUN 10/09/2023 18  8 - 23 mg/dL Final    Creatinine 10/09/2023 0.83  0.57 - 1.00 mg/dL Final    Sodium 10/09/2023 141  136 - 145 mmol/L Final    Potassium 10/09/2023 4.5  3.5 - 5.2 mmol/L Final    Chloride 10/09/2023 106  98 - 107 mmol/L Final    CO2 10/09/2023 27.0  22.0 - 29.0 mmol/L Final    Calcium 10/09/2023 8.7  8.6 - 10.5 mg/dL Final    Total Protein 10/09/2023 6.8  6.0 - 8.5 g/dL Final    Albumin 10/09/2023 3.8  3.5 - 5.2 g/dL Final    ALT (SGPT) 10/09/2023 21  1 - 33 U/L Final    AST (SGOT) 10/09/2023 30  1 - 32 U/L Final    Alkaline Phosphatase 10/09/2023 178 (H)  39 - 117 U/L Final    Total Bilirubin 10/09/2023 0.4  0.0 - 1.2 mg/dL Final    Globulin 10/09/2023 3.0  gm/dL Final    Calculated Result    A/G Ratio 10/09/2023 1.3  g/dL Final    BUN/Creatinine Ratio 10/09/2023 21.7  7.0 - 25.0 " Final    Anion Gap 10/09/2023 8.0  5.0 - 15.0 mmol/L Final    eGFR 10/09/2023 69.6  >60.0 mL/min/1.73 Final    CA 19-9 10/09/2023 15.4  <=35.0 U/mL Final    WBC 10/09/2023 4.05  3.40 - 10.80 10*3/mm3 Final    RBC 10/09/2023 4.34  3.77 - 5.28 10*6/mm3 Final    Hemoglobin 10/09/2023 13.7  12.0 - 15.9 g/dL Final    Hematocrit 10/09/2023 42.3  34.0 - 46.6 % Final    MCV 10/09/2023 97.5 (H)  79.0 - 97.0 fL Final    MCH 10/09/2023 31.6  26.6 - 33.0 pg Final    MCHC 10/09/2023 32.4  31.5 - 35.7 g/dL Final    RDW 10/09/2023 15.0  12.3 - 15.4 % Final    RDW-SD 10/09/2023 54.3 (H)  37.0 - 54.0 fl Final    MPV 10/09/2023 13.3 (H)  6.0 - 12.0 fL Final    Platelets 10/09/2023 125 (L)  140 - 450 10*3/mm3 Final    Neutrophil % 10/09/2023 67.7  42.7 - 76.0 % Final    Lymphocyte % 10/09/2023 13.6 (L)  19.6 - 45.3 % Final    Monocyte % 10/09/2023 14.8 (H)  5.0 - 12.0 % Final    Eosinophil % 10/09/2023 3.2  0.3 - 6.2 % Final    Basophil % 10/09/2023 0.5  0.0 - 1.5 % Final    Immature Grans % 10/09/2023 0.2  0.0 - 0.5 % Final    Neutrophils, Absolute 10/09/2023 2.74  1.70 - 7.00 10*3/mm3 Final    Lymphocytes, Absolute 10/09/2023 0.55 (L)  0.70 - 3.10 10*3/mm3 Final    Monocytes, Absolute 10/09/2023 0.60  0.10 - 0.90 10*3/mm3 Final    Eosinophils, Absolute 10/09/2023 0.13  0.00 - 0.40 10*3/mm3 Final    Basophils, Absolute 10/09/2023 0.02  0.00 - 0.20 10*3/mm3 Final    Immature Grans, Absolute 10/09/2023 0.01  0.00 - 0.05 10*3/mm3 Final    nRBC 10/09/2023 0.0  0.0 - 0.2 /100 WBC Final       CT Abdomen Pelvis With Contrast    Result Date: 10/9/2023  Narrative: CT CHEST W CONTRAST DIAGNOSTIC, CT ABDOMEN PELVIS W CONTRAST Date of Exam: 10/9/2023 9:09 AM EDT Indication: cholangiocarcinoma. Comparison: Chest abdomen pelvis CT scan 2/17/2023, also prior PET/CT scan of 3/27/2023 Technique: Axial CT images were obtained of the chest after the uneventful intravenous administration of 85 mL Isovue-300.  Reconstructed coronal and sagittal images  were also obtained. Automated exposure control and iterative construction methods were used. Findings: Previous exam reports from 7/10/2023 note small new 6 mm left lower lobe pulmonary nodule and minimal enlargement of right middle lobe nodule. Numerous other micronodules. Mildly increased retroperitoneal lymph nodes compared to prior exam, stable post right hepatectomy changes. CT SCAN OF THE CHEST WITH IV CONTRAST: 8 mm left lower pole thyroid nodule or cyst is unchanged. No new or increasing mediastinal or hilar adenopathy is seen. No pericardial or pleural effusion is identified. There is no significant coronary artery calcium. Pulmonary arteries are only moderately opacified on this exam but probably sufficient to exclude significant embolic disease. Multiple micronodules in the lungs are generally, minimally more prominent, either stable in size, or increased by 0.5 to 1 mm from the prior study. The somewhat larger round medial left lower lobe pulmonary nodule has increased from approximately 4.5 mm  to approximately 6 mm. Previously noted right middle lobe nodule, by my measurement previously 3.5 mm, today measures 4.5 mm. Posterior right lower lobe nodule, previously 3 mm now measures 4 mm. No clearly new nodules or other new pulmonary parenchymal  disease are seen elsewhere. Trace linear scarring associated the left major fissure is stable. Bony structures appear to be intact.     Impression: Minimal progression of patient's micronodular disease, most nodules appearing either stable or 1 mm increased in size from 7/10/2023. No clearly new pulmonary parenchymal nodules or other new chest disease elsewhere. CT SCAN OF THE ABDOMEN PELVIS WITH IV CONTRAST: There is expected left lobe liver hypertrophy following right lobe liver resection. There are multiple nonenhancing hepatic cysts unchanged. No new hepatic lesions are identified. Resection margin of the right liver lobe appears stable, with no evidence of  recurrent mass. Portal, hepatic, splenic and superior mesenteric veins opacify normally with contrast. Spleen is not enlarged. Pancreas, left adrenal gland, and kidneys appear within normal limits. Right adrenal gland is difficult to identify but apparently is not enlarged, and it may be included within the postoperative scarring in the right upper abdomen. No ascites or inflammatory change is seen. Bowel loops are normal in caliber and appearance. There are shotty retroperitoneal lymph nodes which appear similar to the prior study overall but mildly changed in size. Previous 15 mm precaval node on  image 75 of series 5 prior study today measures 11 mm, image 75 series 2. Pericaval node slightly cephalad of this level, between the IVC and aorta is minimally increased from 6.5mm to 8 mm. No clearly new nodes or significantly enlarged nodes are seen. Regarding the lower abdomen and pelvis, bladder is nondistended and normal in appearance. Uterus and ovaries appear to be surgically absent or atrophic. No intrapelvic free fluid or adenopathy is identified. Delayed venous phase images show no evidence of obstructive uropathy. Bony structures appear to be intact. IMPRESSION: 1. Stable postoperative appearance of the liver, the right liver lobe resection, left liver lobe hypertrophy and multiple nonenhancing left renal cysts. No evidence of recurrent mass. 2. Shotty retroperitoneal lymph nodes, 1 of which is minimally increased, 1 of which is minimally decreased, but no marked overall change. No evidence of metastatic disease or other new intra-abdominal or intrapelvic disease elsewhere. Electronically Signed: Salvador Gill MD  10/9/2023 10:09 AM EDT  Workstation ID: [id number]    CT Chest With Contrast Diagnostic    Result Date: 10/9/2023  Narrative: CT CHEST W CONTRAST DIAGNOSTIC, CT ABDOMEN PELVIS W CONTRAST Date of Exam: 10/9/2023 9:09 AM EDT Indication: cholangiocarcinoma. Comparison: Chest abdomen pelvis CT scan  2/17/2023, also prior PET/CT scan of 3/27/2023 Technique: Axial CT images were obtained of the chest after the uneventful intravenous administration of 85 mL Isovue-300.  Reconstructed coronal and sagittal images were also obtained. Automated exposure control and iterative construction methods were used. Findings: Previous exam reports from 7/10/2023 note small new 6 mm left lower lobe pulmonary nodule and minimal enlargement of right middle lobe nodule. Numerous other micronodules. Mildly increased retroperitoneal lymph nodes compared to prior exam, stable post right hepatectomy changes. CT SCAN OF THE CHEST WITH IV CONTRAST: 8 mm left lower pole thyroid nodule or cyst is unchanged. No new or increasing mediastinal or hilar adenopathy is seen. No pericardial or pleural effusion is identified. There is no significant coronary artery calcium. Pulmonary arteries are only moderately opacified on this exam but probably sufficient to exclude significant embolic disease. Multiple micronodules in the lungs are generally, minimally more prominent, either stable in size, or increased by 0.5 to 1 mm from the prior study. The somewhat larger round medial left lower lobe pulmonary nodule has increased from approximately 4.5 mm  to approximately 6 mm. Previously noted right middle lobe nodule, by my measurement previously 3.5 mm, today measures 4.5 mm. Posterior right lower lobe nodule, previously 3 mm now measures 4 mm. No clearly new nodules or other new pulmonary parenchymal  disease are seen elsewhere. Trace linear scarring associated the left major fissure is stable. Bony structures appear to be intact.     Impression: Minimal progression of patient's micronodular disease, most nodules appearing either stable or 1 mm increased in size from 7/10/2023. No clearly new pulmonary parenchymal nodules or other new chest disease elsewhere. CT SCAN OF THE ABDOMEN PELVIS WITH IV CONTRAST: There is expected left lobe liver hypertrophy  following right lobe liver resection. There are multiple nonenhancing hepatic cysts unchanged. No new hepatic lesions are identified. Resection margin of the right liver lobe appears stable, with no evidence of recurrent mass. Portal, hepatic, splenic and superior mesenteric veins opacify normally with contrast. Spleen is not enlarged. Pancreas, left adrenal gland, and kidneys appear within normal limits. Right adrenal gland is difficult to identify but apparently is not enlarged, and it may be included within the postoperative scarring in the right upper abdomen. No ascites or inflammatory change is seen. Bowel loops are normal in caliber and appearance. There are shotty retroperitoneal lymph nodes which appear similar to the prior study overall but mildly changed in size. Previous 15 mm precaval node on  image 75 of series 5 prior study today measures 11 mm, image 75 series 2. Pericaval node slightly cephalad of this level, between the IVC and aorta is minimally increased from 6.5mm to 8 mm. No clearly new nodes or significantly enlarged nodes are seen. Regarding the lower abdomen and pelvis, bladder is nondistended and normal in appearance. Uterus and ovaries appear to be surgically absent or atrophic. No intrapelvic free fluid or adenopathy is identified. Delayed venous phase images show no evidence of obstructive uropathy. Bony structures appear to be intact. IMPRESSION: 1. Stable postoperative appearance of the liver, the right liver lobe resection, left liver lobe hypertrophy and multiple nonenhancing left renal cysts. No evidence of recurrent mass. 2. Shotty retroperitoneal lymph nodes, 1 of which is minimally increased, 1 of which is minimally decreased, but no marked overall change. No evidence of metastatic disease or other new intra-abdominal or intrapelvic disease elsewhere. Electronically Signed: Salvador Gill MD  10/9/2023 10:09 AM EDT  Workstation ID: [id number]    DEXA Bone Density Axial    Result Date:  "9/27/2023  Narrative: DUAL-ENERGY X-RAY ABSORPTIOMETRY (DXA) INDICATION: Postmenopausal, screening for osteoporosis, parental hip fracture, prior fracture, cancer, hysterectomy COMPARISON: Previous bone mineral density exam performed on 5/22/2018 PROCEDURE: A DXA scan was performed using a Hologic densitometer. The lumbar spine L1-L4 was evaluated as well as left total hip. The T-score compares the patient's bone mineral density with the peak bone mass of young normal patients.  According to criteria established by the World Health Organization, patients with T-scores  between 1.0 and 2.5 standard deviations BELOW the mean are osteopenic (low bone mass).  Patients with T-scores EQUAL TO OR GREATER than 2.5 standard deviations below the mean are osteoporotic. The Z-score compares the patient bone mineral density with age and sex matched peers.  According to the International Society for Clinical Densitometry's 2007 consensus conference:  In women prior to menopause and men less than age 50, Z-scores, not T-scores are preferred.  A Z-score of -2.0 or lower is defined as \"below the expected range for age\" and a Z-score above -2.0 is \"within the expected range for age.\"  The WHO diagnostic criteria may be applied in women in the menopausal transition.  Osteoporosis cannot be diagnosed in men under age 50 on the basis of BMD alone. TECHNICAL QUALITY:  The study is of good technical quality. RESULTS: Lumbar Spine:  The BMD measured in the L1-L4 region is 0.969 g/cm2.  The average T-score is -0.7.  The Z-score is 2.1. Total Hip:  The BMD measured at the left total proximal femur is 0.780 g/cm2.  The T-score is -1.3.  The Z-score is 1.0. Femoral Neck:  The BMD measured at the left femoral neck is 0.634 g/cm2.  The T-score is -1.9.  The Z-score is 0.5.     Impression: Osteopenia of the left femoral neck, and total left hip. The ten year fracture risk assessment is calculated at 38% for major systemic osteoporotic fracture " and 24% for a hip fracture. Less than 3% risk in the [place] for hip fracture and less than 20% risk of any systemic osteoporotic fracture is considered less than the threshold for where pharmacological therapy is recommended by the National Osteoporosis Foundation. All the treatment decisions require clinical judgment and consideration of individual patient factors, including patient preferences, co-morbidities, previous drug use, risk factors not captured in the FRAX model (frailty, falls, vitamin D deficiency, increased bone turnover, interval significant decline in bone density) and possible under or over estimation of fracture risk by FRAX. Approaches to reduce osteoporosis related fracture risk include optimizing calcium and vitamin D status, appropriate weight bearing exercises and fall-prevention measurements.  The National Osteoporosis Foundation recommends (http://www.nof.org/hcp/practice/practice-and-clinical-guidelines/clinicians-guide) that FDA-approved medical therapies be considered in postmenopausal women and men aged equal or greater than 50 years with :  a) hip or vertebral (clinical or morphometric) fracture; b) T-score of -2.5 or less at the spine or hip; c) Ten-year fracture probability by FRAX of greater than 3% for hip fracture  of greater than 20% for major osteoporotic fracture.  Secondary causes of bone loss should be evaluated if clinically indicated since the etiology of low BMD cannot be determined by BMD measurement alone. FOLLOWUP: Consider repeating the study in 2-3 years to reassess the patient's status or sooner if there is some new clinical indication. INTERVAL CHANGE:   There was a decrease in bone mineral density of the L1-L4 vertebrae by 2.7%, in total left hip by 11.8% when compared to previous study performed on 5/22/2018.   At this facility, the least significant change in the BMD at the left hip with 95% confidence is 0.705397 gm/cm2 at the hip and 0.097310 g/cm2 at  the lumbar spine. Electronically Signed: Mirian Moses MD  9/27/2023 3:24 PM EDT  Workstation ID: [id number]    US Thyroid    Result Date: 9/19/2023  Narrative: Please see performing physician's note for result.     Assessment / Plan      Assessment/Plan:   Intrahepatic cholangiocarcinoma (CMS/HCC) (Primary)  -Noted during her most recent hospitalization with CT scans concerning for an enlarging liver lesion to 7.3 cm that was previously noted to be hemangioma  -Triple phase CT concerning for a solid tumor lesion  -Biopsy consistent with an adenocarcinoma  -CA 19-9 elevated to 84.7  -CT chest as well as the rest of the CT abdomen/pelvis not concerning for distant or metastatic disease  -Status post open right hepatectomy, cholecystectomy, right partial adrenalectomy on 9/23/2021 with Dr. 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  0/4 lymph nodes were positive for disease.  LVI and PNI were present.  Focal R1 resection margin  -Discussed with patient and her daughter that she would benefit from adjuvant chemoXRT using Xeloda.  Discussed side effects including but not limited to immunosuppression, diarrhea, abdominal pain, nausea, vomiting, hand/foot syndrome  -Repeat CA 19-9 (22) in November 2021  -Completed chemo XRT with Xeloda in January 2021  -Repeat scans in March 2022 without any evidence of recurrent or metastatic disease  -Repeat CT C/A/P in June 2022 without evidence of recurrent or metastatic disease.  Did note some IVC stenosis which we will monitor with her next scans  -Repeat CT C/A/P in September 2022 reviewed without evidence of recurrent disease or metastatic disease.  IVC stenosis overall stable  -Repeat CT C/A/P in December 2022 reviewed without evidence of recurrent or metastatic disease.  CA 19-9 within normal limits  -Repeat CT C/A/P in March 2023 reviewed and notable for some slight  enlarging retroperitoneal adenopathy.  CA 19-9 within normal limits  -PET/CT concerning for 1 cm left liver lesion that was only slightly PET avid as well as 2 lymph nodes that were also slightly PET avid  -Previously discussed her case at tumor board and with Dr. Sharma we agreed that she likely had disease progression  -Status post radiation completed in May 2023  -CA 19-9 in June 2022 within normal limits.    -CT C/A/P in July 2023 reviewed and showed some interval decrease in some lymph nodes as well as some slight enlargement of a couple lymph nodes.  -Completed radiation in August 2023  -CT C/A/P in October 2023 reviewed and only notable for slight increase in some pulmonary nodules about 1 mm each  -CA 19-9 improving and she feels well.  We will hold off on treatment at this point in time and continue with surveillance with scans in 2 months.  Ordered today  -Discussed the case with Dr. Billings who is agreeable to this plan    Hemochromatosis carrier  -Noted on recent labs with an elevated ferritin to 246 hemochromatosis gene profile positive for heterozygosity of H63D.  Other genes negative.  Given patient's age and previous cardiac liver work-up showing no evidence of dysfunction, it is unlikely that she has had iron deposition all this time.  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  Treatment as above  -ECHO in July 2020 within normal limits  -Repeat iron studies in April 2021 stable with a ferritin of 229, iron level 100, transferrin saturation 27%  -Low concern for iron overload at this time.      Thyroid nodule  -Incidentally noted on CT scan but enlarging from previous CT  -Thyroid ultrasound in June 2022 only notable for goiter and small nodules  nonconcerning for malignancy  -Has been seen by endocrinology with plan for repeat ultrasound in the summer of next year     Other fatigue   -Continued at this time  -Previously checked iron studies, vitamin B12, folate, thyroid studies within normal limits  -Was previously been seen by cardiology with a normal ECHO and stress test  -Likely related to all her cancer related treatments  -Advised patient to continue activity as tolerated         Follow Up:   Follow-up in 2 months     Jorje Montenegro MD  Hematology and Oncology     Please note that portions of this note may have been completed with a voice recognition program. Efforts were made to edit the dictations, but occasionally words are mistranscribed.

## 2023-10-13 NOTE — PROGRESS NOTES
FOLLOW UP NOTE    PATIENT:                                                      Sheree Hernandez  MEDICAL RECORD #:                        [id number]  :                                                          1940  COMPLETION DATE:   2023  DIAGNOSIS:     Recurrent intrahepatic cholangiocarcinoma  - Clinical Stage IB (cT1b, cN0, cM0)  - Pathologic Stage IIIA (pT3, pN0, cM0)    Seeing today to re-evaluate untreated lung nodules and new enlarged abdominal node for potential treatments as seen within 90 days of last treatments.    BRIEF HISTORY:  Sheree Hernandez is a very pleasant 83 y.o. female who has previously suffered from chronic fatigue and was sent for initial work-up that consisted of a heart cath and multiple other interventions.  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  The patient was then found to have an enlarging liver lesion and an AFP that was 9.27 and a CA 19-9 that was 85 on 2021.  The patient was sent for biopsy of her liver lesion in the inferior right lobe of the liver on 8/10/2021 that was consistent with adenocarcinoma.     The patient was taken for right-sided hepatectomy cholecystectomy and partial adrenalectomy on 2021 with Dr. Sharma at .  Pathology revealed a moderate to poorly differentiated intrahepatic cholangiocarcinoma.  Her tumor measured 8.5 cm.  0/4 lymph nodes were involved but the patient did have LVSI and PNI.  The tumor did extend to the extrahepatic soft tissue between the liver and adrenal gland.  There was tumor positive focally at the cauterized margin.  The adrenal gland was not involved with carcinoma.  This was staged as pT3, pN0, pM0 disease.       The patient's CA 19-9 on 2021 was 22 which was in the range of normal.  The patient completed a course of adjuvant, concurrent chemoradiotherapy with Xeloda on 2022.     The patient did well, with surveillance CT scans showing no evidence of recurrent or metastatic  disease for some time.  More recently, CT scans performed 3/6/2023 revealed some slightly enlarged retroperitoneal adenopathy.  CA 19-9 was within normal limits.  The patient was then sent for a PET scan on 3/27/2023 that showed some hypermetabolism in the liver at the area adjacent to the resection, which would have been inside of her previous radiation field.  There was also uptake in a few lymph nodes lateral to the vena cava which were outside of her previous radiation ports.  The patient was discussed at tumor board and it was mutually felt that she did in fact have a local and regional recurrence based on the imaging.  The patient underwent a course of CyberKnife stereotactic body radiotherapy to a dose of 35 Gray in 5 fractions delivered to the recurrent liver lesion and involved lymph nodes, completing 5/8/2023.      Repeat labs showed improvement in both CA 19-9 and liver function tests.  Repeat CT scans of the chest, abdomen pelvis performed 7/10/2023 showed mixed response to treatment.  On one hand, there was clear interval improvement within the treated liver and lymph nodes.  On the other hand, there was evidence of progression just outside of the treatment field involving 2 lymph nodes along the pelvic brim at the lower portion of the aorta, right before the bifurcation to the comments.  This region was not previously treated as it was not very PET avid and technically would have been very difficult with the length of the field.  The patient underwent another course of CyberKnife stereotactic body radiotherapy to a dose of 35 Gray in 5 fractions delivered to the 2 enlarging lymph nodes that abut her previous radiation field, completing 8/11/2023.     The patient feels well and reports that her pain is improved. No new complaints.      MEDICATIONS: Medication reconciliation for the patient was reviewed and confirmed in the electronic medical record.    Review of Systems - Oncology    Karnofsky score: 90    KPS 90%      Physical Exam  Vitals and nursing note reviewed.   Constitutional:       General: She is not in acute distress.     Appearance: She is well-developed.   HENT:      Head: Normocephalic and atraumatic.   Eyes:      General: No scleral icterus.     Conjunctiva/sclera: Conjunctivae normal.      Pupils: Pupils are equal, round, and reactive to light.   Neck:      Comments: No obviously enlarged cervical or supraclavicular LAD.  Cardiovascular:      Rate and Rhythm: Normal rate and regular rhythm.      Heart sounds: No murmur heard.    No friction rub.      Comments: Patient well perfused. Non cyanotic. No prominent JVD. No pedal edema  Pulmonary:      Effort: Pulmonary effort is normal.      Breath sounds: Normal breath sounds. No wheezing.   Abdominal:      General: Bowel sounds are normal. There is no distension.      Palpations: Abdomen is soft. There is no mass.      Tenderness: There is no abdominal tenderness.      Comments: No palpable organomegaly.  No tenderness with palpation.   Musculoskeletal:         General: Normal range of motion.      Cervical back: Normal range of motion and neck supple.      Comments: Patient moves all extremities spontaneously.     Lymphadenopathy:      Cervical: No cervical adenopathy.   Skin:     General: Skin is warm and dry.      Coloration: Skin is not jaundiced.   Neurological:      Mental Status: She is alert and oriented to person, place, and time.      Comments: Coordination intact.   Psychiatric:         Behavior: Behavior normal.         Thought Content: Thought content normal.         Judgment: Judgment normal.     VITAL SIGNS:   Vitals:    10/13/23 0906   BP: 177/74   Pulse: 75   Resp: 16   Temp: 97 øF (36.1 øC)   TempSrc: Temporal   SpO2: 97%   Weight: 71.9 kg (158 lb 9.6 oz)   PainSc: 0-No pain               The following portions of the patient's history were reviewed and updated as appropriate: allergies, current medications, past family history, past  medical history, past social history, past surgical history and problem list.         There are no diagnoses linked to this encounter.       IMPRESSION:  Sheree Hernandez is an 83 y.o. female with local and regional recurrence of her previously resected cholangiocarcinoma.  She completed adjuvant chemoradiation therapy in 1/2021 and has been under surveillance with serial CT scans since that time.  More recently, she was found to have a recurrence in the liver in the previously treated area adjacent to her resection, as well as several lymph nodes mostly along the vena cava.  Despite these findings on imaging, her CA 19-9 has remained within normal limits.  Given that she did have failure in the field and this does appear to be a fairly slow-growing process, CyberKnife SBRT was recommended for the recurrent liver lesion and 3 enlarged/PET positive lymph nodes.  She completed CyberKnife SBRT on 5/8/2023.  The patient was noted to have interval improvement within the treated areas on subsequent CT imaging, despite now progressing just out of field within 2 lymph nodes along the pelvic brim.  The patient again underwent localized treatment with CyberKnife SBRT to the new colton recurrence which she completed on 8/11/2023.      She feels well and has had a great response to CK. She does have 1 small node that is slightly enlarged since her treatments. Her  is the lowest that it has been in 1 year.    Her lung nodules do look a bit larger, but are still below threshold of PET.    RECOMMENDATIONS:      Abdominal LN  -adjacent to previously treated fields  -almost abuts duodenum  -enlarged since last scan     lung nodules  -Patient has multiple small stable lung nodules  - left lower lobe 6 mm nodule slightly enlarged              -Was present on CT images from PET scan at the end of March 2023 but not at the beginning of March 2023  -Inflammatory versus neoplastic versus granulomatous with infectious etiology  -Too small  for PET or biopsy  -Recommend close attention on upcoming CT scan in 8 weeks     Pelvic brim colton recurrence  -2 nodes at the bifurcation of the aorta enlarging on CT scan 7/10/2023  -Abuts previous radiation field  -Not treated previously as this area was PET negative              -Albeit the patient's recurrence was largely displaying very minimal PET avidity  -Status post CyberKnife SBRT 35 Gray in 5 fractions to these 2 lymph nodes with a small amount of elective coverage around the vessels in the area not previously treated   -Completed 8/11/2023  -Does not appear to have any additional recurrences in the pelvis              -Does have some chronically enlarged lymph nodes in the external iliac lymph node chains predating cancer diagnosis  -Follows with Dr. Montenegro  - ct 8 weeks  -ca19-9 lower      Liver and high RP Tumor recurrence  -On lateral aspect of residual left liver  -Also appears to be along the lateral vena cava lymph nodes  -Below previous radiation field  -Kidney is adjacent              -Complicates delivery              -Recent BUN/creatinine within normal limits  -NexGeneration sequencing with Dr. Montenegro  -Case discussed at multidisciplinary tumor board and with Dr. Sharma at   -Status post CyberKnife SBRT 35 Gray in 3 fractions              -Completed 5/8/2023  -Initial scans show good response in the liver and the nodes that were treated              -However she did have an early out of field recurrence inferiorly       Cholangiocarcinoma of the liver  -CA 19-9 8/8/2021: 84.7  -Status post resection with Dr. Sharma at  9/2021  -pT3, pN0, pM0   -Focally positive margins  -Tumor extended to the extrapelvic soft tissues between the liver and the adrenal gland  -LVSI and PNI present  -0/4 lymph nodes involved  -CA 19-9 11/5/2021: 22  -Completed Xeloda currently with radiation 1/19/2022  -Surveillance CT scans 3/2022-12/2022 showing no evidence of residual or metastatic carcinoma  -CT  C/A/P 3/6/2023 showing some enlarging lymph nodes concerning for recurrence  -CA 19-9 3/6/2023: 21.0  -PET/CT scan 3/27/2023 concerning for 1 cm left liver lesion and 3 lymph nodes along the vena cava that were slightly hypermetabolic and concerning for recurrence  -Status post CyberKnife SBRT as above  -LFTs 6/13/2023 improving  -CA 19-9 6/13/2023: 17.4   -Continues to follow with Dr. Montenegro         No follow-ups on file.    Bunny Billings MD    I spent a total of 35 minutes on today's visit, with more than 20 minutes in direct face to face communication, and the remainder of the time spent in reviewing the relevant history, records, available imaging, and for documentation.    Physical Exam

## 2023-11-17 ENCOUNTER — TELEMEDICINE (OUTPATIENT)
Dept: FAMILY MEDICINE CLINIC | Facility: CLINIC | Age: 83
End: 2023-11-17
Payer: MEDICARE

## 2023-11-17 DIAGNOSIS — U07.1 COVID-19 VIRUS INFECTION: Primary | ICD-10-CM

## 2023-11-17 PROCEDURE — 99214 OFFICE O/P EST MOD 30 MIN: CPT | Performed by: INTERNAL MEDICINE

## 2023-11-17 NOTE — PROGRESS NOTES
Cc: covid 19, fatigue, cough  You have chosen to receive care through a telehealth visit.  Do you consent to use a video/audio connection for your medical care today? Yes  Pt located at home  Provider located at office    HPI:  Sheree Hernandez is a 83 y.o. female who presents today for sore throat, fever, headache, fatigue, sob. Tested positive for covid19 3 days prior.    ROS:  Constitutional: + fevers, night sweats or unexplained weight loss  Eyes: no vision changes  ENT: no runny nose, ear pain, +sore throat  Cardio: no chest pain, palpitations  Pulm: + shortness of breath, wheezing, + cough  GI: no abdominal pain or changes in bowel movements  : no difficulty urinating  MSK: no difficulty ambulating, no joint pain  Neuro: no weakness, dizziness or headache  Psych: no trouble sleeping  Endo: no change in appetite      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    Plantar fasciitis     Chronic    PVC's (premature ventricular contractions)     Senile keratosis     Multiple    MANAN (stress urinary incontinence, female)     Syncope, near     with negative cardiovascular workup      Family History   Problem Relation Age of Onset    Heart attack Mother     Heart failure Mother     Dementia Mother     Stroke Mother     Arthritis Mother         Heart Attack    Heart disease Father     Hearing loss Father     Arrhythmia Brother     Breast cancer Paternal Aunt       Social History     Socioeconomic History    Marital  status:     Number of children: 2   Tobacco Use    Smoking status: Never    Smokeless tobacco: Never    Tobacco comments:     Exposed to secondhand smoke   Vaping Use    Vaping Use: Never used   Substance and Sexual Activity    Alcohol use: Not Currently     Comment: Very seldom    Drug use: Never    Sexual activity: Not Currently     Partners: Male     Birth control/protection: Hysterectomy     Comment: Complete Hysterectomy      Allergies   Allergen Reactions    Pravastatin Myalgia      Immunization History   Administered Date(s) Administered    COVID-19 (PFIZER) Purple Cap Monovalent 02/15/2021, 03/06/2021    Fluzone High Dose =>65 Years ([employer] ONLY) 11/03/2017, 09/11/2018, 10/15/2019    Fluzone High-Dose 65+yrs 10/10/2023    Influenza, Unspecified 10/10/2020, 10/07/2021    Pneumococcal Polysaccharide (PPSV23) 05/17/2013, 12/06/2016, 05/15/2018    TD Preservative Free (Tenivac) 11/09/2016    Td (TDVAX) 05/18/1997        PE:  There were no vitals filed for this visit.   There is no height or weight on file to calculate BMI.    Gen Appearance: NAD      Current Outpatient Medications   Medication Sig Dispense Refill    Nirmatrelvir&Ritonavir 300/100 (PAXLOVID) 20 x 150 MG & 10 x 100MG tablet therapy pack tablet Take 3 tablets by mouth 2 (Two) Times a Day for 5 days. 30 tablet 0    Cholecalciferol (Vitamin D3) 25 MCG (1000 UT) capsule Take  by mouth Daily.      coenzyme Q10 50 MG capsule capsule Take  by mouth Daily.      Cyanocobalamin (VITAMIN B 12 PO) Take  by mouth.      Folic Acid 0.8 MG capsule Take  by mouth.      ipratropium (ATROVENT) 0.06 % nasal spray Instill 2 sprays into both nostrils as directed by provider 2 (Two) Times a Day. 15 mL 6    ipratropium (ATROVENT) 0.06 % nasal spray Administer 1 spray into each nostril twice daily 30 mL 3    lactulose (CHRONULAC) 10 GM/15ML solution Take 30 mL by mouth 2 (two) times a day as needed for constipation. 150 mL 3    magnesium oxide (MAG-OX) 400 MG  tablet Take 1 tablet by mouth Daily.      Omega-3 1000 MG capsule Take  by mouth Daily.      rosuvastatin (CRESTOR) 10 MG tablet Take 1 tablet by mouth Daily. 90 tablet 3    telmisartan (MICARDIS) 20 MG tablet Take 1 tablet by mouth Daily. 90 tablet 3    Turmeric Curcumin 500 MG capsule Take 750 mg by mouth Daily.      Zinc 50 MG capsule Take  by mouth Daily.       No current facility-administered medications for this visit.      Discussed paxlovid with pt and possible side effects. Proceed to ER for any worsening symptoms over the weekend.     Discussed quarantine measures for covid 19.     Diagnoses and all orders for this visit:    1. COVID-19 virus infection (Primary)  -     Discontinue: Nirmatrelvir&Ritonavir 300/100 (PAXLOVID) 20 x 150 MG & 10 x 100MG tablet therapy pack tablet; Take 3 tablets by mouth 2 (Two) Times a Day for 5 days.  Dispense: 30 tablet; Refill: 0  -     Nirmatrelvir&Ritonavir 300/100 (PAXLOVID) 20 x 150 MG & 10 x 100MG tablet therapy pack tablet; Take 3 tablets by mouth 2 (Two) Times a Day for 5 days.  Dispense: 30 tablet; Refill: 0         No follow-ups on file.     Dictated Utilizing Dragon Dictation    Please note that portions of this note were completed with a voice recognition program.    Part of this note may be an electronic transcription/translation of spoken language to printed text using the Dragon Dictation System.

## 2023-12-11 ENCOUNTER — HOSPITAL ENCOUNTER (OUTPATIENT)
Dept: CT IMAGING | Facility: HOSPITAL | Age: 83
Discharge: HOME | End: 2023-12-11
Payer: MEDICARE

## 2023-12-11 ENCOUNTER — LAB (OUTPATIENT)
Dept: LAB | Facility: HOSPITAL | Age: 83
End: 2023-12-11
Payer: MEDICARE

## 2023-12-11 DIAGNOSIS — C22.1 INTRAHEPATIC CHOLANGIOCARCINOMA: ICD-10-CM

## 2023-12-11 LAB
ALBUMIN SERPL-MCNC: 4 G/DL (ref 3.5–5.2)
ALBUMIN/GLOB SERPL: 1.3 G/DL
ALP SERPL-CCNC: 209 U/L (ref 39–117)
ALT SERPL W P-5'-P-CCNC: 22 U/L (ref 1–33)
ANION GAP SERPL CALCULATED.3IONS-SCNC: 11 MMOL/L (ref 5–15)
AST SERPL-CCNC: 29 U/L (ref 1–32)
BASOPHILS # BLD AUTO: 0.02 10*3/MM3 (ref 0–0.2)
BASOPHILS NFR BLD AUTO: 0.3 % (ref 0–1.5)
BILIRUB SERPL-MCNC: 0.5 MG/DL (ref 0–1.2)
BUN SERPL-MCNC: 16 MG/DL (ref 8–23)
BUN/CREAT SERPL: 19.8 (ref 7–25)
CALCIUM SPEC-SCNC: 9.4 MG/DL (ref 8.6–10.5)
CANCER AG19-9 SERPL-ACNC: 17.4 U/ML
CHLORIDE SERPL-SCNC: 104 MMOL/L (ref 98–107)
CO2 SERPL-SCNC: 26 MMOL/L (ref 22–29)
CREAT BLDA-MCNC: 0.9 MG/DL (ref 0.6–1.3)
CREAT SERPL-MCNC: 0.81 MG/DL (ref 0.57–1)
DEPRECATED RDW RBC AUTO: 50.8 FL (ref 37–54)
EGFRCR SERPLBLD CKD-EPI 2021: 71.7 ML/MIN/1.73
EOSINOPHIL # BLD AUTO: 0.19 10*3/MM3 (ref 0–0.4)
EOSINOPHIL NFR BLD AUTO: 3.2 % (ref 0.3–6.2)
ERYTHROCYTE [DISTWIDTH] IN BLOOD BY AUTOMATED COUNT: 14.5 % (ref 12.3–15.4)
GLOBULIN UR ELPH-MCNC: 3.2 GM/DL
GLUCOSE SERPL-MCNC: 90 MG/DL (ref 65–99)
HCT VFR BLD AUTO: 45.5 % (ref 34–46.6)
HGB BLD-MCNC: 15 G/DL (ref 12–15.9)
IMM GRANULOCYTES # BLD AUTO: 0.04 10*3/MM3 (ref 0–0.05)
IMM GRANULOCYTES NFR BLD AUTO: 0.7 % (ref 0–0.5)
LYMPHOCYTES # BLD AUTO: 0.65 10*3/MM3 (ref 0.7–3.1)
LYMPHOCYTES NFR BLD AUTO: 10.8 % (ref 19.6–45.3)
MCH RBC QN AUTO: 31.4 PG (ref 26.6–33)
MCHC RBC AUTO-ENTMCNC: 33 G/DL (ref 31.5–35.7)
MCV RBC AUTO: 95.2 FL (ref 79–97)
MONOCYTES # BLD AUTO: 0.73 10*3/MM3 (ref 0.1–0.9)
MONOCYTES NFR BLD AUTO: 12.1 % (ref 5–12)
NEUTROPHILS NFR BLD AUTO: 4.38 10*3/MM3 (ref 1.7–7)
NEUTROPHILS NFR BLD AUTO: 72.9 % (ref 42.7–76)
NRBC BLD AUTO-RTO: 0 /100 WBC (ref 0–0.2)
PLATELET # BLD AUTO: 172 10*3/MM3 (ref 140–450)
PMV BLD AUTO: 12.7 FL (ref 6–12)
POTASSIUM SERPL-SCNC: 4.4 MMOL/L (ref 3.5–5.2)
PROT SERPL-MCNC: 7.2 G/DL (ref 6–8.5)
RBC # BLD AUTO: 4.78 10*6/MM3 (ref 3.77–5.28)
SODIUM SERPL-SCNC: 141 MMOL/L (ref 136–145)
WBC NRBC COR # BLD AUTO: 6.01 10*3/MM3 (ref 3.4–10.8)

## 2023-12-11 PROCEDURE — 86301 IMMUNOASSAY TUMOR CA 19-9: CPT

## 2023-12-11 PROCEDURE — 71260 CT THORAX DX C+: CPT

## 2023-12-11 PROCEDURE — 80053 COMPREHEN METABOLIC PANEL: CPT

## 2023-12-11 PROCEDURE — 25510000001 IOPAMIDOL 61 % SOLUTION: Performed by: INTERNAL MEDICINE

## 2023-12-11 PROCEDURE — 74177 CT ABD & PELVIS W/CONTRAST: CPT

## 2023-12-11 PROCEDURE — 85025 COMPLETE CBC W/AUTO DIFF WBC: CPT

## 2023-12-11 PROCEDURE — 36415 COLL VENOUS BLD VENIPUNCTURE: CPT

## 2023-12-11 PROCEDURE — 82565 ASSAY OF CREATININE: CPT

## 2023-12-11 RX ADMIN — IOPAMIDOL 85 ML: 612 INJECTION, SOLUTION INTRAVENOUS at 11:34

## 2023-12-15 ENCOUNTER — OFFICE VISIT (OUTPATIENT)
Dept: ONCOLOGY | Facility: CLINIC | Age: 83
End: 2023-12-15
Payer: MEDICARE

## 2023-12-15 VITALS
HEART RATE: 89 BPM | HEIGHT: 64 IN | OXYGEN SATURATION: 98 % | WEIGHT: 152 LBS | SYSTOLIC BLOOD PRESSURE: 176 MMHG | DIASTOLIC BLOOD PRESSURE: 82 MMHG | RESPIRATION RATE: 16 BRPM | TEMPERATURE: 98.5 F | BODY MASS INDEX: 25.95 KG/M2

## 2023-12-15 DIAGNOSIS — C22.1 INTRAHEPATIC CHOLANGIOCARCINOMA: Primary | ICD-10-CM

## 2023-12-15 PROCEDURE — 1126F AMNT PAIN NOTED NONE PRSNT: CPT | Performed by: INTERNAL MEDICINE

## 2023-12-15 PROCEDURE — 99214 OFFICE O/P EST MOD 30 MIN: CPT | Performed by: INTERNAL MEDICINE

## 2023-12-15 PROCEDURE — 3079F DIAST BP 80-89 MM HG: CPT | Performed by: INTERNAL MEDICINE

## 2023-12-15 PROCEDURE — 3077F SYST BP >= 140 MM HG: CPT | Performed by: INTERNAL MEDICINE

## 2023-12-15 NOTE — PROGRESS NOTES
Follow Up Office Visit      Date: 12/15/2023     Patient Name: Sheree Hernandez  MRN: [id number]  : 1940  Chief Complaint:  Follow-up for intrahepatic cholangiocarcinoma      History of Present Illness: Sheree Hernandez is a pleasant 79 y.o. female with a past medical history of hyperlipidemia and hypertension who presents today for evaluation of concern for hemochromatosis. The patient is accompanied by their self who contributes to the history of their care.  Patient states that over the past 2 years she has been having worsening fatigue especially with exertion.  Over the past several months this has become worse.  Patient states that she gets tired with basic activities including showering getting dressed in the morning and walking to and from her car from stores.  She states that her fatigue gets better after a few minutes of rest.  She has previously had an echocardiogram and cardiac catheterization within the past 2 years which did not reveal any cause of her fatigue with exertion.  She is also had an ultrasound of the liver which revealed stable cyst.  She was recently seen by her PCP who ordered iron studies which was notable for an elevated ferritin to 246.  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  All other mutations were negative.  She is currently up-to-date on her mammograms and colonoscopy with no active malignancies noted.  She is otherwise compliant with her statin and high blood pressure medicines.  Repeat abdominal imaging in 2021 concerning for enlarging liver lesion.  She was seen by Dr. Sharma and  is status post open right hepatectomy, cholecystectomy, right partial adrenalectomy on 2021.  Pathology showed a focal R1 resected margin.  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  0/4 lymph nodes were  positive for disease.  LVI and PNI were present.  Finished chemoXRT in January 2021.      Interval History:  Presents to clinic for follow-up.  Completed radiation in May 2023 and again in August 2023.  Was diagnosed with COVID last month.  Has been having worsening fatigue since then.  Denies any fevers or chills today.  Denies any abdominal pain or discomfort    Oncology History:    Oncology/Hematology History   Intrahepatic cholangiocarcinoma   8/17/2021 Initial Diagnosis    Intrahepatic cholangiocarcinoma (CMS/HCC)     8/17/2021 Cancer Staged    Staging form: Intrahepatic Bile Duct, AJCC 8th Edition  - Clinical: Stage IB (cT1b, cN0, cM0) - Signed by Jorje Montenegro MD on 8/17/2021 11/5/2021 Cancer Staged    Staging form: Intrahepatic Bile Duct, AJCC 8th Edition  - Pathologic: Stage IIIA (pT3, pN0, cM0) - Signed by Jorje Montenegro MD on 11/5/2021 12/9/2021 - 1/6/2022 Chemotherapy    OP GALLBLADDER Capecitabine + XRT     12/9/2021 - 1/19/2022 Radiation    Radiation OncologyTreatment Course:  Sheree Hernandez received 5040 cGy in 28 fractions to liver via External Beam Radiation - EBRT.     4/25/2023 - 5/8/2023 Radiation    Radiation OncologyTreatment Course:  Sheree Hernandez received 3500 cGy in 5 fractions to liver mass and lymph nodes via Stereotactic Radiation Therapy - SRT.     8/1/2023 - 8/11/2023 Radiation    Radiation OncologyTreatment Course:  Sheree Hernandez received 3500 cGy in 5 fractions to abdomen via Stereotactic Radiation Therapy - SRT.         Subjective      Review of Systems:   Constitutional: Negative for fevers, chills, or weight loss  Eyes: Negative for blurred vision or discharge         Ear/Nose/Throat: Negative for difficulty swallowing, sore throat, LAD                                                       Respiratory: Negative for cough, SOA, wheezing                                                                                        Cardiovascular: Negative for chest pain  or palpitations                                                                  Gastrointestinal: Negative for nausea, vomiting or diarrhea                                                                     Genitourinary: Negative for dysuria or hematuria                                                                                           Musculoskeletal: Negative for any joint pains or muscle aches                                                                        Neurologic: Negative for any weakness, headaches, dizziness                                                                         Hematologic: Negative for any easy bleeding or bruising                                                                                   Psychiatric: Negative for anxiety or depression                          Past Medical History/Past Surgical History/ Family History/ Social History: Reviewed by me and unchanged from my previous documentation done on October 2023.     Medications:     Current Outpatient Medications:     Cholecalciferol (Vitamin D3) 25 MCG (1000 UT) capsule, Take  by mouth Daily., Disp: , Rfl:     coenzyme Q10 50 MG capsule capsule, Take  by mouth Daily., Disp: , Rfl:     Cyanocobalamin (VITAMIN B 12 PO), Take  by mouth., Disp: , Rfl:     Folic Acid 0.8 MG capsule, Take  by mouth., Disp: , Rfl:     ipratropium (ATROVENT) 0.06 % nasal spray, Instill 2 sprays into both nostrils as directed by provider 2 (Two) Times a Day., Disp: 15 mL, Rfl: 6    ipratropium (ATROVENT) 0.06 % nasal spray, Administer 1 spray into each nostril twice daily, Disp: 30 mL, Rfl: 3    lactulose (CHRONULAC) 10 GM/15ML solution, Take 30 mL by mouth 2 (two) times a day as needed for constipation., Disp: 150 mL, Rfl: 3    magnesium oxide (MAG-OX) 400 MG tablet, Take 1 tablet by mouth Daily., Disp: , Rfl:     Omega-3 1000 MG capsule, Take  by mouth Daily., Disp: , Rfl:     rosuvastatin (CRESTOR) 10 MG tablet, Take 1 tablet by  "mouth Daily., Disp: 90 tablet, Rfl: 3    telmisartan (MICARDIS) 20 MG tablet, Take 1 tablet by mouth Daily., Disp: 90 tablet, Rfl: 3    Turmeric Curcumin 500 MG capsule, Take 750 mg by mouth Daily., Disp: , Rfl:     Zinc 50 MG capsule, Take  by mouth Daily., Disp: , Rfl:     Allergies:   Allergies   Allergen Reactions    Pravastatin Myalgia       Objective     Physical Exam:  Vital Signs:   Vitals:    12/15/23 0908   BP: 176/82   Pulse: 89   Resp: 16   Temp: 98.5 °F (36.9 °C)   TempSrc: Temporal   SpO2: 98%   Weight: 68.9 kg (152 lb)   Height: 162.6 cm (64.02\")   PainSc: 0-No pain     Pain Score    12/15/23 0908   PainSc: 0-No pain     ECOG Performance Status: 0 - Asymptomatic    Constitutional: NAD, ECOG 0  Eyes: PERRLA, scleral anicteric  ENT: No LAD, no thyromegaly  Respiratory: CTAB, no wheezing, rales, rhonchi  Cardiovascular: RRR, no murmurs, pulses 2+ bilaterally  Abdomen: soft, NT/ND, no HSM  Musculoskeletal: strength 5/5 bilaterally, no c/c/e  Neurologic: A&O x 3, CN II-XII intact grossly    Results Review:   Hospital Outpatient Visit on 12/11/2023   Component Date Value Ref Range Status    Creatinine 12/11/2023 0.90  0.60 - 1.30 mg/dL Final    Serial Number: [id number]:  [id number]   Lab on 12/11/2023   Component Date Value Ref Range Status    CA 19-9 12/11/2023 17.4  <=35.0 U/mL Final    Glucose 12/11/2023 90  65 - 99 mg/dL Final    BUN 12/11/2023 16  8 - 23 mg/dL Final    Creatinine 12/11/2023 0.81  0.57 - 1.00 mg/dL Final    Sodium 12/11/2023 141  136 - 145 mmol/L Final    Potassium 12/11/2023 4.4  3.5 - 5.2 mmol/L Final    Chloride 12/11/2023 104  98 - 107 mmol/L Final    CO2 12/11/2023 26.0  22.0 - 29.0 mmol/L Final    Calcium 12/11/2023 9.4  8.6 - 10.5 mg/dL Final    Total Protein 12/11/2023 7.2  6.0 - 8.5 g/dL Final    Albumin 12/11/2023 4.0  3.5 - 5.2 g/dL Final    ALT (SGPT) 12/11/2023 22  1 - 33 U/L Final    AST (SGOT) 12/11/2023 29  1 - 32 U/L Final    Alkaline Phosphatase 12/11/2023 209 (H)  39 " - 117 U/L Final    Total Bilirubin 12/11/2023 0.5  0.0 - 1.2 mg/dL Final    Globulin 12/11/2023 3.2  gm/dL Final    Calculated Result    A/G Ratio 12/11/2023 1.3  g/dL Final    BUN/Creatinine Ratio 12/11/2023 19.8  7.0 - 25.0 Final    Anion Gap 12/11/2023 11.0  5.0 - 15.0 mmol/L Final    eGFR 12/11/2023 71.7  >60.0 mL/min/1.73 Final    WBC 12/11/2023 6.01  3.40 - 10.80 10*3/mm3 Final    RBC 12/11/2023 4.78  3.77 - 5.28 10*6/mm3 Final    Hemoglobin 12/11/2023 15.0  12.0 - 15.9 g/dL Final    Hematocrit 12/11/2023 45.5  34.0 - 46.6 % Final    MCV 12/11/2023 95.2  79.0 - 97.0 fL Final    MCH 12/11/2023 31.4  26.6 - 33.0 pg Final    MCHC 12/11/2023 33.0  31.5 - 35.7 g/dL Final    RDW 12/11/2023 14.5  12.3 - 15.4 % Final    RDW-SD 12/11/2023 50.8  37.0 - 54.0 fl Final    MPV 12/11/2023 12.7 (H)  6.0 - 12.0 fL Final    Platelets 12/11/2023 172  140 - 450 10*3/mm3 Final    Neutrophil % 12/11/2023 72.9  42.7 - 76.0 % Final    Lymphocyte % 12/11/2023 10.8 (L)  19.6 - 45.3 % Final    Monocyte % 12/11/2023 12.1 (H)  5.0 - 12.0 % Final    Eosinophil % 12/11/2023 3.2  0.3 - 6.2 % Final    Basophil % 12/11/2023 0.3  0.0 - 1.5 % Final    Immature Grans % 12/11/2023 0.7 (H)  0.0 - 0.5 % Final    Neutrophils, Absolute 12/11/2023 4.38  1.70 - 7.00 10*3/mm3 Final    Lymphocytes, Absolute 12/11/2023 0.65 (L)  0.70 - 3.10 10*3/mm3 Final    Monocytes, Absolute 12/11/2023 0.73  0.10 - 0.90 10*3/mm3 Final    Eosinophils, Absolute 12/11/2023 0.19  0.00 - 0.40 10*3/mm3 Final    Basophils, Absolute 12/11/2023 0.02  0.00 - 0.20 10*3/mm3 Final    Immature Grans, Absolute 12/11/2023 0.04  0.00 - 0.05 10*3/mm3 Final    nRBC 12/11/2023 0.0  0.0 - 0.2 /100 WBC Final       CT Abdomen Pelvis With Contrast    Result Date: 12/11/2023  Narrative: CT ABDOMEN PELVIS W CONTRAST, CT CHEST W CONTRAST DIAGNOSTIC Date of Exam: 12/11/2023 11:13 AM EST Indication: cholangiocarcinoma. Comparison: 10/9/2023 chest abdomen pelvis CT scans Technique: Axial CT images  were obtained of the abdomen and pelvis following the uneventful intravenous administration of 85 mm Isovue-300. Reconstructed coronal and sagittal images were also obtained. Automated exposure control and iterative construction methods were used. Findings: Prior study report from 10/9/2023 noted stable postoperative appearance of the liver with no evidence of recurrent mass, shotty retroperitoneal lymph nodes with no significant overall change. Minimal progression of patient's pulmonary micronodules. CT SCAN OF THE CHEST WITH IV CONTRAST: The patient's multiple pulmonary micronodules are mostly stable. A few of the larger nodules have increased by 1 mm or less in diameter since the prior exam, particularly, the largest nodule in the medial left lower  lobe image 42, which has increased from 6 to 7 mm. No clearly new pulmonary parenchymal nodules or other new pulmonary parenchymal disease are seen. There is no pleural effusion. Images of the mediastinum show stable small left lower pole thyroid nodules, no evidence of mediastinal hilar axillary, or lower cervical lymphadenopathy. Thoracic aorta and pulmonary arteries appear unremarkable for age. Bony structures appear to be intact.     Impression: Minimal progression of a few of the patient's larger pulmonary nodules, none of which have enlarged and more than 1 mm, since 10/9/2023. No clearly new nodule or other new chest disease. CT SCAN OF THE ABDOMEN PELVIS WITH IV CONTRAST: Postoperative appearance of the liver is stable with apparent right lobe resection, and some stable left lobe cysts. No evidence of recurrent mass is identified. Small renal cysts appear stable. No new abnormalities are seen of the pancreas, spleen, left adrenal gland. Right adrenal gland is not definitely identified, possibly surgically absent, or may be incorporated into the residual perihepatic scar. No, ascites or acute inflammatory change is appreciated. Bowel loops are normal in caliber  and appearance. Axial image 58 of series 2 shows mild but definite enlargement of an aortocaval lymph node, now 11 x 12 mm in diameter previously 5 x 8 mm. This portal venous phase series image appears to show central necrosis of the node as well. No other definite new or enlarging node or mass is appreciated elsewhere. Regarding the lower abdomen and pelvis, no mass or adenopathy is seen. Bladder is nondistended and normal in appearance. Uterus and ovaries are apparently surgically absent or atrophic. Delayed venous phase images show no evidence of obstructive uropathy. Bony structures appear to be intact. Incidental note is made of advanced right and moderate left hip joint DJD. Impression: Single enlarging, necrotic appearing aortocaval lymph node, concerning for early recurrence. Otherwise stable CT scan of the abdomen and pelvis. Electronically Signed: Salvador Gill MD  12/11/2023 8:16 PM EST  Workstation ID: [id number]    CT Chest With Contrast Diagnostic    Result Date: 12/11/2023  Narrative: CT ABDOMEN PELVIS W CONTRAST, CT CHEST W CONTRAST DIAGNOSTIC Date of Exam: 12/11/2023 11:13 AM EST Indication: cholangiocarcinoma. Comparison: 10/9/2023 chest abdomen pelvis CT scans Technique: Axial CT images were obtained of the abdomen and pelvis following the uneventful intravenous administration of 85 mm Isovue-300. Reconstructed coronal and sagittal images were also obtained. Automated exposure control and iterative construction methods were used. Findings: Prior study report from 10/9/2023 noted stable postoperative appearance of the liver with no evidence of recurrent mass, shotty retroperitoneal lymph nodes with no significant overall change. Minimal progression of patient's pulmonary micronodules. CT SCAN OF THE CHEST WITH IV CONTRAST: The patient's multiple pulmonary micronodules are mostly stable. A few of the larger nodules have increased by 1 mm or less in diameter since the prior exam, particularly, the largest  nodule in the medial left lower  lobe image 42, which has increased from 6 to 7 mm. No clearly new pulmonary parenchymal nodules or other new pulmonary parenchymal disease are seen. There is no pleural effusion. Images of the mediastinum show stable small left lower pole thyroid nodules, no evidence of mediastinal hilar axillary, or lower cervical lymphadenopathy. Thoracic aorta and pulmonary arteries appear unremarkable for age. Bony structures appear to be intact.     Impression: Minimal progression of a few of the patient's larger pulmonary nodules, none of which have enlarged and more than 1 mm, since 10/9/2023. No clearly new nodule or other new chest disease. CT SCAN OF THE ABDOMEN PELVIS WITH IV CONTRAST: Postoperative appearance of the liver is stable with apparent right lobe resection, and some stable left lobe cysts. No evidence of recurrent mass is identified. Small renal cysts appear stable. No new abnormalities are seen of the pancreas, spleen, left adrenal gland. Right adrenal gland is not definitely identified, possibly surgically absent, or may be incorporated into the residual perihepatic scar. No, ascites or acute inflammatory change is appreciated. Bowel loops are normal in caliber and appearance. Axial image 58 of series 2 shows mild but definite enlargement of an aortocaval lymph node, now 11 x 12 mm in diameter previously 5 x 8 mm. This portal venous phase series image appears to show central necrosis of the node as well. No other definite new or enlarging node or mass is appreciated elsewhere. Regarding the lower abdomen and pelvis, no mass or adenopathy is seen. Bladder is nondistended and normal in appearance. Uterus and ovaries are apparently surgically absent or atrophic. Delayed venous phase images show no evidence of obstructive uropathy. Bony structures appear to be intact. Incidental note is made of advanced right and moderate left hip joint DJD. Impression: Single enlarging, necrotic  appearing aortocaval lymph node, concerning for early recurrence. Otherwise stable CT scan of the abdomen and pelvis. Electronically Signed: Salvador Gill MD  12/11/2023 8:16 PM EST  Workstation ID: [id number]     Assessment / Plan      Assessment/Plan:   Intrahepatic cholangiocarcinoma (CMS/HCC) (Primary)  -Noted during her most recent hospitalization with CT scans concerning for an enlarging liver lesion to 7.3 cm that was previously noted to be hemangioma  -Triple phase CT concerning for a solid tumor lesion  -Biopsy consistent with an adenocarcinoma  -CA 19-9 elevated to 84.7  -CT chest as well as the rest of the CT abdomen/pelvis not concerning for distant or metastatic disease  -Status post open right hepatectomy, cholecystectomy, right partial adrenalectomy on 9/23/2021 with Dr. 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  0/4 lymph nodes were positive for disease.  LVI and PNI were present.  Focal R1 resection margin  -Discussed with patient and her daughter that she would benefit from adjuvant chemoXRT using Xeloda.  Discussed side effects including but not limited to immunosuppression, diarrhea, abdominal pain, nausea, vomiting, hand/foot syndrome  -Repeat CA 19-9 (22) in November 2021  -Completed chemo XRT with Xeloda in January 2021  -Repeat scans in March 2022 without any evidence of recurrent or metastatic disease  -Repeat CT C/A/P in June 2022 without evidence of recurrent or metastatic disease.  Did note some IVC stenosis which we will monitor with her next scans  -Repeat CT C/A/P in September 2022 reviewed without evidence of recurrent disease or metastatic disease.  IVC stenosis overall stable  -Repeat CT C/A/P in December 2022 reviewed without evidence of recurrent or metastatic disease.  CA 19-9 within normal limits  -Repeat CT C/A/P in March 2023 reviewed and notable for some slight enlarging  retroperitoneal adenopathy.  CA 19-9 within normal limits  -PET/CT concerning for 1 cm left liver lesion that was only slightly PET avid as well as 2 lymph nodes that were also slightly PET avid  -Previously discussed her case at tumor board and with Dr. Sharma we agreed that she likely had disease progression  -Status post radiation completed in May 2023  -CA 19-9 in June 2022 within normal limits.    -CT C/A/P in July 2023 reviewed and showed some interval decrease in some lymph nodes as well as some slight enlargement of a couple lymph nodes.  -Completed radiation in August 2023  -CT C/A/P in October 2023 reviewed and only notable for slight increase in some pulmonary nodules about 1 mm each  -CT C/A/P December 2023 reviewed and notable for enlargement of her aortocaval lymph node.  CA 19-9 stable  -Will discuss her case with Dr. Billings to see if she can receive any more radiation to the area  -If she is not able to see radiation, we will plan for either active surveillance vs pursuing systemic treatment with cisplatin/gemcitabine/durvalumab     Hemochromatosis carrier  -Noted on recent labs with an elevated ferritin to 246 hemochromatosis gene profile positive for heterozygosity of H63D.  Other genes negative.  Given patient's age and previous cardiac liver work-up showing no evidence of dysfunction, it is unlikely that she has had iron deposition all this time.  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  Treatment as above  -ECHO in July 2020 within normal limits  -Repeat iron studies in April 2021 stable with a ferritin of 229, iron level 100, transferrin saturation 27%  -Low concern for iron overload at this time.      Thyroid nodule  -Incidentally noted on CT  scan but enlarging from previous CT  -Thyroid ultrasound in June 2022 only notable for goiter and small nodules nonconcerning for malignancy  -Has been seen by endocrinology with plan for repeat ultrasound in the summer of next year     Other fatigue   -Continued at this time  -Previously checked iron studies, vitamin B12, folate, thyroid studies within normal limits  -Was previously been seen by cardiology with a normal ECHO and stress test  -Likely related to all her cancer related treatments  -Advised patient to continue activity as tolerated         Follow Up:   Follow-up pending her follow-up with Dr. Billings and our discussion     Jorje Montenegro MD  Hematology and Oncology     Please note that portions of this note may have been completed with a voice recognition program. Efforts were made to edit the dictations, but occasionally words are mistranscribed.

## 2023-12-20 ENCOUNTER — HOSPITAL ENCOUNTER (OUTPATIENT)
Dept: RADIATION ONCOLOGY | Facility: HOSPITAL | Age: 83
Setting detail: RADIATION/ONCOLOGY SERIES
Discharge: HOME | End: 2023-12-20
Payer: MEDICARE

## 2023-12-20 ENCOUNTER — OFFICE VISIT (OUTPATIENT)
Dept: RADIATION ONCOLOGY | Facility: HOSPITAL | Age: 83
End: 2023-12-20
Payer: MEDICARE

## 2023-12-20 VITALS
SYSTOLIC BLOOD PRESSURE: 166 MMHG | BODY MASS INDEX: 26.27 KG/M2 | HEART RATE: 84 BPM | DIASTOLIC BLOOD PRESSURE: 71 MMHG | RESPIRATION RATE: 16 BRPM | TEMPERATURE: 97.1 F | WEIGHT: 153.1 LBS | OXYGEN SATURATION: 97 %

## 2023-12-20 DIAGNOSIS — R16.0 LIVER MASSES: Primary | ICD-10-CM

## 2023-12-20 DIAGNOSIS — C22.1 INTRAHEPATIC CHOLANGIOCARCINOMA: Primary | ICD-10-CM

## 2023-12-20 DIAGNOSIS — R91.1 LUNG NODULE: ICD-10-CM

## 2023-12-20 PROCEDURE — G0463 HOSPITAL OUTPT CLINIC VISIT: HCPCS

## 2023-12-20 RX ORDER — DEXAMETHASONE 1 MG
0.5 TABLET ORAL 2 TIMES DAILY WITH MEALS
Qty: 10 TABLET | Refills: 0 | Status: SHIPPED | OUTPATIENT
Start: 2023-12-20

## 2023-12-20 NOTE — PROGRESS NOTES
FOLLOW UP NOTE    PATIENT:                                                      Sheree Hernandez  MEDICAL RECORD #:                        [id number]  :                                                          1940  COMPLETION DATE:   2023  DIAGNOSIS:     Recurrent intrahepatic cholangiocarcinoma  - Clinical Stage IB (cT1b, cN0, cM0)  - Pathologic Stage IIIA (pT3, pN0, cM0)      BRIEF HISTORY:  Sheree Hernandez is a very pleasant 83 y.o. female who has previously suffered from chronic fatigue and was sent for initial work-up that consisted of a heart cath and multiple other interventions.  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  The patient was then found to have an enlarging liver lesion and an AFP that was 9.27 and a CA 19-9 that was 85 on 2021.  The patient was sent for biopsy of her liver lesion in the inferior right lobe of the liver on 8/10/2021 that was consistent with adenocarcinoma.     The patient was taken for right-sided hepatectomy cholecystectomy and partial adrenalectomy on 2021 with Dr. Sharma at .  Pathology revealed a moderate to poorly differentiated intrahepatic cholangiocarcinoma.  Her tumor measured 8.5 cm.  0/4 lymph nodes were involved but the patient did have LVSI and PNI.  The tumor did extend to the extrahepatic soft tissue between the liver and adrenal gland.  There was tumor positive focally at the cauterized margin.  The adrenal gland was not involved with carcinoma.  This was staged as pT3, pN0, pM0 disease.       The patient's CA 19-9 on 2021 was 22 which was in the range of normal.  The patient completed a course of adjuvant, concurrent chemoradiotherapy with Xeloda on 2022.     The patient did well, with surveillance CT scans showing no evidence of recurrent or metastatic disease for some time.  More recently, CT scans performed 3/6/2023 revealed some slightly enlarged retroperitoneal adenopathy.  CA 19-9 was within normal  limits.  The patient was then sent for a PET scan on 3/27/2023 that showed some hypermetabolism in the liver at the area adjacent to the resection, which would have been inside of her previous radiation field.  There was also uptake in a few lymph nodes lateral to the vena cava which were outside of her previous radiation ports.  The patient was discussed at tumor board and it was mutually felt that she did in fact have a local and regional recurrence based on the imaging.  The patient underwent a course of CyberKnife stereotactic body radiotherapy to a dose of 35 Gray in 5 fractions delivered to the recurrent liver lesion and involved lymph nodes, completing 5/8/2023.      Repeat labs showed improvement in both CA 19-9 and liver function tests.  Repeat CT scans of the chest, abdomen pelvis performed 7/10/2023 showed mixed response to treatment.  On one hand, there was clear interval improvement within the treated liver and lymph nodes.  On the other hand, there was evidence of progression just outside of the treatment field involving 2 lymph nodes along the pelvic brim at the lower portion of the aorta, right before the bifurcation to the comments.  This region was not previously treated as it was not very PET avid and technically would have been very difficult with the length of the field.  The patient underwent another course of CyberKnife stereotactic body radiotherapy to a dose of 35 Gray in 5 fractions delivered to the 2 enlarging lymph nodes that abut her previous radiation field, completing 8/11/2023.       The patient is recently had scans that showed an enlarging lymph node in the retroperitoneal area.  The patient also has a left lung nodule that is increasing in size and has increased by factor of 10 since March.    The patient reports that she feels kind of tired with poor appetite.  She reports that she is a struggling with her mood a little bit and is interested in talking to someone about  that.    Patient is not interested in restarting chemotherapy at this time.    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    Plantar fasciitis     Chronic    PVC's (premature ventricular contractions)     Senile keratosis     Multiple    MANAN (stress urinary incontinence, female)     Syncope, near     with negative cardiovascular workup     Past Surgical History:   Procedure Laterality Date    ADRENAL GLAND SURGERY  09/22/2021    ADRENALECTOMY  09/22/2021    CARDIAC CATHETERIZATION N/A 01/18/2019    Procedure: Left Heart Cath;  Surgeon: Tucker Gonzalez MD;  Location: [facility] CATH INVASIVE LOC[facility];  Service: Cardiovascular    CARDIAC CATHETERIZATION  1/18/2019    CATARACT EXTRACTION  04/2019    CHOLECYSTECTOMY  09/22/2021    COLONOSCOPY  07/15/2020    CYBERKNIFE  05/08/2023    Recurrent liver mass/involved LNs    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  Social History     Socioeconomic History    Marital status:     Number of children: 2   Tobacco Use    Smoking status: Never    Smokeless tobacco: Never    Tobacco comments:     Exposed to secondhand smoke   Vaping Use    Vaping Use: Never used   Substance and Sexual Activity    Alcohol use: Not  Currently     Comment: Very seldom    Drug use: Never    Sexual activity: Not Currently     Partners: Male     Birth control/protection: Hysterectomy     Comment: Complete Hysterectomy           Current Outpatient Medications:     Cholecalciferol (Vitamin D3) 25 MCG (1000 UT) capsule, Take  by mouth Daily., Disp: , Rfl:     coenzyme Q10 50 MG capsule capsule, Take  by mouth Daily., Disp: , Rfl:     Cyanocobalamin (VITAMIN B 12 PO), Take  by mouth., Disp: , Rfl:     Folic Acid 0.8 MG capsule, Take  by mouth., Disp: , Rfl:     ipratropium (ATROVENT) 0.06 % nasal spray, Instill 2 sprays into both nostrils as directed by provider 2 (Two) Times a Day., Disp: 15 mL, Rfl: 6    ipratropium (ATROVENT) 0.06 % nasal spray, Administer 1 spray into each nostril twice daily, Disp: 30 mL, Rfl: 3    lactulose (CHRONULAC) 10 GM/15ML solution, Take 30 mL by mouth 2 (two) times a day as needed for constipation., Disp: 150 mL, Rfl: 3    magnesium oxide (MAG-OX) 400 MG tablet, Take 1 tablet by mouth Daily., Disp: , Rfl:     Omega-3 1000 MG capsule, Take  by mouth Daily., Disp: , Rfl:     rosuvastatin (CRESTOR) 10 MG tablet, Take 1 tablet by mouth Daily., Disp: 90 tablet, Rfl: 3    telmisartan (MICARDIS) 20 MG tablet, Take 1 tablet by mouth Daily., Disp: 90 tablet, Rfl: 3    Turmeric Curcumin 500 MG capsule, Take 750 mg by mouth Daily., Disp: , Rfl:     Zinc 50 MG capsule, Take  by mouth Daily., Disp: , Rfl:     dexAMETHasone (DECADRON) 1 MG tablet, Take 0.5 tablets by mouth 2 (Two) Times a Day With Meals., Disp: 10 tablet, Rfl: 0     Review of Systems - Oncology  A full 14 point review of systems was performed and was negative except as noted in the HPI.      Karnofsky score: 80   KPS 90%      Physical Exam  Vitals and nursing note reviewed.   Constitutional:       General: She is not in acute distress.     Appearance: She is well-developed.   HENT:      Head: Normocephalic and atraumatic.   Eyes:      General: No scleral icterus.      Conjunctiva/sclera: Conjunctivae normal.      Pupils: Pupils are equal, round, and reactive to light.   Neck:      Comments: No obviously enlarged cervical or supraclavicular LAD.  Cardiovascular:      Rate and Rhythm: Normal rate and regular rhythm.      Heart sounds: No murmur heard.    No friction rub.      Comments: Patient well perfused. Non cyanotic. No prominent JVD. No pedal edema  Pulmonary:      Effort: Pulmonary effort is normal.      Breath sounds: Normal breath sounds. No wheezing.   Abdominal:      General: Bowel sounds are normal. There is no distension.      Palpations: Abdomen is soft. There is no mass.      Tenderness: There is no abdominal tenderness.      Comments: No palpable organomegaly.  No tenderness with palpation.   Musculoskeletal:         General: Normal range of motion.      Cervical back: Normal range of motion and neck supple.      Comments: Patient moves all extremities spontaneously.     Lymphadenopathy:      Cervical: No cervical adenopathy.   Skin:     General: Skin is warm and dry.      Coloration: Skin is not jaundiced.   Neurological:      Mental Status: She is alert and oriented to person, place, and time.      Comments: Coordination intact.   Psychiatric:         Behavior: Behavior normal.         Thought Content: Thought content normal.         Judgment: Judgment normal.     VITAL SIGNS:   Vitals:    12/20/23 0936   BP: 166/71   Pulse: 84   Resp: 16   Temp: 97.1 °F (36.2 °C)   TempSrc: Temporal   SpO2: 97%   Weight: 69.4 kg (153 lb 1.6 oz)   PainSc:   2               The following portions of the patient's history were reviewed and updated as appropriate: allergies, current medications, past family history, past medical history, past social history, past surgical history and problem list.  CT Abdomen Pelvis With Contrast    Result Date: 12/11/2023  Minimal progression of a few of the patient's larger pulmonary nodules, none of which have enlarged and more than 1 mm, since  10/9/2023. No clearly new nodule or other new chest disease. CT SCAN OF THE ABDOMEN PELVIS WITH IV CONTRAST: Postoperative appearance of the liver is stable with apparent right lobe resection, and some stable left lobe cysts. No evidence of recurrent mass is identified. Small renal cysts appear stable. No new abnormalities are seen of the pancreas, spleen, left adrenal gland. Right adrenal gland is not definitely identified, possibly surgically absent, or may be incorporated into the residual perihepatic scar. No, ascites or acute inflammatory change is appreciated. Bowel loops are normal in caliber and appearance. Axial image 58 of series 2 shows mild but definite enlargement of an aortocaval lymph node, now 11 x 12 mm in diameter previously 5 x 8 mm. This portal venous phase series image appears to show central necrosis of the node as well. No other definite new or enlarging node or mass is appreciated elsewhere. Regarding the lower abdomen and pelvis, no mass or adenopathy is seen. Bladder is nondistended and normal in appearance. Uterus and ovaries are apparently surgically absent or atrophic. Delayed venous phase images show no evidence of obstructive uropathy. Bony structures appear to be intact. Incidental note is made of advanced right and moderate left hip joint DJD. Impression: Single enlarging, necrotic appearing aortocaval lymph node, concerning for early recurrence. Otherwise stable CT scan of the abdomen and pelvis. Electronically Signed: Salvador Gill MD  12/11/2023 8:16 PM EST  Workstation ID: [id number]    CT Chest With Contrast Diagnostic    Result Date: 12/11/2023  Minimal progression of a few of the patient's larger pulmonary nodules, none of which have enlarged and more than 1 mm, since 10/9/2023. No clearly new nodule or other new chest disease. CT SCAN OF THE ABDOMEN PELVIS WITH IV CONTRAST: Postoperative appearance of the liver is stable with apparent right lobe resection, and some stable left  lobe cysts. No evidence of recurrent mass is identified. Small renal cysts appear stable. No new abnormalities are seen of the pancreas, spleen, left adrenal gland. Right adrenal gland is not definitely identified, possibly surgically absent, or may be incorporated into the residual perihepatic scar. No, ascites or acute inflammatory change is appreciated. Bowel loops are normal in caliber and appearance. Axial image 58 of series 2 shows mild but definite enlargement of an aortocaval lymph node, now 11 x 12 mm in diameter previously 5 x 8 mm. This portal venous phase series image appears to show central necrosis of the node as well. No other definite new or enlarging node or mass is appreciated elsewhere. Regarding the lower abdomen and pelvis, no mass or adenopathy is seen. Bladder is nondistended and normal in appearance. Uterus and ovaries are apparently surgically absent or atrophic. Delayed venous phase images show no evidence of obstructive uropathy. Bony structures appear to be intact. Incidental note is made of advanced right and moderate left hip joint DJD. Impression: Single enlarging, necrotic appearing aortocaval lymph node, concerning for early recurrence. Otherwise stable CT scan of the abdomen and pelvis. Electronically Signed: Salvador Gill MD  12/11/2023 8:16 PM EST  Workstation ID: [id number]    CT Abdomen Pelvis With Contrast    Result Date: 10/9/2023  Minimal progression of patient's micronodular disease, most nodules appearing either stable or 1 mm increased in size from 7/10/2023. No clearly new pulmonary parenchymal nodules or other new chest disease elsewhere. CT SCAN OF THE ABDOMEN PELVIS WITH IV CONTRAST: There is expected left lobe liver hypertrophy following right lobe liver resection. There are multiple nonenhancing hepatic cysts unchanged. No new hepatic lesions are identified. Resection margin of the right liver lobe appears stable, with no evidence of recurrent mass. Portal, hepatic,  splenic and superior mesenteric veins opacify normally with contrast. Spleen is not enlarged. Pancreas, left adrenal gland, and kidneys appear within normal limits. Right adrenal gland is difficult to identify but apparently is not enlarged, and it may be included within the postoperative scarring in the right upper abdomen. No ascites or inflammatory change is seen. Bowel loops are normal in caliber and appearance. There are shotty retroperitoneal lymph nodes which appear similar to the prior study overall but mildly changed in size. Previous 15 mm precaval node on  image 75 of series 5 prior study today measures 11 mm, image 75 series 2. Pericaval node slightly cephalad of this level, between the IVC and aorta is minimally increased from 6.5mm to 8 mm. No clearly new nodes or significantly enlarged nodes are seen. Regarding the lower abdomen and pelvis, bladder is nondistended and normal in appearance. Uterus and ovaries appear to be surgically absent or atrophic. No intrapelvic free fluid or adenopathy is identified. Delayed venous phase images show no evidence of obstructive uropathy. Bony structures appear to be intact. IMPRESSION: 1. Stable postoperative appearance of the liver, the right liver lobe resection, left liver lobe hypertrophy and multiple nonenhancing left renal cysts. No evidence of recurrent mass. 2. Shotty retroperitoneal lymph nodes, 1 of which is minimally increased, 1 of which is minimally decreased, but no marked overall change. No evidence of metastatic disease or other new intra-abdominal or intrapelvic disease elsewhere. Electronically Signed: Salvador Gill MD  10/9/2023 10:09 AM EDT  Workstation ID: [id number]    CT Chest With Contrast Diagnostic    Result Date: 10/9/2023  Minimal progression of patient's micronodular disease, most nodules appearing either stable or 1 mm increased in size from 7/10/2023. No clearly new pulmonary parenchymal nodules or other new chest disease elsewhere. CT  SCAN OF THE ABDOMEN PELVIS WITH IV CONTRAST: There is expected left lobe liver hypertrophy following right lobe liver resection. There are multiple nonenhancing hepatic cysts unchanged. No new hepatic lesions are identified. Resection margin of the right liver lobe appears stable, with no evidence of recurrent mass. Portal, hepatic, splenic and superior mesenteric veins opacify normally with contrast. Spleen is not enlarged. Pancreas, left adrenal gland, and kidneys appear within normal limits. Right adrenal gland is difficult to identify but apparently is not enlarged, and it may be included within the postoperative scarring in the right upper abdomen. No ascites or inflammatory change is seen. Bowel loops are normal in caliber and appearance. There are shotty retroperitoneal lymph nodes which appear similar to the prior study overall but mildly changed in size. Previous 15 mm precaval node on  image 75 of series 5 prior study today measures 11 mm, image 75 series 2. Pericaval node slightly cephalad of this level, between the IVC and aorta is minimally increased from 6.5mm to 8 mm. No clearly new nodes or significantly enlarged nodes are seen. Regarding the lower abdomen and pelvis, bladder is nondistended and normal in appearance. Uterus and ovaries appear to be surgically absent or atrophic. No intrapelvic free fluid or adenopathy is identified. Delayed venous phase images show no evidence of obstructive uropathy. Bony structures appear to be intact. IMPRESSION: 1. Stable postoperative appearance of the liver, the right liver lobe resection, left liver lobe hypertrophy and multiple nonenhancing left renal cysts. No evidence of recurrent mass. 2. Shotty retroperitoneal lymph nodes, 1 of which is minimally increased, 1 of which is minimally decreased, but no marked overall change. No evidence of metastatic disease or other new intra-abdominal or intrapelvic disease elsewhere. Electronically Signed: Salvador Gill MD   10/9/2023 10:09 AM EDT  Workstation ID: [id number]        I reviewed the patient's scans back to March.  The patient does have an increase in size of retroperitoneal lymph node and a left lung nodule concerning for metastasis.    I reviewed the patient's CA 19-9.  WBC   Date Value Ref Range Status   12/11/2023 6.01 3.40 - 10.80 10*3/mm3 Final   03/17/2022 4.61 3.70 - 10.30 10*3/uL Final     RBC   Date Value Ref Range Status   12/11/2023 4.78 3.77 - 5.28 10*6/mm3 Final   03/17/2022 4.74 3.90 - 5.20 10*6/uL Final     Hemoglobin   Date Value Ref Range Status   12/11/2023 15.0 12.0 - 15.9 g/dL Final   03/17/2022 14.8 11.2 - 15.7 g/dL Final     Hematocrit   Date Value Ref Range Status   12/11/2023 45.5 34.0 - 46.6 % Final   03/17/2022 45.3 (H) 34.0 - 45.0 % Final     MCV   Date Value Ref Range Status   12/11/2023 95.2 79.0 - 97.0 fL Final   03/17/2022 96 79 - 98 fL Final     MCH   Date Value Ref Range Status   12/11/2023 31.4 26.6 - 33.0 pg Final   03/17/2022 31.2 26.0 - 32.0 pg Final     MCHC   Date Value Ref Range Status   12/11/2023 33.0 31.5 - 35.7 g/dL Final   03/17/2022 32.7 30.7 - 35.5 g/dL Final     RDW   Date Value Ref Range Status   12/11/2023 14.5 12.3 - 15.4 % Final   03/17/2022 17.6 (H) 11.5 - 14.5 % Final     RDW-SD   Date Value Ref Range Status   12/11/2023 50.8 37.0 - 54.0 fl Final     MPV   Date Value Ref Range Status   12/11/2023 12.7 (H) 6.0 - 12.0 fL Final   03/17/2022 12.5 8.8 - 12.5 fL Final     Platelets   Date Value Ref Range Status   12/11/2023 172 140 - 450 10*3/mm3 Final   03/17/2022 159 155 - 369 10*3/uL Final     Neutrophil Rel %   Date Value Ref Range Status   10/03/2021 63.0 % Final     Neutrophil %   Date Value Ref Range Status   12/11/2023 72.9 42.7 - 76.0 % Final     Lymphocyte Rel %   Date Value Ref Range Status   10/03/2021 23.0 % Final     Lymphocyte %   Date Value Ref Range Status   12/11/2023 10.8 (L) 19.6 - 45.3 % Final     Monocyte Rel %   Date Value Ref Range Status   10/03/2021  10.0 % Final     Monocyte %   Date Value Ref Range Status   12/11/2023 12.1 (H) 5.0 - 12.0 % Final     Eosinophil %   Date Value Ref Range Status   12/11/2023 3.2 0.3 - 6.2 % Final   10/03/2021 2.0 % Final     Basophil Rel %   Date Value Ref Range Status   10/03/2021 1.0 % Final     Basophil %   Date Value Ref Range Status   12/11/2023 0.3 0.0 - 1.5 % Final     Immature Grans %   Date Value Ref Range Status   12/11/2023 0.7 (H) 0.0 - 0.5 % Final   10/03/2021 1.0 % Final     Neutrophils Absolute   Date Value Ref Range Status   10/03/2021 6.75 (H) 1.60 - 6.10 10*3/uL Final     Neutrophils, Absolute   Date Value Ref Range Status   12/11/2023 4.38 1.70 - 7.00 10*3/mm3 Final     Lymphocytes Absolute   Date Value Ref Range Status   10/03/2021 2.39 1.20 - 3.90 10*3/uL Final     Lymphocytes, Absolute   Date Value Ref Range Status   12/11/2023 0.65 (L) 0.70 - 3.10 10*3/mm3 Final     Monocytes Absolute   Date Value Ref Range Status   10/03/2021 1.00 (H) 0.30 - 0.90 10*3/uL Final     Monocytes, Absolute   Date Value Ref Range Status   12/11/2023 0.73 0.10 - 0.90 10*3/mm3 Final     Eosinophils Absolute   Date Value Ref Range Status   10/03/2021 0.24 0.00 - 0.50 10*3/uL Final     Eosinophils, Absolute   Date Value Ref Range Status   12/11/2023 0.19 0.00 - 0.40 10*3/mm3 Final     Basophils Absolute   Date Value Ref Range Status   10/03/2021 0.05 0.00 - 0.10 10*3/uL Final     Basophils, Absolute   Date Value Ref Range Status   12/11/2023 0.02 0.00 - 0.20 10*3/mm3 Final     Immature Grans, Absolute   Date Value Ref Range Status   12/11/2023 0.04 0.00 - 0.05 10*3/mm3 Final   10/03/2021 0.10 (H) 0.00 - 0.06 10*3/uL Final     nRBC   Date Value Ref Range Status   12/11/2023 0.0 0.0 - 0.2 /100 WBC Final       Lab Results   Component Value Date    GLUCOSE 90 12/11/2023    BUN 16 12/11/2023    CREATININE 0.90 12/11/2023    EGFR 71.7 12/11/2023    BCR 19.8 12/11/2023    K 4.4 12/11/2023    CO2 26.0 12/11/2023    CALCIUM 9.4 12/11/2023     ALBUMIN 4.0 12/11/2023    BILITOT 0.5 12/11/2023    AST 29 12/11/2023    ALT 22 12/11/2023              There are no diagnoses linked to this encounter.       IMPRESSION:  Sheree Hernandez is an 83 y.o. female with local and regional recurrence of her previously resected cholangiocarcinoma.  She completed adjuvant chemoradiation therapy in 1/2021 and has been under surveillance with serial CT scans since that time.  More recently, she was found to have a recurrence in the liver in the previously treated area adjacent to her resection, as well as several lymph nodes mostly along the vena cava.  Despite these findings on imaging, her CA 19-9 has remained within normal limits.  Given that she did have failure in the field and this does appear to be a fairly slow-growing process, CyberKnife SBRT was recommended for the recurrent liver lesion and 3 enlarged/PET positive lymph nodes.  She completed CyberKnife SBRT on 5/8/2023.  The patient was noted to have interval improvement within the treated areas on subsequent CT imaging, despite now progressing just out of field within 2 lymph nodes along the pelvic brim.  The patient again underwent localized treatment with CyberKnife SBRT to the new colton recurrence which she completed on 8/11/2023.      The patient now has additional recurrence in the retroperitoneum with a new lymph node that is necrotic and high concerning for disease recurrence.  The at risk node does abut previous radiation sites and is increasing in size pretty quickly.    The patient is interested in treatment to this lesion.    The patient also has a left lung nodule measuring 1.3 cm on the coronal images of her most recent CT scan.  This lesion has increased steadily in size since March when it was barely detectable.  Given the growth of this lesion and her diagnosis is likely represents metastatic site disease.  She is not interested in further biopsy and PET scan would likely be somewhat questionable.   Patient was interested in proceeding with ablative treatment to this lesion.  Including this lesion would address all of her potential sites of disease at this time.    She is not interested in restarting chemo.    We discussed the risk and benefits in detail today with treatments of the retroperitoneal lesion and the lung lesion.  We discussed the risk for duodenal issues.  Minimizes we will do 40 scan of her belly for planning.  P.o. to IV and p.o. contrast to better delineate the lesion and the duodenum.  The patient has minimal risk for treatment of the lesion in the lung on the CyberKnife.  Would recommend a single dose 30 Rainey.    For the patient's fatigue and appetite suppression I would recommend short course of dexamethasone to see if that perks her up a little bit.  We also for the patient Janice Sepulveda for counseling.    Greater than 1 hour was spent preparing for and coordinating this visit. >50% of the time was spent in direct face to face conversation with the patient teaching, answering question, and providing explanations regarding the patient's case.  The decision to treat the patient with radiation is a complex one and carries the risk of long-term side effects and complications.  The patient's malignancy represents a complicated life threatening condition that requires complex multidisciplinary management for treatment and followup.      Special treatment procedure Note: Re-irradiation    The patient has previously received radiation to this or an abutting site.  The previous radiation plans were reviewed and were utilized to create a new plan.  This adds complexity and requires additional time and effort.    RECOMMENDATIONS:      Abdominal LN  -adjacent to previously treated fields  -Continued enlargement now with necrotic center  -Concerning for disease involvement  -Recommend CyberKnife SBRT 30 Gray 5 fractions  -Recommend 4D scan to track the excursion of the duodenum  -recommend IV and p.o.  contrast  -Return for CT simulation and treatments       lung nodule  -Patient has multiple small stable lung nodules  - left lower lobe nodule enlarged to 1.3 cm on coronal slices              -Substantial increase from March 2023  -Likely representts metastasis  -Patient interested in treatments to this lesion as well  -Recommend CyberKnife 30 Gray single fraction  -Return for CT simulation and treatments     Pelvic brim colton recurrence (resolved)  -2 nodes at the bifurcation of the aorta enlarging on CT scan 7/10/2023  -Abuts previous radiation field  -Not treated previously as this area was PET negative              -Albeit the patient's recurrence was largely displaying very minimal PET avidity  -Status post CyberKnife SBRT 35 Gray in 5 fractions to these 2 lymph nodes with a small amount of elective coverage around the vessels in the area not previously treated   -Completed 8/11/2023  -Does not appear to have any additional recurrences in the pelvis              -Does have some chronically enlarged lymph nodes in the external iliac lymph node chains predating cancer diagnosis  -Follows with Dr. Montenegro    Fatigue and poor appetite  -Recommend dexamethasone 0.5 mg twice a day for 10 days to see if she gets to feeling better    Anxiety  -Janice Sepulveda referral    Liver and high RP Tumor recurrence (resolved)  -On lateral aspect of residual left liver  -Also appears to be along the lateral vena cava lymph nodes  -Below previous radiation field  -Kidney is adjacent              -Complicates delivery              -Recent BUN/creatinine within normal limits  -NexGeneration sequencing with Dr. Montenegro  -Case discussed at multidisciplinary tumor board and with Dr. Sharma at   -Status post CyberKnife SBRT 35 Gray in 3 fractions              -Completed 5/8/2023  -Initial scans show good response in the liver and the nodes that were treated              -However she did have an early out of field recurrence  inferiorly       Cholangiocarcinoma of the liver  -CA 19-9 8/8/2021: 84.7  -Status post resection with Dr. Sharma at  9/2021  -pT3, pN0, pM0   -Focally positive margins  -Tumor extended to the extrapelvic soft tissues between the liver and the adrenal gland  -LVSI and PNI present  -0/4 lymph nodes involved  -CA 19-9 11/5/2021: 22  -Completed Xeloda currently with radiation 1/19/2022  -Surveillance CT scans 3/2022-12/2022 showing no evidence of residual or metastatic carcinoma  -CT C/A/P 3/6/2023 showing some enlarging lymph nodes concerning for recurrence  -CA 19-9 3/6/2023: 21.0  -PET/CT scan 3/27/2023 concerning for 1 cm left liver lesion and 3 lymph nodes along the vena cava that were slightly hypermetabolic and concerning for recurrence  -Status post CyberKnife SBRT as above  -LFTs 6/13/2023 improving  -CA 19-9 6/13/2023: 17.4   -Continues to follow with Dr. Montenegro         No follow-ups on file.    Bunny Billings MD

## 2023-12-26 ENCOUNTER — HOSPITAL ENCOUNTER (OUTPATIENT)
Dept: RADIATION ONCOLOGY | Facility: HOSPITAL | Age: 83
Discharge: HOME | End: 2023-12-26

## 2023-12-26 PROCEDURE — 77290 THER RAD SIMULAJ FIELD CPLX: CPT | Performed by: RADIOLOGY

## 2023-12-26 PROCEDURE — 77332 RADIATION TREATMENT AID(S): CPT | Performed by: RADIOLOGY

## 2023-12-26 PROCEDURE — 77470 SPECIAL RADIATION TREATMENT: CPT | Performed by: RADIOLOGY

## 2024-01-02 ENCOUNTER — HOSPITAL ENCOUNTER (OUTPATIENT)
Dept: RADIATION ONCOLOGY | Facility: HOSPITAL | Age: 84
Setting detail: RADIATION/ONCOLOGY SERIES
Discharge: HOME | End: 2024-01-02
Payer: MEDICARE

## 2024-01-02 DIAGNOSIS — C22.1 INTRAHEPATIC CHOLANGIOCARCINOMA: Primary | ICD-10-CM

## 2024-01-04 PROCEDURE — 77334 RADIATION TREATMENT AID(S): CPT | Performed by: RADIOLOGY

## 2024-01-04 PROCEDURE — 77300 RADIATION THERAPY DOSE PLAN: CPT | Performed by: RADIOLOGY

## 2024-01-04 PROCEDURE — 77295 3-D RADIOTHERAPY PLAN: CPT | Performed by: RADIOLOGY

## 2024-01-09 ENCOUNTER — HOSPITAL ENCOUNTER (OUTPATIENT)
Dept: RADIATION ONCOLOGY | Facility: HOSPITAL | Age: 84
Discharge: HOME | End: 2024-01-09

## 2024-01-09 PROCEDURE — 77280 THER RAD SIMULAJ FIELD SMPL: CPT | Performed by: RADIOLOGY

## 2024-01-09 PROCEDURE — 77412 RADIATION TX DELIVERY LVL 3: CPT | Performed by: RADIOLOGY

## 2024-01-09 PROCEDURE — 77373 STRTCTC BDY RAD THER TX DLVR: CPT | Performed by: RADIOLOGY

## 2024-01-11 ENCOUNTER — HOSPITAL ENCOUNTER (OUTPATIENT)
Dept: RADIATION ONCOLOGY | Facility: HOSPITAL | Age: 84
Discharge: HOME | End: 2024-01-11

## 2024-01-11 PROCEDURE — 77373 STRTCTC BDY RAD THER TX DLVR: CPT | Performed by: RADIOLOGY

## 2024-01-11 PROCEDURE — 77412 RADIATION TX DELIVERY LVL 3: CPT | Performed by: RADIOLOGY

## 2024-01-11 PROCEDURE — 77332 RADIATION TREATMENT AID(S): CPT | Performed by: RADIOLOGY

## 2024-01-11 PROCEDURE — 77280 THER RAD SIMULAJ FIELD SMPL: CPT | Performed by: RADIOLOGY

## 2024-01-16 ENCOUNTER — HOSPITAL ENCOUNTER (OUTPATIENT)
Dept: RADIATION ONCOLOGY | Facility: HOSPITAL | Age: 84
Discharge: HOME | End: 2024-01-16

## 2024-01-16 PROCEDURE — 77412 RADIATION TX DELIVERY LVL 3: CPT | Performed by: RADIOLOGY

## 2024-01-16 PROCEDURE — 77373 STRTCTC BDY RAD THER TX DLVR: CPT | Performed by: RADIOLOGY

## 2024-01-16 RX ORDER — DEXAMETHASONE 1 MG
0.5 TABLET ORAL 2 TIMES DAILY WITH MEALS
Qty: 10 TABLET | Refills: 0 | Status: SHIPPED | OUTPATIENT
Start: 2024-01-16

## 2024-01-17 ENCOUNTER — OFFICE VISIT (OUTPATIENT)
Dept: PSYCHIATRY | Facility: CLINIC | Age: 84
End: 2024-01-17
Payer: MEDICARE

## 2024-01-17 DIAGNOSIS — F43.21 ADJUSTMENT DISORDER WITH DEPRESSED MOOD: Primary | ICD-10-CM

## 2024-01-17 DIAGNOSIS — R53.82 CHRONIC FATIGUE: ICD-10-CM

## 2024-01-17 PROCEDURE — 90792 PSYCH DIAG EVAL W/MED SRVCS: CPT | Performed by: NURSE PRACTITIONER

## 2024-01-17 PROCEDURE — 1159F MED LIST DOCD IN RCRD: CPT | Performed by: NURSE PRACTITIONER

## 2024-01-17 PROCEDURE — 1160F RVW MEDS BY RX/DR IN RCRD: CPT | Performed by: NURSE PRACTITIONER

## 2024-01-17 RX ORDER — METHYLPHENIDATE HYDROCHLORIDE 5 MG/1
TABLET ORAL
Qty: 60 TABLET | Refills: 0 | Status: SHIPPED | OUTPATIENT
Start: 2024-01-17

## 2024-01-17 NOTE — PROGRESS NOTES
"  Subjective   Sheree Hernandez is a  83 y.o. female who is here today for initial appointment in person face to face.  Patient was referred by: Dr. Billings her Radiation oncologist for patient's fatigue and low mood. 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 She has been getting CyberKnife treatment. She is hoping not to have chemotherapy. Patient reports 6 years of chronic fatigue impacting her quality of life. She lives alone in [place], [place] has good support from her daughter in [facility] who comes for [employer] and her son lives in [place].       Chief Complaint:  fatigue    History of Present Illness Patient reports six years of chronic fatigue and has seen many physicians trying to find out why and how to combat it. She states it has impacted her life with difficulty doing physical tasks, social engagements, even going to [employer] because \"my energy will just be gone\". States it has affected her mood because it is depressing not to be able to do the things she used to ie caring for her home and going out with others. She reports Dr. Billings had placed her on a steroid for a week and it helped her feel almost \"normal\" but then that was it, only 10 pills. He has ordered more but concerned about taking steroids long term. Denies panic, denies sleep issues other than occasional nights of wakefulness. Doesn't  worry about cancer but doesn't want chemotherapy. Denies PTSD, OCD or andreas ever. Has good friends and relationships    The following portions of the patient's history were reviewed and updated as appropriate: allergies, current medications, past family history, past medical history, past social history, past surgical history, and problem list.    Evaluated and reviewed the six pillars of health: Nutrition, Activity, Sleep, Social Engagement, Stress Management, decreasing toxins ie nicotine, alcohol, illicit drugs     Past Psych History: denies     Substance Abuse: denies all     SPENSER " REVIEWED: no red flags       Family Psychiatric History:  family history includes Arrhythmia in her brother; Arthritis in her mother; Breast cancer in her paternal aunt; Dementia in her mother; Hearing loss in her father; Heart attack in her mother; Heart disease in her father; Heart failure in her mother; Stroke in her mother.      Social History: was  but   several years ago. She lives alone in her house in [place], [place]. Has a good relationship with her daughter and son. Enjoys her family and Yazidism and friends. Used to be very active but has had chronic fatigue for 6 years and has affected all aspects of life reducing her social interaction other than phone calls from friends and family.       Medical/Surgical History:  Past Medical History:   Diagnosis Date    Arthritis 2017    Asthma     Atypical chest pain 2017    Cataract     Chronic constipation     Diverticulosis 2018    Fracture, pelvis closed 2015    x2    HL (hearing loss) 2018    Hearing aids    Hyperlipidemia 2017    Hypertension 2017    Intrahepatic cholangiocarcinoma 2021    Low bone mass     with elevated FRAX core    Lung nodule 2023    Mild obstructive sleep apnea 2020    Near syncope     negative cardiovascular workup     Nontoxic multinodular goiter 2022    Plantar fasciitis     Chronic    PVC's (premature ventricular contractions)     Senile keratosis     Multiple    MANAN (stress urinary incontinence, female)     Syncope, near     with negative cardiovascular workup     Past Surgical History:   Procedure Laterality Date    ADRENAL GLAND SURGERY  2021    ADRENALECTOMY  2021    CARDIAC CATHETERIZATION N/A 2019    Procedure: Left Heart Cath;  Surgeon: Tucker Gonzalez MD;  Location: 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      coenzyme Q10 50 MG capsule capsule Take  by mouth Daily.      Cyanocobalamin (VITAMIN B 12 PO) Take  by mouth.      dexAMETHasone (DECADRON) 1 MG tablet Take 0.5 (one-half) tablet by mouth 2 Times a Day With Meals. 10 tablet 0    Folic Acid 0.8 MG capsule Take  by mouth.      ipratropium (ATROVENT) 0.06 % nasal spray Instill 2 sprays into both nostrils as directed by provider 2 (Two) Times a Day. 15 mL 6    ipratropium (ATROVENT) 0.06 % nasal spray Administer 1 spray into each nostril twice daily 30 mL 3    lactulose (CHRONULAC) 10 GM/15ML solution Take 30 mL by mouth 2 (two) times a day as needed for constipation. 150 mL 3    magnesium oxide (MAG-OX) 400 MG tablet Take 1 tablet by mouth Daily.      methylphenidate (RITALIN) 5 MG tablet Take 0.5-1 tablet by mouth Every Morning and repeat 4 hours later. 60 tablet 0    Omega-3 1000 MG capsule Take  by mouth Daily.      rosuvastatin (CRESTOR) 10 MG tablet Take 1 tablet by mouth Daily. 90 tablet 3    telmisartan (MICARDIS) 20 MG tablet Take 1 tablet by mouth Daily. 90 tablet 3    Turmeric Curcumin 500 MG capsule Take 750 mg by mouth Daily.      Zinc 50 MG capsule Take  by mouth Daily.       No current facility-administered medications for this visit.       Lab Results:  Hospital Outpatient Visit on 12/11/2023   Component Date Value Ref Range Status    Creatinine 12/11/2023 0.90  0.60 - 1.30 mg/dL Final    Serial Number: [id number]  [id number]   Lab on  12/11/2023   Component Date Value Ref Range Status    CA 19-9 12/11/2023 17.4  <=35.0 U/mL Final    Glucose 12/11/2023 90  65 - 99 mg/dL Final    BUN 12/11/2023 16  8 - 23 mg/dL Final    Creatinine 12/11/2023 0.81  0.57 - 1.00 mg/dL Final    Sodium 12/11/2023 141  136 - 145 mmol/L Final    Potassium 12/11/2023 4.4  3.5 - 5.2 mmol/L Final    Chloride 12/11/2023 104  98 - 107 mmol/L Final    CO2 12/11/2023 26.0  22.0 - 29.0 mmol/L Final    Calcium 12/11/2023 9.4  8.6 - 10.5 mg/dL Final    Total Protein 12/11/2023 7.2  6.0 - 8.5 g/dL Final    Albumin 12/11/2023 4.0  3.5 - 5.2 g/dL Final    ALT (SGPT) 12/11/2023 22  1 - 33 U/L Final    AST (SGOT) 12/11/2023 29  1 - 32 U/L Final    Alkaline Phosphatase 12/11/2023 209 (H)  39 - 117 U/L Final    Total Bilirubin 12/11/2023 0.5  0.0 - 1.2 mg/dL Final    Globulin 12/11/2023 3.2  gm/dL Final    Calculated Result    A/G Ratio 12/11/2023 1.3  g/dL Final    BUN/Creatinine Ratio 12/11/2023 19.8  7.0 - 25.0 Final    Anion Gap 12/11/2023 11.0  5.0 - 15.0 mmol/L Final    eGFR 12/11/2023 71.7  >60.0 mL/min/1.73 Final    WBC 12/11/2023 6.01  3.40 - 10.80 10*3/mm3 Final    RBC 12/11/2023 4.78  3.77 - 5.28 10*6/mm3 Final    Hemoglobin 12/11/2023 15.0  12.0 - 15.9 g/dL Final    Hematocrit 12/11/2023 45.5  34.0 - 46.6 % Final    MCV 12/11/2023 95.2  79.0 - 97.0 fL Final    MCH 12/11/2023 31.4  26.6 - 33.0 pg Final    MCHC 12/11/2023 33.0  31.5 - 35.7 g/dL Final    RDW 12/11/2023 14.5  12.3 - 15.4 % Final    RDW-SD 12/11/2023 50.8  37.0 - 54.0 fl Final    MPV 12/11/2023 12.7 (H)  6.0 - 12.0 fL Final    Platelets 12/11/2023 172  140 - 450 10*3/mm3 Final    Neutrophil % 12/11/2023 72.9  42.7 - 76.0 % Final    Lymphocyte % 12/11/2023 10.8 (L)  19.6 - 45.3 % Final    Monocyte % 12/11/2023 12.1 (H)  5.0 - 12.0 % Final    Eosinophil % 12/11/2023 3.2  0.3 - 6.2 % Final    Basophil % 12/11/2023 0.3  0.0 - 1.5 % Final    Immature Grans % 12/11/2023 0.7 (H)  0.0 - 0.5 % Final    Neutrophils, Absolute  12/11/2023 4.38  1.70 - 7.00 10*3/mm3 Final    Lymphocytes, Absolute 12/11/2023 0.65 (L)  0.70 - 3.10 10*3/mm3 Final    Monocytes, Absolute 12/11/2023 0.73  0.10 - 0.90 10*3/mm3 Final    Eosinophils, Absolute 12/11/2023 0.19  0.00 - 0.40 10*3/mm3 Final    Basophils, Absolute 12/11/2023 0.02  0.00 - 0.20 10*3/mm3 Final    Immature Grans, Absolute 12/11/2023 0.04  0.00 - 0.05 10*3/mm3 Final    nRBC 12/11/2023 0.0  0.0 - 0.2 /100 WBC Final   Hospital Outpatient Visit on 10/09/2023   Component Date Value Ref Range Status    Creatinine 10/09/2023 0.90  0.60 - 1.30 mg/dL Final    Serial Number: [id number]  [id number]   Lab on 10/09/2023   Component Date Value Ref Range Status    Glucose 10/09/2023 89  65 - 99 mg/dL Final    BUN 10/09/2023 18  8 - 23 mg/dL Final    Creatinine 10/09/2023 0.83  0.57 - 1.00 mg/dL Final    Sodium 10/09/2023 141  136 - 145 mmol/L Final    Potassium 10/09/2023 4.5  3.5 - 5.2 mmol/L Final    Chloride 10/09/2023 106  98 - 107 mmol/L Final    CO2 10/09/2023 27.0  22.0 - 29.0 mmol/L Final    Calcium 10/09/2023 8.7  8.6 - 10.5 mg/dL Final    Total Protein 10/09/2023 6.8  6.0 - 8.5 g/dL Final    Albumin 10/09/2023 3.8  3.5 - 5.2 g/dL Final    ALT (SGPT) 10/09/2023 21  1 - 33 U/L Final    AST (SGOT) 10/09/2023 30  1 - 32 U/L Final    Alkaline Phosphatase 10/09/2023 178 (H)  39 - 117 U/L Final    Total Bilirubin 10/09/2023 0.4  0.0 - 1.2 mg/dL Final    Globulin 10/09/2023 3.0  gm/dL Final    Calculated Result    A/G Ratio 10/09/2023 1.3  g/dL Final    BUN/Creatinine Ratio 10/09/2023 21.7  7.0 - 25.0 Final    Anion Gap 10/09/2023 8.0  5.0 - 15.0 mmol/L Final    eGFR 10/09/2023 69.6  >60.0 mL/min/1.73 Final    CA 19-9 10/09/2023 15.4  <=35.0 U/mL Final    WBC 10/09/2023 4.05  3.40 - 10.80 10*3/mm3 Final    RBC 10/09/2023 4.34  3.77 - 5.28 10*6/mm3 Final    Hemoglobin 10/09/2023 13.7  12.0 - 15.9 g/dL Final    Hematocrit 10/09/2023 42.3  34.0 - 46.6 % Final    MCV 10/09/2023 97.5 (H)  79.0 - 97.0 fL Final     MCH 10/09/2023 31.6  26.6 - 33.0 pg Final    MCHC 10/09/2023 32.4  31.5 - 35.7 g/dL Final    RDW 10/09/2023 15.0  12.3 - 15.4 % Final    RDW-SD 10/09/2023 54.3 (H)  37.0 - 54.0 fl Final    MPV 10/09/2023 13.3 (H)  6.0 - 12.0 fL Final    Platelets 10/09/2023 125 (L)  140 - 450 10*3/mm3 Final    Neutrophil % 10/09/2023 67.7  42.7 - 76.0 % Final    Lymphocyte % 10/09/2023 13.6 (L)  19.6 - 45.3 % Final    Monocyte % 10/09/2023 14.8 (H)  5.0 - 12.0 % Final    Eosinophil % 10/09/2023 3.2  0.3 - 6.2 % Final    Basophil % 10/09/2023 0.5  0.0 - 1.5 % Final    Immature Grans % 10/09/2023 0.2  0.0 - 0.5 % Final    Neutrophils, Absolute 10/09/2023 2.74  1.70 - 7.00 10*3/mm3 Final    Lymphocytes, Absolute 10/09/2023 0.55 (L)  0.70 - 3.10 10*3/mm3 Final    Monocytes, Absolute 10/09/2023 0.60  0.10 - 0.90 10*3/mm3 Final    Eosinophils, Absolute 10/09/2023 0.13  0.00 - 0.40 10*3/mm3 Final    Basophils, Absolute 10/09/2023 0.02  0.00 - 0.20 10*3/mm3 Final    Immature Grans, Absolute 10/09/2023 0.01  0.00 - 0.05 10*3/mm3 Final    nRBC 10/09/2023 0.0  0.0 - 0.2 /100 WBC Final   Lab on 08/07/2023   Component Date Value Ref Range Status    Total Cholesterol 08/07/2023 207 (H)  0 - 200 mg/dL Final    Triglycerides 08/07/2023 96  0 - 150 mg/dL Final    HDL Cholesterol 08/07/2023 79 (H)  40 - 60 mg/dL Final    LDL Cholesterol  08/07/2023 111 (H)  0 - 100 mg/dL Final    VLDL Cholesterol 08/07/2023 17  5 - 40 mg/dL Final    LDL/HDL Ratio 08/07/2023 1.38   Final    25 Hydroxy, Vitamin D 08/07/2023 87.3  30.0 - 100.0 ng/ml Final    Hemoglobin A1C 08/07/2023 5.60  4.80 - 5.60 % Final    Glucose 08/07/2023 84  65 - 99 mg/dL Final    BUN 08/07/2023 24 (H)  8 - 23 mg/dL Final    Creatinine 08/07/2023 1.03 (H)  0.57 - 1.00 mg/dL Final    Sodium 08/07/2023 142  136 - 145 mmol/L Final    Potassium 08/07/2023 4.6  3.5 - 5.2 mmol/L Final    Chloride 08/07/2023 104  98 - 107 mmol/L Final    CO2 08/07/2023 26.8  22.0 - 29.0 mmol/L Final    Calcium  08/07/2023 9.9  8.6 - 10.5 mg/dL Final    Total Protein 08/07/2023 7.1  6.0 - 8.5 g/dL Final    Albumin 08/07/2023 4.2  3.5 - 5.2 g/dL Final    ALT (SGPT) 08/07/2023 26  1 - 33 U/L Final    AST (SGOT) 08/07/2023 33 (H)  1 - 32 U/L Final    Alkaline Phosphatase 08/07/2023 196 (H)  39 - 117 U/L Final    Total Bilirubin 08/07/2023 0.6  0.0 - 1.2 mg/dL Final    Globulin 08/07/2023 2.9  gm/dL Final    A/G Ratio 08/07/2023 1.4  g/dL Final    BUN/Creatinine Ratio 08/07/2023 23.3  7.0 - 25.0 Final    Anion Gap 08/07/2023 11.2  5.0 - 15.0 mmol/L Final    eGFR 08/07/2023 53.7 (L)  >60.0 mL/min/1.73 Final    TSH 08/07/2023 3.120  0.270 - 4.200 uIU/mL Final    Free T4 08/07/2023 1.30  0.93 - 1.70 ng/dL Final    T4 results may be falsely increased if patient taking Biotin.    Color, UA 08/07/2023 Yellow  Yellow, Straw Final    Appearance, UA 08/07/2023 Cloudy (A)  Clear Final    pH, UA 08/07/2023 6.5  5.0 - 8.0 Final    Specific Gravity, UA 08/07/2023 1.022  1.005 - 1.030 Final    Glucose, UA 08/07/2023 Negative  Negative Final    Ketones, UA 08/07/2023 Negative  Negative Final    Bilirubin, UA 08/07/2023 Negative  Negative Final    Blood, UA 08/07/2023 Trace (A)  Negative Final    Protein, UA 08/07/2023 30 mg/dL (1+) (A)  Negative Final    Leuk Esterase, UA 08/07/2023 Large (3+) (A)  Negative Final    Nitrite, UA 08/07/2023 Negative  Negative Final    Urobilinogen, UA 08/07/2023 0.2 E.U./dL  0.2 - 1.0 E.U./dL Final    Ferritin 08/07/2023 179.00 (H)  13.00 - 150.00 ng/mL Final    Iron 08/07/2023 149 (H)  37 - 145 mcg/dL Final    Iron Saturation (TSAT) 08/07/2023 39  20 - 50 % Final    Transferrin 08/07/2023 258  200 - 360 mg/dL Final    TIBC 08/07/2023 384  298 - 536 mcg/dL Final    WBC 08/07/2023 5.16  3.40 - 10.80 10*3/mm3 Final    RBC 08/07/2023 4.90  3.77 - 5.28 10*6/mm3 Final    Hemoglobin 08/07/2023 15.1  12.0 - 15.9 g/dL Final    Hematocrit 08/07/2023 45.4  34.0 - 46.6 % Final    MCV 08/07/2023 92.7  79.0 - 97.0 fL Final     MCH 08/07/2023 30.8  26.6 - 33.0 pg Final    MCHC 08/07/2023 33.3  31.5 - 35.7 g/dL Final    RDW 08/07/2023 13.7  12.3 - 15.4 % Final    RDW-SD 08/07/2023 46.4  37.0 - 54.0 fl Final    MPV 08/07/2023 12.8 (H)  6.0 - 12.0 fL Final    Platelets 08/07/2023 133 (L)  140 - 450 10*3/mm3 Final    Neutrophil % 08/07/2023 74.8  42.7 - 76.0 % Final    Lymphocyte % 08/07/2023 11.4 (L)  19.6 - 45.3 % Final    Monocyte % 08/07/2023 11.6  5.0 - 12.0 % Final    Eosinophil % 08/07/2023 1.2  0.3 - 6.2 % Final    Basophil % 08/07/2023 0.6  0.0 - 1.5 % Final    Neutrophils, Absolute 08/07/2023 3.86  1.70 - 7.00 10*3/mm3 Final    Lymphocytes, Absolute 08/07/2023 0.59 (L)  0.70 - 3.10 10*3/mm3 Final    Monocytes, Absolute 08/07/2023 0.60  0.10 - 0.90 10*3/mm3 Final    Eosinophils, Absolute 08/07/2023 0.06  0.00 - 0.40 10*3/mm3 Final    Basophils, Absolute 08/07/2023 0.03  0.00 - 0.20 10*3/mm3 Final    Urine Culture 08/07/2023 <25,000 CFU/mL Mixed Karyn Isolated   Final    RBC, UA 08/07/2023 0-2  None Seen, 0-2 /HPF Final    WBC, UA 08/07/2023 31-50 (A)  None Seen, 0-2 /HPF Final    Bacteria, UA 08/07/2023 None Seen  None Seen /HPF Final    Squamous Epithelial Cells, UA 08/07/2023 7-12 (A)  None Seen, 0-2 /HPF Final    Hyaline Casts, UA 08/07/2023 3-6  None Seen /LPF Final    Methodology 08/07/2023 Manual Light Microscopy   Final         Review of Systems Constitutional: Negative for appetite change, chills, diaphoresis, fatigue, fever and unexpected weight change.   HENT: Negative for hearing loss, sore throat, trouble swallowing and voice change.    Eyes: Negative for photophobia and visual disturbance.   Respiratory: Negative for cough, chest tightness and shortness of breath.    Cardiovascular: Negative for chest pain and palpitations.   Gastrointestinal: Negative for abdominal pain, constipation, nausea and vomiting.   Endocrine: Negative for cold intolerance and heat intolerance.   Genitourinary: Negative for dysuria and  frequency.   Musculoskeletal: Negative for arthralgia, back pain, joint swelling and neck stiffness.   Skin: Negative for color change and wound.   Allergic/Immunologic: Negative for environmental allergies and immunocompromised state.   Neurological: Negative for dizziness, tremors, seizures, syncope, weakness, light-headedness and headaches.   Hematological: Negative for adenopathy. Does not bruise/bleed easily.    Objective   Physical Exam  There were no vitals taken for this visit.    GAETANO-7:    Over the last two weeks, how often have you been bothered by the following problems?  Feeling nervous, anxious or on edge: Not at all  Not being able to stop or control worrying: Not at all  Worrying too much about different things: Not at all  Trouble Relaxing: Not at all  Being so restless that it is hard to sit still: Not at all  Becoming easily annoyed or irritable: Not at all  Feeling afraid as if something awful might happen: Not at all  GAETANO 7 Total Score: 0  If you checked any problems, how difficult have these problems made it for you to do your work, take care of things at home, or get along with other people: Not difficult at all  0-4: Minimal anxiety  5-9: Mild anxiety  10-14: Moderate anxiety  15-21: Severe anxiety    PHQ-9:      1/17/2024     4:00 PM   PHQ-2/PHQ-9 Depression Screening   Little Interest or Pleasure in Doing Things 1-->several days   Feeling Down, Depressed or Hopeless 0-->not at all   PHQ-9: Brief Depression Severity Measure Score 1      5-9: Minimal symptoms  10-14: Major depression mild  15-19: Major depression moderate  Greater then 20: Major depression severe      Mental Status Exam:   Appearance: appropriate  Hygiene:   good  Cooperation:  Cooperative  Eye Contact:  Good  Psychomotor Behavior:  Appropriate  Mood:  within normal limits  Affect:  Appropriate  Hopelessness: Denies  Speech:  Normal  Thought Process:  Linear  Thought Content:  Normal  Suicidal:  None  Homicidal:   None  Hallucinations:  None  Delusion:  None  Memory:  Intact  Orientation:  Person, Place, Time, and Situation  Reliability:  good  Insight:  Good  Judgement:  Good  Impulse Control:  Good  Physical/Medical Issues:  Yes in active treatment for cancer       Short-term goals: Patient will be compliant with clinic appointments.  Patient will be engaged in therapy, medication compliant with minimal side effects. Patient  will report decrease of symptoms and frequency.    Long-term goals: Patient will have minimal symptoms of mental health disorder with continued treatment. Patient will be compliant with treatment and appointments.       Problem list: chronic fatigue, adjustment disorder with depressed mood   Strengths: patient appears motivated for treatment          Assessment & Plan   Diagnoses and all orders for this visit:    1. Adjustment disorder with depressed mood (Primary)    2. Chronic fatigue  -     methylphenidate (RITALIN) 5 MG tablet; Take 0.5-1 tablet by mouth Every Morning and repeat 4 hours later.  Dispense: 60 tablet; Refill: 0        A psychological evaluation was conducted in order to assess past and current level of functioning. Areas assessed included, but were not limited to: perception of social support, perception of ability to face and deal with challenges in life (positive functioning), anxiety symptoms, depressive symptoms, perspective on beliefs/belief system, coping skills for stress, intelligence level,  Therapeutic rapport was established.     Assisted patient in processing above session content; acknowledged and normalized patient’s thoughts, feelings, and concerns.  Applied  positive coping skills and behavior management in session. Allowed patient to freely discuss issues without interruption or judgment. Provided safe, confidential environment to facilitate the development of positive therapeutic relationship and encourage open, honest communication.     Assisted patient in  identifying risk factors which would indicate the need for higher level of care including thoughts to harm self or others and/or self-harming behavior and encouraged patient to contact this office, call 911, or present to the nearest emergency room should any of these events occur. Discussed crisis intervention services and means to access.  Patient adamantly and convincingly denies current suicidal or homicidal ideation or perceptual disturbance.    Discussed diagnosis and recommendations for treatment:    PROVIDE: Cognitive Behavioral Therapy and Solution Focused Therapy to improve functioning, maintain stability, and avoid decompensation and the need for higher level of care.    MEDICATION MANAGEMENT RECOMMENDATIONS: methylphenidate 5 mg take 1/2 - 1 tablet in am and 1/2 - 1 tablet around 1pm for significant low energy    Pt may take the second round of steroids, if she does we discussed NOT taking the ritalin and waiting to see how she does. IF after the steroids her energy drops again then try the ritalin. OR she may not take the steroid and see how she does on the ritalin. Pt agreeable to plan and verbalized instructions     We discussed risks, benefits,goals and side effects of the above medication and the patient was agreeable with the plan.Patient was educated on the importance of compliance with treatment and follow-up appointments.To call for questions or concerns and return early if necessary. Crisis plan reviewed including going to the Emergency department.       Treatment Plan: stabilize mood,  patient will stay out of the hospital and be at optimal level of functioning, take all medication as prescribed. Patient verbalized  understanding and agreement to plan.      Return in about 3 weeks (around 2/7/2024).

## 2024-01-18 ENCOUNTER — HOSPITAL ENCOUNTER (OUTPATIENT)
Dept: RADIATION ONCOLOGY | Facility: HOSPITAL | Age: 84
Discharge: HOME | End: 2024-01-18

## 2024-01-18 PROCEDURE — 77412 RADIATION TX DELIVERY LVL 3: CPT | Performed by: RADIOLOGY

## 2024-01-18 PROCEDURE — 77373 STRTCTC BDY RAD THER TX DLVR: CPT | Performed by: RADIOLOGY

## 2024-01-23 ENCOUNTER — HOSPITAL ENCOUNTER (OUTPATIENT)
Dept: RADIATION ONCOLOGY | Facility: HOSPITAL | Age: 84
Discharge: HOME | End: 2024-01-23

## 2024-01-23 PROCEDURE — 77412 RADIATION TX DELIVERY LVL 3: CPT | Performed by: RADIOLOGY

## 2024-01-23 PROCEDURE — 77336 RADIATION PHYSICS CONSULT: CPT | Performed by: RADIOLOGY

## 2024-01-23 PROCEDURE — 77373 STRTCTC BDY RAD THER TX DLVR: CPT | Performed by: RADIOLOGY

## 2024-01-25 ENCOUNTER — HOSPITAL ENCOUNTER (OUTPATIENT)
Dept: RADIATION ONCOLOGY | Facility: HOSPITAL | Age: 84
Discharge: HOME | End: 2024-01-25

## 2024-01-25 PROCEDURE — 77332 RADIATION TREATMENT AID(S): CPT | Performed by: RADIOLOGY

## 2024-01-31 PROCEDURE — 77293 RESPIRATOR MOTION MGMT SIMUL: CPT | Performed by: RADIOLOGY

## 2024-01-31 PROCEDURE — 77338 DESIGN MLC DEVICE FOR IMRT: CPT | Performed by: RADIOLOGY

## 2024-01-31 PROCEDURE — 77301 RADIOTHERAPY DOSE PLAN IMRT: CPT | Performed by: RADIOLOGY

## 2024-01-31 PROCEDURE — 77300 RADIATION THERAPY DOSE PLAN: CPT | Performed by: RADIOLOGY

## 2024-02-01 ENCOUNTER — HOSPITAL ENCOUNTER (OUTPATIENT)
Dept: RADIATION ONCOLOGY | Facility: HOSPITAL | Age: 84
Setting detail: RADIATION/ONCOLOGY SERIES
Discharge: HOME | End: 2024-02-01
Payer: MEDICARE

## 2024-02-01 ENCOUNTER — HOSPITAL ENCOUNTER (OUTPATIENT)
Dept: RADIATION ONCOLOGY | Facility: HOSPITAL | Age: 84
Setting detail: RADIATION/ONCOLOGY SERIES
End: 2024-02-01
Payer: MEDICARE

## 2024-02-01 PROCEDURE — 77386: CPT | Performed by: RADIOLOGY

## 2024-02-01 PROCEDURE — 77336 RADIATION PHYSICS CONSULT: CPT | Performed by: RADIOLOGY

## 2024-02-07 ENCOUNTER — TELEMEDICINE (OUTPATIENT)
Dept: PSYCHIATRY | Facility: CLINIC | Age: 84
End: 2024-02-07
Payer: MEDICARE

## 2024-02-07 DIAGNOSIS — R53.82 CHRONIC FATIGUE: ICD-10-CM

## 2024-02-07 DIAGNOSIS — G47.9 SLEEP DISTURBANCE: ICD-10-CM

## 2024-02-07 DIAGNOSIS — F43.21 ADJUSTMENT DISORDER WITH DEPRESSED MOOD: Primary | ICD-10-CM

## 2024-02-07 PROCEDURE — 1160F RVW MEDS BY RX/DR IN RCRD: CPT | Performed by: NURSE PRACTITIONER

## 2024-02-07 PROCEDURE — 99212 OFFICE O/P EST SF 10 MIN: CPT | Performed by: NURSE PRACTITIONER

## 2024-02-07 PROCEDURE — 1159F MED LIST DOCD IN RCRD: CPT | Performed by: NURSE PRACTITIONER

## 2024-02-07 NOTE — PROGRESS NOTES
Subjective   Sheree Hernandez is a 83 y.o. female who is here today for medication management follow up. This was an audio and video enabled telemedicine encounter patient consented to.    TIME IN:1102  TIME OUT:1117    I spent 15 minutes in patient care: reviewing records prior to the visit, assessing the patient, entering orders and documentation.    PATIENT AT: THEIR PLACE OF RESIDENCE    PROVIDER AT:   [facility]  [address]  [place] [address]        Chief Complaint: adjustment disorder with depressed mood, chronic fatigue, sleep disturbance    History of Present Illness Patient presents via telemed less depressed mood over chronic fatigue,  she is intentionally  pushing herself to be more socially engaged ie going out to lunch with friends, helping a friend get to radiation oncology for treatments. She completed radiation treatments herself. Dr. Billings placed her on 0.5 mg decadron twice daily and she states it helps with her chronic fatigue but concerns about staying on a steroid. She reports the ritalin just made her more tired so tried several times but didn't work for her. Has chronic sleep issues for most adult life. Will sometimes difficult to fall asleep and other times awakens at 3 am and can't go back to sleep at all like last night. She states most of the time she doesn't feel tired mentally. She is interested in trying melatonin again, has been years since she tried that .  Denies adverse effects from medications.   (Scales based on 0 - 10 with 10 being the worst)        The following portions of the patient's history were reviewed and updated as appropriate: allergies, current medications, past family history, past medical history, past social history, past surgical history, and problem list.    Review of Systems  A 14 point review of systems was performed and is negative except as noted above.    Objective   Physical  Exam  There were no vitals taken for this visit.    Allergies   Allergen Reactions    Pravastatin Myalgia       Current Medications:   Current Outpatient Medications   Medication Sig Dispense Refill    Cholecalciferol (Vitamin D3) 25 MCG (1000 UT) capsule Take  by mouth Daily.      coenzyme Q10 50 MG capsule capsule Take  by mouth Daily.      Cyanocobalamin (VITAMIN B 12 PO) Take  by mouth.      dexAMETHasone (DECADRON) 1 MG tablet Take 0.5 (one-half) tablet by mouth 2 Times a Day With Meals. 10 tablet 0    Folic Acid 0.8 MG capsule Take  by mouth.      ipratropium (ATROVENT) 0.06 % nasal spray Instill 2 sprays into both nostrils as directed by provider 2 (Two) Times a Day. 15 mL 6    ipratropium (ATROVENT) 0.06 % nasal spray Administer 1 spray into each nostril twice daily 30 mL 3    lactulose (CHRONULAC) 10 GM/15ML solution Take 30 mL by mouth 2 (two) times a day as needed for constipation. 150 mL 3    magnesium oxide (MAG-OX) 400 MG tablet Take 1 tablet by mouth Daily.      methylphenidate (RITALIN) 5 MG tablet Take 0.5-1 tablet by mouth Every Morning and repeat 4 hours later. 60 tablet 0    Omega-3 1000 MG capsule Take  by mouth Daily.      rosuvastatin (CRESTOR) 10 MG tablet Take 1 tablet by mouth Daily. 90 tablet 3    telmisartan (MICARDIS) 20 MG tablet Take 1 tablet by mouth Daily. 90 tablet 3    Turmeric Curcumin 500 MG capsule Take 750 mg by mouth Daily.      Zinc 50 MG capsule Take  by mouth Daily.       No current facility-administered medications for this visit.       Lab Results:  Hospital Outpatient Visit on 12/11/2023   Component Date Value Ref Range Status    Creatinine 12/11/2023 0.90  0.60 - 1.30 mg/dL Final    Serial Number: [id number]:  [id number]   Lab on 12/11/2023   Component Date Value Ref Range Status    CA 19-9 12/11/2023 17.4  <=35.0 U/mL Final    Glucose 12/11/2023 90  65 - 99 mg/dL Final    BUN 12/11/2023 16  8 - 23 mg/dL Final    Creatinine 12/11/2023 0.81  0.57 - 1.00 mg/dL Final     Sodium 12/11/2023 141  136 - 145 mmol/L Final    Potassium 12/11/2023 4.4  3.5 - 5.2 mmol/L Final    Chloride 12/11/2023 104  98 - 107 mmol/L Final    CO2 12/11/2023 26.0  22.0 - 29.0 mmol/L Final    Calcium 12/11/2023 9.4  8.6 - 10.5 mg/dL Final    Total Protein 12/11/2023 7.2  6.0 - 8.5 g/dL Final    Albumin 12/11/2023 4.0  3.5 - 5.2 g/dL Final    ALT (SGPT) 12/11/2023 22  1 - 33 U/L Final    AST (SGOT) 12/11/2023 29  1 - 32 U/L Final    Alkaline Phosphatase 12/11/2023 209 (H)  39 - 117 U/L Final    Total Bilirubin 12/11/2023 0.5  0.0 - 1.2 mg/dL Final    Globulin 12/11/2023 3.2  gm/dL Final    Calculated Result    A/G Ratio 12/11/2023 1.3  g/dL Final    BUN/Creatinine Ratio 12/11/2023 19.8  7.0 - 25.0 Final    Anion Gap 12/11/2023 11.0  5.0 - 15.0 mmol/L Final    eGFR 12/11/2023 71.7  >60.0 mL/min/1.73 Final    WBC 12/11/2023 6.01  3.40 - 10.80 10*3/mm3 Final    RBC 12/11/2023 4.78  3.77 - 5.28 10*6/mm3 Final    Hemoglobin 12/11/2023 15.0  12.0 - 15.9 g/dL Final    Hematocrit 12/11/2023 45.5  34.0 - 46.6 % Final    MCV 12/11/2023 95.2  79.0 - 97.0 fL Final    MCH 12/11/2023 31.4  26.6 - 33.0 pg Final    MCHC 12/11/2023 33.0  31.5 - 35.7 g/dL Final    RDW 12/11/2023 14.5  12.3 - 15.4 % Final    RDW-SD 12/11/2023 50.8  37.0 - 54.0 fl Final    MPV 12/11/2023 12.7 (H)  6.0 - 12.0 fL Final    Platelets 12/11/2023 172  140 - 450 10*3/mm3 Final    Neutrophil % 12/11/2023 72.9  42.7 - 76.0 % Final    Lymphocyte % 12/11/2023 10.8 (L)  19.6 - 45.3 % Final    Monocyte % 12/11/2023 12.1 (H)  5.0 - 12.0 % Final    Eosinophil % 12/11/2023 3.2  0.3 - 6.2 % Final    Basophil % 12/11/2023 0.3  0.0 - 1.5 % Final    Immature Grans % 12/11/2023 0.7 (H)  0.0 - 0.5 % Final    Neutrophils, Absolute 12/11/2023 4.38  1.70 - 7.00 10*3/mm3 Final    Lymphocytes, Absolute 12/11/2023 0.65 (L)  0.70 - 3.10 10*3/mm3 Final    Monocytes, Absolute 12/11/2023 0.73  0.10 - 0.90 10*3/mm3 Final    Eosinophils, Absolute 12/11/2023 0.19  0.00 - 0.40  10*3/mm3 Final    Basophils, Absolute 12/11/2023 0.02  0.00 - 0.20 10*3/mm3 Final    Immature Grans, Absolute 12/11/2023 0.04  0.00 - 0.05 10*3/mm3 Final    nRBC 12/11/2023 0.0  0.0 - 0.2 /100 WBC Final   Hospital Outpatient Visit on 10/09/2023   Component Date Value Ref Range Status    Creatinine 10/09/2023 0.90  0.60 - 1.30 mg/dL Final    Serial Number: [id number]:  [id number]   Lab on 10/09/2023   Component Date Value Ref Range Status    Glucose 10/09/2023 89  65 - 99 mg/dL Final    BUN 10/09/2023 18  8 - 23 mg/dL Final    Creatinine 10/09/2023 0.83  0.57 - 1.00 mg/dL Final    Sodium 10/09/2023 141  136 - 145 mmol/L Final    Potassium 10/09/2023 4.5  3.5 - 5.2 mmol/L Final    Chloride 10/09/2023 106  98 - 107 mmol/L Final    CO2 10/09/2023 27.0  22.0 - 29.0 mmol/L Final    Calcium 10/09/2023 8.7  8.6 - 10.5 mg/dL Final    Total Protein 10/09/2023 6.8  6.0 - 8.5 g/dL Final    Albumin 10/09/2023 3.8  3.5 - 5.2 g/dL Final    ALT (SGPT) 10/09/2023 21  1 - 33 U/L Final    AST (SGOT) 10/09/2023 30  1 - 32 U/L Final    Alkaline Phosphatase 10/09/2023 178 (H)  39 - 117 U/L Final    Total Bilirubin 10/09/2023 0.4  0.0 - 1.2 mg/dL Final    Globulin 10/09/2023 3.0  gm/dL Final    Calculated Result    A/G Ratio 10/09/2023 1.3  g/dL Final    BUN/Creatinine Ratio 10/09/2023 21.7  7.0 - 25.0 Final    Anion Gap 10/09/2023 8.0  5.0 - 15.0 mmol/L Final    eGFR 10/09/2023 69.6  >60.0 mL/min/1.73 Final    CA 19-9 10/09/2023 15.4  <=35.0 U/mL Final    WBC 10/09/2023 4.05  3.40 - 10.80 10*3/mm3 Final    RBC 10/09/2023 4.34  3.77 - 5.28 10*6/mm3 Final    Hemoglobin 10/09/2023 13.7  12.0 - 15.9 g/dL Final    Hematocrit 10/09/2023 42.3  34.0 - 46.6 % Final    MCV 10/09/2023 97.5 (H)  79.0 - 97.0 fL Final    MCH 10/09/2023 31.6  26.6 - 33.0 pg Final    MCHC 10/09/2023 32.4  31.5 - 35.7 g/dL Final    RDW 10/09/2023 15.0  12.3 - 15.4 % Final    RDW-SD 10/09/2023 54.3 (H)  37.0 - 54.0 fl Final    MPV 10/09/2023 13.3 (H)  6.0 - 12.0 fL Final     Platelets 10/09/2023 125 (L)  140 - 450 10*3/mm3 Final    Neutrophil % 10/09/2023 67.7  42.7 - 76.0 % Final    Lymphocyte % 10/09/2023 13.6 (L)  19.6 - 45.3 % Final    Monocyte % 10/09/2023 14.8 (H)  5.0 - 12.0 % Final    Eosinophil % 10/09/2023 3.2  0.3 - 6.2 % Final    Basophil % 10/09/2023 0.5  0.0 - 1.5 % Final    Immature Grans % 10/09/2023 0.2  0.0 - 0.5 % Final    Neutrophils, Absolute 10/09/2023 2.74  1.70 - 7.00 10*3/mm3 Final    Lymphocytes, Absolute 10/09/2023 0.55 (L)  0.70 - 3.10 10*3/mm3 Final    Monocytes, Absolute 10/09/2023 0.60  0.10 - 0.90 10*3/mm3 Final    Eosinophils, Absolute 10/09/2023 0.13  0.00 - 0.40 10*3/mm3 Final    Basophils, Absolute 10/09/2023 0.02  0.00 - 0.20 10*3/mm3 Final    Immature Grans, Absolute 10/09/2023 0.01  0.00 - 0.05 10*3/mm3 Final    nRBC 10/09/2023 0.0  0.0 - 0.2 /100 WBC Final           Appearance: WNL, looks nicely dressed   Hygiene:  REPORTS good  Cooperation:  Cooperative  Eye Contact:  direct  Psychomotor Behavior:  denies psychomotor agitation/retardation, No EPS, No motor tics  Mood:  within normal limits  Affect:  congruent   Hopelessness: Denies  Speech:  Normal  Thought Process:  Linear  Thought Content:  Normal  Concentration: Normal   Suicidal: denies  Homicidal:  None  Hallucinations:  None  Delusion:  None  Memory:  Intact  Orientation:  Person, Place, Time and Situation  Reliability:  good  Insight:  Fair  Judgement: good  Impulse Control: good  Estimated Intelligence: average range    SPENSER REVIEWED NO RED FLAGS    Assessment & Plan   Diagnoses and all orders for this visit:    1. Adjustment disorder with depressed mood (Primary)    2. Chronic fatigue    3. Sleep disturbance          PLAN: DC methylphenidate, she felt more tired on it , was for chronic fatigue    Pt going to try melatonin 3 mg to 6 mg at hs for sleeplessness     We discussed risks, benefits, and side effects of the above medications and the patient was agreeable with the plan.  Patient was educated on the importance of compliance with treatment and follow-up appointments.       Counseled patient regarding multimodal approach with encouragement of healthy nutrition, healthy sleep, regular physical mobility, social involvement compliance.     Assisted patient in identifying risk factors which would indicate the need for higher level of care including thoughts to harm self or others and/or self-harming behavior and encouraged patient to contact this office, call 911, or present to the nearest emergency room should any of these events occur. Discussed crisis intervention services and means to access.  Patient adamantly and convincingly denies current suicidal or homicidal ideation or perceptual disturbance.    Treatment Plan: stabilize mood, patient will stay out of psychiatric hospital and be at optimal level of functioning with therapy and take all medication as prescribed. Patient verbalized  understanding and agreement to plan.    Instructed to call for questions or concerns and return early if necessary.     Greater than 50% time was spent in coordination of care, and counseling the patient regarding current assessment, symptoms, plan of care going forward, supportive therapy.  Answered any questions patient had regarding medications and plan of care.    Return if symptoms worsen or fail to improve.

## 2024-03-06 ENCOUNTER — PATIENT MESSAGE (OUTPATIENT)
Dept: ONCOLOGY | Facility: CLINIC | Age: 84
End: 2024-03-06
Payer: MEDICARE

## 2024-03-07 NOTE — TELEPHONE ENCOUNTER
From: Sheree Hernandez  To: Jorje Montenegro  Sent: 3/6/2024 12:09 PM EST  Subject: Blood test     Dr Montenegro, I had an appointment for my bone density and blood work. My numbers were high for Williams Lambda Free Light Chain Ratio 2.02 - high and Kappa Quant Free Light Chains 35.14 - high and Lambda Quant Free Light Chains -17.41 - normal. The first of April I’m to have blood work and my CT scan so I was wondering if I should have that blood work added to be done again. Is this something I need to be concerned about? Thank you! If I need to make an appointment with you about this, maybe it can be a video call. Sheree Hernandez

## 2024-04-01 ENCOUNTER — LAB (OUTPATIENT)
Dept: LAB | Facility: HOSPITAL | Age: 84
End: 2024-04-01
Payer: MEDICARE

## 2024-04-01 ENCOUNTER — HOSPITAL ENCOUNTER (OUTPATIENT)
Dept: CT IMAGING | Facility: HOSPITAL | Age: 84
Discharge: HOME | End: 2024-04-01
Payer: MEDICARE

## 2024-04-01 DIAGNOSIS — C22.1 INTRAHEPATIC CHOLANGIOCARCINOMA: ICD-10-CM

## 2024-04-01 LAB
ALBUMIN SERPL-MCNC: 3.8 G/DL (ref 3.5–5.2)
ALBUMIN/GLOB SERPL: 1.4 G/DL
ALP SERPL-CCNC: 143 U/L (ref 39–117)
ALT SERPL W P-5'-P-CCNC: 15 U/L (ref 1–33)
ANION GAP SERPL CALCULATED.3IONS-SCNC: 8 MMOL/L (ref 5–15)
AST SERPL-CCNC: 24 U/L (ref 1–32)
BASOPHILS # BLD AUTO: 0.04 10*3/MM3 (ref 0–0.2)
BASOPHILS NFR BLD AUTO: 0.7 % (ref 0–1.5)
BILIRUB SERPL-MCNC: 0.3 MG/DL (ref 0–1.2)
BUN SERPL-MCNC: 23 MG/DL (ref 8–23)
BUN/CREAT SERPL: 26.4 (ref 7–25)
CALCIUM SPEC-SCNC: 9 MG/DL (ref 8.6–10.5)
CANCER AG19-9 SERPL-ACNC: 17.9 U/ML
CHLORIDE SERPL-SCNC: 100 MMOL/L (ref 98–107)
CO2 SERPL-SCNC: 26 MMOL/L (ref 22–29)
CREAT SERPL-MCNC: 0.87 MG/DL (ref 0.57–1)
DEPRECATED RDW RBC AUTO: 51.5 FL (ref 37–54)
EGFRCR SERPLBLD CKD-EPI 2021: 65.8 ML/MIN/1.73
EOSINOPHIL # BLD AUTO: 0.2 10*3/MM3 (ref 0–0.4)
EOSINOPHIL NFR BLD AUTO: 3.5 % (ref 0.3–6.2)
ERYTHROCYTE [DISTWIDTH] IN BLOOD BY AUTOMATED COUNT: 14.2 % (ref 12.3–15.4)
GLOBULIN UR ELPH-MCNC: 2.8 GM/DL
GLUCOSE SERPL-MCNC: 86 MG/DL (ref 65–99)
HCT VFR BLD AUTO: 42.1 % (ref 34–46.6)
HGB BLD-MCNC: 13.6 G/DL (ref 12–15.9)
IMM GRANULOCYTES # BLD AUTO: 0.03 10*3/MM3 (ref 0–0.05)
IMM GRANULOCYTES NFR BLD AUTO: 0.5 % (ref 0–0.5)
LYMPHOCYTES # BLD AUTO: 0.6 10*3/MM3 (ref 0.7–3.1)
LYMPHOCYTES NFR BLD AUTO: 10.6 % (ref 19.6–45.3)
MCH RBC QN AUTO: 31.6 PG (ref 26.6–33)
MCHC RBC AUTO-ENTMCNC: 32.3 G/DL (ref 31.5–35.7)
MCV RBC AUTO: 97.9 FL (ref 79–97)
MONOCYTES # BLD AUTO: 0.76 10*3/MM3 (ref 0.1–0.9)
MONOCYTES NFR BLD AUTO: 13.4 % (ref 5–12)
NEUTROPHILS NFR BLD AUTO: 4.05 10*3/MM3 (ref 1.7–7)
NEUTROPHILS NFR BLD AUTO: 71.3 % (ref 42.7–76)
NRBC BLD AUTO-RTO: 0 /100 WBC (ref 0–0.2)
PLATELET # BLD AUTO: 132 10*3/MM3 (ref 140–450)
PMV BLD AUTO: 13 FL (ref 6–12)
POTASSIUM SERPL-SCNC: 4.4 MMOL/L (ref 3.5–5.2)
PROT SERPL-MCNC: 6.6 G/DL (ref 6–8.5)
RBC # BLD AUTO: 4.3 10*6/MM3 (ref 3.77–5.28)
SODIUM SERPL-SCNC: 134 MMOL/L (ref 136–145)
WBC NRBC COR # BLD AUTO: 5.68 10*3/MM3 (ref 3.4–10.8)

## 2024-04-01 PROCEDURE — 86301 IMMUNOASSAY TUMOR CA 19-9: CPT

## 2024-04-01 PROCEDURE — 80053 COMPREHEN METABOLIC PANEL: CPT

## 2024-04-01 PROCEDURE — 36415 COLL VENOUS BLD VENIPUNCTURE: CPT

## 2024-04-01 PROCEDURE — 85025 COMPLETE CBC W/AUTO DIFF WBC: CPT

## 2024-04-01 PROCEDURE — 71260 CT THORAX DX C+: CPT

## 2024-04-01 PROCEDURE — 25510000001 IOPAMIDOL PER 1 ML: Performed by: INTERNAL MEDICINE

## 2024-04-01 PROCEDURE — 74177 CT ABD & PELVIS W/CONTRAST: CPT

## 2024-04-01 RX ADMIN — IOPAMIDOL 95 ML: 755 INJECTION, SOLUTION INTRAVENOUS at 11:37

## 2024-04-05 ENCOUNTER — OFFICE VISIT (OUTPATIENT)
Dept: ONCOLOGY | Facility: CLINIC | Age: 84
End: 2024-04-05
Payer: MEDICARE

## 2024-04-05 ENCOUNTER — OFFICE VISIT (OUTPATIENT)
Dept: RADIATION ONCOLOGY | Facility: HOSPITAL | Age: 84
End: 2024-04-05
Payer: MEDICARE

## 2024-04-05 ENCOUNTER — HOSPITAL ENCOUNTER (OUTPATIENT)
Dept: RADIATION ONCOLOGY | Facility: HOSPITAL | Age: 84
Setting detail: RADIATION/ONCOLOGY SERIES
End: 2024-04-05
Payer: MEDICARE

## 2024-04-05 ENCOUNTER — PATIENT MESSAGE (OUTPATIENT)
Dept: ONCOLOGY | Facility: CLINIC | Age: 84
End: 2024-04-05

## 2024-04-05 VITALS
DIASTOLIC BLOOD PRESSURE: 81 MMHG | SYSTOLIC BLOOD PRESSURE: 183 MMHG | BODY MASS INDEX: 27.42 KG/M2 | RESPIRATION RATE: 16 BRPM | HEART RATE: 86 BPM | TEMPERATURE: 98.1 F | OXYGEN SATURATION: 96 % | WEIGHT: 159.8 LBS

## 2024-04-05 VITALS
RESPIRATION RATE: 16 BRPM | OXYGEN SATURATION: 96 % | SYSTOLIC BLOOD PRESSURE: 183 MMHG | WEIGHT: 160 LBS | DIASTOLIC BLOOD PRESSURE: 81 MMHG | HEART RATE: 86 BPM | HEIGHT: 64 IN | BODY MASS INDEX: 27.31 KG/M2 | TEMPERATURE: 98.1 F

## 2024-04-05 DIAGNOSIS — C22.1 INTRAHEPATIC CHOLANGIOCARCINOMA: Primary | ICD-10-CM

## 2024-04-05 PROCEDURE — G0463 HOSPITAL OUTPT CLINIC VISIT: HCPCS

## 2024-04-05 RX ORDER — DEXAMETHASONE 1 MG
0.5 TABLET ORAL 2 TIMES DAILY WITH MEALS
Qty: 10 TABLET | Refills: 0 | Status: SHIPPED | OUTPATIENT
Start: 2024-04-05

## 2024-04-05 RX ORDER — PALONOSETRON 0.05 MG/ML
0.25 INJECTION, SOLUTION INTRAVENOUS ONCE
OUTPATIENT
Start: 2024-04-19

## 2024-04-05 RX ORDER — SODIUM CHLORIDE 9 MG/ML
20 INJECTION, SOLUTION INTRAVENOUS ONCE
OUTPATIENT
Start: 2024-04-19

## 2024-04-05 RX ORDER — PALONOSETRON 0.05 MG/ML
0.25 INJECTION, SOLUTION INTRAVENOUS ONCE
OUTPATIENT
Start: 2024-04-26

## 2024-04-05 RX ORDER — SODIUM CHLORIDE 9 MG/ML
20 INJECTION, SOLUTION INTRAVENOUS ONCE
OUTPATIENT
Start: 2024-04-26

## 2024-04-05 NOTE — TELEPHONE ENCOUNTER
From: Sheree Hernandez  To: Jorje Montenegro  Sent: 4/5/2024 3:27 PM EDT  Subject: Interventional radiologist    Dr Montenegro, You mentioned talking to an interventional radiologist about the spot on my liver. Although chemo is being scheduled, it was my understanding that you were going to look into that procedure. My daughter and I have a different opinion. Would you clarify this for me. Thank you very much. Sheree Hernandez

## 2024-04-05 NOTE — PROGRESS NOTES
FOLLOW UP NOTE    PATIENT:                                                      Sheree Hernandez  MEDICAL RECORD #:                        [id number]  :                                                          1940  COMPLETION DATE:    24  DIAGNOSIS:     Recurrent intrahepatic cholangiocarcinoma  - Clinical Stage IB (cT1b, cN0, cM0)  - Pathologic Stage IIIA (pT3, pN0, cM0)      BRIEF HISTORY:  Sheree Hernandez is a very pleasant 83 y.o. female who has previously suffered from chronic fatigue and was sent for initial work-up that consisted of a heart cath and multiple other interventions.  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  The patient was then found to have an enlarging liver lesion and an AFP that was 9.27 and a CA 19-9 that was 85 on 2021.  The patient was sent for biopsy of her liver lesion in the inferior right lobe of the liver on 8/10/2021 that was consistent with adenocarcinoma.     The patient was taken for right-sided hepatectomy cholecystectomy and partial adrenalectomy on 2021 with Dr. Sharma at .  Pathology revealed a moderate to poorly differentiated intrahepatic cholangiocarcinoma.  Her tumor measured 8.5 cm.  0/4 lymph nodes were involved but the patient did have LVSI and PNI.  The tumor did extend to the extrahepatic soft tissue between the liver and adrenal gland.  There was tumor positive focally at the cauterized margin.  The adrenal gland was not involved with carcinoma.  This was staged as pT3, pN0, pM0 disease.       The patient's CA 19-9 on 2021 was 22 which was in the range of normal.  The patient completed a course of adjuvant, concurrent chemoradiotherapy with Xeloda on 2022.     The patient did well, with surveillance CT scans showing no evidence of recurrent or metastatic disease for some time.  More recently, CT scans performed 3/6/2023 revealed some slightly enlarged retroperitoneal adenopathy.  CA 19-9 was within normal  limits.  The patient was then sent for a PET scan on 3/27/2023 that showed some hypermetabolism in the liver at the area adjacent to the resection, which would have been inside of her previous radiation field.  There was also uptake in a few lymph nodes lateral to the vena cava which were outside of her previous radiation ports.  The patient was discussed at tumor board and it was mutually felt that she did in fact have a local and regional recurrence based on the imaging.  The patient underwent a course of CyberKnife stereotactic body radiotherapy to a dose of 35 Gray in 5 fractions delivered to the recurrent liver lesion and involved lymph nodes, completing 5/8/2023.      Repeat labs showed improvement in both CA 19-9 and liver function tests.  Repeat CT scans of the chest, abdomen pelvis performed 7/10/2023 showed mixed response to treatment.  On one hand, there was clear interval improvement within the treated liver and lymph nodes.  On the other hand, there was evidence of progression just outside of the treatment field involving 2 lymph nodes along the pelvic brim at the lower portion of the aorta, right before the bifurcation to the comments.  This region was not previously treated as it was not very PET avid and technically would have been very difficult with the length of the field.  The patient underwent another course of CyberKnife stereotactic body radiotherapy to a dose of 35 Gray in 5 fractions delivered to the 2 enlarging lymph nodes that abut her previous radiation field, completing 8/11/2023.       The patient was treated to an additional abdominal lymph node as well as a lesion in the left lung.  The patient completed the treatments and she returns after restaging scans.    The patient reports pain in her upper abdomen and bandlike distribution that is a little worse than it has been.    The patient denies any difficulty breathing.    Past Medical History:   Diagnosis Date    Arthritis 2017     Asthma     Atypical chest pain 03/09/2017    Cataract     Chronic constipation     Diverticulosis 2018    Fracture, pelvis closed 2015    x2    HL (hearing loss) 2018    Hearing aids    Hyperlipidemia 03/09/2017    Hypertension 03/09/2017    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    Low bone mass     with elevated FRAX core    Lung nodule 07/12/2023    Mild obstructive sleep apnea 01/30/2020    Near syncope     negative cardiovascular workup     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    Plantar fasciitis     Chronic    PVC's (premature ventricular contractions)     Senile keratosis     Multiple    MANAN (stress urinary incontinence, female)     Syncope, near     with negative cardiovascular workup     Past Surgical History:   Procedure Laterality Date    ADRENAL GLAND SURGERY  09/22/2021    ADRENALECTOMY  09/22/2021    CARDIAC CATHETERIZATION N/A 01/18/2019    Procedure: Left Heart Cath;  Surgeon: Tucker Gonzalez MD;  Location: [facility] CATH INVASIVE LOCATION;  Service: Cardiovascular    CARDIAC CATHETERIZATION  1/18/2019    CATARACT EXTRACTION  04/2019    CHOLECYSTECTOMY  09/22/2021    COLONOSCOPY  07/15/2020    CYBERKNIFE  05/08/2023    Recurrent liver mass/involved LNs    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  Social History     Socioeconomic History    Marital status:     Number of children: 2   Tobacco Use    Smoking status: Never    Smokeless tobacco: Never    Tobacco comments:     Exposed to secondhand smoke   Vaping Use    Vaping status: Never Used   Substance and Sexual Activity    Alcohol use: Not Currently     Comment: Very seldom    Drug use: Never    Sexual activity: Not Currently     Partners: Male     Birth control/protection:  Hysterectomy     Comment: Complete Hysterectomy           Current Outpatient Medications:     Cholecalciferol (Vitamin D3) 25 MCG (1000 UT) capsule, Take  by mouth Daily., Disp: , Rfl:     coenzyme Q10 50 MG capsule capsule, Take  by mouth Daily., Disp: , Rfl:     Cyanocobalamin (VITAMIN B 12 PO), Take  by mouth., Disp: , Rfl:     dexAMETHasone (DECADRON) 1 MG tablet, Take 0.5 (one-half) tablet by mouth 2 Times a Day With Meals., Disp: 10 tablet, Rfl: 0    Folic Acid 0.8 MG capsule, Take  by mouth., Disp: , Rfl:     ipratropium (ATROVENT) 0.06 % nasal spray, Instill 2 sprays into both nostrils as directed by provider 2 (Two) Times a Day., Disp: 15 mL, Rfl: 6    ipratropium (ATROVENT) 0.06 % nasal spray, Administer 1 spray into each nostril twice daily, Disp: 30 mL, Rfl: 3    magnesium oxide (MAG-OX) 400 MG tablet, Take 1 tablet by mouth Daily., Disp: , Rfl:     methylphenidate (RITALIN) 5 MG tablet, Take 0.5-1 tablet by mouth Every Morning and repeat 4 hours later., Disp: 60 tablet, Rfl: 0    Omega-3 1000 MG capsule, Take  by mouth Daily., Disp: , Rfl:     rosuvastatin (CRESTOR) 10 MG tablet, Take 1 tablet by mouth Daily., Disp: 90 tablet, Rfl: 3    telmisartan (MICARDIS) 20 MG tablet, Take 1 tablet by mouth Daily., Disp: 90 tablet, Rfl: 3    Turmeric Curcumin 500 MG capsule, Take 750 mg by mouth Daily., Disp: , Rfl:     Zinc 50 MG capsule, Take  by mouth Daily., Disp: , Rfl:      Review of Systems   Musculoskeletal:         Pain under bilateral breasts  Right groin     A full 14 point review of systems was performed and was negative except as noted in the HPI.      Karnofsky score: 80   KPS 90%      Physical Exam  Vitals and nursing note reviewed.   Constitutional:       General: She is not in acute distress.     Appearance: She is well-developed.   HENT:      Head: Normocephalic and atraumatic.   Eyes:      General: No scleral icterus.     Conjunctiva/sclera: Conjunctivae normal.      Pupils: Pupils are equal,  round, and reactive to light.   Neck:      Comments: No obviously enlarged cervical or supraclavicular LAD.  Cardiovascular:      Rate and Rhythm: Normal rate and regular rhythm.      Heart sounds: No murmur heard.    No friction rub.      Comments: Patient well perfused. Non cyanotic. No prominent JVD. No pedal edema  Pulmonary:      Effort: Pulmonary effort is normal.      Breath sounds: Normal breath sounds. No wheezing.   Abdominal:      General: Bowel sounds are normal. There is no distension.      Palpations: Abdomen is soft. There is no mass.      Tenderness: There is no abdominal tenderness.      Comments: No palpable organomegaly.  No tenderness with palpation.   Musculoskeletal:         General: Normal range of motion.      Cervical back: Normal range of motion and neck supple.      Comments: Patient moves all extremities spontaneously.     Lymphadenopathy:      Cervical: No cervical adenopathy.   Skin:     General: Skin is warm and dry.      Coloration: Skin is not jaundiced.   Neurological:      Mental Status: She is alert and oriented to person, place, and time.      Comments: Coordination intact.   Psychiatric:         Behavior: Behavior normal.         Thought Content: Thought content normal.         Judgment: Judgment normal.     VITAL SIGNS:   Vitals:    04/05/24 0934   BP: (!) 183/81   Pulse: 86   Resp: 16   Temp: 98.1 °F (36.7 °C)   TempSrc: Temporal   SpO2: 96%   Weight: 72.5 kg (159 lb 12.8 oz)   PainSc:   7               The following portions of the patient's history were reviewed and updated as appropriate: allergies, current medications, past family history, past medical history, past social history, past surgical history and problem list.    CT Abdomen Pelvis With Contrast    Result Date: 4/1/2024  Impression: 1.Progression of disease with enlarging mediastinal lymph node, few enlarging pulmonary nodules, enlarging low-density lesion in the left hepatic lobe, and few enlarging  retroperitoneal lymph nodes. Other relatively stable findings. Electronically Signed: Maurice Willis MD  4/1/2024 3:16 PM EDT  Workstation ID: [id number]    CT Chest With Contrast Diagnostic    Result Date: 4/1/2024  Impression: 1.Progression of disease with enlarging mediastinal lymph node, few enlarging pulmonary nodules, enlarging low-density lesion in the left hepatic lobe, and few enlarging retroperitoneal lymph nodes. Other relatively stable findings. Electronically Signed: Maurice Willis MD  4/1/2024 3:16 PM EDT  Workstation ID: [id number]    CT Abdomen Pelvis With Contrast    Result Date: 12/11/2023  Minimal progression of a few of the patient's larger pulmonary nodules, none of which have enlarged and more than 1 mm, since 10/9/2023. No clearly new nodule or other new chest disease. CT SCAN OF THE ABDOMEN PELVIS WITH IV CONTRAST: Postoperative appearance of the liver is stable with apparent right lobe resection, and some stable left lobe cysts. No evidence of recurrent mass is identified. Small renal cysts appear stable. No new abnormalities are seen of the pancreas, spleen, left adrenal gland. Right adrenal gland is not definitely identified, possibly surgically absent, or may be incorporated into the residual perihepatic scar. No, ascites or acute inflammatory change is appreciated. Bowel loops are normal in caliber and appearance. Axial image 58 of series 2 shows mild but definite enlargement of an aortocaval lymph node, now 11 x 12 mm in diameter previously 5 x 8 mm. This portal venous phase series image appears to show central necrosis of the node as well. No other definite new or enlarging node or mass is appreciated elsewhere. Regarding the lower abdomen and pelvis, no mass or adenopathy is seen. Bladder is nondistended and normal in appearance. Uterus and ovaries are apparently surgically absent or atrophic. Delayed venous phase images show no evidence of obstructive uropathy. Bony structures appear to be  intact. Incidental note is made of advanced right and moderate left hip joint DJD. Impression: Single enlarging, necrotic appearing aortocaval lymph node, concerning for early recurrence. Otherwise stable CT scan of the abdomen and pelvis. Electronically Signed: Salvador Gill MD  12/11/2023 8:16 PM EST  Workstation ID: [id number]    CT Chest With Contrast Diagnostic    Result Date: 12/11/2023  Minimal progression of a few of the patient's larger pulmonary nodules, none of which have enlarged and more than 1 mm, since 10/9/2023. No clearly new nodule or other new chest disease. CT SCAN OF THE ABDOMEN PELVIS WITH IV CONTRAST: Postoperative appearance of the liver is stable with apparent right lobe resection, and some stable left lobe cysts. No evidence of recurrent mass is identified. Small renal cysts appear stable. No new abnormalities are seen of the pancreas, spleen, left adrenal gland. Right adrenal gland is not definitely identified, possibly surgically absent, or may be incorporated into the residual perihepatic scar. No, ascites or acute inflammatory change is appreciated. Bowel loops are normal in caliber and appearance. Axial image 58 of series 2 shows mild but definite enlargement of an aortocaval lymph node, now 11 x 12 mm in diameter previously 5 x 8 mm. This portal venous phase series image appears to show central necrosis of the node as well. No other definite new or enlarging node or mass is appreciated elsewhere. Regarding the lower abdomen and pelvis, no mass or adenopathy is seen. Bladder is nondistended and normal in appearance. Uterus and ovaries are apparently surgically absent or atrophic. Delayed venous phase images show no evidence of obstructive uropathy. Bony structures appear to be intact. Incidental note is made of advanced right and moderate left hip joint DJD. Impression: Single enlarging, necrotic appearing aortocaval lymph node, concerning for early recurrence. Otherwise stable CT scan of  the abdomen and pelvis. Electronically Signed: Salvador Gill MD  12/11/2023 8:16 PM EST  Workstation ID: [id number]       I also reviewed the patient's previous CT scans for comparison.  I reviewed the patient's radiation treatment plans.    I reviewed the patient's CA 19-9.  WBC   Date Value Ref Range Status   04/01/2024 5.68 3.40 - 10.80 10*3/mm3 Final   03/17/2022 4.61 3.70 - 10.30 10*3/uL Final     RBC   Date Value Ref Range Status   04/01/2024 4.30 3.77 - 5.28 10*6/mm3 Final   03/17/2022 4.74 3.90 - 5.20 10*6/uL Final     Hemoglobin   Date Value Ref Range Status   04/01/2024 13.6 12.0 - 15.9 g/dL Final   03/17/2022 14.8 11.2 - 15.7 g/dL Final     Hematocrit   Date Value Ref Range Status   04/01/2024 42.1 34.0 - 46.6 % Final   03/17/2022 45.3 (H) 34.0 - 45.0 % Final     MCV   Date Value Ref Range Status   04/01/2024 97.9 (H) 79.0 - 97.0 fL Final   03/17/2022 96 79 - 98 fL Final     MCH   Date Value Ref Range Status   04/01/2024 31.6 26.6 - 33.0 pg Final   03/17/2022 31.2 26.0 - 32.0 pg Final     MCHC   Date Value Ref Range Status   04/01/2024 32.3 31.5 - 35.7 g/dL Final   03/17/2022 32.7 30.7 - 35.5 g/dL Final     RDW   Date Value Ref Range Status   04/01/2024 14.2 12.3 - 15.4 % Final   03/17/2022 17.6 (H) 11.5 - 14.5 % Final     RDW-SD   Date Value Ref Range Status   04/01/2024 51.5 37.0 - 54.0 fl Final     MPV   Date Value Ref Range Status   04/01/2024 13.0 (H) 6.0 - 12.0 fL Final   03/17/2022 12.5 8.8 - 12.5 fL Final     Platelets   Date Value Ref Range Status   04/01/2024 132 (L) 140 - 450 10*3/mm3 Final   03/17/2022 159 155 - 369 10*3/uL Final     Neutrophil Rel %   Date Value Ref Range Status   10/03/2021 63.0 % Final     Neutrophil %   Date Value Ref Range Status   04/01/2024 71.3 42.7 - 76.0 % Final     Lymphocyte Rel %   Date Value Ref Range Status   10/03/2021 23.0 % Final     Lymphocyte %   Date Value Ref Range Status   04/01/2024 10.6 (L) 19.6 - 45.3 % Final     Monocyte Rel %   Date Value Ref Range  Status   10/03/2021 10.0 % Final     Monocyte %   Date Value Ref Range Status   04/01/2024 13.4 (H) 5.0 - 12.0 % Final     Eosinophil %   Date Value Ref Range Status   04/01/2024 3.5 0.3 - 6.2 % Final   10/03/2021 2.0 % Final     Basophil Rel %   Date Value Ref Range Status   10/03/2021 1.0 % Final     Basophil %   Date Value Ref Range Status   04/01/2024 0.7 0.0 - 1.5 % Final     Immature Grans %   Date Value Ref Range Status   04/01/2024 0.5 0.0 - 0.5 % Final   10/03/2021 1.0 % Final     Neutrophils Absolute   Date Value Ref Range Status   10/03/2021 6.75 (H) 1.60 - 6.10 10*3/uL Final     Neutrophils, Absolute   Date Value Ref Range Status   04/01/2024 4.05 1.70 - 7.00 10*3/mm3 Final     Lymphocytes Absolute   Date Value Ref Range Status   10/03/2021 2.39 1.20 - 3.90 10*3/uL Final     Lymphocytes, Absolute   Date Value Ref Range Status   04/01/2024 0.60 (L) 0.70 - 3.10 10*3/mm3 Final     Monocytes Absolute   Date Value Ref Range Status   10/03/2021 1.00 (H) 0.30 - 0.90 10*3/uL Final     Monocytes, Absolute   Date Value Ref Range Status   04/01/2024 0.76 0.10 - 0.90 10*3/mm3 Final     Eosinophils Absolute   Date Value Ref Range Status   10/03/2021 0.24 0.00 - 0.50 10*3/uL Final     Eosinophils, Absolute   Date Value Ref Range Status   04/01/2024 0.20 0.00 - 0.40 10*3/mm3 Final     Basophils Absolute   Date Value Ref Range Status   10/03/2021 0.05 0.00 - 0.10 10*3/uL Final     Basophils, Absolute   Date Value Ref Range Status   04/01/2024 0.04 0.00 - 0.20 10*3/mm3 Final     Immature Grans, Absolute   Date Value Ref Range Status   04/01/2024 0.03 0.00 - 0.05 10*3/mm3 Final   10/03/2021 0.10 (H) 0.00 - 0.06 10*3/uL Final     nRBC   Date Value Ref Range Status   04/01/2024 0.0 0.0 - 0.2 /100 WBC Final       Lab Results   Component Value Date    GLUCOSE 86 04/01/2024    BUN 23 04/01/2024    CREATININE 0.87 04/01/2024    EGFR 65.8 04/01/2024    BCR 26.4 (H) 04/01/2024    K 4.4 04/01/2024    CO2 26.0 04/01/2024    CALCIUM  9.0 04/01/2024    ALBUMIN 3.8 04/01/2024    BILITOT 0.3 04/01/2024    AST 24 04/01/2024    ALT 15 04/01/2024              There are no diagnoses linked to this encounter.       IMPRESSION:  Sheree Hernandez is an 83 y.o. female with local and regional recurrence of her previously resected cholangiocarcinoma.  She completed adjuvant chemoradiation therapy in 1/2021 and has been under surveillance with serial CT scans since that time.  More recently, she was found to have a recurrence in the liver in the previously treated area adjacent to her resection, as well as several lymph nodes mostly along the vena cava.  Despite these findings on imaging, her CA 19-9 has remained within normal limits.  Given that she did have failure in the field and this does appear to be a fairly slow-growing process, CyberKnife SBRT was recommended for the recurrent liver lesion and 3 enlarged/PET positive lymph nodes.  She completed CyberKnife SBRT on 5/8/2023.  The patient was noted to have interval improvement within the treated areas on subsequent CT imaging, despite now progressing just out of field within 2 lymph nodes along the pelvic brim.  The patient again underwent localized treatment with CyberKnife SBRT to the new colton recurrence which she completed on 8/11/2023.    We treated an additional abdominal lymph node recurrence as well CyberKnife to a dominant nodule in the left  lung.  The patient has responded well to treatment of the areas but unfortunately does appear to have progressed in the lungs.  The patient also has progression in a liver lesion.  The patient now has additional recurrence in the retroperitoneum with a new lymph node that is necrotic and high concerning for disease recurrence.  The at risk node does abut previous radiation sites and is increasing in size pretty quickly.    The patient will meet with Dr. Paula to discuss potential neck steps.  At this time would not recommend any additional radiotherapy unless  the patient develops painful sites or get disease under control with chemotherapy and needs consolidation.      I spent 30 minutes on the patient's case today.    RECOMMENDATIONS:      Liver lesion  -Was present in December but not called on CT scan  -Progressing at the site  -Adjacent to stomach  -Do not recommend radiation treatments at this time.    Abdominal LN  -adjacent to previously treated fields  -Continued enlargement now with necrotic center  -Responded to treatment     lung nodule  -Patient has multiple small stable lung nodules  - left lower lobe nodule enlarged to 1.3 cm on coronal slices              -Substantial increase from March 2023  -Likely representts metastasis  -Patient interested in treatments to this lesion as well  -Appears to be responding to treatment but has additional lesions throughout the lungs bilaterally concerning for disease progression     Pelvic brim colton recurrence (resolved)  -2 nodes at the bifurcation of the aorta enlarging on CT scan 7/10/2023  -Abuts previous radiation field  -Not treated previously as this area was PET negative              -Albeit the patient's recurrence was largely displaying very minimal PET avidity  -Status post CyberKnife SBRT 35 Gray in 5 fractions to these 2 lymph nodes with a small amount of elective coverage around the vessels in the area not previously treated   -Completed 8/11/2023  -Does not appear to have any additional recurrences in the pelvis              -Does have some chronically enlarged lymph nodes in the external iliac lymph node chains predating cancer diagnosis  -Follows with Dr. Montenegro    Fatigue and poor appetite  -Recommend dexamethasone 0.5 mg twice a day for 10 days to see if she gets to feeling better  -Will resend      Liver and high RP Tumor recurrence (resolved)  -On lateral aspect of residual left liver  -Also appears to be along the lateral vena cava lymph nodes  -Below previous radiation field  -Kidney is  adjacent              -Complicates delivery              -Recent BUN/creatinine within normal limits  -NexGeneration sequencing with Dr. Montenegro  -Case discussed at multidisciplinary tumor board and with Dr. Sharma at   -Status post CyberKnife SBRT 35 Gray in 3 fractions              -Completed 5/8/2023  -Initial scans show good response in the liver and the nodes that were treated  -Has failed in the liver       Cholangiocarcinoma of the liver  -CA 19-9 8/8/2021: 84.7  -Status post resection with Dr. Sharma at  9/2021  -pT3, pN0, pM0   -Focally positive margins  -Tumor extended to the extrapelvic soft tissues between the liver and the adrenal gland  -LVSI and PNI present  -0/4 lymph nodes involved  -CA 19-9 11/5/2021: 22  -Completed Xeloda currently with radiation 1/19/2022  -Surveillance CT scans 3/2022-12/2022 showing no evidence of residual or metastatic carcinoma  -CT C/A/P 3/6/2023 showing some enlarging lymph nodes concerning for recurrence  -CA 19-9 3/6/2023: 21.0  -PET/CT scan 3/27/2023 concerning for 1 cm left liver lesion and 3 lymph nodes along the vena cava that were slightly hypermetabolic and concerning for recurrence  -Status post CyberKnife SBRT as above  -CA 19-9 rising but slowly  -Continues to follow with Dr. Montenegro         No follow-ups on file.    Bunny Billings MD          Physical Exam

## 2024-04-05 NOTE — PROGRESS NOTES
Follow Up Office Visit      Date: 2024     Patient Name: Sheree Hernandez  MRN: [id number]  : 1940  Chief Complaint:  Follow-up for intrahepatic cholangiocarcinoma      History of Present Illness: Sheree Hernandez is a pleasant 79 y.o. female with a past medical history of hyperlipidemia and hypertension who presents today for evaluation of concern for hemochromatosis. The patient is accompanied by their self who contributes to the history of their care.  Patient states that over the past 2 years she has been having worsening fatigue especially with exertion.  Over the past several months this has become worse.  Patient states that she gets tired with basic activities including showering getting dressed in the morning and walking to and from her car from stores.  She states that her fatigue gets better after a few minutes of rest.  She has previously had an echocardiogram and cardiac catheterization within the past 2 years which did not reveal any cause of her fatigue with exertion.  She is also had an ultrasound of the liver which revealed stable cyst.  She was recently seen by her PCP who ordered iron studies which was notable for an elevated ferritin to 246.  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  All other mutations were negative.  She is currently up-to-date on her mammograms and colonoscopy with no active malignancies noted.  She is otherwise compliant with her statin and high blood pressure medicines.  Repeat abdominal imaging in 2021 concerning for enlarging liver lesion.  She was seen by Dr. Sharma and  is status post open right hepatectomy, cholecystectomy, right partial adrenalectomy on 2021.  Pathology showed a focal R1 resected margin.  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  0/4 lymph nodes were  positive for disease.  LVI and PNI were present.  Finished chemoXRT in January 2021.      Interval History:  Presents to clinic for follow-up.  Completed radiation in May 2023 and again in August 2023.  Continues to have significant fatigue and tiredness.  Denies any worsening pain or discomfort.  Weight stable    Oncology History:    Oncology/Hematology History   Intrahepatic cholangiocarcinoma   8/17/2021 Initial Diagnosis    Intrahepatic cholangiocarcinoma (CMS/HCC)     8/17/2021 Cancer Staged    Staging form: Intrahepatic Bile Duct, AJCC 8th Edition  - Clinical: Stage IB (cT1b, cN0, cM0) - Signed by Jorje Montenegro MD on 8/17/2021 11/5/2021 Cancer Staged    Staging form: Intrahepatic Bile Duct, AJCC 8th Edition  - Pathologic: Stage IIIA (pT3, pN0, cM0) - Signed by Jorje Montenegro MD on 11/5/2021 12/9/2021 - 1/6/2022 Chemotherapy    OP GALLBLADDER Capecitabine + XRT     12/9/2021 - 1/19/2022 Radiation    Radiation OncologyTreatment Course:  Sheree Hernandez received 5040 cGy in 28 fractions to liver via External Beam Radiation - EBRT.     4/25/2023 - 5/8/2023 Radiation    Radiation OncologyTreatment Course:  Sheree Hernandez received 3500 cGy in 5 fractions to liver mass and lymph nodes via Stereotactic Radiation Therapy - SRT.     8/1/2023 - 8/11/2023 Radiation    Radiation OncologyTreatment Course:  Sheree Hernandez received 3500 cGy in 5 fractions to abdomen via Stereotactic Radiation Therapy - SRT.     1/9/2024 - 1/23/2024 Radiation    Radiation OncologyTreatment Course:  Sheree Hernandez received 3000 cGy in 5 fractions to abdomen via Stereotactic Radiation Therapy - SRT.     1/11/2024 - 1/11/2024 Radiation    Radiation OncologyTreatment Course:  Sheree Hernandez received 754 cGy in 1 fractions to left lung via Stereotactic Radiation Therapy - SRT.     2/1/2024 - 2/1/2024 Radiation    Radiation OncologyTreatment Course:  Sheree Hernandez received 3000 cGy in 1 fractions to left lung via Stereotactic  Radiation Therapy - SRT.     4/19/2024 -  Chemotherapy    OP HEPATOBILIARY CISplatin + Gemcitabine Days 1,8 / Durvalumab Q21D         Subjective      Review of Systems:   Constitutional: Negative for fevers, chills, or weight loss  Eyes: Negative for blurred vision or discharge         Ear/Nose/Throat: Negative for difficulty swallowing, sore throat, LAD                                                       Respiratory: Negative for cough, SOA, wheezing                                                                                        Cardiovascular: Negative for chest pain or palpitations                                                                  Gastrointestinal: Negative for nausea, vomiting or diarrhea                                                                     Genitourinary: Negative for dysuria or hematuria                                                                                           Musculoskeletal: Negative for any joint pains or muscle aches                                                                        Neurologic: Negative for any weakness, headaches, dizziness                                                                         Hematologic: Negative for any easy bleeding or bruising                                                                                   Psychiatric: Negative for anxiety or depression                          Past Medical History/Past Surgical History/ Family History/ Social History: Reviewed by me and unchanged from my previous documentation done on January 2024.     Medications:     Current Outpatient Medications:     Cholecalciferol (Vitamin D3) 25 MCG (1000 UT) capsule, Take  by mouth Daily., Disp: , Rfl:     coenzyme Q10 50 MG capsule capsule, Take  by mouth Daily., Disp: , Rfl:     Cyanocobalamin (VITAMIN B 12 PO), Take  by mouth., Disp: , Rfl:     dexAMETHasone (DECADRON) 1 MG tablet, Take 0.5 (one-half) tablet by mouth 2 Times a  "Day With Meals., Disp: 10 tablet, Rfl: 0    Folic Acid 0.8 MG capsule, Take  by mouth., Disp: , Rfl:     ipratropium (ATROVENT) 0.06 % nasal spray, Instill 2 sprays into both nostrils as directed by provider 2 (Two) Times a Day., Disp: 15 mL, Rfl: 6    ipratropium (ATROVENT) 0.06 % nasal spray, Administer 1 spray into each nostril twice daily, Disp: 30 mL, Rfl: 3    magnesium oxide (MAG-OX) 400 MG tablet, Take 1 tablet by mouth Daily., Disp: , Rfl:     methylphenidate (RITALIN) 5 MG tablet, Take 0.5-1 tablet by mouth Every Morning and repeat 4 hours later., Disp: 60 tablet, Rfl: 0    Omega-3 1000 MG capsule, Take  by mouth Daily., Disp: , Rfl:     rosuvastatin (CRESTOR) 10 MG tablet, Take 1 tablet by mouth Daily., Disp: 90 tablet, Rfl: 3    telmisartan (MICARDIS) 20 MG tablet, Take 1 tablet by mouth Daily., Disp: 90 tablet, Rfl: 3    Turmeric Curcumin 500 MG capsule, Take 750 mg by mouth Daily., Disp: , Rfl:     Zinc 50 MG capsule, Take  by mouth Daily., Disp: , Rfl:     Allergies:   Allergies   Allergen Reactions    Pravastatin Myalgia       Objective     Physical Exam:  Vital Signs:   Vitals:    04/05/24 1006   BP: (!) 183/81   Pulse: 86   Resp: 16   Temp: 98.1 °F (36.7 °C)   SpO2: 96%   Weight: 72.6 kg (160 lb)   Height: 162.6 cm (64\")   PainSc:   7     Pain Score    04/05/24 1006   PainSc:   7     ECOG Performance Status: 0 - Asymptomatic    Constitutional: NAD, ECOG 0  Eyes: PERRLA, scleral anicteric  ENT: No LAD, no thyromegaly  Respiratory: CTAB, no wheezing, rales, rhonchi  Cardiovascular: RRR, no murmurs, pulses 2+ bilaterally  Abdomen: soft, NT/ND, no HSM  Musculoskeletal: strength 5/5 bilaterally, no c/c/e  Neurologic: A&O x 3, CN II-XII intact grossly    Results Review:   Lab on 04/01/2024   Component Date Value Ref Range Status    Glucose 04/01/2024 86  65 - 99 mg/dL Final    BUN 04/01/2024 23  8 - 23 mg/dL Final    Creatinine 04/01/2024 0.87  0.57 - 1.00 mg/dL Final    Sodium 04/01/2024 134 (L)  136 - " 145 mmol/L Final    Potassium 04/01/2024 4.4  3.5 - 5.2 mmol/L Final    Slight hemolysis detected by analyzer. Result may be falsely elevated.    Chloride 04/01/2024 100  98 - 107 mmol/L Final    CO2 04/01/2024 26.0  22.0 - 29.0 mmol/L Final    Calcium 04/01/2024 9.0  8.6 - 10.5 mg/dL Final    Total Protein 04/01/2024 6.6  6.0 - 8.5 g/dL Final    Albumin 04/01/2024 3.8  3.5 - 5.2 g/dL Final    ALT (SGPT) 04/01/2024 15  1 - 33 U/L Final    AST (SGOT) 04/01/2024 24  1 - 32 U/L Final    Alkaline Phosphatase 04/01/2024 143 (H)  39 - 117 U/L Final    Total Bilirubin 04/01/2024 0.3  0.0 - 1.2 mg/dL Final    Globulin 04/01/2024 2.8  gm/dL Final    Calculated Result    A/G Ratio 04/01/2024 1.4  g/dL Final    BUN/Creatinine Ratio 04/01/2024 26.4 (H)  7.0 - 25.0 Final    Anion Gap 04/01/2024 8.0  5.0 - 15.0 mmol/L Final    eGFR 04/01/2024 65.8  >60.0 mL/min/1.73 Final    CA 19-9 04/01/2024 17.9  <=35.0 U/mL Final    WBC 04/01/2024 5.68  3.40 - 10.80 10*3/mm3 Final    RBC 04/01/2024 4.30  3.77 - 5.28 10*6/mm3 Final    Hemoglobin 04/01/2024 13.6  12.0 - 15.9 g/dL Final    Hematocrit 04/01/2024 42.1  34.0 - 46.6 % Final    MCV 04/01/2024 97.9 (H)  79.0 - 97.0 fL Final    MCH 04/01/2024 31.6  26.6 - 33.0 pg Final    MCHC 04/01/2024 32.3  31.5 - 35.7 g/dL Final    RDW 04/01/2024 14.2  12.3 - 15.4 % Final    RDW-SD 04/01/2024 51.5  37.0 - 54.0 fl Final    MPV 04/01/2024 13.0 (H)  6.0 - 12.0 fL Final    Platelets 04/01/2024 132 (L)  140 - 450 10*3/mm3 Final    Neutrophil % 04/01/2024 71.3  42.7 - 76.0 % Final    Lymphocyte % 04/01/2024 10.6 (L)  19.6 - 45.3 % Final    Monocyte % 04/01/2024 13.4 (H)  5.0 - 12.0 % Final    Eosinophil % 04/01/2024 3.5  0.3 - 6.2 % Final    Basophil % 04/01/2024 0.7  0.0 - 1.5 % Final    Immature Grans % 04/01/2024 0.5  0.0 - 0.5 % Final    Neutrophils, Absolute 04/01/2024 4.05  1.70 - 7.00 10*3/mm3 Final    Lymphocytes, Absolute 04/01/2024 0.60 (L)  0.70 - 3.10 10*3/mm3 Final    Monocytes, Absolute  04/01/2024 0.76  0.10 - 0.90 10*3/mm3 Final    Eosinophils, Absolute 04/01/2024 0.20  0.00 - 0.40 10*3/mm3 Final    Basophils, Absolute 04/01/2024 0.04  0.00 - 0.20 10*3/mm3 Final    Immature Grans, Absolute 04/01/2024 0.03  0.00 - 0.05 10*3/mm3 Final    nRBC 04/01/2024 0.0  0.0 - 0.2 /100 WBC Final       CT Abdomen Pelvis With Contrast    Result Date: 4/1/2024  Narrative: CT CHEST W CONTRAST DIAGNOSTIC, CT ABDOMEN PELVIS W CONTRAST Date of Exam: 4/1/2024 11:15 AM EDT Indication: cholangiocarcinoma. Comparison: 12/11/2023 Technique: Axial CT images were obtained of the chest abdomen and pelvis after the uneventful intravenous administration of 95 cc Isovue-370.  Reconstructed coronal and sagittal images were also obtained. Automated exposure control and iterative construction methods were used. Findings: CT CHEST: MEDIASTINUM: There is a new enlarged centrally necrotic prevascular lymph node measuring 1.6 x 1.1 cm (image 34, series 2).. Aortic and heart size are normal. No mass nor pericardial effusion. CORONARY ARTERIES: No significant calcified atherosclerotic disease. LUNGS: The larger pulmonary nodules on prior examination are relatively stable when compared to prior examination including: *7 mm left lower lobe nodule (image 47, series 4), stable *6 mm right middle lobe nodule (image 63, series 4), stable *6 mm right lower lobe nodule (image 65, series 4), stable However, there are some enlarging previously smaller nodules which include: *5 mm right middle lobe nodule (image 62, series 4), previously 3 mm *5 mm right lower lobe nodule (image 74, series 4), previously 3 mm No dense consolidation. PLEURAL SPACE: No effusion, mass, nor pneumothorax. CT ABDOMEN AND PELVIS:  LIVER: There is an enlarging 1.4 cm hypodense lesion within segment III (image 36, series 2), previously 1.0 cm. No other enhancing lesion identified. There are other cysts within the left hepatic lobe. Relatively stable hepatic postoperative  changes with right hepatectomy. BILIARY/GALLBLADDER: Cholecystectomy SPLEEN:  Unremarkable PANCREAS:  Unremarkable ADRENAL:  Unremarkable KIDNEYS:  Unremarkable parenchyma with no solid mass identified. No obstruction.  No calculus identified. GASTROINTESTINAL/MESENTERY:  No evidence of obstruction nor inflammation. AORTA/IVC:  Normal caliber. RETROPERITONEUM/LYMPH NODES: Retroperitoneal lymph nodes are stable, larger, and smaller. These include: *1.6 x 1.3 cm retrocaval node (image 39, series 2), previously 1.5 x 1.2 cm *0.9 x 0.8 cm precaval node (image 67, series 2), previously 1.1 x 0.9 cm *0.9 x 0.8 cm aortocaval node (image 60, series 2), previously 0.9 x 0.8 cm *0.9 x 0.9 cm left para-aortic node (image 57, series 2), previously 0.5 x 0.3 cm REPRODUCTIVE:  Unremarkable BLADDER:  Unremarkable OSSEUS STRUCTURES:  Typical for age with no acute process identified.     Impression: Impression: 1.Progression of disease with enlarging mediastinal lymph node, few enlarging pulmonary nodules, enlarging low-density lesion in the left hepatic lobe, and few enlarging retroperitoneal lymph nodes. Other relatively stable findings. Electronically Signed: Maurice Willis MD  4/1/2024 3:16 PM EDT  Workstation ID: [id number]    CT Chest With Contrast Diagnostic    Result Date: 4/1/2024  Narrative: CT CHEST W CONTRAST DIAGNOSTIC, CT ABDOMEN PELVIS W CONTRAST Date of Exam: 4/1/2024 11:15 AM EDT Indication: cholangiocarcinoma. Comparison: 12/11/2023 Technique: Axial CT images were obtained of the chest abdomen and pelvis after the uneventful intravenous administration of 95 cc Isovue-370.  Reconstructed coronal and sagittal images were also obtained. Automated exposure control and iterative construction methods were used. Findings: CT CHEST: MEDIASTINUM: There is a new enlarged centrally necrotic prevascular lymph node measuring 1.6 x 1.1 cm (image 34, series 2).. Aortic and heart size are normal. No mass nor pericardial effusion.  CORONARY ARTERIES: No significant calcified atherosclerotic disease. LUNGS: The larger pulmonary nodules on prior examination are relatively stable when compared to prior examination including: *7 mm left lower lobe nodule (image 47, series 4), stable *6 mm right middle lobe nodule (image 63, series 4), stable *6 mm right lower lobe nodule (image 65, series 4), stable However, there are some enlarging previously smaller nodules which include: *5 mm right middle lobe nodule (image 62, series 4), previously 3 mm *5 mm right lower lobe nodule (image 74, series 4), previously 3 mm No dense consolidation. PLEURAL SPACE: No effusion, mass, nor pneumothorax. CT ABDOMEN AND PELVIS:  LIVER: There is an enlarging 1.4 cm hypodense lesion within segment III (image 36, series 2), previously 1.0 cm. No other enhancing lesion identified. There are other cysts within the left hepatic lobe. Relatively stable hepatic postoperative changes with right hepatectomy. BILIARY/GALLBLADDER: Cholecystectomy SPLEEN:  Unremarkable PANCREAS:  Unremarkable ADRENAL:  Unremarkable KIDNEYS:  Unremarkable parenchyma with no solid mass identified. No obstruction.  No calculus identified. GASTROINTESTINAL/MESENTERY:  No evidence of obstruction nor inflammation. AORTA/IVC:  Normal caliber. RETROPERITONEUM/LYMPH NODES: Retroperitoneal lymph nodes are stable, larger, and smaller. These include: *1.6 x 1.3 cm retrocaval node (image 39, series 2), previously 1.5 x 1.2 cm *0.9 x 0.8 cm precaval node (image 67, series 2), previously 1.1 x 0.9 cm *0.9 x 0.8 cm aortocaval node (image 60, series 2), previously 0.9 x 0.8 cm *0.9 x 0.9 cm left para-aortic node (image 57, series 2), previously 0.5 x 0.3 cm REPRODUCTIVE:  Unremarkable BLADDER:  Unremarkable OSSEUS STRUCTURES:  Typical for age with no acute process identified.     Impression: Impression: 1.Progression of disease with enlarging mediastinal lymph node, few enlarging pulmonary nodules, enlarging  low-density lesion in the left hepatic lobe, and few enlarging retroperitoneal lymph nodes. Other relatively stable findings. Electronically Signed: Maurice Willis MD  4/1/2024 3:16 PM EDT  Workstation ID: [id number]     Assessment / Plan      Assessment/Plan:   Intrahepatic cholangiocarcinoma (CMS/HCC) (Primary)  -Noted during her most recent hospitalization with CT scans concerning for an enlarging liver lesion to 7.3 cm that was previously noted to be hemangioma  -Triple phase CT concerning for a solid tumor lesion  -Biopsy consistent with an adenocarcinoma  -CA 19-9 elevated to 84.7  -CT chest as well as the rest of the CT abdomen/pelvis not concerning for distant or metastatic disease  -Status post open right hepatectomy, cholecystectomy, right partial adrenalectomy on 9/23/2021 with Dr. 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  0/4 lymph nodes were positive for disease.  LVI and PNI were present.  Focal R1 resection margin  -Discussed with patient and her daughter that she would benefit from adjuvant chemoXRT using Xeloda.  Discussed side effects including but not limited to immunosuppression, diarrhea, abdominal pain, nausea, vomiting, hand/foot syndrome  -Repeat CA 19-9 (22) in November 2021  -Completed chemo XRT with Xeloda in January 2021  -Repeat scans in March 2022 without any evidence of recurrent or metastatic disease  -Repeat CT C/A/P in June 2022 without evidence of recurrent or metastatic disease.  Did note some IVC stenosis which we will monitor with her next scans  -Repeat CT C/A/P in September 2022 reviewed without evidence of recurrent disease or metastatic disease.  IVC stenosis overall stable  -Repeat CT C/A/P in December 2022 reviewed without evidence of recurrent or metastatic disease.  CA 19-9 within normal limits  -Repeat CT C/A/P in March 2023 reviewed and notable for some slight  enlarging retroperitoneal adenopathy.  CA 19-9 within normal limits  -PET/CT concerning for 1 cm left liver lesion that was only slightly PET avid as well as 2 lymph nodes that were also slightly PET avid  -Previously discussed her case at tumor board and with Dr. Sharma we agreed that she likely had disease progression  -Status post radiation completed in May 2023  -CA 19-9 in June 2022 within normal limits.    -CT C/A/P in July 2023 reviewed and showed some interval decrease in some lymph nodes as well as some slight enlargement of a couple lymph nodes.  -Completed radiation in August 2023  -CT C/A/P in October 2023 reviewed and only notable for slight increase in some pulmonary nodules about 1 mm each  -CT C/A/P December 2023 reviewed and notable for enlargement of her aortocaval lymph node.  CA 19-9 stable  -Completed radiation with Dr. Billings in February 2024  -CT C/A/P in April 2024 reviewed and showing continued slight enlargement of multiple pulmonary nodules as well as a liver lesion.  CA 19-9 stable  -Discussed results with patient and daughter today.  At this point time, she would likely benefit more from systemic chemotherapy.  Discussed initiating cisplatin, gemcitabine, Durvalumab within the next couple weeks.  She is hesitant about chemotherapy but is willing to undergo treatment.  Orders placed today     Hemochromatosis carrier  -Noted on recent labs with an elevated ferritin to 246 hemochromatosis gene profile positive for heterozygosity of H63D.  Other genes negative.  Given patient's age and previous cardiac liver work-up showing no evidence of dysfunction, it is unlikely that she has had iron deposition all this time.  The heterozygosity of H63D makes her carrier for hemochromatosis however does not mean that she has active disease  -CT A/P in July 2020 with no liver dysfunction but was notable for hemangioma which has now been confirmed to be a intrahepatic cholangiocarcinoma and a status post  resection.  Treatment as above  -ECHO in July 2020 within normal limits  -Repeat iron studies in April 2021 stable with a ferritin of 229, iron level 100, transferrin saturation 27%  -Low concern for iron overload at this time.      Thyroid nodule  -Incidentally noted on CT scan but enlarging from previous CT  -Thyroid ultrasound in June 2022 only notable for goiter and small nodules nonconcerning for malignancy  -Has been seen by endocrinology with plan for repeat ultrasound in the summer of next year     Other fatigue   -Continued at this time  -Previously checked iron studies, vitamin B12, folate, thyroid studies within normal limits  -Was previously been seen by cardiology with a normal ECHO and stress test  -Likely related to all her cancer related treatments  -Advised patient to continue activity as tolerated    I spent over 45 minutes on patient's day of visit reviewing her records including progress notes, imaging, pathology, next-generation sequencing, labs as well as discussing her diagnosis, prognosis, treatment plan with patient and daughter along with discussing her case with Dr. Billings, ordering new chemotherapy and dictating her case into the medical record    Follow Up:   Follow-up in 2 weeks     Jorje Montenegro MD  Hematology and Oncology     Please note that portions of this note may have been completed with a voice recognition program. Efforts were made to edit the dictations, but occasionally words are mistranscribed.

## 2024-04-16 DIAGNOSIS — R53.82 CHRONIC FATIGUE: ICD-10-CM

## 2024-04-17 DIAGNOSIS — C22.1 INTRAHEPATIC CHOLANGIOCARCINOMA: Primary | ICD-10-CM

## 2024-04-17 RX ORDER — METHYLPHENIDATE HYDROCHLORIDE 5 MG/1
TABLET ORAL
Qty: 60 TABLET | Refills: 0 | OUTPATIENT
Start: 2024-04-17

## 2024-04-17 RX ORDER — OLANZAPINE 5 MG/1
5 TABLET ORAL NIGHTLY
Qty: 8 TABLET | Refills: 7 | Status: SHIPPED | OUTPATIENT
Start: 2024-04-17

## 2024-04-17 RX ORDER — ONDANSETRON HYDROCHLORIDE 8 MG/1
8 TABLET, FILM COATED ORAL 3 TIMES DAILY PRN
Qty: 30 TABLET | Refills: 5 | Status: SHIPPED | OUTPATIENT
Start: 2024-04-17

## 2024-04-17 NOTE — TELEPHONE ENCOUNTER
Last visit in January pt stated methylphenidate made her more tired and it was discontinued. Will need to have f/u appt with pt, may be by telemed or in person. thanks

## 2024-04-18 ENCOUNTER — HOSPITAL ENCOUNTER (OUTPATIENT)
Dept: ONCOLOGY | Facility: HOSPITAL | Age: 84
Discharge: HOME | End: 2024-04-18
Admitting: INTERNAL MEDICINE
Payer: MEDICARE

## 2024-04-18 ENCOUNTER — SPECIALTY PHARMACY (OUTPATIENT)
Dept: ONCOLOGY | Facility: HOSPITAL | Age: 84
End: 2024-04-18
Payer: MEDICARE

## 2024-04-18 ENCOUNTER — HOSPITAL ENCOUNTER (OUTPATIENT)
Dept: ONCOLOGY | Facility: HOSPITAL | Age: 84
Discharge: HOME | End: 2024-04-18
Payer: MEDICARE

## 2024-04-18 ENCOUNTER — DOCUMENTATION (OUTPATIENT)
Dept: NUTRITION | Facility: HOSPITAL | Age: 84
End: 2024-04-18
Payer: MEDICARE

## 2024-04-18 VITALS
DIASTOLIC BLOOD PRESSURE: 86 MMHG | WEIGHT: 157 LBS | HEIGHT: 64 IN | RESPIRATION RATE: 16 BRPM | BODY MASS INDEX: 26.8 KG/M2 | TEMPERATURE: 97.5 F | HEART RATE: 88 BPM | SYSTOLIC BLOOD PRESSURE: 180 MMHG

## 2024-04-18 DIAGNOSIS — C22.1 INTRAHEPATIC CHOLANGIOCARCINOMA: ICD-10-CM

## 2024-04-18 LAB
ALBUMIN SERPL-MCNC: 3.9 G/DL (ref 3.5–5.2)
ALBUMIN/GLOB SERPL: 1.4 G/DL
ALP SERPL-CCNC: 144 U/L (ref 39–117)
ALT SERPL W P-5'-P-CCNC: 13 U/L (ref 1–33)
ANION GAP SERPL CALCULATED.3IONS-SCNC: 8 MMOL/L (ref 5–15)
AST SERPL-CCNC: 21 U/L (ref 1–32)
BASOPHILS # BLD AUTO: 0.02 10*3/MM3 (ref 0–0.2)
BASOPHILS NFR BLD AUTO: 0.3 % (ref 0–1.5)
BILIRUB SERPL-MCNC: 0.4 MG/DL (ref 0–1.2)
BUN SERPL-MCNC: 24 MG/DL (ref 8–23)
BUN/CREAT SERPL: 27.9 (ref 7–25)
CALCIUM SPEC-SCNC: 9.4 MG/DL (ref 8.6–10.5)
CHLORIDE SERPL-SCNC: 104 MMOL/L (ref 98–107)
CO2 SERPL-SCNC: 28 MMOL/L (ref 22–29)
CREAT SERPL-MCNC: 0.86 MG/DL (ref 0.57–1)
DEPRECATED RDW RBC AUTO: 51.1 FL (ref 37–54)
EGFRCR SERPLBLD CKD-EPI 2021: 66.7 ML/MIN/1.73
EOSINOPHIL # BLD AUTO: 0.15 10*3/MM3 (ref 0–0.4)
EOSINOPHIL NFR BLD AUTO: 2.1 % (ref 0.3–6.2)
ERYTHROCYTE [DISTWIDTH] IN BLOOD BY AUTOMATED COUNT: 13.9 % (ref 12.3–15.4)
GLOBULIN UR ELPH-MCNC: 2.8 GM/DL
GLUCOSE SERPL-MCNC: 89 MG/DL (ref 65–99)
HCT VFR BLD AUTO: 44.5 % (ref 34–46.6)
HGB BLD-MCNC: 14.4 G/DL (ref 12–15.9)
IMM GRANULOCYTES # BLD AUTO: 0.02 10*3/MM3 (ref 0–0.05)
IMM GRANULOCYTES NFR BLD AUTO: 0.3 % (ref 0–0.5)
LYMPHOCYTES # BLD AUTO: 0.7 10*3/MM3 (ref 0.7–3.1)
LYMPHOCYTES NFR BLD AUTO: 9.9 % (ref 19.6–45.3)
MAGNESIUM SERPL-MCNC: 2.1 MG/DL (ref 1.6–2.4)
MCH RBC QN AUTO: 31.9 PG (ref 26.6–33)
MCHC RBC AUTO-ENTMCNC: 32.4 G/DL (ref 31.5–35.7)
MCV RBC AUTO: 98.5 FL (ref 79–97)
MONOCYTES # BLD AUTO: 0.71 10*3/MM3 (ref 0.1–0.9)
MONOCYTES NFR BLD AUTO: 10 % (ref 5–12)
NEUTROPHILS NFR BLD AUTO: 5.48 10*3/MM3 (ref 1.7–7)
NEUTROPHILS NFR BLD AUTO: 77.4 % (ref 42.7–76)
PLATELET # BLD AUTO: 158 10*3/MM3 (ref 140–450)
PMV BLD AUTO: 11.3 FL (ref 6–12)
POTASSIUM SERPL-SCNC: 4.3 MMOL/L (ref 3.5–5.2)
PROT SERPL-MCNC: 6.7 G/DL (ref 6–8.5)
RBC # BLD AUTO: 4.52 10*6/MM3 (ref 3.77–5.28)
SODIUM SERPL-SCNC: 140 MMOL/L (ref 136–145)
T4 FREE SERPL-MCNC: 1.28 NG/DL (ref 0.92–1.68)
TSH SERPL DL<=0.05 MIU/L-ACNC: 2.49 UIU/ML (ref 0.27–4.2)
WBC NRBC COR # BLD AUTO: 7.08 10*3/MM3 (ref 3.4–10.8)

## 2024-04-18 PROCEDURE — 85025 COMPLETE CBC W/AUTO DIFF WBC: CPT | Performed by: INTERNAL MEDICINE

## 2024-04-18 PROCEDURE — 36415 COLL VENOUS BLD VENIPUNCTURE: CPT

## 2024-04-18 PROCEDURE — 84439 ASSAY OF FREE THYROXINE: CPT | Performed by: INTERNAL MEDICINE

## 2024-04-18 PROCEDURE — 83735 ASSAY OF MAGNESIUM: CPT | Performed by: INTERNAL MEDICINE

## 2024-04-18 PROCEDURE — 84443 ASSAY THYROID STIM HORMONE: CPT | Performed by: INTERNAL MEDICINE

## 2024-04-18 PROCEDURE — 80053 COMPREHEN METABOLIC PANEL: CPT | Performed by: INTERNAL MEDICINE

## 2024-04-18 NOTE — PROGRESS NOTES
Outpatient Oncology Nutrition     Reason for Visit:     Oncology Nutrition Screening and Patient Education / Patient Rescreen    Patient Name:  Sheree Hernandez    :  1940    MRN:  [id number]    Date of Encounter: 2024    Nutrition Assessment     Diagnosis:   Recurrent local and regional intrahepatic cholangiocarcinoma - diagnosed 2021    PET scan on 3/27/2023 - hypermetabolism in the liver at the area adjacent to the resection, which would have been inside of her previous radiation field;  also uptake in a few lymph nodes lateral to the vena cava which were outside of her previous radiation ports.  The patient was discussed at tumor board and it was mutually felt that she did in fact have a local and regional recurrence based on the imaging with progression in the lungs and in a liver lesion; additional recurrence in the retroperitoneum with a new lymph node that is necrotic and high concerning for disease recurrence     Surgery:  Right-sided hepatectomy cholecystectomy and partial adrenalectomy on 2021 with Dr. Sharma at  - T3 N0 M0 lesion measuring 8.5 cm.  0 of 4 lymph nodes were involved but the patient did have LVSI and PNI - Focally positive margins     Chemotherapy: Concurrent adjuvant chemotherapy with Xeloda - completed 22      Current Chemotherapy:  OP HEPATOBILIARY CISplatin + Gemcitabine Days 1,8 / Durvalumab Q21D - 8 cycles - start date 24    Radiation: CyberKnife stereotactic body radiotherapy to a dose of 35 Gray in 5 fractions delivered to the recurrent liver lesion and involved lymph nodes, completing 2023     CyberKnife stereotactic body radiotherapy to a dose of 35 Gray in 5 fractions delivered to the 2 enlarging lymph nodes that abut her previous radiation field, completing 2023.     Patient Active Problem List   Diagnosis    Hypertension    Atypical chest pain    Dyslipidemia    Dyspnea on exertion    Allergic rhinitis    Abnormal stress test     "Idiopathic osteoarthritis    BONNIE (obstructive sleep apnea)    Chronic fatigue    Hemochromatosis carrier    Erythrocytosis    Liver masses    Intrahepatic cholangiocarcinoma    Nausea    Dyspepsia    Nontoxic multinodular goiter    Lung nodule       Food / Nutrition Related History       Hydration Status     Goal:  80+ ounces    Enteral Feeding       Anthropometric Measurements     Height:    Ht Readings from Last 1 Encounters:   04/05/24 162.6 cm (64\")       Weight:    Wt Readings from Last 1 Encounters:   04/05/24 72.6 kg (160 lb)       BMI:  27.46 / overweight    Weight Change: Weight stable within a 5 lbs weight range for past year; presently at highest weight of 160 lbs    Review of Lab Data (Time Frame - 1 month / 2 month)     Component  Ref Range & Units 2 wk ago  (4/1/24) 4 mo ago  (12/11/23) 6 mo ago  (10/9/23) 10 mo ago  (6/8/23) 1 yr ago  (3/6/23) 1 yr ago  (11/29/22) 1 yr ago  (9/6/22)   CA 19-9  <=35.0 U/mL 17.9 17.4 15.4 17.4 21.0 17.8 21.4   Resulting Agency  [facility]  [facility]  [facility]  [facility]  [facility]                             Component  Ref Range & Units 2 wk ago  (4/1/24) 3 mo ago  (1/10/24) 4 mo ago  (12/11/23) 4 mo ago  (12/11/23) 6 mo ago  (10/9/23) 6 mo ago  (10/9/23) 8 mo ago  (8/7/23)   Glucose  65 - 99 mg/dL 86   90 89  84   BUN  8 - 23 mg/dL 23   16 18  24 High    Creatinine  0.57 - 1.00 mg/dL 0.87  0.90 R, CM 0.81 0.83 0.90 R, CM 1.03 High    Sodium  136 - 145 mmol/L 134 Low    141 141  142   Potassium  3.5 - 5.2 mmol/L 4.4   4.4 4.5  4.6   Comment: Slight hemolysis detected by analyzer. Result may be falsely elevated.   Chloride  98 - 107 mmol/L 100   104 106  104   CO2  22.0 - 29.0 mmol/L 26.0   26.0 27.0  26.8   Calcium  8.6 - 10.5 mg/dL 9.0   9.4 8.7  9.9   Total Protein  6.0 - 8.5 g/dL 6.6   7.2 6.8  7.1   Albumin  3.5 - 5.2 g/dL 3.8 3.8 R  4.0 3.8  4.2   ALT (SGPT)  1 - 33 U/L 15   22 21  26   AST (SGOT)  1 - 32 U/L 24   29 30  33 High    Alkaline " Phosphatase  39 - 117 U/L 143 High    209 High  178 High   196 High    Total Bilirubin  0.0 - 1.2 mg/dL 0.3   0.5 0.4  0.6   Globulin  gm/dL 2.8   3.2 CM 3.0 CM  2.9   Comment: Calculated Result   A/G Ratio  g/dL 1.4   1.3 1.3  1.4   BUN/Creatinine Ratio  7.0 - 25.0 26.4 High    19.8 21.7  23.3   Anion Gap  5.0 - 15.0 mmol/L 8.0   11.0 8.0  11.2   eGFR  >60.0 mL/min/1.73 65.8   71.7 69.6  53.7 Low    Resulting Agency [facility]  [facility]                   Medication Review   Reviewed 4/19/24     Nutrition Focused Physical Findings       Nutrition Impact Symptoms   Fatigue and poor appetite - dexamethasone 0.5 mg twice a day for 10 days ordered 4/5/24 (Dr. Billings)    Physical Activity      Not my normal self, but able to be up and about with fairly normal activities / struggles with chronic fatigue    Current Nutritional Intake     Oral diet:      Oral nutritional supplements:    Intake:    Malnutrition Risk Assessment     Recent weight loss over the past 6 months:  0 = No    How much weight loss:      Eating poorly because of a decreased appetite:  1 = Yes    Malnutrition Screening Score:     MST = 2 more Patient at risk for malnutrition with diagnosis and disease progression    Nutrition Diagnosis     Problem    Etiology    Signs / Symptoms      Nutrition Intervention   Patient familiar to RD from prior consultations.  As patient begins chemotherapy, she denies the presence of any impact symptoms affecting nutritional intake and status currently.  Weight has remained stable.  Patient tends to lean more toward being constipated.     Discussed the importance of nutrition with diagnosis and treatment plan, focusing on adequate calorie, protein, nutrient and hydration. Encouraged focus on healthy food choices that would support immune system and nutritional status.   Discussed indication for higher protein intake and reviewed high protein foods with  encouragement to include at each meal.      Discussed the possible side effects of treatment and tips for nutritional management including N/V, constipation, diarrhea, fatigue, appetite and taste changes.   Reviewed modification of the diet for managing treatment side effects.      Discussed the types of fiber, identifying insoluble and soluble with recommendations to focus on soluble fiber foods that could improve the frequency and consistency of bowel movements.  Also discussed the advantage of using a soluble fiber supplement such as Metamucil, Benefiber or Citracel to aid with management of the treatment side effect of possible more frequent loose stools as the treatment plan progresses; provided guidance for use.      Discussed ONS and the benefit of including these types of products when oral intake is insufficient to meet nutritional needs; tips for flavor and nutrient enhancement discussed.  Samples of Ensure MAX were provided.    Written patient education materials provided.    Goal       Monitoring / Evaluation   Will follow.

## 2024-04-18 NOTE — PROGRESS NOTES
Outpatient Infusion  [address] [place] [address]  [phone]      CHEMOTHERAPY EDUCATION    NAME:  Sheree Hernandez      : 1940           DATE: 24    Medication Education Sheets: (select all that apply)  Cisplatin, Durvalumab, and Gemcitabine    Other Education Sheets: (select all that apply)  CINV, Constipation, Diarrhea, HOPA Immune Checkpoint Inhibitors, Checkpoint Inhibitor Wallet Card, and Symptom Tracker Sheet and STANISLAV Information    Chemotherapy Regimen:   OP HEPATOBILIARY CISplatin + Gemcitabine Days 1,8 / Durvalumab Q21D every 21 days  Day 1: durvalumab, cisplatin, gemcitabine  Day 8: cisplatin, gemcitabine      TOPICS EDUCATION PROVIDED COMMENTS   ANEMIA:  role of RBC, cause, s/s, ways to manage, role of transfusion [x] Reviewed the role of RBC and the use of transfusions if hemoglobin decreases too much.  Patient to notify us if she experiences shortness of breath, dizziness, or palpitations.  Also let patient know she could feel more tired than usual and to try to stay active, but rest if she needs to.    THROMBOCYTOPENIA:  role of platelet, cause, s/s, ways to prevent bleeding, things to avoid, when to seek help [x] Reviewed the role of platelets in blood clotting and when to call clinic (bloody nose that bleeds for 5 minutes despite pressure, a cut that won't stop bleeding despite pressure, gums that bleed excessively with brushing or flossing). Recommended using an electric razor, soft bristle toothbrush, and blowing the nose gently.    NEUTROPENIA:  role of WBC, cause, infection precautions, s/s of infection, when to call MD [x] Reviewed the role of WBC, good infection prevention practices, and when to call the clinic (temperature 100.4F, sore throat, burning urination, etc)    Discussed that gemcitabine can cause flu-like symptoms, including low-grade fevers on the day of infusion or a few days after.  She is to call for temperature 100.4 or higher no  matter what and the clinic team can determine if it's infection or the gemcitabine flu-like syndrome.   NUTRITION & APPETITE CHANGES:  importance of maintaining healthy diet & weight, ways to manage to improve intake, dietary consult, exercise regimen, electrolyte and/or blood glucose abnormalities [x] Decreased Appetite: Discussed the risk of decreased appetite. Recommended eating smaller, more frequent meals. Instructed the patient to contact the clinic if losing weight or having difficulty eating enough to maintain energy level.   DIARRHEA:  causes, s/s of dehydration, ways to manage, dietary changes, when to call MD [x] Both: Discussed the risk of diarrhea and immune related colitis. Can use OTC loperamide with diarrhea onset, but call the clinic if 4-6 episodes in 24 hours not relieved by OTC loperamide. Discussed the role of high-dose steroids for the treatment of immune related colitis.    CONSTIPATION:  causes, ways to manage, dietary changes, when to call MD [x] Provided supplementary handout with instructions for use of docusate and other OTC therapies to manage constipation.  Instructed patient to call us if medications aren't working.    NAUSEA & VOMITING:  cause, use of antiemetics, dietary changes, when to call MD [x] Emetic risk: High  Days 1 and 8 Premeds: Dexamethasone, Fosaprepitant, and Palonosetron  Scheduled home meds: Olanzapine 5 mg by mouth at night on days 1-4 and 8-11 after chemotherapy to prevent delayed nausea  PRN home meds: Ondansetron 8 mg PO TID PRN N/V  Pharmacy home meds sent to: [facility] on [address]    Instructed the patient to take the scheduled olanzapine even if she does not feel nauseous.  I explained this is to prevent delayed nausea.    Instructed the patient to take a dose of the PRN medication at the first onset of nausea and if it's not working to call us for additional medications.  Also provided non-drug measures to mitigate nausea.   NERVOUS SYSTEM  CHANGES:  causes, s/s, neuropathies, cognitive changes, ways to manage [x] discussed the adverse effect of peripheral neuropathy and signs/symptoms associated with this adverse effect. Instructed patient to call if symptoms become more frequent or worsen.    SKIN & NAIL CHANGES:  cause, s/s, ways to manage [x] Immunotherapy: Discussed the potential for a rash or itchy skin from immunotherapy, offered nonpharmacologic prevention strategies, and instructed the patient to call if a rash develops and worsens.   ORGAN TOXICITIES:  cause, s/s, need for diagnostic tests, labs, when to notify MD [x] Discussed potential effects on organ systems, monitoring, diagnostic tests, labs, and when to notify the clinic. Discussed the signs/symptoms of the following: cardiotoxicity, central neurotoxicity (confusion, vision changes, stiff neck, seizure), eye changes (  blurry vision, watery eyes, photophobia), hepatotoxicity, hormone gland changes (most commonly the thyroid), lung changes, nephrotoxicity, and ototoxicity (loss of ability to hear high-pitched sounds).   INFUSION RELATED REACTIONS or INJECTION-SITE REACTION:  Cause, s/s, anaphylaxis, monitoring, etc. [x] Premeds: None    Discussed the uncommon, but possible risk of an infusion reaction and symptoms such as: fever, chills, dizziness, itchiness or rash, flushing, trouble breathing, wheezing, sudden back pain, or feeling faint.  Instructed the patient to notify her nurse if she starts feeling weird at any point during her infusion.    Reviewed how infusion reactions are managed.   MISCELLANEOUS:  drug interactions, administration, labs, etc. [x] Discussed chemotherapy schedule, lab draws, infusion times, and total expected visit time.   DDIs: No significant DDIs  Drug-food interactions: None  Lab draws: On or before days 1 and 8 of each cycle, no sooner than 3 days early.   INFERTILITY & SEXUALITY:    causes, fertility preservation options, sexuality changes, ways to  manage, importance of birth control [x] IV Oncology Therapy: Reviewed safe sex practices and the importance of minimizing exposure to body fluids for 48 hours after each dose of IV oncology therapy., The patient is not of childbearing potential.   HOME CARE:  storing of oral chemo, how to manage bodily fluids [x] IV - Counseled on management of soiled linens and proper flush technique.  Discussed how to manage all the side effects at home and advised when to contact the MD office     Medications:  Prior to Admission medications    Medication Sig Start Date End Date Taking? Authorizing Provider   Cholecalciferol (Vitamin D3) 25 MCG (1000 UT) capsule Take  by mouth Daily.    Jami Ken MD   coenzyme Q10 50 MG capsule capsule Take  by mouth Daily.    Jami Ken MD   Cyanocobalamin (VITAMIN B 12 PO) Take  by mouth.    Jami Ken MD   dexAMETHasone (DECADRON) 1 MG tablet Take 0.5 (one-half) tablet by mouth 2 Times a Day With Meals. 4/5/24   Bunny Billings MD   Folic Acid 0.8 MG capsule Take  by mouth.    ProviderJami MD   ipratropium (ATROVENT) 0.06 % nasal spray Instill 2 sprays into both nostrils as directed by provider 2 (Two) Times a Day. 10/27/22      ipratropium (ATROVENT) 0.06 % nasal spray Administer 1 spray into each nostril twice daily 10/27/23      magnesium oxide (MAG-OX) 400 MG tablet Take 1 tablet by mouth Daily.    Jami Ken MD   methylphenidate (RITALIN) 5 MG tablet Take 0.5-1 tablet by mouth Every Morning and repeat 4 hours later. 1/17/24   Mitzy Sepulveda APRN   OLANZapine (zyPREXA) 5 MG tablet Take 1 tablet by mouth Every Night. Take on days 1, 2, 3, and 4 and Days 8, 9, 10, 11 after chemotherapy. 4/17/24   Jorje Montenegro MD   Omega-3 1000 MG capsule Take  by mouth Daily.    Jami Ken MD   ondansetron (ZOFRAN) 8 MG tablet Take 1 tablet by mouth 3 (Three) Times a Day As Needed for Nausea or Vomiting. 4/17/24   Lan  Jorje GUAJARDO MD   rosuvastatin (CRESTOR) 10 MG tablet Take 1 tablet by mouth Daily. 6/16/22   Timi Conway DO   telmisartan (MICARDIS) 20 MG tablet Take 1 tablet by mouth Daily. 9/19/23   Zayda Guevara APRN   Turmeric Curcumin 500 MG capsule Take 750 mg by mouth Daily.    Provider, MD Jami   Zinc 50 MG capsule Take  by mouth Daily.    Provider, MD Jami       Notes: All questions and concerns were addressed. Provided a personalized treatment calendar to patient (includes treatment and lab schedule). Provided patient with contact information for the pharmacist and clinic while instructing them to call if any questions or concerns arise. Informed consent for treatment was obtained. Patient and her daughter were receptive to information and expressed understanding.     Verito Aguillon, PharmD, [facility]  Oncology Clinical Pharmacist   Phone: [phone]    4/18/2024  11:16 EDT

## 2024-04-19 ENCOUNTER — HOSPITAL ENCOUNTER (OUTPATIENT)
Dept: ONCOLOGY | Facility: HOSPITAL | Age: 84
Discharge: HOME | End: 2024-04-19
Payer: MEDICARE

## 2024-04-19 ENCOUNTER — PATIENT OUTREACH (OUTPATIENT)
Dept: ONCOLOGY | Facility: CLINIC | Age: 84
End: 2024-04-19
Payer: MEDICARE

## 2024-04-19 ENCOUNTER — OFFICE VISIT (OUTPATIENT)
Dept: ONCOLOGY | Facility: CLINIC | Age: 84
End: 2024-04-19
Payer: MEDICARE

## 2024-04-19 VITALS
DIASTOLIC BLOOD PRESSURE: 74 MMHG | WEIGHT: 158 LBS | BODY MASS INDEX: 26.98 KG/M2 | HEART RATE: 81 BPM | TEMPERATURE: 97.4 F | RESPIRATION RATE: 16 BRPM | OXYGEN SATURATION: 99 % | HEIGHT: 64 IN | SYSTOLIC BLOOD PRESSURE: 177 MMHG

## 2024-04-19 DIAGNOSIS — C22.1 INTRAHEPATIC CHOLANGIOCARCINOMA: Primary | ICD-10-CM

## 2024-04-19 PROCEDURE — 96417 CHEMO IV INFUS EACH ADDL SEQ: CPT

## 2024-04-19 PROCEDURE — 1126F AMNT PAIN NOTED NONE PRSNT: CPT | Performed by: INTERNAL MEDICINE

## 2024-04-19 PROCEDURE — 96360 HYDRATION IV INFUSION INIT: CPT

## 2024-04-19 PROCEDURE — 25010000002 FOSAPREPITANT PER 1 MG: Performed by: INTERNAL MEDICINE

## 2024-04-19 PROCEDURE — 96365 THER/PROPH/DIAG IV INF INIT: CPT

## 2024-04-19 PROCEDURE — 96361 HYDRATE IV INFUSION ADD-ON: CPT

## 2024-04-19 PROCEDURE — 25810000003 SODIUM CHLORIDE 0.9 % SOLUTION: Performed by: INTERNAL MEDICINE

## 2024-04-19 PROCEDURE — 96375 TX/PRO/DX INJ NEW DRUG ADDON: CPT

## 2024-04-19 PROCEDURE — 96366 THER/PROPH/DIAG IV INF ADDON: CPT

## 2024-04-19 PROCEDURE — 25010000002 DURVALUMAB 50 MG/ML SOLUTION 10 ML VIAL: Performed by: INTERNAL MEDICINE

## 2024-04-19 PROCEDURE — 25810000003 SODIUM CHLORIDE 0.9 % SOLUTION 250 ML FLEX CONT: Performed by: INTERNAL MEDICINE

## 2024-04-19 PROCEDURE — 96413 CHEMO IV INFUSION 1 HR: CPT

## 2024-04-19 PROCEDURE — 25010000002 DEXAMETHASONE SODIUM PHOSPHATE 100 MG/10ML SOLUTION: Performed by: INTERNAL MEDICINE

## 2024-04-19 PROCEDURE — 25010000002 POTASSIUM CHLORIDE PER 2 MEQ OF POTASSIUM: Performed by: INTERNAL MEDICINE

## 2024-04-19 PROCEDURE — 99214 OFFICE O/P EST MOD 30 MIN: CPT | Performed by: INTERNAL MEDICINE

## 2024-04-19 PROCEDURE — 25010000002 CISPLATIN PER 50 MG: Performed by: INTERNAL MEDICINE

## 2024-04-19 PROCEDURE — 3078F DIAST BP <80 MM HG: CPT | Performed by: INTERNAL MEDICINE

## 2024-04-19 PROCEDURE — 3077F SYST BP >= 140 MM HG: CPT | Performed by: INTERNAL MEDICINE

## 2024-04-19 PROCEDURE — 25010000002 GEMCITABINE 1 GM/26.3ML SOLUTION 26.3 ML VIAL: Performed by: INTERNAL MEDICINE

## 2024-04-19 PROCEDURE — 25010000002 PALONOSETRON PER 25 MCG: Performed by: INTERNAL MEDICINE

## 2024-04-19 PROCEDURE — 25010000002 MAGNESIUM SULFATE PER 500 MG OF MAGNESIUM: Performed by: INTERNAL MEDICINE

## 2024-04-19 PROCEDURE — 25010000002 GEMCITABINE 200 MG/5.26ML SOLUTION 5.26 ML VIAL: Performed by: INTERNAL MEDICINE

## 2024-04-19 RX ORDER — SODIUM CHLORIDE 9 MG/ML
20 INJECTION, SOLUTION INTRAVENOUS ONCE
Status: COMPLETED | OUTPATIENT
Start: 2024-04-19 | End: 2024-04-19

## 2024-04-19 RX ORDER — PALONOSETRON 0.05 MG/ML
0.25 INJECTION, SOLUTION INTRAVENOUS ONCE
Status: COMPLETED | OUTPATIENT
Start: 2024-04-19 | End: 2024-04-19

## 2024-04-19 RX ORDER — SODIUM CHLORIDE 9 MG/ML
20 INJECTION, SOLUTION INTRAVENOUS ONCE
OUTPATIENT
Start: 2024-05-17

## 2024-04-19 RX ORDER — PALONOSETRON 0.05 MG/ML
0.25 INJECTION, SOLUTION INTRAVENOUS ONCE
OUTPATIENT
Start: 2024-05-17

## 2024-04-19 RX ORDER — PALONOSETRON 0.05 MG/ML
0.25 INJECTION, SOLUTION INTRAVENOUS ONCE
OUTPATIENT
Start: 2024-05-10

## 2024-04-19 RX ORDER — SODIUM CHLORIDE 9 MG/ML
20 INJECTION, SOLUTION INTRAVENOUS ONCE
OUTPATIENT
Start: 2024-05-10

## 2024-04-19 RX ADMIN — DEXAMETHASONE SODIUM PHOSPHATE 12 MG: 10 INJECTION, SOLUTION INTRAMUSCULAR; INTRAVENOUS at 12:56

## 2024-04-19 RX ADMIN — SODIUM CHLORIDE 20 ML/HR: 9 INJECTION, SOLUTION INTRAVENOUS at 09:56

## 2024-04-19 RX ADMIN — FOSAPREPITANT DIMEGLUMINE 150 MG: 150 INJECTION, POWDER, LYOPHILIZED, FOR SOLUTION INTRAVENOUS at 12:56

## 2024-04-19 RX ADMIN — CISPLATIN 44 MG: 1 INJECTION, SOLUTION INTRAVENOUS at 14:47

## 2024-04-19 RX ADMIN — POTASSIUM CHLORIDE 500 ML: 2 INJECTION, SOLUTION, CONCENTRATE INTRAVENOUS at 11:21

## 2024-04-19 RX ADMIN — SODIUM CHLORIDE 1500 MG: 9 INJECTION, SOLUTION INTRAVENOUS at 09:56

## 2024-04-19 RX ADMIN — PALONOSETRON HYDROCHLORIDE 0.25 MG: 0.25 INJECTION INTRAVENOUS at 12:56

## 2024-04-19 RX ADMIN — GEMCITABINE HYDROCHLORIDE 1800 MG: 1 INJECTION, SOLUTION INTRAVENOUS at 13:51

## 2024-04-19 NOTE — PROGRESS NOTES
Follow Up Office Visit      Date: 2024     Patient Name: Sheree Hernandez  MRN: [id number]  : 1940  Chief Complaint:  Follow-up for intrahepatic cholangiocarcinoma      History of Present Illness: Sheree Hernandez is a pleasant 79 y.o. female with a past medical history of hyperlipidemia and hypertension who presents today for evaluation of concern for hemochromatosis. The patient is accompanied by their self who contributes to the history of their care.  Patient states that over the past 2 years she has been having worsening fatigue especially with exertion.  Over the past several months this has become worse.  Patient states that she gets tired with basic activities including showering getting dressed in the morning and walking to and from her car from stores.  She states that her fatigue gets better after a few minutes of rest.  She has previously had an echocardiogram and cardiac catheterization within the past 2 years which did not reveal any cause of her fatigue with exertion.  She is also had an ultrasound of the liver which revealed stable cyst.  She was recently seen by her PCP who ordered iron studies which was notable for an elevated ferritin to 246.  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  All other mutations were negative.  She is currently up-to-date on her mammograms and colonoscopy with no active malignancies noted.  She is otherwise compliant with her statin and high blood pressure medicines.  Repeat abdominal imaging in 2021 concerning for enlarging liver lesion.  She was seen by Dr. Sharma and  is status post open right hepatectomy, cholecystectomy, right partial adrenalectomy on 2021.  Pathology showed a focal R1 resected margin.  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  0/4 lymph nodes were  positive for disease.  LVI and PNI were present.  Finished chemoXRT in January 2021.      Interval History:  Presents to clinic for follow-up.  Completed radiation in May 2023 and again in August 2023.  Scheduled to begin cycle 1 of cisplatin/gemcitabine/nivolumab today.  Anxious about starting treatment but otherwise doing well and overall stable    Oncology History:    Oncology/Hematology History   Intrahepatic cholangiocarcinoma   8/17/2021 Initial Diagnosis    Intrahepatic cholangiocarcinoma (CMS/HCC)     8/17/2021 Cancer Staged    Staging form: Intrahepatic Bile Duct, AJCC 8th Edition  - Clinical: Stage IB (cT1b, cN0, cM0) - Signed by Jorje Montenegro MD on 8/17/2021 11/5/2021 Cancer Staged    Staging form: Intrahepatic Bile Duct, AJCC 8th Edition  - Pathologic: Stage IIIA (pT3, pN0, cM0) - Signed by Jorje Montenegro MD on 11/5/2021 12/9/2021 - 1/6/2022 Chemotherapy    OP GALLBLADDER Capecitabine + XRT     12/9/2021 - 1/19/2022 Radiation    Radiation OncologyTreatment Course:  Sheree Hernandez received 5040 cGy in 28 fractions to liver via External Beam Radiation - EBRT.     4/25/2023 - 5/8/2023 Radiation    Radiation OncologyTreatment Course:  Sheree Hernandez received 3500 cGy in 5 fractions to liver mass and lymph nodes via Stereotactic Radiation Therapy - SRT.     8/1/2023 - 8/11/2023 Radiation    Radiation OncologyTreatment Course:  Sheree Hernandez received 3500 cGy in 5 fractions to abdomen via Stereotactic Radiation Therapy - SRT.     1/9/2024 - 1/23/2024 Radiation    Radiation OncologyTreatment Course:  Sheree Hernandez received 3000 cGy in 5 fractions to abdomen via Stereotactic Radiation Therapy - SRT.     1/11/2024 - 1/11/2024 Radiation    Radiation OncologyTreatment Course:  Sheree Hernandez received 754 cGy in 1 fractions to left lung via Stereotactic Radiation Therapy - SRT.     2/1/2024 - 2/1/2024 Radiation    Radiation OncologyTreatment Course:  Sheree Hernandez received 3000 cGy in 1  fractions to left lung via Stereotactic Radiation Therapy - SRT.     4/19/2024 -  Chemotherapy    OP HEPATOBILIARY CISplatin + Gemcitabine Days 1,8 / Durvalumab Q21D         Subjective      Review of Systems:   Constitutional: Negative for fevers, chills, or weight loss  Eyes: Negative for blurred vision or discharge         Ear/Nose/Throat: Negative for difficulty swallowing, sore throat, LAD                                                       Respiratory: Negative for cough, SOA, wheezing                                                                                        Cardiovascular: Negative for chest pain or palpitations                                                                  Gastrointestinal: Negative for nausea, vomiting or diarrhea                                                                     Genitourinary: Negative for dysuria or hematuria                                                                                           Musculoskeletal: Negative for any joint pains or muscle aches                                                                        Neurologic: Negative for any weakness, headaches, dizziness                                                                         Hematologic: Negative for any easy bleeding or bruising                                                                                   Psychiatric: Negative for anxiety or depression                          Past Medical History/Past Surgical History/ Family History/ Social History: Reviewed by me and unchanged from my previous documentation done on April 2024.     Medications:     Current Outpatient Medications:     Cholecalciferol (Vitamin D3) 25 MCG (1000 UT) capsule, Take  by mouth Daily., Disp: , Rfl:     coenzyme Q10 50 MG capsule capsule, Take  by mouth Daily., Disp: , Rfl:     Cyanocobalamin (VITAMIN B 12 PO), Take  by mouth., Disp: , Rfl:     dexAMETHasone (DECADRON) 1 MG tablet, Take 0.5  "(one-half) tablet by mouth 2 Times a Day With Meals. (Patient not taking: Reported on 4/19/2024), Disp: 10 tablet, Rfl: 0    Folic Acid 0.8 MG capsule, Take  by mouth., Disp: , Rfl:     ipratropium (ATROVENT) 0.06 % nasal spray, Instill 2 sprays into both nostrils as directed by provider 2 (Two) Times a Day., Disp: 15 mL, Rfl: 6    ipratropium (ATROVENT) 0.06 % nasal spray, Administer 1 spray into each nostril twice daily, Disp: 30 mL, Rfl: 3    magnesium oxide (MAG-OX) 400 MG tablet, Take 1 tablet by mouth Daily., Disp: , Rfl:     methylphenidate (RITALIN) 5 MG tablet, Take 0.5-1 tablet by mouth Every Morning and repeat 4 hours later., Disp: 60 tablet, Rfl: 0    OLANZapine (zyPREXA) 5 MG tablet, Take 1 tablet by mouth Every Night. Take on days 1, 2, 3, and 4 and Days 8, 9, 10, 11 after chemotherapy., Disp: 8 tablet, Rfl: 7    Omega-3 1000 MG capsule, Take  by mouth Daily., Disp: , Rfl:     ondansetron (ZOFRAN) 8 MG tablet, Take 1 tablet by mouth 3 (Three) Times a Day As Needed for Nausea or Vomiting., Disp: 30 tablet, Rfl: 5    rosuvastatin (CRESTOR) 10 MG tablet, Take 1 tablet by mouth Daily., Disp: 90 tablet, Rfl: 3    telmisartan (MICARDIS) 20 MG tablet, Take 1 tablet by mouth Daily., Disp: 90 tablet, Rfl: 3    Turmeric Curcumin 500 MG capsule, Take 750 mg by mouth Daily., Disp: , Rfl:     Zinc 50 MG capsule, Take  by mouth Daily., Disp: , Rfl:     Allergies:   Allergies   Allergen Reactions    Pravastatin Myalgia       Objective     Physical Exam:  Vital Signs:   Vitals:    04/19/24 0844   BP: 177/74   Pulse: 81   Resp: 16   Temp: 97.4 °F (36.3 °C)   TempSrc: Temporal   SpO2: 99%   Weight: 71.7 kg (158 lb)   Height: 162.6 cm (64.02\")   PainSc: 0-No pain     Pain Score    04/19/24 0844   PainSc: 0-No pain     ECOG Performance Status: 0 - Asymptomatic    Constitutional: NAD, ECOG 0  Eyes: PERRLA, scleral anicteric  ENT: No LAD, no thyromegaly  Respiratory: CTAB, no wheezing, rales, rhonchi  Cardiovascular: RRR, " no murmurs, pulses 2+ bilaterally  Abdomen: soft, NT/ND, no HSM  Musculoskeletal: strength 5/5 bilaterally, no c/c/e  Neurologic: A&O x 3, CN II-XII intact grossly    Results Review:   Hospital Outpatient Visit on 04/18/2024   Component Date Value Ref Range Status    Glucose 04/18/2024 89  65 - 99 mg/dL Final    BUN 04/18/2024 24 (H)  8 - 23 mg/dL Final    Creatinine 04/18/2024 0.86  0.57 - 1.00 mg/dL Final    Sodium 04/18/2024 140  136 - 145 mmol/L Final    Potassium 04/18/2024 4.3  3.5 - 5.2 mmol/L Final    Slight hemolysis detected by analyzer. Result may be falsely elevated.    Chloride 04/18/2024 104  98 - 107 mmol/L Final    CO2 04/18/2024 28.0  22.0 - 29.0 mmol/L Final    Calcium 04/18/2024 9.4  8.6 - 10.5 mg/dL Final    Total Protein 04/18/2024 6.7  6.0 - 8.5 g/dL Final    Albumin 04/18/2024 3.9  3.5 - 5.2 g/dL Final    ALT (SGPT) 04/18/2024 13  1 - 33 U/L Final    AST (SGOT) 04/18/2024 21  1 - 32 U/L Final    Alkaline Phosphatase 04/18/2024 144 (H)  39 - 117 U/L Final    Total Bilirubin 04/18/2024 0.4  0.0 - 1.2 mg/dL Final    Globulin 04/18/2024 2.8  gm/dL Final    Calculated Result    A/G Ratio 04/18/2024 1.4  g/dL Final    BUN/Creatinine Ratio 04/18/2024 27.9 (H)  7.0 - 25.0 Final    Anion Gap 04/18/2024 8.0  5.0 - 15.0 mmol/L Final    eGFR 04/18/2024 66.7  >60.0 mL/min/1.73 Final    TSH 04/18/2024 2.490  0.270 - 4.200 uIU/mL Final    Free T4 04/18/2024 1.28  0.92 - 1.68 ng/dL Final    Magnesium 04/18/2024 2.1  1.6 - 2.4 mg/dL Final    WBC 04/18/2024 7.08  3.40 - 10.80 10*3/mm3 Final    RBC 04/18/2024 4.52  3.77 - 5.28 10*6/mm3 Final    Hemoglobin 04/18/2024 14.4  12.0 - 15.9 g/dL Final    Hematocrit 04/18/2024 44.5  34.0 - 46.6 % Final    MCV 04/18/2024 98.5 (H)  79.0 - 97.0 fL Final    MCH 04/18/2024 31.9  26.6 - 33.0 pg Final    MCHC 04/18/2024 32.4  31.5 - 35.7 g/dL Final    RDW 04/18/2024 13.9  12.3 - 15.4 % Final    RDW-SD 04/18/2024 51.1  37.0 - 54.0 fl Final    MPV 04/18/2024 11.3  6.0 - 12.0 fL  Final    Platelets 04/18/2024 158  140 - 450 10*3/mm3 Final    Neutrophil % 04/18/2024 77.4 (H)  42.7 - 76.0 % Final    Lymphocyte % 04/18/2024 9.9 (L)  19.6 - 45.3 % Final    Monocyte % 04/18/2024 10.0  5.0 - 12.0 % Final    Eosinophil % 04/18/2024 2.1  0.3 - 6.2 % Final    Basophil % 04/18/2024 0.3  0.0 - 1.5 % Final    Immature Grans % 04/18/2024 0.3  0.0 - 0.5 % Final    Neutrophils, Absolute 04/18/2024 5.48  1.70 - 7.00 10*3/mm3 Final    Lymphocytes, Absolute 04/18/2024 0.70  0.70 - 3.10 10*3/mm3 Final    Monocytes, Absolute 04/18/2024 0.71  0.10 - 0.90 10*3/mm3 Final    Eosinophils, Absolute 04/18/2024 0.15  0.00 - 0.40 10*3/mm3 Final    Basophils, Absolute 04/18/2024 0.02  0.00 - 0.20 10*3/mm3 Final    Immature Grans, Absolute 04/18/2024 0.02  0.00 - 0.05 10*3/mm3 Final       CT Abdomen Pelvis With Contrast    Result Date: 4/1/2024  Narrative: CT CHEST W CONTRAST DIAGNOSTIC, CT ABDOMEN PELVIS W CONTRAST Date of Exam: 4/1/2024 11:15 AM EDT Indication: cholangiocarcinoma. Comparison: 12/11/2023 Technique: Axial CT images were obtained of the chest abdomen and pelvis after the uneventful intravenous administration of 95 cc Isovue-370.  Reconstructed coronal and sagittal images were also obtained. Automated exposure control and iterative construction methods were used. Findings: CT CHEST: MEDIASTINUM: There is a new enlarged centrally necrotic prevascular lymph node measuring 1.6 x 1.1 cm (image 34, series 2).. Aortic and heart size are normal. No mass nor pericardial effusion. CORONARY ARTERIES: No significant calcified atherosclerotic disease. LUNGS: The larger pulmonary nodules on prior examination are relatively stable when compared to prior examination including: *7 mm left lower lobe nodule (image 47, series 4), stable *6 mm right middle lobe nodule (image 63, series 4), stable *6 mm right lower lobe nodule (image 65, series 4), stable However, there are some enlarging previously smaller nodules which  include: *5 mm right middle lobe nodule (image 62, series 4), previously 3 mm *5 mm right lower lobe nodule (image 74, series 4), previously 3 mm No dense consolidation. PLEURAL SPACE: No effusion, mass, nor pneumothorax. CT ABDOMEN AND PELVIS:  LIVER: There is an enlarging 1.4 cm hypodense lesion within segment III (image 36, series 2), previously 1.0 cm. No other enhancing lesion identified. There are other cysts within the left hepatic lobe. Relatively stable hepatic postoperative changes with right hepatectomy. BILIARY/GALLBLADDER: Cholecystectomy SPLEEN:  Unremarkable PANCREAS:  Unremarkable ADRENAL:  Unremarkable KIDNEYS:  Unremarkable parenchyma with no solid mass identified. No obstruction.  No calculus identified. GASTROINTESTINAL/MESENTERY:  No evidence of obstruction nor inflammation. AORTA/IVC:  Normal caliber. RETROPERITONEUM/LYMPH NODES: Retroperitoneal lymph nodes are stable, larger, and smaller. These include: *1.6 x 1.3 cm retrocaval node (image 39, series 2), previously 1.5 x 1.2 cm *0.9 x 0.8 cm precaval node (image 67, series 2), previously 1.1 x 0.9 cm *0.9 x 0.8 cm aortocaval node (image 60, series 2), previously 0.9 x 0.8 cm *0.9 x 0.9 cm left para-aortic node (image 57, series 2), previously 0.5 x 0.3 cm REPRODUCTIVE:  Unremarkable BLADDER:  Unremarkable OSSEUS STRUCTURES:  Typical for age with no acute process identified.     Impression: Impression: 1.Progression of disease with enlarging mediastinal lymph node, few enlarging pulmonary nodules, enlarging low-density lesion in the left hepatic lobe, and few enlarging retroperitoneal lymph nodes. Other relatively stable findings. Electronically Signed: Maurice Willis MD  4/1/2024 3:16 PM EDT  Workstation ID: [id number]    CT Chest With Contrast Diagnostic    Result Date: 4/1/2024  Narrative: CT CHEST W CONTRAST DIAGNOSTIC, CT ABDOMEN PELVIS W CONTRAST Date of Exam: 4/1/2024 11:15 AM EDT Indication: cholangiocarcinoma. Comparison: 12/11/2023  Technique: Axial CT images were obtained of the chest abdomen and pelvis after the uneventful intravenous administration of 95 cc Isovue-370.  Reconstructed coronal and sagittal images were also obtained. Automated exposure control and iterative construction methods were used. Findings: CT CHEST: MEDIASTINUM: There is a new enlarged centrally necrotic prevascular lymph node measuring 1.6 x 1.1 cm (image 34, series 2).. Aortic and heart size are normal. No mass nor pericardial effusion. CORONARY ARTERIES: No significant calcified atherosclerotic disease. LUNGS: The larger pulmonary nodules on prior examination are relatively stable when compared to prior examination including: *7 mm left lower lobe nodule (image 47, series 4), stable *6 mm right middle lobe nodule (image 63, series 4), stable *6 mm right lower lobe nodule (image 65, series 4), stable However, there are some enlarging previously smaller nodules which include: *5 mm right middle lobe nodule (image 62, series 4), previously 3 mm *5 mm right lower lobe nodule (image 74, series 4), previously 3 mm No dense consolidation. PLEURAL SPACE: No effusion, mass, nor pneumothorax. CT ABDOMEN AND PELVIS:  LIVER: There is an enlarging 1.4 cm hypodense lesion within segment III (image 36, series 2), previously 1.0 cm. No other enhancing lesion identified. There are other cysts within the left hepatic lobe. Relatively stable hepatic postoperative changes with right hepatectomy. BILIARY/GALLBLADDER: Cholecystectomy SPLEEN:  Unremarkable PANCREAS:  Unremarkable ADRENAL:  Unremarkable KIDNEYS:  Unremarkable parenchyma with no solid mass identified. No obstruction.  No calculus identified. GASTROINTESTINAL/MESENTERY:  No evidence of obstruction nor inflammation. AORTA/IVC:  Normal caliber. RETROPERITONEUM/LYMPH NODES: Retroperitoneal lymph nodes are stable, larger, and smaller. These include: *1.6 x 1.3 cm retrocaval node (image 39, series 2), previously 1.5 x 1.2 cm *0.9  x 0.8 cm precaval node (image 67, series 2), previously 1.1 x 0.9 cm *0.9 x 0.8 cm aortocaval node (image 60, series 2), previously 0.9 x 0.8 cm *0.9 x 0.9 cm left para-aortic node (image 57, series 2), previously 0.5 x 0.3 cm REPRODUCTIVE:  Unremarkable BLADDER:  Unremarkable OSSEUS STRUCTURES:  Typical for age with no acute process identified.     Impression: Impression: 1.Progression of disease with enlarging mediastinal lymph node, few enlarging pulmonary nodules, enlarging low-density lesion in the left hepatic lobe, and few enlarging retroperitoneal lymph nodes. Other relatively stable findings. Electronically Signed: Maurice Willis MD  4/1/2024 3:16 PM EDT  Workstation ID: [id number]     Assessment / Plan      Assessment/Plan:   Intrahepatic cholangiocarcinoma (CMS/HCC) (Primary)  -Noted during her most recent hospitalization with CT scans concerning for an enlarging liver lesion to 7.3 cm that was previously noted to be hemangioma  -Triple phase CT concerning for a solid tumor lesion  -Biopsy consistent with an adenocarcinoma  -CA 19-9 elevated to 84.7  -CT chest as well as the rest of the CT abdomen/pelvis not concerning for distant or metastatic disease  -Status post open right hepatectomy, cholecystectomy, right partial adrenalectomy on 9/23/2021 with Dr. 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  0/4 lymph nodes were positive for disease.  LVI and PNI were present.  Focal R1 resection margin  -Discussed with patient and her daughter that she would benefit from adjuvant chemoXRT using Xeloda.  Discussed side effects including but not limited to immunosuppression, diarrhea, abdominal pain, nausea, vomiting, hand/foot syndrome  -Repeat CA 19-9 (22) in November 2021  -Completed chemo XRT with Xeloda in January 2021  -Repeat scans in March 2022 without any evidence of recurrent or metastatic  disease  -Repeat CT C/A/P in June 2022 without evidence of recurrent or metastatic disease.  Did note some IVC stenosis which we will monitor with her next scans  -Repeat CT C/A/P in September 2022 reviewed without evidence of recurrent disease or metastatic disease.  IVC stenosis overall stable  -Repeat CT C/A/P in December 2022 reviewed without evidence of recurrent or metastatic disease.  CA 19-9 within normal limits  -Repeat CT C/A/P in March 2023 reviewed and notable for some slight enlarging retroperitoneal adenopathy.  CA 19-9 within normal limits  -PET/CT concerning for 1 cm left liver lesion that was only slightly PET avid as well as 2 lymph nodes that were also slightly PET avid  -Previously discussed her case at tumor board and with Dr. Sharma we agreed that she likely had disease progression  -Status post radiation completed in May 2023  -CA 19-9 in June 2022 within normal limits.    -CT C/A/P in July 2023 reviewed and showed some interval decrease in some lymph nodes as well as some slight enlargement of a couple lymph nodes.  -Completed radiation in August 2023  -CT C/A/P in October 2023 reviewed and only notable for slight increase in some pulmonary nodules about 1 mm each  -CT C/A/P December 2023 reviewed and notable for enlargement of her aortocaval lymph node.  CA 19-9 stable  -Completed radiation with Dr. Billings in February 2024  -CT C/A/P in April 2024 reviewed and showing continued slight enlargement of multiple pulmonary nodules as well as a liver lesion.  CA 19-9 stable  -Scheduled begin cycle 1 of cisplatin/gemcitabine/durvalumab today.  Clinically appropriate for treatment.  Labs reviewed and appropriate for treatment     Hemochromatosis carrier  -Noted on recent labs with an elevated ferritin to 246 hemochromatosis gene profile positive for heterozygosity of H63D.  Other genes negative.  Given patient's age and previous cardiac liver work-up showing no evidence of dysfunction, it is  unlikely that she has had iron deposition all this time.  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  Treatment as above  -ECHO in July 2020 within normal limits  -Repeat iron studies in April 2021 stable with a ferritin of 229, iron level 100, transferrin saturation 27%  -Low concern for iron overload at this time.      Thyroid nodule  -Incidentally noted on CT scan but enlarging from previous CT  -Thyroid ultrasound in June 2022 only notable for goiter and small nodules nonconcerning for malignancy  -Has been seen by endocrinology with plan for repeat ultrasound in the summer of next year     Other fatigue   -Continued at this time  -Previously checked iron studies, vitamin B12, folate, thyroid studies within normal limits  -Was previously been seen by cardiology with a normal ECHO and stress test  -Likely related to all her cancer related treatments  -Advised patient to continue activity as tolerated         Follow Up:   Follow-up in 3 weeks     Jorje Montenegro MD  Hematology and Oncology     Please note that portions of this note may have been completed with a voice recognition program. Efforts were made to edit the dictations, but occasionally words are mistranscribed.

## 2024-04-23 ENCOUNTER — DOCUMENTATION (OUTPATIENT)
Dept: OTHER | Facility: HOSPITAL | Age: 84
End: 2024-04-23
Payer: MEDICARE

## 2024-04-23 NOTE — PROGRESS NOTES
"Distress Screening Follow-up    Name: Sheree Hernandez    : 1940    Diagnosis: Intraheptatic cholangiocarcinoma    Location of Distress Screening: Infusion    Distress Level: 4 (Patient states concern of \" How to continue my life acting as if everything is normal?\"   Would like to speak to pastoral care.) (2024  1:00 PM)    Physical Concerns:  Fatigue: Y  Memory or concentration: Y  Loss or change of physical abilities: Y  Pain: Y      Emotional Concerns:  Worry or anxiety: Y  Loss of interest or enjoyment: Y  Changes in appearance: Y  Feelings of worthlessness or being a burden: Y    Practical Concerns:  Taking care of myself: Y  Treatment decisions: Y    Spiritual Concerns:  Sense of meaning or purpose: Y  Death, dying or afterlife: Y     Interventions:   Practical Needs: Nurse Navigator Referral      Comments:  SW called to follow up with pt regarding recent distress screen results. SW left a VM with pt introducing self, and offering additional support. SW provided call back number, ([phone]) and encouraged pt to reach out at her convenience.     SW will remain available to offer support.     Verito Ansari, EILEEN  Oncology Social Worker        "

## 2024-04-25 ENCOUNTER — HOSPITAL ENCOUNTER (OUTPATIENT)
Dept: ONCOLOGY | Facility: HOSPITAL | Age: 84
Discharge: HOME | End: 2024-04-25
Admitting: INTERNAL MEDICINE
Payer: MEDICARE

## 2024-04-25 VITALS
BODY MASS INDEX: 27.14 KG/M2 | HEART RATE: 95 BPM | DIASTOLIC BLOOD PRESSURE: 64 MMHG | TEMPERATURE: 96.5 F | HEIGHT: 64 IN | RESPIRATION RATE: 16 BRPM | SYSTOLIC BLOOD PRESSURE: 137 MMHG | WEIGHT: 159 LBS

## 2024-04-25 DIAGNOSIS — C22.1 INTRAHEPATIC CHOLANGIOCARCINOMA: ICD-10-CM

## 2024-04-25 LAB
ALBUMIN SERPL-MCNC: 3.5 G/DL (ref 3.5–5.2)
ALBUMIN/GLOB SERPL: 1.3 G/DL
ALP SERPL-CCNC: 159 U/L (ref 39–117)
ALT SERPL W P-5'-P-CCNC: 57 U/L (ref 1–33)
ANION GAP SERPL CALCULATED.3IONS-SCNC: 10 MMOL/L (ref 5–15)
AST SERPL-CCNC: 38 U/L (ref 1–32)
BASOPHILS # BLD AUTO: 0.02 10*3/MM3 (ref 0–0.2)
BASOPHILS NFR BLD AUTO: 0.6 % (ref 0–1.5)
BILIRUB SERPL-MCNC: 0.4 MG/DL (ref 0–1.2)
BUN SERPL-MCNC: 23 MG/DL (ref 8–23)
BUN/CREAT SERPL: 26.1 (ref 7–25)
CALCIUM SPEC-SCNC: 8.5 MG/DL (ref 8.6–10.5)
CHLORIDE SERPL-SCNC: 105 MMOL/L (ref 98–107)
CO2 SERPL-SCNC: 24 MMOL/L (ref 22–29)
CREAT SERPL-MCNC: 0.88 MG/DL (ref 0.57–1)
DEPRECATED RDW RBC AUTO: 50.9 FL (ref 37–54)
EGFRCR SERPLBLD CKD-EPI 2021: 64.9 ML/MIN/1.73
EOSINOPHIL # BLD AUTO: 0.05 10*3/MM3 (ref 0–0.4)
EOSINOPHIL NFR BLD AUTO: 1.4 % (ref 0.3–6.2)
ERYTHROCYTE [DISTWIDTH] IN BLOOD BY AUTOMATED COUNT: 14 % (ref 12.3–15.4)
GLOBULIN UR ELPH-MCNC: 2.6 GM/DL
GLUCOSE SERPL-MCNC: 83 MG/DL (ref 65–99)
HCT VFR BLD AUTO: 41.9 % (ref 34–46.6)
HGB BLD-MCNC: 13.7 G/DL (ref 12–15.9)
IMM GRANULOCYTES # BLD AUTO: 0.01 10*3/MM3 (ref 0–0.05)
IMM GRANULOCYTES NFR BLD AUTO: 0.3 % (ref 0–0.5)
LYMPHOCYTES # BLD AUTO: 0.52 10*3/MM3 (ref 0.7–3.1)
LYMPHOCYTES NFR BLD AUTO: 14.9 % (ref 19.6–45.3)
MAGNESIUM SERPL-MCNC: 2.2 MG/DL (ref 1.6–2.4)
MCH RBC QN AUTO: 32 PG (ref 26.6–33)
MCHC RBC AUTO-ENTMCNC: 32.7 G/DL (ref 31.5–35.7)
MCV RBC AUTO: 97.9 FL (ref 79–97)
MONOCYTES # BLD AUTO: 0.02 10*3/MM3 (ref 0.1–0.9)
MONOCYTES NFR BLD AUTO: 0.6 % (ref 5–12)
NEUTROPHILS NFR BLD AUTO: 2.86 10*3/MM3 (ref 1.7–7)
NEUTROPHILS NFR BLD AUTO: 82.2 % (ref 42.7–76)
PLATELET # BLD AUTO: 90 10*3/MM3 (ref 140–450)
PMV BLD AUTO: 11.2 FL (ref 6–12)
POTASSIUM SERPL-SCNC: 4.1 MMOL/L (ref 3.5–5.2)
PROT SERPL-MCNC: 6.1 G/DL (ref 6–8.5)
RBC # BLD AUTO: 4.28 10*6/MM3 (ref 3.77–5.28)
SODIUM SERPL-SCNC: 139 MMOL/L (ref 136–145)
WBC NRBC COR # BLD AUTO: 3.48 10*3/MM3 (ref 3.4–10.8)

## 2024-04-25 PROCEDURE — 85025 COMPLETE CBC W/AUTO DIFF WBC: CPT | Performed by: INTERNAL MEDICINE

## 2024-04-25 PROCEDURE — 36415 COLL VENOUS BLD VENIPUNCTURE: CPT

## 2024-04-25 PROCEDURE — 83735 ASSAY OF MAGNESIUM: CPT | Performed by: INTERNAL MEDICINE

## 2024-04-25 PROCEDURE — 80053 COMPREHEN METABOLIC PANEL: CPT | Performed by: INTERNAL MEDICINE

## 2024-04-26 ENCOUNTER — HOSPITAL ENCOUNTER (OUTPATIENT)
Dept: ONCOLOGY | Facility: HOSPITAL | Age: 84
Discharge: HOME | End: 2024-04-26
Payer: MEDICARE

## 2024-04-26 ENCOUNTER — APPOINTMENT (OUTPATIENT)
Dept: ONCOLOGY | Facility: HOSPITAL | Age: 84
End: 2024-04-26
Payer: MEDICARE

## 2024-04-26 VITALS
WEIGHT: 160.7 LBS | SYSTOLIC BLOOD PRESSURE: 176 MMHG | HEIGHT: 64 IN | HEART RATE: 97 BPM | DIASTOLIC BLOOD PRESSURE: 77 MMHG | BODY MASS INDEX: 27.43 KG/M2 | TEMPERATURE: 98 F | RESPIRATION RATE: 16 BRPM

## 2024-04-26 DIAGNOSIS — C22.1 INTRAHEPATIC CHOLANGIOCARCINOMA: Primary | ICD-10-CM

## 2024-04-26 PROCEDURE — 25010000002 GEMCITABINE 200 MG/5.26ML SOLUTION 5.26 ML VIAL: Performed by: INTERNAL MEDICINE

## 2024-04-26 PROCEDURE — 96413 CHEMO IV INFUSION 1 HR: CPT

## 2024-04-26 PROCEDURE — 96366 THER/PROPH/DIAG IV INF ADDON: CPT

## 2024-04-26 PROCEDURE — 25010000002 POTASSIUM CHLORIDE PER 2 MEQ OF POTASSIUM: Performed by: INTERNAL MEDICINE

## 2024-04-26 PROCEDURE — 25010000002 CISPLATIN PER 50 MG: Performed by: INTERNAL MEDICINE

## 2024-04-26 PROCEDURE — 96367 TX/PROPH/DG ADDL SEQ IV INF: CPT

## 2024-04-26 PROCEDURE — 25810000003 SODIUM CHLORIDE 0.9 % SOLUTION 250 ML FLEX CONT: Performed by: INTERNAL MEDICINE

## 2024-04-26 PROCEDURE — 96368 THER/DIAG CONCURRENT INF: CPT

## 2024-04-26 PROCEDURE — 25010000002 DEXAMETHASONE SODIUM PHOSPHATE 100 MG/10ML SOLUTION: Performed by: INTERNAL MEDICINE

## 2024-04-26 PROCEDURE — 96417 CHEMO IV INFUS EACH ADDL SEQ: CPT

## 2024-04-26 PROCEDURE — 25010000002 GEMCITABINE 1 GM/26.3ML SOLUTION 26.3 ML VIAL: Performed by: INTERNAL MEDICINE

## 2024-04-26 PROCEDURE — 25010000002 PALONOSETRON PER 25 MCG: Performed by: INTERNAL MEDICINE

## 2024-04-26 PROCEDURE — 25010000002 MAGNESIUM SULFATE PER 500 MG OF MAGNESIUM: Performed by: INTERNAL MEDICINE

## 2024-04-26 PROCEDURE — 25010000002 FOSAPREPITANT PER 1 MG: Performed by: INTERNAL MEDICINE

## 2024-04-26 PROCEDURE — 96375 TX/PRO/DX INJ NEW DRUG ADDON: CPT

## 2024-04-26 PROCEDURE — 25810000003 SODIUM CHLORIDE 0.9 % SOLUTION: Performed by: INTERNAL MEDICINE

## 2024-04-26 RX ORDER — PALONOSETRON 0.05 MG/ML
0.25 INJECTION, SOLUTION INTRAVENOUS ONCE
Status: COMPLETED | OUTPATIENT
Start: 2024-04-26 | End: 2024-04-26

## 2024-04-26 RX ORDER — SODIUM CHLORIDE 9 MG/ML
20 INJECTION, SOLUTION INTRAVENOUS ONCE
Status: DISCONTINUED | OUTPATIENT
Start: 2024-04-26 | End: 2024-04-27 | Stop reason: HOSPADM

## 2024-04-26 RX ADMIN — CISPLATIN 44 MG: 1 INJECTION, SOLUTION INTRAVENOUS at 15:30

## 2024-04-26 RX ADMIN — POTASSIUM CHLORIDE 500 ML: 2 INJECTION, SOLUTION, CONCENTRATE INTRAVENOUS at 12:13

## 2024-04-26 RX ADMIN — PALONOSETRON HYDROCHLORIDE 0.25 MG: 0.25 INJECTION INTRAVENOUS at 13:16

## 2024-04-26 RX ADMIN — DEXAMETHASONE SODIUM PHOSPHATE 12 MG: 10 INJECTION, SOLUTION INTRAMUSCULAR; INTRAVENOUS at 13:16

## 2024-04-26 RX ADMIN — FOSAPREPITANT 150 MG: 150 INJECTION, POWDER, LYOPHILIZED, FOR SOLUTION INTRAVENOUS at 13:16

## 2024-04-26 RX ADMIN — GEMCITABINE HYDROCHLORIDE 1800 MG: 1 INJECTION, SOLUTION INTRAVENOUS at 14:07

## 2024-04-26 RX ADMIN — POTASSIUM CHLORIDE 500 ML: 2 INJECTION, SOLUTION, CONCENTRATE INTRAVENOUS at 15:30

## 2024-05-06 ENCOUNTER — TELEPHONE (OUTPATIENT)
Dept: ONCOLOGY | Facility: CLINIC | Age: 84
End: 2024-05-06
Payer: MEDICARE

## 2024-05-06 ENCOUNTER — HOSPITAL ENCOUNTER (OUTPATIENT)
Dept: INTERVENTIONAL RADIOLOGY/VASCULAR | Facility: HOSPITAL | Age: 84
Discharge: HOME | End: 2024-05-06
Admitting: RADIOLOGY
Payer: MEDICARE

## 2024-05-06 ENCOUNTER — PATIENT MESSAGE (OUTPATIENT)
Dept: ONCOLOGY | Facility: CLINIC | Age: 84
End: 2024-05-06
Payer: MEDICARE

## 2024-05-06 VITALS
BODY MASS INDEX: 26.63 KG/M2 | WEIGHT: 156 LBS | TEMPERATURE: 97.9 F | HEIGHT: 64 IN | SYSTOLIC BLOOD PRESSURE: 169 MMHG | HEART RATE: 81 BPM | DIASTOLIC BLOOD PRESSURE: 86 MMHG | RESPIRATION RATE: 18 BRPM | OXYGEN SATURATION: 97 %

## 2024-05-06 DIAGNOSIS — C22.1 INTRAHEPATIC CHOLANGIOCARCINOMA: ICD-10-CM

## 2024-05-06 DIAGNOSIS — D69.6 PLATELETS DECREASED: Primary | ICD-10-CM

## 2024-05-06 LAB
BASOPHILS # BLD AUTO: 0.01 10*3/MM3 (ref 0–0.2)
BASOPHILS NFR BLD AUTO: 1 % (ref 0–1.5)
DEPRECATED RDW RBC AUTO: 44.5 FL (ref 37–54)
EOSINOPHIL # BLD AUTO: 0.03 10*3/MM3 (ref 0–0.4)
EOSINOPHIL NFR BLD AUTO: 3 % (ref 0.3–6.2)
ERYTHROCYTE [DISTWIDTH] IN BLOOD BY AUTOMATED COUNT: 12.9 % (ref 12.3–15.4)
HCT VFR BLD AUTO: 36.8 % (ref 34–46.6)
HGB BLD-MCNC: 12.4 G/DL (ref 12–15.9)
IMM GRANULOCYTES # BLD AUTO: 0 10*3/MM3 (ref 0–0.05)
IMM GRANULOCYTES NFR BLD AUTO: 0 % (ref 0–0.5)
INR PPP: 0.91 (ref 0.89–1.12)
LYMPHOCYTES # BLD AUTO: 0.42 10*3/MM3 (ref 0.7–3.1)
LYMPHOCYTES NFR BLD AUTO: 41.6 % (ref 19.6–45.3)
MCH RBC QN AUTO: 31.5 PG (ref 26.6–33)
MCHC RBC AUTO-ENTMCNC: 33.7 G/DL (ref 31.5–35.7)
MCV RBC AUTO: 93.4 FL (ref 79–97)
MONOCYTES # BLD AUTO: 0.04 10*3/MM3 (ref 0.1–0.9)
MONOCYTES NFR BLD AUTO: 4 % (ref 5–12)
NEUTROPHILS NFR BLD AUTO: 0.51 10*3/MM3 (ref 1.7–7)
NEUTROPHILS NFR BLD AUTO: 50.4 % (ref 42.7–76)
NRBC BLD AUTO-RTO: 0 /100 WBC (ref 0–0.2)
PLATELET # BLD AUTO: 8 10*3/MM3 (ref 140–450)
PROTHROMBIN TIME: 12.3 SECONDS (ref 12.2–14.5)
RBC # BLD AUTO: 3.94 10*6/MM3 (ref 3.77–5.28)
WBC NRBC COR # BLD AUTO: 1.01 10*3/MM3 (ref 3.4–10.8)

## 2024-05-06 PROCEDURE — 85610 PROTHROMBIN TIME: CPT | Performed by: RADIOLOGY

## 2024-05-06 PROCEDURE — G0463 HOSPITAL OUTPT CLINIC VISIT: HCPCS

## 2024-05-06 PROCEDURE — 85025 COMPLETE CBC W/AUTO DIFF WBC: CPT | Performed by: RADIOLOGY

## 2024-05-06 RX ORDER — LIDOCAINE HYDROCHLORIDE AND EPINEPHRINE 10; 10 MG/ML; UG/ML
INJECTION, SOLUTION INFILTRATION; PERINEURAL
Status: DISPENSED
Start: 2024-05-06 | End: 2024-05-06

## 2024-05-06 RX ORDER — HEPARIN SODIUM 200 [USP'U]/100ML
INJECTION, SOLUTION INTRAVENOUS
Status: DISPENSED
Start: 2024-05-06 | End: 2024-05-06

## 2024-05-06 RX ORDER — SODIUM CHLORIDE 9 MG/ML
250 INJECTION, SOLUTION INTRAVENOUS AS NEEDED
Status: CANCELLED | OUTPATIENT
Start: 2024-05-06

## 2024-05-06 RX ORDER — HEPARIN SODIUM (PORCINE) LOCK FLUSH IV SOLN 100 UNIT/ML 100 UNIT/ML
SOLUTION INTRAVENOUS
Status: DISPENSED
Start: 2024-05-06 | End: 2024-05-06

## 2024-05-07 ENCOUNTER — HOSPITAL ENCOUNTER (OUTPATIENT)
Dept: ONCOLOGY | Facility: HOSPITAL | Age: 84
Discharge: HOME | End: 2024-05-07
Payer: MEDICARE

## 2024-05-07 VITALS
DIASTOLIC BLOOD PRESSURE: 50 MMHG | WEIGHT: 158 LBS | SYSTOLIC BLOOD PRESSURE: 142 MMHG | BODY MASS INDEX: 26.98 KG/M2 | HEIGHT: 64 IN | TEMPERATURE: 98.6 F | HEART RATE: 80 BPM | RESPIRATION RATE: 18 BRPM

## 2024-05-07 DIAGNOSIS — D69.6 PLATELETS DECREASED: ICD-10-CM

## 2024-05-07 DIAGNOSIS — C22.1 INTRAHEPATIC CHOLANGIOCARCINOMA: Primary | ICD-10-CM

## 2024-05-07 LAB
ABO GROUP BLD: NORMAL
ABO GROUP BLD: NORMAL
BLD GP AB SCN SERPL QL: NEGATIVE
RH BLD: POSITIVE
RH BLD: POSITIVE
T&S EXPIRATION DATE: NORMAL

## 2024-05-07 PROCEDURE — 86850 RBC ANTIBODY SCREEN: CPT | Performed by: INTERNAL MEDICINE

## 2024-05-07 PROCEDURE — P9035 PLATELET PHERES LEUKOREDUCED: HCPCS

## 2024-05-07 PROCEDURE — 36430 TRANSFUSION BLD/BLD COMPNT: CPT

## 2024-05-07 PROCEDURE — 86900 BLOOD TYPING SEROLOGIC ABO: CPT

## 2024-05-07 PROCEDURE — 86901 BLOOD TYPING SEROLOGIC RH(D): CPT

## 2024-05-07 PROCEDURE — P9100 PATHOGEN TEST FOR PLATELETS: HCPCS

## 2024-05-07 PROCEDURE — 86900 BLOOD TYPING SEROLOGIC ABO: CPT | Performed by: INTERNAL MEDICINE

## 2024-05-07 PROCEDURE — 86901 BLOOD TYPING SEROLOGIC RH(D): CPT | Performed by: INTERNAL MEDICINE

## 2024-05-07 RX ORDER — SODIUM CHLORIDE 9 MG/ML
250 INJECTION, SOLUTION INTRAVENOUS AS NEEDED
Status: DISCONTINUED | OUTPATIENT
Start: 2024-05-07 | End: 2024-05-08 | Stop reason: HOSPADM

## 2024-05-08 ENCOUNTER — PREP FOR SURGERY (OUTPATIENT)
Dept: OTHER | Facility: HOSPITAL | Age: 84
End: 2024-05-08
Payer: MEDICARE

## 2024-05-08 LAB
BH BB BLOOD EXPIRATION DATE: NORMAL
BH BB BLOOD TYPE BARCODE: 6200
BH BB DISPENSE STATUS: NORMAL
BH BB PRODUCT CODE: NORMAL
BH BB UNIT NUMBER: NORMAL
UNIT  ABO: NORMAL
UNIT  RH: NORMAL

## 2024-05-08 RX ORDER — SODIUM CHLORIDE 0.9 % (FLUSH) 0.9 %
3 SYRINGE (ML) INJECTION EVERY 12 HOURS SCHEDULED
Status: CANCELLED | OUTPATIENT
Start: 2024-05-08

## 2024-05-08 RX ORDER — SODIUM CHLORIDE 9 MG/ML
40 INJECTION, SOLUTION INTRAVENOUS AS NEEDED
Status: CANCELLED | OUTPATIENT
Start: 2024-05-08

## 2024-05-08 RX ORDER — SODIUM CHLORIDE 0.9 % (FLUSH) 0.9 %
10 SYRINGE (ML) INJECTION AS NEEDED
Status: CANCELLED | OUTPATIENT
Start: 2024-05-08

## 2024-05-09 ENCOUNTER — TELEPHONE (OUTPATIENT)
Dept: INFUSION THERAPY | Facility: HOSPITAL | Age: 84
End: 2024-05-09
Payer: MEDICARE

## 2024-05-10 ENCOUNTER — HOSPITAL ENCOUNTER (OUTPATIENT)
Dept: INTERVENTIONAL RADIOLOGY/VASCULAR | Facility: HOSPITAL | Age: 84
Discharge: HOME | End: 2024-05-10
Payer: MEDICARE

## 2024-05-10 VITALS
DIASTOLIC BLOOD PRESSURE: 88 MMHG | TEMPERATURE: 97.9 F | OXYGEN SATURATION: 98 % | RESPIRATION RATE: 20 BRPM | HEART RATE: 103 BPM | HEIGHT: 64 IN | WEIGHT: 155 LBS | BODY MASS INDEX: 26.46 KG/M2 | SYSTOLIC BLOOD PRESSURE: 171 MMHG

## 2024-05-10 LAB
BASOPHILS # BLD MANUAL: 0 10*3/MM3 (ref 0–0.2)
BASOPHILS NFR BLD MANUAL: 0 % (ref 0–1.5)
DEPRECATED RDW RBC AUTO: 44.5 FL (ref 37–54)
EOSINOPHIL # BLD MANUAL: 0.08 10*3/MM3 (ref 0–0.4)
EOSINOPHIL NFR BLD MANUAL: 8 % (ref 0.3–6.2)
ERYTHROCYTE [DISTWIDTH] IN BLOOD BY AUTOMATED COUNT: 13.2 % (ref 12.3–15.4)
HCT VFR BLD AUTO: 32.8 % (ref 34–46.6)
HGB BLD-MCNC: 11 G/DL (ref 12–15.9)
INR PPP: 0.99 (ref 0.89–1.12)
LYMPHOCYTES # BLD MANUAL: 0.48 10*3/MM3 (ref 0.7–3.1)
LYMPHOCYTES NFR BLD MANUAL: 39 % (ref 5–12)
MCH RBC QN AUTO: 30.8 PG (ref 26.6–33)
MCHC RBC AUTO-ENTMCNC: 33.5 G/DL (ref 31.5–35.7)
MCV RBC AUTO: 91.9 FL (ref 79–97)
MONOCYTES # BLD: 0.41 10*3/MM3 (ref 0.1–0.9)
NEUTROPHILS # BLD AUTO: 0.07 10*3/MM3 (ref 1.7–7)
NEUTROPHILS NFR BLD MANUAL: 4 % (ref 42.7–76)
NEUTS BAND NFR BLD MANUAL: 3 % (ref 0–5)
NRBC SPEC MANUAL: 0 /100 WBC (ref 0–0.2)
PLAT MORPH BLD: NORMAL
PLATELET # BLD AUTO: 113 10*3/MM3 (ref 140–450)
PMV BLD AUTO: 12.2 FL (ref 6–12)
PROTHROMBIN TIME: 13.2 SECONDS (ref 12.2–14.5)
RBC # BLD AUTO: 3.57 10*6/MM3 (ref 3.77–5.28)
RBC MORPH BLD: NORMAL
VARIANT LYMPHS NFR BLD MANUAL: 41 % (ref 19.6–45.3)
VARIANT LYMPHS NFR BLD MANUAL: 5 % (ref 0–5)
WBC MORPH BLD: NORMAL
WBC NRBC COR # BLD AUTO: 1.05 10*3/MM3 (ref 3.4–10.8)

## 2024-05-10 PROCEDURE — C1894 INTRO/SHEATH, NON-LASER: HCPCS

## 2024-05-10 PROCEDURE — 85007 BL SMEAR W/DIFF WBC COUNT: CPT | Performed by: NURSE PRACTITIONER

## 2024-05-10 PROCEDURE — 99152 MOD SED SAME PHYS/QHP 5/>YRS: CPT

## 2024-05-10 PROCEDURE — 85610 PROTHROMBIN TIME: CPT | Performed by: NURSE PRACTITIONER

## 2024-05-10 PROCEDURE — 77001 FLUOROGUIDE FOR VEIN DEVICE: CPT

## 2024-05-10 PROCEDURE — 85025 COMPLETE CBC W/AUTO DIFF WBC: CPT | Performed by: NURSE PRACTITIONER

## 2024-05-10 PROCEDURE — C1889 IMPLANT/INSERT DEVICE, NOC: HCPCS

## 2024-05-10 PROCEDURE — 25010000002 HEPARIN LOCK FLUSH PER 10 UNITS

## 2024-05-10 PROCEDURE — C1788 PORT, INDWELLING, IMP: HCPCS

## 2024-05-10 PROCEDURE — 76937 US GUIDE VASCULAR ACCESS: CPT

## 2024-05-10 RX ORDER — FENTANYL CITRATE 50 UG/ML
INJECTION, SOLUTION INTRAMUSCULAR; INTRAVENOUS
Status: DISPENSED
Start: 2024-05-10 | End: 2024-05-11

## 2024-05-10 RX ORDER — HEPARIN SODIUM (PORCINE) LOCK FLUSH IV SOLN 100 UNIT/ML 100 UNIT/ML
SOLUTION INTRAVENOUS
Status: COMPLETED
Start: 2024-05-10 | End: 2024-05-10

## 2024-05-10 RX ORDER — LIDOCAINE AND PRILOCAINE 25; 25 MG/G; MG/G
CREAM TOPICAL
Qty: 30 G | Refills: 3 | Status: SHIPPED | OUTPATIENT
Start: 2024-05-10

## 2024-05-10 RX ORDER — SODIUM CHLORIDE 0.9 % (FLUSH) 0.9 %
10 SYRINGE (ML) INJECTION AS NEEDED
Status: DISCONTINUED | OUTPATIENT
Start: 2024-05-10 | End: 2024-05-11 | Stop reason: HOSPADM

## 2024-05-10 RX ORDER — SODIUM CHLORIDE 9 MG/ML
40 INJECTION, SOLUTION INTRAVENOUS AS NEEDED
Status: DISCONTINUED | OUTPATIENT
Start: 2024-05-10 | End: 2024-05-11 | Stop reason: HOSPADM

## 2024-05-10 RX ORDER — SODIUM CHLORIDE 0.9 % (FLUSH) 0.9 %
3 SYRINGE (ML) INJECTION EVERY 12 HOURS SCHEDULED
Status: DISCONTINUED | OUTPATIENT
Start: 2024-05-10 | End: 2024-05-11 | Stop reason: HOSPADM

## 2024-05-10 RX ORDER — MIDAZOLAM HYDROCHLORIDE 1 MG/ML
INJECTION INTRAMUSCULAR; INTRAVENOUS
Status: DISPENSED
Start: 2024-05-10 | End: 2024-05-11

## 2024-05-10 RX ADMIN — LIDOCAINE HYDROCHLORIDE 6 ML: 10; .005 INJECTION, SOLUTION EPIDURAL; INFILTRATION; INTRACAUDAL; PERINEURAL at 13:26

## 2024-05-10 RX ADMIN — HEPARIN 5 ML: 100 SYRINGE at 13:26

## 2024-05-11 ENCOUNTER — PATIENT MESSAGE (OUTPATIENT)
Dept: ONCOLOGY | Facility: CLINIC | Age: 84
End: 2024-05-11
Payer: MEDICARE

## 2024-05-15 ENCOUNTER — TELEPHONE (OUTPATIENT)
Dept: ONCOLOGY | Facility: CLINIC | Age: 84
End: 2024-05-15
Payer: MEDICARE

## 2024-05-15 NOTE — TELEPHONE ENCOUNTER
Call to Sheree to follow up from message this weekend.  Sheree reports she is feeling better but continues to have fatigue.  Instructed how to apply EMLA cream.  Explained she will be seeing Dr Melton on Friday.  Sheree states understood

## 2024-05-16 NOTE — TELEPHONE ENCOUNTER
From: Sheree Hernandez  To: Jorje Montenegro  Sent: 5/11/2024 8:40 PM EDT  Subject: Port    Dr Montenegro, I realize you aren’t in the office and I’m not sure where to go to ask a couple questions. I had a port put in on Friday. It was a hard night with lots of pain. Today is much better but I’m having chills (no fever) and feeling sick to my stomach with a slight headache. I’m supposed to be careful taking aspirin and was wondering if it’s ok to take. Would it affect my platelets? Thank you for any response

## 2024-05-17 ENCOUNTER — APPOINTMENT (OUTPATIENT)
Dept: ONCOLOGY | Facility: HOSPITAL | Age: 84
End: 2024-05-17
Payer: MEDICARE

## 2024-05-17 ENCOUNTER — HOSPITAL ENCOUNTER (OUTPATIENT)
Dept: ONCOLOGY | Facility: HOSPITAL | Age: 84
Discharge: HOME | End: 2024-05-17
Payer: MEDICARE

## 2024-05-17 ENCOUNTER — OFFICE VISIT (OUTPATIENT)
Dept: ONCOLOGY | Facility: CLINIC | Age: 84
End: 2024-05-17
Payer: MEDICARE

## 2024-05-17 VITALS
HEIGHT: 64 IN | HEART RATE: 84 BPM | WEIGHT: 158 LBS | BODY MASS INDEX: 26.98 KG/M2 | OXYGEN SATURATION: 98 % | DIASTOLIC BLOOD PRESSURE: 79 MMHG | SYSTOLIC BLOOD PRESSURE: 127 MMHG | TEMPERATURE: 97.1 F | RESPIRATION RATE: 20 BRPM

## 2024-05-17 DIAGNOSIS — C22.1 INTRAHEPATIC CHOLANGIOCARCINOMA: Primary | ICD-10-CM

## 2024-05-17 LAB
ALBUMIN SERPL-MCNC: 3.5 G/DL (ref 3.5–5.2)
ALBUMIN/GLOB SERPL: 1.3 G/DL
ALP SERPL-CCNC: 191 U/L (ref 39–117)
ALT SERPL W P-5'-P-CCNC: 15 U/L (ref 1–33)
ANION GAP SERPL CALCULATED.3IONS-SCNC: 10 MMOL/L (ref 5–15)
AST SERPL-CCNC: 20 U/L (ref 1–32)
BASOPHILS # BLD AUTO: 0.02 10*3/MM3 (ref 0–0.2)
BASOPHILS NFR BLD AUTO: 0.5 % (ref 0–1.5)
BILIRUB SERPL-MCNC: 0.3 MG/DL (ref 0–1.2)
BUN SERPL-MCNC: 13 MG/DL (ref 8–23)
BUN/CREAT SERPL: 17.8 (ref 7–25)
CALCIUM SPEC-SCNC: 8.4 MG/DL (ref 8.6–10.5)
CHLORIDE SERPL-SCNC: 104 MMOL/L (ref 98–107)
CO2 SERPL-SCNC: 24 MMOL/L (ref 22–29)
CREAT SERPL-MCNC: 0.73 MG/DL (ref 0.57–1)
DEPRECATED RDW RBC AUTO: 50.1 FL (ref 37–54)
EGFRCR SERPLBLD CKD-EPI 2021: 81.2 ML/MIN/1.73
EOSINOPHIL # BLD AUTO: 0.01 10*3/MM3 (ref 0–0.4)
EOSINOPHIL NFR BLD AUTO: 0.3 % (ref 0.3–6.2)
ERYTHROCYTE [DISTWIDTH] IN BLOOD BY AUTOMATED COUNT: 14.4 % (ref 12.3–15.4)
GLOBULIN UR ELPH-MCNC: 2.8 GM/DL
GLUCOSE SERPL-MCNC: 94 MG/DL (ref 65–99)
HCT VFR BLD AUTO: 33.1 % (ref 34–46.6)
HGB BLD-MCNC: 10.9 G/DL (ref 12–15.9)
IMM GRANULOCYTES # BLD AUTO: 0.23 10*3/MM3 (ref 0–0.05)
IMM GRANULOCYTES NFR BLD AUTO: 5.8 % (ref 0–0.5)
LYMPHOCYTES # BLD AUTO: 0.85 10*3/MM3 (ref 0.7–3.1)
LYMPHOCYTES NFR BLD AUTO: 21.4 % (ref 19.6–45.3)
MAGNESIUM SERPL-MCNC: 2.1 MG/DL (ref 1.6–2.4)
MCH RBC QN AUTO: 31.9 PG (ref 26.6–33)
MCHC RBC AUTO-ENTMCNC: 32.9 G/DL (ref 31.5–35.7)
MCV RBC AUTO: 96.8 FL (ref 79–97)
MONOCYTES # BLD AUTO: 1.23 10*3/MM3 (ref 0.1–0.9)
MONOCYTES NFR BLD AUTO: 30.9 % (ref 5–12)
NEUTROPHILS NFR BLD AUTO: 1.64 10*3/MM3 (ref 1.7–7)
NEUTROPHILS NFR BLD AUTO: 41.1 % (ref 42.7–76)
PLATELET # BLD AUTO: 200 10*3/MM3 (ref 140–450)
PMV BLD AUTO: 11.2 FL (ref 6–12)
POTASSIUM SERPL-SCNC: 4 MMOL/L (ref 3.5–5.2)
PROT SERPL-MCNC: 6.3 G/DL (ref 6–8.5)
RBC # BLD AUTO: 3.42 10*6/MM3 (ref 3.77–5.28)
SODIUM SERPL-SCNC: 138 MMOL/L (ref 136–145)
WBC NRBC COR # BLD AUTO: 3.98 10*3/MM3 (ref 3.4–10.8)

## 2024-05-17 PROCEDURE — 25010000002 GEMCITABINE 1 GM/26.3ML SOLUTION 26.3 ML VIAL: Performed by: INTERNAL MEDICINE

## 2024-05-17 PROCEDURE — 25810000003 SODIUM CHLORIDE 0.9 % SOLUTION: Performed by: INTERNAL MEDICINE

## 2024-05-17 PROCEDURE — 25010000002 FOSAPREPITANT PER 1 MG: Performed by: INTERNAL MEDICINE

## 2024-05-17 PROCEDURE — 25010000002 CISPLATIN PER 50 MG: Performed by: INTERNAL MEDICINE

## 2024-05-17 PROCEDURE — 25010000002 GEMCITABINE 200 MG/5.26ML SOLUTION 5.26 ML VIAL: Performed by: INTERNAL MEDICINE

## 2024-05-17 PROCEDURE — 3078F DIAST BP <80 MM HG: CPT | Performed by: INTERNAL MEDICINE

## 2024-05-17 PROCEDURE — 83735 ASSAY OF MAGNESIUM: CPT | Performed by: INTERNAL MEDICINE

## 2024-05-17 PROCEDURE — 99214 OFFICE O/P EST MOD 30 MIN: CPT | Performed by: INTERNAL MEDICINE

## 2024-05-17 PROCEDURE — 25010000002 DEXAMETHASONE SODIUM PHOSPHATE 100 MG/10ML SOLUTION: Performed by: INTERNAL MEDICINE

## 2024-05-17 PROCEDURE — 96367 TX/PROPH/DG ADDL SEQ IV INF: CPT

## 2024-05-17 PROCEDURE — 25810000003 SODIUM CHLORIDE 0.9 % SOLUTION 250 ML FLEX CONT: Performed by: INTERNAL MEDICINE

## 2024-05-17 PROCEDURE — 80053 COMPREHEN METABOLIC PANEL: CPT | Performed by: INTERNAL MEDICINE

## 2024-05-17 PROCEDURE — 96413 CHEMO IV INFUSION 1 HR: CPT

## 2024-05-17 PROCEDURE — 96375 TX/PRO/DX INJ NEW DRUG ADDON: CPT

## 2024-05-17 PROCEDURE — 96366 THER/PROPH/DIAG IV INF ADDON: CPT

## 2024-05-17 PROCEDURE — 25010000002 MAGNESIUM SULFATE PER 500 MG OF MAGNESIUM: Performed by: INTERNAL MEDICINE

## 2024-05-17 PROCEDURE — 25010000002 PALONOSETRON PER 25 MCG: Performed by: INTERNAL MEDICINE

## 2024-05-17 PROCEDURE — 25010000002 HEPARIN LOCK FLUSH PER 10 UNITS: Performed by: INTERNAL MEDICINE

## 2024-05-17 PROCEDURE — 25010000002 DURVALUMAB 50 MG/ML SOLUTION 10 ML VIAL: Performed by: INTERNAL MEDICINE

## 2024-05-17 PROCEDURE — 1126F AMNT PAIN NOTED NONE PRSNT: CPT | Performed by: INTERNAL MEDICINE

## 2024-05-17 PROCEDURE — 3074F SYST BP LT 130 MM HG: CPT | Performed by: INTERNAL MEDICINE

## 2024-05-17 PROCEDURE — 25010000002 POTASSIUM CHLORIDE PER 2 MEQ OF POTASSIUM: Performed by: INTERNAL MEDICINE

## 2024-05-17 PROCEDURE — 96417 CHEMO IV INFUS EACH ADDL SEQ: CPT

## 2024-05-17 PROCEDURE — 85025 COMPLETE CBC W/AUTO DIFF WBC: CPT | Performed by: INTERNAL MEDICINE

## 2024-05-17 RX ORDER — PALONOSETRON 0.05 MG/ML
0.25 INJECTION, SOLUTION INTRAVENOUS ONCE
Status: COMPLETED | OUTPATIENT
Start: 2024-05-17 | End: 2024-05-17

## 2024-05-17 RX ORDER — HEPARIN SODIUM (PORCINE) LOCK FLUSH IV SOLN 100 UNIT/ML 100 UNIT/ML
500 SOLUTION INTRAVENOUS AS NEEDED
Status: DISCONTINUED | OUTPATIENT
Start: 2024-05-17 | End: 2024-05-18 | Stop reason: HOSPADM

## 2024-05-17 RX ORDER — SODIUM CHLORIDE 9 MG/ML
20 INJECTION, SOLUTION INTRAVENOUS ONCE
Status: COMPLETED | OUTPATIENT
Start: 2024-05-17 | End: 2024-05-17

## 2024-05-17 RX ORDER — HEPARIN SODIUM (PORCINE) LOCK FLUSH IV SOLN 100 UNIT/ML 100 UNIT/ML
500 SOLUTION INTRAVENOUS AS NEEDED
OUTPATIENT
Start: 2024-05-17

## 2024-05-17 RX ADMIN — PALONOSETRON HYDROCHLORIDE 0.25 MG: 0.25 INJECTION, SOLUTION INTRAVENOUS at 11:30

## 2024-05-17 RX ADMIN — POTASSIUM CHLORIDE 500 ML: 2 INJECTION, SOLUTION, CONCENTRATE INTRAVENOUS at 14:09

## 2024-05-17 RX ADMIN — FOSAPREPITANT DIMEGLUMINE 150 MG: 150 INJECTION, POWDER, LYOPHILIZED, FOR SOLUTION INTRAVENOUS at 11:32

## 2024-05-17 RX ADMIN — HEPARIN 500 UNITS: 100 SYRINGE at 15:10

## 2024-05-17 RX ADMIN — GEMCITABINE HYDROCHLORIDE 1400 MG: 1 INJECTION, SOLUTION INTRAVENOUS at 12:07

## 2024-05-17 RX ADMIN — CISPLATIN 36 MG: 1 INJECTION, SOLUTION INTRAVENOUS at 12:52

## 2024-05-17 RX ADMIN — SODIUM CHLORIDE 1500 MG: 9 INJECTION, SOLUTION INTRAVENOUS at 09:09

## 2024-05-17 RX ADMIN — POTASSIUM CHLORIDE 500 ML: 2 INJECTION, SOLUTION, CONCENTRATE INTRAVENOUS at 10:25

## 2024-05-17 RX ADMIN — DEXAMETHASONE SODIUM PHOSPHATE 12 MG: 100 INJECTION INTRAMUSCULAR; INTRAVENOUS at 11:32

## 2024-05-17 RX ADMIN — SODIUM CHLORIDE 20 ML/HR: 9 INJECTION, SOLUTION INTRAVENOUS at 08:45

## 2024-05-17 NOTE — PROGRESS NOTES
Follow Up Office Visit      Date: 2024     Patient Name: Sheree Hernandez  MRN: [id number]  : 1940  Chief Complaint:  Follow-up for intrahepatic cholangiocarcinoma      History of Present Illness: Sheree Hernandez is a pleasant 79 y.o. female with a past medical history of hyperlipidemia and hypertension who presents today for evaluation of concern for hemochromatosis. The patient is accompanied by their self who contributes to the history of their care.  Patient states that over the past 2 years she has been having worsening fatigue especially with exertion.  Over the past several months this has become worse.  Patient states that she gets tired with basic activities including showering getting dressed in the morning and walking to and from her car from stores.  She states that her fatigue gets better after a few minutes of rest.  She has previously had an echocardiogram and cardiac catheterization within the past 2 years which did not reveal any cause of her fatigue with exertion.  She is also had an ultrasound of the liver which revealed stable cyst.  She was recently seen by her PCP who ordered iron studies which was notable for an elevated ferritin to 246.  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  All other mutations were negative.  She is currently up-to-date on her mammograms and colonoscopy with no active malignancies noted.  She is otherwise compliant with her statin and high blood pressure medicines.  Repeat abdominal imaging in 2021 concerning for enlarging liver lesion.  She was seen by Dr. Sharma and  is status post open right hepatectomy, cholecystectomy, right partial adrenalectomy on 2021.  Pathology showed a focal R1 resected margin.  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  0/4 lymph nodes were  positive for disease.  LVI and PNI were present.  Finished chemoXRT in January 2021.      Interval History:  Presents to clinic for follow-up.  Completed radiation in May 2023 and again in August 2023.  She has had cycle 1 of cisplatin/gemcitabine/nivolumab.  She was scheduled for port placement and this had to be delayed because her platelets had dropped to 8.  She received a platelet transfusion and had the port placed the following week.    She says that she felt pretty good for about 3 days after her treatment but once the steroids stopped she felt very fatigued.  She did not have much nausea.  She did have some constipation.  She has been eating and drinking pretty well.    Oncology History:    Oncology/Hematology History   Intrahepatic cholangiocarcinoma   8/17/2021 Initial Diagnosis    Intrahepatic cholangiocarcinoma (CMS/HCC)     8/17/2021 Cancer Staged    Staging form: Intrahepatic Bile Duct, AJCC 8th Edition  - Clinical: Stage IB (cT1b, cN0, cM0) - Signed by Jorje Montenegro MD on 8/17/2021 11/5/2021 Cancer Staged    Staging form: Intrahepatic Bile Duct, AJCC 8th Edition  - Pathologic: Stage IIIA (pT3, pN0, cM0) - Signed by Jorje Montenegro MD on 11/5/2021 12/9/2021 - 1/6/2022 Chemotherapy    OP GALLBLADDER Capecitabine + XRT     12/9/2021 - 1/19/2022 Radiation    Radiation OncologyTreatment Course:  Sheree Hernandez received 5040 cGy in 28 fractions to liver via External Beam Radiation - EBRT.     4/25/2023 - 5/8/2023 Radiation    Radiation OncologyTreatment Course:  Sheree Hernandez received 3500 cGy in 5 fractions to liver mass and lymph nodes via Stereotactic Radiation Therapy - SRT.     8/1/2023 - 8/11/2023 Radiation    Radiation OncologyTreatment Course:  Sheree Hernandez received 3500 cGy in 5 fractions to abdomen via Stereotactic Radiation Therapy - SRT.     1/9/2024 - 1/23/2024 Radiation    Radiation OncologyTreatment Course:  Sheree Hernandez received 3000 cGy in 5 fractions to abdomen  via Stereotactic Radiation Therapy - SRT.     1/11/2024 - 1/11/2024 Radiation    Radiation OncologyTreatment Course:  Sheree Hernandez received 754 cGy in 1 fractions to left lung via Stereotactic Radiation Therapy - SRT.     2/1/2024 - 2/1/2024 Radiation    Radiation OncologyTreatment Course:  Sheree Hernandez received 3000 cGy in 1 fractions to left lung via Stereotactic Radiation Therapy - SRT.     4/19/2024 -  Chemotherapy    OP HEPATOBILIARY CISplatin + Gemcitabine Days 1,8 / Durvalumab Q21D         Subjective      Review of Systems:   Constitutional: Negative for fevers, chills, or weight loss  Eyes: Negative for blurred vision or discharge         Ear/Nose/Throat: Negative for difficulty swallowing, sore throat, LAD                                                       Respiratory: Negative for cough, SOA, wheezing                                                                                        Cardiovascular: Negative for chest pain or palpitations                                                                  Gastrointestinal: Negative for nausea, vomiting or diarrhea                                                                     Genitourinary: Negative for dysuria or hematuria                                                                                           Musculoskeletal: Negative for any joint pains or muscle aches                                                                        Neurologic: Negative for any weakness, headaches, dizziness                                                                         Hematologic: Negative for any easy bleeding or bruising                                                                                   Psychiatric: Negative for anxiety or depression                          Past Medical History/Past Surgical History/ Family History/ Social History: Reviewed by me and unchanged from  previous documentation done on April 2024.      Medications:     Current Outpatient Medications:     Cholecalciferol (Vitamin D3) 25 MCG (1000 UT) capsule, Take  by mouth Daily., Disp: , Rfl:     coenzyme Q10 50 MG capsule capsule, Take  by mouth Daily., Disp: , Rfl:     Cyanocobalamin (VITAMIN B 12 PO), Take  by mouth., Disp: , Rfl:     dexAMETHasone (DECADRON) 1 MG tablet, Take 0.5 (one-half) tablet by mouth 2 Times a Day With Meals., Disp: 10 tablet, Rfl: 0    Folic Acid 0.8 MG capsule, Take  by mouth., Disp: , Rfl:     ipratropium (ATROVENT) 0.06 % nasal spray, Instill 2 sprays into both nostrils as directed by provider 2 (Two) Times a Day., Disp: 15 mL, Rfl: 6    ipratropium (ATROVENT) 0.06 % nasal spray, Administer 1 spray into each nostril twice daily, Disp: 30 mL, Rfl: 3    lidocaine-prilocaine (EMLA) 2.5-2.5 % cream, Please apply to port site 30 min prior to infusion and cover with Saran Wrap, Disp: 30 g, Rfl: 3    lidocaine-prilocaine (EMLA) 2.5-2.5 % cream, Apply to port site 30 min prior to infusion and cover in saran wrap, Disp: 30 g, Rfl: 3    magnesium oxide (MAG-OX) 400 MG tablet, Take 1 tablet by mouth Daily., Disp: , Rfl:     methylphenidate (RITALIN) 5 MG tablet, Take 0.5-1 tablet by mouth Every Morning and repeat 4 hours later., Disp: 60 tablet, Rfl: 0    OLANZapine (zyPREXA) 5 MG tablet, Take 1 tablet by mouth Every Night. Take on days 1, 2, 3, and 4 and Days 8, 9, 10, 11 after chemotherapy., Disp: 8 tablet, Rfl: 7    Omega-3 1000 MG capsule, Take  by mouth Daily., Disp: , Rfl:     ondansetron (ZOFRAN) 8 MG tablet, Take 1 tablet by mouth 3 (Three) Times a Day As Needed for Nausea or Vomiting., Disp: 30 tablet, Rfl: 5    rosuvastatin (CRESTOR) 10 MG tablet, Take 1 tablet by mouth Daily., Disp: 90 tablet, Rfl: 3    Turmeric Curcumin 500 MG capsule, Take 750 mg by mouth Daily., Disp: , Rfl:     Zinc 50 MG capsule, Take  by mouth Daily., Disp: , Rfl:     telmisartan (MICARDIS) 20 MG tablet, Take 1 tablet by mouth Daily. (Patient not taking:  "Reported on 5/17/2024), Disp: 90 tablet, Rfl: 3    Allergies:   Allergies   Allergen Reactions    Pravastatin Myalgia       Objective     Physical Exam:  Vital Signs:   Vitals:    05/17/24 0807   BP: 127/79  Comment: LUE   Pulse: 84   Resp: 20   Temp: 97.1 °F (36.2 °C)   TempSrc: Infrared   SpO2: 98%  Comment: RA   Weight: 71.7 kg (158 lb)   Height: 162.6 cm (64\")   PainSc: 0-No pain       Pain Score    05/17/24 0807   PainSc: 0-No pain       ECOG Performance Status: 0 - Asymptomatic    Constitutional: NAD, ECOG 0  Eyes: PERRLA, scleral anicteric  ENT: No LAD, no thyromegaly  Respiratory: CTAB, no wheezing, rales, rhonchi  Cardiovascular: RRR, no murmurs, pulses 2+ bilaterally  Abdomen: soft, NT/ND, no HSM  Musculoskeletal: strength 5/5 bilaterally, no c/c/e  Neurologic: A&O x 3, CN II-XII intact grossly    Results Review:   Hospital Outpatient Visit on 05/17/2024   Component Date Value Ref Range Status    Glucose 05/17/2024 94  65 - 99 mg/dL Final    BUN 05/17/2024 13  8 - 23 mg/dL Final    Creatinine 05/17/2024 0.73  0.57 - 1.00 mg/dL Final    Sodium 05/17/2024 138  136 - 145 mmol/L Final    Potassium 05/17/2024 4.0  3.5 - 5.2 mmol/L Final    Chloride 05/17/2024 104  98 - 107 mmol/L Final    CO2 05/17/2024 24.0  22.0 - 29.0 mmol/L Final    Calcium 05/17/2024 8.4 (L)  8.6 - 10.5 mg/dL Final    Total Protein 05/17/2024 6.3  6.0 - 8.5 g/dL Final    Albumin 05/17/2024 3.5  3.5 - 5.2 g/dL Final    ALT (SGPT) 05/17/2024 15  1 - 33 U/L Final    AST (SGOT) 05/17/2024 20  1 - 32 U/L Final    Alkaline Phosphatase 05/17/2024 191 (H)  39 - 117 U/L Final    Total Bilirubin 05/17/2024 0.3  0.0 - 1.2 mg/dL Final    Globulin 05/17/2024 2.8  gm/dL Final    Calculated Result    A/G Ratio 05/17/2024 1.3  g/dL Final    BUN/Creatinine Ratio 05/17/2024 17.8  7.0 - 25.0 Final    Anion Gap 05/17/2024 10.0  5.0 - 15.0 mmol/L Final    eGFR 05/17/2024 81.2  >60.0 mL/min/1.73 Final    Magnesium 05/17/2024 2.1  1.6 - 2.4 mg/dL Final    WBC " 05/17/2024 3.98  3.40 - 10.80 10*3/mm3 Final    RBC 05/17/2024 3.42 (L)  3.77 - 5.28 10*6/mm3 Final    Hemoglobin 05/17/2024 10.9 (L)  12.0 - 15.9 g/dL Final    Hematocrit 05/17/2024 33.1 (L)  34.0 - 46.6 % Final    MCV 05/17/2024 96.8  79.0 - 97.0 fL Final    MCH 05/17/2024 31.9  26.6 - 33.0 pg Final    MCHC 05/17/2024 32.9  31.5 - 35.7 g/dL Final    RDW 05/17/2024 14.4  12.3 - 15.4 % Final    RDW-SD 05/17/2024 50.1  37.0 - 54.0 fl Final    MPV 05/17/2024 11.2  6.0 - 12.0 fL Final    Platelets 05/17/2024 200  140 - 450 10*3/mm3 Final    Neutrophil % 05/17/2024 41.1 (L)  42.7 - 76.0 % Final    Lymphocyte % 05/17/2024 21.4  19.6 - 45.3 % Final    Monocyte % 05/17/2024 30.9 (H)  5.0 - 12.0 % Final    Eosinophil % 05/17/2024 0.3  0.3 - 6.2 % Final    Basophil % 05/17/2024 0.5  0.0 - 1.5 % Final    Immature Grans % 05/17/2024 5.8 (H)  0.0 - 0.5 % Final    Neutrophils, Absolute 05/17/2024 1.64 (L)  1.70 - 7.00 10*3/mm3 Final    Lymphocytes, Absolute 05/17/2024 0.85  0.70 - 3.10 10*3/mm3 Final    Monocytes, Absolute 05/17/2024 1.23 (H)  0.10 - 0.90 10*3/mm3 Final    Eosinophils, Absolute 05/17/2024 0.01  0.00 - 0.40 10*3/mm3 Final    Basophils, Absolute 05/17/2024 0.02  0.00 - 0.20 10*3/mm3 Final    Immature Grans, Absolute 05/17/2024 0.23 (H)  0.00 - 0.05 10*3/mm3 Final   Hospital Outpatient Visit on 05/10/2024   Component Date Value Ref Range Status    Protime 05/10/2024 13.2  12.2 - 14.5 Seconds Final    INR 05/10/2024 0.99  0.89 - 1.12 Final    WBC 05/10/2024 1.05 (C)  3.40 - 10.80 10*3/mm3 Final    RBC 05/10/2024 3.57 (L)  3.77 - 5.28 10*6/mm3 Final    Hemoglobin 05/10/2024 11.0 (L)  12.0 - 15.9 g/dL Final    Hematocrit 05/10/2024 32.8 (L)  34.0 - 46.6 % Final    MCV 05/10/2024 91.9  79.0 - 97.0 fL Final    MCH 05/10/2024 30.8  26.6 - 33.0 pg Final    MCHC 05/10/2024 33.5  31.5 - 35.7 g/dL Final    RDW 05/10/2024 13.2  12.3 - 15.4 % Final    RDW-SD 05/10/2024 44.5  37.0 - 54.0 fl Final    MPV 05/10/2024 12.2 (H)  6.0  - 12.0 fL Final    Platelets 05/10/2024 113 (L)  140 - 450 10*3/mm3 Final    Neutrophil % 05/10/2024 4.0 (L)  42.7 - 76.0 % Final    Lymphocyte % 05/10/2024 41.0  19.6 - 45.3 % Final    Monocyte % 05/10/2024 39.0 (H)  5.0 - 12.0 % Final    Eosinophil % 05/10/2024 8.0 (H)  0.3 - 6.2 % Final    Basophil % 05/10/2024 0.0  0.0 - 1.5 % Final    Bands %  05/10/2024 3.0  0.0 - 5.0 % Final    Atypical Lymphocyte % 05/10/2024 5.0  0.0 - 5.0 % Final    Neutrophils Absolute 05/10/2024 0.07 (L)  1.70 - 7.00 10*3/mm3 Final    Lymphocytes Absolute 05/10/2024 0.48 (L)  0.70 - 3.10 10*3/mm3 Final    Monocytes Absolute 05/10/2024 0.41  0.10 - 0.90 10*3/mm3 Final    Eosinophils Absolute 05/10/2024 0.08  0.00 - 0.40 10*3/mm3 Final    Basophils Absolute 05/10/2024 0.00  0.00 - 0.20 10*3/mm3 Final    nRBC 05/10/2024 0.0  0.0 - 0.2 /100 WBC Final    RBC Morphology 05/10/2024 Normal  Normal Final    WBC Morphology 05/10/2024 Normal  Normal Final    Platelet Morphology 05/10/2024 Normal  Normal Final   Hospital Outpatient Visit on 05/07/2024   Component Date Value Ref Range Status    ABO Type 05/07/2024 O   Final    RH type 05/07/2024 Positive   Final    Antibody Screen 05/07/2024 Negative   Final    T&S Expiration Date 05/07/2024 5/10/2024 11:59:59 PM   Final    Product Code 05/08/2024 H5575A33   Final    Unit Number 05/08/2024 [id number]   Final    UNIT  ABO 05/08/2024 A   Final    UNIT  RH 05/08/2024 POS   Final    Dispense Status 05/08/2024 PT   Final    Blood Expiration Date 05/08/2024 [id number]   Final    Blood Type Barcode 05/08/2024 6200   Final    ABO Type 05/07/2024 O   Final    RH type 05/07/2024 Positive   Final   Hospital Outpatient Visit on 05/06/2024   Component Date Value Ref Range Status    Protime 05/06/2024 12.3  12.2 - 14.5 Seconds Final    INR 05/06/2024 0.91  0.89 - 1.12 Final    WBC 05/06/2024 1.01 (C)  3.40 - 10.80 10*3/mm3 Final    RBC 05/06/2024 3.94  3.77 - 5.28 10*6/mm3 Final    Hemoglobin 05/06/2024  12.4  12.0 - 15.9 g/dL Final    Hematocrit 05/06/2024 36.8  34.0 - 46.6 % Final    MCV 05/06/2024 93.4  79.0 - 97.0 fL Final    MCH 05/06/2024 31.5  26.6 - 33.0 pg Final    MCHC 05/06/2024 33.7  31.5 - 35.7 g/dL Final    RDW 05/06/2024 12.9  12.3 - 15.4 % Final    RDW-SD 05/06/2024 44.5  37.0 - 54.0 fl Final    Platelets 05/06/2024 8 (C)  140 - 450 10*3/mm3 Final    Neutrophil % 05/06/2024 50.4  42.7 - 76.0 % Final    Lymphocyte % 05/06/2024 41.6  19.6 - 45.3 % Final    Monocyte % 05/06/2024 4.0 (L)  5.0 - 12.0 % Final    Eosinophil % 05/06/2024 3.0  0.3 - 6.2 % Final    Basophil % 05/06/2024 1.0  0.0 - 1.5 % Final    Immature Grans % 05/06/2024 0.0  0.0 - 0.5 % Final    Neutrophils, Absolute 05/06/2024 0.51 (L)  1.70 - 7.00 10*3/mm3 Final    Lymphocytes, Absolute 05/06/2024 0.42 (L)  0.70 - 3.10 10*3/mm3 Final    Monocytes, Absolute 05/06/2024 0.04 (L)  0.10 - 0.90 10*3/mm3 Final    Eosinophils, Absolute 05/06/2024 0.03  0.00 - 0.40 10*3/mm3 Final    Basophils, Absolute 05/06/2024 0.01  0.00 - 0.20 10*3/mm3 Final    Immature Grans, Absolute 05/06/2024 0.00  0.00 - 0.05 10*3/mm3 Final    nRBC 05/06/2024 0.0  0.0 - 0.2 /100 WBC Final       IR Port Placement    Result Date: 5/10/2024  Narrative: IR PORT PLACEMENT  History: chemotherapy  : South Kim MD.  Modality: Sonography and fluoroscopy  DOSE REDUCTION: The examination was performed according to departmental dose-optimization program which includes automated exposure control, adjustment of the mA and/or kV. Fluoro time: 0.1 Radiation dose: 0.1 mGy air Kerma.  SEDATION: Moderate sedation was administered. 1 milligram of Versed and 50 micrograms of fentanyl IV was used for moderate sedation. Total intra service time of sedation was 17 minutes. The sedation was administered and the patient's vital signs monitored throughout the procedure and recorded in the patient's medical record by the nurse under my direct supervision.  Anesthesia: Lidocaine  1% with epinephrine, local infiltration Medicines: None      Estimated blood loss:  < 5 cc.        Technique: A thorough discussion of the risks, benefits, and alternatives of the procedure, and if applicable, moderate sedation, was carried out with the patient. They were encouraged to ask any questions. Any questions were answered. They verbalized understanding. A written informed consent was then signed.  A multi-component timeout was performed prior to starting the procedure using the departmental protocol. The procedure room personnel used personal protective equipment. The operators used sterile gloves and if indicated, sterile gowns. The surgical site was prepped with chlorhexidine gluconate  and draped in the maximal applicable sterile fashion. A preliminary ultrasonogram was performed of the neck that revealed a patent and compressible internal jugular vein. Pertinent ultrasound images were stored in the PACS for documentation. Using aseptic precautions, real-time ultrasound guidance, the internal jugular vein was accessed with a micropuncture needle after local anesthetic infiltration. A 018 guidewire was advanced into the central venous system under fluoroscopic guidance. Over the wire a micropuncture sheath was placed. Through the micropuncture sheath, a 035 wire was advanced into the venous system under fluoroscopic guidance. Over the wire a peel-away sheath was placed. After local anesthesia, using sharp and blunt dissection, a reservoir pocket of appropriate size was created in the infra clavicular chest wall. The port catheter was connected to the port reservoir. The catheter was tunneled to the venotomy site using a  tunneling device. The the reservoir was placed in the reservoir pocket. The catheter was trimmed to an appropriate length. The catheter was advanced into the venous system through the peel-away sheath which was removed. The port aspirated and flushed well and was terminally packed with  heparin 100 units per cc, total 500 units. The port reservoir was irrigated with saline. The incision was closed in layers using absorbable suture and Dermabond. The venotomy site was closed using Dermabond. An aseptic dressing was applied using the protocol for Dermabond. The patient was transferred to the recovery area and was discharged from the department in stable condition.  Complications: None immediate.    Findings: Patent and compressible internal jugular vein. Final image shows the salomon catheter to be in good position with the catheter tip at the cavoatrial junction/right atrium, an excellent position for use. There is no immediate complication.      Impression: Impression:    Successful ultrasound and fluoroscopic guided right internal jugular vein route single lumen Port-A-Cath placement as described above. Thank you for the opportunity to assist in the care of your patient. Electronically Signed: South Kim MD  5/10/2024 2:26 PM EDT  Workstation ID: [id number]     Assessment / Plan      Assessment/Plan:   Intrahepatic cholangiocarcinoma (CMS/HCC) (Primary)  -Noted during her most recent hospitalization with CT scans concerning for an enlarging liver lesion to 7.3 cm that was previously noted to be hemangioma  -Triple phase CT concerning for a solid tumor lesion  -Biopsy consistent with an adenocarcinoma  -CA 19-9 elevated to 84.7  -CT chest as well as the rest of the CT abdomen/pelvis not concerning for distant or metastatic disease  -Status post open right hepatectomy, cholecystectomy, right partial adrenalectomy on 9/23/2021 with Dr. 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  0/4 lymph nodes were positive for disease.  LVI and PNI were present.  Focal R1 resection margin  -Discussed with patient and her daughter that she would benefit from adjuvant chemoXRT using Xeloda.  Discussed side  effects including but not limited to immunosuppression, diarrhea, abdominal pain, nausea, vomiting, hand/foot syndrome  -Repeat CA 19-9 (22) in November 2021  -Completed chemo XRT with Xeloda in January 2021  -Repeat scans in March 2022 without any evidence of recurrent or metastatic disease  -Repeat CT C/A/P in June 2022 without evidence of recurrent or metastatic disease.  Did note some IVC stenosis which we will monitor with her next scans  -Repeat CT C/A/P in September 2022 reviewed without evidence of recurrent disease or metastatic disease.  IVC stenosis overall stable  -Repeat CT C/A/P in December 2022 reviewed without evidence of recurrent or metastatic disease.  CA 19-9 within normal limits  -Repeat CT C/A/P in March 2023 reviewed and notable for some slight enlarging retroperitoneal adenopathy.  CA 19-9 within normal limits  -PET/CT concerning for 1 cm left liver lesion that was only slightly PET avid as well as 2 lymph nodes that were also slightly PET avid  -Previously discussed her case at tumor board and with Dr. Sharma we agreed that she likely had disease progression  -Status post radiation completed in May 2023  -CA 19-9 in June 2022 within normal limits.    -CT C/A/P in July 2023 reviewed and showed some interval decrease in some lymph nodes as well as some slight enlargement of a couple lymph nodes.  -Completed radiation in August 2023  -CT C/A/P in October 2023 reviewed and only notable for slight increase in some pulmonary nodules about 1 mm each  -CT C/A/P December 2023 reviewed and notable for enlargement of her aortocaval lymph node.  CA 19-9 stable  -Completed radiation with Dr. Billings in February 2024  -CT C/A/P in April 2024 reviewed and showing continued slight enlargement of multiple pulmonary nodules as well as a liver lesion.  CA 19-9 stable  -s/p cycle 1 cisplatin/gemcitabine/durvalumab on 4/19/24.  Complicated by severe neutropenia and thrombocytopenia, ANC 70, platelets 8.  I will  reduce her dose to 80% for cycle 2.  We will proceed with treatment today pending renal function testing.    Hemochromatosis carrier  -Noted on recent labs with an elevated ferritin to 246 hemochromatosis gene profile positive for heterozygosity of H63D.  Other genes negative.  Given patient's age and previous cardiac liver work-up showing no evidence of dysfunction, it is unlikely that she has had iron deposition all this time.  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  Treatment as above  -ECHO in July 2020 within normal limits  -Repeat iron studies in April 2021 stable with a ferritin of 229, iron level 100, transferrin saturation 27%  -Low concern for iron overload at this time.      Thyroid nodule  -Incidentally noted on CT scan but enlarging from previous CT  -Thyroid ultrasound in June 2022 only notable for goiter and small nodules nonconcerning for malignancy  -Has been seen by endocrinology with plan for repeat ultrasound in the summer of next year     Other fatigue   -Continued at this time  -Previously checked iron studies, vitamin B12, folate, thyroid studies within normal limits  -Was previously been seen by cardiology with a normal ECHO and stress test  -Likely related to all her cancer related treatments  -Advised patient to continue activity as tolerated         Follow Up:   Follow-up in 3 weeks.  Will defer scheduling of imaging to Dr. Montenegro.     Luanne Melton MD      Please note that portions of this note may have been completed with a voice recognition program. Efforts were made to edit the dictations, but occasionally words are mistranscribed.

## 2024-05-19 NOTE — PROGRESS NOTES
Subjective:     Encounter Date:05/21/2024      Patient ID: Sheree Hernandez is a 83 y.o.  white female from [place], [place].     PHYSICIAN: Timi Conway DO.  CARDIOLOGIST: Carlos Kay MD  ONCOLOGIST: Jorje Montenegro MD    Chief Complaint:   Chief Complaint   Patient presents with    Shortness of Breath     Shortness of breath for 7 years.     Problem List:  Hypertension  Abnormal stress test-data deficit  Left heart catheterization 2019: RCA 20% stenosis, otherwise normal coronaries, EF 60%  Echocardiogram 7/24/2020: EF 65%  Exercise stress test 1/4/2023: LVEF greater than 70%, normal myocardial perfusion study with no evidence of ischemia, CT portion of the exam with no significant coronary artery calcification.  Incidentally detected dilated loops of bowel noted, surgical clips noted in the liver.  Echocardiogram 1/13/2023: LVEF 61-65%, mild calcification of the aortic valve, hypertrophy of the intra-atrial septum noted, IVSD 0.8 cm  Residual class I symptoms  Hyperlipidemia; intolerant to pravastatin  Intrahepatic cholangiocarcinoma   Status post right hepatectomy cholecystectomy right partial adrenalectomy   Status post chemotherapy and radiation, patient completed Xeloda  CT chest/abdomen/pelvis July 2023 showing mild increase in retroperitoneal lymph nodes, and new small 6mm LLL nodule and minimal enlargement of nodule in RML. Additional numerous tiny pulmonary nodules stable. CT concerning for metastatic disease  Completed radiation August 2023  Completed radiation February 2024  CT chest/abdomen/pelvis April 2024: Continued slight enlargement of multiple pulmonary nodules as well as liver lesion, CA 19-9 stable  Status post cycle 1 cisplatin/gemcitabine/durvalumab on 4/19/2024, complicated by severe neutropenia and thrombocytopenia, ANC 70, platelets 8, dose reduced 8% for cycle 2.  Hemochromatosis carrier  Obstructive sleep apnea, not on CPAP  Thyroid nodule  PVCs with Holter  monitor June 2021 showing occasional PACs, no atrial arrhythmias, 5 episodes of SVT  COVID November 2023.    Allergies   Allergen Reactions    Pravastatin Myalgia       Current Outpatient Medications   Medication Instructions    Cholecalciferol (Vitamin D3) 25 MCG (1000 UT) capsule Oral, Daily    coenzyme Q10 50 MG capsule capsule Oral, Daily    Cyanocobalamin (VITAMIN B 12 PO) Oral    dexAMETHasone (DECADRON) 1 MG tablet Take 0.5 (one-half) tablet by mouth 2 Times a Day With Meals.    Folic Acid 0.8 MG capsule Oral    ipratropium (ATROVENT) 0.06 % nasal spray Instill 2 sprays into both nostrils as directed by provider 2 (Two) Times a Day.    ipratropium (ATROVENT) 0.06 % nasal spray Administer 1 spray into each nostril twice daily    lidocaine-prilocaine (EMLA) 2.5-2.5 % cream Please apply to port site 30 min prior to infusion and cover with Saran Wrap    magnesium oxide (MAG-OX) 400 mg, Oral, Daily    OLANZapine (ZYPREXA) 5 mg, Oral, Nightly, Take on days 1, 2, 3, and 4 and Days 8, 9, 10, 11 after chemotherapy.    Omega-3 1000 MG capsule Oral, Daily    rosuvastatin (CRESTOR) 10 mg, Oral, Daily         HISTORY OF PRESENT ILLNESS:  The patient is here for an 8-month follow-up. She had an acceptable stress test and echocardiogram in January 2023.  She had a CT chest/abdomen/pelvis July 2023 showing mild increase in retroperitoneal lymph nodes, and new small 6mm LLL nodule and minimal enlargement of nodule in RML. Additional numerous tiny pulmonary nodules stable. CT concerning for metastatic disease.  At her last appointment she was started on telmisartan 20 mg daily but she says that she has not been taking it lately.  She says that most the time her blood pressure readings have been in the 120 systolic.  She says the highest her blood pressure has been is in the low 150s systolic this is not all the time.  She will log her blood pressures for the next 1-2 weeks and call back with readings.  In the interim the  "patient had COVID in November 2023.  She saw oncology May 2024 with complaints of worsening fatigue with exertion that was felt to possibly be related to all her cancer related treatments but she is concerned that it is due to her heart.  She is status post cycle 1 cisplatin/gemcitabine/durvalumab on 4/19/2024, complicated by severe neutropenia and thrombocytopenia, ANC 70, platelets 8, dose reduced 8% for cycle 2.  She says that she has had shortness of breath for 7 years but feels like it may be getting a little worse lately.  She had a heart catheterization 2019 showing overall normal coronaries except 20% plaque in the RCA, normal EF.  She says that she since she started this new chemotherapy, that she has noticed a little bit of puffiness and erythema where she has had IVs placed at and on Friday she will address this with her oncologist.  The patient has also noticed some pedal edema.  She has a scale that she can weigh herself on we will start weighing herself daily and let me know she is getting more than 2-3 pounds per day or 5 pounds per week.  She denies any chest pain, palpitations, presyncope, or syncope.  She will try to limit her salt and try compression stockings.      ROS   All other systems reviewed and otherwise negative.    Procedures       Objective:       Vitals:    05/21/24 1105 05/21/24 1110 05/21/24 1146   BP: 158/78 152/78 150/70   BP Location: Right arm Right arm Right arm   Patient Position: Sitting Standing Sitting   Cuff Size: Adult Adult    Pulse: 96     SpO2: 99% 98%    Weight: 74.1 kg (163 lb 6.4 oz)     Height: 162.6 cm (64.02\")       Body mass index is 28.03 kg/m².  Wt Readings from Last 2 Encounters:   05/21/24 74.1 kg (163 lb 6.4 oz)   05/17/24 71.7 kg (158 lb)        Constitutional:       Appearance: Healthy appearance. Not in distress.   Neck:      Vascular: No JVR. JVD normal.   Pulmonary:      Effort: Pulmonary effort is normal.      Breath sounds: Normal breath sounds. No " wheezing. No rhonchi. No rales.   Chest:      Chest wall: Not tender to palpatation.   Cardiovascular:      PMI at left midclavicular line. Normal rate. Regular rhythm. Normal S1. Normal S2.       Murmurs: There is no murmur.      No gallop.  No click. No rub.   Pulses:     Intact distal pulses.   Edema:     Ankle: bilateral 1+ edema of the ankle.     Feet: bilateral 1+ edema of the feet.  Abdominal:      General: Bowel sounds are normal.      Palpations: Abdomen is soft.      Tenderness: There is no abdominal tenderness.   Musculoskeletal: Normal range of motion.         General: No tenderness. Skin:     General: Skin is warm and dry.   Neurological:      General: No focal deficit present.      Mental Status: Alert and oriented to person, place and time.           Lab Review:   Lab Results   Component Value Date    GLUCOSE 94 05/17/2024    BUN 13 05/17/2024    CREATININE 0.73 05/17/2024    EGFRIFNONA >60 03/17/2022    EGFRIFAFRI >60 03/17/2022    BCR 17.8 05/17/2024    CO2 24.0 05/17/2024    CALCIUM 8.4 (L) 05/17/2024    ALBUMIN 3.5 05/17/2024    AST 20 05/17/2024    ALT 15 05/17/2024       Lab Results   Component Value Date    WBC 3.98 05/17/2024    HGB 10.9 (L) 05/17/2024    HCT 33.1 (L) 05/17/2024    MCV 96.8 05/17/2024     05/17/2024       Lab Results   Component Value Date    HGBA1C 5.60 08/07/2023       Lab Results   Component Value Date    TSH 2.490 04/18/2024       Lab Results   Component Value Date    CHOL 207 (H) 08/07/2023    CHOL 186 06/02/2022     Lab Results   Component Value Date    TRIG 96 08/07/2023    TRIG 74 06/02/2022     Lab Results   Component Value Date    HDL 79 (H) 08/07/2023    HDL 82 (H) 06/02/2022     Lab Results   Component Value Date     (H) 08/07/2023    LDL 90 06/02/2022           Lab Results   Component Value Date    BNP 58.0 10/24/2018                 Results for orders placed during the hospital encounter of 01/13/23    Adult Transthoracic Echo Complete W/ Cont if  Necessary Per Protocol    Interpretation Summary    Left ventricular ejection fraction appears to be 61 - 65%.    Left ventricular diastolic function was indeterminate    There is mild calcification of the aortic valve.    There is hypertrophy of the interatrial septum noted.       Results for orders placed during the hospital encounter of 11/16/18    Duplex Venous Lower Extremity - Bilateral CAR    Interpretation Summary  · No evidence of deep or superficial venous thrombosis of the right or left lower extremities.       Advance Care Planning   ACP discussion was held with the patient during this visit. Patient has an advance directive (not in EMR), copy requested.      Assessment:    Patient says that she has had shortness of breath on exertion for 7 years.  She had a heart catheterization 2019 showing overall normal coronaries other than 20% plaque in the RCA with normal EF.  She had an acceptable stress test and echocardiogram in January 2023.  On echocardiogram she demonstrated hypertrophy of the intra-atrial septum noted, IVSD 0.8 cm and I will assess this on next echo.  She is status post cycle 1 cisplatin/gemcitabine/durvalumab on 4/19/2024 for treatment of intrahepatic cholangiocarcinoma, complicated by severe neutropenia and thrombocytopenia, ANC 70, platelets 8, dose reduced 8% for cycle 2.  Will order echocardiogram to reassess heart structure/function/IVSd and proBNP, troponin.  May consider LHC if symptoms do not improve.  Diuretics (loops, thiazides, MRA) have drug-drug interaction with cisplatin by increasing levels of cisplatin).  Patient's blood pressure was elevated in office today but she says most the time it is in the 120s systolic.  She will log this for the next 1-2 weeks and call back with readings.  She has telmisartan 20 mg daily at home.  The patient will also start weighing herself daily.     Diagnosis Plan   1. Dyspnea on exertion  proBNP, troponin, echocardiogram      2. Primary  hypertension  Patient's blood pressure was elevated in office today but she says most the time it is in the 120s systolic.  She will log this for the next 1-2 weeks and call back with readings.  She has telmisartan 20 mg daily at home.        3. Intrahepatic cholangiocarcinoma  She is status post cycle 1 cisplatin/gemcitabine/durvalumab on 4/19/2024 for treatment of intrahepatic cholangiocarcinoma, complicated by severe neutropenia and thrombocytopenia, ANC 70, platelets 8, dose reduced 8% for cycle 2.              Plan:         Patient to continue current medications and close follow up with the above providers.  Tentative cardiology follow up in September 2024 with [facility] or patient may return sooner PRN.  proBNP, troponin when she has her labs drawn on Friday  Echocardiogram  Patient will start weighing herself daily and let me know if she is gaining more than 2-3 pounds in a day or more than 5 pounds in a week  Patient to monitor her blood pressure at home for the next 1-2 weeks and let me know what her readings are.  She says most the time her blood pressures are in the 120s but occasionally it will be elevated.    Electronically signed by LEDY Hayes, 05/21/24, 11:58 AM EDT.

## 2024-05-21 ENCOUNTER — OFFICE VISIT (OUTPATIENT)
Dept: CARDIOLOGY | Facility: CLINIC | Age: 84
End: 2024-05-21
Payer: MEDICARE

## 2024-05-21 VITALS
OXYGEN SATURATION: 98 % | HEIGHT: 64 IN | SYSTOLIC BLOOD PRESSURE: 150 MMHG | BODY MASS INDEX: 27.9 KG/M2 | DIASTOLIC BLOOD PRESSURE: 70 MMHG | WEIGHT: 163.4 LBS | HEART RATE: 96 BPM

## 2024-05-21 DIAGNOSIS — C22.1 INTRAHEPATIC CHOLANGIOCARCINOMA: ICD-10-CM

## 2024-05-21 DIAGNOSIS — R06.09 DYSPNEA ON EXERTION: Primary | ICD-10-CM

## 2024-05-21 DIAGNOSIS — I10 PRIMARY HYPERTENSION: ICD-10-CM

## 2024-05-21 PROCEDURE — 1159F MED LIST DOCD IN RCRD: CPT | Performed by: NURSE PRACTITIONER

## 2024-05-21 PROCEDURE — 3077F SYST BP >= 140 MM HG: CPT | Performed by: NURSE PRACTITIONER

## 2024-05-21 PROCEDURE — 3078F DIAST BP <80 MM HG: CPT | Performed by: NURSE PRACTITIONER

## 2024-05-21 PROCEDURE — 1160F RVW MEDS BY RX/DR IN RCRD: CPT | Performed by: NURSE PRACTITIONER

## 2024-05-21 PROCEDURE — 99214 OFFICE O/P EST MOD 30 MIN: CPT | Performed by: NURSE PRACTITIONER

## 2024-05-22 ENCOUNTER — HOSPITAL ENCOUNTER (OUTPATIENT)
Dept: CARDIOLOGY | Facility: HOSPITAL | Age: 84
Discharge: HOME | End: 2024-05-22
Admitting: NURSE PRACTITIONER
Payer: MEDICARE

## 2024-05-22 LAB
BH CV ECHO LEFT VENTRICLE GLOBAL LONGITUDINAL STRAIN: -19.5 %
BH CV ECHO MEAS - AO MAX PG: 7.8 MMHG
BH CV ECHO MEAS - AO MEAN PG: 4.2 MMHG
BH CV ECHO MEAS - AO ROOT DIAM: 2.7 CM
BH CV ECHO MEAS - AO V2 MAX: 140 CM/SEC
BH CV ECHO MEAS - AO V2 VTI: 26 CM
BH CV ECHO MEAS - IVS/LVPW: 1.16 CM
BH CV ECHO MEAS - IVSD: 1.07 CM
BH CV ECHO MEAS - LA DIMENSION: 2.6 CM
BH CV ECHO MEAS - LV MAX PG: 4.4 MMHG
BH CV ECHO MEAS - LV MEAN PG: 1.6 MMHG
BH CV ECHO MEAS - LV V1 MAX: 105 CM/SEC
BH CV ECHO MEAS - LV V1 VTI: 19 CM
BH CV ECHO MEAS - LVIDD: 3.4 CM
BH CV ECHO MEAS - LVIDS: 2.9 CM
BH CV ECHO MEAS - LVOT DIAM: 2 CM
BH CV ECHO MEAS - LVPWD: 0.92 CM
BH CV ECHO MEAS - MV A MAX VEL: 132 CM/SEC
BH CV ECHO MEAS - MV E MAX VEL: 84 CM/SEC
BH CV ECHO MEAS - MV E/A: 0.64
BH CV ECHO MEAS - MV MAX PG: 7.6 MMHG
BH CV ECHO MEAS - MV MEAN PG: 2.5 MMHG
BH CV ECHO MEAS - MV V2 VTI: 28.3 CM
BH CV ECHO MEAS - PA ACC TIME: 0.08 SEC
BH CV ECHO MEAS - TAPSE (>1.6): 2.04 CM
BH CV XLRA - RV BASE: 3.3 CM
BH CV XLRA - RV LENGTH: 6.26 CM
BH CV XLRA - RV MID: 3.2 CM
BH CV XLRA - TDI S': 17 CM/SEC
LEFT ATRIUM VOLUME INDEX: 25.9 ML/M2

## 2024-05-22 PROCEDURE — 93306 TTE W/DOPPLER COMPLETE: CPT

## 2024-05-23 DIAGNOSIS — J90 PLEURAL EFFUSION: Primary | ICD-10-CM

## 2024-05-24 ENCOUNTER — HOSPITAL ENCOUNTER (OUTPATIENT)
Dept: GENERAL RADIOLOGY | Facility: HOSPITAL | Age: 84
Discharge: HOME | End: 2024-05-24
Payer: MEDICARE

## 2024-05-24 ENCOUNTER — APPOINTMENT (OUTPATIENT)
Dept: ONCOLOGY | Facility: HOSPITAL | Age: 84
End: 2024-05-24
Payer: MEDICARE

## 2024-05-24 ENCOUNTER — HOSPITAL ENCOUNTER (OUTPATIENT)
Dept: ONCOLOGY | Facility: HOSPITAL | Age: 84
Discharge: HOME | End: 2024-05-24
Payer: MEDICARE

## 2024-05-24 ENCOUNTER — DOCUMENTATION (OUTPATIENT)
Dept: OTHER | Facility: HOSPITAL | Age: 84
End: 2024-05-24
Payer: MEDICARE

## 2024-05-24 VITALS
WEIGHT: 159.5 LBS | RESPIRATION RATE: 18 BRPM | BODY MASS INDEX: 27.36 KG/M2 | HEART RATE: 101 BPM | SYSTOLIC BLOOD PRESSURE: 167 MMHG | TEMPERATURE: 98.3 F | DIASTOLIC BLOOD PRESSURE: 70 MMHG

## 2024-05-24 DIAGNOSIS — R06.09 DYSPNEA ON EXERTION: ICD-10-CM

## 2024-05-24 DIAGNOSIS — C22.1 INTRAHEPATIC CHOLANGIOCARCINOMA: Primary | ICD-10-CM

## 2024-05-24 DIAGNOSIS — R91.1 LUNG NODULE: ICD-10-CM

## 2024-05-24 DIAGNOSIS — R06.09 DYSPNEA ON EXERTION: Primary | ICD-10-CM

## 2024-05-24 DIAGNOSIS — J90 PLEURAL EFFUSION: ICD-10-CM

## 2024-05-24 LAB
ALBUMIN SERPL-MCNC: 3.4 G/DL (ref 3.5–5.2)
ALBUMIN/GLOB SERPL: 1.3 G/DL
ALP SERPL-CCNC: 250 U/L (ref 39–117)
ALT SERPL W P-5'-P-CCNC: 35 U/L (ref 1–33)
ANION GAP SERPL CALCULATED.3IONS-SCNC: 10 MMOL/L (ref 5–15)
AST SERPL-CCNC: 27 U/L (ref 1–32)
BASOPHILS # BLD AUTO: 0.01 10*3/MM3 (ref 0–0.2)
BASOPHILS NFR BLD AUTO: 0.2 % (ref 0–1.5)
BILIRUB SERPL-MCNC: 0.3 MG/DL (ref 0–1.2)
BUN SERPL-MCNC: 21 MG/DL (ref 8–23)
BUN/CREAT SERPL: 22.8 (ref 7–25)
CALCIUM SPEC-SCNC: 8.9 MG/DL (ref 8.6–10.5)
CHLORIDE SERPL-SCNC: 103 MMOL/L (ref 98–107)
CO2 SERPL-SCNC: 25 MMOL/L (ref 22–29)
CREAT SERPL-MCNC: 0.92 MG/DL (ref 0.57–1)
DEPRECATED RDW RBC AUTO: 49.6 FL (ref 37–54)
EGFRCR SERPLBLD CKD-EPI 2021: 61.5 ML/MIN/1.73
EOSINOPHIL # BLD AUTO: 0 10*3/MM3 (ref 0–0.4)
EOSINOPHIL NFR BLD AUTO: 0 % (ref 0.3–6.2)
ERYTHROCYTE [DISTWIDTH] IN BLOOD BY AUTOMATED COUNT: 14.3 % (ref 12.3–15.4)
GLOBULIN UR ELPH-MCNC: 2.7 GM/DL
GLUCOSE SERPL-MCNC: 94 MG/DL (ref 65–99)
HCT VFR BLD AUTO: 31.8 % (ref 34–46.6)
HGB BLD-MCNC: 10.6 G/DL (ref 12–15.9)
IMM GRANULOCYTES # BLD AUTO: 0.2 10*3/MM3 (ref 0–0.05)
IMM GRANULOCYTES NFR BLD AUTO: 4.4 % (ref 0–0.5)
LYMPHOCYTES # BLD AUTO: 0.77 10*3/MM3 (ref 0.7–3.1)
LYMPHOCYTES NFR BLD AUTO: 17.1 % (ref 19.6–45.3)
MAGNESIUM SERPL-MCNC: 1.9 MG/DL (ref 1.6–2.4)
MCH RBC QN AUTO: 31.9 PG (ref 26.6–33)
MCHC RBC AUTO-ENTMCNC: 33.3 G/DL (ref 31.5–35.7)
MCV RBC AUTO: 95.8 FL (ref 79–97)
MONOCYTES # BLD AUTO: 0.61 10*3/MM3 (ref 0.1–0.9)
MONOCYTES NFR BLD AUTO: 13.6 % (ref 5–12)
NEUTROPHILS NFR BLD AUTO: 2.91 10*3/MM3 (ref 1.7–7)
NEUTROPHILS NFR BLD AUTO: 64.7 % (ref 42.7–76)
NT-PROBNP SERPL-MCNC: 501.9 PG/ML (ref 0–1800)
PLATELET # BLD AUTO: 137 10*3/MM3 (ref 140–450)
PMV BLD AUTO: 12.4 FL (ref 6–12)
POTASSIUM SERPL-SCNC: 4.4 MMOL/L (ref 3.5–5.2)
PROT SERPL-MCNC: 6.1 G/DL (ref 6–8.5)
RBC # BLD AUTO: 3.32 10*6/MM3 (ref 3.77–5.28)
SODIUM SERPL-SCNC: 138 MMOL/L (ref 136–145)
TROPONIN T SERPL HS-MCNC: 22 NG/L
WBC NRBC COR # BLD AUTO: 4.5 10*3/MM3 (ref 3.4–10.8)

## 2024-05-24 PROCEDURE — 96375 TX/PRO/DX INJ NEW DRUG ADDON: CPT

## 2024-05-24 PROCEDURE — 96366 THER/PROPH/DIAG IV INF ADDON: CPT

## 2024-05-24 PROCEDURE — 25010000002 CISPLATIN PER 50 MG: Performed by: INTERNAL MEDICINE

## 2024-05-24 PROCEDURE — 80053 COMPREHEN METABOLIC PANEL: CPT | Performed by: INTERNAL MEDICINE

## 2024-05-24 PROCEDURE — 25010000002 PALONOSETRON PER 25 MCG: Performed by: INTERNAL MEDICINE

## 2024-05-24 PROCEDURE — 96413 CHEMO IV INFUSION 1 HR: CPT

## 2024-05-24 PROCEDURE — 71046 X-RAY EXAM CHEST 2 VIEWS: CPT

## 2024-05-24 PROCEDURE — 25810000003 SODIUM CHLORIDE 0.9 % SOLUTION 250 ML FLEX CONT: Performed by: INTERNAL MEDICINE

## 2024-05-24 PROCEDURE — 83880 ASSAY OF NATRIURETIC PEPTIDE: CPT | Performed by: NURSE PRACTITIONER

## 2024-05-24 PROCEDURE — 25010000002 MAGNESIUM SULFATE PER 500 MG OF MAGNESIUM: Performed by: INTERNAL MEDICINE

## 2024-05-24 PROCEDURE — 85025 COMPLETE CBC W/AUTO DIFF WBC: CPT | Performed by: INTERNAL MEDICINE

## 2024-05-24 PROCEDURE — 25010000002 FOSAPREPITANT PER 1 MG: Performed by: INTERNAL MEDICINE

## 2024-05-24 PROCEDURE — 25010000002 GEMCITABINE 1 GM/26.3ML SOLUTION 26.3 ML VIAL: Performed by: INTERNAL MEDICINE

## 2024-05-24 PROCEDURE — 25810000003 SODIUM CHLORIDE 0.9 % SOLUTION: Performed by: INTERNAL MEDICINE

## 2024-05-24 PROCEDURE — 25010000002 POTASSIUM CHLORIDE PER 2 MEQ OF POTASSIUM: Performed by: INTERNAL MEDICINE

## 2024-05-24 PROCEDURE — 84484 ASSAY OF TROPONIN QUANT: CPT | Performed by: NURSE PRACTITIONER

## 2024-05-24 PROCEDURE — 25010000002 HEPARIN LOCK FLUSH PER 10 UNITS: Performed by: INTERNAL MEDICINE

## 2024-05-24 PROCEDURE — 83735 ASSAY OF MAGNESIUM: CPT | Performed by: INTERNAL MEDICINE

## 2024-05-24 PROCEDURE — 25010000002 DEXAMETHASONE SODIUM PHOSPHATE 100 MG/10ML SOLUTION: Performed by: INTERNAL MEDICINE

## 2024-05-24 PROCEDURE — 25010000002 GEMCITABINE 200 MG/5.26ML SOLUTION 5.26 ML VIAL: Performed by: INTERNAL MEDICINE

## 2024-05-24 PROCEDURE — 96367 TX/PROPH/DG ADDL SEQ IV INF: CPT

## 2024-05-24 PROCEDURE — 96417 CHEMO IV INFUS EACH ADDL SEQ: CPT

## 2024-05-24 RX ORDER — PALONOSETRON 0.05 MG/ML
0.25 INJECTION, SOLUTION INTRAVENOUS ONCE
Status: COMPLETED | OUTPATIENT
Start: 2024-05-24 | End: 2024-05-24

## 2024-05-24 RX ORDER — HEPARIN SODIUM (PORCINE) LOCK FLUSH IV SOLN 100 UNIT/ML 100 UNIT/ML
500 SOLUTION INTRAVENOUS AS NEEDED
Status: DISCONTINUED | OUTPATIENT
Start: 2024-05-24 | End: 2024-05-25 | Stop reason: HOSPADM

## 2024-05-24 RX ORDER — DOXYCYCLINE HYCLATE 100 MG/1
100 CAPSULE ORAL 2 TIMES DAILY
Qty: 14 CAPSULE | Refills: 0 | Status: SHIPPED | OUTPATIENT
Start: 2024-05-24

## 2024-05-24 RX ORDER — HEPARIN SODIUM (PORCINE) LOCK FLUSH IV SOLN 100 UNIT/ML 100 UNIT/ML
500 SOLUTION INTRAVENOUS AS NEEDED
OUTPATIENT
Start: 2024-05-24

## 2024-05-24 RX ORDER — SODIUM CHLORIDE 9 MG/ML
20 INJECTION, SOLUTION INTRAVENOUS ONCE
Status: COMPLETED | OUTPATIENT
Start: 2024-05-24 | End: 2024-05-24

## 2024-05-24 RX ADMIN — FOSAPREPITANT DIMEGLUMINE 150 MG: 150 INJECTION, POWDER, LYOPHILIZED, FOR SOLUTION INTRAVENOUS at 10:10

## 2024-05-24 RX ADMIN — POTASSIUM CHLORIDE 500 ML: 2 INJECTION, SOLUTION, CONCENTRATE INTRAVENOUS at 13:07

## 2024-05-24 RX ADMIN — PALONOSETRON HYDROCHLORIDE 0.25 MG: 0.25 INJECTION INTRAVENOUS at 10:09

## 2024-05-24 RX ADMIN — DEXAMETHASONE SODIUM PHOSPHATE 12 MG: 100 INJECTION INTRAMUSCULAR; INTRAVENOUS at 10:10

## 2024-05-24 RX ADMIN — GEMCITABINE HYDROCHLORIDE 1400 MG: 1 INJECTION, SOLUTION INTRAVENOUS at 10:52

## 2024-05-24 RX ADMIN — CISPLATIN 36 MG: 1 INJECTION, SOLUTION INTRAVENOUS at 11:45

## 2024-05-24 RX ADMIN — HEPARIN 500 UNITS: 100 SYRINGE at 14:20

## 2024-05-24 RX ADMIN — SODIUM CHLORIDE 20 ML/HR: 9 INJECTION, SOLUTION INTRAVENOUS at 10:10

## 2024-05-24 RX ADMIN — POTASSIUM CHLORIDE 500 ML: 2 INJECTION, SOLUTION, CONCENTRATE INTRAVENOUS at 09:08

## 2024-05-24 NOTE — PROGRESS NOTES
Distress Screening Follow-up    Name: Sheree Hernandez    : 1940    Diagnosis: Intrahepatic cholangiocarcinoma     Location of Distress Screening: Infusion    Distress Level: 6 (2024  9:00 AM)      Physical Concerns:  Fatigue: Y  Memory or concentration: Y  Loss or change of physical abilities: Y  Pain: Y    Emotional Concerns:  Worry or anxiety: Y  Loss of interest or enjoyment: Y  Changes in appearance: Y  Feelings of worthlessness or being a burden: Y    Practical Concerns:  Taking care of myself: Y  Treatment decisions: Y    Spiritual Concerns:  Sense of meaning or purpose: Y  Death, dying or afterlife: Y       Interventions: n/a    Comments:  SW met with pt in infusion to provide support and assistance with psychosocial needs. Pt was doing well, and reported her son has come in town from [place] for the weekend. SW provided education on role and resources available, and inquired about psychosocial needs. Pt denied any needs at this time, but thanked SW for the visit. SW  provided contact information and will be available for ongoing support and assistance.

## 2024-05-24 NOTE — PROGRESS NOTES
Discussed patient's lab results and chest x-ray with her by phone.    proBNP was normal and no significant pleural effusions noted on chest x-ray.  Does not appear to have any significant pulmonary edema or signs of congestive heart failure.  The area on echocardiogram that suggested pleural effusion may have been artifact.    She is anemic and undergoing chemotherapy.  A troponin was checked in the outpatient setting that was mildly elevated at 22.  She does not have any chest pain.  At this mild level of elevation does not suggest acute coronary syndrome.    Suspect her fatigue is exacerbated by her current malignancy and ongoing chemotherapy.  We will continue to monitor.

## 2024-06-05 DIAGNOSIS — I49.3 PVC (PREMATURE VENTRICULAR CONTRACTION): Primary | ICD-10-CM

## 2024-06-07 ENCOUNTER — OFFICE VISIT (OUTPATIENT)
Dept: ONCOLOGY | Facility: CLINIC | Age: 84
End: 2024-06-07
Payer: MEDICARE

## 2024-06-07 ENCOUNTER — HOSPITAL ENCOUNTER (OUTPATIENT)
Dept: ONCOLOGY | Facility: HOSPITAL | Age: 84
Discharge: HOME | End: 2024-06-07
Payer: MEDICARE

## 2024-06-07 VITALS
WEIGHT: 155 LBS | HEIGHT: 64 IN | BODY MASS INDEX: 26.46 KG/M2 | SYSTOLIC BLOOD PRESSURE: 154 MMHG | HEART RATE: 98 BPM | DIASTOLIC BLOOD PRESSURE: 81 MMHG | OXYGEN SATURATION: 98 % | RESPIRATION RATE: 32 BRPM | TEMPERATURE: 97.1 F

## 2024-06-07 DIAGNOSIS — C22.1 INTRAHEPATIC CHOLANGIOCARCINOMA: Primary | ICD-10-CM

## 2024-06-07 DIAGNOSIS — R06.09 DYSPNEA ON EXERTION: ICD-10-CM

## 2024-06-07 LAB
ALBUMIN SERPL-MCNC: 3.5 G/DL (ref 3.5–5.2)
ALBUMIN/GLOB SERPL: 1.2 G/DL
ALP SERPL-CCNC: 206 U/L (ref 39–117)
ALT SERPL W P-5'-P-CCNC: 15 U/L (ref 1–33)
ANION GAP SERPL CALCULATED.3IONS-SCNC: 12 MMOL/L (ref 5–15)
AST SERPL-CCNC: 21 U/L (ref 1–32)
BASOPHILS # BLD AUTO: 0.01 10*3/MM3 (ref 0–0.2)
BASOPHILS NFR BLD AUTO: 0.3 % (ref 0–1.5)
BILIRUB SERPL-MCNC: 0.2 MG/DL (ref 0–1.2)
BUN SERPL-MCNC: 22 MG/DL (ref 8–23)
BUN/CREAT SERPL: 23.4 (ref 7–25)
CALCIUM SPEC-SCNC: 8.6 MG/DL (ref 8.6–10.5)
CANCER AG19-9 SERPL-ACNC: 16.6 U/ML
CHLORIDE SERPL-SCNC: 101 MMOL/L (ref 98–107)
CO2 SERPL-SCNC: 21 MMOL/L (ref 22–29)
CREAT SERPL-MCNC: 0.94 MG/DL (ref 0.57–1)
DEPRECATED RDW RBC AUTO: 51.5 FL (ref 37–54)
EGFRCR SERPLBLD CKD-EPI 2021: 60 ML/MIN/1.73
EOSINOPHIL # BLD AUTO: 0.08 10*3/MM3 (ref 0–0.4)
EOSINOPHIL NFR BLD AUTO: 2.2 % (ref 0.3–6.2)
ERYTHROCYTE [DISTWIDTH] IN BLOOD BY AUTOMATED COUNT: 15.5 % (ref 12.3–15.4)
GLOBULIN UR ELPH-MCNC: 2.9 GM/DL
GLUCOSE SERPL-MCNC: 95 MG/DL (ref 65–99)
HCT VFR BLD AUTO: 29.5 % (ref 34–46.6)
HGB BLD-MCNC: 9.7 G/DL (ref 12–15.9)
IMM GRANULOCYTES # BLD AUTO: 0.05 10*3/MM3 (ref 0–0.05)
IMM GRANULOCYTES NFR BLD AUTO: 1.4 % (ref 0–0.5)
LYMPHOCYTES # BLD AUTO: 0.65 10*3/MM3 (ref 0.7–3.1)
LYMPHOCYTES NFR BLD AUTO: 17.6 % (ref 19.6–45.3)
MAGNESIUM SERPL-MCNC: 2 MG/DL (ref 1.6–2.4)
MCH RBC QN AUTO: 31.8 PG (ref 26.6–33)
MCHC RBC AUTO-ENTMCNC: 32.9 G/DL (ref 31.5–35.7)
MCV RBC AUTO: 96.7 FL (ref 79–97)
MONOCYTES # BLD AUTO: 0.78 10*3/MM3 (ref 0.1–0.9)
MONOCYTES NFR BLD AUTO: 21.1 % (ref 5–12)
NEUTROPHILS NFR BLD AUTO: 2.13 10*3/MM3 (ref 1.7–7)
NEUTROPHILS NFR BLD AUTO: 57.4 % (ref 42.7–76)
PLATELET # BLD AUTO: 191 10*3/MM3 (ref 140–450)
PMV BLD AUTO: 11.9 FL (ref 6–12)
POTASSIUM SERPL-SCNC: 4.2 MMOL/L (ref 3.5–5.2)
PROT SERPL-MCNC: 6.4 G/DL (ref 6–8.5)
RBC # BLD AUTO: 3.05 10*6/MM3 (ref 3.77–5.28)
SODIUM SERPL-SCNC: 134 MMOL/L (ref 136–145)
T4 FREE SERPL-MCNC: 1.3 NG/DL (ref 0.92–1.68)
TSH SERPL DL<=0.05 MIU/L-ACNC: 4.8 UIU/ML (ref 0.27–4.2)
WBC NRBC COR # BLD AUTO: 3.7 10*3/MM3 (ref 3.4–10.8)

## 2024-06-07 PROCEDURE — 25010000002 GEMCITABINE 1 GM/26.3ML SOLUTION 26.3 ML VIAL: Performed by: INTERNAL MEDICINE

## 2024-06-07 PROCEDURE — 25810000003 SODIUM CHLORIDE 0.9 % SOLUTION: Performed by: INTERNAL MEDICINE

## 2024-06-07 PROCEDURE — 96413 CHEMO IV INFUSION 1 HR: CPT

## 2024-06-07 PROCEDURE — 3079F DIAST BP 80-89 MM HG: CPT | Performed by: INTERNAL MEDICINE

## 2024-06-07 PROCEDURE — 1126F AMNT PAIN NOTED NONE PRSNT: CPT | Performed by: INTERNAL MEDICINE

## 2024-06-07 PROCEDURE — 25010000002 PALONOSETRON PER 25 MCG: Performed by: INTERNAL MEDICINE

## 2024-06-07 PROCEDURE — 86301 IMMUNOASSAY TUMOR CA 19-9: CPT | Performed by: INTERNAL MEDICINE

## 2024-06-07 PROCEDURE — 84439 ASSAY OF FREE THYROXINE: CPT | Performed by: INTERNAL MEDICINE

## 2024-06-07 PROCEDURE — 84443 ASSAY THYROID STIM HORMONE: CPT | Performed by: INTERNAL MEDICINE

## 2024-06-07 PROCEDURE — 80053 COMPREHEN METABOLIC PANEL: CPT | Performed by: INTERNAL MEDICINE

## 2024-06-07 PROCEDURE — 25010000002 DEXAMETHASONE SODIUM PHOSPHATE 100 MG/10ML SOLUTION: Performed by: INTERNAL MEDICINE

## 2024-06-07 PROCEDURE — 25010000002 FOSAPREPITANT PER 1 MG: Performed by: INTERNAL MEDICINE

## 2024-06-07 PROCEDURE — 25810000003 SODIUM CHLORIDE 0.9 % SOLUTION 250 ML FLEX CONT: Performed by: INTERNAL MEDICINE

## 2024-06-07 PROCEDURE — 25010000002 HEPARIN LOCK FLUSH PER 10 UNITS: Performed by: INTERNAL MEDICINE

## 2024-06-07 PROCEDURE — 25010000002 GEMCITABINE 200 MG/5.26ML SOLUTION 5.26 ML VIAL: Performed by: INTERNAL MEDICINE

## 2024-06-07 PROCEDURE — 99214 OFFICE O/P EST MOD 30 MIN: CPT | Performed by: INTERNAL MEDICINE

## 2024-06-07 PROCEDURE — 25010000002 POTASSIUM CHLORIDE PER 2 MEQ OF POTASSIUM: Performed by: INTERNAL MEDICINE

## 2024-06-07 PROCEDURE — 85025 COMPLETE CBC W/AUTO DIFF WBC: CPT | Performed by: INTERNAL MEDICINE

## 2024-06-07 PROCEDURE — 3077F SYST BP >= 140 MM HG: CPT | Performed by: INTERNAL MEDICINE

## 2024-06-07 PROCEDURE — 25010000002 MAGNESIUM SULFATE PER 500 MG OF MAGNESIUM: Performed by: INTERNAL MEDICINE

## 2024-06-07 PROCEDURE — 83735 ASSAY OF MAGNESIUM: CPT | Performed by: INTERNAL MEDICINE

## 2024-06-07 PROCEDURE — 96367 TX/PROPH/DG ADDL SEQ IV INF: CPT

## 2024-06-07 PROCEDURE — 96417 CHEMO IV INFUS EACH ADDL SEQ: CPT

## 2024-06-07 PROCEDURE — 96375 TX/PRO/DX INJ NEW DRUG ADDON: CPT

## 2024-06-07 PROCEDURE — 25010000002 CISPLATIN PER 50 MG: Performed by: INTERNAL MEDICINE

## 2024-06-07 RX ORDER — SODIUM CHLORIDE 9 MG/ML
20 INJECTION, SOLUTION INTRAVENOUS ONCE
OUTPATIENT
Start: 2024-06-14

## 2024-06-07 RX ORDER — PALONOSETRON 0.05 MG/ML
0.25 INJECTION, SOLUTION INTRAVENOUS ONCE
OUTPATIENT
Start: 2024-06-28

## 2024-06-07 RX ORDER — PALONOSETRON 0.05 MG/ML
0.25 INJECTION, SOLUTION INTRAVENOUS ONCE
Status: CANCELLED | OUTPATIENT
Start: 2024-06-07

## 2024-06-07 RX ORDER — SODIUM CHLORIDE 9 MG/ML
20 INJECTION, SOLUTION INTRAVENOUS ONCE
Status: CANCELLED | OUTPATIENT
Start: 2024-06-07

## 2024-06-07 RX ORDER — PALONOSETRON 0.05 MG/ML
0.25 INJECTION, SOLUTION INTRAVENOUS ONCE
OUTPATIENT
Start: 2024-06-14

## 2024-06-07 RX ORDER — SODIUM CHLORIDE 9 MG/ML
20 INJECTION, SOLUTION INTRAVENOUS ONCE
OUTPATIENT
Start: 2024-06-28

## 2024-06-07 RX ORDER — PREDNISONE 5 MG/1
5 TABLET ORAL DAILY
Qty: 21 TABLET | Refills: 0 | Status: SHIPPED | OUTPATIENT
Start: 2024-06-07

## 2024-06-07 RX ORDER — SODIUM CHLORIDE 9 MG/ML
20 INJECTION, SOLUTION INTRAVENOUS ONCE
Status: COMPLETED | OUTPATIENT
Start: 2024-06-07 | End: 2024-06-07

## 2024-06-07 RX ORDER — PALONOSETRON 0.05 MG/ML
0.25 INJECTION, SOLUTION INTRAVENOUS ONCE
Status: COMPLETED | OUTPATIENT
Start: 2024-06-07 | End: 2024-06-07

## 2024-06-07 RX ORDER — HEPARIN SODIUM (PORCINE) LOCK FLUSH IV SOLN 100 UNIT/ML 100 UNIT/ML
500 SOLUTION INTRAVENOUS AS NEEDED
Status: DISCONTINUED | OUTPATIENT
Start: 2024-06-07 | End: 2024-06-08 | Stop reason: HOSPADM

## 2024-06-07 RX ORDER — PALONOSETRON 0.05 MG/ML
0.25 INJECTION, SOLUTION INTRAVENOUS ONCE
OUTPATIENT
Start: 2024-07-05

## 2024-06-07 RX ORDER — SODIUM CHLORIDE 9 MG/ML
20 INJECTION, SOLUTION INTRAVENOUS ONCE
OUTPATIENT
Start: 2024-07-05

## 2024-06-07 RX ORDER — HEPARIN SODIUM (PORCINE) LOCK FLUSH IV SOLN 100 UNIT/ML 100 UNIT/ML
500 SOLUTION INTRAVENOUS AS NEEDED
OUTPATIENT
Start: 2024-06-07

## 2024-06-07 RX ADMIN — FOSAPREPITANT DIMEGLUMINE 150 MG: 150 INJECTION, POWDER, LYOPHILIZED, FOR SOLUTION INTRAVENOUS at 12:22

## 2024-06-07 RX ADMIN — POTASSIUM CHLORIDE 500 ML: 2 INJECTION, SOLUTION, CONCENTRATE INTRAVENOUS at 15:18

## 2024-06-07 RX ADMIN — POTASSIUM CHLORIDE 500 ML: 2 INJECTION, SOLUTION, CONCENTRATE INTRAVENOUS at 11:10

## 2024-06-07 RX ADMIN — GEMCITABINE HYDROCHLORIDE 1400 MG: 1 INJECTION, SOLUTION INTRAVENOUS at 13:25

## 2024-06-07 RX ADMIN — PALONOSETRON HYDROCHLORIDE 0.25 MG: 0.25 INJECTION INTRAVENOUS at 12:20

## 2024-06-07 RX ADMIN — DEXAMETHASONE SODIUM PHOSPHATE 12 MG: 10 INJECTION, SOLUTION INTRAMUSCULAR; INTRAVENOUS at 12:19

## 2024-06-07 RX ADMIN — CISPLATIN 36 MG: 1 INJECTION, SOLUTION INTRAVENOUS at 14:03

## 2024-06-07 RX ADMIN — SODIUM CHLORIDE 1500 MG: 9 INJECTION, SOLUTION INTRAVENOUS at 09:56

## 2024-06-07 RX ADMIN — SODIUM CHLORIDE 20 ML/HR: 9 INJECTION, SOLUTION INTRAVENOUS at 09:54

## 2024-06-07 RX ADMIN — HEPARIN 500 UNITS: 100 SYRINGE at 16:31

## 2024-06-07 NOTE — PROGRESS NOTES
Follow Up Office Visit      Date: 2024     Patient Name: Sheree Hernandez  MRN: [id number]  : 1940  Chief Complaint:  Follow-up for intrahepatic cholangiocarcinoma      History of Present Illness: Sheree Hernandez is a pleasant 79 y.o. female with a past medical history of hyperlipidemia and hypertension who presents today for evaluation of concern for hemochromatosis. The patient is accompanied by their self who contributes to the history of their care.  Patient states that over the past 2 years she has been having worsening fatigue especially with exertion.  Over the past several months this has become worse.  Patient states that she gets tired with basic activities including showering getting dressed in the morning and walking to and from her car from stores.  She states that her fatigue gets better after a few minutes of rest.  She has previously had an echocardiogram and cardiac catheterization within the past 2 years which did not reveal any cause of her fatigue with exertion.  She is also had an ultrasound of the liver which revealed stable cyst.  She was recently seen by her PCP who ordered iron studies which was notable for an elevated ferritin to 246.  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  All other mutations were negative.  She is currently up-to-date on her mammograms and colonoscopy with no active malignancies noted.  She is otherwise compliant with her statin and high blood pressure medicines.  Repeat abdominal imaging in 2021 concerning for enlarging liver lesion.  She was seen by Dr. Sharma and  is status post open right hepatectomy, cholecystectomy, right partial adrenalectomy on 2021.  Pathology showed a focal R1 resected margin.  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  0/4 lymph nodes were  positive for disease.  LVI and PNI were present.  Finished chemoXRT in January 2021.      Interval History:  Presents to clinic for follow-up.  Completed radiation in May 2023 and again in August 2023.  Scheduled to begin cycle 3 of cisplatin/gemcitabine/nivolumab today.  Notes fatigue with each treatment but denies any significant nausea or vomiting.  Does note some intermittent constipation.  Denies any easy bleeding or bruising episodes    Oncology History:    Oncology/Hematology History   Intrahepatic cholangiocarcinoma   8/17/2021 Initial Diagnosis    Intrahepatic cholangiocarcinoma (CMS/HCC)     8/17/2021 Cancer Staged    Staging form: Intrahepatic Bile Duct, AJCC 8th Edition  - Clinical: Stage IB (cT1b, cN0, cM0) - Signed by Jorje Montenegro MD on 8/17/2021 11/5/2021 Cancer Staged    Staging form: Intrahepatic Bile Duct, AJCC 8th Edition  - Pathologic: Stage IIIA (pT3, pN0, cM0) - Signed by Jorje Montenegro MD on 11/5/2021 12/9/2021 - 1/6/2022 Chemotherapy    OP GALLBLADDER Capecitabine + XRT     12/9/2021 - 1/19/2022 Radiation    Radiation OncologyTreatment Course:  Sheree Hernandez received 5040 cGy in 28 fractions to liver via External Beam Radiation - EBRT.     4/25/2023 - 5/8/2023 Radiation    Radiation OncologyTreatment Course:  Sheree Hernandez received 3500 cGy in 5 fractions to liver mass and lymph nodes via Stereotactic Radiation Therapy - SRT.     8/1/2023 - 8/11/2023 Radiation    Radiation OncologyTreatment Course:  Sheree Hernandez received 3500 cGy in 5 fractions to abdomen via Stereotactic Radiation Therapy - SRT.     1/9/2024 - 1/23/2024 Radiation    Radiation OncologyTreatment Course:  Sheree Hernandez received 3000 cGy in 5 fractions to abdomen via Stereotactic Radiation Therapy - SRT.     1/11/2024 - 1/11/2024 Radiation    Radiation OncologyTreatment Course:  Sheree Hernandez received 754 cGy in 1 fractions to left lung via Stereotactic Radiation Therapy - SRT.     2/1/2024 -  2/1/2024 Radiation    Radiation OncologyTreatment Course:  Sheree Hernandez received 3000 cGy in 1 fractions to left lung via Stereotactic Radiation Therapy - SRT.     4/19/2024 -  Chemotherapy    OP HEPATOBILIARY CISplatin + Gemcitabine Days 1,8 / Durvalumab Q21D     5/17/2024 -  Chemotherapy    OP CENTRAL VENOUS ACCESS DEVICE Access, Care, and Maintenance (CVAD)         Subjective      Review of Systems:   Constitutional: Negative for fevers, chills, or weight loss  Eyes: Negative for blurred vision or discharge         Ear/Nose/Throat: Negative for difficulty swallowing, sore throat, LAD                                                       Respiratory: Negative for cough, SOA, wheezing                                                                                        Cardiovascular: Negative for chest pain or palpitations                                                                  Gastrointestinal: Negative for nausea, vomiting or diarrhea                                                                     Genitourinary: Negative for dysuria or hematuria                                                                                           Musculoskeletal: Negative for any joint pains or muscle aches                                                                        Neurologic: Negative for any weakness, headaches, dizziness                                                                         Hematologic: Negative for any easy bleeding or bruising                                                                                   Psychiatric: Negative for anxiety or depression                          Past Medical History/Past Surgical History/ Family History/ Social History: Reviewed by me and unchanged from my previous documentation done on May 2024.     Medications:     Current Outpatient Medications:     Cholecalciferol (Vitamin D3) 25 MCG (1000 UT) capsule, Take  by mouth Daily., Disp: ,  "Rfl:     coenzyme Q10 50 MG capsule capsule, Take  by mouth Daily., Disp: , Rfl:     Cyanocobalamin (VITAMIN B 12 PO), Take  by mouth., Disp: , Rfl:     doxycycline (VIBRAMYCIN) 100 MG capsule, Take 1 capsule by mouth 2 (Two) Times a Day., Disp: 14 capsule, Rfl: 0    Folic Acid 0.8 MG capsule, Take  by mouth., Disp: , Rfl:     ipratropium (ATROVENT) 0.06 % nasal spray, Instill 2 sprays into both nostrils as directed by provider 2 (Two) Times a Day., Disp: 15 mL, Rfl: 6    ipratropium (ATROVENT) 0.06 % nasal spray, Administer 1 spray into each nostril twice daily, Disp: 30 mL, Rfl: 3    lidocaine-prilocaine (EMLA) 2.5-2.5 % cream, Please apply to port site 30 min prior to infusion and cover with Saran Wrap, Disp: 30 g, Rfl: 3    magnesium oxide (MAG-OX) 400 MG tablet, Take 1 tablet by mouth Daily., Disp: , Rfl:     OLANZapine (zyPREXA) 5 MG tablet, Take 1 tablet by mouth Every Night. Take on days 1, 2, 3, and 4 and Days 8, 9, 10, 11 after chemotherapy., Disp: 8 tablet, Rfl: 7    Omega-3 1000 MG capsule, Take  by mouth Daily., Disp: , Rfl:     rosuvastatin (CRESTOR) 10 MG tablet, Take 1 tablet by mouth Daily., Disp: 90 tablet, Rfl: 3    predniSONE (DELTASONE) 5 MG tablet, Take 1 tablet by mouth Daily., Disp: 21 tablet, Rfl: 0    Allergies:   Allergies   Allergen Reactions    Pravastatin Myalgia       Objective     Physical Exam:  Vital Signs:   Vitals:    06/07/24 0842   BP: 154/81  Comment: LUE   Pulse: 98   Resp: (!) 32   Temp: 97.1 °F (36.2 °C)   TempSrc: Infrared   SpO2: 98%  Comment: RA   Weight: 70.3 kg (155 lb)   Height: 162.6 cm (64\")   PainSc: 0-No pain     Pain Score    06/07/24 0842   PainSc: 0-No pain     ECOG Performance Status: 1 - Symptomatic but completely ambulatory    Constitutional: NAD, ECOG 1  Eyes: PERRLA, scleral anicteric  ENT: No LAD, no thyromegaly  Respiratory: CTAB, no wheezing, rales, rhonchi  Cardiovascular: RRR, no murmurs, pulses 2+ bilaterally  Abdomen: soft, NT/ND, no " HSM  Musculoskeletal: strength 5/5 bilaterally, no c/c/e  Neurologic: A&O x 3, CN II-XII intact grossly    Results Review:   Hospital Outpatient Visit on 06/07/2024   Component Date Value Ref Range Status    WBC 06/07/2024 3.70  3.40 - 10.80 10*3/mm3 Final    RBC 06/07/2024 3.05 (L)  3.77 - 5.28 10*6/mm3 Final    Hemoglobin 06/07/2024 9.7 (L)  12.0 - 15.9 g/dL Final    Hematocrit 06/07/2024 29.5 (L)  34.0 - 46.6 % Final    MCV 06/07/2024 96.7  79.0 - 97.0 fL Final    MCH 06/07/2024 31.8  26.6 - 33.0 pg Final    MCHC 06/07/2024 32.9  31.5 - 35.7 g/dL Final    RDW 06/07/2024 15.5 (H)  12.3 - 15.4 % Final    RDW-SD 06/07/2024 51.5  37.0 - 54.0 fl Final    MPV 06/07/2024 11.9  6.0 - 12.0 fL Final    Platelets 06/07/2024 191  140 - 450 10*3/mm3 Final    Neutrophil % 06/07/2024 57.4  42.7 - 76.0 % Final    Lymphocyte % 06/07/2024 17.6 (L)  19.6 - 45.3 % Final    Monocyte % 06/07/2024 21.1 (H)  5.0 - 12.0 % Final    Eosinophil % 06/07/2024 2.2  0.3 - 6.2 % Final    Basophil % 06/07/2024 0.3  0.0 - 1.5 % Final    Immature Grans % 06/07/2024 1.4 (H)  0.0 - 0.5 % Final    Neutrophils, Absolute 06/07/2024 2.13  1.70 - 7.00 10*3/mm3 Final    Lymphocytes, Absolute 06/07/2024 0.65 (L)  0.70 - 3.10 10*3/mm3 Final    Monocytes, Absolute 06/07/2024 0.78  0.10 - 0.90 10*3/mm3 Final    Eosinophils, Absolute 06/07/2024 0.08  0.00 - 0.40 10*3/mm3 Final    Basophils, Absolute 06/07/2024 0.01  0.00 - 0.20 10*3/mm3 Final    Immature Grans, Absolute 06/07/2024 0.05  0.00 - 0.05 10*3/mm3 Final       Mammo Screening Digital Tomosynthesis Bilateral With CAD    Result Date: 5/31/2024  Narrative: PROCEDURE: Bilateral digital screening mammogram with tomosynthesis. REASON FOR EXAM: Routine screening. FAMILY HISTORY:  Paternal aunt and paternal cousin with history of breast cancer. COMPARISON STUDY: 6 mammograms between May 2023 and November 2017. FINDINGS: Craniocaudal and mediolateral oblique images were obtained in 2D, C-view, and 3D modes.  This examination was reviewed with the benefit of computer-aided detection (CAD). The breast are heterogeneously dense, which may obscure small masses. There are no suspicious findings.    Impression: FINAL IMPRESSION: No mammographic evidence of malignancy. BI-RADS: ACR BI-RADS 1: Negative. RECOMMENDATIONS: Annual Screening Mammography This report will serve as an order for any recommended procedure(s). A letter including results and recommendations was sent to the patient. Density notification was provided to patients with dense breast tissue pattern. Patient information entered into a reminder system with a target due date for the next mammogram. At our facility, a [place] marker is positioned over a visible skin lesion and a linear marker is used to indicate a scar. A triangular marker is placed on a self reported palpable finding. Note: Mammography does not detect approximately 10-15% of breast cancers. An annual clinical breast exam by the patient's breast care physician and regular monthly self breast exams by the patient are integral parts of breast cancer screening, in addition to annual mammography. A normal mammogram does not completely exclude the presence of breast cancer, especially if there is an abnormal finding on physical exam. When clinically indicated, a biopsy should not be deferred because of a normal mammogram report.    XR Chest 2 View    Result Date: 5/26/2024  Narrative: XR CHEST 2 VW Date of Exam: 5/24/2024 2:42 PM EDT Indication: pleural effusion Comparison: 4/1/2024, 12/11/2023. Findings: Stable right-sided Mediport. There are no airspace consolidations. No pleural fluid. No pneumothorax. The pulmonary vasculature appears within normal limits. Stable chronic interstitial changes. The cardiac and mediastinal silhouette appear unremarkable.  No acute osseous abnormality identified.     Impression: Impression: No acute cardiopulmonary process. No significant pleural effusion identified.  Electronically Signed: Mary Jo Johnson MD  5/26/2024 11:32 AM EDT  Workstation ID: [id number]    Adult Transthoracic Echo Complete W/ Cont if Necessary Per Protocol    Result Date: 5/22/2024  Narrative:   Left ventricular ejection fraction appears to be 56 - 60%.   Normal global longitudinal LV strain (GLS) = -19.5%.   There is a moderate sized left pleural effusion.   Trace mitral valve regurgitation is present.     IR Port Placement    Result Date: 5/10/2024  Narrative: IR PORT PLACEMENT  History: chemotherapy  : South Kim MD.  Modality: Sonography and fluoroscopy  DOSE REDUCTION: The examination was performed according to departmental dose-optimization program which includes automated exposure control, adjustment of the mA and/or kV. Fluoro time: 0.1 Radiation dose: 0.1 mGy air Kerma.  SEDATION: Moderate sedation was administered. 1 milligram of Versed and 50 micrograms of fentanyl IV was used for moderate sedation. Total intra service time of sedation was 17 minutes. The sedation was administered and the patient's vital signs monitored throughout the procedure and recorded in the patient's medical record by the nurse under my direct supervision.  Anesthesia: Lidocaine 1% with epinephrine, local infiltration Medicines: None      Estimated blood loss:  < 5 cc.        Technique: A thorough discussion of the risks, benefits, and alternatives of the procedure, and if applicable, moderate sedation, was carried out with the patient. They were encouraged to ask any questions. Any questions were answered. They verbalized understanding. A written informed consent was then signed.  A multi-component timeout was performed prior to starting the procedure using the departmental protocol. The procedure room personnel used personal protective equipment. The operators used sterile gloves and if indicated, sterile gowns. The surgical site was prepped with chlorhexidine gluconate  and draped in the maximal  applicable sterile fashion. A preliminary ultrasonogram was performed of the neck that revealed a patent and compressible internal jugular vein. Pertinent ultrasound images were stored in the PACS for documentation. Using aseptic precautions, real-time ultrasound guidance, the internal jugular vein was accessed with a micropuncture needle after local anesthetic infiltration. A 018 guidewire was advanced into the central venous system under fluoroscopic guidance. Over the wire a micropuncture sheath was placed. Through the micropuncture sheath, a 035 wire was advanced into the venous system under fluoroscopic guidance. Over the wire a peel-away sheath was placed. After local anesthesia, using sharp and blunt dissection, a reservoir pocket of appropriate size was created in the infra clavicular chest wall. The port catheter was connected to the port reservoir. The catheter was tunneled to the venotomy site using a  tunneling device. The the reservoir was placed in the reservoir pocket. The catheter was trimmed to an appropriate length. The catheter was advanced into the venous system through the peel-away sheath which was removed. The port aspirated and flushed well and was terminally packed with heparin 100 units per cc, total 500 units. The port reservoir was irrigated with saline. The incision was closed in layers using absorbable suture and Dermabond. The venotomy site was closed using Dermabond. An aseptic dressing was applied using the protocol for Dermabond. The patient was transferred to the recovery area and was discharged from the department in stable condition.  Complications: None immediate.    Findings: Patent and compressible internal jugular vein. Final image shows the salomon catheter to be in good position with the catheter tip at the cavoatrial junction/right atrium, an excellent position for use. There is no immediate complication.      Impression: Impression:    Successful ultrasound and fluoroscopic  guided right internal jugular vein route single lumen Port-A-Cath placement as described above. Thank you for the opportunity to assist in the care of your patient. Electronically Signed: South Kim MD  5/10/2024 2:26 PM EDT  Workstation ID: [id number]     Assessment / Plan      Assessment/Plan:   Intrahepatic cholangiocarcinoma (CMS/HCC) (Primary)  -Noted during her most recent hospitalization with CT scans concerning for an enlarging liver lesion to 7.3 cm that was previously noted to be hemangioma  -Triple phase CT concerning for a solid tumor lesion  -Biopsy consistent with an adenocarcinoma  -CA 19-9 elevated to 84.7  -CT chest as well as the rest of the CT abdomen/pelvis not concerning for distant or metastatic disease  -Status post open right hepatectomy, cholecystectomy, right partial adrenalectomy on 9/23/2021 with Dr. 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  0/4 lymph nodes were positive for disease.  LVI and PNI were present.  Focal R1 resection margin  -Discussed with patient and her daughter that she would benefit from adjuvant chemoXRT using Xeloda.  Discussed side effects including but not limited to immunosuppression, diarrhea, abdominal pain, nausea, vomiting, hand/foot syndrome  -Repeat CA 19-9 (22) in November 2021  -Completed chemo XRT with Xeloda in January 2021  -Repeat scans in March 2022 without any evidence of recurrent or metastatic disease  -Repeat CT C/A/P in June 2022 without evidence of recurrent or metastatic disease.  Did note some IVC stenosis which we will monitor with her next scans  -Repeat CT C/A/P in September 2022 reviewed without evidence of recurrent disease or metastatic disease.  IVC stenosis overall stable  -Repeat CT C/A/P in December 2022 reviewed without evidence of recurrent or metastatic disease.  CA 19-9 within normal limits  -Repeat CT C/A/P in March 2023  reviewed and notable for some slight enlarging retroperitoneal adenopathy.  CA 19-9 within normal limits  -PET/CT concerning for 1 cm left liver lesion that was only slightly PET avid as well as 2 lymph nodes that were also slightly PET avid  -Previously discussed her case at tumor board and with Dr. Sharma we agreed that she likely had disease progression  -Status post radiation completed in May 2023  -CA 19-9 in June 2022 within normal limits.    -CT C/A/P in July 2023 reviewed and showed some interval decrease in some lymph nodes as well as some slight enlargement of a couple lymph nodes.  -Completed radiation in August 2023  -CT C/A/P in October 2023 reviewed and only notable for slight increase in some pulmonary nodules about 1 mm each  -CT C/A/P December 2023 reviewed and notable for enlargement of her aortocaval lymph node.  CA 19-9 stable  -Completed radiation with Dr. Billings in February 2024  -CT C/A/P in April 2024 reviewed and showing continued slight enlargement of multiple pulmonary nodules as well as a liver lesion.  CA 19-9 stable  -Scheduled begin cycle 3 of cisplatin/gemcitabine/durvalumab today.  Dose reduced by 20% with cycle 2 due to significant thrombocytopenia with cycle 1.  Clinically appropriate for treatment.  Labs reviewed and appropriate for treatment  -Plan for repeat CT scans prior to her next appoint with me.  Ordered today     Hemochromatosis carrier  -Noted on recent labs with an elevated ferritin to 246 hemochromatosis gene profile positive for heterozygosity of H63D.  Other genes negative.  Given patient's age and previous cardiac liver work-up showing no evidence of dysfunction, it is unlikely that she has had iron deposition all this time.  The heterozygosity of H63D makes her carrier for hemochromatosis however does not mean that she has active disease  -CT A/P in July 2020 with no liver dysfunction but was notable for hemangioma which has now been confirmed to be a intrahepatic  cholangiocarcinoma and a status post resection.  Treatment as above  -ECHO in July 2020 within normal limits  -Repeat iron studies in April 2021 stable with a ferritin of 229, iron level 100, transferrin saturation 27%  -Low concern for iron overload at this time.      Thyroid nodule  -Incidentally noted on CT scan but enlarging from previous CT  -Thyroid ultrasound in June 2022 only notable for goiter and small nodules nonconcerning for malignancy  -Has been seen by endocrinology with plan for repeat ultrasound in the summer of next year     Other fatigue   -Continued at this time  -Previously checked iron studies, vitamin B12, folate, thyroid studies within normal limits  -Was previously been seen by cardiology with a normal ECHO and stress test  -Likely related to all her cancer related treatments  -Advised patient to continue activity as tolerated  -Will start prednisone 5 mg daily today         Follow Up:   Follow-up in 3 weeks     Jorje Montenegro MD  Hematology and Oncology     Please note that portions of this note may have been completed with a voice recognition program. Efforts were made to edit the dictations, but occasionally words are mistranscribed.

## 2024-06-14 ENCOUNTER — HOSPITAL ENCOUNTER (OUTPATIENT)
Dept: ONCOLOGY | Facility: HOSPITAL | Age: 84
Discharge: HOME | End: 2024-06-14
Payer: MEDICARE

## 2024-06-14 ENCOUNTER — APPOINTMENT (OUTPATIENT)
Dept: ONCOLOGY | Facility: HOSPITAL | Age: 84
End: 2024-06-14
Payer: MEDICARE

## 2024-06-14 VITALS
BODY MASS INDEX: 27.14 KG/M2 | RESPIRATION RATE: 18 BRPM | DIASTOLIC BLOOD PRESSURE: 63 MMHG | WEIGHT: 159 LBS | HEART RATE: 110 BPM | SYSTOLIC BLOOD PRESSURE: 138 MMHG | TEMPERATURE: 97.6 F | HEIGHT: 64 IN

## 2024-06-14 DIAGNOSIS — C22.1 INTRAHEPATIC CHOLANGIOCARCINOMA: Primary | ICD-10-CM

## 2024-06-14 LAB
ALBUMIN SERPL-MCNC: 3.5 G/DL (ref 3.5–5.2)
ALBUMIN/GLOB SERPL: 1.3 G/DL
ALP SERPL-CCNC: 196 U/L (ref 39–117)
ALT SERPL W P-5'-P-CCNC: 25 U/L (ref 1–33)
ANION GAP SERPL CALCULATED.3IONS-SCNC: 9 MMOL/L (ref 5–15)
AST SERPL-CCNC: 25 U/L (ref 1–32)
BASOPHILS # BLD AUTO: 0.02 10*3/MM3 (ref 0–0.2)
BASOPHILS NFR BLD AUTO: 0.5 % (ref 0–1.5)
BILIRUB SERPL-MCNC: 0.2 MG/DL (ref 0–1.2)
BUN SERPL-MCNC: 21 MG/DL (ref 8–23)
BUN/CREAT SERPL: 19.6 (ref 7–25)
CALCIUM SPEC-SCNC: 8.8 MG/DL (ref 8.6–10.5)
CHLORIDE SERPL-SCNC: 103 MMOL/L (ref 98–107)
CO2 SERPL-SCNC: 25 MMOL/L (ref 22–29)
CREAT SERPL-MCNC: 1.07 MG/DL (ref 0.57–1)
DEPRECATED RDW RBC AUTO: 58 FL (ref 37–54)
EGFRCR SERPLBLD CKD-EPI 2021: 51.3 ML/MIN/1.73
EOSINOPHIL # BLD AUTO: 0.03 10*3/MM3 (ref 0–0.4)
EOSINOPHIL NFR BLD AUTO: 0.7 % (ref 0.3–6.2)
ERYTHROCYTE [DISTWIDTH] IN BLOOD BY AUTOMATED COUNT: 16.2 % (ref 12.3–15.4)
GLOBULIN UR ELPH-MCNC: 2.8 GM/DL
GLUCOSE SERPL-MCNC: 104 MG/DL (ref 65–99)
HCT VFR BLD AUTO: 29.5 % (ref 34–46.6)
HGB BLD-MCNC: 9.9 G/DL (ref 12–15.9)
IMM GRANULOCYTES # BLD AUTO: 0.43 10*3/MM3 (ref 0–0.05)
IMM GRANULOCYTES NFR BLD AUTO: 10.5 % (ref 0–0.5)
LYMPHOCYTES # BLD AUTO: 0.89 10*3/MM3 (ref 0.7–3.1)
LYMPHOCYTES NFR BLD AUTO: 21.7 % (ref 19.6–45.3)
MAGNESIUM SERPL-MCNC: 2 MG/DL (ref 1.6–2.4)
MCH RBC QN AUTO: 32.8 PG (ref 26.6–33)
MCHC RBC AUTO-ENTMCNC: 33.6 G/DL (ref 31.5–35.7)
MCV RBC AUTO: 97.7 FL (ref 79–97)
MONOCYTES # BLD AUTO: 0.58 10*3/MM3 (ref 0.1–0.9)
MONOCYTES NFR BLD AUTO: 14.1 % (ref 5–12)
NEUTROPHILS NFR BLD AUTO: 2.16 10*3/MM3 (ref 1.7–7)
NEUTROPHILS NFR BLD AUTO: 52.5 % (ref 42.7–76)
PLATELET # BLD AUTO: 192 10*3/MM3 (ref 140–450)
PMV BLD AUTO: 11.5 FL (ref 6–12)
POTASSIUM SERPL-SCNC: 4.3 MMOL/L (ref 3.5–5.2)
PROT SERPL-MCNC: 6.3 G/DL (ref 6–8.5)
RBC # BLD AUTO: 3.02 10*6/MM3 (ref 3.77–5.28)
SODIUM SERPL-SCNC: 137 MMOL/L (ref 136–145)
WBC NRBC COR # BLD AUTO: 4.11 10*3/MM3 (ref 3.4–10.8)

## 2024-06-14 PROCEDURE — 83735 ASSAY OF MAGNESIUM: CPT | Performed by: INTERNAL MEDICINE

## 2024-06-14 PROCEDURE — 25010000002 DEXAMETHASONE SODIUM PHOSPHATE 100 MG/10ML SOLUTION: Performed by: INTERNAL MEDICINE

## 2024-06-14 PROCEDURE — 25010000002 FOSAPREPITANT PER 1 MG: Performed by: INTERNAL MEDICINE

## 2024-06-14 PROCEDURE — 96413 CHEMO IV INFUSION 1 HR: CPT

## 2024-06-14 PROCEDURE — 96376 TX/PRO/DX INJ SAME DRUG ADON: CPT

## 2024-06-14 PROCEDURE — 96375 TX/PRO/DX INJ NEW DRUG ADDON: CPT

## 2024-06-14 PROCEDURE — 25010000002 GEMCITABINE 200 MG/5.26ML SOLUTION 5.26 ML VIAL: Performed by: INTERNAL MEDICINE

## 2024-06-14 PROCEDURE — 25810000003 SODIUM CHLORIDE 0.9 % SOLUTION 250 ML FLEX CONT: Performed by: INTERNAL MEDICINE

## 2024-06-14 PROCEDURE — 25010000002 MAGNESIUM SULFATE PER 500 MG OF MAGNESIUM: Performed by: INTERNAL MEDICINE

## 2024-06-14 PROCEDURE — 96417 CHEMO IV INFUS EACH ADDL SEQ: CPT

## 2024-06-14 PROCEDURE — 96367 TX/PROPH/DG ADDL SEQ IV INF: CPT

## 2024-06-14 PROCEDURE — 25810000003 SODIUM CHLORIDE 0.9 % SOLUTION: Performed by: INTERNAL MEDICINE

## 2024-06-14 PROCEDURE — 25010000002 CISPLATIN PER 50 MG: Performed by: INTERNAL MEDICINE

## 2024-06-14 PROCEDURE — 85025 COMPLETE CBC W/AUTO DIFF WBC: CPT | Performed by: INTERNAL MEDICINE

## 2024-06-14 PROCEDURE — 25010000002 GEMCITABINE 1 GM/26.3ML SOLUTION 26.3 ML VIAL: Performed by: INTERNAL MEDICINE

## 2024-06-14 PROCEDURE — 25010000002 HEPARIN LOCK FLUSH PER 10 UNITS: Performed by: INTERNAL MEDICINE

## 2024-06-14 PROCEDURE — 25010000002 POTASSIUM CHLORIDE PER 2 MEQ OF POTASSIUM: Performed by: INTERNAL MEDICINE

## 2024-06-14 PROCEDURE — 80053 COMPREHEN METABOLIC PANEL: CPT | Performed by: INTERNAL MEDICINE

## 2024-06-14 PROCEDURE — 25010000002 PALONOSETRON PER 25 MCG: Performed by: INTERNAL MEDICINE

## 2024-06-14 RX ORDER — PALONOSETRON 0.05 MG/ML
0.25 INJECTION, SOLUTION INTRAVENOUS ONCE
Status: COMPLETED | OUTPATIENT
Start: 2024-06-14 | End: 2024-06-14

## 2024-06-14 RX ORDER — HEPARIN SODIUM (PORCINE) LOCK FLUSH IV SOLN 100 UNIT/ML 100 UNIT/ML
500 SOLUTION INTRAVENOUS AS NEEDED
Status: DISCONTINUED | OUTPATIENT
Start: 2024-06-14 | End: 2024-06-15 | Stop reason: HOSPADM

## 2024-06-14 RX ORDER — SODIUM CHLORIDE 9 MG/ML
20 INJECTION, SOLUTION INTRAVENOUS ONCE
Status: COMPLETED | OUTPATIENT
Start: 2024-06-14 | End: 2024-06-14

## 2024-06-14 RX ORDER — HEPARIN SODIUM (PORCINE) LOCK FLUSH IV SOLN 100 UNIT/ML 100 UNIT/ML
500 SOLUTION INTRAVENOUS AS NEEDED
OUTPATIENT
Start: 2024-06-14

## 2024-06-14 RX ADMIN — GEMCITABINE HYDROCHLORIDE 1400 MG: 1 INJECTION, SOLUTION INTRAVENOUS at 11:30

## 2024-06-14 RX ADMIN — POTASSIUM CHLORIDE 500 ML: 2 INJECTION, SOLUTION, CONCENTRATE INTRAVENOUS at 09:40

## 2024-06-14 RX ADMIN — DEXAMETHASONE SODIUM PHOSPHATE 12 MG: 10 INJECTION, SOLUTION INTRAMUSCULAR; INTRAVENOUS at 10:45

## 2024-06-14 RX ADMIN — FOSAPREPITANT 150 MG: 150 INJECTION, POWDER, LYOPHILIZED, FOR SOLUTION INTRAVENOUS at 10:45

## 2024-06-14 RX ADMIN — CISPLATIN 36 MG: 1 INJECTION, SOLUTION INTRAVENOUS at 12:13

## 2024-06-14 RX ADMIN — POTASSIUM CHLORIDE 500 ML: 2 INJECTION, SOLUTION, CONCENTRATE INTRAVENOUS at 13:25

## 2024-06-14 RX ADMIN — HEPARIN 500 UNITS: 100 SYRINGE at 14:28

## 2024-06-14 RX ADMIN — SODIUM CHLORIDE 20 ML/HR: 9 INJECTION, SOLUTION INTRAVENOUS at 09:39

## 2024-06-14 RX ADMIN — PALONOSETRON HYDROCHLORIDE 0.25 MG: 0.25 INJECTION INTRAVENOUS at 10:44

## 2024-06-24 ENCOUNTER — HOSPITAL ENCOUNTER (OUTPATIENT)
Dept: CT IMAGING | Facility: HOSPITAL | Age: 84
Discharge: HOME | End: 2024-06-24
Admitting: INTERNAL MEDICINE
Payer: MEDICARE

## 2024-06-24 DIAGNOSIS — C22.1 INTRAHEPATIC CHOLANGIOCARCINOMA: ICD-10-CM

## 2024-06-24 PROCEDURE — 25510000001 IOPAMIDOL 61 % SOLUTION: Performed by: INTERNAL MEDICINE

## 2024-06-24 PROCEDURE — 74177 CT ABD & PELVIS W/CONTRAST: CPT

## 2024-06-24 PROCEDURE — 71260 CT THORAX DX C+: CPT

## 2024-06-24 RX ADMIN — IOPAMIDOL 85 ML: 612 INJECTION, SOLUTION INTRAVENOUS at 15:57

## 2024-06-27 ENCOUNTER — PATIENT MESSAGE (OUTPATIENT)
Dept: ONCOLOGY | Facility: CLINIC | Age: 84
End: 2024-06-27
Payer: MEDICARE

## 2024-06-27 NOTE — TELEPHONE ENCOUNTER
From: Sheree Hernandez  To: Jorje Montenegro  Sent: 6/27/2024 6:36 AM EDT  Subject: CT Scan    Micaela, I have a follow-up appointment with Dr Montenegro tomorrow morning. I had a CT Scan on Monday afternoon at the [facility] location and the nurse said it would be posted by Tuesday. As yet, it is not posted. Would you be able to locate the results? Thank you Micaela Hernandez

## 2024-06-28 ENCOUNTER — TELEPHONE (OUTPATIENT)
Dept: CARDIOLOGY | Facility: CLINIC | Age: 84
End: 2024-06-28
Payer: MEDICARE

## 2024-06-28 ENCOUNTER — APPOINTMENT (OUTPATIENT)
Dept: ONCOLOGY | Facility: HOSPITAL | Age: 84
End: 2024-06-28
Payer: MEDICARE

## 2024-06-28 ENCOUNTER — OFFICE VISIT (OUTPATIENT)
Dept: ONCOLOGY | Facility: CLINIC | Age: 84
End: 2024-06-28
Payer: MEDICARE

## 2024-06-28 ENCOUNTER — HOSPITAL ENCOUNTER (OUTPATIENT)
Dept: ONCOLOGY | Facility: HOSPITAL | Age: 84
Discharge: HOME | End: 2024-06-28
Admitting: INTERNAL MEDICINE
Payer: MEDICARE

## 2024-06-28 VITALS
HEART RATE: 105 BPM | WEIGHT: 157 LBS | BODY MASS INDEX: 26.8 KG/M2 | HEIGHT: 64 IN | DIASTOLIC BLOOD PRESSURE: 73 MMHG | SYSTOLIC BLOOD PRESSURE: 144 MMHG | OXYGEN SATURATION: 100 % | TEMPERATURE: 97.3 F

## 2024-06-28 DIAGNOSIS — C22.1 INTRAHEPATIC CHOLANGIOCARCINOMA: Primary | ICD-10-CM

## 2024-06-28 LAB
ABO GROUP BLD: NORMAL
ALBUMIN SERPL-MCNC: 3.5 G/DL (ref 3.5–5.2)
ALBUMIN/GLOB SERPL: 1.3 G/DL
ALP SERPL-CCNC: 192 U/L (ref 39–117)
ALT SERPL W P-5'-P-CCNC: 13 U/L (ref 1–33)
ANION GAP SERPL CALCULATED.3IONS-SCNC: 11 MMOL/L (ref 5–15)
AST SERPL-CCNC: 20 U/L (ref 1–32)
BASOPHILS # BLD AUTO: 0.02 10*3/MM3 (ref 0–0.2)
BASOPHILS NFR BLD AUTO: 0.4 % (ref 0–1.5)
BILIRUB SERPL-MCNC: 0.2 MG/DL (ref 0–1.2)
BLD GP AB SCN SERPL QL: NEGATIVE
BUN SERPL-MCNC: 16 MG/DL (ref 8–23)
BUN/CREAT SERPL: 15.5 (ref 7–25)
CALCIUM SPEC-SCNC: 8.7 MG/DL (ref 8.6–10.5)
CHLORIDE SERPL-SCNC: 105 MMOL/L (ref 98–107)
CO2 SERPL-SCNC: 22 MMOL/L (ref 22–29)
CREAT SERPL-MCNC: 1.03 MG/DL (ref 0.57–1)
DEPRECATED RDW RBC AUTO: 70.9 FL (ref 37–54)
EGFRCR SERPLBLD CKD-EPI 2021: 53.7 ML/MIN/1.73
EOSINOPHIL # BLD AUTO: 0.05 10*3/MM3 (ref 0–0.4)
EOSINOPHIL NFR BLD AUTO: 0.9 % (ref 0.3–6.2)
ERYTHROCYTE [DISTWIDTH] IN BLOOD BY AUTOMATED COUNT: 19.4 % (ref 12.3–15.4)
FERRITIN SERPL-MCNC: 553.8 NG/ML (ref 13–150)
GLOBULIN UR ELPH-MCNC: 2.7 GM/DL
GLUCOSE SERPL-MCNC: 134 MG/DL (ref 65–99)
HCT VFR BLD AUTO: 27.6 % (ref 34–46.6)
HGB BLD-MCNC: 9 G/DL (ref 12–15.9)
IMM GRANULOCYTES # BLD AUTO: 0.05 10*3/MM3 (ref 0–0.05)
IMM GRANULOCYTES NFR BLD AUTO: 0.9 % (ref 0–0.5)
IRON 24H UR-MRATE: 43 MCG/DL (ref 37–145)
IRON SATN MFR SERPL: 15 % (ref 20–50)
LYMPHOCYTES # BLD AUTO: 0.71 10*3/MM3 (ref 0.7–3.1)
LYMPHOCYTES NFR BLD AUTO: 13 % (ref 19.6–45.3)
MAGNESIUM SERPL-MCNC: 2.3 MG/DL (ref 1.6–2.4)
MCH RBC QN AUTO: 33.1 PG (ref 26.6–33)
MCHC RBC AUTO-ENTMCNC: 32.6 G/DL (ref 31.5–35.7)
MCV RBC AUTO: 101.5 FL (ref 79–97)
MONOCYTES # BLD AUTO: 1.13 10*3/MM3 (ref 0.1–0.9)
MONOCYTES NFR BLD AUTO: 20.7 % (ref 5–12)
NEUTROPHILS NFR BLD AUTO: 3.5 10*3/MM3 (ref 1.7–7)
NEUTROPHILS NFR BLD AUTO: 64.1 % (ref 42.7–76)
PLATELET # BLD AUTO: 121 10*3/MM3 (ref 140–450)
PMV BLD AUTO: 12.3 FL (ref 6–12)
POTASSIUM SERPL-SCNC: 4.4 MMOL/L (ref 3.5–5.2)
PROT SERPL-MCNC: 6.2 G/DL (ref 6–8.5)
RBC # BLD AUTO: 2.72 10*6/MM3 (ref 3.77–5.28)
RH BLD: POSITIVE
SODIUM SERPL-SCNC: 138 MMOL/L (ref 136–145)
T&S EXPIRATION DATE: NORMAL
TIBC SERPL-MCNC: 297 MCG/DL (ref 298–536)
TRANSFERRIN SERPL-MCNC: 199 MG/DL (ref 200–360)
WBC NRBC COR # BLD AUTO: 5.46 10*3/MM3 (ref 3.4–10.8)

## 2024-06-28 PROCEDURE — 25010000002 DEXAMETHASONE SODIUM PHOSPHATE 100 MG/10ML SOLUTION: Performed by: INTERNAL MEDICINE

## 2024-06-28 PROCEDURE — 25010000002 PALONOSETRON PER 25 MCG: Performed by: INTERNAL MEDICINE

## 2024-06-28 PROCEDURE — 85025 COMPLETE CBC W/AUTO DIFF WBC: CPT | Performed by: INTERNAL MEDICINE

## 2024-06-28 PROCEDURE — 25010000002 MAGNESIUM SULFATE PER 500 MG OF MAGNESIUM: Performed by: INTERNAL MEDICINE

## 2024-06-28 PROCEDURE — 83735 ASSAY OF MAGNESIUM: CPT | Performed by: INTERNAL MEDICINE

## 2024-06-28 PROCEDURE — 96366 THER/PROPH/DIAG IV INF ADDON: CPT

## 2024-06-28 PROCEDURE — 86900 BLOOD TYPING SEROLOGIC ABO: CPT | Performed by: INTERNAL MEDICINE

## 2024-06-28 PROCEDURE — 25810000003 SODIUM CHLORIDE 0.9 % SOLUTION: Performed by: INTERNAL MEDICINE

## 2024-06-28 PROCEDURE — 99214 OFFICE O/P EST MOD 30 MIN: CPT | Performed by: INTERNAL MEDICINE

## 2024-06-28 PROCEDURE — 96367 TX/PROPH/DG ADDL SEQ IV INF: CPT

## 2024-06-28 PROCEDURE — 25010000002 HEPARIN LOCK FLUSH PER 10 UNITS: Performed by: INTERNAL MEDICINE

## 2024-06-28 PROCEDURE — 96375 TX/PRO/DX INJ NEW DRUG ADDON: CPT

## 2024-06-28 PROCEDURE — 96413 CHEMO IV INFUSION 1 HR: CPT

## 2024-06-28 PROCEDURE — 86923 COMPATIBILITY TEST ELECTRIC: CPT

## 2024-06-28 PROCEDURE — 86850 RBC ANTIBODY SCREEN: CPT | Performed by: INTERNAL MEDICINE

## 2024-06-28 PROCEDURE — 25010000002 DURVALUMAB 50 MG/ML SOLUTION 10 ML VIAL: Performed by: INTERNAL MEDICINE

## 2024-06-28 PROCEDURE — 25010000002 GEMCITABINE 1 GM/26.3ML SOLUTION 26.3 ML VIAL: Performed by: INTERNAL MEDICINE

## 2024-06-28 PROCEDURE — 25010000002 POTASSIUM CHLORIDE PER 2 MEQ OF POTASSIUM: Performed by: INTERNAL MEDICINE

## 2024-06-28 PROCEDURE — 96417 CHEMO IV INFUS EACH ADDL SEQ: CPT

## 2024-06-28 PROCEDURE — 84466 ASSAY OF TRANSFERRIN: CPT | Performed by: INTERNAL MEDICINE

## 2024-06-28 PROCEDURE — 86901 BLOOD TYPING SEROLOGIC RH(D): CPT | Performed by: INTERNAL MEDICINE

## 2024-06-28 PROCEDURE — 25010000002 FOSAPREPITANT PER 1 MG: Performed by: INTERNAL MEDICINE

## 2024-06-28 PROCEDURE — 3077F SYST BP >= 140 MM HG: CPT | Performed by: INTERNAL MEDICINE

## 2024-06-28 PROCEDURE — 3078F DIAST BP <80 MM HG: CPT | Performed by: INTERNAL MEDICINE

## 2024-06-28 PROCEDURE — 25010000002 CISPLATIN PER 50 MG: Performed by: INTERNAL MEDICINE

## 2024-06-28 PROCEDURE — 82728 ASSAY OF FERRITIN: CPT | Performed by: INTERNAL MEDICINE

## 2024-06-28 PROCEDURE — 25010000002 GEMCITABINE 200 MG/5.26ML SOLUTION 5.26 ML VIAL: Performed by: INTERNAL MEDICINE

## 2024-06-28 PROCEDURE — 83540 ASSAY OF IRON: CPT | Performed by: INTERNAL MEDICINE

## 2024-06-28 PROCEDURE — 1126F AMNT PAIN NOTED NONE PRSNT: CPT | Performed by: INTERNAL MEDICINE

## 2024-06-28 PROCEDURE — 25810000003 SODIUM CHLORIDE 0.9 % SOLUTION 250 ML FLEX CONT: Performed by: INTERNAL MEDICINE

## 2024-06-28 PROCEDURE — 80053 COMPREHEN METABOLIC PANEL: CPT | Performed by: INTERNAL MEDICINE

## 2024-06-28 RX ORDER — SODIUM CHLORIDE 9 MG/ML
20 INJECTION, SOLUTION INTRAVENOUS ONCE
Status: COMPLETED | OUTPATIENT
Start: 2024-06-28 | End: 2024-06-28

## 2024-06-28 RX ORDER — SODIUM CHLORIDE 9 MG/ML
20 INJECTION, SOLUTION INTRAVENOUS ONCE
OUTPATIENT
Start: 2024-07-19

## 2024-06-28 RX ORDER — HEPARIN SODIUM (PORCINE) LOCK FLUSH IV SOLN 100 UNIT/ML 100 UNIT/ML
500 SOLUTION INTRAVENOUS AS NEEDED
Status: CANCELLED | OUTPATIENT
Start: 2024-06-28

## 2024-06-28 RX ORDER — SODIUM CHLORIDE 9 MG/ML
250 INJECTION, SOLUTION INTRAVENOUS AS NEEDED
Status: CANCELLED | OUTPATIENT
Start: 2024-06-28

## 2024-06-28 RX ORDER — HEPARIN SODIUM (PORCINE) LOCK FLUSH IV SOLN 100 UNIT/ML 100 UNIT/ML
500 SOLUTION INTRAVENOUS AS NEEDED
Status: DISCONTINUED | OUTPATIENT
Start: 2024-06-28 | End: 2024-06-29 | Stop reason: HOSPADM

## 2024-06-28 RX ORDER — PALONOSETRON 0.05 MG/ML
0.25 INJECTION, SOLUTION INTRAVENOUS ONCE
OUTPATIENT
Start: 2024-07-26

## 2024-06-28 RX ORDER — PALONOSETRON 0.05 MG/ML
0.25 INJECTION, SOLUTION INTRAVENOUS ONCE
OUTPATIENT
Start: 2024-07-19

## 2024-06-28 RX ORDER — SODIUM CHLORIDE 9 MG/ML
20 INJECTION, SOLUTION INTRAVENOUS ONCE
OUTPATIENT
Start: 2024-07-26

## 2024-06-28 RX ORDER — PALONOSETRON 0.05 MG/ML
0.25 INJECTION, SOLUTION INTRAVENOUS ONCE
Status: COMPLETED | OUTPATIENT
Start: 2024-06-28 | End: 2024-06-28

## 2024-06-28 RX ADMIN — POTASSIUM CHLORIDE 500 ML: 2 INJECTION, SOLUTION, CONCENTRATE INTRAVENOUS at 11:28

## 2024-06-28 RX ADMIN — SODIUM CHLORIDE 20 ML/HR: 9 INJECTION, SOLUTION INTRAVENOUS at 09:34

## 2024-06-28 RX ADMIN — DEXAMETHASONE SODIUM PHOSPHATE 12 MG: 10 INJECTION, SOLUTION INTRAMUSCULAR; INTRAVENOUS at 12:41

## 2024-06-28 RX ADMIN — PALONOSETRON HYDROCHLORIDE 0.25 MG: 0.25 INJECTION INTRAVENOUS at 12:40

## 2024-06-28 RX ADMIN — POTASSIUM CHLORIDE 500 ML: 2 INJECTION, SOLUTION, CONCENTRATE INTRAVENOUS at 15:22

## 2024-06-28 RX ADMIN — FOSAPREPITANT DIMEGLUMINE 150 MG: 150 INJECTION, POWDER, LYOPHILIZED, FOR SOLUTION INTRAVENOUS at 12:41

## 2024-06-28 RX ADMIN — GEMCITABINE 1400 MG: 38 INJECTION, SOLUTION INTRAVENOUS at 13:23

## 2024-06-28 RX ADMIN — HEPARIN 500 UNITS: 100 SYRINGE at 16:34

## 2024-06-28 RX ADMIN — SODIUM CHLORIDE 1500 MG: 9 INJECTION, SOLUTION INTRAVENOUS at 10:07

## 2024-06-28 RX ADMIN — CISPLATIN 36 MG: 1 INJECTION, SOLUTION INTRAVENOUS at 14:06

## 2024-06-28 NOTE — PROGRESS NOTES
Follow Up Office Visit      Date: 2024     Patient Name: Sheree Hernandez  MRN: [id number]  : 1940  Chief Complaint:  Follow-up for intrahepatic cholangiocarcinoma      History of Present Illness: Sheree Hernandez is a pleasant 79 y.o. female with a past medical history of hyperlipidemia and hypertension who presents today for evaluation of concern for hemochromatosis. The patient is accompanied by their self who contributes to the history of their care.  Patient states that over the past 2 years she has been having worsening fatigue especially with exertion.  Over the past several months this has become worse.  Patient states that she gets tired with basic activities including showering getting dressed in the morning and walking to and from her car from stores.  She states that her fatigue gets better after a few minutes of rest.  She has previously had an echocardiogram and cardiac catheterization within the past 2 years which did not reveal any cause of her fatigue with exertion.  She is also had an ultrasound of the liver which revealed stable cyst.  She was recently seen by her PCP who ordered iron studies which was notable for an elevated ferritin to 246.  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  All other mutations were negative.  She is currently up-to-date on her mammograms and colonoscopy with no active malignancies noted.  She is otherwise compliant with her statin and high blood pressure medicines.  Repeat abdominal imaging in 2021 concerning for enlarging liver lesion.  She was seen by Dr. Sahrma and  is status post open right hepatectomy, cholecystectomy, right partial adrenalectomy on 2021.  Pathology showed a focal R1 resected margin.  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  0/4 lymph nodes were  positive for disease.  LVI and PNI were present.  Finished chemoXRT in January 2021.      Interval History:  Presents to clinic for follow-up.  Completed radiation in May 2023 and again in August 2023.  Scheduled to begin cycle 4 of cisplatin/gemcitabine/nivolumab today.  Continues to have worsening fatigue with treatment.  Denies any nausea or vomiting.  Denies any abdominal pain or discomfort.  Denies any chest pain or shortness of breath    Oncology History:    Oncology/Hematology History   Intrahepatic cholangiocarcinoma   8/17/2021 Initial Diagnosis    Intrahepatic cholangiocarcinoma (CMS/HCC)     8/17/2021 Cancer Staged    Staging form: Intrahepatic Bile Duct, AJCC 8th Edition  - Clinical: Stage IB (cT1b, cN0, cM0) - Signed by Jorje Montenegro MD on 8/17/2021 11/5/2021 Cancer Staged    Staging form: Intrahepatic Bile Duct, AJCC 8th Edition  - Pathologic: Stage IIIA (pT3, pN0, cM0) - Signed by Jorje Montenegro MD on 11/5/2021 12/9/2021 - 1/6/2022 Chemotherapy    OP GALLBLADDER Capecitabine + XRT     12/9/2021 - 1/19/2022 Radiation    Radiation OncologyTreatment Course:  Sheree Hernandez received 5040 cGy in 28 fractions to liver via External Beam Radiation - EBRT.     4/25/2023 - 5/8/2023 Radiation    Radiation OncologyTreatment Course:  Sheree Hernandez received 3500 cGy in 5 fractions to liver mass and lymph nodes via Stereotactic Radiation Therapy - SRT.     8/1/2023 - 8/11/2023 Radiation    Radiation OncologyTreatment Course:  Sheree Hernandez received 3500 cGy in 5 fractions to abdomen via Stereotactic Radiation Therapy - SRT.     1/9/2024 - 1/23/2024 Radiation    Radiation OncologyTreatment Course:  Sheree Hernandez received 3000 cGy in 5 fractions to abdomen via Stereotactic Radiation Therapy - SRT.     1/11/2024 - 1/11/2024 Radiation    Radiation OncologyTreatment Course:  Sheree Hernandez received 754 cGy in 1 fractions to left lung via Stereotactic Radiation Therapy - SRT.     2/1/2024 -  2/1/2024 Radiation    Radiation OncologyTreatment Course:  Sheree Hernandez received 3000 cGy in 1 fractions to left lung via Stereotactic Radiation Therapy - SRT.     4/19/2024 -  Chemotherapy    OP HEPATOBILIARY CISplatin + Gemcitabine Days 1,8 / Durvalumab Q21D     5/17/2024 -  Chemotherapy    OP CENTRAL VENOUS ACCESS DEVICE Access, Care, and Maintenance (CVAD)         Subjective      Review of Systems:   Constitutional: Negative for fevers, chills, or weight loss  Eyes: Negative for blurred vision or discharge         Ear/Nose/Throat: Negative for difficulty swallowing, sore throat, LAD                                                       Respiratory: Negative for cough, SOA, wheezing                                                                                        Cardiovascular: Negative for chest pain or palpitations                                                                  Gastrointestinal: Negative for nausea, vomiting or diarrhea                                                                     Genitourinary: Negative for dysuria or hematuria                                                                                           Musculoskeletal: Negative for any joint pains or muscle aches                                                                        Neurologic: Negative for any weakness, headaches, dizziness                                                                         Hematologic: Negative for any easy bleeding or bruising                                                                                   Psychiatric: Negative for anxiety or depression                          Past Medical History/Past Surgical History/ Family History/ Social History: Reviewed by me and unchanged from my previous documentation done on June 2024.     Medications:     Current Outpatient Medications:     Cholecalciferol (Vitamin D3) 25 MCG (1000 UT) capsule, Take  by mouth Daily., Disp: ,  "Rfl:     coenzyme Q10 50 MG capsule capsule, Take  by mouth Daily., Disp: , Rfl:     Cyanocobalamin (VITAMIN B 12 PO), Take  by mouth., Disp: , Rfl:     doxycycline (VIBRAMYCIN) 100 MG capsule, Take 1 capsule by mouth 2 (Two) Times a Day., Disp: 14 capsule, Rfl: 0    Folic Acid 0.8 MG capsule, Take  by mouth., Disp: , Rfl:     ipratropium (ATROVENT) 0.06 % nasal spray, Administer 1 spray into each nostril twice daily, Disp: 30 mL, Rfl: 3    lidocaine-prilocaine (EMLA) 2.5-2.5 % cream, Please apply to port site 30 min prior to infusion and cover with Saran Wrap, Disp: 30 g, Rfl: 3    magnesium oxide (MAG-OX) 400 MG tablet, Take 1 tablet by mouth Daily., Disp: , Rfl:     OLANZapine (zyPREXA) 5 MG tablet, Take 1 tablet by mouth Every Night. Take on days 1, 2, 3, and 4 and Days 8, 9, 10, 11 after chemotherapy., Disp: 8 tablet, Rfl: 7    Omega-3 1000 MG capsule, Take  by mouth Daily., Disp: , Rfl:     rosuvastatin (CRESTOR) 10 MG tablet, Take 1 tablet by mouth Daily., Disp: 90 tablet, Rfl: 3    Allergies:   Allergies   Allergen Reactions    Pravastatin Myalgia       Objective     Physical Exam:  Vital Signs:   Vitals:    06/28/24 0835   BP: 144/73   Pulse: 105   Temp: 97.3 °F (36.3 °C)   TempSrc: Temporal   SpO2: 100%   Weight: 71.2 kg (157 lb)   Height: 162.6 cm (64.02\")   PainSc: 0-No pain     Pain Score    06/28/24 0835   PainSc: 0-No pain     ECOG Performance Status: 1 - Symptomatic but completely ambulatory    Constitutional: NAD, ECOG 1  Eyes: PERRLA, scleral anicteric  ENT: No LAD, no thyromegaly  Respiratory: CTAB, no wheezing, rales, rhonchi  Cardiovascular: RRR, no murmurs, pulses 2+ bilaterally  Abdomen: soft, NT/ND, no HSM  Musculoskeletal: strength 5/5 bilaterally, no c/c/e  Neurologic: A&O x 3, CN II-XII intact grossly    Results Review:   Hospital Outpatient Visit on 06/28/2024   Component Date Value Ref Range Status    WBC 06/28/2024 5.46  3.40 - 10.80 10*3/mm3 Final    RBC 06/28/2024 2.72 (L)  3.77 - " 5.28 10*6/mm3 Final    Hemoglobin 06/28/2024 9.0 (L)  12.0 - 15.9 g/dL Final    Hematocrit 06/28/2024 27.6 (L)  34.0 - 46.6 % Final    MCV 06/28/2024 101.5 (H)  79.0 - 97.0 fL Final    MCH 06/28/2024 33.1 (H)  26.6 - 33.0 pg Final    MCHC 06/28/2024 32.6  31.5 - 35.7 g/dL Final    RDW 06/28/2024 19.4 (H)  12.3 - 15.4 % Final    RDW-SD 06/28/2024 70.9 (H)  37.0 - 54.0 fl Final    MPV 06/28/2024 12.3 (H)  6.0 - 12.0 fL Final    Platelets 06/28/2024 121 (L)  140 - 450 10*3/mm3 Final    Neutrophil % 06/28/2024 64.1  42.7 - 76.0 % Final    Lymphocyte % 06/28/2024 13.0 (L)  19.6 - 45.3 % Final    Monocyte % 06/28/2024 20.7 (H)  5.0 - 12.0 % Final    Eosinophil % 06/28/2024 0.9  0.3 - 6.2 % Final    Basophil % 06/28/2024 0.4  0.0 - 1.5 % Final    Immature Grans % 06/28/2024 0.9 (H)  0.0 - 0.5 % Final    Neutrophils, Absolute 06/28/2024 3.50  1.70 - 7.00 10*3/mm3 Final    Lymphocytes, Absolute 06/28/2024 0.71  0.70 - 3.10 10*3/mm3 Final    Monocytes, Absolute 06/28/2024 1.13 (H)  0.10 - 0.90 10*3/mm3 Final    Eosinophils, Absolute 06/28/2024 0.05  0.00 - 0.40 10*3/mm3 Final    Basophils, Absolute 06/28/2024 0.02  0.00 - 0.20 10*3/mm3 Final    Immature Grans, Absolute 06/28/2024 0.05  0.00 - 0.05 10*3/mm3 Final       CT Abdomen Pelvis With Contrast    Result Date: 6/27/2024  Narrative: CT CHEST W CONTRAST DIAGNOSTIC, CT ABDOMEN PELVIS W CONTRAST Date of Exam: 6/24/2024 3:35 PM EDT Indication: cholangiocarcinoma. Comparison: 4/1/2024 Technique: Axial CT images were obtained of the chest, abdomen and pelvis after the uneventful intravenous administration of Isovue nonionic contrast.  Reconstructed coronal and sagittal images were also obtained. Automated exposure control and iterative  construction methods were used. Findings: Chest: Stable 1.6 x 1.1 cm prevascular mediastinal lymph node with central low density suggesting necrosis. Trace fluid in the superior pericardial recess. 1.7 x 0.8 cm high right paratracheal lymph node  appears stable. No other mediastinal lymphadenopathy. There is a stable 1.3 cm indeterminate hypodense left thyroid nodule. Multiple pulmonary nodules again noted: Stable 7 mm nodule in the left lower lobe on image #45 of series 2. Stable 6 mm nodule in the right middle lobe base on image #65 series 2. Stable 6 mm nodule in the right lower lobe on image #65 of today's study, series 2. Stable 5 mm right middle lobe nodule on image #64 of series 2. Stable 4 mm nodule in the right upper lobe posteriorly on image #32 of today's study. Stable 5 mm nodule in the right lower lobe on image #75 of series 2. Stable 4 mm nodule in the left upper lobe on image #37. Stable 5 mm subpleural nodule in the posterior left upper lobe apex on image #17 of the axial series. Additional scattered 3 mm micronodules appear unchanged. No definite new or enlarging nodules identified on today's study. Central airways are grossly patent. No pleural fluid identified. Coronary and other vascular calcification is noted. There is a right IJ Meeopa-t-Evld catheter noted. There is degenerative change in the spine. No aggressive osseous lesions are identified. Abdomen and pelvis: Stable 1.4 cm hypodense peripherally enhancing lesion in the posterior aspect of the left lateral hepatic lobe on image #40. Other fluid density lesions appear stable and may be due to cysts. No new or enlarging suspicious hepatic lesions identified. There is postsurgical change from right hepatectomy again noted. The spleen, pancreas, kidneys have a grossly normal appearance. Right adrenal is not identified. Retrocaval lymph node on image #41 of series 2 appears stable, measuring 1.6 x 1.3 cm. The subcentimeter periaortic lymph node on image #59 of today's study is slightly decreased, measuring 7 mm in short axis dimension. Additional subcentimeter aortocaval lymph node is unchanged on image #62. No evidence of bowel obstruction, free air or free fluid. Atherosclerotic  vascular calcifications noted in the aorta and its branches. Patient is status post hysterectomy. Urinary bladder is grossly unremarkable. There is degenerative change in the hips and spine. No aggressive osseous lesions are identified.     Impression: Impression: 1. In the thorax, stable multiple pulmonary nodules and mediastinal adenopathy. No evidence of progression of disease within the thorax. 2. Stable 1.4 cm hypodense left hepatic lobe lesion, presumably metastatic. 3. Retroperitoneal lymph nodes are either stable or slightly decreased. Please see above discussion. 4. Additional findings as given above. Electronically Signed: Davidson Damian MD  6/27/2024 12:43 PM EDT  Workstation ID: [id number]    CT Chest With Contrast Diagnostic    Result Date: 6/27/2024  Narrative: CT CHEST W CONTRAST DIAGNOSTIC, CT ABDOMEN PELVIS W CONTRAST Date of Exam: 6/24/2024 3:35 PM EDT Indication: cholangiocarcinoma. Comparison: 4/1/2024 Technique: Axial CT images were obtained of the chest, abdomen and pelvis after the uneventful intravenous administration of Isovue nonionic contrast.  Reconstructed coronal and sagittal images were also obtained. Automated exposure control and iterative  construction methods were used. Findings: Chest: Stable 1.6 x 1.1 cm prevascular mediastinal lymph node with central low density suggesting necrosis. Trace fluid in the superior pericardial recess. 1.7 x 0.8 cm high right paratracheal lymph node appears stable. No other mediastinal lymphadenopathy. There is a stable 1.3 cm indeterminate hypodense left thyroid nodule. Multiple pulmonary nodules again noted: Stable 7 mm nodule in the left lower lobe on image #45 of series 2. Stable 6 mm nodule in the right middle lobe base on image #65 series 2. Stable 6 mm nodule in the right lower lobe on image #65 of today's study, series 2. Stable 5 mm right middle lobe nodule on image #64 of series 2. Stable 4 mm nodule in the right upper lobe posteriorly on image  #32 of today's study. Stable 5 mm nodule in the right lower lobe on image #75 of series 2. Stable 4 mm nodule in the left upper lobe on image #37. Stable 5 mm subpleural nodule in the posterior left upper lobe apex on image #17 of the axial series. Additional scattered 3 mm micronodules appear unchanged. No definite new or enlarging nodules identified on today's study. Central airways are grossly patent. No pleural fluid identified. Coronary and other vascular calcification is noted. There is a right IJ Zqswwq-k-Zbxh catheter noted. There is degenerative change in the spine. No aggressive osseous lesions are identified. Abdomen and pelvis: Stable 1.4 cm hypodense peripherally enhancing lesion in the posterior aspect of the left lateral hepatic lobe on image #40. Other fluid density lesions appear stable and may be due to cysts. No new or enlarging suspicious hepatic lesions identified. There is postsurgical change from right hepatectomy again noted. The spleen, pancreas, kidneys have a grossly normal appearance. Right adrenal is not identified. Retrocaval lymph node on image #41 of series 2 appears stable, measuring 1.6 x 1.3 cm. The subcentimeter periaortic lymph node on image #59 of today's study is slightly decreased, measuring 7 mm in short axis dimension. Additional subcentimeter aortocaval lymph node is unchanged on image #62. No evidence of bowel obstruction, free air or free fluid. Atherosclerotic vascular calcifications noted in the aorta and its branches. Patient is status post hysterectomy. Urinary bladder is grossly unremarkable. There is degenerative change in the hips and spine. No aggressive osseous lesions are identified.     Impression: Impression: 1. In the thorax, stable multiple pulmonary nodules and mediastinal adenopathy. No evidence of progression of disease within the thorax. 2. Stable 1.4 cm hypodense left hepatic lobe lesion, presumably metastatic. 3. Retroperitoneal lymph nodes are either  stable or slightly decreased. Please see above discussion. 4. Additional findings as given above. Electronically Signed: Davidson Damian MD  6/27/2024 12:43 PM EDT  Workstation ID: [id number]     Assessment / Plan      Assessment/Plan:   Intrahepatic cholangiocarcinoma (CMS/HCC) (Primary)  -Noted during her most recent hospitalization with CT scans concerning for an enlarging liver lesion to 7.3 cm that was previously noted to be hemangioma  -Triple phase CT concerning for a solid tumor lesion  -Biopsy consistent with an adenocarcinoma  -CA 19-9 elevated to 84.7  -CT chest as well as the rest of the CT abdomen/pelvis not concerning for distant or metastatic disease  -Status post open right hepatectomy, cholecystectomy, right partial adrenalectomy on 9/23/2021 with Dr. 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  0/4 lymph nodes were positive for disease.  LVI and PNI were present.  Focal R1 resection margin  -Discussed with patient and her daughter that she would benefit from adjuvant chemoXRT using Xeloda.  Discussed side effects including but not limited to immunosuppression, diarrhea, abdominal pain, nausea, vomiting, hand/foot syndrome  -Repeat CA 19-9 (22) in November 2021  -Completed chemo XRT with Xeloda in January 2021  -Repeat scans in March 2022 without any evidence of recurrent or metastatic disease  -Repeat CT C/A/P in June 2022 without evidence of recurrent or metastatic disease.  Did note some IVC stenosis which we will monitor with her next scans  -Repeat CT C/A/P in September 2022 reviewed without evidence of recurrent disease or metastatic disease.  IVC stenosis overall stable  -Repeat CT C/A/P in December 2022 reviewed without evidence of recurrent or metastatic disease.  CA 19-9 within normal limits  -Repeat CT C/A/P in March 2023 reviewed and notable for some slight enlarging retroperitoneal  adenopathy.  CA 19-9 within normal limits  -PET/CT concerning for 1 cm left liver lesion that was only slightly PET avid as well as 2 lymph nodes that were also slightly PET avid  -Previously discussed her case at tumor board and with Dr. Sharma we agreed that she likely had disease progression  -Status post radiation completed in May 2023  -CA 19-9 in June 2022 within normal limits.    -CT C/A/P in July 2023 reviewed and showed some interval decrease in some lymph nodes as well as some slight enlargement of a couple lymph nodes.  -Completed radiation in August 2023  -CT C/A/P in October 2023 reviewed and only notable for slight increase in some pulmonary nodules about 1 mm each  -CT C/A/P December 2023 reviewed and notable for enlargement of her aortocaval lymph node.  CA 19-9 stable  -Completed radiation with Dr. Billings in February 2024  -CT C/A/P in April 2024 reviewed and showing continued slight enlargement of multiple pulmonary nodules as well as a liver lesion.  CA 19-9 stable  -CT C/A/P inJune 2024 reviewed and showing overall stable disease  -Scheduled begin cycle 4 of cisplatin/gemcitabine/durvalumab today.  Dose reduced by 20% with cycle 2 due to significant thrombocytopenia with cycle 1.  Clinically appropriate for treatment.  Labs reviewed and appropriate for treatment     Hemochromatosis carrier  -Noted on recent labs with an elevated ferritin to 246 hemochromatosis gene profile positive for heterozygosity of H63D.  Other genes negative.  Given patient's age and previous cardiac liver work-up showing no evidence of dysfunction, it is unlikely that she has had iron deposition all this time.  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  Treatment as  above  -ECHO in July 2020 within normal limits  -Repeat iron studies in April 2021 stable with a ferritin of 229, iron level 100, transferrin saturation 27%  -Low concern for iron overload at this time.      Thyroid nodule  -Incidentally noted on CT scan but enlarging from previous CT  -Thyroid ultrasound in June 2022 only notable for goiter and small nodules nonconcerning for malignancy  -Has been seen by endocrinology with plan for repeat ultrasound in the summer of next year     Other fatigue   -Continued at this time  -Previously checked iron studies, vitamin B12, folate, thyroid studies within normal limits  -Was previously been seen by cardiology with a normal ECHO and stress test  -Likely related to all her cancer related treatments  -Advised patient to continue activity as tolerated  -No improvement with prednisone 5 mg daily  -Patient with a prescription of Ritalin but has not taken yet.  Advised to start 5 mg twice daily    Anemia  -Likely related to her chemotherapy  -Will check iron studies today and consider IV iron infusions based off results  -If her iron studies are stable, will consider 1-2 units PRBC transfusions within the next week    Follow Up:   Follow-up in 3 weeks    Jorje Montenegro MD  Hematology and Oncology     Please note that portions of this note may have been completed with a voice recognition program. Efforts were made to edit the dictations, but occasionally words are mistranscribed.

## 2024-06-28 NOTE — TELEPHONE ENCOUNTER
Spoke with patient and patients daughter regarding holter monitor results and new afib diagnosis. Patient is agreeable to plan. Patient will check with insurance about deductible. Free 30 day card for Eliquis sent via statusboom.

## 2024-06-28 NOTE — PROGRESS NOTES
Left voicemail for patient.  Holter monitor returned today with new diagnosis of A-fib with RVR.  Would recommend rate control with metoprolol 25 mg twice daily.  Would recommend anticoagulation.  Would recommend the lower dosing 2.5 mg Eliquis twice daily at this time due to recent thrombocytopenia.  Patient's age is greater than 79.  Weight is not currently under 60 kg.  Asked patient to call back to our office and we can discuss further.

## 2024-07-01 ENCOUNTER — HOSPITAL ENCOUNTER (OUTPATIENT)
Dept: ONCOLOGY | Facility: HOSPITAL | Age: 84
Discharge: HOME | End: 2024-07-01
Payer: MEDICARE

## 2024-07-01 VITALS
HEART RATE: 93 BPM | WEIGHT: 164 LBS | TEMPERATURE: 97.2 F | DIASTOLIC BLOOD PRESSURE: 55 MMHG | HEIGHT: 64 IN | SYSTOLIC BLOOD PRESSURE: 105 MMHG | BODY MASS INDEX: 28 KG/M2 | RESPIRATION RATE: 18 BRPM

## 2024-07-01 DIAGNOSIS — C22.1 INTRAHEPATIC CHOLANGIOCARCINOMA: Primary | ICD-10-CM

## 2024-07-01 PROCEDURE — 25010000002 HEPARIN LOCK FLUSH PER 10 UNITS: Performed by: INTERNAL MEDICINE

## 2024-07-01 PROCEDURE — 36430 TRANSFUSION BLD/BLD COMPNT: CPT

## 2024-07-01 PROCEDURE — 86900 BLOOD TYPING SEROLOGIC ABO: CPT

## 2024-07-01 PROCEDURE — P9016 RBC LEUKOCYTES REDUCED: HCPCS

## 2024-07-01 RX ORDER — SODIUM CHLORIDE 9 MG/ML
250 INJECTION, SOLUTION INTRAVENOUS AS NEEDED
Status: DISCONTINUED | OUTPATIENT
Start: 2024-07-01 | End: 2024-07-02 | Stop reason: HOSPADM

## 2024-07-01 RX ORDER — HEPARIN SODIUM (PORCINE) LOCK FLUSH IV SOLN 100 UNIT/ML 100 UNIT/ML
500 SOLUTION INTRAVENOUS AS NEEDED
Status: CANCELLED | OUTPATIENT
Start: 2024-07-01

## 2024-07-01 RX ORDER — HEPARIN SODIUM (PORCINE) LOCK FLUSH IV SOLN 100 UNIT/ML 100 UNIT/ML
500 SOLUTION INTRAVENOUS AS NEEDED
Status: DISCONTINUED | OUTPATIENT
Start: 2024-07-01 | End: 2024-07-02 | Stop reason: HOSPADM

## 2024-07-01 RX ADMIN — HEPARIN 500 UNITS: 100 SYRINGE at 13:20

## 2024-07-02 LAB
BH BB BLOOD EXPIRATION DATE: NORMAL
BH BB BLOOD TYPE BARCODE: 5100
BH BB DISPENSE STATUS: NORMAL
BH BB PRODUCT CODE: NORMAL
BH BB UNIT NUMBER: NORMAL
CROSSMATCH INTERPRETATION: NORMAL
UNIT  ABO: NORMAL
UNIT  RH: NORMAL

## 2024-07-03 ENCOUNTER — TELEPHONE (OUTPATIENT)
Dept: CARDIOLOGY | Facility: CLINIC | Age: 84
End: 2024-07-03
Payer: MEDICARE

## 2024-07-03 NOTE — TELEPHONE ENCOUNTER
"Per Faith Jackson:   \"This patient had a monitor and was newly diagnosed with atrial fibrillation. She does not have an appointment until November. It is not clear whether she is seeing Graham or Eliza but he is the one that had someone call and put her on Eliquis. I think she needs an appointment to probably be seen in the next 4 weeks with him.\"    Appt made for 7/23 at 10:20am w/ Zayda Guevara.  "

## 2024-07-05 ENCOUNTER — HOSPITAL ENCOUNTER (OUTPATIENT)
Dept: ONCOLOGY | Facility: HOSPITAL | Age: 84
Discharge: HOME | End: 2024-07-05
Payer: MEDICARE

## 2024-07-05 ENCOUNTER — APPOINTMENT (OUTPATIENT)
Dept: ONCOLOGY | Facility: HOSPITAL | Age: 84
End: 2024-07-05
Payer: MEDICARE

## 2024-07-05 VITALS
DIASTOLIC BLOOD PRESSURE: 72 MMHG | WEIGHT: 160 LBS | HEIGHT: 64 IN | SYSTOLIC BLOOD PRESSURE: 165 MMHG | TEMPERATURE: 98.1 F | HEART RATE: 74 BPM | BODY MASS INDEX: 27.31 KG/M2 | RESPIRATION RATE: 18 BRPM

## 2024-07-05 DIAGNOSIS — C22.1 INTRAHEPATIC CHOLANGIOCARCINOMA: Primary | ICD-10-CM

## 2024-07-05 LAB
ALBUMIN SERPL-MCNC: 3.4 G/DL (ref 3.5–5.2)
ALBUMIN/GLOB SERPL: 1.4 G/DL
ALP SERPL-CCNC: 185 U/L (ref 39–117)
ALT SERPL W P-5'-P-CCNC: 25 U/L (ref 1–33)
ANION GAP SERPL CALCULATED.3IONS-SCNC: 12 MMOL/L (ref 5–15)
AST SERPL-CCNC: 24 U/L (ref 1–32)
BASOPHILS # BLD AUTO: 0.01 10*3/MM3 (ref 0–0.2)
BASOPHILS NFR BLD AUTO: 0.3 % (ref 0–1.5)
BILIRUB SERPL-MCNC: 0.2 MG/DL (ref 0–1.2)
BUN SERPL-MCNC: 17 MG/DL (ref 8–23)
BUN/CREAT SERPL: 17.3 (ref 7–25)
CALCIUM SPEC-SCNC: 8.7 MG/DL (ref 8.6–10.5)
CHLORIDE SERPL-SCNC: 103 MMOL/L (ref 98–107)
CO2 SERPL-SCNC: 22 MMOL/L (ref 22–29)
CREAT SERPL-MCNC: 0.98 MG/DL (ref 0.57–1)
DEPRECATED RDW RBC AUTO: 65.2 FL (ref 37–54)
EGFRCR SERPLBLD CKD-EPI 2021: 57 ML/MIN/1.73
EOSINOPHIL # BLD AUTO: 0.04 10*3/MM3 (ref 0–0.4)
EOSINOPHIL NFR BLD AUTO: 1.3 % (ref 0.3–6.2)
ERYTHROCYTE [DISTWIDTH] IN BLOOD BY AUTOMATED COUNT: 18.3 % (ref 12.3–15.4)
GLOBULIN UR ELPH-MCNC: 2.5 GM/DL
GLUCOSE SERPL-MCNC: 97 MG/DL (ref 65–99)
HCT VFR BLD AUTO: 32.7 % (ref 34–46.6)
HGB BLD-MCNC: 10.6 G/DL (ref 12–15.9)
IMM GRANULOCYTES # BLD AUTO: 0.24 10*3/MM3 (ref 0–0.05)
IMM GRANULOCYTES NFR BLD AUTO: 7.5 % (ref 0–0.5)
LYMPHOCYTES # BLD AUTO: 0.75 10*3/MM3 (ref 0.7–3.1)
LYMPHOCYTES NFR BLD AUTO: 23.5 % (ref 19.6–45.3)
MAGNESIUM SERPL-MCNC: 2 MG/DL (ref 1.6–2.4)
MCH RBC QN AUTO: 31.8 PG (ref 26.6–33)
MCHC RBC AUTO-ENTMCNC: 32.4 G/DL (ref 31.5–35.7)
MCV RBC AUTO: 98.2 FL (ref 79–97)
MONOCYTES # BLD AUTO: 0.49 10*3/MM3 (ref 0.1–0.9)
MONOCYTES NFR BLD AUTO: 15.4 % (ref 5–12)
NEUTROPHILS NFR BLD AUTO: 1.66 10*3/MM3 (ref 1.7–7)
NEUTROPHILS NFR BLD AUTO: 52 % (ref 42.7–76)
PLATELET # BLD AUTO: 155 10*3/MM3 (ref 140–450)
PMV BLD AUTO: 11.3 FL (ref 6–12)
POTASSIUM SERPL-SCNC: 4.7 MMOL/L (ref 3.5–5.2)
PROT SERPL-MCNC: 5.9 G/DL (ref 6–8.5)
RBC # BLD AUTO: 3.33 10*6/MM3 (ref 3.77–5.28)
SODIUM SERPL-SCNC: 137 MMOL/L (ref 136–145)
WBC NRBC COR # BLD AUTO: 3.19 10*3/MM3 (ref 3.4–10.8)

## 2024-07-05 PROCEDURE — 25010000002 CISPLATIN PER 50 MG: Performed by: INTERNAL MEDICINE

## 2024-07-05 PROCEDURE — 85025 COMPLETE CBC W/AUTO DIFF WBC: CPT | Performed by: INTERNAL MEDICINE

## 2024-07-05 PROCEDURE — 25010000002 MAGNESIUM SULFATE PER 500 MG OF MAGNESIUM: Performed by: INTERNAL MEDICINE

## 2024-07-05 PROCEDURE — 25010000002 POTASSIUM CHLORIDE PER 2 MEQ OF POTASSIUM: Performed by: INTERNAL MEDICINE

## 2024-07-05 PROCEDURE — 25010000002 HEPARIN LOCK FLUSH PER 10 UNITS: Performed by: INTERNAL MEDICINE

## 2024-07-05 PROCEDURE — 25010000002 FOSAPREPITANT PER 1 MG: Performed by: INTERNAL MEDICINE

## 2024-07-05 PROCEDURE — 25810000003 SODIUM CHLORIDE 0.9 % SOLUTION 250 ML FLEX CONT: Performed by: INTERNAL MEDICINE

## 2024-07-05 PROCEDURE — 96413 CHEMO IV INFUSION 1 HR: CPT

## 2024-07-05 PROCEDURE — 96367 TX/PROPH/DG ADDL SEQ IV INF: CPT

## 2024-07-05 PROCEDURE — 96375 TX/PRO/DX INJ NEW DRUG ADDON: CPT

## 2024-07-05 PROCEDURE — 25010000002 DEXAMETHASONE SODIUM PHOSPHATE 100 MG/10ML SOLUTION: Performed by: INTERNAL MEDICINE

## 2024-07-05 PROCEDURE — 25010000002 GEMCITABINE 200 MG/5.26ML SOLUTION 5.26 ML VIAL: Performed by: INTERNAL MEDICINE

## 2024-07-05 PROCEDURE — 96376 TX/PRO/DX INJ SAME DRUG ADON: CPT

## 2024-07-05 PROCEDURE — 25010000002 PALONOSETRON PER 25 MCG: Performed by: INTERNAL MEDICINE

## 2024-07-05 PROCEDURE — 25810000003 SODIUM CHLORIDE 0.9 % SOLUTION: Performed by: INTERNAL MEDICINE

## 2024-07-05 PROCEDURE — 96417 CHEMO IV INFUS EACH ADDL SEQ: CPT

## 2024-07-05 PROCEDURE — 80053 COMPREHEN METABOLIC PANEL: CPT | Performed by: INTERNAL MEDICINE

## 2024-07-05 PROCEDURE — 96366 THER/PROPH/DIAG IV INF ADDON: CPT

## 2024-07-05 PROCEDURE — 25010000002 GEMCITABINE 1 GM/26.3ML SOLUTION 26.3 ML VIAL: Performed by: INTERNAL MEDICINE

## 2024-07-05 PROCEDURE — 83735 ASSAY OF MAGNESIUM: CPT | Performed by: INTERNAL MEDICINE

## 2024-07-05 RX ORDER — PALONOSETRON 0.05 MG/ML
0.25 INJECTION, SOLUTION INTRAVENOUS ONCE
Status: COMPLETED | OUTPATIENT
Start: 2024-07-05 | End: 2024-07-05

## 2024-07-05 RX ORDER — SODIUM CHLORIDE 9 MG/ML
20 INJECTION, SOLUTION INTRAVENOUS ONCE
Status: COMPLETED | OUTPATIENT
Start: 2024-07-05 | End: 2024-07-05

## 2024-07-05 RX ORDER — HEPARIN SODIUM (PORCINE) LOCK FLUSH IV SOLN 100 UNIT/ML 100 UNIT/ML
500 SOLUTION INTRAVENOUS AS NEEDED
Status: DISCONTINUED | OUTPATIENT
Start: 2024-07-05 | End: 2024-07-06 | Stop reason: HOSPADM

## 2024-07-05 RX ORDER — HEPARIN SODIUM (PORCINE) LOCK FLUSH IV SOLN 100 UNIT/ML 100 UNIT/ML
500 SOLUTION INTRAVENOUS AS NEEDED
OUTPATIENT
Start: 2024-07-05

## 2024-07-05 RX ADMIN — CISPLATIN 36 MG: 1 INJECTION, SOLUTION INTRAVENOUS at 11:04

## 2024-07-05 RX ADMIN — POTASSIUM CHLORIDE 500 ML: 2 INJECTION, SOLUTION, CONCENTRATE INTRAVENOUS at 12:20

## 2024-07-05 RX ADMIN — DEXAMETHASONE SODIUM PHOSPHATE 12 MG: 10 INJECTION, SOLUTION INTRAMUSCULAR; INTRAVENOUS at 09:35

## 2024-07-05 RX ADMIN — GEMCITABINE 1400 MG: 38 INJECTION, SOLUTION INTRAVENOUS at 10:21

## 2024-07-05 RX ADMIN — POTASSIUM CHLORIDE 500 ML: 2 INJECTION, SOLUTION, CONCENTRATE INTRAVENOUS at 08:34

## 2024-07-05 RX ADMIN — PALONOSETRON HYDROCHLORIDE 0.25 MG: 0.25 INJECTION INTRAVENOUS at 09:34

## 2024-07-05 RX ADMIN — HEPARIN 500 UNITS: 100 SYRINGE at 13:22

## 2024-07-05 RX ADMIN — FOSAPREPITANT DIMEGLUMINE 150 MG: 150 INJECTION, POWDER, LYOPHILIZED, FOR SOLUTION INTRAVENOUS at 09:35

## 2024-07-05 RX ADMIN — SODIUM CHLORIDE 20 ML/HR: 9 INJECTION, SOLUTION INTRAVENOUS at 09:34

## 2024-07-12 RX ORDER — FLECAINIDE ACETATE 50 MG/1
50 TABLET ORAL 2 TIMES DAILY
Qty: 180 TABLET | Refills: 1 | Status: SHIPPED | OUTPATIENT
Start: 2024-07-12

## 2024-07-13 DIAGNOSIS — C22.1 INTRAHEPATIC CHOLANGIOCARCINOMA: ICD-10-CM

## 2024-07-15 ENCOUNTER — CLINICAL SUPPORT (OUTPATIENT)
Dept: CARDIOLOGY | Facility: CLINIC | Age: 84
End: 2024-07-15
Payer: MEDICARE

## 2024-07-15 DIAGNOSIS — I49.3 PVC (PREMATURE VENTRICULAR CONTRACTION): Primary | ICD-10-CM

## 2024-07-15 PROBLEM — I48.0 PAF (PAROXYSMAL ATRIAL FIBRILLATION): Status: ACTIVE | Noted: 2024-07-15

## 2024-07-15 PROCEDURE — 93000 ELECTROCARDIOGRAM COMPLETE: CPT | Performed by: INTERNAL MEDICINE

## 2024-07-15 RX ORDER — OLANZAPINE 5 MG/1
5 TABLET ORAL NIGHTLY
Qty: 8 TABLET | Refills: 7 | Status: SHIPPED | OUTPATIENT
Start: 2024-07-15

## 2024-07-15 NOTE — PROGRESS NOTES
Subjective:     Encounter Date:07/23/2024      Patient ID: Sheree Hernandez is a 83 y.o.  white female from [place], [place].     PHYSICIAN: Timi Conway DO.  CARDIOLOGIST: Carlos Kay MD  ONCOLOGIST: Jorje Montenegro MD.    Chief Complaint:   Chief Complaint   Patient presents with    Dyspnea on exertion     Patient complains of fatigue     Problem List:  Hypertension  Abnormal stress test-data deficit  Left heart catheterization 2019: RCA 20% stenosis, otherwise normal coronaries, EF 60%  Echocardiogram 7/24/2020: EF 65%  Exercise stress test 1/4/2023: LVEF greater than 70%, normal myocardial perfusion study with no evidence of ischemia, CT portion of the exam with no significant coronary artery calcification.  Incidentally detected dilated loops of bowel noted, surgical clips noted in the liver.  Echocardiogram 1/13/2023: LVEF 61-65%, mild calcification of the aortic valve, hypertrophy of the intra-atrial septum noted, IVSD 0.8 cm  Echocardiogram 5/22/2024: LVEF 56 to 60%, GLS -19.5%, moderate left pleural effusion, trace MR  Residual class I symptoms  Hyperlipidemia; intolerant to pravastatin  Intrahepatic cholangiocarcinoma   Status post right hepatectomy cholecystectomy right partial adrenalectomy   Status post chemotherapy and radiation, patient completed Xeloda  CT chest/abdomen/pelvis July 2023 showing mild increase in retroperitoneal lymph nodes, and new small 6mm LLL nodule and minimal enlargement of nodule in RML. Additional numerous tiny pulmonary nodules stable. CT concerning for metastatic disease  Completed radiation August 2023  Completed radiation February 2024  CT chest/abdomen/pelvis April 2024: Continued slight enlargement of multiple pulmonary nodules as well as liver lesion, CA 19-9 stable  Status post cycle 1 cisplatin/gemcitabine/durvalumab on 4/19/2024, complicated by severe neutropenia and thrombocytopenia, ANC 70, platelets 8, dose reduced 8% for cycle  2.  Hemochromatosis carrier  Obstructive sleep apnea, not on CPAP  Thyroid nodule  PVCs with Holter monitor June 2021 showing occasional PACs, no atrial arrhythmias, 5 episodes of SVT, Holter monitor June 2024 showing new onset atrial fibrillation with RVR, longest episode 10 hours, atrial fibrillation burden 9.2%, ventricular bigeminy and nonsustained runs of VT present with the longest 8 beats.  PVC burden less than 1%, patient started on Eliquis  COVID November 2023.    Allergies   Allergen Reactions    Pravastatin Myalgia       Current Outpatient Medications   Medication Instructions    Cholecalciferol (Vitamin D3) 25 MCG (1000 UT) capsule Oral, Daily    coenzyme Q10 50 MG capsule capsule Oral, Daily    Cyanocobalamin (VITAMIN B 12 PO) Oral    doxycycline (VIBRAMYCIN) 100 mg, Oral, 2 Times Daily    Eliquis 2.5 mg, Oral, 2 Times Daily    Folic Acid 0.8 MG capsule Oral    ipratropium (ATROVENT) 0.06 % nasal spray Administer 1 spray into each nostril twice daily    lidocaine-prilocaine (EMLA) 2.5-2.5 % cream Please apply to port site 30 min prior to infusion and cover with Saran Wrap    magnesium oxide (MAG-OX) 400 mg, Oral, Daily    metoprolol tartrate (LOPRESSOR) 25 mg, Oral, 2 Times Daily    OLANZapine (ZYPREXA) 5 mg, Oral, Nightly, Take on days 1, 2, 3, and 4 and Days 8, 9, 10, 11 after chemotherapy.    Omega-3 1000 MG capsule Oral, Daily    rosuvastatin (CRESTOR) 10 mg, Oral, Daily         HISTORY OF PRESENT ILLNESS:  The patient is here earlier than scheduled appointment.She had an acceptable stress test and echocardiogram in January 2023. She had a CT chest/abdomen/pelvis July 2023 showing mild increase in retroperitoneal lymph nodes, and new small 6mm LLL nodule and minimal enlargement of nodule in RML. Additional numerous tiny pulmonary nodules stable. CT concerning for metastatic disease. She is status continuing on chemotherapy with cisplatin/gemcitabine/durvalumab on 4/19/2024, complicated by severe  "neutropenia and thrombocytopenia, ANC 70, platelets 8, dose reduced 8% for cycle 2. Echocardiogram May 2024 showed normal EF and moderate left pleural effusion.  Chest x-ray did not show any significant fluid accumulation and echocardiogram results overestimated.  Was felt that his shortness of breath and fatigue symptoms were related to chemotherapy treatments.  proBNP was normal.  Patient wore a Holter monitor showing new diagnosis of atrial fibrillation.  She was started on Eliquis 2.5 mg twice daily and metoprolol..  Patient called our office 7/12/2024 with complaints of heart palpitations when walking and she was started on flecainide.  Unfortunately she did not tolerate this and it was stopped shortly thereafter.    Her main issue is fatigue which she feels predates her cancer diagnosis and chemotherapy.  She does have anemia related to her treatment which I think is contributing.  She also does have sleep apnea but does not utilize/tolerate CPAP.  She is in sinus rhythm today.  Previous stress test and cardiac catheterization have not shown obstructive CAD.      ROS   All other systems reviewed and otherwise negative.         Objective:       Vitals:    07/23/24 1139   BP: 142/72   Pulse: 70   SpO2: 98%   Weight: 74.8 kg (164 lb 12.8 oz)   Height: 162.6 cm (64\")     Body mass index is 28.29 kg/m².  Wt Readings from Last 2 Encounters:   07/23/24 74.8 kg (164 lb 12.8 oz)   07/19/24 73 kg (161 lb)        Constitutional:       Appearance: Healthy appearance. Not in distress.   Neck:      Vascular: No JVR. JVD normal.   Pulmonary:      Effort: Pulmonary effort is normal.      Breath sounds: Normal breath sounds. No wheezing. No rhonchi. No rales.   Chest:      Chest wall: Not tender to palpatation.   Cardiovascular:      PMI at left midclavicular line. Normal rate. Regular rhythm. Normal S1. Normal S2.       Murmurs: There is no murmur.      No gallop.  No click. No rub.   Pulses:     Intact distal pulses. "   Edema:     Peripheral edema absent.   Abdominal:      General: Bowel sounds are normal.      Palpations: Abdomen is soft.      Tenderness: There is no abdominal tenderness.   Musculoskeletal: Normal range of motion.         General: No tenderness. Skin:     General: Skin is warm and dry.   Neurological:      General: No focal deficit present.      Mental Status: Alert and oriented to person, place and time.           Lab Review:   Lab Results   Component Value Date    GLUCOSE 97 07/19/2024    BUN 22 07/19/2024    CREATININE 1.10 (H) 07/19/2024    EGFRIFNONA >60 03/17/2022    EGFRIFAFRI >60 03/17/2022    BCR 20.0 07/19/2024    CO2 24.0 07/19/2024    CALCIUM 8.3 (L) 07/19/2024    ALBUMIN 3.6 07/19/2024    AST 20 07/19/2024    ALT 13 07/19/2024       Lab Results   Component Value Date    WBC 4.04 07/19/2024    HGB 9.4 (L) 07/19/2024    HCT 28.2 (L) 07/19/2024    .4 (H) 07/19/2024    PLT 93 (L) 07/19/2024       Lab Results   Component Value Date    HGBA1C 5.60 08/07/2023       Lab Results   Component Value Date    TSH 5.140 (H) 07/19/2024       Lab Results   Component Value Date    CHOL 207 (H) 08/07/2023    CHOL 186 06/02/2022     Lab Results   Component Value Date    TRIG 96 08/07/2023    TRIG 74 06/02/2022     Lab Results   Component Value Date    HDL 79 (H) 08/07/2023    HDL 82 (H) 06/02/2022     Lab Results   Component Value Date     (H) 08/07/2023    LDL 90 06/02/2022           Lab Results   Component Value Date    BNP 58.0 10/24/2018                 Results for orders placed in visit on 05/21/24    Adult Transthoracic Echo Complete W/ Cont if Necessary Per Protocol    Interpretation Summary    Left ventricular ejection fraction appears to be 56 - 60%.    Normal global longitudinal LV strain (GLS) = -19.5%.    There is a moderate sized left pleural effusion.    Trace mitral valve regurgitation is present.       Results for orders placed during the hospital encounter of 11/16/18    Duplex Venous  Lower Extremity - Bilateral CAR    Interpretation Summary  · No evidence of deep or superficial venous thrombosis of the right or left lower extremities.       Advance Care Planning   ACP discussion was held with the patient during this visit. Patient does not have an advance directive, declines further assistance.      Assessment:     Overall continued acceptable course with no new interim cardiopulmonary complaints with acceptable functional status. We will defer additional diagnostic or therapeutic intervention from a cardiac perspective at this time.  She continues on chemotherapy cisplatin/gemcitabine/durvalumab on 4/19/2024 for treatment of intrahepatic cholangiocarcinoma, complicated by severe neutropenia and thrombocytopenia, ANC 70, platelets 8, dose reduced 8% for cycle 2.   Diuretics (loops, thiazides, MRA) have drug-drug interaction with cisplatin by increasing levels of cisplatin).  Echocardiogram May 2024 showed normal EF and moderate left pleural effusion.  Patient with new onset atrial fibrillation June 2024 and was started on flecainide and Eliquis.  She did not tolerate the flecainide so is just on metoprolol now.     Diagnosis Plan   1. PAF (paroxysmal atrial fibrillation)        2. Dyspnea on exertion        3. Primary hypertension        4. Dyslipidemia               Plan:         For atrial fibrillation continue low-dose Eliquis and metoprolol.  Did not tolerate flecainide.  Would would not recommend alternative antiarrhythmics currently.  I think her fatigue is multifactorial related to ongoing chemotherapy, anemia, sleep apnea.  Her cardiac studies recently have showed normal ejection fraction, no ischemia.  Her atrial fibrillation burden was 10% which I do not think accounts for the degree of her fatigue symptoms.  Will continue current cardiac medications and follow-up in about 6 months.

## 2024-07-19 ENCOUNTER — APPOINTMENT (OUTPATIENT)
Dept: ONCOLOGY | Facility: HOSPITAL | Age: 84
End: 2024-07-19
Payer: MEDICARE

## 2024-07-19 ENCOUNTER — HOSPITAL ENCOUNTER (OUTPATIENT)
Dept: ONCOLOGY | Facility: HOSPITAL | Age: 84
Discharge: HOME | End: 2024-07-19
Payer: MEDICARE

## 2024-07-19 ENCOUNTER — OFFICE VISIT (OUTPATIENT)
Dept: ONCOLOGY | Facility: CLINIC | Age: 84
End: 2024-07-19
Payer: MEDICARE

## 2024-07-19 VITALS
HEART RATE: 66 BPM | RESPIRATION RATE: 16 BRPM | WEIGHT: 161 LBS | TEMPERATURE: 97.2 F | BODY MASS INDEX: 27.49 KG/M2 | DIASTOLIC BLOOD PRESSURE: 65 MMHG | SYSTOLIC BLOOD PRESSURE: 121 MMHG | HEIGHT: 64 IN

## 2024-07-19 DIAGNOSIS — C22.1 INTRAHEPATIC CHOLANGIOCARCINOMA: ICD-10-CM

## 2024-07-19 DIAGNOSIS — C22.1 INTRAHEPATIC CHOLANGIOCARCINOMA: Primary | ICD-10-CM

## 2024-07-19 LAB — CANCER AG19-9 SERPL-ACNC: 20.4 U/ML

## 2024-07-19 PROCEDURE — 86301 IMMUNOASSAY TUMOR CA 19-9: CPT | Performed by: INTERNAL MEDICINE

## 2024-07-19 PROCEDURE — 1126F AMNT PAIN NOTED NONE PRSNT: CPT | Performed by: INTERNAL MEDICINE

## 2024-07-19 PROCEDURE — 84443 ASSAY THYROID STIM HORMONE: CPT | Performed by: INTERNAL MEDICINE

## 2024-07-19 PROCEDURE — 96375 TX/PRO/DX INJ NEW DRUG ADDON: CPT

## 2024-07-19 PROCEDURE — 80053 COMPREHEN METABOLIC PANEL: CPT | Performed by: INTERNAL MEDICINE

## 2024-07-19 PROCEDURE — 84439 ASSAY OF FREE THYROXINE: CPT | Performed by: INTERNAL MEDICINE

## 2024-07-19 PROCEDURE — 83735 ASSAY OF MAGNESIUM: CPT | Performed by: INTERNAL MEDICINE

## 2024-07-19 PROCEDURE — 96413 CHEMO IV INFUSION 1 HR: CPT

## 2024-07-19 PROCEDURE — 99214 OFFICE O/P EST MOD 30 MIN: CPT | Performed by: INTERNAL MEDICINE

## 2024-07-19 PROCEDURE — 85025 COMPLETE CBC W/AUTO DIFF WBC: CPT | Performed by: INTERNAL MEDICINE

## 2024-07-19 PROCEDURE — 96417 CHEMO IV INFUS EACH ADDL SEQ: CPT

## 2024-07-19 PROCEDURE — 96367 TX/PROPH/DG ADDL SEQ IV INF: CPT

## 2024-07-19 PROCEDURE — 36415 COLL VENOUS BLD VENIPUNCTURE: CPT | Performed by: INTERNAL MEDICINE

## 2024-07-19 RX ORDER — PALONOSETRON 0.05 MG/ML
0.25 INJECTION, SOLUTION INTRAVENOUS ONCE
OUTPATIENT
Start: 2024-08-16

## 2024-07-19 RX ORDER — PALONOSETRON 0.05 MG/ML
0.25 INJECTION, SOLUTION INTRAVENOUS ONCE
OUTPATIENT
Start: 2024-08-09

## 2024-07-19 RX ORDER — SODIUM CHLORIDE 9 MG/ML
20 INJECTION, SOLUTION INTRAVENOUS ONCE
OUTPATIENT
Start: 2024-08-09

## 2024-07-19 RX ORDER — SODIUM CHLORIDE 9 MG/ML
20 INJECTION, SOLUTION INTRAVENOUS ONCE
OUTPATIENT
Start: 2024-08-16

## 2024-07-19 NOTE — PROGRESS NOTES
Follow Up Office Visit      Date: 2024     Patient Name: Sheree Hernandez  MRN: [id number]  : 1940  Chief Complaint:  Follow-up for intrahepatic cholangiocarcinoma      History of Present Illness: Sheree Hernandez is a pleasant 79 y.o. female with a past medical history of hyperlipidemia and hypertension who presents today for evaluation of concern for hemochromatosis. The patient is accompanied by their self who contributes to the history of their care.  Patient states that over the past 2 years she has been having worsening fatigue especially with exertion.  Over the past several months this has become worse.  Patient states that she gets tired with basic activities including showering getting dressed in the morning and walking to and from her car from stores.  She states that her fatigue gets better after a few minutes of rest.  She has previously had an echocardiogram and cardiac catheterization within the past 2 years which did not reveal any cause of her fatigue with exertion.  She is also had an ultrasound of the liver which revealed stable cyst.  She was recently seen by her PCP who ordered iron studies which was notable for an elevated ferritin to 246.  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  All other mutations were negative.  She is currently up-to-date on her mammograms and colonoscopy with no active malignancies noted.  She is otherwise compliant with her statin and high blood pressure medicines.  Repeat abdominal imaging in 2021 concerning for enlarging liver lesion.  She was seen by Dr. Sharma and  is status post open right hepatectomy, cholecystectomy, right partial adrenalectomy on 2021.  Pathology showed a focal R1 resected margin.  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  0/4 lymph nodes were  positive for disease.  LVI and PNI were present.  Finished chemoXRT in January 2021.      Interval History:  Presents to clinic for follow-up.  Completed radiation in May 2023 and again in August 2023.  Scheduled to begin cycle 5 of cisplatin/gemcitabine/nivolumab today.  Continues to have significant fatigue.  Holter monitor notable for atrial fibrillation.  Was started on metoprolol and flecainide.  Did not tolerate flecainide well and is currently off the medication.  Remains on metoprolol and prophylactic dose apixaban    Oncology History:    Oncology/Hematology History   Intrahepatic cholangiocarcinoma   8/17/2021 Initial Diagnosis    Intrahepatic cholangiocarcinoma (CMS/HCC)     8/17/2021 Cancer Staged    Staging form: Intrahepatic Bile Duct, AJCC 8th Edition  - Clinical: Stage IB (cT1b, cN0, cM0) - Signed by Jorje Montenegro MD on 8/17/2021 11/5/2021 Cancer Staged    Staging form: Intrahepatic Bile Duct, AJCC 8th Edition  - Pathologic: Stage IIIA (pT3, pN0, cM0) - Signed by Jorje Montenegro MD on 11/5/2021 12/9/2021 - 1/6/2022 Chemotherapy    OP GALLBLADDER Capecitabine + XRT     12/9/2021 - 1/19/2022 Radiation    Radiation OncologyTreatment Course:  Sheree Hernandez received 5040 cGy in 28 fractions to liver via External Beam Radiation - EBRT.     4/25/2023 - 5/8/2023 Radiation    Radiation OncologyTreatment Course:  Sheree Hernandez received 3500 cGy in 5 fractions to liver mass and lymph nodes via Stereotactic Radiation Therapy - SRT.     8/1/2023 - 8/11/2023 Radiation    Radiation OncologyTreatment Course:  Sheree Hernandez received 3500 cGy in 5 fractions to abdomen via Stereotactic Radiation Therapy - SRT.     1/9/2024 - 1/23/2024 Radiation    Radiation OncologyTreatment Course:  Sheree Hernandez received 3000 cGy in 5 fractions to abdomen via Stereotactic Radiation Therapy - SRT.     1/11/2024 - 1/11/2024 Radiation    Radiation OncologyTreatment Course:  Sheree Hernandez received 754 cGy in 1  fractions to left lung via Stereotactic Radiation Therapy - SRT.     2/1/2024 - 2/1/2024 Radiation    Radiation OncologyTreatment Course:  Sheree Hernandez received 3000 cGy in 1 fractions to left lung via Stereotactic Radiation Therapy - SRT.     4/19/2024 -  Chemotherapy    OP HEPATOBILIARY CISplatin + Gemcitabine Days 1,8 / Durvalumab Q21D     5/17/2024 -  Chemotherapy    OP CENTRAL VENOUS ACCESS DEVICE Access, Care, and Maintenance (CVAD)         Subjective      Review of Systems:   Constitutional: Negative for fevers, chills, or weight loss  Eyes: Negative for blurred vision or discharge         Ear/Nose/Throat: Negative for difficulty swallowing, sore throat, LAD                                                       Respiratory: Negative for cough, SOA, wheezing                                                                                        Cardiovascular: Negative for chest pain or palpitations                                                                  Gastrointestinal: Negative for nausea, vomiting or diarrhea                                                                     Genitourinary: Negative for dysuria or hematuria                                                                                           Musculoskeletal: Negative for any joint pains or muscle aches                                                                        Neurologic: Negative for any weakness, headaches, dizziness                                                                         Hematologic: Negative for any easy bleeding or bruising                                                                                   Psychiatric: Negative for anxiety or depression                          Past Medical History/Past Surgical History/ Family History/ Social History: Reviewed by me and unchanged from my previous documentation done on June 2024.     Medications:     Current Outpatient Medications:     apixaban  (ELIQUIS) 2.5 MG tablet tablet, Take 1 tablet by mouth 2 (Two) Times a Day., Disp: 60 tablet, Rfl: 11    Cholecalciferol (Vitamin D3) 25 MCG (1000 UT) capsule, Take  by mouth Daily., Disp: , Rfl:     coenzyme Q10 50 MG capsule capsule, Take  by mouth Daily., Disp: , Rfl:     Cyanocobalamin (VITAMIN B 12 PO), Take  by mouth., Disp: , Rfl:     doxycycline (VIBRAMYCIN) 100 MG capsule, Take 1 capsule by mouth 2 (Two) Times a Day., Disp: 14 capsule, Rfl: 0    flecainide (TAMBOCOR) 50 MG tablet, Take 1 tablet by mouth 2 (Two) Times a Day., Disp: 180 tablet, Rfl: 1    Folic Acid 0.8 MG capsule, Take  by mouth., Disp: , Rfl:     ipratropium (ATROVENT) 0.06 % nasal spray, Administer 1 spray into each nostril twice daily, Disp: 30 mL, Rfl: 3    lidocaine-prilocaine (EMLA) 2.5-2.5 % cream, Please apply to port site 30 min prior to infusion and cover with Saran Wrap, Disp: 30 g, Rfl: 3    magnesium oxide (MAG-OX) 400 MG tablet, Take 1 tablet by mouth Daily., Disp: , Rfl:     metoprolol tartrate (LOPRESSOR) 25 MG tablet, Take 1 tablet by mouth 2 (Two) Times a Day., Disp: 60 tablet, Rfl: 11    OLANZapine (zyPREXA) 5 MG tablet, Take 1 tablet by mouth Every Night. Take on days 1, 2, 3, and 4 and Days 8, 9, 10, 11 after chemotherapy., Disp: 8 tablet, Rfl: 7    Omega-3 1000 MG capsule, Take  by mouth Daily., Disp: , Rfl:     rosuvastatin (CRESTOR) 10 MG tablet, Take 1 tablet by mouth Daily., Disp: 90 tablet, Rfl: 3    Allergies:   Allergies   Allergen Reactions    Pravastatin Myalgia       Objective     Physical Exam:  Vital Signs:   Vitals:    07/19/24 1547   PainSc: 0-No pain     Pain Score    07/19/24 1547   PainSc: 0-No pain     ECOG Performance Status: 1 - Symptomatic but completely ambulatory    Constitutional: NAD, ECOG 1  Eyes: PERRLA, scleral anicteric  ENT: No LAD, no thyromegaly  Respiratory: CTAB, no wheezing, rales, rhonchi  Cardiovascular: RRR, no murmurs, pulses 2+ bilaterally  Abdomen: soft, NT/ND, no  HSM  Musculoskeletal: strength 5/5 bilaterally, no c/c/e  Neurologic: A&O x 3, CN II-XII intact grossly    Results Review:   No visits with results within 2 Week(s) from this visit.   Latest known visit with results is:   Hospital Outpatient Visit on 07/05/2024   Component Date Value Ref Range Status    Glucose 07/05/2024 97  65 - 99 mg/dL Final    BUN 07/05/2024 17  8 - 23 mg/dL Final    Creatinine 07/05/2024 0.98  0.57 - 1.00 mg/dL Final    Sodium 07/05/2024 137  136 - 145 mmol/L Final    Potassium 07/05/2024 4.7  3.5 - 5.2 mmol/L Final    Chloride 07/05/2024 103  98 - 107 mmol/L Final    CO2 07/05/2024 22.0  22.0 - 29.0 mmol/L Final    Calcium 07/05/2024 8.7  8.6 - 10.5 mg/dL Final    Total Protein 07/05/2024 5.9 (L)  6.0 - 8.5 g/dL Final    Albumin 07/05/2024 3.4 (L)  3.5 - 5.2 g/dL Final    ALT (SGPT) 07/05/2024 25  1 - 33 U/L Final    AST (SGOT) 07/05/2024 24  1 - 32 U/L Final    Alkaline Phosphatase 07/05/2024 185 (H)  39 - 117 U/L Final    Total Bilirubin 07/05/2024 0.2  0.0 - 1.2 mg/dL Final    Globulin 07/05/2024 2.5  gm/dL Final    Calculated Result    A/G Ratio 07/05/2024 1.4  g/dL Final    BUN/Creatinine Ratio 07/05/2024 17.3  7.0 - 25.0 Final    Anion Gap 07/05/2024 12.0  5.0 - 15.0 mmol/L Final    eGFR 07/05/2024 57.0 (L)  >60.0 mL/min/1.73 Final    Magnesium 07/05/2024 2.0  1.6 - 2.4 mg/dL Final    WBC 07/05/2024 3.19 (L)  3.40 - 10.80 10*3/mm3 Final    RBC 07/05/2024 3.33 (L)  3.77 - 5.28 10*6/mm3 Final    Hemoglobin 07/05/2024 10.6 (L)  12.0 - 15.9 g/dL Final    Hematocrit 07/05/2024 32.7 (L)  34.0 - 46.6 % Final    MCV 07/05/2024 98.2 (H)  79.0 - 97.0 fL Final    MCH 07/05/2024 31.8  26.6 - 33.0 pg Final    MCHC 07/05/2024 32.4  31.5 - 35.7 g/dL Final    RDW 07/05/2024 18.3 (H)  12.3 - 15.4 % Final    RDW-SD 07/05/2024 65.2 (H)  37.0 - 54.0 fl Final    MPV 07/05/2024 11.3  6.0 - 12.0 fL Final    Platelets 07/05/2024 155  140 - 450 10*3/mm3 Final    Neutrophil % 07/05/2024 52.0  42.7 - 76.0 % Final     Lymphocyte % 07/05/2024 23.5  19.6 - 45.3 % Final    Monocyte % 07/05/2024 15.4 (H)  5.0 - 12.0 % Final    Eosinophil % 07/05/2024 1.3  0.3 - 6.2 % Final    Basophil % 07/05/2024 0.3  0.0 - 1.5 % Final    Immature Grans % 07/05/2024 7.5 (H)  0.0 - 0.5 % Final    Neutrophils, Absolute 07/05/2024 1.66 (L)  1.70 - 7.00 10*3/mm3 Final    Lymphocytes, Absolute 07/05/2024 0.75  0.70 - 3.10 10*3/mm3 Final    Monocytes, Absolute 07/05/2024 0.49  0.10 - 0.90 10*3/mm3 Final    Eosinophils, Absolute 07/05/2024 0.04  0.00 - 0.40 10*3/mm3 Final    Basophils, Absolute 07/05/2024 0.01  0.00 - 0.20 10*3/mm3 Final    Immature Grans, Absolute 07/05/2024 0.24 (H)  0.00 - 0.05 10*3/mm3 Final       CT Abdomen Pelvis With Contrast    Result Date: 6/27/2024  Narrative: CT CHEST W CONTRAST DIAGNOSTIC, CT ABDOMEN PELVIS W CONTRAST Date of Exam: 6/24/2024 3:35 PM EDT Indication: cholangiocarcinoma. Comparison: 4/1/2024 Technique: Axial CT images were obtained of the chest, abdomen and pelvis after the uneventful intravenous administration of Isovue nonionic contrast.  Reconstructed coronal and sagittal images were also obtained. Automated exposure control and iterative  construction methods were used. Findings: Chest: Stable 1.6 x 1.1 cm prevascular mediastinal lymph node with central low density suggesting necrosis. Trace fluid in the superior pericardial recess. 1.7 x 0.8 cm high right paratracheal lymph node appears stable. No other mediastinal lymphadenopathy. There is a stable 1.3 cm indeterminate hypodense left thyroid nodule. Multiple pulmonary nodules again noted: Stable 7 mm nodule in the left lower lobe on image #45 of series 2. Stable 6 mm nodule in the right middle lobe base on image #65 series 2. Stable 6 mm nodule in the right lower lobe on image #65 of today's study, series 2. Stable 5 mm right middle lobe nodule on image #64 of series 2. Stable 4 mm nodule in the right upper lobe posteriorly on image #32 of today's study.  Stable 5 mm nodule in the right lower lobe on image #75 of series 2. Stable 4 mm nodule in the left upper lobe on image #37. Stable 5 mm subpleural nodule in the posterior left upper lobe apex on image #17 of the axial series. Additional scattered 3 mm micronodules appear unchanged. No definite new or enlarging nodules identified on today's study. Central airways are grossly patent. No pleural fluid identified. Coronary and other vascular calcification is noted. There is a right IJ Wxyraz-e-Diak catheter noted. There is degenerative change in the spine. No aggressive osseous lesions are identified. Abdomen and pelvis: Stable 1.4 cm hypodense peripherally enhancing lesion in the posterior aspect of the left lateral hepatic lobe on image #40. Other fluid density lesions appear stable and may be due to cysts. No new or enlarging suspicious hepatic lesions identified. There is postsurgical change from right hepatectomy again noted. The spleen, pancreas, kidneys have a grossly normal appearance. Right adrenal is not identified. Retrocaval lymph node on image #41 of series 2 appears stable, measuring 1.6 x 1.3 cm. The subcentimeter periaortic lymph node on image #59 of today's study is slightly decreased, measuring 7 mm in short axis dimension. Additional subcentimeter aortocaval lymph node is unchanged on image #62. No evidence of bowel obstruction, free air or free fluid. Atherosclerotic vascular calcifications noted in the aorta and its branches. Patient is status post hysterectomy. Urinary bladder is grossly unremarkable. There is degenerative change in the hips and spine. No aggressive osseous lesions are identified.     Impression: Impression: 1. In the thorax, stable multiple pulmonary nodules and mediastinal adenopathy. No evidence of progression of disease within the thorax. 2. Stable 1.4 cm hypodense left hepatic lobe lesion, presumably metastatic. 3. Retroperitoneal lymph nodes are either stable or slightly  decreased. Please see above discussion. 4. Additional findings as given above. Electronically Signed: Davidson Damian MD  6/27/2024 12:43 PM EDT  Workstation ID: [id number]    CT Chest With Contrast Diagnostic    Result Date: 6/27/2024  Narrative: CT CHEST W CONTRAST DIAGNOSTIC, CT ABDOMEN PELVIS W CONTRAST Date of Exam: 6/24/2024 3:35 PM EDT Indication: cholangiocarcinoma. Comparison: 4/1/2024 Technique: Axial CT images were obtained of the chest, abdomen and pelvis after the uneventful intravenous administration of Isovue nonionic contrast.  Reconstructed coronal and sagittal images were also obtained. Automated exposure control and iterative  construction methods were used. Findings: Chest: Stable 1.6 x 1.1 cm prevascular mediastinal lymph node with central low density suggesting necrosis. Trace fluid in the superior pericardial recess. 1.7 x 0.8 cm high right paratracheal lymph node appears stable. No other mediastinal lymphadenopathy. There is a stable 1.3 cm indeterminate hypodense left thyroid nodule. Multiple pulmonary nodules again noted: Stable 7 mm nodule in the left lower lobe on image #45 of series 2. Stable 6 mm nodule in the right middle lobe base on image #65 series 2. Stable 6 mm nodule in the right lower lobe on image #65 of today's study, series 2. Stable 5 mm right middle lobe nodule on image #64 of series 2. Stable 4 mm nodule in the right upper lobe posteriorly on image #32 of today's study. Stable 5 mm nodule in the right lower lobe on image #75 of series 2. Stable 4 mm nodule in the left upper lobe on image #37. Stable 5 mm subpleural nodule in the posterior left upper lobe apex on image #17 of the axial series. Additional scattered 3 mm micronodules appear unchanged. No definite new or enlarging nodules identified on today's study. Central airways are grossly patent. No pleural fluid identified. Coronary and other vascular calcification is noted. There is a right IJ Tptmna-e-Urra catheter noted.  There is degenerative change in the spine. No aggressive osseous lesions are identified. Abdomen and pelvis: Stable 1.4 cm hypodense peripherally enhancing lesion in the posterior aspect of the left lateral hepatic lobe on image #40. Other fluid density lesions appear stable and may be due to cysts. No new or enlarging suspicious hepatic lesions identified. There is postsurgical change from right hepatectomy again noted. The spleen, pancreas, kidneys have a grossly normal appearance. Right adrenal is not identified. Retrocaval lymph node on image #41 of series 2 appears stable, measuring 1.6 x 1.3 cm. The subcentimeter periaortic lymph node on image #59 of today's study is slightly decreased, measuring 7 mm in short axis dimension. Additional subcentimeter aortocaval lymph node is unchanged on image #62. No evidence of bowel obstruction, free air or free fluid. Atherosclerotic vascular calcifications noted in the aorta and its branches. Patient is status post hysterectomy. Urinary bladder is grossly unremarkable. There is degenerative change in the hips and spine. No aggressive osseous lesions are identified.     Impression: Impression: 1. In the thorax, stable multiple pulmonary nodules and mediastinal adenopathy. No evidence of progression of disease within the thorax. 2. Stable 1.4 cm hypodense left hepatic lobe lesion, presumably metastatic. 3. Retroperitoneal lymph nodes are either stable or slightly decreased. Please see above discussion. 4. Additional findings as given above. Electronically Signed: Davidson Damian MD  6/27/2024 12:43 PM EDT  Workstation ID: [id number]     Assessment / Plan      Assessment/Plan:   Intrahepatic cholangiocarcinoma (CMS/HCC) (Primary)  -Noted during her most recent hospitalization with CT scans concerning for an enlarging liver lesion to 7.3 cm that was previously noted to be hemangioma  -Triple phase CT concerning for a solid tumor lesion  -Biopsy consistent with an  adenocarcinoma  -CA 19-9 elevated to 84.7  -CT chest as well as the rest of the CT abdomen/pelvis not concerning for distant or metastatic disease  -Status post open right hepatectomy, cholecystectomy, right partial adrenalectomy on 9/23/2021 with Dr. 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  0/4 lymph nodes were positive for disease.  LVI and PNI were present.  Focal R1 resection margin  -Discussed with patient and her daughter that she would benefit from adjuvant chemoXRT using Xeloda.  Discussed side effects including but not limited to immunosuppression, diarrhea, abdominal pain, nausea, vomiting, hand/foot syndrome  -Repeat CA 19-9 (22) in November 2021  -Completed chemo XRT with Xeloda in January 2021  -Repeat scans in March 2022 without any evidence of recurrent or metastatic disease  -Repeat CT C/A/P in June 2022 without evidence of recurrent or metastatic disease.  Did note some IVC stenosis which we will monitor with her next scans  -Repeat CT C/A/P in September 2022 reviewed without evidence of recurrent disease or metastatic disease.  IVC stenosis overall stable  -Repeat CT C/A/P in December 2022 reviewed without evidence of recurrent or metastatic disease.  CA 19-9 within normal limits  -Repeat CT C/A/P in March 2023 reviewed and notable for some slight enlarging retroperitoneal adenopathy.  CA 19-9 within normal limits  -PET/CT concerning for 1 cm left liver lesion that was only slightly PET avid as well as 2 lymph nodes that were also slightly PET avid  -Previously discussed her case at tumor board and with Dr. Sharma we agreed that she likely had disease progression  -Status post radiation completed in May 2023  -CA 19-9 in June 2022 within normal limits.    -CT C/A/P in July 2023 reviewed and showed some interval decrease in some lymph nodes as well as some slight enlargement of a couple  lymph nodes.  -Completed radiation in August 2023  -CT C/A/P in October 2023 reviewed and only notable for slight increase in some pulmonary nodules about 1 mm each  -CT C/A/P December 2023 reviewed and notable for enlargement of her aortocaval lymph node.  CA 19-9 stable  -Completed radiation with Dr. Billings in February 2024  -CT C/A/P in April 2024 reviewed and showing continued slight enlargement of multiple pulmonary nodules as well as a liver lesion.  CA 19-9 stable  -CT C/A/P in June 2024 reviewed and showing overall stable disease  -Scheduled begin cycle 5 of cisplatin/gemcitabine/durvalumab today.  Dose reduced by 20% with cycle 2 due to significant thrombocytopenia with cycle 1.  Clinically appropriate for treatment.  Labs reviewed and appropriate for treatment     Hemochromatosis carrier  -Noted on recent labs with an elevated ferritin to 246 hemochromatosis gene profile positive for heterozygosity of H63D.  Other genes negative.  Given patient's age and previous cardiac liver work-up showing no evidence of dysfunction, it is unlikely that she has had iron deposition all this time.  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  Treatment as above  -ECHO in July 2020 within normal limits  -Repeat iron studies in April 2021 stable with a ferritin of 229, iron level 100, transferrin saturation 27%  -Low concern for iron overload at this time.      Thyroid nodule  -Incidentally noted on CT scan but enlarging from previous CT  -Thyroid ultrasound in June 2022 only notable for goiter and small nodules nonconcerning for malignancy  -Has been seen by endocrinology with plan for repeat ultrasound in the summer of next year     Other fatigue   -Continued at this  time  -Previously checked iron studies, vitamin B12, folate, thyroid studies within normal limits  -Was previously been seen by cardiology with a normal ECHO and stress test  -Holter monitor notable for atrial fibrillation  -Currently following with cardiology spoke with Dr. Kay today  -Has outpatient follow-up with cardiology next week     Anemia  -Likely related to her chemotherapy  -Iron studies in June 2024 with only mild iron deficiency anemia.  Will continue to monitor         Follow Up:   Follow-up in 3 weeks     Jorje Montenegro MD  Hematology and Oncology     Please note that portions of this note may have been completed with a voice recognition program. Efforts were made to edit the dictations, but occasionally words are mistranscribed.

## 2024-07-20 LAB
ALBUMIN SERPL-MCNC: 3.6 G/DL (ref 3.5–5.2)
ALBUMIN/GLOB SERPL: 1.5 G/DL
ALP SERPL-CCNC: 171 U/L (ref 39–117)
ALT SERPL W P-5'-P-CCNC: 13 U/L (ref 1–33)
ANION GAP SERPL CALCULATED.3IONS-SCNC: 9 MMOL/L (ref 5–15)
AST SERPL-CCNC: 20 U/L (ref 1–32)
BASOPHILS # BLD AUTO: 0.02 10*3/MM3 (ref 0–0.2)
BASOPHILS NFR BLD AUTO: 0.5 % (ref 0–1.5)
BILIRUB SERPL-MCNC: 0.2 MG/DL (ref 0–1.2)
BUN SERPL-MCNC: 22 MG/DL (ref 8–23)
BUN/CREAT SERPL: 20 (ref 7–25)
CALCIUM SPEC-SCNC: 8.3 MG/DL (ref 8.6–10.5)
CHLORIDE SERPL-SCNC: 106 MMOL/L (ref 98–107)
CO2 SERPL-SCNC: 24 MMOL/L (ref 22–29)
CREAT SERPL-MCNC: 1.1 MG/DL (ref 0.57–1)
DEPRECATED RDW RBC AUTO: 71.9 FL (ref 37–54)
EGFRCR SERPLBLD CKD-EPI 2021: 49.7 ML/MIN/1.73
EOSINOPHIL # BLD AUTO: 0.05 10*3/MM3 (ref 0–0.4)
EOSINOPHIL NFR BLD AUTO: 1.2 % (ref 0.3–6.2)
ERYTHROCYTE [DISTWIDTH] IN BLOOD BY AUTOMATED COUNT: 20.2 % (ref 12.3–15.4)
GLOBULIN UR ELPH-MCNC: 2.4 GM/DL
GLUCOSE SERPL-MCNC: 97 MG/DL (ref 65–99)
HCT VFR BLD AUTO: 28.2 % (ref 34–46.6)
HGB BLD-MCNC: 9.4 G/DL (ref 12–15.9)
IMM GRANULOCYTES # BLD AUTO: 0.06 10*3/MM3 (ref 0–0.05)
IMM GRANULOCYTES NFR BLD AUTO: 1.5 % (ref 0–0.5)
LYMPHOCYTES # BLD AUTO: 0.58 10*3/MM3 (ref 0.7–3.1)
LYMPHOCYTES NFR BLD AUTO: 14.4 % (ref 19.6–45.3)
MAGNESIUM SERPL-MCNC: 2.2 MG/DL (ref 1.6–2.4)
MCH RBC QN AUTO: 33.5 PG (ref 26.6–33)
MCHC RBC AUTO-ENTMCNC: 33.3 G/DL (ref 31.5–35.7)
MCV RBC AUTO: 100.4 FL (ref 79–97)
MONOCYTES # BLD AUTO: 0.99 10*3/MM3 (ref 0.1–0.9)
MONOCYTES NFR BLD AUTO: 24.5 % (ref 5–12)
NEUTROPHILS NFR BLD AUTO: 2.34 10*3/MM3 (ref 1.7–7)
NEUTROPHILS NFR BLD AUTO: 57.9 % (ref 42.7–76)
PLATELET # BLD AUTO: 93 10*3/MM3 (ref 140–450)
PMV BLD AUTO: 13.5 FL (ref 6–12)
POTASSIUM SERPL-SCNC: 4.7 MMOL/L (ref 3.5–5.2)
PROT SERPL-MCNC: 6 G/DL (ref 6–8.5)
RBC # BLD AUTO: 2.81 10*6/MM3 (ref 3.77–5.28)
SODIUM SERPL-SCNC: 139 MMOL/L (ref 136–145)
T4 FREE SERPL-MCNC: 1.4 NG/DL (ref 0.92–1.68)
TSH SERPL DL<=0.05 MIU/L-ACNC: 5.14 UIU/ML (ref 0.27–4.2)
WBC NRBC COR # BLD AUTO: 4.04 10*3/MM3 (ref 3.4–10.8)

## 2024-07-20 NOTE — ADDENDUM NOTE
Encounter addended by: Brisa Khan on: 7/20/2024 3:54 PM   Actions taken: Child order released for a procedure order

## 2024-07-23 ENCOUNTER — OFFICE VISIT (OUTPATIENT)
Dept: CARDIOLOGY | Facility: CLINIC | Age: 84
End: 2024-07-23
Payer: MEDICARE

## 2024-07-23 VITALS
WEIGHT: 164.8 LBS | HEIGHT: 64 IN | HEART RATE: 70 BPM | OXYGEN SATURATION: 98 % | SYSTOLIC BLOOD PRESSURE: 142 MMHG | DIASTOLIC BLOOD PRESSURE: 72 MMHG | BODY MASS INDEX: 28.13 KG/M2

## 2024-07-23 DIAGNOSIS — I10 PRIMARY HYPERTENSION: ICD-10-CM

## 2024-07-23 DIAGNOSIS — R06.09 DYSPNEA ON EXERTION: ICD-10-CM

## 2024-07-23 DIAGNOSIS — E78.5 DYSLIPIDEMIA: ICD-10-CM

## 2024-07-23 DIAGNOSIS — I48.0 PAF (PAROXYSMAL ATRIAL FIBRILLATION): Primary | ICD-10-CM

## 2024-07-23 PROCEDURE — 3077F SYST BP >= 140 MM HG: CPT | Performed by: INTERNAL MEDICINE

## 2024-07-23 PROCEDURE — 3078F DIAST BP <80 MM HG: CPT | Performed by: INTERNAL MEDICINE

## 2024-07-23 PROCEDURE — 99214 OFFICE O/P EST MOD 30 MIN: CPT | Performed by: INTERNAL MEDICINE

## 2024-07-25 ENCOUNTER — PATIENT MESSAGE (OUTPATIENT)
Dept: ONCOLOGY | Facility: CLINIC | Age: 84
End: 2024-07-25
Payer: MEDICARE

## 2024-07-25 NOTE — TELEPHONE ENCOUNTER
From: Sheree Hernandez  To: Jorje Montenegro  Sent: 7/25/2024 11:15 AM EDT  Subject: Anemic     Dr Kay mentioned me being anemic. I see my platelets are extra low. You mentioned about giving me iron. Do you think it would help being I’m so drained of energy?

## 2024-07-26 ENCOUNTER — HOSPITAL ENCOUNTER (OUTPATIENT)
Dept: ONCOLOGY | Facility: HOSPITAL | Age: 84
Discharge: HOME | End: 2024-07-26
Payer: MEDICARE

## 2024-07-26 ENCOUNTER — APPOINTMENT (OUTPATIENT)
Dept: ONCOLOGY | Facility: HOSPITAL | Age: 84
End: 2024-07-26
Payer: MEDICARE

## 2024-07-26 VITALS
BODY MASS INDEX: 27.83 KG/M2 | SYSTOLIC BLOOD PRESSURE: 147 MMHG | HEIGHT: 64 IN | WEIGHT: 163 LBS | TEMPERATURE: 97.8 F | HEART RATE: 74 BPM | DIASTOLIC BLOOD PRESSURE: 71 MMHG | RESPIRATION RATE: 16 BRPM

## 2024-07-26 DIAGNOSIS — C22.1 INTRAHEPATIC CHOLANGIOCARCINOMA: ICD-10-CM

## 2024-07-26 DIAGNOSIS — C22.1 INTRAHEPATIC CHOLANGIOCARCINOMA: Primary | ICD-10-CM

## 2024-07-26 LAB
ALBUMIN SERPL-MCNC: 3.4 G/DL (ref 3.5–5.2)
ALBUMIN/GLOB SERPL: 1.4 G/DL
ALP SERPL-CCNC: 183 U/L (ref 39–117)
ALT SERPL W P-5'-P-CCNC: 22 U/L (ref 1–33)
ANION GAP SERPL CALCULATED.3IONS-SCNC: 12 MMOL/L (ref 5–15)
AST SERPL-CCNC: 23 U/L (ref 1–32)
BASOPHILS # BLD AUTO: 0.01 10*3/MM3 (ref 0–0.2)
BASOPHILS NFR BLD AUTO: 0.3 % (ref 0–1.5)
BILIRUB SERPL-MCNC: 0.2 MG/DL (ref 0–1.2)
BUN SERPL-MCNC: 19 MG/DL (ref 8–23)
BUN/CREAT SERPL: 17.9 (ref 7–25)
CALCIUM SPEC-SCNC: 8.5 MG/DL (ref 8.6–10.5)
CHLORIDE SERPL-SCNC: 104 MMOL/L (ref 98–107)
CO2 SERPL-SCNC: 21 MMOL/L (ref 22–29)
CREAT SERPL-MCNC: 1.06 MG/DL (ref 0.57–1)
DEPRECATED RDW RBC AUTO: 75.3 FL (ref 37–54)
EGFRCR SERPLBLD CKD-EPI 2021: 51.9 ML/MIN/1.73
EOSINOPHIL # BLD AUTO: 0.02 10*3/MM3 (ref 0–0.4)
EOSINOPHIL NFR BLD AUTO: 0.6 % (ref 0.3–6.2)
ERYTHROCYTE [DISTWIDTH] IN BLOOD BY AUTOMATED COUNT: 20.1 % (ref 12.3–15.4)
FERRITIN SERPL-MCNC: 937.2 NG/ML (ref 13–150)
GLOBULIN UR ELPH-MCNC: 2.4 GM/DL
GLUCOSE SERPL-MCNC: 95 MG/DL (ref 65–99)
HCT VFR BLD AUTO: 26.5 % (ref 34–46.6)
HGB BLD-MCNC: 8.6 G/DL (ref 12–15.9)
IMM GRANULOCYTES # BLD AUTO: 0.23 10*3/MM3 (ref 0–0.05)
IMM GRANULOCYTES NFR BLD AUTO: 6.5 % (ref 0–0.5)
IRON 24H UR-MRATE: 75 MCG/DL (ref 37–145)
IRON SATN MFR SERPL: 28 % (ref 20–50)
LYMPHOCYTES # BLD AUTO: 0.6 10*3/MM3 (ref 0.7–3.1)
LYMPHOCYTES NFR BLD AUTO: 16.9 % (ref 19.6–45.3)
MAGNESIUM SERPL-MCNC: 2 MG/DL (ref 1.6–2.4)
MCH RBC QN AUTO: 33.3 PG (ref 26.6–33)
MCHC RBC AUTO-ENTMCNC: 32.5 G/DL (ref 31.5–35.7)
MCV RBC AUTO: 102.7 FL (ref 79–97)
MONOCYTES # BLD AUTO: 0.52 10*3/MM3 (ref 0.1–0.9)
MONOCYTES NFR BLD AUTO: 14.6 % (ref 5–12)
NEUTROPHILS NFR BLD AUTO: 2.18 10*3/MM3 (ref 1.7–7)
NEUTROPHILS NFR BLD AUTO: 61.1 % (ref 42.7–76)
PLATELET # BLD AUTO: 106 10*3/MM3 (ref 140–450)
PMV BLD AUTO: 11.7 FL (ref 6–12)
POTASSIUM SERPL-SCNC: 4.7 MMOL/L (ref 3.5–5.2)
PROT SERPL-MCNC: 5.8 G/DL (ref 6–8.5)
RBC # BLD AUTO: 2.58 10*6/MM3 (ref 3.77–5.28)
SODIUM SERPL-SCNC: 137 MMOL/L (ref 136–145)
TIBC SERPL-MCNC: 268 MCG/DL (ref 298–536)
TRANSFERRIN SERPL-MCNC: 180 MG/DL (ref 200–360)
WBC NRBC COR # BLD AUTO: 3.56 10*3/MM3 (ref 3.4–10.8)

## 2024-07-26 PROCEDURE — 96367 TX/PROPH/DG ADDL SEQ IV INF: CPT

## 2024-07-26 PROCEDURE — 84466 ASSAY OF TRANSFERRIN: CPT | Performed by: INTERNAL MEDICINE

## 2024-07-26 PROCEDURE — 80053 COMPREHEN METABOLIC PANEL: CPT | Performed by: INTERNAL MEDICINE

## 2024-07-26 PROCEDURE — 25010000002 GEMCITABINE HCL 2 GM/20ML SOLUTION 20 ML VIAL: Performed by: INTERNAL MEDICINE

## 2024-07-26 PROCEDURE — 25810000003 SODIUM CHLORIDE 0.9 % SOLUTION: Performed by: INTERNAL MEDICINE

## 2024-07-26 PROCEDURE — 96413 CHEMO IV INFUSION 1 HR: CPT

## 2024-07-26 PROCEDURE — 96375 TX/PRO/DX INJ NEW DRUG ADDON: CPT

## 2024-07-26 PROCEDURE — 25010000002 FOSAPREPITANT PER 1 MG: Performed by: INTERNAL MEDICINE

## 2024-07-26 PROCEDURE — 25010000002 MAGNESIUM SULFATE PER 500 MG OF MAGNESIUM: Performed by: INTERNAL MEDICINE

## 2024-07-26 PROCEDURE — 25810000003 SODIUM CHLORIDE 0.9 % SOLUTION 250 ML FLEX CONT: Performed by: INTERNAL MEDICINE

## 2024-07-26 PROCEDURE — 85025 COMPLETE CBC W/AUTO DIFF WBC: CPT | Performed by: INTERNAL MEDICINE

## 2024-07-26 PROCEDURE — 25010000002 DEXAMETHASONE SODIUM PHOSPHATE 100 MG/10ML SOLUTION: Performed by: INTERNAL MEDICINE

## 2024-07-26 PROCEDURE — 96366 THER/PROPH/DIAG IV INF ADDON: CPT

## 2024-07-26 PROCEDURE — 82728 ASSAY OF FERRITIN: CPT | Performed by: INTERNAL MEDICINE

## 2024-07-26 PROCEDURE — 83540 ASSAY OF IRON: CPT | Performed by: INTERNAL MEDICINE

## 2024-07-26 PROCEDURE — 25010000002 HEPARIN LOCK FLUSH PER 10 UNITS: Performed by: INTERNAL MEDICINE

## 2024-07-26 PROCEDURE — 25010000002 PALONOSETRON PER 25 MCG: Performed by: INTERNAL MEDICINE

## 2024-07-26 PROCEDURE — 83735 ASSAY OF MAGNESIUM: CPT | Performed by: INTERNAL MEDICINE

## 2024-07-26 PROCEDURE — 25010000002 CISPLATIN PER 50 MG: Performed by: INTERNAL MEDICINE

## 2024-07-26 PROCEDURE — 25010000002 POTASSIUM CHLORIDE PER 2 MEQ OF POTASSIUM: Performed by: INTERNAL MEDICINE

## 2024-07-26 PROCEDURE — 96417 CHEMO IV INFUS EACH ADDL SEQ: CPT

## 2024-07-26 RX ORDER — HEPARIN SODIUM (PORCINE) LOCK FLUSH IV SOLN 100 UNIT/ML 100 UNIT/ML
500 SOLUTION INTRAVENOUS AS NEEDED
OUTPATIENT
Start: 2024-07-26

## 2024-07-26 RX ORDER — OLANZAPINE 5 MG/1
5 TABLET ORAL NIGHTLY
Qty: 8 TABLET | Refills: 7 | Status: SHIPPED | OUTPATIENT
Start: 2024-07-26

## 2024-07-26 RX ORDER — HEPARIN SODIUM (PORCINE) LOCK FLUSH IV SOLN 100 UNIT/ML 100 UNIT/ML
500 SOLUTION INTRAVENOUS AS NEEDED
Status: DISCONTINUED | OUTPATIENT
Start: 2024-07-26 | End: 2024-07-27 | Stop reason: HOSPADM

## 2024-07-26 RX ORDER — SODIUM CHLORIDE 9 MG/ML
20 INJECTION, SOLUTION INTRAVENOUS ONCE
Status: COMPLETED | OUTPATIENT
Start: 2024-07-26 | End: 2024-07-26

## 2024-07-26 RX ORDER — PALONOSETRON 0.05 MG/ML
0.25 INJECTION, SOLUTION INTRAVENOUS ONCE
Status: COMPLETED | OUTPATIENT
Start: 2024-07-26 | End: 2024-07-26

## 2024-07-26 RX ADMIN — PALONOSETRON HYDROCHLORIDE 0.25 MG: 0.25 INJECTION INTRAVENOUS at 11:25

## 2024-07-26 RX ADMIN — POTASSIUM CHLORIDE 500 ML: 2 INJECTION, SOLUTION, CONCENTRATE INTRAVENOUS at 14:15

## 2024-07-26 RX ADMIN — FOSAPREPITANT 150 MG: 150 INJECTION, POWDER, LYOPHILIZED, FOR SOLUTION INTRAVENOUS at 11:27

## 2024-07-26 RX ADMIN — HEPARIN 500 UNITS: 100 SYRINGE at 15:17

## 2024-07-26 RX ADMIN — GEMCITABINE 1400 MG: 100 INJECTION, SOLUTION INTRAVENOUS at 12:15

## 2024-07-26 RX ADMIN — DEXAMETHASONE SODIUM PHOSPHATE 12 MG: 10 INJECTION, SOLUTION INTRAMUSCULAR; INTRAVENOUS at 11:28

## 2024-07-26 RX ADMIN — POTASSIUM CHLORIDE 500 ML: 2 INJECTION, SOLUTION, CONCENTRATE INTRAVENOUS at 10:15

## 2024-07-26 RX ADMIN — CISPLATIN 36 MG: 1 INJECTION, SOLUTION INTRAVENOUS at 12:58

## 2024-07-26 RX ADMIN — SODIUM CHLORIDE 20 ML/HR: 9 INJECTION, SOLUTION INTRAVENOUS at 10:15

## 2024-08-09 ENCOUNTER — OFFICE VISIT (OUTPATIENT)
Dept: ONCOLOGY | Facility: CLINIC | Age: 84
End: 2024-08-09
Payer: MEDICARE

## 2024-08-09 ENCOUNTER — HOSPITAL ENCOUNTER (OUTPATIENT)
Dept: ONCOLOGY | Facility: HOSPITAL | Age: 84
Discharge: HOME | End: 2024-08-09
Payer: MEDICARE

## 2024-08-09 ENCOUNTER — APPOINTMENT (OUTPATIENT)
Dept: ONCOLOGY | Facility: HOSPITAL | Age: 84
End: 2024-08-09
Payer: MEDICARE

## 2024-08-09 VITALS
HEART RATE: 91 BPM | HEIGHT: 64 IN | WEIGHT: 160 LBS | DIASTOLIC BLOOD PRESSURE: 58 MMHG | OXYGEN SATURATION: 99 % | BODY MASS INDEX: 27.31 KG/M2 | TEMPERATURE: 96.8 F | SYSTOLIC BLOOD PRESSURE: 129 MMHG

## 2024-08-09 DIAGNOSIS — D64.9 SYMPTOMATIC ANEMIA: Primary | ICD-10-CM

## 2024-08-09 DIAGNOSIS — C22.1 INTRAHEPATIC CHOLANGIOCARCINOMA: Primary | ICD-10-CM

## 2024-08-09 DIAGNOSIS — D64.9 SYMPTOMATIC ANEMIA: ICD-10-CM

## 2024-08-09 LAB
ABO GROUP BLD: NORMAL
ALBUMIN SERPL-MCNC: 3.4 G/DL (ref 3.5–5.2)
ALBUMIN/GLOB SERPL: 1.4 G/DL
ALP SERPL-CCNC: 151 U/L (ref 39–117)
ALT SERPL W P-5'-P-CCNC: 12 U/L (ref 1–33)
ANION GAP SERPL CALCULATED.3IONS-SCNC: 9 MMOL/L (ref 5–15)
AST SERPL-CCNC: 20 U/L (ref 1–32)
BASOPHILS # BLD AUTO: 0.02 10*3/MM3 (ref 0–0.2)
BASOPHILS NFR BLD AUTO: 0.6 % (ref 0–1.5)
BILIRUB SERPL-MCNC: 0.2 MG/DL (ref 0–1.2)
BLD GP AB SCN SERPL QL: NEGATIVE
BUN SERPL-MCNC: 18 MG/DL (ref 8–23)
BUN/CREAT SERPL: 14.3 (ref 7–25)
CALCIUM SPEC-SCNC: 8.8 MG/DL (ref 8.6–10.5)
CHLORIDE SERPL-SCNC: 105 MMOL/L (ref 98–107)
CO2 SERPL-SCNC: 24 MMOL/L (ref 22–29)
CREAT SERPL-MCNC: 1.26 MG/DL (ref 0.57–1)
DEPRECATED RDW RBC AUTO: 82.3 FL (ref 37–54)
EGFRCR SERPLBLD CKD-EPI 2021: 41.9 ML/MIN/1.73
EOSINOPHIL # BLD AUTO: 0.07 10*3/MM3 (ref 0–0.4)
EOSINOPHIL NFR BLD AUTO: 2.1 % (ref 0.3–6.2)
ERYTHROCYTE [DISTWIDTH] IN BLOOD BY AUTOMATED COUNT: 21.6 % (ref 12.3–15.4)
GLOBULIN UR ELPH-MCNC: 2.4 GM/DL
GLUCOSE SERPL-MCNC: 117 MG/DL (ref 65–99)
HCT VFR BLD AUTO: 25.3 % (ref 34–46.6)
HGB BLD-MCNC: 8 G/DL (ref 12–15.9)
IMM GRANULOCYTES # BLD AUTO: 0.03 10*3/MM3 (ref 0–0.05)
IMM GRANULOCYTES NFR BLD AUTO: 0.9 % (ref 0–0.5)
LYMPHOCYTES # BLD AUTO: 0.65 10*3/MM3 (ref 0.7–3.1)
LYMPHOCYTES NFR BLD AUTO: 19.7 % (ref 19.6–45.3)
MAGNESIUM SERPL-MCNC: 1.9 MG/DL (ref 1.6–2.4)
MCH RBC QN AUTO: 33.9 PG (ref 26.6–33)
MCHC RBC AUTO-ENTMCNC: 31.6 G/DL (ref 31.5–35.7)
MCV RBC AUTO: 107.2 FL (ref 79–97)
MONOCYTES # BLD AUTO: 0.88 10*3/MM3 (ref 0.1–0.9)
MONOCYTES NFR BLD AUTO: 26.7 % (ref 5–12)
NEUTROPHILS NFR BLD AUTO: 1.65 10*3/MM3 (ref 1.7–7)
NEUTROPHILS NFR BLD AUTO: 50 % (ref 42.7–76)
PLATELET # BLD AUTO: 88 10*3/MM3 (ref 140–450)
PMV BLD AUTO: 13.3 FL (ref 6–12)
POTASSIUM SERPL-SCNC: 4.3 MMOL/L (ref 3.5–5.2)
PROT SERPL-MCNC: 5.8 G/DL (ref 6–8.5)
RBC # BLD AUTO: 2.36 10*6/MM3 (ref 3.77–5.28)
RH BLD: POSITIVE
SODIUM SERPL-SCNC: 138 MMOL/L (ref 136–145)
T&S EXPIRATION DATE: NORMAL
WBC NRBC COR # BLD AUTO: 3.3 10*3/MM3 (ref 3.4–10.8)

## 2024-08-09 PROCEDURE — 85025 COMPLETE CBC W/AUTO DIFF WBC: CPT | Performed by: INTERNAL MEDICINE

## 2024-08-09 PROCEDURE — 25810000003 SODIUM CHLORIDE 0.9 % SOLUTION 250 ML FLEX CONT: Performed by: INTERNAL MEDICINE

## 2024-08-09 PROCEDURE — 3074F SYST BP LT 130 MM HG: CPT | Performed by: INTERNAL MEDICINE

## 2024-08-09 PROCEDURE — 86850 RBC ANTIBODY SCREEN: CPT | Performed by: INTERNAL MEDICINE

## 2024-08-09 PROCEDURE — 25010000002 MAGNESIUM SULFATE PER 500 MG OF MAGNESIUM: Performed by: INTERNAL MEDICINE

## 2024-08-09 PROCEDURE — 25010000002 FOSAPREPITANT PER 1 MG: Performed by: INTERNAL MEDICINE

## 2024-08-09 PROCEDURE — 96413 CHEMO IV INFUSION 1 HR: CPT

## 2024-08-09 PROCEDURE — 25010000002 POTASSIUM CHLORIDE PER 2 MEQ OF POTASSIUM: Performed by: INTERNAL MEDICINE

## 2024-08-09 PROCEDURE — 83735 ASSAY OF MAGNESIUM: CPT | Performed by: INTERNAL MEDICINE

## 2024-08-09 PROCEDURE — 25010000002 DURVALUMAB 50 MG/ML SOLUTION 10 ML VIAL: Performed by: INTERNAL MEDICINE

## 2024-08-09 PROCEDURE — 80053 COMPREHEN METABOLIC PANEL: CPT | Performed by: INTERNAL MEDICINE

## 2024-08-09 PROCEDURE — 96361 HYDRATE IV INFUSION ADD-ON: CPT

## 2024-08-09 PROCEDURE — 96375 TX/PRO/DX INJ NEW DRUG ADDON: CPT

## 2024-08-09 PROCEDURE — 25010000002 DEXAMETHASONE SODIUM PHOSPHATE 100 MG/10ML SOLUTION: Performed by: INTERNAL MEDICINE

## 2024-08-09 PROCEDURE — 25010000002 GEMCITABINE HCL 2 GM/20ML SOLUTION 20 ML VIAL: Performed by: INTERNAL MEDICINE

## 2024-08-09 PROCEDURE — 1126F AMNT PAIN NOTED NONE PRSNT: CPT | Performed by: INTERNAL MEDICINE

## 2024-08-09 PROCEDURE — 86901 BLOOD TYPING SEROLOGIC RH(D): CPT | Performed by: INTERNAL MEDICINE

## 2024-08-09 PROCEDURE — 25010000002 HEPARIN LOCK FLUSH PER 10 UNITS: Performed by: INTERNAL MEDICINE

## 2024-08-09 PROCEDURE — 3078F DIAST BP <80 MM HG: CPT | Performed by: INTERNAL MEDICINE

## 2024-08-09 PROCEDURE — 96360 HYDRATION IV INFUSION INIT: CPT

## 2024-08-09 PROCEDURE — 36591 DRAW BLOOD OFF VENOUS DEVICE: CPT

## 2024-08-09 PROCEDURE — 96417 CHEMO IV INFUS EACH ADDL SEQ: CPT

## 2024-08-09 PROCEDURE — 25010000002 CISPLATIN PER 50 MG: Performed by: INTERNAL MEDICINE

## 2024-08-09 PROCEDURE — 86900 BLOOD TYPING SEROLOGIC ABO: CPT | Performed by: INTERNAL MEDICINE

## 2024-08-09 PROCEDURE — 25810000003 SODIUM CHLORIDE 0.9 % SOLUTION: Performed by: INTERNAL MEDICINE

## 2024-08-09 PROCEDURE — 99214 OFFICE O/P EST MOD 30 MIN: CPT | Performed by: INTERNAL MEDICINE

## 2024-08-09 PROCEDURE — 86923 COMPATIBILITY TEST ELECTRIC: CPT

## 2024-08-09 PROCEDURE — 96374 THER/PROPH/DIAG INJ IV PUSH: CPT

## 2024-08-09 PROCEDURE — 25010000002 PALONOSETRON PER 25 MCG: Performed by: INTERNAL MEDICINE

## 2024-08-09 RX ORDER — HEPARIN SODIUM (PORCINE) LOCK FLUSH IV SOLN 100 UNIT/ML 100 UNIT/ML
500 SOLUTION INTRAVENOUS AS NEEDED
Status: CANCELLED | OUTPATIENT
Start: 2024-08-09

## 2024-08-09 RX ORDER — PALONOSETRON 0.05 MG/ML
0.25 INJECTION, SOLUTION INTRAVENOUS ONCE
Status: COMPLETED | OUTPATIENT
Start: 2024-08-09 | End: 2024-08-09

## 2024-08-09 RX ORDER — SODIUM CHLORIDE 9 MG/ML
20 INJECTION, SOLUTION INTRAVENOUS ONCE
OUTPATIENT
Start: 2024-08-30

## 2024-08-09 RX ORDER — SODIUM CHLORIDE 9 MG/ML
20 INJECTION, SOLUTION INTRAVENOUS ONCE
Status: COMPLETED | OUTPATIENT
Start: 2024-08-09 | End: 2024-08-09

## 2024-08-09 RX ORDER — SODIUM CHLORIDE 9 MG/ML
250 INJECTION, SOLUTION INTRAVENOUS AS NEEDED
Status: CANCELLED | OUTPATIENT
Start: 2024-08-09

## 2024-08-09 RX ORDER — ALBUTEROL SULFATE 90 UG/1
1 AEROSOL, METERED RESPIRATORY (INHALATION) EVERY 4 HOURS PRN
Qty: 18 G | Refills: 2 | Status: SHIPPED | OUTPATIENT
Start: 2024-08-09

## 2024-08-09 RX ORDER — SODIUM CHLORIDE 9 MG/ML
20 INJECTION, SOLUTION INTRAVENOUS ONCE
OUTPATIENT
Start: 2024-09-06

## 2024-08-09 RX ORDER — PALONOSETRON 0.05 MG/ML
0.25 INJECTION, SOLUTION INTRAVENOUS ONCE
OUTPATIENT
Start: 2024-09-06

## 2024-08-09 RX ORDER — PALONOSETRON 0.05 MG/ML
0.25 INJECTION, SOLUTION INTRAVENOUS ONCE
OUTPATIENT
Start: 2024-08-30

## 2024-08-09 RX ORDER — HEPARIN SODIUM (PORCINE) LOCK FLUSH IV SOLN 100 UNIT/ML 100 UNIT/ML
500 SOLUTION INTRAVENOUS AS NEEDED
Status: DISCONTINUED | OUTPATIENT
Start: 2024-08-09 | End: 2024-08-10 | Stop reason: HOSPADM

## 2024-08-09 RX ADMIN — HEPARIN 500 UNITS: 100 SYRINGE at 16:16

## 2024-08-09 RX ADMIN — FOSAPREPITANT DIMEGLUMINE 150 MG: 150 INJECTION, POWDER, LYOPHILIZED, FOR SOLUTION INTRAVENOUS at 12:39

## 2024-08-09 RX ADMIN — DEXAMETHASONE SODIUM PHOSPHATE 12 MG: 10 INJECTION, SOLUTION INTRAMUSCULAR; INTRAVENOUS at 12:41

## 2024-08-09 RX ADMIN — SODIUM CHLORIDE 20 ML/HR: 9 INJECTION, SOLUTION INTRAVENOUS at 10:17

## 2024-08-09 RX ADMIN — POTASSIUM CHLORIDE 500 ML: 2 INJECTION, SOLUTION, CONCENTRATE INTRAVENOUS at 15:13

## 2024-08-09 RX ADMIN — CISPLATIN 36 MG: 1 INJECTION, SOLUTION INTRAVENOUS at 14:04

## 2024-08-09 RX ADMIN — PALONOSETRON HYDROCHLORIDE 0.25 MG: 0.25 INJECTION INTRAVENOUS at 12:39

## 2024-08-09 RX ADMIN — GEMCITABINE 1400 MG: 100 INJECTION, SOLUTION INTRAVENOUS at 13:25

## 2024-08-09 RX ADMIN — SODIUM CHLORIDE 1500 MG: 9 INJECTION, SOLUTION INTRAVENOUS at 10:20

## 2024-08-09 RX ADMIN — POTASSIUM CHLORIDE 500 ML: 2 INJECTION, SOLUTION, CONCENTRATE INTRAVENOUS at 11:34

## 2024-08-09 NOTE — PROGRESS NOTES
Follow Up Office Visit      Date: 2024     Patient Name: Sheree Hernandez  MRN: [id number]  : 1940  Chief Complaint:  Follow-up for intrahepatic cholangiocarcinoma      History of Present Illness: Sheree Hernandez is a pleasant 79 y.o. female with a past medical history of hyperlipidemia and hypertension who presents today for evaluation of concern for hemochromatosis. The patient is accompanied by their self who contributes to the history of their care.  Patient states that over the past 2 years she has been having worsening fatigue especially with exertion.  Over the past several months this has become worse.  Patient states that she gets tired with basic activities including showering getting dressed in the morning and walking to and from her car from stores.  She states that her fatigue gets better after a few minutes of rest.  She has previously had an echocardiogram and cardiac catheterization within the past 2 years which did not reveal any cause of her fatigue with exertion.  She is also had an ultrasound of the liver which revealed stable cyst.  She was recently seen by her PCP who ordered iron studies which was notable for an elevated ferritin to 246.  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  All other mutations were negative.  She is currently up-to-date on her mammograms and colonoscopy with no active malignancies noted.  She is otherwise compliant with her statin and high blood pressure medicines.  Repeat abdominal imaging in 2021 concerning for enlarging liver lesion.  She was seen by Dr. Sharma and  is status post open right hepatectomy, cholecystectomy, right partial adrenalectomy on 2021.  Pathology showed a focal R1 resected margin.  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  0/4 lymph nodes were  positive for disease.  LVI and PNI were present.  Finished chemoXRT in January 2021.      Interval History:  Presents to clinic for follow-up.  Completed radiation in May 2023 and again in August 2023.  Scheduled to begin cycle 6 of cisplatin/gemcitabine/nivolumab today.  Still having continued fatigue and tiredness.  Notes some shortness of breath with exertion.    Oncology History:    Oncology/Hematology History   Intrahepatic cholangiocarcinoma   8/17/2021 Initial Diagnosis    Intrahepatic cholangiocarcinoma (CMS/HCC)     8/17/2021 Cancer Staged    Staging form: Intrahepatic Bile Duct, AJCC 8th Edition  - Clinical: Stage IB (cT1b, cN0, cM0) - Signed by Jorje Montenegro MD on 8/17/2021 11/5/2021 Cancer Staged    Staging form: Intrahepatic Bile Duct, AJCC 8th Edition  - Pathologic: Stage IIIA (pT3, pN0, cM0) - Signed by Jorje Montenegro MD on 11/5/2021 12/9/2021 - 1/6/2022 Chemotherapy    OP GALLBLADDER Capecitabine + XRT     12/9/2021 - 1/19/2022 Radiation    Radiation OncologyTreatment Course:  Sheree Hernandez received 5040 cGy in 28 fractions to liver via External Beam Radiation - EBRT.     4/25/2023 - 5/8/2023 Radiation    Radiation OncologyTreatment Course:  Sheree Hernandez received 3500 cGy in 5 fractions to liver mass and lymph nodes via Stereotactic Radiation Therapy - SRT.     8/1/2023 - 8/11/2023 Radiation    Radiation OncologyTreatment Course:  Sheree Hernandez received 3500 cGy in 5 fractions to abdomen via Stereotactic Radiation Therapy - SRT.     1/9/2024 - 1/23/2024 Radiation    Radiation OncologyTreatment Course:  Sheree Hernandez received 3000 cGy in 5 fractions to abdomen via Stereotactic Radiation Therapy - SRT.     1/11/2024 - 1/11/2024 Radiation    Radiation OncologyTreatment Course:  Sheree Hernandez received 754 cGy in 1 fractions to left lung via Stereotactic Radiation Therapy - SRT.     2/1/2024 - 2/1/2024 Radiation    Radiation OncologyTreatment Course:  Sheree Hernandez received  3000 cGy in 1 fractions to left lung via Stereotactic Radiation Therapy - SRT.     4/19/2024 -  Chemotherapy    OP HEPATOBILIARY CISplatin + Gemcitabine Days 1,8 / Durvalumab Q21D     5/17/2024 -  Chemotherapy    OP CENTRAL VENOUS ACCESS DEVICE Access, Care, and Maintenance (CVAD)         Subjective      Review of Systems:   Constitutional: Negative for fevers, chills, or weight loss  Eyes: Negative for blurred vision or discharge         Ear/Nose/Throat: Negative for difficulty swallowing, sore throat, LAD                                                       Respiratory: Negative for cough, SOA, wheezing                                                                                        Cardiovascular: Negative for chest pain or palpitations                                                                  Gastrointestinal: Negative for nausea, vomiting or diarrhea                                                                     Genitourinary: Negative for dysuria or hematuria                                                                                           Musculoskeletal: Negative for any joint pains or muscle aches                                                                        Neurologic: Negative for any weakness, headaches, dizziness                                                                         Hematologic: Negative for any easy bleeding or bruising                                                                                   Psychiatric: Negative for anxiety or depression                          Past Medical History/Past Surgical History/ Family History/ Social History: Reviewed by me and unchanged from my previous documentation done on July 2024.     Medications:     Current Outpatient Medications:     albuterol sulfate  (90 Base) MCG/ACT inhaler, Inhale 1 puff Every 4 (Four) Hours As Needed for Wheezing., Disp: 18 g, Rfl: 2    apixaban (ELIQUIS) 2.5 MG tablet  "tablet, Take 1 tablet by mouth 2 (Two) Times a Day., Disp: 60 tablet, Rfl: 11    Cholecalciferol (Vitamin D3) 25 MCG (1000 UT) capsule, Take  by mouth Daily., Disp: , Rfl:     coenzyme Q10 50 MG capsule capsule, Take  by mouth Daily., Disp: , Rfl:     Cyanocobalamin (VITAMIN B 12 PO), Take  by mouth., Disp: , Rfl:     doxycycline (VIBRAMYCIN) 100 MG capsule, Take 1 capsule by mouth 2 (Two) Times a Day., Disp: 14 capsule, Rfl: 0    Folic Acid 0.8 MG capsule, Take  by mouth., Disp: , Rfl:     ipratropium (ATROVENT) 0.06 % nasal spray, Administer 1 spray into each nostril twice daily, Disp: 30 mL, Rfl: 3    lidocaine-prilocaine (EMLA) 2.5-2.5 % cream, Please apply to port site 30 min prior to infusion and cover with Saran Wrap, Disp: 30 g, Rfl: 3    magnesium oxide (MAG-OX) 400 MG tablet, Take 1 tablet by mouth Daily., Disp: , Rfl:     metoprolol tartrate (LOPRESSOR) 25 MG tablet, Take 1 tablet by mouth 2 (Two) Times a Day., Disp: 60 tablet, Rfl: 11    OLANZapine (zyPREXA) 5 MG tablet, Take 1 tablet by mouth Every Night. Take on days 1, 2, 3, and 4 and Days 8, 9, 10, 11 after chemotherapy., Disp: 8 tablet, Rfl: 7    Omega-3 1000 MG capsule, Take  by mouth Daily., Disp: , Rfl:     rosuvastatin (CRESTOR) 10 MG tablet, Take 1 tablet by mouth Daily., Disp: 90 tablet, Rfl: 3    Allergies:   Allergies   Allergen Reactions    Pravastatin Myalgia       Objective     Physical Exam:  Vital Signs:   Vitals:    08/09/24 0844   BP: 129/58   Pulse: 91   Temp: 96.8 °F (36 °C)   TempSrc: Infrared   SpO2: 99%   Weight: 72.6 kg (160 lb)   Height: 162.6 cm (64.02\")   PainSc: 0-No pain     Pain Score    08/09/24 0844   PainSc: 0-No pain     ECOG Performance Status: 1 - Symptomatic but completely ambulatory    Constitutional: NAD, ECOG 1-2  Eyes: PERRLA, scleral anicteric  ENT: No LAD, no thyromegaly  Respiratory: CTAB, no wheezing, rales, rhonchi  Cardiovascular: RRR, no murmurs, pulses 2+ bilaterally  Abdomen: soft, NT/ND, no " HSM  Musculoskeletal: strength 5/5 bilaterally, no c/c/e  Neurologic: A&O x 3, CN II-XII intact grossly    Results Review:   Hospital Outpatient Visit on 08/09/2024   Component Date Value Ref Range Status    WBC 08/09/2024 3.30 (L)  3.40 - 10.80 10*3/mm3 Final    RBC 08/09/2024 2.36 (L)  3.77 - 5.28 10*6/mm3 Final    Hemoglobin 08/09/2024 8.0 (L)  12.0 - 15.9 g/dL Final    Hematocrit 08/09/2024 25.3 (L)  34.0 - 46.6 % Final    MCV 08/09/2024 107.2 (H)  79.0 - 97.0 fL Final    MCH 08/09/2024 33.9 (H)  26.6 - 33.0 pg Final    MCHC 08/09/2024 31.6  31.5 - 35.7 g/dL Final    RDW 08/09/2024 21.6 (H)  12.3 - 15.4 % Final    RDW-SD 08/09/2024 82.3 (H)  37.0 - 54.0 fl Final    MPV 08/09/2024 13.3 (H)  6.0 - 12.0 fL Final    Platelets 08/09/2024 88 (L)  140 - 450 10*3/mm3 Final    Neutrophil % 08/09/2024 50.0  42.7 - 76.0 % Final    Lymphocyte % 08/09/2024 19.7  19.6 - 45.3 % Final    Monocyte % 08/09/2024 26.7 (H)  5.0 - 12.0 % Final    Eosinophil % 08/09/2024 2.1  0.3 - 6.2 % Final    Basophil % 08/09/2024 0.6  0.0 - 1.5 % Final    Immature Grans % 08/09/2024 0.9 (H)  0.0 - 0.5 % Final    Neutrophils, Absolute 08/09/2024 1.65 (L)  1.70 - 7.00 10*3/mm3 Final    Lymphocytes, Absolute 08/09/2024 0.65 (L)  0.70 - 3.10 10*3/mm3 Final    Monocytes, Absolute 08/09/2024 0.88  0.10 - 0.90 10*3/mm3 Final    Eosinophils, Absolute 08/09/2024 0.07  0.00 - 0.40 10*3/mm3 Final    Basophils, Absolute 08/09/2024 0.02  0.00 - 0.20 10*3/mm3 Final    Immature Grans, Absolute 08/09/2024 0.03  0.00 - 0.05 10*3/mm3 Final       No results found.    Assessment / Plan      Assessment/Plan:   Intrahepatic cholangiocarcinoma (CMS/HCC) (Primary)  -Noted during her most recent hospitalization with CT scans concerning for an enlarging liver lesion to 7.3 cm that was previously noted to be hemangioma  -Triple phase CT concerning for a solid tumor lesion  -Biopsy consistent with an adenocarcinoma  -CA 19-9 elevated to 84.7  -CT chest as well as the rest  of the CT abdomen/pelvis not concerning for distant or metastatic disease  -Status post open right hepatectomy, cholecystectomy, right partial adrenalectomy on 9/23/2021 with Dr. 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  0/4 lymph nodes were positive for disease.  LVI and PNI were present.  Focal R1 resection margin  -Discussed with patient and her daughter that she would benefit from adjuvant chemoXRT using Xeloda.  Discussed side effects including but not limited to immunosuppression, diarrhea, abdominal pain, nausea, vomiting, hand/foot syndrome  -Repeat CA 19-9 (22) in November 2021  -Completed chemo XRT with Xeloda in January 2021  -Repeat scans in March 2022 without any evidence of recurrent or metastatic disease  -Repeat CT C/A/P in June 2022 without evidence of recurrent or metastatic disease.  Did note some IVC stenosis which we will monitor with her next scans  -Repeat CT C/A/P in September 2022 reviewed without evidence of recurrent disease or metastatic disease.  IVC stenosis overall stable  -Repeat CT C/A/P in December 2022 reviewed without evidence of recurrent or metastatic disease.  CA 19-9 within normal limits  -Repeat CT C/A/P in March 2023 reviewed and notable for some slight enlarging retroperitoneal adenopathy.  CA 19-9 within normal limits  -PET/CT concerning for 1 cm left liver lesion that was only slightly PET avid as well as 2 lymph nodes that were also slightly PET avid  -Previously discussed her case at tumor board and with Dr. Sharma we agreed that she likely had disease progression  -Status post radiation completed in May 2023  -CA 19-9 in June 2022 within normal limits.    -CT C/A/P in July 2023 reviewed and showed some interval decrease in some lymph nodes as well as some slight enlargement of a couple lymph nodes.  -Completed radiation in August 2023  -CT C/A/P in October 2023  reviewed and only notable for slight increase in some pulmonary nodules about 1 mm each  -CT C/A/P December 2023 reviewed and notable for enlargement of her aortocaval lymph node.  CA 19-9 stable  -Completed radiation with Dr. Billings in February 2024  -CT C/A/P in April 2024 reviewed and showing continued slight enlargement of multiple pulmonary nodules as well as a liver lesion.  CA 19-9 stable  -CT C/A/P in June 2024 reviewed and showing overall stable disease  -Scheduled begin cycle 6 of cisplatin/gemcitabine/durvalumab today.  Dose reduced by 20% with cycle 2 due to significant thrombocytopenia with cycle 1.  Clinically appropriate for treatment.  Labs reviewed and appropriate for treatment  -Plan for repeat CT scans following the cycle.  Ordered today     Hemochromatosis carrier  -Noted on recent labs with an elevated ferritin to 246 hemochromatosis gene profile positive for heterozygosity of H63D.  Other genes negative.  Given patient's age and previous cardiac liver work-up showing no evidence of dysfunction, it is unlikely that she has had iron deposition all this time.  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  Treatment as above  -ECHO in July 2020 within normal limits  -Repeat iron studies in April 2021 stable with a ferritin of 229, iron level 100, transferrin saturation 27%  -Low concern for iron overload at this time.      Thyroid nodule  -Incidentally noted on CT scan but enlarging from previous CT  -Thyroid ultrasound in June 2022 only notable for goiter and small nodules nonconcerning for malignancy  -Has been seen by endocrinology with plan for repeat ultrasound in the summer of next year     Other fatigue   -Continued at this time  -Previously checked  iron studies, vitamin B12, folate, thyroid studies within normal limits  -Was previously been seen by cardiology with a normal ECHO and stress test  -Holter monitor notable for atrial fibrillation for which she is on metoprolol and prophylactic apixaban  -Will plan to start an albuterol inhaler given her shortness of breath with exertion     Anemia  -Likely related to her chemotherapy  -Iron studies in June 2024 with only mild iron deficiency anemia.    -Iron studies in July 2024 within normal limits outside of an elevated ferritin  -Given her hemoglobin is 8 today.  Will plan for 1 unit of blood to be given on Monday         Follow Up:   Follow-up in 3 weeks     Jorje Montenegro MD  Hematology and Oncology     Please note that portions of this note may have been completed with a voice recognition program. Efforts were made to edit the dictations, but occasionally words are mistranscribed.

## 2024-08-12 ENCOUNTER — HOSPITAL ENCOUNTER (OUTPATIENT)
Dept: ONCOLOGY | Facility: HOSPITAL | Age: 84
Discharge: HOME | End: 2024-08-12
Payer: MEDICARE

## 2024-08-12 VITALS
HEIGHT: 64 IN | SYSTOLIC BLOOD PRESSURE: 169 MMHG | RESPIRATION RATE: 16 BRPM | TEMPERATURE: 97.6 F | BODY MASS INDEX: 28.68 KG/M2 | WEIGHT: 168 LBS | DIASTOLIC BLOOD PRESSURE: 71 MMHG | HEART RATE: 55 BPM

## 2024-08-12 DIAGNOSIS — C22.1 INTRAHEPATIC CHOLANGIOCARCINOMA: Primary | ICD-10-CM

## 2024-08-12 DIAGNOSIS — D64.9 SYMPTOMATIC ANEMIA: ICD-10-CM

## 2024-08-12 PROCEDURE — 86900 BLOOD TYPING SEROLOGIC ABO: CPT

## 2024-08-12 PROCEDURE — 36430 TRANSFUSION BLD/BLD COMPNT: CPT

## 2024-08-12 PROCEDURE — P9016 RBC LEUKOCYTES REDUCED: HCPCS

## 2024-08-12 PROCEDURE — P9040 RBC LEUKOREDUCED IRRADIATED: HCPCS

## 2024-08-12 PROCEDURE — 25010000002 HEPARIN LOCK FLUSH PER 10 UNITS: Performed by: INTERNAL MEDICINE

## 2024-08-12 RX ORDER — HEPARIN SODIUM (PORCINE) LOCK FLUSH IV SOLN 100 UNIT/ML 100 UNIT/ML
500 SOLUTION INTRAVENOUS AS NEEDED
Status: DISCONTINUED | OUTPATIENT
Start: 2024-08-12 | End: 2024-08-13 | Stop reason: HOSPADM

## 2024-08-12 RX ORDER — SODIUM CHLORIDE 9 MG/ML
250 INJECTION, SOLUTION INTRAVENOUS AS NEEDED
Status: DISCONTINUED | OUTPATIENT
Start: 2024-08-12 | End: 2024-08-13 | Stop reason: HOSPADM

## 2024-08-12 RX ORDER — HEPARIN SODIUM (PORCINE) LOCK FLUSH IV SOLN 100 UNIT/ML 100 UNIT/ML
500 SOLUTION INTRAVENOUS AS NEEDED
Status: CANCELLED | OUTPATIENT
Start: 2024-08-12

## 2024-08-12 RX ADMIN — HEPARIN 500 UNITS: 100 SYRINGE at 09:51

## 2024-08-16 ENCOUNTER — HOSPITAL ENCOUNTER (OUTPATIENT)
Dept: ONCOLOGY | Facility: HOSPITAL | Age: 84
Discharge: HOME | End: 2024-08-16
Payer: MEDICARE

## 2024-08-16 VITALS
TEMPERATURE: 97.6 F | HEART RATE: 60 BPM | HEIGHT: 64 IN | BODY MASS INDEX: 27.66 KG/M2 | RESPIRATION RATE: 16 BRPM | WEIGHT: 162 LBS | SYSTOLIC BLOOD PRESSURE: 170 MMHG | DIASTOLIC BLOOD PRESSURE: 67 MMHG

## 2024-08-16 DIAGNOSIS — C22.1 INTRAHEPATIC CHOLANGIOCARCINOMA: Primary | ICD-10-CM

## 2024-08-16 DIAGNOSIS — R91.1 LUNG NODULE: ICD-10-CM

## 2024-08-16 DIAGNOSIS — R91.1 LUNG NODULE: Primary | ICD-10-CM

## 2024-08-16 LAB
ALBUMIN SERPL-MCNC: 3.3 G/DL (ref 3.5–5.2)
ALBUMIN/GLOB SERPL: 1.3 G/DL
ALP SERPL-CCNC: 167 U/L (ref 39–117)
ALT SERPL W P-5'-P-CCNC: 20 U/L (ref 1–33)
ANION GAP SERPL CALCULATED.3IONS-SCNC: 8 MMOL/L (ref 5–15)
AST SERPL-CCNC: 23 U/L (ref 1–32)
BASOPHILS # BLD AUTO: 0.02 10*3/MM3 (ref 0–0.2)
BASOPHILS NFR BLD AUTO: 0.8 % (ref 0–1.5)
BILIRUB SERPL-MCNC: 0.2 MG/DL (ref 0–1.2)
BUN SERPL-MCNC: 20 MG/DL (ref 8–23)
BUN/CREAT SERPL: 18.2 (ref 7–25)
CALCIUM SPEC-SCNC: 8.6 MG/DL (ref 8.6–10.5)
CHLORIDE SERPL-SCNC: 104 MMOL/L (ref 98–107)
CO2 SERPL-SCNC: 25 MMOL/L (ref 22–29)
CREAT SERPL-MCNC: 1.1 MG/DL (ref 0.57–1)
DEPRECATED RDW RBC AUTO: 73.6 FL (ref 37–54)
EGFRCR SERPLBLD CKD-EPI 2021: 49.3 ML/MIN/1.73
EOSINOPHIL # BLD AUTO: 0.01 10*3/MM3 (ref 0–0.4)
EOSINOPHIL NFR BLD AUTO: 0.4 % (ref 0.3–6.2)
ERYTHROCYTE [DISTWIDTH] IN BLOOD BY AUTOMATED COUNT: 19.5 % (ref 12.3–15.4)
GLOBULIN UR ELPH-MCNC: 2.6 GM/DL
GLUCOSE SERPL-MCNC: 106 MG/DL (ref 65–99)
HCT VFR BLD AUTO: 30 % (ref 34–46.6)
HGB BLD-MCNC: 9.8 G/DL (ref 12–15.9)
IMM GRANULOCYTES # BLD AUTO: 0.11 10*3/MM3 (ref 0–0.05)
IMM GRANULOCYTES NFR BLD AUTO: 4.2 % (ref 0–0.5)
LYMPHOCYTES # BLD AUTO: 0.57 10*3/MM3 (ref 0.7–3.1)
LYMPHOCYTES NFR BLD AUTO: 21.9 % (ref 19.6–45.3)
MAGNESIUM SERPL-MCNC: 1.9 MG/DL (ref 1.6–2.4)
MCH RBC QN AUTO: 33.7 PG (ref 26.6–33)
MCHC RBC AUTO-ENTMCNC: 32.7 G/DL (ref 31.5–35.7)
MCV RBC AUTO: 103.1 FL (ref 79–97)
MONOCYTES # BLD AUTO: 0.45 10*3/MM3 (ref 0.1–0.9)
MONOCYTES NFR BLD AUTO: 17.3 % (ref 5–12)
NEUTROPHILS NFR BLD AUTO: 1.44 10*3/MM3 (ref 1.7–7)
NEUTROPHILS NFR BLD AUTO: 55.4 % (ref 42.7–76)
PLATELET # BLD AUTO: 95 10*3/MM3 (ref 140–450)
PMV BLD AUTO: 12.3 FL (ref 6–12)
POTASSIUM SERPL-SCNC: 4.2 MMOL/L (ref 3.5–5.2)
PROT SERPL-MCNC: 5.9 G/DL (ref 6–8.5)
RBC # BLD AUTO: 2.91 10*6/MM3 (ref 3.77–5.28)
SODIUM SERPL-SCNC: 137 MMOL/L (ref 136–145)
WBC NRBC COR # BLD AUTO: 2.6 10*3/MM3 (ref 3.4–10.8)

## 2024-08-16 PROCEDURE — 25010000002 PALONOSETRON PER 25 MCG: Performed by: INTERNAL MEDICINE

## 2024-08-16 PROCEDURE — 96367 TX/PROPH/DG ADDL SEQ IV INF: CPT

## 2024-08-16 PROCEDURE — 25010000002 GEMCITABINE HCL 2 GM/20ML SOLUTION 20 ML VIAL: Performed by: INTERNAL MEDICINE

## 2024-08-16 PROCEDURE — 25810000003 SODIUM CHLORIDE 0.9 % SOLUTION 250 ML FLEX CONT: Performed by: INTERNAL MEDICINE

## 2024-08-16 PROCEDURE — 25810000003 SODIUM CHLORIDE 0.9 % SOLUTION: Performed by: INTERNAL MEDICINE

## 2024-08-16 PROCEDURE — 25010000002 DEXAMETHASONE SODIUM PHOSPHATE 100 MG/10ML SOLUTION: Performed by: INTERNAL MEDICINE

## 2024-08-16 PROCEDURE — 25010000002 MAGNESIUM SULFATE PER 500 MG OF MAGNESIUM: Performed by: INTERNAL MEDICINE

## 2024-08-16 PROCEDURE — 25010000002 CISPLATIN PER 50 MG: Performed by: INTERNAL MEDICINE

## 2024-08-16 PROCEDURE — 80053 COMPREHEN METABOLIC PANEL: CPT | Performed by: INTERNAL MEDICINE

## 2024-08-16 PROCEDURE — 96413 CHEMO IV INFUSION 1 HR: CPT

## 2024-08-16 PROCEDURE — 83735 ASSAY OF MAGNESIUM: CPT | Performed by: INTERNAL MEDICINE

## 2024-08-16 PROCEDURE — 25010000002 HEPARIN LOCK FLUSH PER 10 UNITS: Performed by: INTERNAL MEDICINE

## 2024-08-16 PROCEDURE — 25010000002 POTASSIUM CHLORIDE PER 2 MEQ OF POTASSIUM: Performed by: INTERNAL MEDICINE

## 2024-08-16 PROCEDURE — 96375 TX/PRO/DX INJ NEW DRUG ADDON: CPT

## 2024-08-16 PROCEDURE — 85025 COMPLETE CBC W/AUTO DIFF WBC: CPT | Performed by: INTERNAL MEDICINE

## 2024-08-16 PROCEDURE — 25010000002 FOSAPREPITANT PER 1 MG: Performed by: INTERNAL MEDICINE

## 2024-08-16 PROCEDURE — 96417 CHEMO IV INFUS EACH ADDL SEQ: CPT

## 2024-08-16 RX ORDER — HEPARIN SODIUM (PORCINE) LOCK FLUSH IV SOLN 100 UNIT/ML 100 UNIT/ML
500 SOLUTION INTRAVENOUS AS NEEDED
OUTPATIENT
Start: 2024-08-16

## 2024-08-16 RX ORDER — PALONOSETRON 0.05 MG/ML
0.25 INJECTION, SOLUTION INTRAVENOUS ONCE
Status: COMPLETED | OUTPATIENT
Start: 2024-08-16 | End: 2024-08-16

## 2024-08-16 RX ORDER — HEPARIN SODIUM (PORCINE) LOCK FLUSH IV SOLN 100 UNIT/ML 100 UNIT/ML
500 SOLUTION INTRAVENOUS AS NEEDED
Status: DISCONTINUED | OUTPATIENT
Start: 2024-08-16 | End: 2024-08-17 | Stop reason: HOSPADM

## 2024-08-16 RX ORDER — SODIUM CHLORIDE 9 MG/ML
20 INJECTION, SOLUTION INTRAVENOUS ONCE
Status: COMPLETED | OUTPATIENT
Start: 2024-08-16 | End: 2024-08-16

## 2024-08-16 RX ADMIN — POTASSIUM CHLORIDE 500 ML: 2 INJECTION, SOLUTION, CONCENTRATE INTRAVENOUS at 14:14

## 2024-08-16 RX ADMIN — HEPARIN 500 UNITS: 100 SYRINGE at 15:23

## 2024-08-16 RX ADMIN — CISPLATIN 36 MG: 1 INJECTION, SOLUTION INTRAVENOUS at 13:02

## 2024-08-16 RX ADMIN — GEMCITABINE 1400 MG: 100 INJECTION, SOLUTION INTRAVENOUS at 12:23

## 2024-08-16 RX ADMIN — SODIUM CHLORIDE 20 ML/HR: 9 INJECTION, SOLUTION INTRAVENOUS at 10:22

## 2024-08-16 RX ADMIN — FOSAPREPITANT DIMEGLUMINE 150 MG: 150 INJECTION, POWDER, LYOPHILIZED, FOR SOLUTION INTRAVENOUS at 11:41

## 2024-08-16 RX ADMIN — PALONOSETRON HYDROCHLORIDE 0.25 MG: 0.25 INJECTION INTRAVENOUS at 11:37

## 2024-08-16 RX ADMIN — POTASSIUM CHLORIDE 500 ML: 2 INJECTION, SOLUTION, CONCENTRATE INTRAVENOUS at 10:25

## 2024-08-16 RX ADMIN — DEXAMETHASONE SODIUM PHOSPHATE 12 MG: 10 INJECTION, SOLUTION INTRAMUSCULAR; INTRAVENOUS at 11:39

## 2024-08-26 ENCOUNTER — HOSPITAL ENCOUNTER (OUTPATIENT)
Dept: CT IMAGING | Facility: HOSPITAL | Age: 84
Discharge: HOME | End: 2024-08-26
Admitting: INTERNAL MEDICINE
Payer: MEDICARE

## 2024-08-26 DIAGNOSIS — C22.1 INTRAHEPATIC CHOLANGIOCARCINOMA: ICD-10-CM

## 2024-08-26 PROCEDURE — 71260 CT THORAX DX C+: CPT

## 2024-08-26 PROCEDURE — 74177 CT ABD & PELVIS W/CONTRAST: CPT

## 2024-08-26 PROCEDURE — 25510000001 IOPAMIDOL 61 % SOLUTION: Performed by: INTERNAL MEDICINE

## 2024-08-26 RX ORDER — IOPAMIDOL 612 MG/ML
100 INJECTION, SOLUTION INTRAVASCULAR
Status: COMPLETED | OUTPATIENT
Start: 2024-08-26 | End: 2024-08-26

## 2024-08-26 RX ADMIN — IOPAMIDOL 85 ML: 612 INJECTION, SOLUTION INTRAVENOUS at 10:41

## 2024-08-30 ENCOUNTER — HOSPITAL ENCOUNTER (OUTPATIENT)
Dept: ONCOLOGY | Facility: HOSPITAL | Age: 84
Discharge: HOME | End: 2024-08-30
Payer: MEDICARE

## 2024-08-30 ENCOUNTER — APPOINTMENT (OUTPATIENT)
Dept: ONCOLOGY | Facility: HOSPITAL | Age: 84
End: 2024-08-30
Payer: MEDICARE

## 2024-08-30 ENCOUNTER — OFFICE VISIT (OUTPATIENT)
Dept: ONCOLOGY | Facility: CLINIC | Age: 84
End: 2024-08-30
Payer: MEDICARE

## 2024-08-30 ENCOUNTER — TELEPHONE (OUTPATIENT)
Dept: ONCOLOGY | Facility: CLINIC | Age: 84
End: 2024-08-30

## 2024-08-30 VITALS
DIASTOLIC BLOOD PRESSURE: 69 MMHG | HEIGHT: 64 IN | BODY MASS INDEX: 27.14 KG/M2 | WEIGHT: 159 LBS | SYSTOLIC BLOOD PRESSURE: 120 MMHG | OXYGEN SATURATION: 97 % | HEART RATE: 83 BPM | TEMPERATURE: 97.1 F

## 2024-08-30 VITALS
SYSTOLIC BLOOD PRESSURE: 140 MMHG | BODY MASS INDEX: 27.14 KG/M2 | RESPIRATION RATE: 16 BRPM | WEIGHT: 159 LBS | HEART RATE: 76 BPM | DIASTOLIC BLOOD PRESSURE: 65 MMHG | TEMPERATURE: 98.8 F | HEIGHT: 64 IN

## 2024-08-30 DIAGNOSIS — C22.1 INTRAHEPATIC CHOLANGIOCARCINOMA: Primary | ICD-10-CM

## 2024-08-30 LAB
ALBUMIN SERPL-MCNC: 3.5 G/DL (ref 3.5–5.2)
ALBUMIN/GLOB SERPL: 1.7 G/DL
ALP SERPL-CCNC: 152 U/L (ref 39–117)
ALT SERPL W P-5'-P-CCNC: 15 U/L (ref 1–33)
ANION GAP SERPL CALCULATED.3IONS-SCNC: 10 MMOL/L (ref 5–15)
AST SERPL-CCNC: 28 U/L (ref 1–32)
BASOPHILS # BLD AUTO: 0.02 10*3/MM3 (ref 0–0.2)
BASOPHILS NFR BLD AUTO: 1 % (ref 0–1.5)
BILIRUB SERPL-MCNC: 0.2 MG/DL (ref 0–1.2)
BUN SERPL-MCNC: 15 MG/DL (ref 8–23)
BUN/CREAT SERPL: 12.6 (ref 7–25)
CALCIUM SPEC-SCNC: 8.6 MG/DL (ref 8.6–10.5)
CANCER AG19-9 SERPL-ACNC: 18.2 U/ML
CHLORIDE SERPL-SCNC: 101 MMOL/L (ref 98–107)
CO2 SERPL-SCNC: 23 MMOL/L (ref 22–29)
CREAT SERPL-MCNC: 1.19 MG/DL (ref 0.57–1)
DEPRECATED RDW RBC AUTO: 74.5 FL (ref 37–54)
EGFRCR SERPLBLD CKD-EPI 2021: 44.9 ML/MIN/1.73
EOSINOPHIL # BLD AUTO: 0.03 10*3/MM3 (ref 0–0.4)
EOSINOPHIL NFR BLD AUTO: 1.4 % (ref 0.3–6.2)
ERYTHROCYTE [DISTWIDTH] IN BLOOD BY AUTOMATED COUNT: 19.8 % (ref 12.3–15.4)
GLOBULIN UR ELPH-MCNC: 2.1 GM/DL
GLUCOSE SERPL-MCNC: 132 MG/DL (ref 65–99)
HCT VFR BLD AUTO: 26.5 % (ref 34–46.6)
HGB BLD-MCNC: 8.7 G/DL (ref 12–15.9)
IMM GRANULOCYTES # BLD AUTO: 0.04 10*3/MM3 (ref 0–0.05)
IMM GRANULOCYTES NFR BLD AUTO: 1.9 % (ref 0–0.5)
LYMPHOCYTES # BLD AUTO: 0.38 10*3/MM3 (ref 0.7–3.1)
LYMPHOCYTES NFR BLD AUTO: 18.2 % (ref 19.6–45.3)
MAGNESIUM SERPL-MCNC: 1.6 MG/DL (ref 1.6–2.4)
MCH RBC QN AUTO: 34.1 PG (ref 26.6–33)
MCHC RBC AUTO-ENTMCNC: 32.8 G/DL (ref 31.5–35.7)
MCV RBC AUTO: 103.9 FL (ref 79–97)
MONOCYTES # BLD AUTO: 0.61 10*3/MM3 (ref 0.1–0.9)
MONOCYTES NFR BLD AUTO: 29.2 % (ref 5–12)
NEUTROPHILS NFR BLD AUTO: 1.01 10*3/MM3 (ref 1.7–7)
NEUTROPHILS NFR BLD AUTO: 48.3 % (ref 42.7–76)
PLATELET # BLD AUTO: 71 10*3/MM3 (ref 140–450)
PMV BLD AUTO: 13.7 FL (ref 6–12)
POTASSIUM SERPL-SCNC: 4.3 MMOL/L (ref 3.5–5.2)
PROT SERPL-MCNC: 5.6 G/DL (ref 6–8.5)
RBC # BLD AUTO: 2.55 10*6/MM3 (ref 3.77–5.28)
SODIUM SERPL-SCNC: 134 MMOL/L (ref 136–145)
T4 FREE SERPL-MCNC: 1.3 NG/DL (ref 0.92–1.68)
TSH SERPL DL<=0.05 MIU/L-ACNC: 7.03 UIU/ML (ref 0.27–4.2)
WBC NRBC COR # BLD AUTO: 2.09 10*3/MM3 (ref 3.4–10.8)

## 2024-08-30 PROCEDURE — 3074F SYST BP LT 130 MM HG: CPT | Performed by: INTERNAL MEDICINE

## 2024-08-30 PROCEDURE — 85025 COMPLETE CBC W/AUTO DIFF WBC: CPT | Performed by: INTERNAL MEDICINE

## 2024-08-30 PROCEDURE — 80053 COMPREHEN METABOLIC PANEL: CPT | Performed by: INTERNAL MEDICINE

## 2024-08-30 PROCEDURE — 36591 DRAW BLOOD OFF VENOUS DEVICE: CPT

## 2024-08-30 PROCEDURE — 86301 IMMUNOASSAY TUMOR CA 19-9: CPT | Performed by: INTERNAL MEDICINE

## 2024-08-30 PROCEDURE — 84443 ASSAY THYROID STIM HORMONE: CPT | Performed by: INTERNAL MEDICINE

## 2024-08-30 PROCEDURE — 25010000002 HEPARIN LOCK FLUSH PER 10 UNITS: Performed by: INTERNAL MEDICINE

## 2024-08-30 PROCEDURE — 83735 ASSAY OF MAGNESIUM: CPT | Performed by: INTERNAL MEDICINE

## 2024-08-30 PROCEDURE — 1126F AMNT PAIN NOTED NONE PRSNT: CPT | Performed by: INTERNAL MEDICINE

## 2024-08-30 PROCEDURE — 99214 OFFICE O/P EST MOD 30 MIN: CPT | Performed by: INTERNAL MEDICINE

## 2024-08-30 PROCEDURE — 84439 ASSAY OF FREE THYROXINE: CPT | Performed by: INTERNAL MEDICINE

## 2024-08-30 PROCEDURE — 3078F DIAST BP <80 MM HG: CPT | Performed by: INTERNAL MEDICINE

## 2024-08-30 RX ORDER — ONDANSETRON 8 MG/1
8 TABLET, FILM COATED ORAL 3 TIMES DAILY PRN
Qty: 30 TABLET | Refills: 5 | Status: SHIPPED | OUTPATIENT
Start: 2024-08-30

## 2024-08-30 RX ORDER — HEPARIN SODIUM (PORCINE) LOCK FLUSH IV SOLN 100 UNIT/ML 100 UNIT/ML
500 SOLUTION INTRAVENOUS AS NEEDED
Status: DISCONTINUED | OUTPATIENT
Start: 2024-08-30 | End: 2024-08-31 | Stop reason: HOSPADM

## 2024-08-30 RX ORDER — LEVOTHYROXINE SODIUM 25 UG/1
25 TABLET ORAL
Qty: 30 TABLET | Refills: 3 | Status: SHIPPED | OUTPATIENT
Start: 2024-08-30

## 2024-08-30 RX ORDER — HEPARIN SODIUM (PORCINE) LOCK FLUSH IV SOLN 100 UNIT/ML 100 UNIT/ML
500 SOLUTION INTRAVENOUS AS NEEDED
OUTPATIENT
Start: 2024-08-30

## 2024-08-30 RX ORDER — ONDANSETRON 4 MG/1
4 TABLET, FILM COATED ORAL EVERY 6 HOURS PRN
Qty: 30 TABLET | Refills: 5 | Status: SHIPPED | OUTPATIENT
Start: 2024-08-30

## 2024-08-30 RX ADMIN — HEPARIN 500 UNITS: 100 SYRINGE at 09:23

## 2024-08-30 NOTE — TELEPHONE ENCOUNTER
Call to Sheree to notify that Dr Montenegro is sending in some levothyroxine for her to start daily.  Sheree states understood

## 2024-08-30 NOTE — PROGRESS NOTES
Follow Up Office Visit      Date: 2024     Patient Name: Sheree Hernandez  MRN: [id number]  : 1940  Chief Complaint:  Follow-up for intrahepatic cholangiocarcinoma      History of Present Illness: Sheree Hernandez is a pleasant 79 y.o. female with a past medical history of hyperlipidemia and hypertension who presents today for evaluation of concern for hemochromatosis. The patient is accompanied by their self who contributes to the history of their care.  Patient states that over the past 2 years she has been having worsening fatigue especially with exertion.  Over the past several months this has become worse.  Patient states that she gets tired with basic activities including showering getting dressed in the morning and walking to and from her car from stores.  She states that her fatigue gets better after a few minutes of rest.  She has previously had an echocardiogram and cardiac catheterization within the past 2 years which did not reveal any cause of her fatigue with exertion.  She is also had an ultrasound of the liver which revealed stable cyst.  She was recently seen by her PCP who ordered iron studies which was notable for an elevated ferritin to 246.  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  All other mutations were negative.  She is currently up-to-date on her mammograms and colonoscopy with no active malignancies noted.  She is otherwise compliant with her statin and high blood pressure medicines.  Repeat abdominal imaging in 2021 concerning for enlarging liver lesion.  She was seen by Dr. Sharma and  is status post open right hepatectomy, cholecystectomy, right partial adrenalectomy on 2021.  Pathology showed a focal R1 resected margin.  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  0/4 lymph nodes were  positive for disease.  LVI and PNI were present.  Finished chemoXRT in January 2021.      Interval History:  Presents to clinic for follow-up.  Completed radiation in May 2023 and again in August 2023.  Scheduled to begin cycle 7 of cisplatin/gemcitabine/nivolumab today.  Continues to have fatigue and tiredness.  Not getting around as well she liked to.  Did not feel significant improvement with her PRBC transfusion with the past cycle.  Does not feel like she could tolerate chemotherapy today    Oncology History:    Oncology/Hematology History   Intrahepatic cholangiocarcinoma   8/17/2021 Initial Diagnosis    Intrahepatic cholangiocarcinoma (CMS/HCC)     8/17/2021 Cancer Staged    Staging form: Intrahepatic Bile Duct, AJCC 8th Edition  - Clinical: Stage IB (cT1b, cN0, cM0) - Signed by Jorje Montenegro MD on 8/17/2021 11/5/2021 Cancer Staged    Staging form: Intrahepatic Bile Duct, AJCC 8th Edition  - Pathologic: Stage IIIA (pT3, pN0, cM0) - Signed by Jorje Montenegro MD on 11/5/2021 12/9/2021 - 1/6/2022 Chemotherapy    OP GALLBLADDER Capecitabine + XRT     12/9/2021 - 1/19/2022 Radiation    Radiation OncologyTreatment Course:  Sheree Hernandez received 5040 cGy in 28 fractions to liver via External Beam Radiation - EBRT.     4/25/2023 - 5/8/2023 Radiation    Radiation OncologyTreatment Course:  Sheree Hernandez received 3500 cGy in 5 fractions to liver mass and lymph nodes via Stereotactic Radiation Therapy - SRT.     8/1/2023 - 8/11/2023 Radiation    Radiation OncologyTreatment Course:  Sheree Hernandez received 3500 cGy in 5 fractions to abdomen via Stereotactic Radiation Therapy - SRT.     1/9/2024 - 1/23/2024 Radiation    Radiation OncologyTreatment Course:  Sheree Hernandez received 3000 cGy in 5 fractions to abdomen via Stereotactic Radiation Therapy - SRT.     1/11/2024 - 1/11/2024 Radiation    Radiation OncologyTreatment Course:  Sheree Hernandez received 754 cGy in 1 fractions to left lung via  Stereotactic Radiation Therapy - SRT.     2/1/2024 - 2/1/2024 Radiation    Radiation OncologyTreatment Course:  Sheree Hernandez received 3000 cGy in 1 fractions to left lung via Stereotactic Radiation Therapy - SRT.     4/19/2024 - 8/16/2024 Chemotherapy    OP HEPATOBILIARY CISplatin + Gemcitabine Days 1,8 / Durvalumab Q21D     5/17/2024 -  Chemotherapy    OP CENTRAL VENOUS ACCESS DEVICE Access, Care, and Maintenance (CVAD)     9/6/2024 -  Chemotherapy    OP CHOLANGIOCARCINOMA Durvalumab 1500 mg         Subjective      Review of Systems:   Constitutional: Negative for fevers, chills, or weight loss  Eyes: Negative for blurred vision or discharge         Ear/Nose/Throat: Negative for difficulty swallowing, sore throat, LAD                                                       Respiratory: Negative for cough, SOA, wheezing                                                                                        Cardiovascular: Negative for chest pain or palpitations                                                                  Gastrointestinal: Negative for nausea, vomiting or diarrhea                                                                     Genitourinary: Negative for dysuria or hematuria                                                                                           Musculoskeletal: Negative for any joint pains or muscle aches                                                                        Neurologic: Negative for any weakness, headaches, dizziness                                                                         Hematologic: Negative for any easy bleeding or bruising                                                                                   Psychiatric: Negative for anxiety or depression                          Past Medical History/Past Surgical History/ Family History/ Social History: Reviewed by me and unchanged from my previous documentation done on August 2024.  "    Medications:     Current Outpatient Medications:     albuterol sulfate  (90 Base) MCG/ACT inhaler, Inhale 1 puff Every 4 (Four) Hours As Needed for Wheezing., Disp: 18 g, Rfl: 2    apixaban (ELIQUIS) 2.5 MG tablet tablet, Take 1 tablet by mouth 2 (Two) Times a Day., Disp: 60 tablet, Rfl: 11    Cholecalciferol (Vitamin D3) 25 MCG (1000 UT) capsule, Take  by mouth Daily., Disp: , Rfl:     coenzyme Q10 50 MG capsule capsule, Take  by mouth Daily., Disp: , Rfl:     Cyanocobalamin (VITAMIN B 12 PO), Take  by mouth., Disp: , Rfl:     doxycycline (VIBRAMYCIN) 100 MG capsule, Take 1 capsule by mouth 2 (Two) Times a Day., Disp: 14 capsule, Rfl: 0    Folic Acid 0.8 MG capsule, Take  by mouth., Disp: , Rfl:     ipratropium (ATROVENT) 0.06 % nasal spray, Administer 1 spray into each nostril twice daily, Disp: 30 mL, Rfl: 3    lidocaine-prilocaine (EMLA) 2.5-2.5 % cream, Please apply to port site 30 min prior to infusion and cover with Saran Wrap, Disp: 30 g, Rfl: 3    magnesium oxide (MAG-OX) 400 MG tablet, Take 1 tablet by mouth Daily., Disp: , Rfl:     metoprolol tartrate (LOPRESSOR) 25 MG tablet, Take 1 tablet by mouth 2 (Two) Times a Day., Disp: 60 tablet, Rfl: 11    Omega-3 1000 MG capsule, Take  by mouth Daily., Disp: , Rfl:     ondansetron (ZOFRAN) 4 MG tablet, Take 1 tablet by mouth Every 6 (Six) Hours As Needed for Nausea or Vomiting., Disp: 30 tablet, Rfl: 5    rosuvastatin (CRESTOR) 10 MG tablet, Take 1 tablet by mouth Daily., Disp: 90 tablet, Rfl: 3    Allergies:   Allergies   Allergen Reactions    Pravastatin Myalgia       Objective     Physical Exam:  Vital Signs:   Vitals:    08/30/24 0836   BP: 120/69   Pulse: 83   Temp: 97.1 °F (36.2 °C)   TempSrc: Infrared   SpO2: 97%   Weight: 72.1 kg (159 lb)   Height: 162.6 cm (64.02\")   PainSc: 0-No pain     Pain Score    08/30/24 0836   PainSc: 0-No pain     ECOG Performance Status: 2 - Symptomatic, <50% confined to bed    Constitutional: NAD, ECOG 2  Eyes: " PERRLA, scleral anicteric  ENT: No LAD, no thyromegaly  Respiratory: CTAB, no wheezing, rales, rhonchi  Cardiovascular: RRR, no murmurs, pulses 2+ bilaterally  Abdomen: soft, NT/ND, no HSM  Musculoskeletal: strength 5/5 bilaterally, no c/c/e  Neurologic: A&O x 3, CN II-XII intact grossly    Results Review:   Hospital Outpatient Visit on 08/30/2024   Component Date Value Ref Range Status    WBC 08/30/2024 2.09 (L)  3.40 - 10.80 10*3/mm3 Final    RBC 08/30/2024 2.55 (L)  3.77 - 5.28 10*6/mm3 Final    Hemoglobin 08/30/2024 8.7 (L)  12.0 - 15.9 g/dL Final    Hematocrit 08/30/2024 26.5 (L)  34.0 - 46.6 % Final    MCV 08/30/2024 103.9 (H)  79.0 - 97.0 fL Final    MCH 08/30/2024 34.1 (H)  26.6 - 33.0 pg Final    MCHC 08/30/2024 32.8  31.5 - 35.7 g/dL Final    RDW 08/30/2024 19.8 (H)  12.3 - 15.4 % Final    RDW-SD 08/30/2024 74.5 (H)  37.0 - 54.0 fl Final    MPV 08/30/2024 13.7 (H)  6.0 - 12.0 fL Final    Platelets 08/30/2024 71 (L)  140 - 450 10*3/mm3 Final    Verified by repeat analysis.      Neutrophil % 08/30/2024 48.3  42.7 - 76.0 % Final    Lymphocyte % 08/30/2024 18.2 (L)  19.6 - 45.3 % Final    Monocyte % 08/30/2024 29.2 (H)  5.0 - 12.0 % Final    Eosinophil % 08/30/2024 1.4  0.3 - 6.2 % Final    Basophil % 08/30/2024 1.0  0.0 - 1.5 % Final    Immature Grans % 08/30/2024 1.9 (H)  0.0 - 0.5 % Final    Neutrophils, Absolute 08/30/2024 1.01 (L)  1.70 - 7.00 10*3/mm3 Final    Lymphocytes, Absolute 08/30/2024 0.38 (L)  0.70 - 3.10 10*3/mm3 Final    Monocytes, Absolute 08/30/2024 0.61  0.10 - 0.90 10*3/mm3 Final    Eosinophils, Absolute 08/30/2024 0.03  0.00 - 0.40 10*3/mm3 Final    Basophils, Absolute 08/30/2024 0.02  0.00 - 0.20 10*3/mm3 Final    Immature Grans, Absolute 08/30/2024 0.04  0.00 - 0.05 10*3/mm3 Final       CT Abdomen Pelvis With Contrast    Result Date: 8/27/2024  Narrative: CT ABDOMEN PELVIS W CONTRAST, CT CHEST W CONTRAST DIAGNOSTIC Date of Exam: 8/26/2024 10:22 AM EDT Indication: cholangiocarcinoma.  Comparison: CT chest abdomen pelvis 6/24/2024 Technique: Axial CT images were obtained of the chest, abdomen and pelvis following the uneventful intravenous administration of 85 mL Isovue-300. Reconstructed coronal and sagittal images were also obtained. Automated exposure control and iterative construction methods were used. Findings: CT chest Stable small thyroid nodules. No supraclavicular adenopathy. Right chest port catheter tip within the high right atrium. Aortic atherosclerotic disease without aneurysm. Heart size within normal limits. Trace pericardial fluid. Esophagus unremarkable. Normal caliber main pulmonary artery without large central filling defects. Stable 7 mm short axis high right paratracheal lymph node image 18. Stable 9 mm short axis morphologically abnormal AP window lymph node image 35. No enlarging thoracic adenopathy or axillary adenopathy. Trachea patent. Small to moderate right and trace left pleural effusions both new from prior. No infectious appearing consolidation. Suspect mild adjacent bilateral lower lobe atelectasis. No pneumothorax. Multiple pulmonary nodules overall appearing without significant change from prior. Examples include 4 mm right upper lobe nodule image 56, 6 mm right middle lobe nodule image 67, 4 mm left upper lobe nodule image 37 and 6 mm left lower lobe nodule image 45. No enlarging pulmonary nodules. No acute chest wall findings or suspicious soft tissue nodules. Old left clavicle deformity status post ORIF. Old left third rib deformity. Osseous demineralization. Degenerative related changes throughout the spine without acute displaced fracture or aggressive bone lesion. CT abdomen/pelvis Surgical changes of right hepatectomy. Similar heterogeneous low density along the surgical bed. Fluid density hepatic cysts are stable. Low to intermediate density 1.2 cm posterior left hepatic lobe lesion image 40 also stable. No intrahepatic duct dilation. Stable pancreatic  atrophy without active inflammation or drainable collection. Spleen is unremarkable. Right adrenal gland not clearly visualized and may be surgically absent. No left adrenal nodule. Symmetric renal size, contour and enhancement. Stable small low-density renal lesions favoring benign cyst. No hydronephrosis. Partially distended urinary bladder. Nonspecific mild circumferential bladder wall thickening without significant surrounding inflammation. Fat deposition in the urinary bladder indicates changes of prior cystitis. Hysterectomy. No suspicious adnexal mass. Expected configuration stomach and duodenum. Colonic diverticulosis. No evidence of bowel obstruction or active inflammation. No CT evidence of acute appendicitis. Diffuse aortic atherosclerotic disease without aneurysm. Minimal pelvic fluid without free  air or drainable collection. Minimal presacral edema. No suspicious or enlarging adenopathy. No appreciable omental or peritoneal nodules. Linear scarring from prior laparotomy. Minimal body wall edema. Mildly heterogeneous predominantly lucent mixed sclerotic lesion in the posterior right acetabulum stable  from prior. This could be degenerative in etiology given proximity to the joint space and moderate if not severe degenerative changes in the right hip. Osseous demineralization. No acute displaced fracture or aggressive bone lesion.     Impression: Impression: 1. No enlarging soft tissue masses or adenopathy to suggest disease progression in the chest, abdomen or pelvis. 2. Stable multiple pulmonary nodules. Stable few mildly enlarged thoracic lymph nodes. 3. Findings suggesting volume overload/third spacing including small to moderate right and trace left pleural effusions new from prior, trace pelvic fluid, minimal presacral edema and minimal body wall edema. These findings are all new and/or increased from prior. 4. Nonspecific bladder wall thickening which may be chronic. Correlate for clinical signs  of cystitis. 5. Other chronic/ancillary findings as above. Electronically Signed: Joseph Jensen MD  8/27/2024 12:04 PM EDT  Workstation ID: [id number]    CT Chest With Contrast Diagnostic    Result Date: 8/27/2024  Narrative: CT ABDOMEN PELVIS W CONTRAST, CT CHEST W CONTRAST DIAGNOSTIC Date of Exam: 8/26/2024 10:22 AM EDT Indication: cholangiocarcinoma. Comparison: CT chest abdomen pelvis 6/24/2024 Technique: Axial CT images were obtained of the chest, abdomen and pelvis following the uneventful intravenous administration of 85 mL Isovue-300. Reconstructed coronal and sagittal images were also obtained. Automated exposure control and iterative construction methods were used. Findings: CT chest Stable small thyroid nodules. No supraclavicular adenopathy. Right chest port catheter tip within the high right atrium. Aortic atherosclerotic disease without aneurysm. Heart size within normal limits. Trace pericardial fluid. Esophagus unremarkable. Normal caliber main pulmonary artery without large central filling defects. Stable 7 mm short axis high right paratracheal lymph node image 18. Stable 9 mm short axis morphologically abnormal AP window lymph node image 35. No enlarging thoracic adenopathy or axillary adenopathy. Trachea patent. Small to moderate right and trace left pleural effusions both new from prior. No infectious appearing consolidation. Suspect mild adjacent bilateral lower lobe atelectasis. No pneumothorax. Multiple pulmonary nodules overall appearing without significant change from prior. Examples include 4 mm right upper lobe nodule image 56, 6 mm right middle lobe nodule image 67, 4 mm left upper lobe nodule image 37 and 6 mm left lower lobe nodule image 45. No enlarging pulmonary nodules. No acute chest wall findings or suspicious soft tissue nodules. Old left clavicle deformity status post ORIF. Old left third rib deformity. Osseous demineralization. Degenerative related changes throughout the spine  without acute displaced fracture or aggressive bone lesion. CT abdomen/pelvis Surgical changes of right hepatectomy. Similar heterogeneous low density along the surgical bed. Fluid density hepatic cysts are stable. Low to intermediate density 1.2 cm posterior left hepatic lobe lesion image 40 also stable. No intrahepatic duct dilation. Stable pancreatic atrophy without active inflammation or drainable collection. Spleen is unremarkable. Right adrenal gland not clearly visualized and may be surgically absent. No left adrenal nodule. Symmetric renal size, contour and enhancement. Stable small low-density renal lesions favoring benign cyst. No hydronephrosis. Partially distended urinary bladder. Nonspecific mild circumferential bladder wall thickening without significant surrounding inflammation. Fat deposition in the urinary bladder indicates changes of prior cystitis. Hysterectomy. No suspicious adnexal mass. Expected configuration stomach and duodenum. Colonic diverticulosis. No evidence of bowel obstruction or active inflammation. No CT evidence of acute appendicitis. Diffuse aortic atherosclerotic disease without aneurysm. Minimal pelvic fluid without free  air or drainable collection. Minimal presacral edema. No suspicious or enlarging adenopathy. No appreciable omental or peritoneal nodules. Linear scarring from prior laparotomy. Minimal body wall edema. Mildly heterogeneous predominantly lucent mixed sclerotic lesion in the posterior right acetabulum stable  from prior. This could be degenerative in etiology given proximity to the joint space and moderate if not severe degenerative changes in the right hip. Osseous demineralization. No acute displaced fracture or aggressive bone lesion.     Impression: Impression: 1. No enlarging soft tissue masses or adenopathy to suggest disease progression in the chest, abdomen or pelvis. 2. Stable multiple pulmonary nodules. Stable few mildly enlarged thoracic lymph nodes.  3. Findings suggesting volume overload/third spacing including small to moderate right and trace left pleural effusions new from prior, trace pelvic fluid, minimal presacral edema and minimal body wall edema. These findings are all new and/or increased from prior. 4. Nonspecific bladder wall thickening which may be chronic. Correlate for clinical signs of cystitis. 5. Other chronic/ancillary findings as above. Electronically Signed: Joseph Jensen MD  8/27/2024 12:04 PM EDT  Workstation ID: [id number]     Assessment / Plan      Assessment/Plan:   Intrahepatic cholangiocarcinoma (CMS/HCC) (Primary)  -Noted during her most recent hospitalization with CT scans concerning for an enlarging liver lesion to 7.3 cm that was previously noted to be hemangioma  -Triple phase CT concerning for a solid tumor lesion  -Biopsy consistent with an adenocarcinoma  -CA 19-9 elevated to 84.7  -CT chest as well as the rest of the CT abdomen/pelvis not concerning for distant or metastatic disease  -Status post open right hepatectomy, cholecystectomy, right partial adrenalectomy on 9/23/2021 with Dr. 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  0/4 lymph nodes were positive for disease.  LVI and PNI were present.  Focal R1 resection margin  -Discussed with patient and her daughter that she would benefit from adjuvant chemoXRT using Xeloda.  Discussed side effects including but not limited to immunosuppression, diarrhea, abdominal pain, nausea, vomiting, hand/foot syndrome  -Repeat CA 19-9 (22) in November 2021  -Completed chemo XRT with Xeloda in January 2021  -Repeat scans in March 2022 without any evidence of recurrent or metastatic disease  -Repeat CT C/A/P in June 2022 without evidence of recurrent or metastatic disease.  Did note some IVC stenosis which we will monitor with her next scans  -Repeat CT C/A/P in September 2022  reviewed without evidence of recurrent disease or metastatic disease.  IVC stenosis overall stable  -Repeat CT C/A/P in December 2022 reviewed without evidence of recurrent or metastatic disease.  CA 19-9 within normal limits  -Repeat CT C/A/P in March 2023 reviewed and notable for some slight enlarging retroperitoneal adenopathy.  CA 19-9 within normal limits  -PET/CT concerning for 1 cm left liver lesion that was only slightly PET avid as well as 2 lymph nodes that were also slightly PET avid  -Previously discussed her case at tumor board and with Dr. Sharma we agreed that she likely had disease progression  -Status post radiation completed in May 2023  -CA 19-9 in June 2022 within normal limits.    -CT C/A/P in July 2023 reviewed and showed some interval decrease in some lymph nodes as well as some slight enlargement of a couple lymph nodes.  -Completed radiation in August 2023  -CT C/A/P in October 2023 reviewed and only notable for slight increase in some pulmonary nodules about 1 mm each  -CT C/A/P December 2023 reviewed and notable for enlargement of her aortocaval lymph node.  CA 19-9 stable  -Completed radiation with Dr. Billings in February 2024  -CT C/A/P in April 2024 reviewed and showing continued slight enlargement of multiple pulmonary nodules as well as a liver lesion.  CA 19-9 stable  -CT C/A/P in June 2024 reviewed and showing overall stable disease  -CT C/A/P in August 2024 reviewed showing overall stable disease  -Scheduled to begin cycle 7 of cisplatin/gemcitabine/durvalumab today  Not clinically appropriate for chemotherapy anymore.  Will plan to discontinue cisplatin gemcitabine  -Will plan to initiate monthly nivolumab next week     Hemochromatosis carrier  -Noted on recent labs with an elevated ferritin to 246 hemochromatosis gene profile positive for heterozygosity of H63D.  Other genes negative.  Given patient's age and previous cardiac liver work-up showing no evidence of dysfunction, it is  unlikely that she has had iron deposition all this time.  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  Treatment as above  -ECHO in July 2020 within normal limits  -Repeat iron studies in April 2021 stable with a ferritin of 229, iron level 100, transferrin saturation 27%  -Low concern for iron overload at this time.      Thyroid nodule  -Incidentally noted on CT scan but enlarging from previous CT  -Thyroid ultrasound in June 2022 only notable for goiter and small nodules nonconcerning for malignancy  -Has been seen by endocrinology with plan for repeat ultrasound in the summer of next year     Other fatigue   -Continued at this time  -Previously checked iron studies, vitamin B12, folate, thyroid studies within normal limits  -Was previously been seen by cardiology with a normal ECHO and stress test  -Holter monitor notable for atrial fibrillation for which she is on metoprolol and prophylactic apixaban  -Stable at this time    Anemia  -Likely related to her chemotherapy  -Iron studies in June 2024 with only mild iron deficiency anemia.    -Iron studies in July 2024 within normal limits outside of an elevated ferritin  -Status post 1 unit PRBC in August 2024    Nausea  -Will start as needed Zofran today    Follow Up:   Follow-up in 5 weeks     Jorje Montenegro MD  Hematology and Oncology     Please note that portions of this note may have been completed with a voice recognition program. Efforts were made to edit the dictations, but occasionally words are mistranscribed.

## 2024-09-05 ENCOUNTER — TELEPHONE (OUTPATIENT)
Dept: ONCOLOGY | Facility: CLINIC | Age: 84
End: 2024-09-05
Payer: MEDICARE

## 2024-09-05 DIAGNOSIS — C22.1 INTRAHEPATIC CHOLANGIOCARCINOMA: Primary | ICD-10-CM

## 2024-09-05 RX ORDER — SODIUM CHLORIDE 9 MG/ML
20 INJECTION, SOLUTION INTRAVENOUS ONCE
Status: CANCELLED | OUTPATIENT
Start: 2024-09-06

## 2024-09-05 NOTE — TELEPHONE ENCOUNTER
----- Message from [facility] [employer] sent at 9/5/2024  3:21 PM EDT -----  Regarding: Immunotherapy   Contact: [phone]  Dr Montenegro, I am very concerned about my infusion tomorrow. In fact, I’m actually scared I might have a bad reaction from it. I guess you can’t reassure me but I needed to voice my concern.

## 2024-09-05 NOTE — TELEPHONE ENCOUNTER
Call to Sheree to address concerns about immunotherapy.  Advised that she will be closely monitored   Reviewed some potential side effects and advised that she can feel free to contact the office with any concerns at any time.  Sheree states understood.

## 2024-09-06 ENCOUNTER — HOSPITAL ENCOUNTER (OUTPATIENT)
Dept: ONCOLOGY | Facility: HOSPITAL | Age: 84
End: 2024-09-06
Payer: MEDICARE

## 2024-09-06 ENCOUNTER — HOSPITAL ENCOUNTER (OUTPATIENT)
Dept: ONCOLOGY | Facility: HOSPITAL | Age: 84
Discharge: HOME | End: 2024-09-06
Payer: MEDICARE

## 2024-09-06 VITALS
DIASTOLIC BLOOD PRESSURE: 68 MMHG | TEMPERATURE: 97.9 F | HEART RATE: 69 BPM | RESPIRATION RATE: 16 BRPM | HEIGHT: 64 IN | SYSTOLIC BLOOD PRESSURE: 150 MMHG | BODY MASS INDEX: 26.63 KG/M2 | WEIGHT: 156 LBS

## 2024-09-06 DIAGNOSIS — C22.1 INTRAHEPATIC CHOLANGIOCARCINOMA: Primary | ICD-10-CM

## 2024-09-06 LAB
ALBUMIN SERPL-MCNC: 3.5 G/DL (ref 3.5–5.2)
ALBUMIN/GLOB SERPL: 1.6 G/DL
ALP SERPL-CCNC: 153 U/L (ref 39–117)
ALT SERPL W P-5'-P-CCNC: 10 U/L (ref 1–33)
ANION GAP SERPL CALCULATED.3IONS-SCNC: 12 MMOL/L (ref 5–15)
AST SERPL-CCNC: 21 U/L (ref 1–32)
BASOPHILS # BLD AUTO: 0.03 10*3/MM3 (ref 0–0.2)
BASOPHILS NFR BLD AUTO: 0.6 % (ref 0–1.5)
BILIRUB SERPL-MCNC: 0.2 MG/DL (ref 0–1.2)
BUN SERPL-MCNC: 13 MG/DL (ref 8–23)
BUN/CREAT SERPL: 10.7 (ref 7–25)
CALCIUM SPEC-SCNC: 8.4 MG/DL (ref 8.6–10.5)
CANCER AG19-9 SERPL-ACNC: 17.5 U/ML
CHLORIDE SERPL-SCNC: 105 MMOL/L (ref 98–107)
CO2 SERPL-SCNC: 22 MMOL/L (ref 22–29)
CREAT SERPL-MCNC: 1.21 MG/DL (ref 0.57–1)
DEPRECATED RDW RBC AUTO: 72.9 FL (ref 37–54)
EGFRCR SERPLBLD CKD-EPI 2021: 44 ML/MIN/1.73
EOSINOPHIL # BLD AUTO: 0.06 10*3/MM3 (ref 0–0.4)
EOSINOPHIL NFR BLD AUTO: 1.2 % (ref 0.3–6.2)
ERYTHROCYTE [DISTWIDTH] IN BLOOD BY AUTOMATED COUNT: 18.6 % (ref 12.3–15.4)
GLOBULIN UR ELPH-MCNC: 2.2 GM/DL
GLUCOSE SERPL-MCNC: 116 MG/DL (ref 65–99)
HCT VFR BLD AUTO: 29.9 % (ref 34–46.6)
HGB BLD-MCNC: 9.6 G/DL (ref 12–15.9)
IMM GRANULOCYTES # BLD AUTO: 0.08 10*3/MM3 (ref 0–0.05)
IMM GRANULOCYTES NFR BLD AUTO: 1.6 % (ref 0–0.5)
LYMPHOCYTES # BLD AUTO: 1.74 10*3/MM3 (ref 0.7–3.1)
LYMPHOCYTES NFR BLD AUTO: 34.6 % (ref 19.6–45.3)
MCH RBC QN AUTO: 33.8 PG (ref 26.6–33)
MCHC RBC AUTO-ENTMCNC: 32.1 G/DL (ref 31.5–35.7)
MCV RBC AUTO: 105.3 FL (ref 79–97)
MONOCYTES # BLD AUTO: 1.3 10*3/MM3 (ref 0.1–0.9)
MONOCYTES NFR BLD AUTO: 25.8 % (ref 5–12)
NEUTROPHILS NFR BLD AUTO: 1.82 10*3/MM3 (ref 1.7–7)
NEUTROPHILS NFR BLD AUTO: 36.2 % (ref 42.7–76)
PLATELET # BLD AUTO: 177 10*3/MM3 (ref 140–450)
PMV BLD AUTO: 11.4 FL (ref 6–12)
POTASSIUM SERPL-SCNC: 4.2 MMOL/L (ref 3.5–5.2)
PROT SERPL-MCNC: 5.7 G/DL (ref 6–8.5)
RBC # BLD AUTO: 2.84 10*6/MM3 (ref 3.77–5.28)
SODIUM SERPL-SCNC: 139 MMOL/L (ref 136–145)
T4 FREE SERPL-MCNC: 1.52 NG/DL (ref 0.92–1.68)
TSH SERPL DL<=0.05 MIU/L-ACNC: 5.94 UIU/ML (ref 0.27–4.2)
WBC NRBC COR # BLD AUTO: 5.03 10*3/MM3 (ref 3.4–10.8)

## 2024-09-06 PROCEDURE — 25010000002 DURVALUMAB 50 MG/ML SOLUTION 10 ML VIAL: Performed by: INTERNAL MEDICINE

## 2024-09-06 PROCEDURE — 84439 ASSAY OF FREE THYROXINE: CPT | Performed by: INTERNAL MEDICINE

## 2024-09-06 PROCEDURE — 80053 COMPREHEN METABOLIC PANEL: CPT | Performed by: INTERNAL MEDICINE

## 2024-09-06 PROCEDURE — 25810000003 SODIUM CHLORIDE 0.9 % SOLUTION: Performed by: INTERNAL MEDICINE

## 2024-09-06 PROCEDURE — 85025 COMPLETE CBC W/AUTO DIFF WBC: CPT | Performed by: INTERNAL MEDICINE

## 2024-09-06 PROCEDURE — 25810000003 SODIUM CHLORIDE 0.9 % SOLUTION 250 ML FLEX CONT: Performed by: INTERNAL MEDICINE

## 2024-09-06 PROCEDURE — 96413 CHEMO IV INFUSION 1 HR: CPT

## 2024-09-06 PROCEDURE — 25010000002 HEPARIN LOCK FLUSH PER 10 UNITS: Performed by: INTERNAL MEDICINE

## 2024-09-06 PROCEDURE — 86301 IMMUNOASSAY TUMOR CA 19-9: CPT | Performed by: INTERNAL MEDICINE

## 2024-09-06 PROCEDURE — 84443 ASSAY THYROID STIM HORMONE: CPT | Performed by: INTERNAL MEDICINE

## 2024-09-06 RX ORDER — SODIUM CHLORIDE 9 MG/ML
20 INJECTION, SOLUTION INTRAVENOUS ONCE
Status: COMPLETED | OUTPATIENT
Start: 2024-09-06 | End: 2024-09-06

## 2024-09-06 RX ORDER — HEPARIN SODIUM (PORCINE) LOCK FLUSH IV SOLN 100 UNIT/ML 100 UNIT/ML
500 SOLUTION INTRAVENOUS AS NEEDED
Status: DISCONTINUED | OUTPATIENT
Start: 2024-09-06 | End: 2024-09-07 | Stop reason: HOSPADM

## 2024-09-06 RX ORDER — HEPARIN SODIUM (PORCINE) LOCK FLUSH IV SOLN 100 UNIT/ML 100 UNIT/ML
500 SOLUTION INTRAVENOUS AS NEEDED
OUTPATIENT
Start: 2024-09-06

## 2024-09-06 RX ADMIN — SODIUM CHLORIDE 1500 MG: 9 INJECTION, SOLUTION INTRAVENOUS at 11:13

## 2024-09-06 RX ADMIN — HEPARIN 500 UNITS: 100 SYRINGE at 12:29

## 2024-09-06 RX ADMIN — SODIUM CHLORIDE 20 ML/HR: 9 INJECTION, SOLUTION INTRAVENOUS at 11:11

## 2024-09-25 ENCOUNTER — PATIENT MESSAGE (OUTPATIENT)
Dept: CARDIOLOGY | Facility: CLINIC | Age: 84
End: 2024-09-25
Payer: MEDICARE

## 2024-09-27 PROCEDURE — 96374 THER/PROPH/DIAG INJ IV PUSH: CPT

## 2024-09-27 PROCEDURE — 99284 EMERGENCY DEPT VISIT MOD MDM: CPT

## 2024-09-27 PROCEDURE — 96375 TX/PRO/DX INJ NEW DRUG ADDON: CPT

## 2024-09-27 RX ORDER — AMIODARONE HYDROCHLORIDE 200 MG/1
200 TABLET ORAL 2 TIMES DAILY
Qty: 28 TABLET | Refills: 0 | Status: SHIPPED | OUTPATIENT
Start: 2024-09-27

## 2024-09-27 RX ORDER — AMIODARONE HYDROCHLORIDE 200 MG/1
200 TABLET ORAL DAILY
Qty: 90 TABLET | Refills: 0 | Status: SHIPPED | OUTPATIENT
Start: 2024-09-27

## 2024-09-28 ENCOUNTER — HOSPITAL ENCOUNTER (EMERGENCY)
Facility: HOSPITAL | Age: 84
Discharge: HOME | End: 2024-09-28
Attending: EMERGENCY MEDICINE
Payer: MEDICARE

## 2024-09-28 ENCOUNTER — APPOINTMENT (OUTPATIENT)
Dept: CT IMAGING | Facility: HOSPITAL | Age: 84
End: 2024-09-28
Payer: MEDICARE

## 2024-09-28 VITALS
WEIGHT: 162 LBS | TEMPERATURE: 98.1 F | OXYGEN SATURATION: 95 % | SYSTOLIC BLOOD PRESSURE: 112 MMHG | BODY MASS INDEX: 27.66 KG/M2 | RESPIRATION RATE: 18 BRPM | DIASTOLIC BLOOD PRESSURE: 63 MMHG | HEART RATE: 80 BPM | HEIGHT: 64 IN

## 2024-09-28 DIAGNOSIS — C22.1 CHOLANGIOCARCINOMA: ICD-10-CM

## 2024-09-28 DIAGNOSIS — R10.13 DYSPEPSIA: ICD-10-CM

## 2024-09-28 DIAGNOSIS — R10.13 EPIGASTRIC PAIN: Primary | ICD-10-CM

## 2024-09-28 LAB
ALBUMIN SERPL-MCNC: 4.1 G/DL (ref 3.5–5.2)
ALBUMIN/GLOB SERPL: 1.5 G/DL
ALP SERPL-CCNC: 113 U/L (ref 39–117)
ALT SERPL W P-5'-P-CCNC: 15 U/L (ref 1–33)
ANION GAP SERPL CALCULATED.3IONS-SCNC: 11 MMOL/L (ref 5–15)
AST SERPL-CCNC: 31 U/L (ref 1–32)
BACTERIA UR QL AUTO: ABNORMAL /HPF
BASOPHILS # BLD AUTO: 0.04 10*3/MM3 (ref 0–0.2)
BASOPHILS NFR BLD AUTO: 0.6 % (ref 0–1.5)
BILIRUB SERPL-MCNC: 0.3 MG/DL (ref 0–1.2)
BILIRUB UR QL STRIP: NEGATIVE
BUN SERPL-MCNC: 17 MG/DL (ref 8–23)
BUN/CREAT SERPL: 12.6 (ref 7–25)
CALCIUM SPEC-SCNC: 9.1 MG/DL (ref 8.6–10.5)
CHLORIDE SERPL-SCNC: 104 MMOL/L (ref 98–107)
CLARITY UR: ABNORMAL
CO2 SERPL-SCNC: 24 MMOL/L (ref 22–29)
COLOR UR: YELLOW
CREAT SERPL-MCNC: 1.35 MG/DL (ref 0.57–1)
D-LACTATE SERPL-SCNC: 1.6 MMOL/L (ref 0.5–2)
DEPRECATED RDW RBC AUTO: 61.1 FL (ref 37–54)
EGFRCR SERPLBLD CKD-EPI 2021: 38.6 ML/MIN/1.73
EOSINOPHIL # BLD AUTO: 0.27 10*3/MM3 (ref 0–0.4)
EOSINOPHIL NFR BLD AUTO: 4.1 % (ref 0.3–6.2)
ERYTHROCYTE [DISTWIDTH] IN BLOOD BY AUTOMATED COUNT: 15.8 % (ref 12.3–15.4)
GLOBULIN UR ELPH-MCNC: 2.8 GM/DL
GLUCOSE SERPL-MCNC: 111 MG/DL (ref 65–99)
GLUCOSE UR STRIP-MCNC: NEGATIVE MG/DL
HCT VFR BLD AUTO: 38.7 % (ref 34–46.6)
HGB BLD-MCNC: 12.2 G/DL (ref 12–15.9)
HGB UR QL STRIP.AUTO: NEGATIVE
HOLD SPECIMEN: NORMAL
HYALINE CASTS UR QL AUTO: ABNORMAL /LPF
IMM GRANULOCYTES # BLD AUTO: 0.02 10*3/MM3 (ref 0–0.05)
IMM GRANULOCYTES NFR BLD AUTO: 0.3 % (ref 0–0.5)
KETONES UR QL STRIP: NEGATIVE
LEUKOCYTE ESTERASE UR QL STRIP.AUTO: ABNORMAL
LIPASE SERPL-CCNC: 38 U/L (ref 13–60)
LYMPHOCYTES # BLD AUTO: 1.43 10*3/MM3 (ref 0.7–3.1)
LYMPHOCYTES NFR BLD AUTO: 21.6 % (ref 19.6–45.3)
MCH RBC QN AUTO: 32.7 PG (ref 26.6–33)
MCHC RBC AUTO-ENTMCNC: 31.5 G/DL (ref 31.5–35.7)
MCV RBC AUTO: 103.8 FL (ref 79–97)
MONOCYTES # BLD AUTO: 0.83 10*3/MM3 (ref 0.1–0.9)
MONOCYTES NFR BLD AUTO: 12.5 % (ref 5–12)
NEUTROPHILS NFR BLD AUTO: 4.04 10*3/MM3 (ref 1.7–7)
NEUTROPHILS NFR BLD AUTO: 60.9 % (ref 42.7–76)
NITRITE UR QL STRIP: NEGATIVE
NRBC BLD AUTO-RTO: 0 /100 WBC (ref 0–0.2)
PH UR STRIP.AUTO: 5.5 [PH] (ref 5–8)
PLATELET # BLD AUTO: 140 10*3/MM3 (ref 140–450)
PMV BLD AUTO: 12.6 FL (ref 6–12)
POTASSIUM SERPL-SCNC: 4.2 MMOL/L (ref 3.5–5.2)
PROT SERPL-MCNC: 6.9 G/DL (ref 6–8.5)
PROT UR QL STRIP: ABNORMAL
RBC # BLD AUTO: 3.73 10*6/MM3 (ref 3.77–5.28)
RBC # UR STRIP: ABNORMAL /HPF
REF LAB TEST METHOD: ABNORMAL
SODIUM SERPL-SCNC: 139 MMOL/L (ref 136–145)
SP GR UR STRIP: 1.02 (ref 1–1.03)
SQUAMOUS #/AREA URNS HPF: ABNORMAL /HPF
UROBILINOGEN UR QL STRIP: ABNORMAL
WBC # UR STRIP: ABNORMAL /HPF
WBC NRBC COR # BLD AUTO: 6.63 10*3/MM3 (ref 3.4–10.8)
WHOLE BLOOD HOLD COAG: NORMAL
WHOLE BLOOD HOLD SPECIMEN: NORMAL

## 2024-09-28 PROCEDURE — 80053 COMPREHEN METABOLIC PANEL: CPT | Performed by: EMERGENCY MEDICINE

## 2024-09-28 PROCEDURE — 25010000002 ONDANSETRON PER 1 MG: Performed by: EMERGENCY MEDICINE

## 2024-09-28 PROCEDURE — 85025 COMPLETE CBC W/AUTO DIFF WBC: CPT | Performed by: EMERGENCY MEDICINE

## 2024-09-28 PROCEDURE — 74176 CT ABD & PELVIS W/O CONTRAST: CPT

## 2024-09-28 PROCEDURE — 96374 THER/PROPH/DIAG INJ IV PUSH: CPT

## 2024-09-28 PROCEDURE — 83605 ASSAY OF LACTIC ACID: CPT | Performed by: EMERGENCY MEDICINE

## 2024-09-28 PROCEDURE — 81001 URINALYSIS AUTO W/SCOPE: CPT | Performed by: EMERGENCY MEDICINE

## 2024-09-28 PROCEDURE — 83690 ASSAY OF LIPASE: CPT | Performed by: EMERGENCY MEDICINE

## 2024-09-28 PROCEDURE — 96375 TX/PRO/DX INJ NEW DRUG ADDON: CPT

## 2024-09-28 PROCEDURE — 25010000002 MORPHINE PER 10 MG: Performed by: EMERGENCY MEDICINE

## 2024-09-28 RX ORDER — ONDANSETRON 2 MG/ML
4 INJECTION INTRAMUSCULAR; INTRAVENOUS ONCE
Status: COMPLETED | OUTPATIENT
Start: 2024-09-28 | End: 2024-09-28

## 2024-09-28 RX ORDER — TRAMADOL HYDROCHLORIDE 50 MG/1
50 TABLET ORAL 2 TIMES DAILY PRN
Qty: 6 TABLET | Refills: 0 | Status: SHIPPED | OUTPATIENT
Start: 2024-09-28 | End: 2024-10-01

## 2024-09-28 RX ORDER — SODIUM CHLORIDE 9 MG/ML
10 INJECTION, SOLUTION INTRAMUSCULAR; INTRAVENOUS; SUBCUTANEOUS AS NEEDED
Status: DISCONTINUED | OUTPATIENT
Start: 2024-09-28 | End: 2024-09-28 | Stop reason: HOSPADM

## 2024-09-28 RX ORDER — MORPHINE SULFATE 4 MG/ML
4 INJECTION, SOLUTION INTRAMUSCULAR; INTRAVENOUS ONCE
Status: COMPLETED | OUTPATIENT
Start: 2024-09-28 | End: 2024-09-28

## 2024-09-28 RX ORDER — FAMOTIDINE 20 MG/1
20 TABLET, FILM COATED ORAL 2 TIMES DAILY
Qty: 28 TABLET | Refills: 0 | Status: SHIPPED | OUTPATIENT
Start: 2024-09-28 | End: 2024-10-12

## 2024-09-28 RX ADMIN — MORPHINE SULFATE 4 MG: 4 INJECTION, SOLUTION INTRAMUSCULAR; INTRAVENOUS at 02:07

## 2024-09-28 RX ADMIN — ONDANSETRON 4 MG: 2 INJECTION INTRAMUSCULAR; INTRAVENOUS at 02:07

## 2024-09-28 NOTE — ED PROVIDER NOTES
Subjective   History of Present Illness  This is a 84-year-old female with past medical history of cholangiocarcinoma presented to the emergency department with some abdominal pain and nausea.  Patient states that she has had this for the last 2 weeks or so.  She has had some dull cramping pain.  However last night intensified to the point where she could not sleep.  She is having some burning type sensation in the middle of her abdomen.  Nonradiating.  She does have some burning and sour taste in her mouth.  She has been slightly nauseous.  Denies any vomiting or diarrhea.  She not have any fevers or chills.  No headache or change in vision.  No focal weakness.  No chest pain or shortness of breath    History provided by:  Patient   used: No        Review of Systems   Constitutional:  Negative for chills and fever.   HENT:  Negative for congestion, ear pain and sore throat.    Eyes:  Negative for visual disturbance.   Respiratory:  Negative for shortness of breath.    Cardiovascular:  Negative for chest pain.   Gastrointestinal:  Positive for abdominal pain and nausea.   Genitourinary:  Negative for difficulty urinating.   Musculoskeletal:  Negative for arthralgias.   Skin:  Negative for rash.   Neurological:  Negative for dizziness, weakness and numbness.   Psychiatric/Behavioral:  Negative for agitation.        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    PAF (paroxysmal atrial fibrillation)  7/15/2024    Plantar fasciitis     Chronic    PVC's (premature ventricular contractions)     Senile keratosis     Multiple    MANAN (stress urinary incontinence, female)     Syncope, near     with negative cardiovascular workup       Allergies   Allergen Reactions    Pravastatin Myalgia       Past Surgical History:   Procedure Laterality Date    ADRENAL GLAND SURGERY  09/22/2021    ADRENALECTOMY  09/22/2021    CARDIAC CATHETERIZATION N/A 01/18/2019    Procedure: Left Heart Cath;  Surgeon: Tucker Gonzalez MD;  Location: [facility] CATH INVASIVE LOCATION;  Service: Cardiovascular    CARDIAC CATHETERIZATION  1/18/2019    CATARACT EXTRACTION  04/2019    CHOLECYSTECTOMY  09/22/2021    COLONOSCOPY  07/15/2020    CYBERKNIFE  05/08/2023    Recurrent liver mass/involved LNs    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    HYSTERECTOMY  1984    age 46 - ovarian cyst, endometriosis,  jorge/bso    LIVER SURGERY Right 09/22/2021    Removed    ROTATOR CUFF REPAIR Left 1989    Collar Bone Repair    VENOUS ACCESS DEVICE (PORT) INSERTION Right 05/10/2024       Family History   Problem Relation Age of Onset    Heart attack Mother     Heart failure Mother     Dementia Mother     Stroke Mother     Arthritis Mother         Heart Attack    Heart disease Father     Hearing loss Father     Arrhythmia Brother     Breast cancer Paternal Aunt        Social History     Socioeconomic History    Marital status:     Number of children: 2   Tobacco Use    Smoking status: Never    Smokeless tobacco: Never    Tobacco comments:     Exposed to secondhand smoke   Vaping Use    Vaping status: Never Used   Substance and Sexual Activity    Alcohol use: Not Currently     Comment: Very seldom    Drug use: Never    Sexual activity: Not Currently     Partners: Male     Birth control/protection: Hysterectomy     Comment: Complete Hysterectomy           Objective   Physical Exam  Vitals and nursing note reviewed.   Constitutional:       General: She is not  in acute distress.     Appearance: She is not ill-appearing or toxic-appearing.   HENT:      Mouth/Throat:      Pharynx: No posterior oropharyngeal erythema.   Eyes:      Conjunctiva/sclera: Conjunctivae normal.      Pupils: Pupils are equal, round, and reactive to light.   Cardiovascular:      Rate and Rhythm: Normal rate and regular rhythm.   Pulmonary:      Effort: Pulmonary effort is normal. No respiratory distress.   Abdominal:      General: Abdomen is flat. There is no distension.      Palpations: There is no mass.      Tenderness: There is abdominal tenderness in the epigastric area. There is no guarding or rebound.   Musculoskeletal:         General: No deformity. Normal range of motion.   Skin:     General: Skin is warm.      Findings: No rash.   Neurological:      General: No focal deficit present.      Mental Status: She is alert and oriented to person, place, and time.      Motor: No weakness.         Procedures           ED Course  ED Course as of 09/28/24 0334   Sat Sep 28, 2024   0331 BP(!): 156/101 [JK]   0331 Temp: 98.1 °F (36.7 °C) [JK]   0331 Temp src: Oral [JK]   0331 Heart Rate: 94 [JK]   0331 Resp: 18 [JK]   0331 SpO2: 98 %  Interpretation:  Patient's repeat vitals, telemetry tracing, and pulse oximetry tracing were directly viewed and interpreted by myself.   O2 sat 98% on room air, interpreted as normal  Telemetry rhythm strip revealed a rate of 94 bpm, interpreted as normal sinus rhythm [JK]   0331 Comprehensive Metabolic Panel(!) [JK]   0331 Lipase [JK]   0331 Lactic Acid, Plasma [JK]   0331 Urinalysis With Microscopic If Indicated (No Culture) - Urine, Clean Catch(!) [JK]   0331 Urinalysis, Microscopic Only - Urine, Clean Catch(!) [JK]   0331 CBC & Differential(!)  Interpretation:  Laboratory studies were reviewed and interpreted directly by myself.  CMP showed some minor elevation in creatinine 1.35, lipase normal, lactic normal, urinalysis unremarkable, CBC normal [JK]   0331 CT Abdomen  Pelvis Without Contrast  Interpretation:  Imaging was directly visualized by myself, per my interpretations, CT abdomen pelvis showed some inflammation around the surgical site as well as some inflamed nodes. [JK]   0331 On reevaluation, the patient states that she is feeling better.  This is the best she is felt the last 2 weeks.  He is tolerating oral intake.  I did have discussion with the patient regarding her CT findings.  There is concern for possible recurrence of her cancer.  She states that she has follow-up with her oncologist next week.  I encouraged her to maintain this.  She is to continue her immunotherapy treatments.  Patient given strict return precautions.  Verbalized understanding. [JK]   0332 I had a discussion with the patient/family regarding diagnosis, diagnostic results, treatment plan, and medications. The patient/family indicated understanding of these instructions. I spent adequate time at the bedside prior to discharge necessary to discuss the aftercare instructions, giving patient education, providing explanations of the results of our evaluations/findings, and my decision making to assure that the patient/family understand the plan of care. Time was allotted to answer questions at that time and throughout the ED course. Patient is required to maintain timely follow up, as discussed. I also discussed the potential for the development of an acute emergent condition requiring further evaluation, return to the ER, admission, or even surgical intervention.  I encouraged the patient to return to the emergency department immediately for any concerns, worsening symptoms, new complaints, or if symptoms persist and they are unable to seek follow-up in a timely fashion. The patient/family expressed understanding and agreement with this plan    Shared decision making:   After full review of the patient's clinical presentation, review of any work-up including but not limited to laboratory studies and  radiology obtained, I had a discussion with the patient.  Treatment options were discussed as well as the risks, benefits and consequences.  I discussed all findings with the patient and family members if available.  During the discussion, treatment goals were understood by all as well as any misconceptions which were addressed with the patient.  Ample time was given for any questions they may have had.  They are in agreement with the treatment plan as well as final disposition. [JK]      ED Course User Index  [JK] Joseph Roy MD                                     [facility] reviewed by Joseph Roy MD       Medical Decision Making  This is a 84-year-old female with a history of cholangiocarcinoma presenting to the emergency department with some upper abdominal discomfort.  This appears to be subacute in nature.  Symptoms seem most consistent with dyspepsia versus gastritis.  Patient has no evidence of acute abdomen or peritonitis on exam.  Overall, the patient is nontoxic.  Afebrile.  IV access was established and the patient.  Placed on continuous telemetry monitoring.  Given the patient's presentation, differential is broad and will require further evaluation.  Workup initiated.      Differential diagnosis: Peptic ulcer disease, dyspepsia, GERD, pancreatitis, biliary colic, electrolyte abnormality, acute kidney injury      Amount and/or Complexity of Data Reviewed  External Data Reviewed: labs, radiology and notes.     Details: External laboratories, imaging as well as notes were reviewed personally by myself.  All relevant studies were used to guide decision making.     Date of previous record: 8/30/2024    Source of note: Primary oncology    Summary: Patient was seen for follow-up for cholangiocarcinoma.  Due to the patient laboratory studies on file as well as previous CT abdomen pelvis.  Records reviewed    Labs: ordered. Decision-making details documented in ED Course.  Radiology: ordered and  independent interpretation performed. Decision-making details documented in ED Course.    Risk  Prescription drug management.        Final diagnoses:   Epigastric pain   Dyspepsia   Cholangiocarcinoma       ED Disposition  ED Disposition       ED Disposition   Discharge    Condition   Stable    Comment   --               Jorje Montenegro MD  [address]  [facility] [address]  [facility] [address] [phone]    Call in 1 day           Medication List        New Prescriptions      famotidine 20 MG tablet  Commonly known as: PEPCID  Take 1 tablet by mouth 2 (Two) Times a Day for 14 days.     traMADol 50 MG tablet  Commonly known as: ULTRAM  Take 1 tablet by mouth 2 (Two) Times a Day As Needed for Moderate Pain for up to 3 days.               Where to Get Your Medications        These medications were sent to [id number] - [place], [place] - [address] AT [facility] [address] - [phone]  - [phone] FX  [address], [facility] [address]      Phone: [phone]   famotidine 20 MG tablet  traMADol 50 MG tablet            Joseph Roy MD  09/28/24 0334

## 2024-09-30 ENCOUNTER — PATIENT MESSAGE (OUTPATIENT)
Dept: ONCOLOGY | Facility: CLINIC | Age: 84
End: 2024-09-30
Payer: MEDICARE

## 2024-09-30 NOTE — TELEPHONE ENCOUNTER
From: Sheree Hernandez  To: Jorje Montenegro  Sent: 9/30/2024 8:32 AM EDT  Subject: Inflammation     Dr Montenegro, I went to the ER on Friday and the doctor said to follow up with you today and I told him I’d see you on Friday. Giving you a heads up so you can check my records before I see you. I have inflammation around my incision and the lymph nodes are swollen. I had really bad pain in my stomach. Doctor had prescribed medicine.

## 2024-10-01 ENCOUNTER — PATIENT OUTREACH (OUTPATIENT)
Dept: CASE MANAGEMENT | Facility: OTHER | Age: 84
End: 2024-10-01
Payer: MEDICARE

## 2024-10-01 ENCOUNTER — PATIENT MESSAGE (OUTPATIENT)
Dept: CASE MANAGEMENT | Facility: OTHER | Age: 84
End: 2024-10-01

## 2024-10-01 NOTE — OUTREACH NOTE
AMBULATORY CASE MANAGEMENT NOTE    Names and Relationships of Patient/Support Persons: Contact: Sheree Hernandez; Relationship: Self -     Patient Outreach    [facility] ED visit on 09/28/2024: epigastric pain,dyspepsia, and cholangiocarcinoma with complaints of pain and nausea. Patient prescribed famotidine 20 mg BID X 14 days and tramadol 50 mg BID as needed for pain. Patient discharged home with instructions to follow up with Dr. Montenegro.   Patient reports that she is not feeling well today. Abdomen remains tender and she is afraid eating will exacerbate stomach discomfort. Patient reports some nausea as well as constipation since ED visit, taking ondansetron as prescribed. Mrs. Hernandez states she has taken a few sips of mag citrate resulting in small BM yesterday and today, states she does not feel her bowel has completely emptied. Bowel protocol discussed with patient; daily stool softener and Miralax as needed. Increased hydration encouraged, and eating small mild frequent snacks daily. Nutritional shakes encouraged.   Medications reviewed with patient, Mrs. Hernandez was prescribed amiodarone on 9/27, patient has not started medication wants to check with Dr. Montenegro first. We discussed the purpose of this medication and risk of A-Fib.   Mrs. Hernandez lives alone states someone checks on her daily. [facility] questionnaire sent.  Role of oncology  explained, patient agreeable to service.    Education Documentation  Medication Management - amiodarone, bowel regimen, taught by Jenise Jsoeph, RN at 10/1/2024  1:40 PM.  Learner: Patient  Readiness: Acceptance  Method: Explanation  Response: Needs Reinforcement    Fluid/Food Intake, taught by Jenise Joseph, RN at 10/1/2024  1:40 PM.  Learner: Patient  Readiness: Acceptance  Method: Explanation  Response: Needs Reinforcement    Symptom Management - constipation, nausea, taught by Jenise Joseph, RN at 10/1/2024  1:40 PM.  Learner: Patient  Readiness:  Acceptance  Method: Explanation  Response: Needs Reinforcement          Jenise CASTELLANOS  Ambulatory Case Management    10/1/2024, 13:44 EDT

## 2024-10-04 ENCOUNTER — HOSPITAL ENCOUNTER (OUTPATIENT)
Dept: ONCOLOGY | Facility: HOSPITAL | Age: 84
Discharge: HOME | End: 2024-10-04
Payer: MEDICARE

## 2024-10-04 ENCOUNTER — OFFICE VISIT (OUTPATIENT)
Dept: ONCOLOGY | Facility: CLINIC | Age: 84
End: 2024-10-04
Payer: MEDICARE

## 2024-10-04 VITALS
HEIGHT: 64 IN | SYSTOLIC BLOOD PRESSURE: 154 MMHG | RESPIRATION RATE: 16 BRPM | HEART RATE: 68 BPM | DIASTOLIC BLOOD PRESSURE: 80 MMHG | WEIGHT: 148 LBS | TEMPERATURE: 97.4 F | BODY MASS INDEX: 25.27 KG/M2 | OXYGEN SATURATION: 98 %

## 2024-10-04 DIAGNOSIS — E11.9: ICD-10-CM

## 2024-10-04 DIAGNOSIS — R16.0: ICD-10-CM

## 2024-10-04 DIAGNOSIS — R62.52: ICD-10-CM

## 2024-10-04 DIAGNOSIS — C22.1 CHOLANGIOCARCINOMA: ICD-10-CM

## 2024-10-04 DIAGNOSIS — R16.0 LIVER MASSES: Primary | ICD-10-CM

## 2024-10-04 DIAGNOSIS — C22.1 INTRAHEPATIC CHOLANGIOCARCINOMA: Primary | ICD-10-CM

## 2024-10-04 DIAGNOSIS — M85.2: ICD-10-CM

## 2024-10-04 DIAGNOSIS — F41.9 ANXIETY: ICD-10-CM

## 2024-10-04 LAB
ALBUMIN SERPL-MCNC: 3.7 G/DL (ref 3.5–5.2)
ALBUMIN/GLOB SERPL: 1.5 G/DL
ALP SERPL-CCNC: 109 U/L (ref 39–117)
ALT SERPL W P-5'-P-CCNC: 19 U/L (ref 1–33)
ANION GAP SERPL CALCULATED.3IONS-SCNC: 11 MMOL/L (ref 5–15)
AST SERPL-CCNC: 38 U/L (ref 1–32)
BASOPHILS # BLD AUTO: 0.03 10*3/MM3 (ref 0–0.2)
BASOPHILS NFR BLD AUTO: 0.5 % (ref 0–1.5)
BILIRUB SERPL-MCNC: 0.3 MG/DL (ref 0–1.2)
BUN SERPL-MCNC: 16 MG/DL (ref 8–23)
BUN/CREAT SERPL: 11.2 (ref 7–25)
CALCIUM SPEC-SCNC: 9 MG/DL (ref 8.6–10.5)
CANCER AG19-9 SERPL-ACNC: 19 U/ML
CHLORIDE SERPL-SCNC: 103 MMOL/L (ref 98–107)
CO2 SERPL-SCNC: 22 MMOL/L (ref 22–29)
CREAT SERPL-MCNC: 1.43 MG/DL (ref 0.57–1)
DEPRECATED RDW RBC AUTO: 60.2 FL (ref 37–54)
EGFRCR SERPLBLD CKD-EPI 2021: 36 ML/MIN/1.73
EOSINOPHIL # BLD AUTO: 0.33 10*3/MM3 (ref 0–0.4)
EOSINOPHIL NFR BLD AUTO: 6 % (ref 0.3–6.2)
ERYTHROCYTE [DISTWIDTH] IN BLOOD BY AUTOMATED COUNT: 15.3 % (ref 12.3–15.4)
GLOBULIN UR ELPH-MCNC: 2.4 GM/DL
GLUCOSE SERPL-MCNC: 117 MG/DL (ref 65–99)
HCT VFR BLD AUTO: 35.4 % (ref 34–46.6)
HGB BLD-MCNC: 11.3 G/DL (ref 12–15.9)
IMM GRANULOCYTES # BLD AUTO: 0.02 10*3/MM3 (ref 0–0.05)
IMM GRANULOCYTES NFR BLD AUTO: 0.4 % (ref 0–0.5)
LYMPHOCYTES # BLD AUTO: 1.09 10*3/MM3 (ref 0.7–3.1)
LYMPHOCYTES NFR BLD AUTO: 19.7 % (ref 19.6–45.3)
MCH RBC QN AUTO: 33.3 PG (ref 26.6–33)
MCHC RBC AUTO-ENTMCNC: 31.9 G/DL (ref 31.5–35.7)
MCV RBC AUTO: 104.4 FL (ref 79–97)
MONOCYTES # BLD AUTO: 0.73 10*3/MM3 (ref 0.1–0.9)
MONOCYTES NFR BLD AUTO: 13.2 % (ref 5–12)
NEUTROPHILS NFR BLD AUTO: 3.32 10*3/MM3 (ref 1.7–7)
NEUTROPHILS NFR BLD AUTO: 60.2 % (ref 42.7–76)
PLATELET # BLD AUTO: 114 10*3/MM3 (ref 140–450)
PMV BLD AUTO: 12.3 FL (ref 6–12)
POTASSIUM SERPL-SCNC: 4.5 MMOL/L (ref 3.5–5.2)
PROT SERPL-MCNC: 6.1 G/DL (ref 6–8.5)
RBC # BLD AUTO: 3.39 10*6/MM3 (ref 3.77–5.28)
SODIUM SERPL-SCNC: 136 MMOL/L (ref 136–145)
T4 FREE SERPL-MCNC: 1.61 NG/DL (ref 0.92–1.68)
TSH SERPL DL<=0.05 MIU/L-ACNC: 5.42 UIU/ML (ref 0.27–4.2)
WBC NRBC COR # BLD AUTO: 5.52 10*3/MM3 (ref 3.4–10.8)

## 2024-10-04 PROCEDURE — 85025 COMPLETE CBC W/AUTO DIFF WBC: CPT | Performed by: INTERNAL MEDICINE

## 2024-10-04 PROCEDURE — 84443 ASSAY THYROID STIM HORMONE: CPT | Performed by: INTERNAL MEDICINE

## 2024-10-04 PROCEDURE — 99214 OFFICE O/P EST MOD 30 MIN: CPT | Performed by: INTERNAL MEDICINE

## 2024-10-04 PROCEDURE — 86301 IMMUNOASSAY TUMOR CA 19-9: CPT | Performed by: INTERNAL MEDICINE

## 2024-10-04 PROCEDURE — 25810000003 SODIUM CHLORIDE 0.9 % SOLUTION 250 ML FLEX CONT: Performed by: INTERNAL MEDICINE

## 2024-10-04 PROCEDURE — 84439 ASSAY OF FREE THYROXINE: CPT | Performed by: INTERNAL MEDICINE

## 2024-10-04 PROCEDURE — 3079F DIAST BP 80-89 MM HG: CPT | Performed by: INTERNAL MEDICINE

## 2024-10-04 PROCEDURE — 25010000002 DURVALUMAB 50 MG/ML SOLUTION 10 ML VIAL: Performed by: INTERNAL MEDICINE

## 2024-10-04 PROCEDURE — 3077F SYST BP >= 140 MM HG: CPT | Performed by: INTERNAL MEDICINE

## 2024-10-04 PROCEDURE — 80053 COMPREHEN METABOLIC PANEL: CPT | Performed by: INTERNAL MEDICINE

## 2024-10-04 PROCEDURE — 96413 CHEMO IV INFUSION 1 HR: CPT

## 2024-10-04 PROCEDURE — 1126F AMNT PAIN NOTED NONE PRSNT: CPT | Performed by: INTERNAL MEDICINE

## 2024-10-04 PROCEDURE — 25010000002 HEPARIN LOCK FLUSH PER 10 UNITS: Performed by: INTERNAL MEDICINE

## 2024-10-04 PROCEDURE — 25810000003 SODIUM CHLORIDE 0.9 % SOLUTION: Performed by: INTERNAL MEDICINE

## 2024-10-04 RX ORDER — FAMOTIDINE 20 MG/1
20 TABLET, FILM COATED ORAL 2 TIMES DAILY
Qty: 60 TABLET | Refills: 0 | Status: SHIPPED | OUTPATIENT
Start: 2024-10-04 | End: 2024-11-03

## 2024-10-04 RX ORDER — SODIUM CHLORIDE 9 MG/ML
20 INJECTION, SOLUTION INTRAVENOUS ONCE
Status: CANCELLED | OUTPATIENT
Start: 2024-10-04

## 2024-10-04 RX ORDER — HEPARIN SODIUM (PORCINE) LOCK FLUSH IV SOLN 100 UNIT/ML 100 UNIT/ML
500 SOLUTION INTRAVENOUS AS NEEDED
OUTPATIENT
Start: 2024-10-04

## 2024-10-04 RX ORDER — HEPARIN SODIUM (PORCINE) LOCK FLUSH IV SOLN 100 UNIT/ML 100 UNIT/ML
500 SOLUTION INTRAVENOUS AS NEEDED
Status: DISCONTINUED | OUTPATIENT
Start: 2024-10-04 | End: 2024-10-05 | Stop reason: HOSPADM

## 2024-10-04 RX ORDER — SODIUM CHLORIDE 9 MG/ML
20 INJECTION, SOLUTION INTRAVENOUS ONCE
Status: COMPLETED | OUTPATIENT
Start: 2024-10-04 | End: 2024-10-04

## 2024-10-04 RX ORDER — SODIUM CHLORIDE 9 MG/ML
20 INJECTION, SOLUTION INTRAVENOUS ONCE
OUTPATIENT
Start: 2024-11-05

## 2024-10-04 RX ADMIN — HEPARIN 500 UNITS: 100 SYRINGE at 12:08

## 2024-10-04 RX ADMIN — SODIUM CHLORIDE 20 ML/HR: 9 INJECTION, SOLUTION INTRAVENOUS at 10:52

## 2024-10-04 RX ADMIN — SODIUM CHLORIDE 1500 MG: 9 INJECTION, SOLUTION INTRAVENOUS at 10:53

## 2024-10-04 NOTE — PROGRESS NOTES
Follow Up Office Visit      Date: 10/04/2024     Patient Name: Sheree Hernandez  MRN: [id number]  : 1940  Chief Complaint:  Follow-up for intrahepatic cholangiocarcinoma      History of Present Illness: Sheree Hernandez is a pleasant 79 y.o. female with a past medical history of hyperlipidemia and hypertension who presents today for evaluation of concern for hemochromatosis. The patient is accompanied by their self who contributes to the history of their care.  Patient states that over the past 2 years she has been having worsening fatigue especially with exertion.  Over the past several months this has become worse.  Patient states that she gets tired with basic activities including showering getting dressed in the morning and walking to and from her car from stores.  She states that her fatigue gets better after a few minutes of rest.  She has previously had an echocardiogram and cardiac catheterization within the past 2 years which did not reveal any cause of her fatigue with exertion.  She is also had an ultrasound of the liver which revealed stable cyst.  She was recently seen by her PCP who ordered iron studies which was notable for an elevated ferritin to 246.  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  All other mutations were negative.  She is currently up-to-date on her mammograms and colonoscopy with no active malignancies noted.  She is otherwise compliant with her statin and high blood pressure medicines.  Repeat abdominal imaging in 2021 concerning for enlarging liver lesion.  She was seen by Dr. Sharma and  is status post open right hepatectomy, cholecystectomy, right partial adrenalectomy on 2021.  Pathology showed a focal R1 resected margin.  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  0/4 lymph nodes were  positive for disease.  LVI and PNI were present.  Finished chemoXRT in January 2021.      Interval History:  Presents to clinic for follow-up.  Completed radiation in May 2023 and again in August 2023.  Completed 6 cycles of cisplatin/gemcitabine/Durvalumab in August 2024.  Treatment discontinued due to toxicity.  Currently on maintenance Durvalumab and tolerating okay.  Has noted some dark stools as well as worsening abdominal pain for which she was seen in the ED.  Started on Pepcid with normalization of her stools.  Notes continued palpitations with exertion as well as when laying down.  Currently on amiodarone and Eliquis for atrial fibrillation    Oncology History:    Oncology/Hematology History   Intrahepatic cholangiocarcinoma   8/17/2021 Initial Diagnosis    Intrahepatic cholangiocarcinoma (CMS/HCC)     8/17/2021 Cancer Staged    Staging form: Intrahepatic Bile Duct, AJCC 8th Edition  - Clinical: Stage IB (cT1b, cN0, cM0) - Signed by Jorje Montenegro MD on 8/17/2021 11/5/2021 Cancer Staged    Staging form: Intrahepatic Bile Duct, AJCC 8th Edition  - Pathologic: Stage IIIA (pT3, pN0, cM0) - Signed by Jorje Montenegro MD on 11/5/2021 12/9/2021 - 1/6/2022 Chemotherapy    OP GALLBLADDER Capecitabine + XRT     12/9/2021 - 1/19/2022 Radiation    Radiation OncologyTreatment Course:  Sheree Hernandez received 5040 cGy in 28 fractions to liver via External Beam Radiation - EBRT.     4/25/2023 - 5/8/2023 Radiation    Radiation OncologyTreatment Course:  Sheree Hernandez received 3500 cGy in 5 fractions to liver mass and lymph nodes via Stereotactic Radiation Therapy - SRT.     8/1/2023 - 8/11/2023 Radiation    Radiation OncologyTreatment Course:  Sheree Hernandez received 3500 cGy in 5 fractions to abdomen via Stereotactic Radiation Therapy - SRT.     1/9/2024 - 1/23/2024 Radiation    Radiation OncologyTreatment Course:  Sheree Hernandez received 3000 cGy in 5 fractions to abdomen via Stereotactic Radiation  Therapy - SRT.     1/11/2024 - 1/11/2024 Radiation    Radiation OncologyTreatment Course:  Sheree Hernandez received 754 cGy in 1 fractions to left lung via Stereotactic Radiation Therapy - SRT.     2/1/2024 - 2/1/2024 Radiation    Radiation OncologyTreatment Course:  Sheree Hernandez received 3000 cGy in 1 fractions to left lung via Stereotactic Radiation Therapy - SRT.     4/19/2024 - 8/16/2024 Chemotherapy    OP HEPATOBILIARY CISplatin + Gemcitabine Days 1,8 / Durvalumab Q21D     5/17/2024 -  Chemotherapy    OP CENTRAL VENOUS ACCESS DEVICE Access, Care, and Maintenance (CVAD)     9/6/2024 -  Chemotherapy    OP CHOLANGIOCARCINOMA Durvalumab 1500 mg         Subjective      Review of Systems:   Constitutional: Negative for fevers, chills, or weight loss  Eyes: Negative for blurred vision or discharge         Ear/Nose/Throat: Negative for difficulty swallowing, sore throat, LAD                                                       Respiratory: Negative for cough, SOA, wheezing                                                                                        Cardiovascular: Negative for chest pain or palpitations                                                                  Gastrointestinal: Negative for nausea, vomiting or diarrhea                                                                     Genitourinary: Negative for dysuria or hematuria                                                                                           Musculoskeletal: Negative for any joint pains or muscle aches                                                                        Neurologic: Negative for any weakness, headaches, dizziness                                                                         Hematologic: Negative for any easy bleeding or bruising                                                                                   Psychiatric: Negative for anxiety or depression                          Past  Medical History/Past Surgical History/ Family History/ Social History: Reviewed by me and unchanged from my previous documentation done on August 2024.     Medications:     Current Outpatient Medications:     famotidine (PEPCID) 20 MG tablet, Take 1 tablet by mouth 2 (Two) Times a Day for 30 days., Disp: 60 tablet, Rfl: 0    albuterol sulfate  (90 Base) MCG/ACT inhaler, Inhale 1 puff Every 4 (Four) Hours As Needed for Wheezing., Disp: 18 g, Rfl: 2    amiodarone (PACERONE) 200 MG tablet, Take 1 tablet by mouth Daily., Disp: 90 tablet, Rfl: 0    amiodarone (PACERONE) 200 MG tablet, Take 1 tablet by mouth 2 (Two) Times a Day., Disp: 28 tablet, Rfl: 0    apixaban (ELIQUIS) 2.5 MG tablet tablet, Take 1 tablet by mouth 2 (Two) Times a Day., Disp: 60 tablet, Rfl: 11    Cholecalciferol (Vitamin D3) 25 MCG (1000 UT) capsule, Take  by mouth Daily., Disp: , Rfl:     coenzyme Q10 50 MG capsule capsule, Take  by mouth Daily., Disp: , Rfl:     Cyanocobalamin (VITAMIN B 12 PO), Take  by mouth., Disp: , Rfl:     doxycycline (VIBRAMYCIN) 100 MG capsule, Take 1 capsule by mouth 2 (Two) Times a Day., Disp: 14 capsule, Rfl: 0    Folic Acid 0.8 MG capsule, Take  by mouth., Disp: , Rfl:     ipratropium (ATROVENT) 0.06 % nasal spray, Administer 1 spray into each nostril twice daily, Disp: 30 mL, Rfl: 3    levothyroxine (SYNTHROID, LEVOTHROID) 25 MCG tablet, Take 1 tablet by mouth Every Morning., Disp: 30 tablet, Rfl: 3    lidocaine-prilocaine (EMLA) 2.5-2.5 % cream, Please apply to port site 30 min prior to infusion and cover with Saran Wrap, Disp: 30 g, Rfl: 3    magnesium oxide (MAG-OX) 400 MG tablet, Take 1 tablet by mouth Daily., Disp: , Rfl:     metoprolol tartrate (LOPRESSOR) 25 MG tablet, Take 1 tablet by mouth 2 (Two) Times a Day., Disp: 60 tablet, Rfl: 11    Omega-3 1000 MG capsule, Take  by mouth Daily., Disp: , Rfl:     ondansetron (ZOFRAN) 4 MG tablet, Take 1 tablet by mouth Every 6 (Six) Hours As Needed for Nausea or  "Vomiting., Disp: 30 tablet, Rfl: 5    ondansetron (ZOFRAN) 8 MG tablet, Take 1 tablet by mouth 3 (Three) Times a Day As Needed for Nausea or Vomiting., Disp: 30 tablet, Rfl: 5    rosuvastatin (CRESTOR) 10 MG tablet, Take 1 tablet by mouth Daily., Disp: 90 tablet, Rfl: 3  No current facility-administered medications for this visit.    Facility-Administered Medications Ordered in Other Visits:     durvalumab (IMFINZI) 1,500 mg in sodium chloride 0.9 % 280 mL chemo IVPB, 1,500 mg, Intravenous, Once, Jorje Montenegro MD    heparin injection 500 Units, 500 Units, Intravenous, PRN, Jorje Montenegro MD    sodium chloride 0.9 % infusion, 20 mL/hr, Intravenous, Once, Jorje Montenegro MD    Allergies:   Allergies   Allergen Reactions    Pravastatin Myalgia       Objective     Physical Exam:  Vital Signs:   Vitals:    10/04/24 0918   BP: 154/80   Pulse: 68   Resp: 16   Temp: 97.4 °F (36.3 °C)   TempSrc: Temporal   SpO2: 98%   Weight: 67.1 kg (148 lb)   Height: 162.6 cm (64.02\")   PainSc: 0-No pain     Pain Score    10/04/24 0918   PainSc: 0-No pain     ECOG Performance Status: 1 - Symptomatic but completely ambulatory    Constitutional: NAD, ECOG 1  Eyes: PERRLA, scleral anicteric  ENT: No LAD, no thyromegaly  Respiratory: CTAB, no wheezing, rales, rhonchi  Cardiovascular: RRR, no murmurs, pulses 2+ bilaterally  Abdomen: soft, NT/ND, no HSM  Musculoskeletal: strength 5/5 bilaterally, no c/c/e  Neurologic: A&O x 3, CN II-XII intact grossly    Results Review:   Hospital Outpatient Visit on 10/04/2024   Component Date Value Ref Range Status    Glucose 10/04/2024 117 (H)  65 - 99 mg/dL Final    BUN 10/04/2024 16  8 - 23 mg/dL Final    Creatinine 10/04/2024 1.43 (H)  0.57 - 1.00 mg/dL Final    Sodium 10/04/2024 136  136 - 145 mmol/L Final    Potassium 10/04/2024 4.5  3.5 - 5.2 mmol/L Final    Slight hemolysis detected by analyzer. Result may be falsely elevated.    Chloride 10/04/2024 103  98 - 107 mmol/L Final    CO2 " 10/04/2024 22.0  22.0 - 29.0 mmol/L Final    Calcium 10/04/2024 9.0  8.6 - 10.5 mg/dL Final    Total Protein 10/04/2024 6.1  6.0 - 8.5 g/dL Final    Albumin 10/04/2024 3.7  3.5 - 5.2 g/dL Final    ALT (SGPT) 10/04/2024 19  1 - 33 U/L Final    AST (SGOT) 10/04/2024 38 (H)  1 - 32 U/L Final    Alkaline Phosphatase 10/04/2024 109  39 - 117 U/L Final    Total Bilirubin 10/04/2024 0.3  0.0 - 1.2 mg/dL Final    Globulin 10/04/2024 2.4  gm/dL Final    Calculated Result    A/G Ratio 10/04/2024 1.5  g/dL Final    BUN/Creatinine Ratio 10/04/2024 11.2  7.0 - 25.0 Final    Anion Gap 10/04/2024 11.0  5.0 - 15.0 mmol/L Final    eGFR 10/04/2024 36.0 (L)  >60.0 mL/min/1.73 Final    TSH 10/04/2024 5.420 (H)  0.270 - 4.200 uIU/mL Final    Free T4 10/04/2024 1.61  0.92 - 1.68 ng/dL Final    WBC 10/04/2024 5.52  3.40 - 10.80 10*3/mm3 Final    RBC 10/04/2024 3.39 (L)  3.77 - 5.28 10*6/mm3 Final    Hemoglobin 10/04/2024 11.3 (L)  12.0 - 15.9 g/dL Final    Hematocrit 10/04/2024 35.4  34.0 - 46.6 % Final    MCV 10/04/2024 104.4 (H)  79.0 - 97.0 fL Final    MCH 10/04/2024 33.3 (H)  26.6 - 33.0 pg Final    MCHC 10/04/2024 31.9  31.5 - 35.7 g/dL Final    RDW 10/04/2024 15.3  12.3 - 15.4 % Final    RDW-SD 10/04/2024 60.2 (H)  37.0 - 54.0 fl Final    MPV 10/04/2024 12.3 (H)  6.0 - 12.0 fL Final    Platelets 10/04/2024 114 (L)  140 - 450 10*3/mm3 Final    Neutrophil % 10/04/2024 60.2  42.7 - 76.0 % Final    Lymphocyte % 10/04/2024 19.7  19.6 - 45.3 % Final    Monocyte % 10/04/2024 13.2 (H)  5.0 - 12.0 % Final    Eosinophil % 10/04/2024 6.0  0.3 - 6.2 % Final    Basophil % 10/04/2024 0.5  0.0 - 1.5 % Final    Immature Grans % 10/04/2024 0.4  0.0 - 0.5 % Final    Neutrophils, Absolute 10/04/2024 3.32  1.70 - 7.00 10*3/mm3 Final    Lymphocytes, Absolute 10/04/2024 1.09  0.70 - 3.10 10*3/mm3 Final    Monocytes, Absolute 10/04/2024 0.73  0.10 - 0.90 10*3/mm3 Final    Eosinophils, Absolute 10/04/2024 0.33  0.00 - 0.40 10*3/mm3 Final    Basophils,  Absolute 10/04/2024 0.03  0.00 - 0.20 10*3/mm3 Final    Immature Grans, Absolute 10/04/2024 0.02  0.00 - 0.05 10*3/mm3 Final   Admission on 09/28/2024, Discharged on 09/28/2024   Component Date Value Ref Range Status    Glucose 09/28/2024 111 (H)  65 - 99 mg/dL Final    BUN 09/28/2024 17  8 - 23 mg/dL Final    Creatinine 09/28/2024 1.35 (H)  0.57 - 1.00 mg/dL Final    Sodium 09/28/2024 139  136 - 145 mmol/L Final    Potassium 09/28/2024 4.2  3.5 - 5.2 mmol/L Final    Slight hemolysis detected by analyzer. Result may be falsely elevated.    Chloride 09/28/2024 104  98 - 107 mmol/L Final    CO2 09/28/2024 24.0  22.0 - 29.0 mmol/L Final    Calcium 09/28/2024 9.1  8.6 - 10.5 mg/dL Final    Total Protein 09/28/2024 6.9  6.0 - 8.5 g/dL Final    Albumin 09/28/2024 4.1  3.5 - 5.2 g/dL Final    ALT (SGPT) 09/28/2024 15  1 - 33 U/L Final    AST (SGOT) 09/28/2024 31  1 - 32 U/L Final    Alkaline Phosphatase 09/28/2024 113  39 - 117 U/L Final    Total Bilirubin 09/28/2024 0.3  0.0 - 1.2 mg/dL Final    Globulin 09/28/2024 2.8  gm/dL Final    Calculated Result    A/G Ratio 09/28/2024 1.5  g/dL Final    BUN/Creatinine Ratio 09/28/2024 12.6  7.0 - 25.0 Final    Anion Gap 09/28/2024 11.0  5.0 - 15.0 mmol/L Final    eGFR 09/28/2024 38.6 (L)  >60.0 mL/min/1.73 Final    Lipase 09/28/2024 38  13 - 60 U/L Final    Color, UA 09/28/2024 Yellow  Yellow, Straw Final    Appearance, UA 09/28/2024 Cloudy (A)  Clear Final    pH, UA 09/28/2024 5.5  5.0 - 8.0 Final    Specific Gravity, UA 09/28/2024 1.018  1.001 - 1.030 Final    Glucose, UA 09/28/2024 Negative  Negative Final    Ketones, UA 09/28/2024 Negative  Negative Final    Bilirubin, UA 09/28/2024 Negative  Negative Final    Blood, UA 09/28/2024 Negative  Negative Final    Protein, UA 09/28/2024 Trace (A)  Negative Final    Leuk Esterase, UA 09/28/2024 Small (1+) (A)  Negative Final    Nitrite, UA 09/28/2024 Negative  Negative Final    Urobilinogen, UA 09/28/2024 0.2 E.U./dL  0.2 - 1.0  E.U./dL Final    Lactate 09/28/2024 1.6  0.5 - 2.0 mmol/L Final    Falsely depressed results may occur on samples drawn from patients receiving N-Acetylcysteine (NAC) or Metamizole.    Extra Tube 09/28/2024 Hold for add-ons.   Final    Auto resulted.    Extra Tube 09/28/2024 hold for add-on   Final    Auto resulted    Extra Tube 09/28/2024 Hold for add-ons.   Final    Auto resulted.    Extra Tube 09/28/2024 Hold for add-ons.   Final    Auto resulted.    Extra Tube 09/28/2024 Hold for add-ons.   Final    Auto resulted    WBC 09/28/2024 6.63  3.40 - 10.80 10*3/mm3 Final    RBC 09/28/2024 3.73 (L)  3.77 - 5.28 10*6/mm3 Final    Hemoglobin 09/28/2024 12.2  12.0 - 15.9 g/dL Final    Hematocrit 09/28/2024 38.7  34.0 - 46.6 % Final    MCV 09/28/2024 103.8 (H)  79.0 - 97.0 fL Final    MCH 09/28/2024 32.7  26.6 - 33.0 pg Final    MCHC 09/28/2024 31.5  31.5 - 35.7 g/dL Final    RDW 09/28/2024 15.8 (H)  12.3 - 15.4 % Final    RDW-SD 09/28/2024 61.1 (H)  37.0 - 54.0 fl Final    MPV 09/28/2024 12.6 (H)  6.0 - 12.0 fL Final    Platelets 09/28/2024 140  140 - 450 10*3/mm3 Final    Neutrophil % 09/28/2024 60.9  42.7 - 76.0 % Final    Lymphocyte % 09/28/2024 21.6  19.6 - 45.3 % Final    Monocyte % 09/28/2024 12.5 (H)  5.0 - 12.0 % Final    Eosinophil % 09/28/2024 4.1  0.3 - 6.2 % Final    Basophil % 09/28/2024 0.6  0.0 - 1.5 % Final    Immature Grans % 09/28/2024 0.3  0.0 - 0.5 % Final    Neutrophils, Absolute 09/28/2024 4.04  1.70 - 7.00 10*3/mm3 Final    Lymphocytes, Absolute 09/28/2024 1.43  0.70 - 3.10 10*3/mm3 Final    Monocytes, Absolute 09/28/2024 0.83  0.10 - 0.90 10*3/mm3 Final    Eosinophils, Absolute 09/28/2024 0.27  0.00 - 0.40 10*3/mm3 Final    Basophils, Absolute 09/28/2024 0.04  0.00 - 0.20 10*3/mm3 Final    Immature Grans, Absolute 09/28/2024 0.02  0.00 - 0.05 10*3/mm3 Final    nRBC 09/28/2024 0.0  0.0 - 0.2 /100 WBC Final    RBC, UA 09/28/2024 0-2  None Seen, 0-2 /HPF Final    WBC, UA 09/28/2024 11-20 (A)  None  Seen, 0-2 /HPF Final    Bacteria,  09/28/2024 None Seen  None Seen, Trace /HPF Final    Squamous Epithelial Cells,  09/28/2024 3-6 (A)  None Seen, 0-2 /HPF Final    Hyaline Casts,  09/28/2024 0-6  0 - 6 /LPF Final    Methodology 09/28/2024 Automated Microscopy   Final       CT Abdomen Pelvis Without Contrast    Result Date: 9/28/2024  Narrative: CT ABDOMEN PELVIS WO CONTRAST Date of Exam: 9/28/2024 1:59 AM EDT Indication: abdominal pain. Comparison: 8/26/2024. Technique: Axial CT images were obtained of the abdomen and pelvis without the administration of contrast. Reconstructed coronal and sagittal images were also obtained. Automated exposure control and iterative construction methods were used. Findings: Heart size is normal. Distal esophagus is unremarkable. There are multiple right-sided basilar lung nodules measuring up to 7 mm as seen in the right middle lobe on image 15. There are more than 10 nodules in the right lung base. These appear unchanged from the prior study. There is a 7 mm nodular density with adjacent linear scarring in the inferior lingula also on image 15. No definite new lung nodules. No acute findings in the superficial soft tissues. No acute osseous abnormality or destructive bone lesion. There is mild lower lumbar spondylosis. There is severe narrowing of the posterior hip joint space bilaterally, right greater than left along with osteophytes. There are lucent areas in the acetabular roof posteriorly on the right, likely degenerative. Patient is status post right hepatic lobectomy. There are multiple simple appearing cysts in the liver. There is a more ill-defined low-density area within the posterior aspect of the left hepatic lobe on image 41 measuring 15 mm diameter. This could represent a site of malignancy. There is an increasing area of hypoattenuation in the left hepatic lobe at the surgical margin measuring up to 3.9 cm diameter concerning for recurrent/residual disease.  There is a stable retrocaval lymph node on image 43 of the axial series concerning for lymph node metastasis. No additional enlarged lymph nodes. The stomach, duodenum, spleen and adrenal gland on the left appear normal. The right adrenal gland is not appreciated. Kidney parenchyma is homogeneous. The ureters and urinary bladder appear normal. Patient is status post hysterectomy. The ovaries are not seen. The appendix is not discretely identified. There is colonic diverticulosis. No small bowel distention. Mild aortoiliac atherosclerosis. No aneurysm.  No ascites, pneumoperitoneum or lymphadenopathy.     Impression: Impression: 1.No acute abdominal or pelvic abnormality. 2.Status post right hepatic lobectomy. There is an increasing area of hypoattenuation in the left hepatic lobe at the surgical margin concerning for recurrent/residual disease. There is also a stable retrocaval lymph node concerning for lymph node metastasis. There is also a 15 mm hypoattenuating lesion in the left hepatic lobe that is not definitely a cyst and does appear new when compared with multiple older studies from 2022 and 2023. 3.Multiple stable lung nodules in the right lung base and a single nodule in the lingula. These are concerning for metastatic disease. 4.Colonic diverticulosis. 5.Lumbar spondylosis and bilateral hip osteoarthritis. Electronically Signed: Reggie Angela MD  9/28/2024 2:49 AM EDT  Workstation ID: [id number]     Assessment / Plan      Assessment/Plan:   Diagnoses and all orders for this visit:    1. Intrahepatic cholangiocarcinoma (CMS/HCC) (Primary)  -Noted during her most recent hospitalization with CT scans concerning for an enlarging liver lesion to 7.3 cm that was previously noted to be hemangioma  -Triple phase CT concerning for a solid tumor lesion  -Biopsy consistent with an adenocarcinoma  -CA 19-9 elevated to 84.7  -CT chest as well as the rest of the CT abdomen/pelvis not concerning for distant or metastatic  disease  -Status post open right hepatectomy, cholecystectomy, right partial adrenalectomy on 9/23/2021 with Dr. 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  0/4 lymph nodes were positive for disease.  LVI and PNI were present.  Focal R1 resection margin  -Discussed with patient and her daughter that she would benefit from adjuvant chemoXRT using Xeloda.  Discussed side effects including but not limited to immunosuppression, diarrhea, abdominal pain, nausea, vomiting, hand/foot syndrome  -Repeat CA 19-9 (22) in November 2021  -Completed chemo XRT with Xeloda in January 2021  -Repeat scans in March 2022 without any evidence of recurrent or metastatic disease  -Repeat CT C/A/P in June 2022 without evidence of recurrent or metastatic disease.  Did note some IVC stenosis which we will monitor with her next scans  -Repeat CT C/A/P in September 2022 reviewed without evidence of recurrent disease or metastatic disease.  IVC stenosis overall stable  -Repeat CT C/A/P in December 2022 reviewed without evidence of recurrent or metastatic disease.  CA 19-9 within normal limits  -Repeat CT C/A/P in March 2023 reviewed and notable for some slight enlarging retroperitoneal adenopathy.  CA 19-9 within normal limits  -PET/CT concerning for 1 cm left liver lesion that was only slightly PET avid as well as 2 lymph nodes that were also slightly PET avid  -Previously discussed her case at tumor board and with Dr. Sharma we agreed that she likely had disease progression  -Status post radiation completed in May 2023  -CA 19-9 in June 2022 within normal limits.    -CT C/A/P in July 2023 reviewed and showed some interval decrease in some lymph nodes as well as some slight enlargement of a couple lymph nodes.  -Completed radiation in August 2023  -CT C/A/P in October 2023 reviewed and only notable for slight increase in some pulmonary  nodules about 1 mm each  -CT C/A/P December 2023 reviewed and notable for enlargement of her aortocaval lymph node.  CA 19-9 stable  -Completed radiation with Dr. Billings in February 2024  -CT C/A/P in April 2024 reviewed and showing continued slight enlargement of multiple pulmonary nodules as well as a liver lesion.  CA 19-9 stable  -CT C/A/P in June 2024 reviewed and showing overall stable disease  -CT C/A/P in August 2024 reviewed showing overall stable disease  -Completed 6 cycles of cisplatin/gemcitabine/durvalumab in August 2024.  Discontinued chemotherapy due to toxicity  -Currently on maintenance Durvalumab and tolerating okay.  Clinically appropriate for treatment today  -Given her CT scan in September 2024 noting concerns for possible disease progression, we will plan to repeat CT scans with contrast in 3-4 weeks rather than wait until December.  Orders placed today     Hemochromatosis carrier  -Noted on recent labs with an elevated ferritin to 246 hemochromatosis gene profile positive for heterozygosity of H63D.  Other genes negative.  Given patient's age and previous cardiac liver work-up showing no evidence of dysfunction, it is unlikely that she has had iron deposition all this time.  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  Treatment as above  -ECHO in July 2020 within normal limits  -Repeat iron studies in April 2021 stable with a ferritin of 229, iron level 100, transferrin saturation 27%  -Low concern for iron overload at this time.      Thyroid nodule  -Incidentally noted on CT scan but enlarging from previous CT  -Thyroid ultrasound in June 2022 only notable for goiter and small nodules nonconcerning for malignancy  -Has been seen by endocrinology  with plan for repeat ultrasound in the summer of next year     Other fatigue   -Continued at this time  -Previously checked iron studies, vitamin B12, folate, thyroid studies within normal limits  -Was previously been seen by cardiology with a normal ECHO and stress test  -Holter monitor notable for atrial fibrillation for which she is on metoprolol and prophylactic apixaban  -Stable at this time     Anemia  -Likely related to her chemotherapy  -Iron studies in June 2024 with only mild iron deficiency anemia.    -Iron studies in July 2024 within normal limits outside of an elevated ferritin  -Status post 1 unit PRBC in August 2024     Nausea  -Stable with as needed Zofran    Anxiety  -Worsening  -Per patient preference, rereferred to Janice Sepulveda today    Follow Up:   Follow-up in 5 weeks     Jorje Montenegro MD  Hematology and Oncology     Please note that portions of this note may have been completed with a voice recognition program. Efforts were made to edit the dictations, but occasionally words are mistranscribed.

## 2024-10-14 RX ORDER — FAMOTIDINE 20 MG/1
20 TABLET, FILM COATED ORAL 2 TIMES DAILY
Qty: 60 TABLET | Refills: 0 | OUTPATIENT
Start: 2024-10-14 | End: 2024-11-13

## 2024-11-01 ENCOUNTER — HOSPITAL ENCOUNTER (OUTPATIENT)
Dept: CT IMAGING | Facility: HOSPITAL | Age: 84
Discharge: HOME | End: 2024-11-01
Payer: MEDICARE

## 2024-11-01 DIAGNOSIS — C22.1 INTRAHEPATIC CHOLANGIOCARCINOMA: ICD-10-CM

## 2024-11-01 PROCEDURE — 71260 CT THORAX DX C+: CPT

## 2024-11-01 PROCEDURE — 74177 CT ABD & PELVIS W/CONTRAST: CPT

## 2024-11-01 PROCEDURE — 25510000001 IOPAMIDOL 61 % SOLUTION: Performed by: INTERNAL MEDICINE

## 2024-11-01 RX ORDER — IOPAMIDOL 612 MG/ML
85 INJECTION, SOLUTION INTRAVASCULAR
Status: COMPLETED | OUTPATIENT
Start: 2024-11-01 | End: 2024-11-01

## 2024-11-01 RX ADMIN — IOPAMIDOL 85 ML: 612 INJECTION, SOLUTION INTRAVENOUS at 11:07

## 2024-11-04 ENCOUNTER — PATIENT MESSAGE (OUTPATIENT)
Dept: ONCOLOGY | Facility: CLINIC | Age: 84
End: 2024-11-04
Payer: MEDICARE

## 2024-11-05 ENCOUNTER — APPOINTMENT (OUTPATIENT)
Dept: ONCOLOGY | Facility: HOSPITAL | Age: 84
End: 2024-11-05
Payer: MEDICARE

## 2024-11-05 ENCOUNTER — OFFICE VISIT (OUTPATIENT)
Dept: ONCOLOGY | Facility: CLINIC | Age: 84
End: 2024-11-05
Payer: MEDICARE

## 2024-11-05 ENCOUNTER — HOSPITAL ENCOUNTER (OUTPATIENT)
Dept: ONCOLOGY | Facility: HOSPITAL | Age: 84
Discharge: HOME | End: 2024-11-05
Admitting: INTERNAL MEDICINE
Payer: MEDICARE

## 2024-11-05 VITALS
TEMPERATURE: 97.9 F | RESPIRATION RATE: 16 BRPM | HEART RATE: 68 BPM | BODY MASS INDEX: 24.41 KG/M2 | OXYGEN SATURATION: 97 % | SYSTOLIC BLOOD PRESSURE: 131 MMHG | WEIGHT: 143 LBS | HEIGHT: 64 IN | DIASTOLIC BLOOD PRESSURE: 64 MMHG

## 2024-11-05 DIAGNOSIS — R06.01 ORTHOPNEA: ICD-10-CM

## 2024-11-05 DIAGNOSIS — E86.0 DEHYDRATION: ICD-10-CM

## 2024-11-05 DIAGNOSIS — C22.1 INTRAHEPATIC CHOLANGIOCARCINOMA: Primary | ICD-10-CM

## 2024-11-05 DIAGNOSIS — R06.09 DYSPNEA ON EXERTION: ICD-10-CM

## 2024-11-05 DIAGNOSIS — E86.0 DEHYDRATION: Primary | ICD-10-CM

## 2024-11-05 LAB
ALBUMIN SERPL-MCNC: 3.6 G/DL (ref 3.5–5.2)
ALBUMIN/GLOB SERPL: 1.4 G/DL
ALP SERPL-CCNC: 106 U/L (ref 39–117)
ALT SERPL W P-5'-P-CCNC: 20 U/L (ref 1–33)
ANION GAP SERPL CALCULATED.3IONS-SCNC: 12 MMOL/L (ref 5–15)
AST SERPL-CCNC: 30 U/L (ref 1–32)
BASOPHILS # BLD AUTO: 0.02 10*3/MM3 (ref 0–0.2)
BASOPHILS NFR BLD AUTO: 0.3 % (ref 0–1.5)
BILIRUB SERPL-MCNC: 0.5 MG/DL (ref 0–1.2)
BUN SERPL-MCNC: 18 MG/DL (ref 8–23)
BUN/CREAT SERPL: 15.9 (ref 7–25)
CALCIUM SPEC-SCNC: 8.7 MG/DL (ref 8.6–10.5)
CANCER AG19-9 SERPL-ACNC: 19 U/ML
CHLORIDE SERPL-SCNC: 104 MMOL/L (ref 98–107)
CO2 SERPL-SCNC: 22 MMOL/L (ref 22–29)
CREAT SERPL-MCNC: 1.13 MG/DL (ref 0.57–1)
DEPRECATED RDW RBC AUTO: 58.8 FL (ref 37–54)
EGFRCR SERPLBLD CKD-EPI 2021: 47.8 ML/MIN/1.73
EOSINOPHIL # BLD AUTO: 0.22 10*3/MM3 (ref 0–0.4)
EOSINOPHIL NFR BLD AUTO: 3.7 % (ref 0.3–6.2)
ERYTHROCYTE [DISTWIDTH] IN BLOOD BY AUTOMATED COUNT: 15.6 % (ref 12.3–15.4)
GLOBULIN UR ELPH-MCNC: 2.6 GM/DL
GLUCOSE SERPL-MCNC: 95 MG/DL (ref 65–99)
HCT VFR BLD AUTO: 33.4 % (ref 34–46.6)
HGB BLD-MCNC: 11 G/DL (ref 12–15.9)
IMM GRANULOCYTES # BLD AUTO: 0.01 10*3/MM3 (ref 0–0.05)
IMM GRANULOCYTES NFR BLD AUTO: 0.2 % (ref 0–0.5)
LYMPHOCYTES # BLD AUTO: 0.98 10*3/MM3 (ref 0.7–3.1)
LYMPHOCYTES NFR BLD AUTO: 16.4 % (ref 19.6–45.3)
MCH RBC QN AUTO: 33.3 PG (ref 26.6–33)
MCHC RBC AUTO-ENTMCNC: 32.9 G/DL (ref 31.5–35.7)
MCV RBC AUTO: 101.2 FL (ref 79–97)
MONOCYTES # BLD AUTO: 0.79 10*3/MM3 (ref 0.1–0.9)
MONOCYTES NFR BLD AUTO: 13.2 % (ref 5–12)
NEUTROPHILS NFR BLD AUTO: 3.95 10*3/MM3 (ref 1.7–7)
NEUTROPHILS NFR BLD AUTO: 66.2 % (ref 42.7–76)
PLATELET # BLD AUTO: 119 10*3/MM3 (ref 140–450)
PMV BLD AUTO: 12 FL (ref 6–12)
POTASSIUM SERPL-SCNC: 4.2 MMOL/L (ref 3.5–5.2)
PROT SERPL-MCNC: 6.2 G/DL (ref 6–8.5)
RBC # BLD AUTO: 3.3 10*6/MM3 (ref 3.77–5.28)
SODIUM SERPL-SCNC: 138 MMOL/L (ref 136–145)
T4 FREE SERPL-MCNC: 1.65 NG/DL (ref 0.92–1.68)
TSH SERPL DL<=0.05 MIU/L-ACNC: 4.06 UIU/ML (ref 0.27–4.2)
WBC NRBC COR # BLD AUTO: 5.97 10*3/MM3 (ref 3.4–10.8)

## 2024-11-05 PROCEDURE — 96374 THER/PROPH/DIAG INJ IV PUSH: CPT

## 2024-11-05 PROCEDURE — 25010000002 ONDANSETRON PER 1 MG: Performed by: INTERNAL MEDICINE

## 2024-11-05 PROCEDURE — 25810000003 SODIUM CHLORIDE 0.9 % SOLUTION: Performed by: INTERNAL MEDICINE

## 2024-11-05 PROCEDURE — 96361 HYDRATE IV INFUSION ADD-ON: CPT

## 2024-11-05 PROCEDURE — 1126F AMNT PAIN NOTED NONE PRSNT: CPT | Performed by: INTERNAL MEDICINE

## 2024-11-05 PROCEDURE — 96360 HYDRATION IV INFUSION INIT: CPT

## 2024-11-05 PROCEDURE — 84439 ASSAY OF FREE THYROXINE: CPT | Performed by: INTERNAL MEDICINE

## 2024-11-05 PROCEDURE — 96375 TX/PRO/DX INJ NEW DRUG ADDON: CPT

## 2024-11-05 PROCEDURE — 86301 IMMUNOASSAY TUMOR CA 19-9: CPT | Performed by: INTERNAL MEDICINE

## 2024-11-05 PROCEDURE — 3075F SYST BP GE 130 - 139MM HG: CPT | Performed by: INTERNAL MEDICINE

## 2024-11-05 PROCEDURE — 85025 COMPLETE CBC W/AUTO DIFF WBC: CPT | Performed by: INTERNAL MEDICINE

## 2024-11-05 PROCEDURE — 99214 OFFICE O/P EST MOD 30 MIN: CPT | Performed by: INTERNAL MEDICINE

## 2024-11-05 PROCEDURE — 80053 COMPREHEN METABOLIC PANEL: CPT | Performed by: INTERNAL MEDICINE

## 2024-11-05 PROCEDURE — 84443 ASSAY THYROID STIM HORMONE: CPT | Performed by: INTERNAL MEDICINE

## 2024-11-05 PROCEDURE — 25010000002 HEPARIN LOCK FLUSH PER 10 UNITS: Performed by: INTERNAL MEDICINE

## 2024-11-05 PROCEDURE — 3078F DIAST BP <80 MM HG: CPT | Performed by: INTERNAL MEDICINE

## 2024-11-05 RX ORDER — HEPARIN SODIUM (PORCINE) LOCK FLUSH IV SOLN 100 UNIT/ML 100 UNIT/ML
500 SOLUTION INTRAVENOUS AS NEEDED
OUTPATIENT
Start: 2024-11-05

## 2024-11-05 RX ORDER — ONDANSETRON 4 MG/1
4 TABLET, ORALLY DISINTEGRATING ORAL EVERY 8 HOURS PRN
Qty: 60 TABLET | Refills: 3 | Status: SHIPPED | OUTPATIENT
Start: 2024-11-05

## 2024-11-05 RX ORDER — SODIUM CHLORIDE 9 MG/ML
500 INJECTION, SOLUTION INTRAVENOUS CONTINUOUS
Status: CANCELLED
Start: 2024-11-05 | End: 2024-11-06

## 2024-11-05 RX ORDER — SODIUM CHLORIDE 9 MG/ML
500 INJECTION, SOLUTION INTRAVENOUS CONTINUOUS
Status: DISCONTINUED | OUTPATIENT
Start: 2024-11-05 | End: 2024-11-06 | Stop reason: HOSPADM

## 2024-11-05 RX ORDER — ONDANSETRON 2 MG/ML
4 INJECTION INTRAMUSCULAR; INTRAVENOUS EVERY 6 HOURS PRN
Status: DISCONTINUED | OUTPATIENT
Start: 2024-11-05 | End: 2024-11-06 | Stop reason: HOSPADM

## 2024-11-05 RX ORDER — HEPARIN SODIUM (PORCINE) LOCK FLUSH IV SOLN 100 UNIT/ML 100 UNIT/ML
500 SOLUTION INTRAVENOUS AS NEEDED
Status: DISCONTINUED | OUTPATIENT
Start: 2024-11-05 | End: 2024-11-06 | Stop reason: HOSPADM

## 2024-11-05 RX ORDER — ONDANSETRON 2 MG/ML
4 INJECTION INTRAMUSCULAR; INTRAVENOUS EVERY 6 HOURS PRN
Status: CANCELLED
Start: 2024-11-05

## 2024-11-05 RX ORDER — AMOXICILLIN 250 MG
1 CAPSULE ORAL 2 TIMES DAILY PRN
Qty: 60 TABLET | Refills: 0 | Status: SHIPPED | OUTPATIENT
Start: 2024-11-05

## 2024-11-05 RX ADMIN — SODIUM CHLORIDE 500 ML/HR: 9 INJECTION, SOLUTION INTRAVENOUS at 10:51

## 2024-11-05 RX ADMIN — ONDANSETRON 4 MG: 2 INJECTION INTRAMUSCULAR; INTRAVENOUS at 10:52

## 2024-11-05 RX ADMIN — HEPARIN 500 UNITS: 100 SYRINGE at 11:58

## 2024-11-05 NOTE — PROGRESS NOTES
Follow Up Office Visit      Date: 2024     Patient Name: Sheree Hernandez  MRN: [id number]  : 1940  Chief Complaint:  Follow-up for intrahepatic cholangiocarcinoma      History of Present Illness: Sheree Hernandez is a pleasant 79 y.o. female with a past medical history of hyperlipidemia and hypertension who presents today for evaluation of concern for hemochromatosis. The patient is accompanied by their self who contributes to the history of their care.  Patient states that over the past 2 years she has been having worsening fatigue especially with exertion.  Over the past several months this has become worse.  Patient states that she gets tired with basic activities including showering getting dressed in the morning and walking to and from her car from stores.  She states that her fatigue gets better after a few minutes of rest.  She has previously had an echocardiogram and cardiac catheterization within the past 2 years which did not reveal any cause of her fatigue with exertion.  She is also had an ultrasound of the liver which revealed stable cyst.  She was recently seen by her PCP who ordered iron studies which was notable for an elevated ferritin to 246.  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  All other mutations were negative.  She is currently up-to-date on her mammograms and colonoscopy with no active malignancies noted.  She is otherwise compliant with her statin and high blood pressure medicines.  Repeat abdominal imaging in 2021 concerning for enlarging liver lesion.  She was seen by Dr. Sharma and  is status post open right hepatectomy, cholecystectomy, right partial adrenalectomy on 2021.  Pathology showed a focal R1 resected margin.  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  0/4 lymph nodes were  positive for disease.  LVI and PNI were present.  Finished chemoXRT in January 2021.      Interval History:  Presents to clinic for follow-up.  Completed radiation in May 2023 and again in August 2023.  Completed 6 cycles of cisplatin/gemcitabine/Durvalumab in August 2024.  Treatment discontinued due to toxicity.  Currently on maintenance Durvalumab.  Worsening fatigue and tiredness this past month.  Has lost 5 pounds.  Notes worsening orthopnea at night    Oncology History:    Oncology/Hematology History   Intrahepatic cholangiocarcinoma   8/17/2021 Initial Diagnosis    Intrahepatic cholangiocarcinoma (CMS/HCC)     8/17/2021 Cancer Staged    Staging form: Intrahepatic Bile Duct, AJCC 8th Edition  - Clinical: Stage IB (cT1b, cN0, cM0) - Signed by Jorje Montenegro MD on 8/17/2021 11/5/2021 Cancer Staged    Staging form: Intrahepatic Bile Duct, AJCC 8th Edition  - Pathologic: Stage IIIA (pT3, pN0, cM0) - Signed by Jorje Montenegro MD on 11/5/2021 12/9/2021 - 1/6/2022 Chemotherapy    OP GALLBLADDER Capecitabine + XRT     12/9/2021 - 1/19/2022 Radiation    Radiation OncologyTreatment Course:  Sheree Hernandez received 5040 cGy in 28 fractions to liver via External Beam Radiation - EBRT.     4/25/2023 - 5/8/2023 Radiation    Radiation OncologyTreatment Course:  Sheree Hernandez received 3500 cGy in 5 fractions to liver mass and lymph nodes via Stereotactic Radiation Therapy - SRT.     8/1/2023 - 8/11/2023 Radiation    Radiation OncologyTreatment Course:  Sheree Hernandez received 3500 cGy in 5 fractions to abdomen via Stereotactic Radiation Therapy - SRT.     1/9/2024 - 1/23/2024 Radiation    Radiation OncologyTreatment Course:  Sheree Hernandez received 3000 cGy in 5 fractions to abdomen via Stereotactic Radiation Therapy - SRT.     1/11/2024 - 1/11/2024 Radiation    Radiation OncologyTreatment Course:  Sheree Hernandez received 754 cGy in 1 fractions to left lung via Stereotactic Radiation Therapy - SRT.      2/1/2024 - 2/1/2024 Radiation    Radiation OncologyTreatment Course:  Sheree Hernandez received 3000 cGy in 1 fractions to left lung via Stereotactic Radiation Therapy - SRT.     4/19/2024 - 8/16/2024 Chemotherapy    OP HEPATOBILIARY CISplatin + Gemcitabine Days 1,8 / Durvalumab Q21D     5/17/2024 -  Chemotherapy    OP CENTRAL VENOUS ACCESS DEVICE Access, Care, and Maintenance (CVAD)     9/6/2024 -  Chemotherapy    OP CHOLANGIOCARCINOMA Durvalumab 1500 mg         Subjective      Review of Systems:   Constitutional: Negative for fevers, chills, or weight loss  Eyes: Negative for blurred vision or discharge         Ear/Nose/Throat: Negative for difficulty swallowing, sore throat, LAD                                                       Respiratory: Negative for cough, SOA, wheezing                                                                                        Cardiovascular: Negative for chest pain or palpitations                                                                  Gastrointestinal: Negative for nausea, vomiting or diarrhea                                                                     Genitourinary: Negative for dysuria or hematuria                                                                                           Musculoskeletal: Negative for any joint pains or muscle aches                                                                        Neurologic: Negative for any weakness, headaches, dizziness                                                                         Hematologic: Negative for any easy bleeding or bruising                                                                                   Psychiatric: Negative for anxiety or depression                          Past Medical History/Past Surgical History/ Family History/ Social History: Reviewed by me and unchanged from my previous documentation done on October 2024.     Medications:     Current Outpatient  Medications:     albuterol sulfate  (90 Base) MCG/ACT inhaler, Inhale 1 puff Every 4 (Four) Hours As Needed for Wheezing., Disp: 18 g, Rfl: 2    amiodarone (PACERONE) 200 MG tablet, Take 1 tablet by mouth Daily., Disp: 90 tablet, Rfl: 0    amiodarone (PACERONE) 200 MG tablet, Take 1 tablet by mouth 2 (Two) Times a Day., Disp: 28 tablet, Rfl: 0    apixaban (ELIQUIS) 2.5 MG tablet tablet, Take 1 tablet by mouth 2 (Two) Times a Day., Disp: 60 tablet, Rfl: 11    Cholecalciferol (Vitamin D3) 25 MCG (1000 UT) capsule, Take  by mouth Daily., Disp: , Rfl:     coenzyme Q10 50 MG capsule capsule, Take  by mouth Daily., Disp: , Rfl:     Cyanocobalamin (VITAMIN B 12 PO), Take  by mouth., Disp: , Rfl:     Folic Acid 0.8 MG capsule, Take  by mouth., Disp: , Rfl:     ipratropium (ATROVENT) 0.06 % nasal spray, Administer 1 spray into each nostril twice daily, Disp: 30 mL, Rfl: 3    levothyroxine (SYNTHROID, LEVOTHROID) 25 MCG tablet, Take 1 tablet by mouth Every Morning., Disp: 30 tablet, Rfl: 3    lidocaine-prilocaine (EMLA) 2.5-2.5 % cream, Please apply to port site 30 min prior to infusion and cover with Saran Wrap, Disp: 30 g, Rfl: 3    magnesium oxide (MAG-OX) 400 MG tablet, Take 1 tablet by mouth Daily., Disp: , Rfl:     metoprolol tartrate (LOPRESSOR) 25 MG tablet, Take 1 tablet by mouth 2 (Two) Times a Day., Disp: 60 tablet, Rfl: 11    Omega-3 1000 MG capsule, Take  by mouth Daily., Disp: , Rfl:     rosuvastatin (CRESTOR) 10 MG tablet, Take 1 tablet by mouth Daily., Disp: 90 tablet, Rfl: 3    ondansetron ODT (ZOFRAN-ODT) 4 MG disintegrating tablet, Place 1 tablet on the tongue Every 8 (Eight) Hours As Needed for Nausea or Vomiting., Disp: 60 tablet, Rfl: 3    sennosides-docusate (senna-docusate sodium) 8.6-50 MG per tablet, Take 1 tablet by mouth 2 (Two) Times a Day As Needed for Constipation., Disp: 60 tablet, Rfl: 0    Allergies:   Allergies   Allergen Reactions    Pravastatin Myalgia       Objective     Physical  "Exam:  Vital Signs:   Vitals:    11/05/24 0947   BP: 131/64   Pulse: 68   Resp: 16   Temp: 97.9 °F (36.6 °C)   SpO2: 97%   Weight: 64.9 kg (143 lb)   Height: 162.6 cm (64\")   PainSc: 0-No pain     Pain Score    11/05/24 0947   PainSc: 0-No pain     ECOG Performance Status: 2 - Symptomatic, <50% confined to bed    Constitutional: NAD, ECOG 2  Eyes: PERRLA, scleral anicteric  ENT: No LAD, no thyromegaly  Respiratory: CTAB, no wheezing, rales, rhonchi  Cardiovascular: RRR, no murmurs, pulses 2+ bilaterally  Abdomen: soft, NT/ND, no HSM  Musculoskeletal: strength 5/5 bilaterally, no c/c/e  Neurologic: A&O x 3, CN II-XII intact grossly    Results Review:   Hospital Outpatient Visit on 11/05/2024   Component Date Value Ref Range Status    Glucose 11/05/2024 95  65 - 99 mg/dL Final    BUN 11/05/2024 18  8 - 23 mg/dL Final    Creatinine 11/05/2024 1.13 (H)  0.57 - 1.00 mg/dL Final    Sodium 11/05/2024 138  136 - 145 mmol/L Final    Potassium 11/05/2024 4.2  3.5 - 5.2 mmol/L Final    Slight hemolysis detected by analyzer. Result may be falsely elevated.    Chloride 11/05/2024 104  98 - 107 mmol/L Final    CO2 11/05/2024 22.0  22.0 - 29.0 mmol/L Final    Calcium 11/05/2024 8.7  8.6 - 10.5 mg/dL Final    Total Protein 11/05/2024 6.2  6.0 - 8.5 g/dL Final    Albumin 11/05/2024 3.6  3.5 - 5.2 g/dL Final    ALT (SGPT) 11/05/2024 20  1 - 33 U/L Final    AST (SGOT) 11/05/2024 30  1 - 32 U/L Final    Alkaline Phosphatase 11/05/2024 106  39 - 117 U/L Final    Total Bilirubin 11/05/2024 0.5  0.0 - 1.2 mg/dL Final    Globulin 11/05/2024 2.6  gm/dL Final    Calculated Result    A/G Ratio 11/05/2024 1.4  g/dL Final    BUN/Creatinine Ratio 11/05/2024 15.9  7.0 - 25.0 Final    Anion Gap 11/05/2024 12.0  5.0 - 15.0 mmol/L Final    eGFR 11/05/2024 47.8 (L)  >60.0 mL/min/1.73 Final    TSH 11/05/2024 4.060  0.270 - 4.200 uIU/mL Final    Free T4 11/05/2024 1.65  0.92 - 1.68 ng/dL Final    WBC 11/05/2024 5.97  3.40 - 10.80 10*3/mm3 Final    RBC " 11/05/2024 3.30 (L)  3.77 - 5.28 10*6/mm3 Final    Hemoglobin 11/05/2024 11.0 (L)  12.0 - 15.9 g/dL Final    Hematocrit 11/05/2024 33.4 (L)  34.0 - 46.6 % Final    MCV 11/05/2024 101.2 (H)  79.0 - 97.0 fL Final    MCH 11/05/2024 33.3 (H)  26.6 - 33.0 pg Final    MCHC 11/05/2024 32.9  31.5 - 35.7 g/dL Final    RDW 11/05/2024 15.6 (H)  12.3 - 15.4 % Final    RDW-SD 11/05/2024 58.8 (H)  37.0 - 54.0 fl Final    MPV 11/05/2024 12.0  6.0 - 12.0 fL Final    Platelets 11/05/2024 119 (L)  140 - 450 10*3/mm3 Final    Neutrophil % 11/05/2024 66.2  42.7 - 76.0 % Final    Lymphocyte % 11/05/2024 16.4 (L)  19.6 - 45.3 % Final    Monocyte % 11/05/2024 13.2 (H)  5.0 - 12.0 % Final    Eosinophil % 11/05/2024 3.7  0.3 - 6.2 % Final    Basophil % 11/05/2024 0.3  0.0 - 1.5 % Final    Immature Grans % 11/05/2024 0.2  0.0 - 0.5 % Final    Neutrophils, Absolute 11/05/2024 3.95  1.70 - 7.00 10*3/mm3 Final    Lymphocytes, Absolute 11/05/2024 0.98  0.70 - 3.10 10*3/mm3 Final    Monocytes, Absolute 11/05/2024 0.79  0.10 - 0.90 10*3/mm3 Final    Eosinophils, Absolute 11/05/2024 0.22  0.00 - 0.40 10*3/mm3 Final    Basophils, Absolute 11/05/2024 0.02  0.00 - 0.20 10*3/mm3 Final    Immature Grans, Absolute 11/05/2024 0.01  0.00 - 0.05 10*3/mm3 Final       CT Abdomen Pelvis With Contrast    Result Date: 11/4/2024  Narrative: CT CHEST W CONTRAST DIAGNOSTIC, CT ABDOMEN PELVIS W CONTRAST Date of Exam: 11/1/2024 10:58 AM EDT Indication: cholangiocarcinoma. Comparison: CT chest 8/26/2024, CT abdomen pelvis 9/28/2024 Technique: Axial CT images were obtained of the chest abdomen and pelvis after the uneventful intravenous administration of 85 cc Isovue-300.  Reconstructed coronal and sagittal images were also obtained. Automated exposure control and iterative construction methods were used. Findings: CT CHEST: MEDIASTINUM: Right internal jugular central venous port with catheter tip similarly positioned. Aortic and heart size are normal. No mass nor  pericardial effusion. CORONARY ARTERIES: There is calcified atherosclerotic disease. LUNGS: Stable scattered pulmonary nodules including: *4 mm right middle lobe nodule (image 56, series 4) *6 mm right middle lobe nodule (image 67, series 4) *3 mm left upper lobe nodule (image 39, series 4) *6 mm left lower lobe nodule (image 47, series 4). No definite new nor enlarging nodule is right lower lobe nodules were previously obscured by right lower lobe atelectasis. PLEURAL SPACE: No effusion, mass, nor pneumothorax. LYMPH NODES: Similar lymph nodes including: *1.7 x 0.8 cm right paratracheal node (image 17, series 2) *1.3 x 0.9 cm centrally necrotic prevascular node (image 36, series 2). No new new or definite enlarging node identified. CT ABDOMEN AND PELVIS:  LIVER: Partial hepatectomy. There are numerous low-density lesions/cysts within the residual left hepatic lobe, similar to previous CT with contrast and likely not significantly changed when compared to more recent noncontrast CT given differences in technique. Similar low density along resection margin without well-defined masslike features. BILIARY/GALLBLADDER: Cholecystectomy SPLEEN:  Unremarkable PANCREAS:  Unremarkable ADRENAL:  Unremarkable KIDNEYS:  Unremarkable parenchyma with no solid mass identified. No obstruction.  No calculus identified. GASTROINTESTINAL/MESENTERY:  No evidence of obstruction nor inflammation. AORTA/IVC:  Normal caliber. RETROPERITONEUM/LYMPH NODES: There is a similar 1.8 x 1.6 cm retrocaval lymph node (image 43, series 2). REPRODUCTIVE: Hysterectomy BLADDER:  Unremarkable OSSEUS STRUCTURES:  Typical for age with no acute process identified.     Impression: Impression: 1.Stable exam without evidence of disease progression. Electronically Signed: Maurice Willis MD  11/4/2024 3:32 PM EST  Workstation ID: [id number]    CT Chest With Contrast Diagnostic    Result Date: 11/4/2024  Narrative: CT CHEST W CONTRAST DIAGNOSTIC, CT ABDOMEN PELVIS W  CONTRAST Date of Exam: 11/1/2024 10:58 AM EDT Indication: cholangiocarcinoma. Comparison: CT chest 8/26/2024, CT abdomen pelvis 9/28/2024 Technique: Axial CT images were obtained of the chest abdomen and pelvis after the uneventful intravenous administration of 85 cc Isovue-300.  Reconstructed coronal and sagittal images were also obtained. Automated exposure control and iterative construction methods were used. Findings: CT CHEST: MEDIASTINUM: Right internal jugular central venous port with catheter tip similarly positioned. Aortic and heart size are normal. No mass nor pericardial effusion. CORONARY ARTERIES: There is calcified atherosclerotic disease. LUNGS: Stable scattered pulmonary nodules including: *4 mm right middle lobe nodule (image 56, series 4) *6 mm right middle lobe nodule (image 67, series 4) *3 mm left upper lobe nodule (image 39, series 4) *6 mm left lower lobe nodule (image 47, series 4). No definite new nor enlarging nodule is right lower lobe nodules were previously obscured by right lower lobe atelectasis. PLEURAL SPACE: No effusion, mass, nor pneumothorax. LYMPH NODES: Similar lymph nodes including: *1.7 x 0.8 cm right paratracheal node (image 17, series 2) *1.3 x 0.9 cm centrally necrotic prevascular node (image 36, series 2). No new new or definite enlarging node identified. CT ABDOMEN AND PELVIS:  LIVER: Partial hepatectomy. There are numerous low-density lesions/cysts within the residual left hepatic lobe, similar to previous CT with contrast and likely not significantly changed when compared to more recent noncontrast CT given differences in technique. Similar low density along resection margin without well-defined masslike features. BILIARY/GALLBLADDER: Cholecystectomy SPLEEN:  Unremarkable PANCREAS:  Unremarkable ADRENAL:  Unremarkable KIDNEYS:  Unremarkable parenchyma with no solid mass identified. No obstruction.  No calculus identified. GASTROINTESTINAL/MESENTERY:  No evidence of  obstruction nor inflammation. AORTA/IVC:  Normal caliber. RETROPERITONEUM/LYMPH NODES: There is a similar 1.8 x 1.6 cm retrocaval lymph node (image 43, series 2). REPRODUCTIVE: Hysterectomy BLADDER:  Unremarkable OSSEUS STRUCTURES:  Typical for age with no acute process identified.     Impression: Impression: 1.Stable exam without evidence of disease progression. Electronically Signed: Maurice Willis MD  11/4/2024 3:32 PM EST  Workstation ID: [id number]     Assessment / Plan      Assessment/Plan:     1. Intrahepatic cholangiocarcinoma (CMS/HCC) (Primary)  -Noted during her most recent hospitalization with CT scans concerning for an enlarging liver lesion to 7.3 cm that was previously noted to be hemangioma  -Triple phase CT concerning for a solid tumor lesion  -Biopsy consistent with an adenocarcinoma  -CA 19-9 elevated to 84.7  -CT chest as well as the rest of the CT abdomen/pelvis not concerning for distant or metastatic disease  -Status post open right hepatectomy, cholecystectomy, right partial adrenalectomy on 9/23/2021 with Dr. 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  0/4 lymph nodes were positive for disease.  LVI and PNI were present.  Focal R1 resection margin  -Discussed with patient and her daughter that she would benefit from adjuvant chemoXRT using Xeloda.  Discussed side effects including but not limited to immunosuppression, diarrhea, abdominal pain, nausea, vomiting, hand/foot syndrome  -Repeat CA 19-9 (22) in November 2021  -Completed chemo XRT with Xeloda in January 2021  -Repeat scans in March 2022 without any evidence of recurrent or metastatic disease  -Repeat CT C/A/P in June 2022 without evidence of recurrent or metastatic disease.  Did note some IVC stenosis which we will monitor with her next scans  -Repeat CT C/A/P in September 2022 reviewed without evidence of recurrent disease or  metastatic disease.  IVC stenosis overall stable  -Repeat CT C/A/P in December 2022 reviewed without evidence of recurrent or metastatic disease.  CA 19-9 within normal limits  -Repeat CT C/A/P in March 2023 reviewed and notable for some slight enlarging retroperitoneal adenopathy.  CA 19-9 within normal limits  -PET/CT concerning for 1 cm left liver lesion that was only slightly PET avid as well as 2 lymph nodes that were also slightly PET avid  -Previously discussed her case at tumor board and with Dr. Sharma we agreed that she likely had disease progression  -Status post radiation completed in May 2023  -CA 19-9 in June 2022 within normal limits.    -CT C/A/P in July 2023 reviewed and showed some interval decrease in some lymph nodes as well as some slight enlargement of a couple lymph nodes.  -Completed radiation in August 2023  -CT C/A/P in October 2023 reviewed and only notable for slight increase in some pulmonary nodules about 1 mm each  -CT C/A/P December 2023 reviewed and notable for enlargement of her aortocaval lymph node.  CA 19-9 stable  -Completed radiation with Dr. Billings in February 2024  -CT C/A/P in April 2024 reviewed and showing continued slight enlargement of multiple pulmonary nodules as well as a liver lesion.  CA 19-9 stable  -CT C/A/P in June 2024 reviewed and showing overall stable disease  -CT C/A/P in August 2024 reviewed showing overall stable disease  -Completed 6 cycles of cisplatin/gemcitabine/durvalumab in August 2024.  Discontinued chemotherapy due to toxicity  -CT C/A/P in November 2024 reviewed and showing overall stable disease  -Currently on maintenance Durvalumab and tolerating okay.  Not clinically appropriate for treatment today.  Will plan to hold for 1 month and give her 500 cc of normal saline     Hemochromatosis carrier  -Noted on recent labs with an elevated ferritin to 246 hemochromatosis gene profile positive for heterozygosity of H63D.  Other genes negative.  Given  patient's age and previous cardiac liver work-up showing no evidence of dysfunction, it is unlikely that she has had iron deposition all this time.  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  Treatment as above  -ECHO in July 2020 within normal limits  -Repeat iron studies in April 2021 stable with a ferritin of 229, iron level 100, transferrin saturation 27%  -Low concern for iron overload at this time.      Thyroid nodule  -Incidentally noted on CT scan but enlarging from previous CT  -Thyroid ultrasound in June 2022 only notable for goiter and small nodules nonconcerning for malignancy  -Has been seen by endocrinology with plan for repeat ultrasound in the summer of next year  -Currently on levothyroxine 25 mcg daily and tolerating well  -TSH in November 2024 within normal limits     Other fatigue   -Continued at this time  -Previously checked iron studies, vitamin B12, folate, thyroid studies within normal limits  -Was previously been seen by cardiology with a normal ECHO and stress test  -Holter monitor notable for atrial fibrillation for which she is on metoprolol and prophylactic apixaban  -Stable at this time     Anemia  -Likely related to her chemotherapy  -Iron studies in June 2024 with only mild iron deficiency anemia.    -Iron studies in July 2024 within normal limits outside of an elevated ferritin  -Status post 1 unit PRBC in August 2024     Nausea  -Stable with as needed Zofran.  Refilled ODT Zofran today     Anxiety  -Worsening  -Per patient preference, rereferred to Janice Sepulveda today    Shortness of breath with exertion/orthopnea  -Will repeat ECHO in the coming weeks    Follow Up:   Follow-up in 1 month     Jorje Montenegro MD  Hematology and Oncology      Please note that portions of this note may have been completed with a voice recognition program. Efforts were made to edit the dictations, but occasionally words are mistranscribed.

## 2024-11-07 ENCOUNTER — PATIENT OUTREACH (OUTPATIENT)
Dept: CASE MANAGEMENT | Facility: OTHER | Age: 84
End: 2024-11-07
Payer: MEDICARE

## 2024-11-07 NOTE — OUTREACH NOTE
AMBULATORY CASE MANAGEMENT NOTE    Names and Relationships of Patient/Support Persons: Contact: Sheree Hernandez; Relationship: Self -     Patient Outreach    Patient reports on-going constipation, medications reviewed, taking sennosides-docusate 1 tablet BID, does not think it is helping.  Experiences occasional episode of queasiness, relieved with ondansetron, states she does not take everyday. Reviewed food and fluid status, patient reports decreased appetite and fluid intake, water upsets her stomach. Education provided on increasing fresh fruits and vegetable and increasing fluid intake, hydration options and OTC use of Miralax.  Patient states she gets limited exercise, has started using seated peddling device. Denies SDOH at this time.    Education Documentation  Medication Management - bowel protocol, taught by Jenise Joseph, RN at 11/7/2024  1:57 PM.  Learner: Patient  Readiness: Acceptance  Method: Explanation  Response: Verbalizes Understanding    Diet Modification, taught by Jenise Joseph, RN at 11/7/2024  1:57 PM.  Learner: Patient  Readiness: Acceptance  Method: Explanation  Response: Verbalizes Understanding    Symptom Management - constipation, taught by Jenise Joseph, RN at 11/7/2024  1:57 PM.  Learner: Patient  Readiness: Acceptance  Method: Explanation  Response: Verbalizes Understanding    Fluid/Food Intake, taught by Jenise Joseph, RN at 11/7/2024  1:57 PM.  Learner: Patient  Readiness: Acceptance  Method: Explanation  Response: Verbalizes Understanding          Jenise CASTELLANOS  Ambulatory Case Management    11/7/2024, 13:58 EST

## 2024-11-08 ENCOUNTER — HOSPITAL ENCOUNTER (OUTPATIENT)
Facility: HOSPITAL | Age: 84
Discharge: HOME | End: 2024-11-08
Payer: MEDICARE

## 2024-11-08 VITALS
SYSTOLIC BLOOD PRESSURE: 142 MMHG | WEIGHT: 143.08 LBS | HEIGHT: 64 IN | DIASTOLIC BLOOD PRESSURE: 52 MMHG | BODY MASS INDEX: 24.43 KG/M2

## 2024-11-08 DIAGNOSIS — R06.01 ORTHOPNEA: ICD-10-CM

## 2024-11-08 DIAGNOSIS — R06.09 DYSPNEA ON EXERTION: ICD-10-CM

## 2024-11-08 LAB
ASCENDING AORTA: 2.7 CM
BH CV ECHO MEAS - AO MAX PG: 7.7 MMHG
BH CV ECHO MEAS - AO MEAN PG: 5 MMHG
BH CV ECHO MEAS - AO ROOT DIAM: 3 CM
BH CV ECHO MEAS - AO V2 MAX: 139 CM/SEC
BH CV ECHO MEAS - AO V2 VTI: 33.4 CM
BH CV ECHO MEAS - AVA(I,D): 2.46 CM2
BH CV ECHO MEAS - EDV(CUBED): 54.9 ML
BH CV ECHO MEAS - EDV(MOD-SP2): 79.1 ML
BH CV ECHO MEAS - EDV(MOD-SP4): 65.8 ML
BH CV ECHO MEAS - EF(MOD-BP): 52.1 %
BH CV ECHO MEAS - EF(MOD-SP2): 53.2 %
BH CV ECHO MEAS - EF(MOD-SP4): 50.5 %
BH CV ECHO MEAS - ESV(CUBED): 22 ML
BH CV ECHO MEAS - ESV(MOD-SP2): 37 ML
BH CV ECHO MEAS - ESV(MOD-SP4): 32.6 ML
BH CV ECHO MEAS - FS: 26.3 %
BH CV ECHO MEAS - IVS/LVPW: 1 CM
BH CV ECHO MEAS - IVSD: 0.9 CM
BH CV ECHO MEAS - LA DIMENSION: 3 CM
BH CV ECHO MEAS - LAT PEAK E' VEL: 9.9 CM/SEC
BH CV ECHO MEAS - LV DIASTOLIC VOL/BSA (35-75): 38.8 CM2
BH CV ECHO MEAS - LV MASS(C)D: 101.1 GRAMS
BH CV ECHO MEAS - LV MAX PG: 4.2 MMHG
BH CV ECHO MEAS - LV MEAN PG: 2 MMHG
BH CV ECHO MEAS - LV SYSTOLIC VOL/BSA (12-30): 19.2 CM2
BH CV ECHO MEAS - LV V1 MAX: 103 CM/SEC
BH CV ECHO MEAS - LV V1 VTI: 26.2 CM
BH CV ECHO MEAS - LVIDD: 3.8 CM
BH CV ECHO MEAS - LVIDS: 2.8 CM
BH CV ECHO MEAS - LVOT AREA: 3.1 CM2
BH CV ECHO MEAS - LVOT DIAM: 2 CM
BH CV ECHO MEAS - LVPWD: 0.9 CM
BH CV ECHO MEAS - MED PEAK E' VEL: 8.1 CM/SEC
BH CV ECHO MEAS - MR MAX PG: 83.9 MMHG
BH CV ECHO MEAS - MR MAX VEL: 458 CM/SEC
BH CV ECHO MEAS - MV A MAX VEL: 90.7 CM/SEC
BH CV ECHO MEAS - MV DEC SLOPE: 288 CM/SEC2
BH CV ECHO MEAS - MV DEC TIME: 0.21 SEC
BH CV ECHO MEAS - MV E MAX VEL: 87 CM/SEC
BH CV ECHO MEAS - MV E/A: 0.96
BH CV ECHO MEAS - MV MAX PG: 3.5 MMHG
BH CV ECHO MEAS - MV MEAN PG: 1 MMHG
BH CV ECHO MEAS - MV P1/2T: 84.8 MSEC
BH CV ECHO MEAS - MV V2 VTI: 30.3 CM
BH CV ECHO MEAS - MVA(P1/2T): 2.6 CM2
BH CV ECHO MEAS - MVA(VTI): 2.7 CM2
BH CV ECHO MEAS - PA ACC TIME: 0.11 SEC
BH CV ECHO MEAS - RAP SYSTOLE: 3 MMHG
BH CV ECHO MEAS - RVSP: 19.2 MMHG
BH CV ECHO MEAS - SV(LVOT): 82.3 ML
BH CV ECHO MEAS - SV(MOD-SP2): 42.1 ML
BH CV ECHO MEAS - SV(MOD-SP4): 33.2 ML
BH CV ECHO MEAS - SVI(LVOT): 48.5 ML/M2
BH CV ECHO MEAS - SVI(MOD-SP2): 24.8 ML/M2
BH CV ECHO MEAS - SVI(MOD-SP4): 19.6 ML/M2
BH CV ECHO MEAS - TAPSE (>1.6): 1.9 CM
BH CV ECHO MEAS - TR MAX PG: 16.2 MMHG
BH CV ECHO MEAS - TR MAX VEL: 186.8 CM/SEC
BH CV ECHO MEASUREMENTS AVERAGE E/E' RATIO: 9.67
BH CV XLRA - RV BASE: 3.2 CM
BH CV XLRA - RV LENGTH: 5.9 CM
BH CV XLRA - RV MID: 1.9 CM
BH CV XLRA - TDI S': 8.6 CM/SEC
LEFT ATRIUM VOLUME INDEX: 33.5 ML/M2

## 2024-11-08 PROCEDURE — 93306 TTE W/DOPPLER COMPLETE: CPT | Performed by: INTERNAL MEDICINE

## 2024-11-08 PROCEDURE — 93306 TTE W/DOPPLER COMPLETE: CPT

## 2024-11-12 RX ORDER — IPRATROPIUM BROMIDE 42 UG/1
1 SPRAY, METERED NASAL DAILY
Qty: 30 ML | Refills: 3 | Status: SHIPPED | OUTPATIENT
Start: 2024-11-12

## 2024-11-12 NOTE — TELEPHONE ENCOUNTER
Call Center TCM Note      Flowsheet Row Responses   [facility] ([facility] patient discharged from? [facility]   Does the patient have one of the following disease processes/diagnoses(primary or secondary)? Other   TCM attempt successful? No   Unsuccessful attempts Attempt 2  [noted that pt is non-verbal, attempted to reach son's--noted that pt is also followed by ACM]            Katalina Go RN    4/5/2024, 13:29 EDT         Rx Refill Note  Requested Prescriptions     Pending Prescriptions Disp Refills    ipratropium (ATROVENT) 0.06 % nasal spray 30 mL 3     Sig: Administer 1 spray into each nostril twice daily      Last office visit with prescribing clinician: 10/10/2023   Last telemedicine visit with prescribing clinician: Visit date not found   Next office visit with prescribing clinician: Visit date not found                         Would you like a call back once the refill request has been completed: [] Yes [] No    If the office needs to give you a call back, can they leave a voicemail: [] Yes [] No    Molly Lantigua MA  11/12/24, 14:39 EST

## 2024-11-14 ENCOUNTER — PATIENT MESSAGE (OUTPATIENT)
Dept: ONCOLOGY | Facility: CLINIC | Age: 84
End: 2024-11-14
Payer: MEDICARE

## 2024-11-19 ENCOUNTER — PATIENT MESSAGE (OUTPATIENT)
Dept: ONCOLOGY | Facility: CLINIC | Age: 84
End: 2024-11-19
Payer: MEDICARE

## 2024-11-19 RX ORDER — DICYCLOMINE HCL 20 MG
20 TABLET ORAL EVERY 4 HOURS PRN
Qty: 30 TABLET | Refills: 4 | Status: SHIPPED | OUTPATIENT
Start: 2024-11-19

## 2024-12-03 ENCOUNTER — HOSPITAL ENCOUNTER (OUTPATIENT)
Dept: ONCOLOGY | Facility: HOSPITAL | Age: 84
Discharge: HOME | End: 2024-12-03
Admitting: INTERNAL MEDICINE
Payer: MEDICARE

## 2024-12-03 ENCOUNTER — OFFICE VISIT (OUTPATIENT)
Dept: ONCOLOGY | Facility: CLINIC | Age: 84
End: 2024-12-03
Payer: MEDICARE

## 2024-12-03 VITALS
WEIGHT: 141 LBS | OXYGEN SATURATION: 97 % | RESPIRATION RATE: 18 BRPM | DIASTOLIC BLOOD PRESSURE: 50 MMHG | HEIGHT: 64 IN | SYSTOLIC BLOOD PRESSURE: 115 MMHG | HEART RATE: 74 BPM | BODY MASS INDEX: 24.07 KG/M2 | TEMPERATURE: 97.5 F

## 2024-12-03 DIAGNOSIS — C22.1 INTRAHEPATIC CHOLANGIOCARCINOMA: Primary | ICD-10-CM

## 2024-12-03 DIAGNOSIS — E86.0 DEHYDRATION: ICD-10-CM

## 2024-12-03 DIAGNOSIS — R63.4 WEIGHT LOSS: ICD-10-CM

## 2024-12-03 DIAGNOSIS — R10.84 GENERALIZED ABDOMINAL PAIN: ICD-10-CM

## 2024-12-03 LAB
ALBUMIN SERPL-MCNC: 3.5 G/DL (ref 3.5–5.2)
ALBUMIN/GLOB SERPL: 1.3 G/DL
ALP SERPL-CCNC: 164 U/L (ref 39–117)
ALT SERPL W P-5'-P-CCNC: 13 U/L (ref 1–33)
ANION GAP SERPL CALCULATED.3IONS-SCNC: 12 MMOL/L (ref 5–15)
AST SERPL-CCNC: 21 U/L (ref 1–32)
BASOPHILS # BLD AUTO: 0.03 10*3/MM3 (ref 0–0.2)
BASOPHILS NFR BLD AUTO: 0.6 % (ref 0–1.5)
BILIRUB SERPL-MCNC: 0.4 MG/DL (ref 0–1.2)
BUN SERPL-MCNC: 15 MG/DL (ref 8–23)
BUN/CREAT SERPL: 13.4 (ref 7–25)
CALCIUM SPEC-SCNC: 8.8 MG/DL (ref 8.6–10.5)
CANCER AG19-9 SERPL-ACNC: 17.1 U/ML
CHLORIDE SERPL-SCNC: 102 MMOL/L (ref 98–107)
CO2 SERPL-SCNC: 23 MMOL/L (ref 22–29)
CREAT SERPL-MCNC: 1.12 MG/DL (ref 0.57–1)
DEPRECATED RDW RBC AUTO: 57.7 FL (ref 37–54)
EGFRCR SERPLBLD CKD-EPI 2021: 48.3 ML/MIN/1.73
EOSINOPHIL # BLD AUTO: 0.39 10*3/MM3 (ref 0–0.4)
EOSINOPHIL NFR BLD AUTO: 7.6 % (ref 0.3–6.2)
ERYTHROCYTE [DISTWIDTH] IN BLOOD BY AUTOMATED COUNT: 15.7 % (ref 12.3–15.4)
GLOBULIN UR ELPH-MCNC: 2.6 GM/DL
GLUCOSE SERPL-MCNC: 149 MG/DL (ref 65–99)
HCT VFR BLD AUTO: 30.2 % (ref 34–46.6)
HGB BLD-MCNC: 10 G/DL (ref 12–15.9)
IMM GRANULOCYTES # BLD AUTO: 0.01 10*3/MM3 (ref 0–0.05)
IMM GRANULOCYTES NFR BLD AUTO: 0.2 % (ref 0–0.5)
LYMPHOCYTES # BLD AUTO: 0.65 10*3/MM3 (ref 0.7–3.1)
LYMPHOCYTES NFR BLD AUTO: 12.7 % (ref 19.6–45.3)
MCH RBC QN AUTO: 33.1 PG (ref 26.6–33)
MCHC RBC AUTO-ENTMCNC: 33.1 G/DL (ref 31.5–35.7)
MCV RBC AUTO: 100 FL (ref 79–97)
MONOCYTES # BLD AUTO: 0.77 10*3/MM3 (ref 0.1–0.9)
MONOCYTES NFR BLD AUTO: 15.1 % (ref 5–12)
NEUTROPHILS NFR BLD AUTO: 3.25 10*3/MM3 (ref 1.7–7)
NEUTROPHILS NFR BLD AUTO: 63.8 % (ref 42.7–76)
PLATELET # BLD AUTO: 98 10*3/MM3 (ref 140–450)
PMV BLD AUTO: 12.9 FL (ref 6–12)
POTASSIUM SERPL-SCNC: 3.6 MMOL/L (ref 3.5–5.2)
PROT SERPL-MCNC: 6.1 G/DL (ref 6–8.5)
RBC # BLD AUTO: 3.02 10*6/MM3 (ref 3.77–5.28)
SODIUM SERPL-SCNC: 137 MMOL/L (ref 136–145)
T4 FREE SERPL-MCNC: 1.36 NG/DL (ref 0.92–1.68)
TSH SERPL DL<=0.05 MIU/L-ACNC: 4.09 UIU/ML (ref 0.27–4.2)
WBC NRBC COR # BLD AUTO: 5.1 10*3/MM3 (ref 3.4–10.8)

## 2024-12-03 PROCEDURE — 1126F AMNT PAIN NOTED NONE PRSNT: CPT | Performed by: INTERNAL MEDICINE

## 2024-12-03 PROCEDURE — 80053 COMPREHEN METABOLIC PANEL: CPT | Performed by: INTERNAL MEDICINE

## 2024-12-03 PROCEDURE — 84443 ASSAY THYROID STIM HORMONE: CPT | Performed by: INTERNAL MEDICINE

## 2024-12-03 PROCEDURE — 85025 COMPLETE CBC W/AUTO DIFF WBC: CPT | Performed by: INTERNAL MEDICINE

## 2024-12-03 PROCEDURE — 84439 ASSAY OF FREE THYROXINE: CPT | Performed by: INTERNAL MEDICINE

## 2024-12-03 PROCEDURE — 25810000003 SODIUM CHLORIDE 0.9 % SOLUTION: Performed by: INTERNAL MEDICINE

## 2024-12-03 PROCEDURE — 99214 OFFICE O/P EST MOD 30 MIN: CPT | Performed by: INTERNAL MEDICINE

## 2024-12-03 PROCEDURE — 25010000002 HEPARIN LOCK FLUSH PER 10 UNITS: Performed by: INTERNAL MEDICINE

## 2024-12-03 PROCEDURE — 86301 IMMUNOASSAY TUMOR CA 19-9: CPT | Performed by: INTERNAL MEDICINE

## 2024-12-03 PROCEDURE — 96360 HYDRATION IV INFUSION INIT: CPT

## 2024-12-03 PROCEDURE — 3074F SYST BP LT 130 MM HG: CPT | Performed by: INTERNAL MEDICINE

## 2024-12-03 PROCEDURE — 3078F DIAST BP <80 MM HG: CPT | Performed by: INTERNAL MEDICINE

## 2024-12-03 RX ORDER — SODIUM CHLORIDE 9 MG/ML
1000 INJECTION, SOLUTION INTRAVENOUS ONCE
Status: COMPLETED | OUTPATIENT
Start: 2024-12-03 | End: 2024-12-03

## 2024-12-03 RX ORDER — SODIUM CHLORIDE 9 MG/ML
1000 INJECTION, SOLUTION INTRAVENOUS ONCE
Status: CANCELLED | OUTPATIENT
Start: 2024-12-03 | End: 2024-12-03

## 2024-12-03 RX ORDER — HEPARIN SODIUM (PORCINE) LOCK FLUSH IV SOLN 100 UNIT/ML 100 UNIT/ML
500 SOLUTION INTRAVENOUS AS NEEDED
Status: DISCONTINUED | OUTPATIENT
Start: 2024-12-03 | End: 2024-12-04 | Stop reason: HOSPADM

## 2024-12-03 RX ORDER — ONDANSETRON 4 MG/1
4 TABLET, ORALLY DISINTEGRATING ORAL EVERY 8 HOURS PRN
Qty: 60 TABLET | Refills: 3 | Status: SHIPPED | OUTPATIENT
Start: 2024-12-03

## 2024-12-03 RX ORDER — HEPARIN SODIUM (PORCINE) LOCK FLUSH IV SOLN 100 UNIT/ML 100 UNIT/ML
500 SOLUTION INTRAVENOUS AS NEEDED
OUTPATIENT
Start: 2024-12-03

## 2024-12-03 RX ADMIN — HEPARIN 500 UNITS: 100 SYRINGE at 11:57

## 2024-12-03 RX ADMIN — SODIUM CHLORIDE 1000 ML/HR: 9 INJECTION, SOLUTION INTRAVENOUS at 10:46

## 2024-12-03 NOTE — PROGRESS NOTES
Follow Up Office Visit      Date: 2024     Patient Name: Sheree Hernandez  MRN: [id number]  : 1940  Chief Complaint:  Follow-up for intrahepatic cholangiocarcinoma      History of Present Illness: Sheree Hernandez is a pleasant 79 y.o. female with a past medical history of hyperlipidemia and hypertension who presents today for evaluation of concern for hemochromatosis. The patient is accompanied by their self who contributes to the history of their care.  Patient states that over the past 2 years she has been having worsening fatigue especially with exertion.  Over the past several months this has become worse.  Patient states that she gets tired with basic activities including showering getting dressed in the morning and walking to and from her car from stores.  She states that her fatigue gets better after a few minutes of rest.  She has previously had an echocardiogram and cardiac catheterization within the past 2 years which did not reveal any cause of her fatigue with exertion.  She is also had an ultrasound of the liver which revealed stable cyst.  She was recently seen by her PCP who ordered iron studies which was notable for an elevated ferritin to 246.  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  All other mutations were negative.  She is currently up-to-date on her mammograms and colonoscopy with no active malignancies noted.  She is otherwise compliant with her statin and high blood pressure medicines.  Repeat abdominal imaging in 2021 concerning for enlarging liver lesion.  She was seen by Dr. Sharma and  is status post open right hepatectomy, cholecystectomy, right partial adrenalectomy on 2021.  Pathology showed a focal R1 resected margin.  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  0/4 lymph nodes were  positive for disease.  LVI and PNI were present.  Finished chemoXRT in January 2021.      Interval History:  Presents to clinic for follow-up.  Completed radiation in May 2023 and again in August 2023.  Completed 6 cycles of cisplatin/gemcitabine/Durvalumab in August 2024.  Treatment discontinued due to toxicity.  Currently on maintenance Durvalumab.  Treatment held in November due to fatigue and abdominal discomfort.  Continues to have fatigue and abdominal pain.  Notes cramping as well as nausea with minimal p.o. intake.  Has lost 7 pounds in the past 2 months    Oncology History:    Oncology/Hematology History   Intrahepatic cholangiocarcinoma   8/17/2021 Initial Diagnosis    Intrahepatic cholangiocarcinoma (CMS/HCC)     8/17/2021 Cancer Staged    Staging form: Intrahepatic Bile Duct, AJCC 8th Edition  - Clinical: Stage IB (cT1b, cN0, cM0) - Signed by Jorje Montenegro MD on 8/17/2021 11/5/2021 Cancer Staged    Staging form: Intrahepatic Bile Duct, AJCC 8th Edition  - Pathologic: Stage IIIA (pT3, pN0, cM0) - Signed by Jorje Montenegro MD on 11/5/2021 12/9/2021 - 1/6/2022 Chemotherapy    OP GALLBLADDER Capecitabine + XRT     12/9/2021 - 1/19/2022 Radiation    Radiation OncologyTreatment Course:  Sheree Hernandez received 5040 cGy in 28 fractions to liver via External Beam Radiation - EBRT.     4/25/2023 - 5/8/2023 Radiation    Radiation OncologyTreatment Course:  Sheree Hernandez received 3500 cGy in 5 fractions to liver mass and lymph nodes via Stereotactic Radiation Therapy - SRT.     8/1/2023 - 8/11/2023 Radiation    Radiation OncologyTreatment Course:  Sheree Hernandez received 3500 cGy in 5 fractions to abdomen via Stereotactic Radiation Therapy - SRT.     1/9/2024 - 1/23/2024 Radiation    Radiation OncologyTreatment Course:  Sheree Hernandez received 3000 cGy in 5 fractions to abdomen via Stereotactic Radiation Therapy - SRT.     1/11/2024 - 1/11/2024 Radiation    Radiation OncologyTreatment Course:   Sheree Hernandez received 754 cGy in 1 fractions to left lung via Stereotactic Radiation Therapy - SRT.     2/1/2024 - 2/1/2024 Radiation    Radiation OncologyTreatment Course:  Sheree Hernandez received 3000 cGy in 1 fractions to left lung via Stereotactic Radiation Therapy - SRT.     4/19/2024 - 8/16/2024 Chemotherapy    OP HEPATOBILIARY CISplatin + Gemcitabine Days 1,8 / Durvalumab Q21D     5/17/2024 -  Chemotherapy    OP CENTRAL VENOUS ACCESS DEVICE Access, Care, and Maintenance (CVAD)     9/6/2024 -  Chemotherapy    OP CHOLANGIOCARCINOMA Durvalumab 1500 mg         Subjective      Review of Systems:   Constitutional: Negative for fevers, chills, or weight loss  Eyes: Negative for blurred vision or discharge         Ear/Nose/Throat: Negative for difficulty swallowing, sore throat, LAD                                                       Respiratory: Negative for cough, SOA, wheezing                                                                                        Cardiovascular: Negative for chest pain or palpitations                                                                  Gastrointestinal: Negative for nausea, vomiting or diarrhea                                                                     Genitourinary: Negative for dysuria or hematuria                                                                                           Musculoskeletal: Negative for any joint pains or muscle aches                                                                        Neurologic: Negative for any weakness, headaches, dizziness                                                                         Hematologic: Negative for any easy bleeding or bruising                                                                                   Psychiatric: Negative for anxiety or depression                          Past Medical History/Past Surgical History/ Family History/ Social History: Reviewed by me and  unchanged from my previous documentation done on November 2024.     Medications:     Current Outpatient Medications:     albuterol sulfate  (90 Base) MCG/ACT inhaler, Inhale 1 puff Every 4 (Four) Hours As Needed for Wheezing., Disp: 18 g, Rfl: 2    amiodarone (PACERONE) 200 MG tablet, Take 1 tablet by mouth Daily., Disp: 90 tablet, Rfl: 0    amiodarone (PACERONE) 200 MG tablet, Take 1 tablet by mouth 2 (Two) Times a Day., Disp: 28 tablet, Rfl: 0    apixaban (ELIQUIS) 2.5 MG tablet tablet, Take 1 tablet by mouth 2 (Two) Times a Day., Disp: 60 tablet, Rfl: 11    Cholecalciferol (Vitamin D3) 25 MCG (1000 UT) capsule, Take  by mouth Daily., Disp: , Rfl:     coenzyme Q10 50 MG capsule capsule, Take  by mouth Daily., Disp: , Rfl:     Cyanocobalamin (VITAMIN B 12 PO), Take  by mouth., Disp: , Rfl:     Folic Acid 0.8 MG capsule, Take  by mouth., Disp: , Rfl:     ipratropium (ATROVENT) 0.06 % nasal spray, Administer 1 spray into each nostril twice daily, Disp: 30 mL, Rfl: 3    levothyroxine (SYNTHROID, LEVOTHROID) 25 MCG tablet, Take 1 tablet by mouth Every Morning., Disp: 30 tablet, Rfl: 3    lidocaine-prilocaine (EMLA) 2.5-2.5 % cream, Please apply to port site 30 min prior to infusion and cover with Saran Wrap, Disp: 30 g, Rfl: 3    magnesium oxide (MAG-OX) 400 MG tablet, Take 1 tablet by mouth Daily., Disp: , Rfl:     metoprolol tartrate (LOPRESSOR) 25 MG tablet, Take 1 tablet by mouth 2 (Two) Times a Day., Disp: 60 tablet, Rfl: 11    Omega-3 1000 MG capsule, Take  by mouth Daily., Disp: , Rfl:     ondansetron ODT (ZOFRAN-ODT) 4 MG disintegrating tablet, Place 1 tablet on the tongue Every 8 (Eight) Hours As Needed for Nausea or Vomiting., Disp: 60 tablet, Rfl: 3    rosuvastatin (CRESTOR) 10 MG tablet, Take 1 tablet by mouth Daily., Disp: 90 tablet, Rfl: 3    sennosides-docusate (senna-docusate sodium) 8.6-50 MG per tablet, Take 1 tablet by mouth 2 (Two) Times a Day As Needed for Constipation., Disp: 60 tablet,  "Rfl: 0  No current facility-administered medications for this visit.    Facility-Administered Medications Ordered in Other Visits:     heparin injection 500 Units, 500 Units, Intravenous, PRN, Jorje Montenegro MD    sodium chloride 0.9 % infusion, 1,000 mL/hr, Intravenous, Once, Jorje Montenegro MD    Allergies:   Allergies   Allergen Reactions    Pravastatin Myalgia       Objective     Physical Exam:  Vital Signs:   Vitals:    12/03/24 0913   BP: 115/50   Pulse: 74   Resp: 18   Temp: 97.5 °F (36.4 °C)   SpO2: 97%   Weight: 64 kg (141 lb)   Height: 162.6 cm (64\")   PainSc: 0-No pain     Pain Score    12/03/24 0913   PainSc: 0-No pain     ECOG Performance Status: 1 - Symptomatic but completely ambulatory    Constitutional: NAD, ECOG 1-2  Eyes: PERRLA, scleral anicteric  ENT: No LAD, no thyromegaly  Respiratory: CTAB, no wheezing, rales, rhonchi  Cardiovascular: RRR, no murmurs, pulses 2+ bilaterally  Abdomen: soft, NT/ND, no HSM  Musculoskeletal: strength 5/5 bilaterally, no c/c/e  Neurologic: A&O x 3, CN II-XII intact grossly    Results Review:   Hospital Outpatient Visit on 12/03/2024   Component Date Value Ref Range Status    Glucose 12/03/2024 149 (H)  65 - 99 mg/dL Final    BUN 12/03/2024 15  8 - 23 mg/dL Final    Creatinine 12/03/2024 1.12 (H)  0.57 - 1.00 mg/dL Final    Sodium 12/03/2024 137  136 - 145 mmol/L Final    Potassium 12/03/2024 3.6  3.5 - 5.2 mmol/L Final    Chloride 12/03/2024 102  98 - 107 mmol/L Final    CO2 12/03/2024 23.0  22.0 - 29.0 mmol/L Final    Calcium 12/03/2024 8.8  8.6 - 10.5 mg/dL Final    Total Protein 12/03/2024 6.1  6.0 - 8.5 g/dL Final    Albumin 12/03/2024 3.5  3.5 - 5.2 g/dL Final    ALT (SGPT) 12/03/2024 13  1 - 33 U/L Final    AST (SGOT) 12/03/2024 21  1 - 32 U/L Final    Alkaline Phosphatase 12/03/2024 164 (H)  39 - 117 U/L Final    Total Bilirubin 12/03/2024 0.4  0.0 - 1.2 mg/dL Final    Globulin 12/03/2024 2.6  gm/dL Final    Calculated Result    A/G Ratio 12/03/2024 " 1.3  g/dL Final    BUN/Creatinine Ratio 12/03/2024 13.4  7.0 - 25.0 Final    Anion Gap 12/03/2024 12.0  5.0 - 15.0 mmol/L Final    eGFR 12/03/2024 48.3 (L)  >60.0 mL/min/1.73 Final    TSH 12/03/2024 4.090  0.270 - 4.200 uIU/mL Final    Free T4 12/03/2024 1.36  0.92 - 1.68 ng/dL Final    WBC 12/03/2024 5.10  3.40 - 10.80 10*3/mm3 Final    RBC 12/03/2024 3.02 (L)  3.77 - 5.28 10*6/mm3 Final    Hemoglobin 12/03/2024 10.0 (L)  12.0 - 15.9 g/dL Final    Hematocrit 12/03/2024 30.2 (L)  34.0 - 46.6 % Final    MCV 12/03/2024 100.0 (H)  79.0 - 97.0 fL Final    MCH 12/03/2024 33.1 (H)  26.6 - 33.0 pg Final    MCHC 12/03/2024 33.1  31.5 - 35.7 g/dL Final    RDW 12/03/2024 15.7 (H)  12.3 - 15.4 % Final    RDW-SD 12/03/2024 57.7 (H)  37.0 - 54.0 fl Final    MPV 12/03/2024 12.9 (H)  6.0 - 12.0 fL Final    Platelets 12/03/2024 98 (L)  140 - 450 10*3/mm3 Final    Neutrophil % 12/03/2024 63.8  42.7 - 76.0 % Final    Lymphocyte % 12/03/2024 12.7 (L)  19.6 - 45.3 % Final    Monocyte % 12/03/2024 15.1 (H)  5.0 - 12.0 % Final    Eosinophil % 12/03/2024 7.6 (H)  0.3 - 6.2 % Final    Basophil % 12/03/2024 0.6  0.0 - 1.5 % Final    Immature Grans % 12/03/2024 0.2  0.0 - 0.5 % Final    Neutrophils, Absolute 12/03/2024 3.25  1.70 - 7.00 10*3/mm3 Final    Lymphocytes, Absolute 12/03/2024 0.65 (L)  0.70 - 3.10 10*3/mm3 Final    Monocytes, Absolute 12/03/2024 0.77  0.10 - 0.90 10*3/mm3 Final    Eosinophils, Absolute 12/03/2024 0.39  0.00 - 0.40 10*3/mm3 Final    Basophils, Absolute 12/03/2024 0.03  0.00 - 0.20 10*3/mm3 Final    Immature Grans, Absolute 12/03/2024 0.01  0.00 - 0.05 10*3/mm3 Final       Adult Transthoracic Echo Complete W/ Cont if Necessary Per Protocol    Result Date: 11/8/2024  Narrative:   Left ventricular ejection fraction appears to be 51 - 55%.   Left ventricular diastolic function was indeterminate.   Mild mitral valve regurgitation is present.   The aortic valve exhibits sclerosis.   Trace tricuspid valve regurgitation is  present. Estimated right ventricular systolic pressure from tricuspid regurgitation is normal (<35 mmHg).   There is no evidence of pericardial effusion.      Assessment / Plan      Assessment/Plan:   D1. Intrahepatic cholangiocarcinoma (CMS/HCC) (Primary)  -Noted during her most recent hospitalization with CT scans concerning for an enlarging liver lesion to 7.3 cm that was previously noted to be hemangioma  -Triple phase CT concerning for a solid tumor lesion  -Biopsy consistent with an adenocarcinoma  -CA 19-9 elevated to 84.7  -CT chest as well as the rest of the CT abdomen/pelvis not concerning for distant or metastatic disease  -Status post open right hepatectomy, cholecystectomy, right partial adrenalectomy on 9/23/2021 with Dr. 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  0/4 lymph nodes were positive for disease.  LVI and PNI were present.  Focal R1 resection margin  -Discussed with patient and her daughter that she would benefit from adjuvant chemoXRT using Xeloda.  Discussed side effects including but not limited to immunosuppression, diarrhea, abdominal pain, nausea, vomiting, hand/foot syndrome  -Repeat CA 19-9 (22) in November 2021  -Completed chemo XRT with Xeloda in January 2021  -Repeat scans in March 2022 without any evidence of recurrent or metastatic disease  -Repeat CT C/A/P in June 2022 without evidence of recurrent or metastatic disease.  Did note some IVC stenosis which we will monitor with her next scans  -Repeat CT C/A/P in September 2022 reviewed without evidence of recurrent disease or metastatic disease.  IVC stenosis overall stable  -Repeat CT C/A/P in December 2022 reviewed without evidence of recurrent or metastatic disease.  CA 19-9 within normal limits  -Repeat CT C/A/P in March 2023 reviewed and notable for some slight enlarging retroperitoneal adenopathy.  CA 19-9 within  normal limits  -PET/CT concerning for 1 cm left liver lesion that was only slightly PET avid as well as 2 lymph nodes that were also slightly PET avid  -Previously discussed her case at tumor board and with Dr. Sharma we agreed that she likely had disease progression  -Status post radiation completed in May 2023  -CA 19-9 in June 2022 within normal limits.    -CT C/A/P in July 2023 reviewed and showed some interval decrease in some lymph nodes as well as some slight enlargement of a couple lymph nodes.  -Completed radiation in August 2023  -CT C/A/P in October 2023 reviewed and only notable for slight increase in some pulmonary nodules about 1 mm each  -CT C/A/P December 2023 reviewed and notable for enlargement of her aortocaval lymph node.  CA 19-9 stable  -Completed radiation with Dr. Billings in February 2024  -CT C/A/P in April 2024 reviewed and showing continued slight enlargement of multiple pulmonary nodules as well as a liver lesion.  CA 19-9 stable  -CT C/A/P in June 2024 reviewed and showing overall stable disease  -CT C/A/P in August 2024 reviewed showing overall stable disease  -Completed 6 cycles of cisplatin/gemcitabine/durvalumab in August 2024.  Discontinued chemotherapy due to toxicity  -CT C/A/P in November 2024 reviewed and showing overall stable disease  -Currently on maintenance Durvalumab and tolerating okay.  Not clinically appropriate for treatment again today.  Will plan to hold for 1 month and give her some normal saline  -Will plan for repeat CT scans prior to her next appoint with me.  Ordered today     Hemochromatosis carrier  -Noted on recent labs with an elevated ferritin to 246 hemochromatosis gene profile positive for heterozygosity of H63D.  Other genes negative.  Given patient's age and previous cardiac liver work-up showing no evidence of dysfunction, it is unlikely that she has had iron deposition all this time.  The heterozygosity of H63D makes her carrier for hemochromatosis  however does not mean that she has active disease  -CT A/P in July 2020 with no liver dysfunction but was notable for hemangioma which has now been confirmed to be a intrahepatic cholangiocarcinoma and a status post resection.  Treatment as above  -ECHO in July 2020 within normal limits  -Repeat iron studies in April 2021 stable with a ferritin of 229, iron level 100, transferrin saturation 27%  -Low concern for iron overload at this time.      Thyroid nodule  -Incidentally noted on CT scan but enlarging from previous CT  -Thyroid ultrasound in June 2022 only notable for goiter and small nodules nonconcerning for malignancy  -Has been seen by endocrinology with plan for repeat ultrasound in the summer of next year  -Currently on levothyroxine 25 mcg daily and tolerating well  -TSH in November 2024 within normal limits     Other fatigue   -Continued at this time  -Previously checked iron studies, vitamin B12, folate, thyroid studies within normal limits  -Was previously been seen by cardiology with a normal ECHO and stress test  -Holter monitor notable for atrial fibrillation for which she is on metoprolol and prophylactic apixaban  -Stable at this time     Anemia  -Likely related to her chemotherapy  -Iron studies in June 2024 with only mild iron deficiency anemia.    -Iron studies in July 2024 within normal limits outside of an elevated ferritin  -Status post 1 unit PRBC in August 2024     Nausea  -Stable with as needed Zofran.  Refilled ODT Zofran today     Anxiety  -Previously referred to Janice Sepulveda     Shortness of breath with exertion/orthopnea  -ECHO in November 2024 with a normal EF    Abdominal pain/weight loss  -Continue abdominal discomfort.  Unlikely to be related to her malignancy given her stable CT scans last month  -Would benefit from gastroenterology workup including potential EGD  -Referred to GI today         Follow Up:   Follow-up in 1 month     Jorje Montenegro MD  Hematology and Oncology      Please note that portions of this note may have been completed with a voice recognition program. Efforts were made to edit the dictations, but occasionally words are mistranscribed.

## 2024-12-05 ENCOUNTER — PATIENT OUTREACH (OUTPATIENT)
Dept: CASE MANAGEMENT | Facility: OTHER | Age: 84
End: 2024-12-05
Payer: MEDICARE

## 2024-12-05 NOTE — PLAN OF CARE
Problem: Cancer Treatment Phase  Goal: Manage Fatigue (Tiredness)  Outcome: Not Progressing  Frequent rest periods  Adequate nutrition and hydration    Goal: Keep Nausea and Vomiting Under Control  Outcome: Not Progressing  Take antiemetic as prescribed  Manage constipation

## 2024-12-05 NOTE — PLAN OF CARE
Problem: Cancer Treatment Phase  Goal: Keep Nausea and Vomiting Under Control  Outcome: Not Progressing  Intervention: My Nausea/Vomiting Control To Do List  Description:   Why is this important?  During cancer treatment, it may be hard to keep your weight and strength up.  Feeling sick a lot can make life tough.  Cancer treatment can also make you feel sick and throw up.  Eating a variety of healthy foods that are high in calories and protein will help.  Keeping away from foods and smells that might make you feel worse can make a big difference.  There are some tips to help you have fewer bad days.    Flowsheets (Taken 12/5/2024 1400)  My Nausea/Vomiting Control To Do List:   avoid foods that might trigger nausea   avoid strong odors that might trigger nausea   eat 5 small meals each day   stay away from greasy and spicy food   try small sips and bites

## 2024-12-05 NOTE — OUTREACH NOTE
AMBULATORY CASE MANAGEMENT NOTE    Names and Relationships of Patient/Support Persons: Contact: Sheree Hernandez; Relationship: Self -     Patient Outreach    Patient states she is not feeling well today, ongoing episodes of nausea and constipation. Patient not taking medications consistently.     Education Documentation  Medication Management - ondansetron, sennosides-docusate, taught by Jenise Joseph, RN at 12/5/2024  1:55 PM.  Learner: Patient  Readiness: Acceptance  Method: Explanation  Response: Needs Reinforcement    Symptom Management - nausea, constipation, taught by Jenise Joseph, RN at 12/5/2024  1:55 PM.  Learner: Patient  Readiness: Acceptance  Method: Explanation  Response: Needs Reinforcement    Fluid/Food Intake - decreased intake, taught by Jenise Joseph, RN at 12/5/2024  1:55 PM.  Learner: Patient  Readiness: Acceptance  Method: Explanation  Response: Needs Reinforcement    Energy Conservation, taught by Jenise Joseph, RN at 12/5/2024  1:55 PM.  Learner: Patient  Readiness: Acceptance  Method: Explanation  Response: Needs Reinforcement        Jenise CASTELLANOS  Ambulatory Case Management    12/5/2024, 13:55 EST

## 2024-12-06 ENCOUNTER — PATIENT MESSAGE (OUTPATIENT)
Dept: ONCOLOGY | Facility: CLINIC | Age: 84
End: 2024-12-06
Payer: MEDICARE

## 2024-12-10 DIAGNOSIS — C22.1 INTRAHEPATIC CHOLANGIOCARCINOMA: Primary | ICD-10-CM

## 2024-12-11 ENCOUNTER — APPOINTMENT (OUTPATIENT)
Dept: CT IMAGING | Facility: HOSPITAL | Age: 84
End: 2024-12-11
Payer: MEDICARE

## 2024-12-11 ENCOUNTER — APPOINTMENT (OUTPATIENT)
Dept: GENERAL RADIOLOGY | Facility: HOSPITAL | Age: 84
End: 2024-12-11
Payer: MEDICARE

## 2024-12-11 ENCOUNTER — TELEMEDICINE (OUTPATIENT)
Dept: FAMILY MEDICINE CLINIC | Facility: CLINIC | Age: 84
End: 2024-12-11
Payer: MEDICARE

## 2024-12-11 ENCOUNTER — HOSPITAL ENCOUNTER (OUTPATIENT)
Facility: HOSPITAL | Age: 84
Setting detail: OBSERVATION
Discharge: HOME | End: 2024-12-13
Attending: EMERGENCY MEDICINE | Admitting: INTERNAL MEDICINE
Payer: MEDICARE

## 2024-12-11 ENCOUNTER — LAB (OUTPATIENT)
Dept: LAB | Facility: HOSPITAL | Age: 84
End: 2024-12-11
Payer: MEDICARE

## 2024-12-11 DIAGNOSIS — R16.0 LIVER MASSES: ICD-10-CM

## 2024-12-11 DIAGNOSIS — R10.13 EPIGASTRIC PAIN: ICD-10-CM

## 2024-12-11 DIAGNOSIS — R10.13 DYSPEPSIA: ICD-10-CM

## 2024-12-11 DIAGNOSIS — R16.0: ICD-10-CM

## 2024-12-11 DIAGNOSIS — C22.1 INTRAHEPATIC CHOLANGIOCARCINOMA: ICD-10-CM

## 2024-12-11 DIAGNOSIS — R63.4 WEIGHT LOSS: ICD-10-CM

## 2024-12-11 DIAGNOSIS — D64.9 ANEMIA, UNSPECIFIED TYPE: ICD-10-CM

## 2024-12-11 DIAGNOSIS — E11.9: ICD-10-CM

## 2024-12-11 DIAGNOSIS — K26.3 ACUTE DUODENAL ULCER: ICD-10-CM

## 2024-12-11 DIAGNOSIS — R10.9 ABDOMINAL PAIN, UNSPECIFIED ABDOMINAL LOCATION: ICD-10-CM

## 2024-12-11 DIAGNOSIS — R62.52: ICD-10-CM

## 2024-12-11 DIAGNOSIS — R11.2 NAUSEA AND VOMITING IN ADULT: Primary | ICD-10-CM

## 2024-12-11 DIAGNOSIS — C22.1 CHOLANGIOCARCINOMA: ICD-10-CM

## 2024-12-11 DIAGNOSIS — R79.89 ELEVATED TROPONIN: ICD-10-CM

## 2024-12-11 DIAGNOSIS — R53.83 OTHER FATIGUE: ICD-10-CM

## 2024-12-11 DIAGNOSIS — R53.83 FATIGUE, UNSPECIFIED TYPE: ICD-10-CM

## 2024-12-11 DIAGNOSIS — M85.2: ICD-10-CM

## 2024-12-11 DIAGNOSIS — R53.83 OTHER FATIGUE: Primary | ICD-10-CM

## 2024-12-11 PROBLEM — R55 VASOVAGAL SYNCOPE: Status: ACTIVE | Noted: 2024-12-11

## 2024-12-11 LAB
ALBUMIN SERPL-MCNC: 3.7 G/DL (ref 3.5–5.2)
ALBUMIN SERPL-MCNC: 3.8 G/DL (ref 3.5–5.2)
ALBUMIN/GLOB SERPL: 1.3 G/DL
ALBUMIN/GLOB SERPL: 1.3 G/DL
ALP SERPL-CCNC: 176 U/L (ref 39–117)
ALP SERPL-CCNC: 176 U/L (ref 39–117)
ALT SERPL W P-5'-P-CCNC: 14 U/L (ref 1–33)
ALT SERPL W P-5'-P-CCNC: 14 U/L (ref 1–33)
ANION GAP SERPL CALCULATED.3IONS-SCNC: 10 MMOL/L (ref 5–15)
ANION GAP SERPL CALCULATED.3IONS-SCNC: 15 MMOL/L (ref 5–15)
AST SERPL-CCNC: 27 U/L (ref 1–32)
AST SERPL-CCNC: 28 U/L (ref 1–32)
B PARAPERT DNA SPEC QL NAA+PROBE: NOT DETECTED
B PERT DNA SPEC QL NAA+PROBE: NOT DETECTED
BASOPHILS # BLD AUTO: 0.02 10*3/MM3 (ref 0–0.2)
BASOPHILS # BLD AUTO: 0.02 10*3/MM3 (ref 0–0.2)
BASOPHILS NFR BLD AUTO: 0.4 % (ref 0–1.5)
BASOPHILS NFR BLD AUTO: 0.4 % (ref 0–1.5)
BILIRUB SERPL-MCNC: 0.5 MG/DL (ref 0–1.2)
BILIRUB SERPL-MCNC: 0.5 MG/DL (ref 0–1.2)
BUN SERPL-MCNC: 15 MG/DL (ref 8–23)
BUN SERPL-MCNC: 15 MG/DL (ref 8–23)
BUN/CREAT SERPL: 14.9 (ref 7–25)
BUN/CREAT SERPL: 15.3 (ref 7–25)
C PNEUM DNA NPH QL NAA+NON-PROBE: NOT DETECTED
CALCIUM SPEC-SCNC: 9 MG/DL (ref 8.6–10.5)
CALCIUM SPEC-SCNC: 9.2 MG/DL (ref 8.6–10.5)
CANCER AG19-9 SERPL-ACNC: 19.1 U/ML
CHLORIDE SERPL-SCNC: 100 MMOL/L (ref 98–107)
CHLORIDE SERPL-SCNC: 99 MMOL/L (ref 98–107)
CO2 SERPL-SCNC: 24 MMOL/L (ref 22–29)
CO2 SERPL-SCNC: 26 MMOL/L (ref 22–29)
CREAT SERPL-MCNC: 0.98 MG/DL (ref 0.57–1)
CREAT SERPL-MCNC: 1.01 MG/DL (ref 0.57–1)
DEPRECATED RDW RBC AUTO: 55.2 FL (ref 37–54)
DEPRECATED RDW RBC AUTO: 55.8 FL (ref 37–54)
EGFRCR SERPLBLD CKD-EPI 2021: 54.7 ML/MIN/1.73
EGFRCR SERPLBLD CKD-EPI 2021: 56.7 ML/MIN/1.73
EOSINOPHIL # BLD AUTO: 0.28 10*3/MM3 (ref 0–0.4)
EOSINOPHIL # BLD AUTO: 0.33 10*3/MM3 (ref 0–0.4)
EOSINOPHIL NFR BLD AUTO: 5.3 % (ref 0.3–6.2)
EOSINOPHIL NFR BLD AUTO: 6.1 % (ref 0.3–6.2)
ERYTHROCYTE [DISTWIDTH] IN BLOOD BY AUTOMATED COUNT: 15.4 % (ref 12.3–15.4)
ERYTHROCYTE [DISTWIDTH] IN BLOOD BY AUTOMATED COUNT: 15.5 % (ref 12.3–15.4)
FERRITIN SERPL-MCNC: 755 NG/ML (ref 13–150)
FLUAV SUBTYP SPEC NAA+PROBE: NOT DETECTED
FLUBV RNA ISLT QL NAA+PROBE: NOT DETECTED
FOLATE SERPL-MCNC: 13.9 NG/ML (ref 4.78–24.2)
GEN 5 1HR TROPONIN T REFLEX: 75 NG/L
GLOBULIN UR ELPH-MCNC: 2.9 GM/DL
GLOBULIN UR ELPH-MCNC: 2.9 GM/DL
GLUCOSE SERPL-MCNC: 112 MG/DL (ref 65–99)
GLUCOSE SERPL-MCNC: 125 MG/DL (ref 65–99)
HADV DNA SPEC NAA+PROBE: NOT DETECTED
HCOV 229E RNA SPEC QL NAA+PROBE: NOT DETECTED
HCOV HKU1 RNA SPEC QL NAA+PROBE: NOT DETECTED
HCOV NL63 RNA SPEC QL NAA+PROBE: NOT DETECTED
HCOV OC43 RNA SPEC QL NAA+PROBE: NOT DETECTED
HCT VFR BLD AUTO: 30 % (ref 34–46.6)
HCT VFR BLD AUTO: 31.7 % (ref 34–46.6)
HGB BLD-MCNC: 10 G/DL (ref 12–15.9)
HGB BLD-MCNC: 10.4 G/DL (ref 12–15.9)
HMPV RNA NPH QL NAA+NON-PROBE: NOT DETECTED
HPIV1 RNA ISLT QL NAA+PROBE: NOT DETECTED
HPIV2 RNA SPEC QL NAA+PROBE: NOT DETECTED
HPIV3 RNA NPH QL NAA+PROBE: NOT DETECTED
HPIV4 P GENE NPH QL NAA+PROBE: NOT DETECTED
IMM GRANULOCYTES # BLD AUTO: 0.03 10*3/MM3 (ref 0–0.05)
IMM GRANULOCYTES # BLD AUTO: 0.04 10*3/MM3 (ref 0–0.05)
IMM GRANULOCYTES NFR BLD AUTO: 0.6 % (ref 0–0.5)
IMM GRANULOCYTES NFR BLD AUTO: 0.8 % (ref 0–0.5)
IRON 24H UR-MRATE: 127 MCG/DL (ref 37–145)
IRON SATN MFR SERPL: 51 % (ref 20–50)
LYMPHOCYTES # BLD AUTO: 0.67 10*3/MM3 (ref 0.7–3.1)
LYMPHOCYTES # BLD AUTO: 0.89 10*3/MM3 (ref 0.7–3.1)
LYMPHOCYTES NFR BLD AUTO: 12.7 % (ref 19.6–45.3)
LYMPHOCYTES NFR BLD AUTO: 16.3 % (ref 19.6–45.3)
M PNEUMO IGG SER IA-ACNC: NOT DETECTED
MCH RBC QN AUTO: 32.1 PG (ref 26.6–33)
MCH RBC QN AUTO: 32.5 PG (ref 26.6–33)
MCHC RBC AUTO-ENTMCNC: 32.8 G/DL (ref 31.5–35.7)
MCHC RBC AUTO-ENTMCNC: 33.3 G/DL (ref 31.5–35.7)
MCV RBC AUTO: 97.4 FL (ref 79–97)
MCV RBC AUTO: 97.8 FL (ref 79–97)
MONOCYTES # BLD AUTO: 0.66 10*3/MM3 (ref 0.1–0.9)
MONOCYTES # BLD AUTO: 0.91 10*3/MM3 (ref 0.1–0.9)
MONOCYTES NFR BLD AUTO: 12.1 % (ref 5–12)
MONOCYTES NFR BLD AUTO: 17.3 % (ref 5–12)
NEUTROPHILS NFR BLD AUTO: 3.34 10*3/MM3 (ref 1.7–7)
NEUTROPHILS NFR BLD AUTO: 3.52 10*3/MM3 (ref 1.7–7)
NEUTROPHILS NFR BLD AUTO: 63.5 % (ref 42.7–76)
NEUTROPHILS NFR BLD AUTO: 64.5 % (ref 42.7–76)
NRBC BLD AUTO-RTO: 0 /100 WBC (ref 0–0.2)
NRBC BLD AUTO-RTO: 0 /100 WBC (ref 0–0.2)
NT-PROBNP SERPL-MCNC: 306.4 PG/ML (ref 0–1800)
PATHOLOGY REVIEW: YES
PLATELET # BLD AUTO: 112 10*3/MM3 (ref 140–450)
PLATELET # BLD AUTO: 93 10*3/MM3 (ref 140–450)
PMV BLD AUTO: 13.4 FL (ref 6–12)
PMV BLD AUTO: 13.7 FL (ref 6–12)
POTASSIUM SERPL-SCNC: 3.7 MMOL/L (ref 3.5–5.2)
POTASSIUM SERPL-SCNC: 4 MMOL/L (ref 3.5–5.2)
PROT SERPL-MCNC: 6.6 G/DL (ref 6–8.5)
PROT SERPL-MCNC: 6.7 G/DL (ref 6–8.5)
QT INTERVAL: 446 MS
QTC INTERVAL: 430 MS
RBC # BLD AUTO: 3.08 10*6/MM3 (ref 3.77–5.28)
RBC # BLD AUTO: 3.24 10*6/MM3 (ref 3.77–5.28)
RETICS # AUTO: 0.01 10*6/MM3 (ref 0.02–0.13)
RETICS/RBC NFR AUTO: 0.35 % (ref 0.7–1.9)
RHINOVIRUS RNA SPEC NAA+PROBE: NOT DETECTED
RSV RNA NPH QL NAA+NON-PROBE: NOT DETECTED
SARS-COV-2 RNA NPH QL NAA+NON-PROBE: NOT DETECTED
SODIUM SERPL-SCNC: 135 MMOL/L (ref 136–145)
SODIUM SERPL-SCNC: 139 MMOL/L (ref 136–145)
T4 FREE SERPL-MCNC: 1.53 NG/DL (ref 0.92–1.68)
TIBC SERPL-MCNC: 247 MCG/DL (ref 298–536)
TRANSFERRIN SERPL-MCNC: 166 MG/DL (ref 200–360)
TROPONIN T % DELTA: -10 %
TROPONIN T NUMERIC DELTA: -8 NG/L
TROPONIN T SERPL HS-MCNC: 83 NG/L
TSH SERPL DL<=0.05 MIU/L-ACNC: 7.26 UIU/ML (ref 0.27–4.2)
VIT B12 BLD-MCNC: 1345 PG/ML (ref 211–946)
WBC NRBC COR # BLD AUTO: 5.26 10*3/MM3 (ref 3.4–10.8)
WBC NRBC COR # BLD AUTO: 5.45 10*3/MM3 (ref 3.4–10.8)

## 2024-12-11 PROCEDURE — 83540 ASSAY OF IRON: CPT

## 2024-12-11 PROCEDURE — G0378 HOSPITAL OBSERVATION PER HR: HCPCS

## 2024-12-11 PROCEDURE — 99285 EMERGENCY DEPT VISIT HI MDM: CPT

## 2024-12-11 PROCEDURE — 25810000003 SODIUM CHLORIDE 0.9 % SOLUTION: Performed by: EMERGENCY MEDICINE

## 2024-12-11 PROCEDURE — 70450 CT HEAD/BRAIN W/O DYE: CPT

## 2024-12-11 PROCEDURE — 74176 CT ABD & PELVIS W/O CONTRAST: CPT

## 2024-12-11 PROCEDURE — 25010000002 ONDANSETRON PER 1 MG: Performed by: EMERGENCY MEDICINE

## 2024-12-11 PROCEDURE — 85025 COMPLETE CBC W/AUTO DIFF WBC: CPT | Performed by: EMERGENCY MEDICINE

## 2024-12-11 PROCEDURE — 93005 ELECTROCARDIOGRAM TRACING: CPT | Performed by: EMERGENCY MEDICINE

## 2024-12-11 PROCEDURE — 36415 COLL VENOUS BLD VENIPUNCTURE: CPT

## 2024-12-11 PROCEDURE — 84466 ASSAY OF TRANSFERRIN: CPT

## 2024-12-11 PROCEDURE — 1126F AMNT PAIN NOTED NONE PRSNT: CPT | Performed by: INTERNAL MEDICINE

## 2024-12-11 PROCEDURE — 71045 X-RAY EXAM CHEST 1 VIEW: CPT

## 2024-12-11 PROCEDURE — 84484 ASSAY OF TROPONIN QUANT: CPT | Performed by: EMERGENCY MEDICINE

## 2024-12-11 PROCEDURE — 71250 CT THORAX DX C-: CPT

## 2024-12-11 PROCEDURE — 86301 IMMUNOASSAY TUMOR CA 19-9: CPT

## 2024-12-11 PROCEDURE — 83880 ASSAY OF NATRIURETIC PEPTIDE: CPT | Performed by: EMERGENCY MEDICINE

## 2024-12-11 PROCEDURE — 0202U NFCT DS 22 TRGT SARS-COV-2: CPT | Performed by: EMERGENCY MEDICINE

## 2024-12-11 PROCEDURE — 85025 COMPLETE CBC W/AUTO DIFF WBC: CPT

## 2024-12-11 PROCEDURE — 96374 THER/PROPH/DIAG INJ IV PUSH: CPT

## 2024-12-11 PROCEDURE — 96375 TX/PRO/DX INJ NEW DRUG ADDON: CPT

## 2024-12-11 PROCEDURE — 99214 OFFICE O/P EST MOD 30 MIN: CPT | Performed by: INTERNAL MEDICINE

## 2024-12-11 PROCEDURE — 25010000002 PROCHLORPERAZINE 10 MG/2ML SOLUTION: Performed by: INTERNAL MEDICINE

## 2024-12-11 PROCEDURE — 85045 AUTOMATED RETICULOCYTE COUNT: CPT

## 2024-12-11 PROCEDURE — 82746 ASSAY OF FOLIC ACID SERUM: CPT

## 2024-12-11 PROCEDURE — 99215 OFFICE O/P EST HI 40 MIN: CPT | Performed by: INTERNAL MEDICINE

## 2024-12-11 PROCEDURE — 99222 1ST HOSP IP/OBS MODERATE 55: CPT | Performed by: INTERNAL MEDICINE

## 2024-12-11 PROCEDURE — 84443 ASSAY THYROID STIM HORMONE: CPT

## 2024-12-11 PROCEDURE — 80053 COMPREHEN METABOLIC PANEL: CPT

## 2024-12-11 PROCEDURE — 82728 ASSAY OF FERRITIN: CPT

## 2024-12-11 PROCEDURE — 82607 VITAMIN B-12: CPT

## 2024-12-11 PROCEDURE — 84439 ASSAY OF FREE THYROXINE: CPT

## 2024-12-11 PROCEDURE — 80053 COMPREHEN METABOLIC PANEL: CPT | Performed by: EMERGENCY MEDICINE

## 2024-12-11 RX ORDER — BISACODYL 10 MG
10 SUPPOSITORY, RECTAL RECTAL DAILY PRN
Status: DISCONTINUED | OUTPATIENT
Start: 2024-12-11 | End: 2024-12-13 | Stop reason: HOSPADM

## 2024-12-11 RX ORDER — ONDANSETRON 4 MG/1
4 TABLET, ORALLY DISINTEGRATING ORAL EVERY 6 HOURS PRN
Status: DISCONTINUED | OUTPATIENT
Start: 2024-12-11 | End: 2024-12-13 | Stop reason: HOSPADM

## 2024-12-11 RX ORDER — ONDANSETRON 2 MG/ML
4 INJECTION INTRAMUSCULAR; INTRAVENOUS ONCE
Status: COMPLETED | OUTPATIENT
Start: 2024-12-11 | End: 2024-12-11

## 2024-12-11 RX ORDER — PANTOPRAZOLE SODIUM 40 MG/1
40 TABLET, DELAYED RELEASE ORAL
Status: DISCONTINUED | OUTPATIENT
Start: 2024-12-12 | End: 2024-12-12

## 2024-12-11 RX ORDER — ACETAMINOPHEN 325 MG/1
650 TABLET ORAL EVERY 4 HOURS PRN
Status: DISCONTINUED | OUTPATIENT
Start: 2024-12-11 | End: 2024-12-13 | Stop reason: HOSPADM

## 2024-12-11 RX ORDER — SODIUM CHLORIDE 0.9 % (FLUSH) 0.9 %
10 SYRINGE (ML) INJECTION EVERY 12 HOURS SCHEDULED
Status: DISCONTINUED | OUTPATIENT
Start: 2024-12-11 | End: 2024-12-13 | Stop reason: HOSPADM

## 2024-12-11 RX ORDER — ONDANSETRON 2 MG/ML
4 INJECTION INTRAMUSCULAR; INTRAVENOUS EVERY 6 HOURS PRN
Status: DISCONTINUED | OUTPATIENT
Start: 2024-12-11 | End: 2024-12-13 | Stop reason: HOSPADM

## 2024-12-11 RX ORDER — ESOMEPRAZOLE MAGNESIUM 40 MG/1
40 CAPSULE, DELAYED RELEASE ORAL
Qty: 30 CAPSULE | Refills: 2 | Status: SHIPPED | OUTPATIENT
Start: 2024-12-11 | End: 2024-12-13 | Stop reason: HOSPADM

## 2024-12-11 RX ORDER — ALBUTEROL SULFATE 90 UG/1
1 INHALANT RESPIRATORY (INHALATION) EVERY 4 HOURS PRN
Status: DISCONTINUED | OUTPATIENT
Start: 2024-12-11 | End: 2024-12-13 | Stop reason: HOSPADM

## 2024-12-11 RX ORDER — METOPROLOL TARTRATE 25 MG/1
25 TABLET, FILM COATED ORAL 2 TIMES DAILY
Status: DISCONTINUED | OUTPATIENT
Start: 2024-12-11 | End: 2024-12-13 | Stop reason: HOSPADM

## 2024-12-11 RX ORDER — SODIUM CHLORIDE 0.9 % (FLUSH) 0.9 %
10 SYRINGE (ML) INJECTION AS NEEDED
Status: DISCONTINUED | OUTPATIENT
Start: 2024-12-11 | End: 2024-12-13 | Stop reason: HOSPADM

## 2024-12-11 RX ORDER — SODIUM CHLORIDE 9 MG/ML
40 INJECTION, SOLUTION INTRAVENOUS AS NEEDED
Status: DISCONTINUED | OUTPATIENT
Start: 2024-12-11 | End: 2024-12-13 | Stop reason: HOSPADM

## 2024-12-11 RX ORDER — PROCHLORPERAZINE EDISYLATE 5 MG/ML
2.5 INJECTION INTRAMUSCULAR; INTRAVENOUS EVERY 6 HOURS PRN
Status: DISCONTINUED | OUTPATIENT
Start: 2024-12-11 | End: 2024-12-13 | Stop reason: HOSPADM

## 2024-12-11 RX ORDER — TRAMADOL HYDROCHLORIDE 50 MG/1
50 TABLET ORAL EVERY 6 HOURS PRN
Status: DISCONTINUED | OUTPATIENT
Start: 2024-12-11 | End: 2024-12-13 | Stop reason: HOSPADM

## 2024-12-11 RX ORDER — BISACODYL 5 MG/1
5 TABLET, DELAYED RELEASE ORAL DAILY PRN
Status: DISCONTINUED | OUTPATIENT
Start: 2024-12-11 | End: 2024-12-13 | Stop reason: HOSPADM

## 2024-12-11 RX ORDER — AMOXICILLIN 250 MG
2 CAPSULE ORAL 2 TIMES DAILY PRN
Status: DISCONTINUED | OUTPATIENT
Start: 2024-12-11 | End: 2024-12-13 | Stop reason: HOSPADM

## 2024-12-11 RX ORDER — ACETAMINOPHEN 650 MG/1
650 SUPPOSITORY RECTAL EVERY 4 HOURS PRN
Status: DISCONTINUED | OUTPATIENT
Start: 2024-12-11 | End: 2024-12-13 | Stop reason: HOSPADM

## 2024-12-11 RX ORDER — ACETAMINOPHEN 160 MG/5ML
650 SOLUTION ORAL EVERY 4 HOURS PRN
Status: DISCONTINUED | OUTPATIENT
Start: 2024-12-11 | End: 2024-12-13 | Stop reason: HOSPADM

## 2024-12-11 RX ORDER — POLYETHYLENE GLYCOL 3350 17 G/17G
17 POWDER, FOR SOLUTION ORAL DAILY PRN
Status: DISCONTINUED | OUTPATIENT
Start: 2024-12-11 | End: 2024-12-13 | Stop reason: HOSPADM

## 2024-12-11 RX ADMIN — Medication 10 ML: at 22:02

## 2024-12-11 RX ADMIN — METOPROLOL TARTRATE 25 MG: 25 TABLET, FILM COATED ORAL at 22:01

## 2024-12-11 RX ADMIN — ONDANSETRON 4 MG: 2 INJECTION INTRAMUSCULAR; INTRAVENOUS at 15:27

## 2024-12-11 RX ADMIN — SODIUM CHLORIDE 500 ML: 9 INJECTION, SOLUTION INTRAVENOUS at 15:04

## 2024-12-11 RX ADMIN — PROCHLORPERAZINE EDISYLATE 2.5 MG: 5 INJECTION INTRAMUSCULAR; INTRAVENOUS at 17:58

## 2024-12-11 RX ADMIN — SENNOSIDES AND DOCUSATE SODIUM 2 TABLET: 50; 8.6 TABLET ORAL at 18:10

## 2024-12-11 NOTE — PROGRESS NOTES
Cc: fatigue, epigastric pain  You have chosen to receive care through a telehealth visit.  Do you consent to use a video/audio connection for your medical care today? Yes  Patient located at home  Provider located at office    HPI:  Sheree Hernandez is a 84 y.o. female who presents today for worsening epigastric pain.  She was recently referred to gastroenterology but does not have an appointment for another 2 months.  She took tramadol last night which did not help with her symptoms.  Epigastric pain worsening over the past few months.    ROS:  Constitutional: no fevers, night sweats or unexplained weight loss  Eyes: no vision changes  ENT: no runny nose, ear pain, sore throat  Cardio: no chest pain, palpitations  Pulm: no shortness of breath, wheezing, or cough  GI: no abdominal pain or changes in bowel movements  : no difficulty urinating  MSK: no difficulty ambulating, no joint pain  Neuro: no weakness, dizziness or headache  Psych: no trouble sleeping  Endo: no change in appetite      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    PAF (paroxysmal atrial fibrillation) 7/15/2024    Plantar fasciitis     Chronic    PVC's (premature ventricular contractions)     Senile keratosis     Multiple    MANAN (stress urinary incontinence, female)     Syncope, near     with negative cardiovascular workup      Family History   Problem Relation Age of Onset    Heart attack Mother     Heart failure Mother     Dementia Mother      Stroke Mother     Arthritis Mother         Heart Attack    Heart disease Father     Hearing loss Father     Arrhythmia Brother     Breast cancer Paternal Aunt       Social History     Socioeconomic History    Marital status:     Number of children: 2   Tobacco Use    Smoking status: Never    Smokeless tobacco: Never    Tobacco comments:     Exposed to secondhand smoke   Vaping Use    Vaping status: Never Used   Substance and Sexual Activity    Alcohol use: Not Currently     Comment: Very seldom    Drug use: Never    Sexual activity: Not Currently     Partners: Male     Birth control/protection: Hysterectomy     Comment: Complete Hysterectomy      Allergies   Allergen Reactions    Pravastatin Myalgia      Immunization History   Administered Date(s) Administered    COVID-19 (PFIZER) Purple Cap Monovalent 02/15/2021, 03/06/2021    Fluzone High-Dose 65+YRS 11/03/2017, 09/11/2018, 10/15/2019    Fluzone High-Dose 65+yrs 10/10/2023    Influenza, Unspecified 10/10/2020, 10/07/2021    Pneumococcal Polysaccharide (PPSV23) 05/17/2013, 12/06/2016, 05/15/2018    TD Preservative Free (Tenivac) 11/09/2016    Td (TDVAX) 05/18/1997        PE:  There were no vitals filed for this visit.   There is no height or weight on file to calculate BMI.    Gen Appearance: NAD  HEENT: Normocephalic, PERRLA, no thyromegaly, trache midline  Heart: RRR, normal S1 and S2, no murmur  Lungs: CTA b/l, no wheezing, no crackles  Abdomen: Soft, non-tender, non-distended, no guarding and BSx4  MSK: Moves all extremities well, normal gait, no peripheral edema  Pulses: Palpable and equal b/l  Lymph nodes: No palpable lymphadenopathy   Neuro: No focal deficits      Current Outpatient Medications   Medication Sig Dispense Refill    albuterol sulfate  (90 Base) MCG/ACT inhaler Inhale 1 puff Every 4 (Four) Hours As Needed for Wheezing. 18 g 2    amiodarone (PACERONE) 200 MG tablet Take 1 tablet by mouth Daily. 90 tablet 0    amiodarone (PACERONE)  200 MG tablet Take 1 tablet by mouth 2 (Two) Times a Day. 28 tablet 0    apixaban (ELIQUIS) 2.5 MG tablet tablet Take 1 tablet by mouth 2 (Two) Times a Day. 60 tablet 11    Cholecalciferol (Vitamin D3) 25 MCG (1000 UT) capsule Take  by mouth Daily.      coenzyme Q10 50 MG capsule capsule Take  by mouth Daily.      Cyanocobalamin (VITAMIN B 12 PO) Take  by mouth.      esomeprazole (nexIUM) 40 MG capsule Take 1 capsule by mouth Every Morning Before Breakfast. 30 capsule 2    Folic Acid 0.8 MG capsule Take  by mouth.      ipratropium (ATROVENT) 0.06 % nasal spray Administer 1 spray into each nostril twice daily 30 mL 3    levothyroxine (SYNTHROID, LEVOTHROID) 25 MCG tablet Take 1 tablet by mouth Every Morning. 30 tablet 3    lidocaine-prilocaine (EMLA) 2.5-2.5 % cream Please apply to port site 30 min prior to infusion and cover with Saran Wrap 30 g 3    magnesium oxide (MAG-OX) 400 MG tablet Take 1 tablet by mouth Daily.      metoprolol tartrate (LOPRESSOR) 25 MG tablet Take 1 tablet by mouth 2 (Two) Times a Day. 60 tablet 11    Omega-3 1000 MG capsule Take  by mouth Daily.      ondansetron ODT (ZOFRAN-ODT) 4 MG disintegrating tablet Place 1 tablet on the tongue Every 8 (Eight) Hours As Needed for Nausea or Vomiting. 60 tablet 3    rosuvastatin (CRESTOR) 10 MG tablet Take 1 tablet by mouth Daily. 90 tablet 3    sennosides-docusate (senna-docusate sodium) 8.6-50 MG per tablet Take 1 tablet by mouth 2 (Two) Times a Day As Needed for Constipation. 60 tablet 0     No current facility-administered medications for this visit.      Suspect gastritis, ordering EGD.  Starting on Nexium.  Follow-up with gastroenterology as able.    Anemia on recent labs, she has chronic fatigue but this may be contributing.  Rechecking labs today.  Patient states she has not taken Durvalumab in several months.    Reviewed recent notes from oncology.    Counseling was given to patient for the following topics: diagnostic results, impressions,  risks and benefits of treatment options, and importance of treatment compliance . Total time of the encounter was 40 minutes and 20 minutes was spent face to face counseling.      Diagnoses and all orders for this visit:    1. Other fatigue (Primary)  -     esomeprazole (nexIUM) 40 MG capsule; Take 1 capsule by mouth Every Morning Before Breakfast.  Dispense: 30 capsule; Refill: 2  -     CBC & Differential; Future  -     Comprehensive Metabolic Panel; Future  -     Ferritin; Future  -     Iron Profile; Future  -     Vitamin B12; Future  -     Folate; Future  -     Reticulocytes; Future  -     Peripheral Blood Smear; Future    2. Abdominal pain, unspecified abdominal location  -     Ambulatory referral for Screening EGD  -     esomeprazole (nexIUM) 40 MG capsule; Take 1 capsule by mouth Every Morning Before Breakfast.  Dispense: 30 capsule; Refill: 2  -     CBC & Differential; Future  -     Comprehensive Metabolic Panel; Future  -     Ferritin; Future  -     Iron Profile; Future  -     Vitamin B12; Future  -     Folate; Future  -     Reticulocytes; Future  -     Peripheral Blood Smear; Future  -     H. Pylori Breath Test - Breath, Lung; Future    3. Epigastric pain  -     Ambulatory referral for Screening EGD  -     esomeprazole (nexIUM) 40 MG capsule; Take 1 capsule by mouth Every Morning Before Breakfast.  Dispense: 30 capsule; Refill: 2  -     CBC & Differential; Future  -     Comprehensive Metabolic Panel; Future  -     Ferritin; Future  -     Iron Profile; Future  -     Vitamin B12; Future  -     Folate; Future  -     Reticulocytes; Future  -     Peripheral Blood Smear; Future  -     H. Pylori Breath Test - Breath, Lung; Future    4. Anemia, unspecified type  -     CBC & Differential; Future  -     Comprehensive Metabolic Panel; Future  -     Ferritin; Future  -     Iron Profile; Future  -     Vitamin B12; Future  -     Folate; Future  -     Reticulocytes; Future  -     Peripheral Blood Smear; Future         No  follow-ups on file.     Dictated Utilizing Dragon Dictation    Please note that portions of this note were completed with a voice recognition program.    Part of this note may be an electronic transcription/translation of spoken language to printed text using the Dragon Dictation System.

## 2024-12-11 NOTE — CONSULTS
[facility] at [facility]  CARDIOLOGY CONSULTATION NOTE    Sheree Hernandez  : 1940  MRN:[id number]  Home Phone:[phone]    Date of Admission:2024  Date of Consultation: 24    PCP: Timi Conway DO    IDENTIFICATION: A 84 y.o. female resident of [place], [place]     Chief Complaint   Patient presents with    Syncope     PROBLEM LIST:   Hypertension  Abnormal stress test-data deficit  Left heart catheterization : RCA 20% stenosis, otherwise normal coronaries, EF 60%  Echocardiogram 2020: EF 65%  Exercise stress test 2023: LVEF greater than 70%, normal myocardial perfusion study with no evidence of ischemia, CT portion of the exam with no significant coronary artery calcification.  Incidentally detected dilated loops of bowel noted, surgical clips noted in the liver.  Echocardiogram 2023: LVEF 61-65%, mild calcification of the aortic valve, hypertrophy of the intra-atrial septum noted, IVSD 0.8 cm  Echocardiogram 2024: LVEF 56 to 60%, GLS -19.5%, moderate left pleural effusion, trace MR  Residual class I symptoms  Hyperlipidemia; intolerant to pravastatin  Intrahepatic cholangiocarcinoma   Status post right hepatectomy cholecystectomy right partial adrenalectomy   Status post chemotherapy and radiation, patient completed Xeloda  CT chest/abdomen/pelvis 2023 showing mild increase in retroperitoneal lymph nodes, and new small 6mm LLL nodule and minimal enlargement of nodule in RML. Additional numerous tiny pulmonary nodules stable. CT concerning for metastatic disease  Completed radiation 2023  Completed radiation 2024  CT chest/abdomen/pelvis 2024: Continued slight enlargement of multiple pulmonary nodules as well as liver lesion, CA 19-9 stable  Status post cycle 1 cisplatin/gemcitabine/durvalumab on 2024, complicated by severe neutropenia and thrombocytopenia, ANC 70, platelets 8, dose reduced 8% for cycle  2.  Hemochromatosis carrier  Obstructive sleep apnea, not on CPAP  Thyroid nodule  PVCs with Holter monitor June 2021 showing occasional PACs, no atrial arrhythmias, 5 episodes of SVT, Holter monitor June 2024 showing new onset atrial fibrillation with RVR, longest episode 10 hours, atrial fibrillation burden 9.2%, ventricular bigeminy and nonsustained runs of VT present with the longest 8 beats.  PVC burden less than 1%, patient started on Eliquis  COVID November 2023.    ALLERGIES:   Allergies   Allergen Reactions    Pravastatin Myalgia       HOME MEDICINES:   Current Outpatient Medications   Medication Instructions    albuterol sulfate  (90 Base) MCG/ACT inhaler 1 puff, Inhalation, Every 4 Hours PRN    amiodarone (PACERONE) 200 mg, Oral, Daily    amiodarone (PACERONE) 200 mg, Oral, 2 Times Daily    Cholecalciferol (Vitamin D3) 25 MCG (1000 UT) capsule Daily    coenzyme Q10 50 MG capsule capsule Daily    Cyanocobalamin (VITAMIN B 12 PO) Take  by mouth.    Eliquis 2.5 mg, Oral, 2 Times Daily    esomeprazole (NEXIUM) 40 mg, Oral, Every Morning Before Breakfast    Folic Acid 0.8 MG capsule Take  by mouth.    ipratropium (ATROVENT) 0.06 % nasal spray Administer 1 spray into each nostril twice daily    levothyroxine (SYNTHROID, LEVOTHROID) 25 mcg, Oral, Every Early Morning    lidocaine-prilocaine (EMLA) 2.5-2.5 % cream Please apply to port site 30 min prior to infusion and cover with Saran Wrap    magnesium oxide (MAG-OX) 400 mg, Daily    metoprolol tartrate (LOPRESSOR) 25 mg, Oral, 2 Times Daily    Omega-3 1000 MG capsule Daily    ondansetron ODT (ZOFRAN-ODT) 4 mg, Translingual, Every 8 Hours PRN    rosuvastatin (CRESTOR) 10 mg, Oral, Daily    sennosides-docusate (senna-docusate sodium) 8.6-50 MG per tablet 1 tablet, Oral, 2 Times Daily PRN       HPI: Mrs. Hernandez is an 85 y/o female with HTN, HLD, cholangiocarcinoma, BONNIE, PAF, PVCs who is seen in consultation for elevated HS troponin. She presents to the ER with  near syncope vs syncope following blood draw. HS troponins are noted to be mildly elevated at 83--75. EKG with sinus abelino and no acute ischemic changes. She denies any chest pain. She has a history of PAF found on Holter monitor 06/2024. She is on Metoprolol, but reports she has not been taking Eliquis as she was afraid this would cause some internal bleeding with her recent abdominal complaints.  No longer taking amiodarone as well.  Had recent echo 11/2024 with normal EF, mild MR, aortic sclerosis. She reports ongoing lower abdominal and epigastric pain for the past 3 months, has not been able to eat or drink much due to her complaints, and has lost about 20 lbs. She had a televisit with Dr. Conway today, was given Nexium but has not started it yet.  Was due to establish with GI in early 2025.  Patient with no complaints of chest pain but does endorse ongoing chronic fatigue.      ROS: All systems have been reviewed and are negative with the exception of those mentioned in the HPI and problem list above.    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    HYSTERECTOMY  1984    age 46 - ovarian cyst, endometriosis,  jorge/bso    LIVER SURGERY Right 09/22/2021    Removed    ROTATOR CUFF REPAIR Left 1989    Collar Bone Repair    VENOUS ACCESS DEVICE (PORT) INSERTION Right 05/10/2024       Social History:   Social History     Socioeconomic History    Marital status:     Number of children: 2   Tobacco Use    Smoking  "status: Never    Smokeless tobacco: Never    Tobacco comments:     Exposed to secondhand smoke   Vaping Use    Vaping status: Never Used   Substance and Sexual Activity    Alcohol use: Not Currently     Comment: Very seldom    Drug use: Never    Sexual activity: Not Currently     Partners: Male     Birth control/protection: Hysterectomy     Comment: Complete Hysterectomy       Family History:   Family History   Problem Relation Age of Onset    Heart attack Mother     Heart failure Mother     Dementia Mother     Stroke Mother     Arthritis Mother         Heart Attack    Heart disease Father     Hearing loss Father     Arrhythmia Brother     Breast cancer Paternal Aunt        Objective     /67   Pulse 57   Temp 97.9 °F (36.6 °C) (Oral)   Resp 16   Ht 162.6 cm (64\")   Wt 63.5 kg (140 lb)   SpO2 99%   BMI 24.03 kg/m²   No intake or output data in the 24 hours ending 12/11/24 1514    PHYSICAL EXAM:  CONSTITUTIONAL: Well nourished, cooperative, in no acute distress  HEENT: Normocephalic, atraumatic, PERRLA, no JVD  CARDIOVASCULAR:  Regular rhythm and normal rate, no murmur, gallop, rub.  No significant tenderness to palpation  RESPIRATORY: Clear to auscultation, normal respiratory effort, no wheezing, rales or rhonchi  EXTREMITIES: No gross deformities, no edema.  NEUROLOGICAL: No focal deficits    Labs/Diagnostic Data  Results from last 7 days   Lab Units 12/11/24  1259 12/11/24  1011   SODIUM mmol/L 135* 139   POTASSIUM mmol/L 3.7 4.0   CHLORIDE mmol/L 99 100   CO2 mmol/L 26.0 24.0   BUN mg/dL 15 15   CREATININE mg/dL 0.98 1.01*   GLUCOSE mg/dL 112* 125*   CALCIUM mg/dL 9.0 9.2     Results from last 7 days   Lab Units 12/11/24  1400 12/11/24  1259   HSTROP T ng/L 75* 83*     Results from last 7 days   Lab Units 12/11/24  1259 12/11/24  1011   WBC 10*3/mm3 5.26 5.45   HEMOGLOBIN g/dL 10.0* 10.4*   HEMATOCRIT % 30.0* 31.7*   PLATELETS 10*3/mm3 93* 112*             Results from last 7 days   Lab Units " 12/11/24  1011   TSH uIU/mL 7.260*   FREE T4 ng/dL 1.53         Results from last 7 days   Lab Units 12/11/24  1259   PROBNP pg/mL 306.4       I personally reviewed the patient's EKG/Telemetry data    Radiology Data:   CT Chest Without Contrast Diagnostic    Result Date: 12/11/2024  Impression: 1.Mild interval enlargement of the patient's multiple small pulmonary nodules faintly suggested compared to 11/1/2024 exam, but more apparent compared to 8/26/2024 exam. 2.Focal greater interval enlargement of left lower lobe pulmonary spiculated nodule image 56 series 3 of today's exam. Mild interval enlargement of patient's preaortic lymph node. 3.Small right pleural effusion, new from 11/1/2024 exam. No other new chest disease is seen. Electronically Signed: Salvador Gill MD  12/11/2024 12:37 PM EST  Workstation ID: [id number]    CT Head Without Contrast    Result Date: 12/11/2024  Impression: No acute intracranial process identified. Electronically Signed: Con Norton MD  12/11/2024 12:24 PM EST  Workstation ID: [id number]    XR Chest 1 View    Result Date: 12/11/2024  Impression: 1.Suspected skinfold shadow superimposed over the left apex. A small pneumothorax is considered unlikely, but not entirely excluded. If patient has ongoing left chest symptoms, consider repeat radiograph. 2.No other evidence of active chest disease. Stable mild chronic appearing interstitial changes compared to prior exam. Electronically Signed: Salvador Gill MD  12/11/2024 12:19 PM EST  Workstation ID: [id number]       Current Medications:  ondansetron, 4 mg, Intravenous, Once  sodium chloride, 500 mL, Intravenous, Once         Assessment and Plan:     1. Elevated HS troponin  - HS troponins 83--75, EKG sinus abelino with no acute ischemic changes  - normal cath in 2019 and negative stress test 01/2023  -No chest pain but has had chronic fatigue/dyspnea  - recent echo 11/2024 with normal EF, mild MR, no significant pathology   -Will obtain stress PET in  AM.  Anticipating need for possible sedation EGD this admission    2. Near syncope vs brief syncope  -Suspect vasovagal event secondary to blood draw.  - patient denies true syncope    3. PAF  - found on Holter monitor 06/2024, currently on metoprolol  - not taking Eliquis due to concerns of bleeding we will hold any anticoagulation at this time.     4. Epigastric and lower abdominal pain   - no obvious etiology on CT scans  - Agree with inpatient GI evaluation andEGD.    5. Cholangiocarcinoma   - followed by Dr. Montenegro      Scribed for Carlos Kay MD by Marleni Garcia PA-C. 12/11/2024  16:06 EST      Patient seen and examined in a face-to-face encounter.  EKG reviewed with no acute ischemic changes.  She had a syncopal episode today after blood draw..  appears to be vagal in nature.  Troponins are elevated at 83 and 75.  I do not strongly suspect new ACS.  However recently elevated troponin and in anticipation of sedation for EGD discussed with patient repeating stress PET in a.m. to evaluate failed new coronary sinus.  Previous cardiac evaluations including cardiac catheterization in 2020 and stress PET in January 2023 negative.      Discussed with patient,  Dr. Mazariegos,  Dr. Amanuel FAYE, Carlos Kay M.D.,  have reviewed the notes, assessments, and/or procedures performed by LEDY/PA, I CONCUR with the documentation of Sheree Hernandez.

## 2024-12-11 NOTE — CASE MANAGEMENT/SOCIAL WORK
Discharge Planning Assessment  [facility]     Patient Name: Sheree Hernandez  MRN: [id number]  Today's Date: 12/11/2024    Admit Date: 12/11/2024    Plan: Home   Discharge Needs Assessment       Row Name 12/11/24 1613       Living Environment    People in Home alone    Current Living Arrangements home    Primary Care Provided by self    Provides Primary Care For no one    Family Caregiver if Needed friend(s)    Family Caregiver Names Tiana Olvera, daughter; Bobby Huynh, friend    Quality of Family Relationships helpful;involved;supportive    Able to Return to Prior Arrangements yes       Transition Planning    Patient/Family Anticipates Transition to home    Patient/Family Anticipated Services at Transition        Discharge Needs Assessment    Equipment Currently Used at Home none    Concerns to be Addressed denies needs/concerns at this time    Discharge Coordination/Progress Lives in a house in [facility] alone, can call on friend Bobby if needed.  No DME or history of home health.  Has an advanced directive.                   Discharge Plan       Row Name 12/11/24 1615       Plan    Plan Home    Patient/Family in Agreement with Plan yes    Plan Comments I spoke with Ms Hernandez and her friend Bobby at bedside.  Ms Hernandez resides in a house in [place] [facility] [place] [place] by herself, but can call on her daughter Tiana or her friend Bobby if needed.  She does not use any DME and has not had home health in the past.  She does have an advanced directive.  Her insurance is [employer], and she denies any issues or lapses in coverage.  Her insurance does help with prescription costs.  Her PCP is Timi Conway, and she gets her prescriptions filled at [facility].  Occassionally she will go to [facility] on [address].  At this time there are no discharge needs.    Final Discharge Disposition Code 01 - home or self-care                  Continued Care and Services - Admitted Since 12/11/2024    No active  coordination exists for this encounter.       Selected Continued Care - Episodes Includes continued care and service providers with selected services from the active episodes listed below      High Risk Care Management Episode start date: 9/30/2024   There are no active outsourced providers for this episode.                    Demographic Summary    No documentation.                  Functional Status       Row Name 12/11/24 1613       Functional Status    Usual Activity Tolerance good    Current Activity Tolerance good       Functional Status, IADL    Medications independent    Meal Preparation assistive person    Housekeeping independent    Laundry independent    Shopping independent       Mental Status    General Appearance WDL WDL                   Psychosocial    No documentation.                  Abuse/Neglect    No documentation.                  Legal    No documentation.                  Substance Abuse    No documentation.                  Patient Forms    No documentation.                     Humaira Candelario RN

## 2024-12-11 NOTE — PLAN OF CARE
Problem: Adult Inpatient Plan of Care  Goal: Plan of Care Review  Outcome: Progressing  Goal: Patient-Specific Goal (Individualized)  Outcome: Progressing  Goal: Absence of Hospital-Acquired Illness or Injury  Outcome: Progressing  Goal: Optimal Comfort and Wellbeing  Outcome: Progressing  Goal: Readiness for Transition of Care  Outcome: Progressing  Intervention: Mutually Develop Transition Plan  Recent Flowsheet Documentation  Taken 12/11/2024 1710 by Sneha Mercado, RN  Equipment Currently Used at Home: none  Taken 12/11/2024 1706 by Sneha Mercado, RN  Transportation Anticipated: family or friend will provide  Patient/Family Anticipated Services at Transition: none  Patient/Family Anticipates Transition to: home  Taken 12/11/2024 1705 by Sneha Mercado, RN  Equipment Currently Used at Home: none     Problem: Pain Acute  Goal: Optimal Pain Control and Function  Outcome: Progressing     Problem: Nausea and Vomiting  Goal: Nausea and Vomiting Relief  Outcome: Progressing   Goal Outcome Evaluation:  Patient arrived to floor around 1700. Stand by assist. Aox4. NPO at midnight. Cards on. GI consulted. Compazine given for nausea.

## 2024-12-11 NOTE — H&P
"    [facility] Medicine Services  HISTORY AND PHYSICAL    Patient Name: Sheree Hernandez  : 1940  MRN: [id number]  Primary Care Physician: Timi Conway DO  Date of admission: 2024      Subjective   Subjective     Chief Complaint: syncope w lab draw    HPI:  Sheree Hernandez is a 84 y.o. female w intrahepatic cholangiocarcinoma s/p chemo/radiation ([place]), paroxysmal Afib ([place]), 3 months of nausea and decreased intake who presents after syncope with lab draw today.    Patient reports at end of phlebotomy feeling very weak. Male in room with her describes classic syncope lasting ~20 seconds with quick return to baseline. No seizure-like movements, no urinary incontinence, no mouth injury. BP reportedly 99/46 shortly after syncope when typically her BP runs high especially in medical settings.    No chest pain then or now.    Patient reports nausea x 3 months w decreased appetite and 20 lb weight loss. She reports sour taste in her mouth, diffuse abdominal pain that comes and goes. Reports h/o constipation that is now better with laxative use and having daily bowel movements now. Feels like a \"tight band\" on her stomach circumferentially and comes and goes. Better with tramadol. Denies melena or hematochezia. Dark bowel movement months ago but 2/2 peptobysmol use.  Seen by her PCP this morning who gave her new script for esomeprazole use with suspected gastritis.    Personal History     Past Medical History:   Diagnosis Date    Arthritis 2017    Asthma     Atypical chest pain 2017    Cataract     Chronic constipation     Diverticulosis 2018    Fracture, pelvis closed 2015    x2    HL (hearing loss) 2018    Hearing aids    Hyperlipidemia 2017    Hypertension 2017    Intrahepatic cholangiocarcinoma 2021    Low bone mass     with elevated FRAX core    Lung nodule 2023    Mild obstructive sleep apnea 2020    Near syncope     negative " cardiovascular workup     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    PAF (paroxysmal atrial fibrillation) 7/15/2024    Plantar fasciitis     Chronic    PVC's (premature ventricular contractions)     Senile keratosis     Multiple    MANAN (stress urinary incontinence, female)     Syncope, near     with negative cardiovascular workup         Oncology Problem List:  Intrahepatic cholangiocarcinoma (08/17/2021; Status: Active)    Oncology/Hematology History   Intrahepatic cholangiocarcinoma   8/17/2021 Initial Diagnosis    Intrahepatic cholangiocarcinoma (CMS/HCC)     8/17/2021 Cancer Staged    Staging form: Intrahepatic Bile Duct, AJCC 8th Edition  - Clinical: Stage IB (cT1b, cN0, cM0) - Signed by Jorje Montenegro MD on 8/17/2021 11/5/2021 Cancer Staged    Staging form: Intrahepatic Bile Duct, AJCC 8th Edition  - Pathologic: Stage IIIA (pT3, pN0, cM0) - Signed by Jorje [place]ro MD on 11/5/2021 12/9/2021 - 1/6/2022 Chemotherapy    OP GALLBLADDER Capecitabine + XRT     12/9/2021 - 1/19/2022 Radiation    Radiation OncologyTreatment Course:  Sheree Hernandez received 5040 cGy in 28 fractions to liver via External Beam Radiation - EBRT.     4/25/2023 - 5/8/2023 Radiation    Radiation OncologyTreatment Course:  Sheree Hernandez received 3500 cGy in 5 fractions to liver mass and lymph nodes via Stereotactic Radiation Therapy - SRT.     8/1/2023 - 8/11/2023 Radiation    Radiation OncologyTreatment Course:  Sheree Hernandez received 3500 cGy in 5 fractions to abdomen via Stereotactic Radiation Therapy - SRT.     1/9/2024 - 1/23/2024 Radiation    Radiation OncologyTreatment Course:  Sheree Hernandez received 3000 cGy in 5 fractions to abdomen via Stereotactic Radiation Therapy - SRT.     1/11/2024 - 1/11/2024 Radiation    Radiation OncologyTreatment Course:  Sheree Hernandez received 754 cGy in 1 fractions to left lung via Stereotactic Radiation Therapy - SRT.     2/1/2024 - 2/1/2024 Radiation    Radiation  OncologyTreatment Course:  Sheree Hernandez received 3000 cGy in 1 fractions to left lung via Stereotactic Radiation Therapy - SRT.     4/19/2024 - 8/16/2024 Chemotherapy    OP HEPATOBILIARY CISplatin + Gemcitabine Days 1,8 / Durvalumab Q21D     5/17/2024 -  Chemotherapy    OP CENTRAL VENOUS ACCESS DEVICE Access, Care, and Maintenance (CVAD)     9/6/2024 -  Chemotherapy    OP CHOLANGIOCARCINOMA Durvalumab 1500 mg       Past Surgical History:   Procedure Laterality Date    ADRENAL GLAND SURGERY  09/22/2021    ADRENALECTOMY  09/22/2021    CARDIAC CATHETERIZATION N/A 01/18/2019    Procedure: Left Heart Cath;  Surgeon: Tucker Gonzalez MD;  Location: [facility] CATH INVASIVE LOC[facility];  Service: Cardiovascular    CARDIAC CATHETERIZATION  1/18/2019    CATARACT EXTRACTION  04/2019    CHOLECYSTECTOMY  09/22/2021    COLONOSCOPY  07/15/2020    CYBERKNIFE  05/08/2023    Recurrent liver mass/involved LNs    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    HYSTERECTOMY  1984    age 46 - ovarian cyst, endometriosis,  jorge/bso    LIVER SURGERY Right 09/22/2021    Removed    ROTATOR CUFF REPAIR Left 1989    Collar Bone Repair    VENOUS ACCESS DEVICE (PORT) INSERTION Right 05/10/2024       Family History: family history includes Arrhythmia in her brother; Arthritis in her mother; Breast cancer in her paternal aunt; Dementia in her mother; Hearing loss in her father; Heart attack in her mother; Heart disease in her father; Heart failure in her mother; Stroke in her mother.     Social History:  reports that she has never smoked. She has never used smokeless tobacco. She reports that she does not currently use alcohol. She reports that she does not use drugs.  Social History     Social History Narrative    5/18:     since 2013    2 kids:    Best Hernandez - [place]    Tiananandini Olvera - [place], [place] - medical POA    2 gk    Hobbies: volunteers [employer], involved in [employer]    Exercise: yes 2-5d/wk    Dental: utd    Eye: utd        Medications:  Available home medication information reviewed.  Folic Acid, Omega-3, Vitamin B 12, Vitamin D3, albuterol sulfate HFA, apixaban, coenzyme Q10, esomeprazole, ipratropium, levothyroxine, lidocaine-prilocaine, magnesium oxide, metoprolol tartrate, ondansetron ODT, and sennosides-docusate    Allergies   Allergen Reactions    Pravastatin Myalgia       Objective   Objective     Vital Signs:   Temp:  [97.9 °F (36.6 °C)] 97.9 °F (36.6 °C)  Heart Rate:  [55-57] 57  Resp:  [16] 16  BP: (130-142)/(58-76) 142/67       Physical Exam   Constitutional: Awake, alert female sitting up in bed  HENT: NCAT, mucous membranes moist  Neck: Supple, no thyromegaly, no lymphadenopathy, trachea midline  Respiratory: Clear to auscultation bilaterally, nonlabored respirations   Cardiovascular: RRR, no murmurs, rubs, or gallops, palpable pedal pulses bilaterally  Gastrointestinal: Positive bowel sounds, soft, nontender, nondistended, surgical scar RUQ  Musculoskeletal: No bilateral ankle edema, no clubbing or cyanosis to extremities  Psychiatric: Appropriate affect, cooperative  Neurologic: Oriented x 3, strength symmetric in all extremities, Cranial Nerves grossly intact to confrontation, speech clear  Skin: No rashes    Result Review:  I have personally reviewed the results from the time of this admission to 12/11/2024 17:04 EST and agree with these findings:  [x]  Laboratory list / accordion  []  Microbiology  []  Radiology  [x]  EKG/Telemetry : EKG personally reviewed and interpreted: sinus abelino without blocks  []  Cardiology/Vascular   []  Pathology  []  Old records  []  Other:  Most notable findings include:       LAB RESULTS:      Lab 12/11/24  1259 12/11/24  1011   WBC 5.26 5.45   HEMOGLOBIN 10.0* 10.4*   HEMATOCRIT 30.0* 31.7*   PLATELETS 93* 112*   NEUTROS ABS 3.34 3.52   IMMATURE GRANS (ABS) 0.04 0.03   LYMPHS ABS 0.67* 0.89   MONOS ABS 0.91* 0.66   EOS ABS 0.28 0.33   MCV 97.4* 97.8*         Lab 12/11/24  1259  12/11/24  1011   SODIUM 135* 139   POTASSIUM 3.7 4.0   CHLORIDE 99 100   CO2 26.0 24.0   ANION GAP 10.0 15.0   BUN 15 15   CREATININE 0.98 1.01*   EGFR 56.7* 54.7*   GLUCOSE 112* 125*   CALCIUM 9.0 9.2   TSH  --  7.260*         Lab 12/11/24  1259 12/11/24  1011   TOTAL PROTEIN 6.6 6.7   ALBUMIN 3.7 3.8   GLOBULIN 2.9 2.9   ALT (SGPT) 14 14   AST (SGOT) 27 28   BILIRUBIN 0.5 0.5   ALK PHOS 176* 176*         Lab 12/11/24  1400 12/11/24  1259   PROBNP  --  306.4   HSTROP T 75* 83*                 UA          9/28/2024    01:41   Urinalysis   Squamous Epithelial Cells, UA 3-6    Specific Gravity, UA 1.018    Ketones, UA Negative    Blood, UA Negative    Leukocytes, UA Small (1+)    Nitrite, UA Negative    RBC, UA 0-2    WBC, UA 11-20    Bacteria, UA None Seen        Microbiology Results (last 10 days)       Procedure Component Value - Date/Time    COVID PRE-OP / PRE-PROCEDURE SCREENING ORDER (NO ISOLATION) - Swab, Nasopharynx [[id number]]  (Normal) Collected: 12/11/24 1259    Lab Status: Final result Specimen: Swab from Nasopharynx Updated: 12/11/24 1409    Narrative:      The following orders were created for panel order COVID PRE-OP / PRE-PROCEDURE SCREENING ORDER (NO ISOLATION) - Swab, Nasopharynx.  Procedure                               Abnormality         Status                     ---------                               -----------         ------                     Respiratory Panel PCR w/...[[id number]]  Normal              Final result                 Please view results for these tests on the individual orders.    Respiratory Panel PCR w/COVID-19(SARS-CoV-2) CAITLIN/BRENDA/DEMETRA/PAD/COR/HANNA In-House, NP Swab in UTM/VTM, 2 HR TAT - Swab, Nasopharynx [[id number]]  (Normal) Collected: 12/11/24 1259    Lab Status: Final result Specimen: Swab from Nasopharynx Updated: 12/11/24 1409     ADENOVIRUS, PCR Not Detected     Coronavirus 229E Not Detected     Coronavirus HKU1 Not Detected     Coronavirus NL63 Not Detected      Coronavirus OC43 Not Detected     COVID19 Not Detected     Human Metapneumovirus Not Detected     Human Rhinovirus/Enterovirus Not Detected     Influenza A PCR Not Detected     Influenza B PCR Not Detected     Parainfluenza Virus 1 Not Detected     Parainfluenza Virus 2 Not Detected     Parainfluenza Virus 3 Not Detected     Parainfluenza Virus 4 Not Detected     RSV, PCR Not Detected     Bordetella pertussis pcr Not Detected     Bordetella parapertussis PCR Not Detected     Chlamydophila pneumoniae PCR Not Detected     Mycoplasma pneumo by PCR Not Detected    Narrative:      In the setting of a positive respiratory panel with a viral infection PLUS a negative procalcitonin without other underlying concern for bacterial infection, consider observing off antibiotics or discontinuation of antibiotics and continue supportive care. If the respiratory panel is positive for atypical bacterial infection (Bordetella pertussis, Chlamydophila pneumoniae, or Mycoplasma pneumoniae), consider antibiotic de-escalation to target atypical bacterial infection.            CT Abdomen Pelvis Without Contrast    Result Date: 12/11/2024  CT ABDOMEN PELVIS WO CONTRAST Date of Exam: 12/11/2024 3:21 PM EST Indication: nausea/fatigue/weight loss. Comparison: 11/1/2024 Technique: Axial CT images were obtained of the abdomen and pelvis without the administration of contrast. Reconstructed coronal and sagittal images were also obtained. Automated exposure control and iterative construction methods were used. Findings: Liver: Surgical changes of right hepatectomy. Several liver cysts noted. Further evaluation of the liver is limited without IV contrast. No biliary dilation is seen. Gallbladder: Absent. Pancreas: Unremarkable. Spleen: Unremarkable. Adrenal glands: Left adrenal gland is unremarkable. Right adrenal gland is not definitely visualized. Genitourinary tract: Kidneys are unremarkable. No hydronephrosis is seen. No urinary tract calculi  are seen. The visualized portions of the ureters and urinary bladder appear unremarkable. Status post hysterectomy. Gastrointestinal tract: Limited evaluation of the hollow viscera due to lack of IV contrast. Colonic diverticulosis is present. No findings to suggest bowel obstruction. Appendix: The appendix is not identified. Other findings: No free air or free fluid. Limited evaluation for lymphadenopathy on this noncontrast study. Vascular calcifications are present. Bones and soft tissues: No acute osseous lesion is identified. Bones are demineralized. There are degenerative changes within the spine. Superficial soft tissues demonstrate no acute abnormality. Lung bases: Trace right pleural effusion multiple lung base nodules noted, grossly similar since 11/1/2024..     Impression: Impression: 1.Examination is limited due to lack of IV contrast administration. 2.Given this limitation, no acute abnormality is identified within the abdomen or pelvis. 3.Trace right pleural effusion, new since 11/1/2024. Otherwise, no significant change since 11/1/2024, accounting for differences in technique. 4.Please see above for additional details. Electronically Signed: Herman Armstrong MD  12/11/2024 3:43 PM EST  Workstation ID: [id number]    CT Chest Without Contrast Diagnostic    Result Date: 12/11/2024  CT CHEST WO CONTRAST DIAGNOSTIC Date of Exam: 12/11/2024 12:11 PM EST Indication: fatigue/syncope/weight loss. Comparison: CT scan of the chest 11/1/2024 Technique: Axial CT images were obtained of the chest without contrast administration.  Reconstructed coronal and sagittal images were also obtained. Automated exposure control and iterative construction methods were used. Findings: Prior CT scan report describes stable scattered pulmonary nodules. No progressive or new chest disease. Image-by-image comparison of the right and left lungs with the 11/1/2024 exam shows no visible interval change in the patient's multiple small  rounded pulmonary nodules. The irregular shaped nodule in the left lower lobe on image 56, however, appears further enlarged and mildly spiculated on today's exam, previously 0.6 x 0.4 cm, today 1.0 x 0.6 cm. No other clearly enlarging nodules identified. On the earlier 8/26/2024 exam, the nodule appears to be only approximately 0.3 x 0.4 cm. Comparison with the older exam does appear to show very mild generalized progression in size of the pulmonary nodules over this longer interval, for instance the slightly spiculated rounded right middle lobe nodule on today's image 64 measures approximately 0.7 cm, previously approximately 0.5 cm. Right lower lobe nodule image 67 today measures 0.7 cm, previously 0.45 cm. There is recurrence of a small right effusion. No left effusion is seen. No airspace opacity is identified. The airways appear to be normally patent. Images the mediastinum show mildly enlarged preaortic lymph node image 33, previously 1.0 x 1.3 cm today  1.1 x 1.6 cm, with a suggestion of a necrotic center. No significant adenopathy is appreciated elsewhere. Moderate coronary artery calcifications again noted. Included images of the upper abdomen show previous right lobe liver resection and multiple left lobe hepatic cysts. Scarring at the operative site appears stable. No new or increasing upper abdominal node or mass or inflammatory changes seen. Bony structures appear to be intact.     Impression: Impression: 1.Mild interval enlargement of the patient's multiple small pulmonary nodules faintly suggested compared to 11/1/2024 exam, but more apparent compared to 8/26/2024 exam. 2.Focal greater interval enlargement of left lower lobe pulmonary spiculated nodule image 56 series 3 of today's exam. Mild interval enlargement of patient's preaortic lymph node. 3.Small right pleural effusion, new from 11/1/2024 exam. No other new chest disease is seen. Electronically Signed: Salvador Gill MD  12/11/2024 12:37 PM EST   Workstation ID: [id number]    CT Head Without Contrast    Result Date: 12/11/2024  CT HEAD WO CONTRAST Date of Exam: 12/11/2024 12:11 PM EST Indication: syncope. Comparison: MRI brain from August 8, 2021 and CT head from November 9, 2015 Technique: Axial CT images were obtained of the head without contrast administration.  Automated exposure control and iterative construction methods were used. Findings: No acute intracranial hemorrhage or extra-axial collection is identified. The ventricles appear normal in caliber, with no evidence of mass effect or midline shift. The basal cisterns appear patent. The gray-white differentiation appears preserved. The calvarium appear intact. The paranasal sinuses are clear. The mastoid air cells are well-aerated.     Impression: Impression: No acute intracranial process identified. Electronically Signed: Con Norton MD  12/11/2024 12:24 PM EST  Workstation ID: [id number]    XR Chest 1 View    Result Date: 12/11/2024  XR CHEST 1 VW Date of Exam: 12/11/2024 11:53 AM EST Indication: syncope Comparison: Chest radiograph 5/24/2024 Findings: Right-sided Port-A-Cath is again seen, tip in the distal SVC. The heart mediastinum and pulmonary vasculature appear within normal limits. There is again chronic elevation left hemidiaphragm, and a right hemidiaphragm eventration. Mild chronic appearing interstitial lung changes are stable. There is a suggestion of a skinfold shadow superimposed over the left apex, seen between the posterior left third and fourth interspace. This resembles a small pneumothorax, but is more typical in appearance for skinfold shadow. Skinfold shadow is also seen superimposed over the lateral right lower chest. Bony structures appear to be intact. Previous left distal clavicle surgery is again noted.     Impression: Impression: 1.Suspected skinfold shadow superimposed over the left apex. A small pneumothorax is considered unlikely, but not entirely excluded. If  patient has ongoing left chest symptoms, consider repeat radiograph. 2.No other evidence of active chest disease. Stable mild chronic appearing interstitial changes compared to prior exam. Electronically Signed: Salvador Gill MD  12/11/2024 12:19 PM EST  Workstation ID: [id number]     Results for orders placed during the hospital encounter of 11/08/24    Adult Transthoracic Echo Complete W/ Cont if Necessary Per Protocol    Interpretation Summary    Left ventricular ejection fraction appears to be 51 - 55%.    Left ventricular diastolic function was indeterminate.    Mild mitral valve regurgitation is present.    The aortic valve exhibits sclerosis.    Trace tricuspid valve regurgitation is present. Estimated right ventricular systolic pressure from tricuspid regurgitation is normal (<35 mmHg).    There is no evidence of pericardial effusion.      Assessment & Plan   Assessment & Plan       Vasovagal syncope    Nausea    Weight loss    Sheree Hernandez is a 84 y.o. female w intrahepatic cholangiocarcinoma s/p chemo/radiation ([place]), paroxysmal Afib ([place]), 3 months of nausea and decreased intake who presents after syncope with lab draw today.      Vasovagal syncope in setting of lab draw  -s/p IVF in ED, d/w patient and  suspicion given timing    Elev troponin  -2/2 above, no active chest pain, very low concern for cardiac active issue  -negative stress 1/2023, repeat stress testing per cardiology    Nausea, poor intake, weight loss x 3 months  Intrahepatic cholangiocarcinoma s/p chemo/radiation  -agree w PCP evaluation and empiric ppi, likely dyspepsia/gastritis related. Also agree with constipation management which, per report, is recently improved  -had OP referral for GI in late January, ED d/w Dr Monten[place]ro who requested IP evaluation. GI consult, family hopeful for inpatient EGD    Paroxysmal Afib - metoprolol, patient reports to me she was not taking home eliquis. Hold for now, return to prior plan at  DC    Feel patient will be appropriate for dc after evaluations by consult services, likely 12/12 PM    VTE Prophylaxis:  Pharmacologic & mechanical VTE prophylaxis orders are present.          CODE STATUS:    Code Status and Medical Interventions: CPR (Attempt to Resuscitate); Full Support   Ordered at: 12/11/24 0597     Level Of Support Discussed With:    Patient     Code Status (Patient has no pulse and is not breathing):    CPR (Attempt to Resuscitate)     Medical Interventions (Patient has pulse or is breathing):    Full Support       Expected Discharge   Expected Discharge Date: 12/12/2024; Expected Discharge Time:      Stephanie Vital MD  12/11/24

## 2024-12-11 NOTE — ED NOTES
Sheree Hernandez    Nursing Report ED to Floor:  Mental status: A&O x 4  Ambulatory status: standby  Oxygen Therapy:  room air   Cardiac Rhythm: normal sinus  Admitted from: ED  Safety Concerns:  none   Social Issues: none   ED Room #:  20    ED Nurse Phone Extension - [phone] or may call [phone].      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    PAF (paroxysmal atrial fibrillation) 7/15/2024    Plantar fasciitis     Chronic    PVC's (premature ventricular contractions)     Senile keratosis     Multiple    MANAN (stress urinary incontinence, female)     Syncope, near     with negative cardiovascular workup        Past Surgical History:  Past Surgical History:   Procedure Laterality Date    ADRENAL GLAND SURGERY  09/22/2021    ADRENALECTOMY  09/22/2021    CARDIAC CATHETERIZATION N/A 01/18/2019    Procedure: Left Heart Cath;  Surgeon: Tucker Gonzalez MD;  Location: [facility] CATH INVASIVE LOCATION;  Service: Cardiovascular    CARDIAC CATHETERIZATION  1/18/2019    CATARACT EXTRACTION  04/2019    CHOLECYSTECTOMY  09/22/2021    COLONOSCOPY  07/15/2020    CYBERKNIFE  05/08/2023    Recurrent liver mass/involved LNs    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 Diagnoses of Fatigue, unspecified type, Weight loss, and Elevated troponin were also pertinent to this visit.    Most Recent Vitals:   Vitals:    12/11/24 1115 12/11/24 1230 12/11/24 1300   BP: 142/58 130/76 142/67   BP Location: Right arm     Patient Position: Sitting     Pulse: 56 55 57   Resp: 16     Temp: 97.9 °F (36.6 °C)     TempSrc: Oral     SpO2: 99% 96% 99%   Weight: 63.5 kg (140 lb)     Height: 162.6 cm (64\")         Active LDAs/IV Access:   Lines, Drains & Airways       Active LDAs       Name Placement date Placement time Site Days    Peripheral IV 12/11/24 1308 Left Antecubital 12/11/24  1308  Antecubital  less than 1    Single Lumen Implantable Port 05/10/24 Right Internal jugular 05/10/24  1323  Internal jugular  215                    Labs (abnormal labs have a star):   Labs Reviewed   COMPREHENSIVE METABOLIC PANEL - Abnormal; Notable for the following components:       Result Value    Glucose 112 (*)     Sodium 135 (*)     Alkaline Phosphatase 176 (*)     eGFR 56.7 (*)     All other components within normal limits    Narrative:     GFR Categories in Chronic Kidney Disease (CKD)      GFR Category          GFR (mL/min/1.73)    Interpretation  G1                     90 or greater         Normal or high (1)  G2                      60-89                Mild decrease (1)  G3a                   45-59                Mild to moderate decrease  G3b                   30-44                Moderate to severe decrease  G4                    15-29                Severe decrease  G5                    14 or less           Kidney failure          (1)In the absence of evidence " of kidney disease, neither GFR category G1 or G2 fulfill the criteria for CKD.    eGFR calculation 2021 CKD-EPI creatinine equation, which does not include race as a factor   TROPONIN - Abnormal; Notable for the following components:    HS Troponin T 83 (*)     All other components within normal limits    Narrative:     High Sensitive Troponin T Reference Range:  <14.0 ng/L- Negative Female for AMI  <22.0 ng/L- Negative Male for AMI  >=14 - Abnormal Female indicating possible myocardial injury.  >=22 - Abnormal Male indicating possible myocardial injury.   Clinicians would have to utilize clinical acumen, EKG, Troponin, and serial changes to determine if it is an Acute Myocardial Infarction or myocardial injury due to an underlying chronic condition.        CBC WITH AUTO DIFFERENTIAL - Abnormal; Notable for the following components:    RBC 3.08 (*)     Hemoglobin 10.0 (*)     Hematocrit 30.0 (*)     MCV 97.4 (*)     RDW-SD 55.2 (*)     MPV 13.4 (*)     Platelets 93 (*)     Lymphocyte % 12.7 (*)     Monocyte % 17.3 (*)     Immature Grans % 0.8 (*)     Lymphocytes, Absolute 0.67 (*)     Monocytes, Absolute 0.91 (*)     All other components within normal limits   HIGH SENSITIVITIY TROPONIN T 1HR - Abnormal; Notable for the following components:    HS Troponin T 75 (*)     All other components within normal limits    Narrative:     High Sensitive Troponin T Reference Range:  <14.0 ng/L- Negative Female for AMI  <22.0 ng/L- Negative Male for AMI  >=14 - Abnormal Female indicating possible myocardial injury.  >=22 - Abnormal Male indicating possible myocardial injury.   Clinicians would have to utilize clinical acumen, EKG, Troponin, and serial changes to determine if it is an Acute Myocardial Infarction or myocardial injury due to an underlying chronic condition.        RESPIRATORY PANEL PCR W/ COVID-19 (SARS-COV-2), NP SWAB IN UTM/VTP, 2 HR TAT - Normal    Narrative:     In the setting of a positive respiratory panel  with a viral infection PLUS a negative procalcitonin without other underlying concern for bacterial infection, consider observing off antibiotics or discontinuation of antibiotics and continue supportive care. If the respiratory panel is positive for atypical bacterial infection (Bordetella pertussis, Chlamydophila pneumoniae, or Mycoplasma pneumoniae), consider antibiotic de-escalation to target atypical bacterial infection.   BNP (IN-HOUSE) - Normal    Narrative:     This assay is used as an aid in the diagnosis of individuals suspected of having heart failure. It can be used as an aid in the diagnosis of acute decompensated heart failure (ADHF) in patients presenting with signs and symptoms of ADHF to the emergency department (ED). In addition, NT-proBNP of <300 pg/mL indicates ADHF is not likely.    Age Range Result Interpretation  NT-proBNP Concentration (pg/mL:      <50             Positive            >450                   Gray                 300-450                    Negative             <300    50-75           Positive            >900                  Gray                300-900                  Negative            <300      >75             Positive            >1800                  Gray                300-1800                  Negative            <300   COVID PRE-OP / PRE-PROCEDURE SCREENING ORDER (NO ISOLATION)    Narrative:     The following orders were created for panel order COVID PRE-OP / PRE-PROCEDURE SCREENING ORDER (NO ISOLATION) - Swab, Nasopharynx.  Procedure                               Abnormality         Status                     ---------                               -----------         ------                     Respiratory Panel PCR w/...[[id number]]  Normal              Final result                 Please view results for these tests on the individual orders.   CBC AND DIFFERENTIAL    Narrative:     The following orders were created for panel order CBC & Differential.  Procedure                                Abnormality         Status                     ---------                               -----------         ------                     CBC Auto Differential[[id number]]        Abnormal            Final result               Scan Slide[[id number]]                                                                    Please view results for these tests on the individual orders.       Meds Given in ED:   Medications   sodium chloride 0.9 % bolus 500 mL (500 mL Intravenous New Bag 12/11/24 1504)   ondansetron (ZOFRAN) injection 4 mg (4 mg Intravenous Given 12/11/24 1527)     No current facility-administered medications for this encounter.       Last NIH score:                                                          Dysphagia screening results:        Aisha Coma Scale:  No data recorded     CIWA:        Restraint Type:            Isolation Status:  No active isolations

## 2024-12-11 NOTE — ED PROVIDER NOTES
Subjective   History of Present Illness  84-year-old female who presents after a syncopal episode.  The patient states that she has had continued fatigue ever since completing her chemotherapy over 3 months ago.  She reports that anytime she eats she will develop abdominal pain.  She also reports that she developed some right-sided abdominal pain and upper abdominal pain whenever she drinks.  Her oral intake therefore has been decreased over the last 3 months.  She reports a 20 pound weight loss during this time.  She is followed by Dr. Montenegro of oncology, and currently not receiving treatment for her previous identified liver cancer.  She also had a partial resection performed due to the previous diagnosis of liver cancer.  She was seen by her primary care physician Dr. Pringle today and while last being drawn she passed out.  This combination with the recent fatigue led to the current ER presentation.  She is awake and alert with normal mentation currently.  No complaints chest pain cough or shortness of breath.  No other upper respiratory symptoms.  No sick contacts.  No new medications.  No bright red blood per rectum or melena.  No dysuria.  No other acute complaints.      Review of Systems   Constitutional:  Positive for activity change, appetite change and fatigue. Negative for chills and fever.   HENT:  Negative for congestion, ear pain, postnasal drip, sinus pressure and sore throat.    Eyes:  Negative for pain, redness and visual disturbance.   Respiratory:  Negative for cough, chest tightness and shortness of breath.    Cardiovascular:  Negative for chest pain, palpitations and leg swelling.   Gastrointestinal:  Positive for abdominal pain. Negative for anal bleeding, blood in stool, diarrhea, nausea and vomiting.   Endocrine: Negative for polydipsia and polyuria.   Genitourinary:  Negative for difficulty urinating, dysuria, frequency and urgency.   Musculoskeletal:  Negative for arthralgias, back pain  and neck pain.   Skin:  Negative for pallor and rash.   Allergic/Immunologic: Negative for environmental allergies and immunocompromised state.   Neurological:  Negative for dizziness, weakness and headaches.   Hematological:  Negative for adenopathy.   Psychiatric/Behavioral:  Negative for confusion, self-injury and suicidal ideas. The patient is not nervous/anxious.    All other systems reviewed and are negative.      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    PAF (paroxysmal atrial fibrillation) 7/15/2024    Plantar fasciitis     Chronic    PVC's (premature ventricular contractions)     Senile keratosis     Multiple    MANAN (stress urinary incontinence, female)     Syncope, near     with negative cardiovascular workup       Allergies   Allergen Reactions    Pravastatin Myalgia       Past Surgical History:   Procedure Laterality Date    ADRENAL GLAND SURGERY  09/22/2021    ADRENALECTOMY  09/22/2021    CARDIAC CATHETERIZATION N/A 01/18/2019    Procedure: Left Heart Cath;  Surgeon: Tucker Gonzalez MD;  Location: [facility] CATH INVASIVE LOCATION;  Service: Cardiovascular    CARDIAC CATHETERIZATION  1/18/2019    CATARACT EXTRACTION  04/2019    CHOLECYSTECTOMY  09/22/2021    COLONOSCOPY  07/15/2020    CYBERKNIFE  05/08/2023    Recurrent liver mass/involved LNs    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    HYSTERECTOMY  1984    age 46 - ovarian cyst, endometriosis,  jorge/bso    LIVER SURGERY Right 09/22/2021     Removed    ROTATOR CUFF REPAIR Left 1989    Collar Bone Repair    VENOUS ACCESS DEVICE (PORT) INSERTION Right 05/10/2024       Family History   Problem Relation Age of Onset    Heart attack Mother     Heart failure Mother     Dementia Mother     Stroke Mother     Arthritis Mother         Heart Attack    Heart disease Father     Hearing loss Father     Arrhythmia Brother     Breast cancer Paternal Aunt        Social History     Socioeconomic History    Marital status:     Number of children: 2   Tobacco Use    Smoking status: Never    Smokeless tobacco: Never    Tobacco comments:     Exposed to secondhand smoke   Vaping Use    Vaping status: Never Used   Substance and Sexual Activity    Alcohol use: Not Currently     Comment: Very seldom    Drug use: Never    Sexual activity: Not Currently     Partners: Male     Birth control/protection: Hysterectomy     Comment: Complete Hysterectomy           Objective   Physical Exam  Vitals and nursing note reviewed.   Constitutional:       General: She is not in acute distress.     Appearance: Normal appearance. She is well-developed. She is not toxic-appearing or diaphoretic.   HENT:      Head: Normocephalic and atraumatic.      Right Ear: External ear normal.      Left Ear: External ear normal.      Nose: Nose normal.   Eyes:      General: Lids are normal.      Pupils: Pupils are equal, round, and reactive to light.   Neck:      Trachea: No tracheal deviation.   Cardiovascular:      Rate and Rhythm: Normal rate and regular rhythm.      Pulses: No decreased pulses.      Heart sounds: Normal heart sounds. No murmur heard.     No friction rub. No gallop.   Pulmonary:      Effort: Pulmonary effort is normal. No respiratory distress.      Breath sounds: Normal breath sounds. No decreased breath sounds, wheezing, rhonchi or rales.   Abdominal:      General: Bowel sounds are normal.      Palpations: Abdomen is soft.      Tenderness: There is abdominal tenderness in the right  upper quadrant, right lower quadrant and epigastric area. There is no guarding or rebound.   Musculoskeletal:         General: No deformity. Normal range of motion.      Cervical back: Normal range of motion and neck supple.   Lymphadenopathy:      Cervical: No cervical adenopathy.   Skin:     General: Skin is warm and dry.      Findings: No rash.   Neurological:      Mental Status: She is alert and oriented to person, place, and time.      Cranial Nerves: No cranial nerve deficit.      Sensory: No sensory deficit.   Psychiatric:         Speech: Speech normal.         Behavior: Behavior normal.         Thought Content: Thought content normal.         Judgment: Judgment normal.         Procedures           ED Course  ED Course as of 12/12/24 0849   Wed Dec 11, 2024   1602 The patient has a history of liver cancer and previously received resection and treatment.  Is not currently receiving treatment for the last 3 months.  However over the last 3 months the patient has continued to have nausea vomiting with any attempted oral intake and has lost greater than 20 pounds during that time.  She reports feeling very fatigued.  She is followed by Dr. Montenegro of oncology.  She was being seen by her primary care physician Dr. Conway the day and had a syncopal episode while labs were being drawn.  But she states that she felt lightheaded and fatigued prior to the labs being drawn.  CT scan of the chest does show some enlarged lymph nodes and a lung nodule that is larger than previous.  The patient's troponin is also elevated more than previous baseline, but was trending down on repeat evaluation.  Cardiology has been consulted.  I discussed the patient with Dr. Montenegro of oncology who is requesting admission the patient for fluids and GI evaluation given the continued nausea vomiting poor oral intake and failure to thrive. [NS]      ED Course User Index  [NS] Doris Mazariegos MD                                                        Medical Decision Making  Differential diagnosis includes failure to thrive, dehydration, renal insufficiency, bowel obstruction acute infectious process, other unspecified etiology.    Viral respiratory panel is negative for acute infection.    Labs show normal white count, normal BNP, normal kidney function, no significant electrolyte abnormalities.    The patient's troponin is elevated but was trending down on repeat evaluation.    Chest x-ray reports suspected skinfold shadow over the left apex.  Small pneumothorax is considered unlikely.  CT scan the chest shows enlargement the patient's multiple small pulmonary nodules focal interval enlargement of left lower lobe pulmonary spiculated nodule small right pleural effusion new compared to November 1, 2024.  CT scan of the head without contrast shows no acute abnormalities.  CT scan of the abdomen pelvis shows no acute abnormalities.    I discussed the patient with Dr. Montenegro of oncology who recommends admission.    Due to elevated troponin Dr. Kay was counseled on the patient and recommends admission for further inpatient evaluation.    I discussed the patient with the hospitalist, who will consult to determine status of admission.    Problems Addressed:  Elevated troponin: complicated acute illness or injury with systemic symptoms  Fatigue, unspecified type: complicated acute illness or injury with systemic symptoms  Nausea and vomiting in adult: complicated acute illness or injury with systemic symptoms  Weight loss: complicated acute illness or injury with systemic symptoms    Amount and/or Complexity of Data Reviewed  Independent Historian: friend     Details: Family member provides additional history.  External Data Reviewed: labs, radiology and ECG.  Labs: ordered. Decision-making details documented in ED Course.  Radiology: ordered and independent interpretation performed. Decision-making details documented in ED Course.  ECG/medicine  tests: ordered and independent interpretation performed. Decision-making details documented in ED Course.     Details: EKG independently interpreted unsell shows sinus bradycardia, normal QTc, normal QRS, no acute ischemic changes.    Risk  Prescription drug management.  Decision regarding hospitalization.        Final diagnoses:   Nausea and vomiting in adult   Fatigue, unspecified type   Weight loss   Elevated troponin       ED Disposition  ED Disposition       ED Disposition   Decision to Admit    Condition   --    Comment   Level of Care: Telemetry [5]   Diagnosis: Nausea & vomiting [[id number]]   Admitting Physician: NUBIA PEREIRA [[id number]]   Attending Physician: NUBIA PEREIRA [[id number]]                 No follow-up provider specified.       Medication List      No changes were made to your prescriptions during this visit.            Doris Mazariegos MD  12/12/24 [id number]

## 2024-12-12 ENCOUNTER — APPOINTMENT (OUTPATIENT)
Dept: CARDIOLOGY | Facility: HOSPITAL | Age: 84
End: 2024-12-12
Payer: MEDICARE

## 2024-12-12 ENCOUNTER — ANESTHESIA EVENT (OUTPATIENT)
Dept: GASTROENTEROLOGY | Facility: HOSPITAL | Age: 84
End: 2024-12-12
Payer: MEDICARE

## 2024-12-12 ENCOUNTER — ANESTHESIA (OUTPATIENT)
Dept: GASTROENTEROLOGY | Facility: HOSPITAL | Age: 84
End: 2024-12-12
Payer: MEDICARE

## 2024-12-12 LAB
BH CV REST NUCLEAR ISOTOPE DOSE: 30 MCI
BH CV STRESS BP STAGE 2: NORMAL
BH CV STRESS BP STAGE 4: NORMAL
BH CV STRESS COMMENTS STAGE 1: NORMAL
BH CV STRESS DOSE REGADENOSON STAGE 1: 0.4
BH CV STRESS DURATION MIN STAGE 1: 1
BH CV STRESS DURATION MIN STAGE 2: 1
BH CV STRESS DURATION MIN STAGE 3: 1
BH CV STRESS DURATION MIN STAGE 4: 1
BH CV STRESS DURATION SEC STAGE 1: 0
BH CV STRESS DURATION SEC STAGE 2: 0
BH CV STRESS DURATION SEC STAGE 3: 0
BH CV STRESS DURATION SEC STAGE 4: 0
BH CV STRESS HR STAGE 1: 85
BH CV STRESS HR STAGE 2: 87
BH CV STRESS HR STAGE 3: 86
BH CV STRESS HR STAGE 4: 86
BH CV STRESS NUCLEAR ISOTOPE DOSE: 30 MCI
BH CV STRESS O2 STAGE 1: 96
BH CV STRESS O2 STAGE 2: 99
BH CV STRESS O2 STAGE 3: 99
BH CV STRESS O2 STAGE 4: 98
BH CV STRESS PROTOCOL 1: NORMAL
BH CV STRESS RECOVERY BP: NORMAL MMHG
BH CV STRESS RECOVERY HR: 84 BPM
BH CV STRESS RECOVERY O2: 97 %
BH CV STRESS STAGE 1: 1
BH CV STRESS STAGE 2: 2
BH CV STRESS STAGE 3: 3
BH CV STRESS STAGE 4: 4
DEPRECATED RDW RBC AUTO: 55.3 FL (ref 37–54)
ERYTHROCYTE [DISTWIDTH] IN BLOOD BY AUTOMATED COUNT: 15.4 % (ref 12.3–15.4)
HCT VFR BLD AUTO: 28.8 % (ref 34–46.6)
HGB BLD-MCNC: 9.4 G/DL (ref 12–15.9)
LAB AP CASE REPORT: NORMAL
LV EF NUC BP: 79 %
MAXIMAL PREDICTED HEART RATE: 135 BPM
MCH RBC QN AUTO: 31.9 PG (ref 26.6–33)
MCHC RBC AUTO-ENTMCNC: 32.6 G/DL (ref 31.5–35.7)
MCV RBC AUTO: 97.6 FL (ref 79–97)
PATH REPORT.FINAL DX SPEC: NORMAL
PERCENT MAX PREDICTED HR: 71.11 %
PLATELET # BLD AUTO: 83 10*3/MM3 (ref 140–450)
PMV BLD AUTO: 13.7 FL (ref 6–12)
RBC # BLD AUTO: 2.95 10*6/MM3 (ref 3.77–5.28)
STRESS BASELINE BP: NORMAL MMHG
STRESS BASELINE HR: 75 BPM
STRESS O2 SAT REST: 96 %
STRESS PERCENT HR: 84 %
STRESS POST ESTIMATED WORKLOAD: 1 METS
STRESS POST EXERCISE DUR MIN: 4 MIN
STRESS POST EXERCISE DUR SEC: 0 SEC
STRESS POST O2 SAT PEAK: 96 %
STRESS POST PEAK BP: NORMAL MMHG
STRESS POST PEAK HR: 96 BPM
STRESS TARGET HR: 115 BPM
WBC NRBC COR # BLD AUTO: 3.85 10*3/MM3 (ref 3.4–10.8)

## 2024-12-12 PROCEDURE — 63710000001 PANTOPRAZOLE 40 MG TABLET DELAYED-RELEASE: Performed by: INTERNAL MEDICINE

## 2024-12-12 PROCEDURE — A9555 RB82 RUBIDIUM: HCPCS | Performed by: INTERNAL MEDICINE

## 2024-12-12 PROCEDURE — G0378 HOSPITAL OBSERVATION PER HR: HCPCS

## 2024-12-12 PROCEDURE — 99204 OFFICE O/P NEW MOD 45 MIN: CPT | Performed by: PHYSICIAN ASSISTANT

## 2024-12-12 PROCEDURE — 85027 COMPLETE CBC AUTOMATED: CPT | Performed by: INTERNAL MEDICINE

## 2024-12-12 PROCEDURE — 34310000005 RUBIDIUM CHLORIDE: Performed by: INTERNAL MEDICINE

## 2024-12-12 PROCEDURE — 63710000001 METOPROLOL TARTRATE 25 MG TABLET: Performed by: INTERNAL MEDICINE

## 2024-12-12 PROCEDURE — 25010000002 REGADENOSON 0.4 MG/5ML SOLUTION: Performed by: INTERNAL MEDICINE

## 2024-12-12 PROCEDURE — A9270 NON-COVERED ITEM OR SERVICE: HCPCS | Performed by: INTERNAL MEDICINE

## 2024-12-12 PROCEDURE — 63710000001 SUCRALFATE 1 G TABLET: Performed by: INTERNAL MEDICINE

## 2024-12-12 PROCEDURE — 93017 CV STRESS TEST TRACING ONLY: CPT

## 2024-12-12 PROCEDURE — 93018 CV STRESS TEST I&R ONLY: CPT | Performed by: INTERNAL MEDICINE

## 2024-12-12 PROCEDURE — 78431 MYOCRD IMG PET RST&STRS CT: CPT | Performed by: INTERNAL MEDICINE

## 2024-12-12 PROCEDURE — 99232 SBSQ HOSP IP/OBS MODERATE 35: CPT | Performed by: INTERNAL MEDICINE

## 2024-12-12 PROCEDURE — 99232 SBSQ HOSP IP/OBS MODERATE 35: CPT

## 2024-12-12 PROCEDURE — 78431 MYOCRD IMG PET RST&STRS CT: CPT

## 2024-12-12 RX ORDER — LIDOCAINE HYDROCHLORIDE 10 MG/ML
0.5 INJECTION, SOLUTION EPIDURAL; INFILTRATION; INTRACAUDAL; PERINEURAL ONCE AS NEEDED
Status: DISCONTINUED | OUTPATIENT
Start: 2024-12-12 | End: 2024-12-12 | Stop reason: HOSPADM

## 2024-12-12 RX ORDER — FAMOTIDINE 10 MG/ML
20 INJECTION, SOLUTION INTRAVENOUS ONCE
Status: COMPLETED | OUTPATIENT
Start: 2024-12-12 | End: 2024-12-12

## 2024-12-12 RX ORDER — SUCRALFATE 1 G/1
1 TABLET ORAL
Status: DISCONTINUED | OUTPATIENT
Start: 2024-12-12 | End: 2024-12-13

## 2024-12-12 RX ORDER — SODIUM CHLORIDE, SODIUM LACTATE, POTASSIUM CHLORIDE, CALCIUM CHLORIDE 600; 310; 30; 20 MG/100ML; MG/100ML; MG/100ML; MG/100ML
9 INJECTION, SOLUTION INTRAVENOUS CONTINUOUS
Status: DISCONTINUED | OUTPATIENT
Start: 2024-12-13 | End: 2024-12-13 | Stop reason: HOSPADM

## 2024-12-12 RX ORDER — PANTOPRAZOLE SODIUM 40 MG/1
40 TABLET, DELAYED RELEASE ORAL
Status: DISCONTINUED | OUTPATIENT
Start: 2024-12-12 | End: 2024-12-13 | Stop reason: HOSPADM

## 2024-12-12 RX ORDER — SODIUM CHLORIDE 0.9 % (FLUSH) 0.9 %
10 SYRINGE (ML) INJECTION AS NEEDED
Status: DISCONTINUED | OUTPATIENT
Start: 2024-12-12 | End: 2024-12-12 | Stop reason: HOSPADM

## 2024-12-12 RX ORDER — REGADENOSON 0.08 MG/ML
0.4 INJECTION, SOLUTION INTRAVENOUS ONCE
Status: COMPLETED | OUTPATIENT
Start: 2024-12-12 | End: 2024-12-12

## 2024-12-12 RX ORDER — FAMOTIDINE 20 MG/1
20 TABLET, FILM COATED ORAL ONCE
Status: DISCONTINUED | OUTPATIENT
Start: 2024-12-12 | End: 2024-12-12 | Stop reason: HOSPADM

## 2024-12-12 RX ORDER — SODIUM CHLORIDE 0.9 % (FLUSH) 0.9 %
10 SYRINGE (ML) INJECTION EVERY 12 HOURS SCHEDULED
Status: DISCONTINUED | OUTPATIENT
Start: 2024-12-12 | End: 2024-12-12 | Stop reason: HOSPADM

## 2024-12-12 RX ORDER — MIDAZOLAM HYDROCHLORIDE 1 MG/ML
0.5 INJECTION, SOLUTION INTRAMUSCULAR; INTRAVENOUS
Status: DISCONTINUED | OUTPATIENT
Start: 2024-12-12 | End: 2024-12-12 | Stop reason: HOSPADM

## 2024-12-12 RX ADMIN — FAMOTIDINE 20 MG: 10 INJECTION, SOLUTION INTRAVENOUS at 16:29

## 2024-12-12 RX ADMIN — Medication 10 ML: at 21:54

## 2024-12-12 RX ADMIN — SUCRALFATE 1 G: 1 TABLET ORAL at 21:54

## 2024-12-12 RX ADMIN — METOPROLOL TARTRATE 25 MG: 25 TABLET, FILM COATED ORAL at 09:49

## 2024-12-12 RX ADMIN — METOPROLOL TARTRATE 25 MG: 25 TABLET, FILM COATED ORAL at 21:53

## 2024-12-12 RX ADMIN — REGADENOSON 0.4 MG: 0.08 INJECTION, SOLUTION INTRAVENOUS at 09:25

## 2024-12-12 RX ADMIN — PANTOPRAZOLE SODIUM 40 MG: 40 TABLET, DELAYED RELEASE ORAL at 18:07

## 2024-12-12 RX ADMIN — SUCRALFATE 1 G: 1 TABLET ORAL at 18:07

## 2024-12-12 RX ADMIN — RUBIDIUM CHLORIDE RB-82 1 DOSE: 150 INJECTION, SOLUTION INTRAVENOUS at 09:16

## 2024-12-12 RX ADMIN — Medication 10 ML: at 08:53

## 2024-12-12 RX ADMIN — RUBIDIUM CHLORIDE RB-82 1 DOSE: 150 INJECTION, SOLUTION INTRAVENOUS at 09:26

## 2024-12-12 NOTE — ANESTHESIA PREPROCEDURE EVALUATION
Anesthesia Evaluation     Patient summary reviewed and Nursing notes reviewed                Airway   Mallampati: II  TM distance: >3 FB  Neck ROM: full  No difficulty expected  Dental - normal exam     Pulmonary - normal exam   (+) asthma,shortness of breath, sleep apnea  Cardiovascular - normal exam    (+) hypertension, dysrhythmias Atrial Fib, hyperlipidemia      Neuro/Psych  (+) syncope  GI/Hepatic/Renal/Endo    (+) liver disease, thyroid problem thyroid nodules    Musculoskeletal     Abdominal  - normal exam    Bowel sounds: normal.   Substance History - negative use     OB/GYN negative ob/gyn ROS         Other   arthritis,   history of cancer (LIVER)                  Anesthesia Plan    ASA 3     general     (PROPOFOL)  intravenous induction     Anesthetic plan, risks, benefits, and alternatives have been provided, discussed and informed consent has been obtained with: patient.    Plan discussed with CRNA.    CODE STATUS:    Level Of Support Discussed With: Patient  Code Status (Patient has no pulse and is not breathing): CPR (Attempt to Resuscitate)  Medical Interventions (Patient has pulse or is breathing): Full Support

## 2024-12-12 NOTE — CONSULTS
[facility]    Referring Provider: Patti Ledbetter DO     PCP: Timi Conway DO    Reason for Consultation:  Abdominal pain, unintentional weight loss, nausea      Chief complaint: Syncope, extreme fatigue     History of present illness:    Sheree Hernandez is a 84 y.o. female who is admitted with syncope.   She was seen by her primary care physician yesterday and when having labs drawn she passed out.   Her chief complaint at this time is extreme fatigue.    She complains of exertional fatigue with inability to ambulate short distances without having to stop to rest.  She denies chest pain.        She has known intrahepatic cholangiocarcinoma, initially diagnosed in 2021 and underwent open right hepatectomy, cholecystectomy and right partial adrenalectomy with Dr. Sharma at [facility] at that time.    She received adjuvant chemotherapy.  She was found to have disease progression in 2023 and received radiation in May 2023 as well as February 2024.   She completed 6 cycles of chemotherapy in August 2024 that was then discontinued due to toxicity.       She complains of epigastric discomfort with nausea and poor appetite.   She has lost 20 lbs unintentionally.    She was prescribed Nexium by her PCP yesterday.        Allergies:  Pravastatin    Scheduled Meds:  metoprolol tartrate, 25 mg, Oral, BID  pantoprazole, 40 mg, Oral, Q AM  sodium chloride, 10 mL, Intravenous, Q12H         Infusions:       PRN Meds:    acetaminophen **OR** acetaminophen **OR** acetaminophen    albuterol sulfate HFA    senna-docusate sodium **AND** polyethylene glycol **AND** bisacodyl **AND** bisacodyl    ondansetron    ondansetron ODT    prochlorperazine    sodium chloride    sodium chloride    traMADol    Home Meds:  Medications Prior to Admission   Medication Sig Dispense Refill Last Dose/Taking    albuterol sulfate  (90 Base) MCG/ACT inhaler Inhale 1 puff Every 4 (Four) Hours As Needed for Wheezing. (Patient  taking differently: Inhale 1 puff Every 4 (Four) Hours As Needed for Wheezing or Shortness of Air.) 18 g 2 Past Month    apixaban (ELIQUIS) 2.5 MG tablet tablet Take 1 tablet by mouth 2 (Two) Times a Day. 60 tablet 11     Cholecalciferol (Vitamin D3) 25 MCG (1000 UT) capsule Take 1 capsule by mouth Daily. OTC       coenzyme Q10 50 MG capsule capsule Take 1 capsule by mouth Daily. OTC       Cyanocobalamin (Vitamin B 12) 500 MCG tablet Take 1 tablet by mouth Daily. OTC       esomeprazole (nexIUM) 40 MG capsule Take 1 capsule by mouth Every Morning Before Breakfast. 30 capsule 2     Folic Acid 0.8 MG capsule Take  by mouth.       ipratropium (ATROVENT) 0.06 % nasal spray Administer 1 spray into each nostril twice daily 30 mL 3     levothyroxine (SYNTHROID, LEVOTHROID) 25 MCG tablet Take 1 tablet by mouth Every Morning. 30 tablet 3     lidocaine-prilocaine (EMLA) 2.5-2.5 % cream Please apply to port site 30 min prior to infusion and cover with Saran Wrap 30 g 3     magnesium oxide (MAG-OX) 400 MG tablet Take 1 tablet by mouth Daily.       metoprolol tartrate (LOPRESSOR) 25 MG tablet Take 1 tablet by mouth 2 (Two) Times a Day. 60 tablet 11     Omega-3 1000 MG capsule Take  by mouth Daily.       ondansetron ODT (ZOFRAN-ODT) 4 MG disintegrating tablet Place 1 tablet on the tongue Every 8 (Eight) Hours As Needed for Nausea or Vomiting. 60 tablet 3     sennosides-docusate (senna-docusate sodium) 8.6-50 MG per tablet Take 1 tablet by mouth 2 (Two) Times a Day As Needed for Constipation. 60 tablet 0        ROS: Review of Systems   Constitutional:  Positive for appetite change, fatigue and unexpected weight change.   Respiratory:  Positive for shortness of breath.    Gastrointestinal:  Positive for abdominal pain and nausea.   Neurological:  Positive for weakness.       PAST MED HX:  Past Medical History:   Diagnosis Date    Arthritis 2017    Asthma     Atypical chest pain 03/09/2017    Cataract     Chronic constipation      Diverticulosis 2018    Fracture, pelvis closed 2015    x2    HL (hearing loss) 2018    Hearing aids    Hyperlipidemia 03/09/2017    Hypertension 03/09/2017    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    Low bone mass     with elevated FRAX core    Lung nodule 07/12/2023    Mild obstructive sleep apnea 01/30/2020    Near syncope     negative cardiovascular workup     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    PAF (paroxysmal atrial fibrillation) 7/15/2024    Plantar fasciitis     Chronic    PVC's (premature ventricular contractions)     Senile keratosis     Multiple    MANAN (stress urinary incontinence, female)     Syncope, near     with negative cardiovascular workup       PAST SURG HX:  Past Surgical History:   Procedure Laterality Date    ADRENAL GLAND SURGERY  09/22/2021    ADRENALECTOMY  09/22/2021    CARDIAC CATHETERIZATION N/A 01/18/2019    Procedure: Left Heart Cath;  Surgeon: Tucker Gonzalez MD;  Location: [facility] CATH INVASIVE LOCATION;  Service: Cardiovascular    CARDIAC CATHETERIZATION  1/18/2019    CATARACT EXTRACTION  04/2019    CHOLECYSTECTOMY  09/22/2021    COLONOSCOPY  07/15/2020    CYBERKNIFE  05/08/2023    Recurrent liver mass/involved LNs    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    HYSTERECTOMY  1984    age 46 - ovarian cyst, endometriosis,  jorge/bso    LIVER SURGERY Right 09/22/2021    Removed    ROTATOR CUFF REPAIR Left 1989    Collar Bone Repair    VENOUS ACCESS DEVICE (PORT) INSERTION Right 05/10/2024       FAM HX:  Family History   Problem Relation Age of Onset    Heart attack Mother     Heart failure Mother     Dementia Mother     Stroke Mother     Arthritis Mother         Heart Attack    Heart disease Father     Hearing loss Father     Arrhythmia Brother     Breast cancer Paternal Aunt        SOC HX:  Social History     Socioeconomic History    Marital status:     Number of children: 2   Tobacco Use    Smoking status: Never    Smokeless tobacco: Never    Tobacco comments:  "    Exposed to secondhand smoke   Vaping Use    Vaping status: Never Used   Substance and Sexual Activity    Alcohol use: Not Currently     Comment: Very seldom    Drug use: Never    Sexual activity: Not Currently     Partners: Male     Birth control/protection: Hysterectomy     Comment: Complete Hysterectomy       PHYSICAL EXAM  /61 (BP Location: Right arm, Patient Position: Lying)   Pulse 63   Temp 98.8 °F (37.1 °C) (Oral)   Resp 16   Ht 162.6 cm (64\")   Wt 63.5 kg (140 lb)   SpO2 97%   BMI 24.03 kg/m²   Wt Readings from Last 3 Encounters:   12/11/24 63.5 kg (140 lb)   12/03/24 64 kg (141 lb)   11/08/24 64.9 kg (143 lb 1.3 oz)   ,body mass index is 24.03 kg/m².  Physical Exam  Constitutional:       General: She is not in acute distress.  Eyes:      General: No scleral icterus.  Cardiovascular:      Rate and Rhythm: Normal rate and regular rhythm.   Pulmonary:      Effort: Pulmonary effort is normal. No respiratory distress.   Abdominal:      General: Bowel sounds are normal.      Palpations: Abdomen is soft.      Tenderness: There is no abdominal tenderness. There is no guarding or rebound.   Skin:     General: Skin is warm and dry.   Neurological:      Mental Status: She is alert and oriented to person, place, and time.       Results Review:   I reviewed the patient's new clinical results.    Lab Results   Component Value Date    WBC 3.85 12/12/2024    HGB 9.4 (L) 12/12/2024    HGB 10.0 (L) 12/11/2024    HGB 10.4 (L) 12/11/2024    HCT 28.8 (L) 12/12/2024    MCV 97.6 (H) 12/12/2024    PLT 83 (L) 12/12/2024       Lab Results   Component Value Date    INR 0.99 05/10/2024    INR 0.91 05/06/2024    INR 1.0 03/17/2022       Lab Results   Component Value Date    GLUCOSE 112 (H) 12/11/2024    BUN 15 12/11/2024    CREATININE 0.98 12/11/2024    EGFRIFNONA >60 03/17/2022    EGFRIFAFRI >60 03/17/2022    BCR 15.3 12/11/2024     (L) 12/11/2024    K 3.7 12/11/2024    CO2 26.0 12/11/2024    CALCIUM 9.0 " 12/11/2024    ALBUMIN 3.7 12/11/2024    ALKPHOS 176 (H) 12/11/2024    BILITOT 0.5 12/11/2024    BILIDIR 1.7 (H) 09/27/2021    ALT 14 12/11/2024    AST 27 12/11/2024      Latest Reference Range & Units 12/11/24 10:11   Iron 37 - 145 mcg/dL 127   Ferritin 13.00 - 150.00 ng/mL 755.00 (H)   Iron Saturation (TSAT) 20 - 50 % 51 (H)   Transferrin 200 - 360 mg/dL 166 (L)   TIBC 298 - 536 mcg/dL 247 (L)   Vitamin B-12 211 - 946 pg/mL 1,345 (H)   Folate 4.78 - 24.20 ng/mL 13.90     CT Abdomen/Pelvis without contrast:  Findings:   Liver: Surgical changes of right hepatectomy. Several liver cysts noted. Further evaluation of the liver is limited without IV contrast. No biliary dilation is seen.   Gallbladder: Absent.   Pancreas: Unremarkable.   Spleen: Unremarkable.   Adrenal glands: Left adrenal gland is unremarkable. Right adrenal gland is not definitely visualized.   Genitourinary tract: Kidneys are unremarkable. No hydronephrosis is seen. No urinary tract calculi are seen. The visualized portions of the ureters and urinary bladder appear unremarkable. Status post hysterectomy.   Gastrointestinal tract: Limited evaluation of the hollow viscera due to lack of IV contrast. Colonic diverticulosis is present. No findings to suggest bowel obstruction.   Appendix: The appendix is not identified.   Other findings: No free air or free fluid. Limited evaluation for lymphadenopathy on this noncontrast study. Vascular calcifications are present.   Bones and soft tissues: No acute osseous lesion is identified. Bones are demineralized. There are degenerative changes within the spine. Superficial soft tissues demonstrate no acute abnormality.   Lung bases: Trace right pleural effusion multiple lung base nodules noted, grossly similar since 11/1/2024..   IMPRESSION:  Impression:  1.Examination is limited due to lack of IV contrast administration.  2.Given this limitation, no acute abnormality is identified within the abdomen or  pelvis.  3.Trace right pleural effusion, new since 11/1/2024. Otherwise, no significant change since 11/1/2024, accounting for differences in technique.  4.Please see above for additional details.    ASSESSMENTS/PLANS    Epigastric pain  Nausea  Unintentional weight loss  Syncope  Chronic fatigue, worsening.   Precipitated by exertion.    Intrahepatic cholangiocarcinoma with metastasis s/p remote open right hepatectomy and cholecystectomy in 2021, radiation and chemotherapy.        >> Await results of cardiac PET test performed earlier today  >> Recommend diagnostic EGD if cardiac workup is negative   >> Empiric BID PPI  >> Antiemetics as needed    I discussed the patient's findings and my recommendations with patient    PETER Hannah  12/12/24  08:55 EST

## 2024-12-12 NOTE — PLAN OF CARE
Problem: Adult Inpatient Plan of Care  Goal: Absence of Hospital-Acquired Illness or Injury  Intervention: Identify and Manage Fall Risk  Recent Flowsheet Documentation  Taken 12/12/2024 0601 by Keren Schwarz, RN  Safety Promotion/Fall Prevention:   activity supervised   assistive device/personal items within reach   clutter free environment maintained   fall prevention program maintained   lighting adjusted   nonskid shoes/slippers when out of bed   room organization consistent   safety round/check completed  Taken 12/12/2024 0401 by Keren Schwarz, RN  Safety Promotion/Fall Prevention:   activity supervised   assistive device/personal items within reach   clutter free environment maintained   fall prevention program maintained   lighting adjusted   nonskid shoes/slippers when out of bed   room organization consistent   safety round/check completed  Taken 12/12/2024 0201 by Keren Schwarz, RN  Safety Promotion/Fall Prevention:   activity supervised   assistive device/personal items within reach   clutter free environment maintained   fall prevention program maintained   lighting adjusted   nonskid shoes/slippers when out of bed   room organization consistent   safety round/check completed  Taken 12/12/2024 0001 by Keren Schwarz, RN  Safety Promotion/Fall Prevention:   activity supervised   assistive device/personal items within reach   clutter free environment maintained   fall prevention program maintained   lighting adjusted   nonskid shoes/slippers when out of bed   room organization consistent   safety round/check completed  Taken 12/11/2024 2201 by Keren Schwarz, RN  Safety Promotion/Fall Prevention:   activity supervised   assistive device/personal items within reach   clutter free environment maintained   fall prevention program maintained   lighting adjusted   nonskid shoes/slippers when out of bed   room organization consistent   safety round/check completed  Taken 12/11/2024 2000 by Chong  Keren, CHAPIN  Safety Promotion/Fall Prevention:   activity supervised   assistive device/personal items within reach   clutter free environment maintained   fall prevention program maintained   lighting adjusted   nonskid shoes/slippers when out of bed   room organization consistent   safety round/check completed  Intervention: Prevent Skin Injury  Recent Flowsheet Documentation  Taken 12/12/2024 0601 by Keren Schwarz RN  Body Position: position changed independently  Taken 12/12/2024 0401 by Keren Schwarz RN  Body Position: position changed independently  Taken 12/12/2024 0201 by Keren Schwarz RN  Body Position: position changed independently  Taken 12/12/2024 0001 by Keren Schwarz RN  Body Position: position changed independently  Taken 12/11/2024 2201 by Keren Schwarz RN  Body Position: position changed independently  Taken 12/11/2024 2000 by Keren Schwarz RN  Body Position: position changed independently  Intervention: Prevent Infection  Recent Flowsheet Documentation  Taken 12/12/2024 0601 by Keren Schwarz RN  Infection Prevention:   environmental surveillance performed   hand hygiene promoted   rest/sleep promoted   single patient room provided  Taken 12/12/2024 0401 by Keren Schwarz RN  Infection Prevention:   environmental surveillance performed   hand hygiene promoted   rest/sleep promoted   single patient room provided  Taken 12/12/2024 0201 by Keren Schwarz RN  Infection Prevention:   environmental surveillance performed   hand hygiene promoted   rest/sleep promoted   single patient room provided  Taken 12/12/2024 0001 by Keren Schwarz RN  Infection Prevention:   environmental surveillance performed   hand hygiene promoted   rest/sleep promoted   single patient room provided  Taken 12/11/2024 2201 by Keren Schwarz, RN  Infection Prevention:   environmental surveillance performed   hand hygiene promoted   rest/sleep promoted   single patient room provided  Taken  12/11/2024 2000 by Keren Schwarz, RN  Infection Prevention:   environmental surveillance performed   hand hygiene promoted   rest/sleep promoted   single patient room provided  Goal: Optimal Comfort and Wellbeing  Intervention: Provide Person-Centered Care  Recent Flowsheet Documentation  Taken 12/11/2024 2201 by Keren Schwarz, RN  Trust Relationship/Rapport:   care explained   choices provided   questions answered   questions encouraged   thoughts/feelings acknowledged   Goal Outcome Evaluation:

## 2024-12-12 NOTE — PROGRESS NOTES
"[facility]  Hospital Progress Note     LOS: 0 days   Patient Care Team:  Timi Conway DO as PCP - General (Internal Medicine)  Jorje Montenegro MD as Referring Physician (Hematology and Oncology)  Ricci Florentino MD (Rheumatology)  Mitzy Sepulveda APRN as Nurse Practitioner (Psychiatry)  Bunny Billings MD as Consulting Physician (Radiation Oncology)  Tucker Gonzaelz MD as Consulting Physician (Cardiology)  Alexsander Peng MD as Consulting Physician (Endocrinology)  Carlos Kay MD as Consulting Physician (Cardiology)  Zayda Guevara APRN as Nurse Practitioner (Cardiology)  Champ Guillen MD as Consulting Physician (Otolaryngology)  Jenise Joseph RN as Ambulatory  (Oncology) ([facility])  PCP:  Timi Conway DO    Chief Complaint: Elevated troponin, syncope    SUBJECTIVE:  Stress test completed this morning.  Awake resting in bed,  at bedside.  No chest pain or recurrent syncope.      OBJECTIVE:         Vital Sign Min/Max for last 24 hours  Temp  Min: 97.9 °F (36.6 °C)  Max: 98.8 °F (37.1 °C)   BP  Min: 120/64  Max: 161/79   Pulse  Min: 55  Max: 63   Resp  Min: 16  Max: 18   SpO2  Min: 93 %  Max: 99 %   No data recorded   Weight  Min: 63.5 kg (140 lb)  Max: 63.5 kg (140 lb)     Flowsheet Rows      Flowsheet Row First Filed Value   Admission Height 162.6 cm (64\") Documented at 12/11/2024 1115   Admission Weight 63.5 kg (140 lb) Documented at 12/11/2024 1115            Telemetry: SR      Intake/Output Summary (Last 24 hours) at 12/12/2024 0832  Last data filed at 12/11/2024 1628  Gross per 24 hour   Intake 500 ml   Output --   Net 500 ml     Intake & Output (last 3 days)         12/09 0701  12/10 0700 12/10 0701 12/11 0700 12/11 0701 12/12 0700 12/12 0701 12/13 0700    IV Piggyback   500     Total Intake(mL/kg)   500 (7.9)     Net   +500                      Physical Exam:  Vitals reviewed. "   Constitutional:       Appearance: Well-developed, normal weight and not in distress.   Pulmonary:      Effort: Pulmonary effort is normal.   Cardiovascular:      Normal rate. Irregularly irregular rhythm.   Skin:     General: Skin is warm and dry.      Coloration: Skin is pale.   Neurological:      General: No focal deficit present.      Mental Status: Alert.   Psychiatric:         Behavior: Behavior is cooperative.          LABS/DIAGNOSTIC DATA:  Results from last 7 days   Lab Units 12/12/24  0435 12/11/24  1259 12/11/24  1011   WBC 10*3/mm3 3.85 5.26 5.45   HEMOGLOBIN g/dL 9.4* 10.0* 10.4*   HEMATOCRIT % 28.8* 30.0* 31.7*   PLATELETS 10*3/mm3 83* 93* 112*     Lab Results   Lab Value Date/Time    TROPONINT 75 (C) 12/11/2024 1400    TROPONINT 83 (C) 12/11/2024 1259    TROPONINT 22 (H) 05/24/2024 0731    TROPONINT <0.010 08/08/2021 1450         Results from last 7 days   Lab Units 12/11/24  1259 12/11/24  1011   SODIUM mmol/L 135* 139   POTASSIUM mmol/L 3.7 4.0   CHLORIDE mmol/L 99 100   CO2 mmol/L 26.0 24.0   BUN mg/dL 15 15   CREATININE mg/dL 0.98 1.01*   CALCIUM mg/dL 9.0 9.2   BILIRUBIN mg/dL 0.5 0.5   ALK PHOS U/L 176* 176*   ALT (SGPT) U/L 14 14   AST (SGOT) U/L 27 28   GLUCOSE mg/dL 112* 125*             Results from last 7 days   Lab Units 12/11/24  1011   TSH uIU/mL 7.260*   FREE T4 ng/dL 1.53           Medication Review:   metoprolol tartrate, 25 mg, Oral, BID  pantoprazole, 40 mg, Oral, Q AM  sodium chloride, 10 mL, Intravenous, Q12H             ASSESSMENT:  1. Elevated HS troponin  - HS troponins 83--75, EKG sinus abelino with no acute ischemic changes  - normal cath in 2019 and negative stress test 01/2023  - No chest pain but has had chronic fatigue/dyspnea  - recent echo 11/2024 with normal EF, mild MR, no significant pathology   - Stress PET, 12/12/2024: 79% stress EF, normal perfusion, no significant calcium      2. Near syncope vs brief syncope  -Suspect vasovagal event secondary to blood draw.  -  patient denies true syncope     3. PAF  - found on Holter monitor 06/2024, currently on metoprolol  - not taking Eliquis due to concerns of bleeding we will hold any anticoagulation at this time.      4. Epigastric and lower abdominal pain   - no obvious etiology on CT scans  - Agree with inpatient GI evaluation andEGD.     5. Cholangiocarcinoma   - followed by Dr. Montenegro    PLAN:  Stress low risk, no significant coronary calcification  No further cardiac testing warranted at this time  Acceptable cardiac risk to proceed with EGD/GI evaluation  Restart Eliquis when GI allows  Cardiology will see as needed.   Keep scheduled follow up with Dr. Kay in Feb 2025.    Abby Bravo, LEDY   12/12/24  08:32 EST

## 2024-12-12 NOTE — PROGRESS NOTES
"          Clinical Nutrition Assessment     Patient Name: Sheree Hernandez  YOB: 1940  MRN: [id number]  Date of Encounter: 12/12/24 17:07 EST  Admission date: 12/11/2024  Reason for Visit: MST score 2+, Unintentional weight loss, Reduced oral intake    Assessment   Nutrition Assessment   Admission Diagnosis:  Nausea & vomiting [R11.2]    Problem List:    Vasovagal syncope    Nausea    Weight loss      PMH:   She  has a past medical history of Arthritis (2017), Asthma, Atypical chest pain (03/09/2017), Cataract, Chronic constipation, Diverticulosis (2018), Fracture, pelvis closed (2015), HL (hearing loss) (2018), Hyperlipidemia (03/09/2017), Hypertension (03/09/2017), Intrahepatic cholangiocarcinoma (08/17/2021), Low bone mass, Lung nodule (07/12/2023), Mild obstructive sleep apnea (01/30/2020), Near syncope, Nontoxic multinodular goiter (09/19/2022), PAF (paroxysmal atrial fibrillation) (7/15/2024), Plantar fasciitis, PVC's (premature ventricular contractions), Senile keratosis, MANAN (stress urinary incontinence, female), and Syncope, near.    PSH:  She  has a past surgical history that includes Rotator cuff repair (Left, 1989); Cardiac catheterization (N/A, 01/18/2019); Cataract extraction (04/2019); Hysterectomy (1984); Colonoscopy (07/15/2020); Cholecystectomy (09/22/2021); Liver surgery (Right, 09/22/2021); Adrenalectomy (09/22/2021); Adrenal gland surgery (09/22/2021); Cardiac catheterization (1/18/2019); Cyberknife (05/08/2023); Cyberknife (08/11/2023); and Venous Access Device (Port) (Right, 05/10/2024).    Applicable Nutrition History:       Anthropometrics     Height: Height: 162.6 cm (64\")  Last Filed Weight: Weight: 63.5 kg (139 lb 15.9 oz) (12/12/24 1605)  Method: Weight Method: Stated  BMI: BMI (Calculated): 24    UBW:  XIOMARA  Weight change: weight loss of 16 lbs (10.2%) over 3 month(s)    Significant?  Yes     Weight       Weight (kg) Weight (lbs) Weight Method Visit Report   8/9/2024 72.576 " kg  160 lb   --    8/12/2024 76.204 kg  168 lb      8/16/2024 73.483 kg  162 lb      8/30/2024 72.122 kg  159 lb   --     72.122 kg  159 lb   --    9/6/2024 70.761 kg  156 lb      9/27/2024 73.483 kg  162 lb  Stated     10/4/2024 67.132 kg  148 lb   --    11/5/2024 64.864 kg  143 lb   --    11/8/2024 64.9 kg  143 lb 1.3 oz      12/3/2024 63.957 kg  141 lb   --    12/11/2024 63.504 kg  140 lb  Stated     12/12/2024 63.5 kg  139 lb 15.9 oz  Stated     =    Nutrition Focused Physical Exam    Date:  12/12       Unable to perform due to Pt unable to participate at time of visit     Subjective   Reported/Observed/Food/Nutrition Related History:     Patient out of room at time of visit, caregiver in room was able to provide some nutrition hx. He reports that patient has had persistent nausea over the past 3 months, no vomiting, however pt has tried to induce vomiting to encourage relief without success. Pt w/ hx of constipation, caregiver reports this began around 5 years ago r/t chemotherapy. Pt used to drink protein drinks but now does not wish to drink them. Caregiver also notes that patient just has a general disinterest in food, sometimes will states she's hungry but after a few bites she is done.    Current Nutrition Prescription   PO: NPO Diet NPO Type: Strict NPO  Oral Nutrition Supplement: N/A  Intake: Insufficient data    Assessment & Plan   Nutrition Diagnosis   Date:  12/12            Updated:    Problem Predicted inadequate nutrient intake    Etiology Nausea, early satiety    Signs/Symptoms Reported per caregiver   Status: New      Goal:   Nutrition to support treatment and Establish PO, Advance diet as medically feasible/appropriate      Nutrition Intervention      Follow treatment progress, Care plan reviewed, Advise alternate selection, Advised available snacks    NFPE when feasible  Follow for food preferences.  Recommend appetite stimulant if medically appropriate.     Monitoring/Evaluation:   Per  protocol, I&O, PO intake, Pertinent labs, Weight, Symptoms, POC/GOC, Swallow function    Trinidad Peñaloza RD  Time Spent: 30m

## 2024-12-12 NOTE — PROGRESS NOTES
[facility] Medicine Services  PROGRESS NOTE    Patient Name: Sheree Hernandez  : 1940  MRN: [id number]    Date of Admission: 2024  Primary Care Physician: Timi Conway DO    Subjective   Subjective     CC:  F/u abdominal pain, syncope    HPI:  Patient sitting up in bed. Complains of abdominal pain and dyspepsia ongoing. Stress test was negative.      Objective   Objective     Vital Signs:   Temp:  [97.9 °F (36.6 °C)-98.8 °F (37.1 °C)] 98.2 °F (36.8 °C)  Heart Rate:  [59-89] 89  Resp:  [16-18] 16  BP: (120-161)/(47-92) 130/66     Physical Exam:  Constitutional: No acute distress, awake, alert  HENT: NCAT, mucous membranes moist  Respiratory: Clear to auscultation bilaterally, respiratory effort normal   Cardiovascular: RRR, no murmurs, rubs, or gallops  Gastrointestinal: soft, nontender, nondistended  Musculoskeletal: No bilateral ankle edema  Psychiatric: Appropriate affect, cooperative  Neurologic: Oriented x 3, speech clear, no focal deficits  Skin: No rashes      Results Reviewed:  LAB RESULTS:      Lab 24  0435 24  1400 24  1259 24  1011   WBC 3.85  --  5.26 5.45   HEMOGLOBIN 9.4*  --  10.0* 10.4*   HEMATOCRIT 28.8*  --  30.0* 31.7*   PLATELETS 83*  --  93* 112*   NEUTROS ABS  --   --  3.34 3.52   IMMATURE GRANS (ABS)  --   --  0.04 0.03   LYMPHS ABS  --   --  0.67* 0.89   MONOS ABS  --   --  0.91* 0.66   EOS ABS  --   --  0.28 0.33   MCV 97.6*  --  97.4* 97.8*   HSTROP T  --  75* 83*  --          Lab 24  1259 24  1011   SODIUM 135* 139   POTASSIUM 3.7 4.0   CHLORIDE 99 100   CO2 26.0 24.0   ANION GAP 10.0 15.0   BUN 15 15   CREATININE 0.98 1.01*   EGFR 56.7* 54.7*   GLUCOSE 112* 125*   CALCIUM 9.0 9.2   TSH  --  7.260*         Lab 24  1259 24  1011   TOTAL PROTEIN 6.6 6.7   ALBUMIN 3.7 3.8   GLOBULIN 2.9 2.9   ALT (SGPT) 14 14   AST (SGOT) 27 28   BILIRUBIN 0.5 0.5   ALK PHOS 176* 176*         Lab 24  1400  12/11/24  1259   PROBNP  --  306.4   HSTROP T 75* 83*             Lab 12/11/24  1011   IRON 127   IRON SATURATION (TSAT) 51*   TIBC 247*   TRANSFERRIN 166*   FERRITIN 755.00*   FOLATE 13.90   VITAMIN B 12 1,345*         Brief Urine Lab Results  (Last result in the past 365 days)        Color   Clarity   Blood   Leuk Est   Nitrite   Protein   CREAT   Urine HCG        09/28/24 0141 Yellow   Cloudy   Negative   Small (1+)   Negative   Trace                   Microbiology Results Abnormal       None            Stress Test With Pet Myocardial Perfusion    Result Date: 12/12/2024    Myocardial perfusion imaging indicates a normal myocardial perfusion study with no evidence of ischemia. Impressions are consistent with a low risk study.   Left ventricular ejection fraction is hyperdynamic (Calculated EF > 70%).   REST:  79%EF STRESS:  79%EF.   Findings consistent with an equivocal ECG stress test.     CT Abdomen Pelvis Without Contrast    Result Date: 12/11/2024  CT ABDOMEN PELVIS WO CONTRAST Date of Exam: 12/11/2024 3:21 PM EST Indication: nausea/fatigue/weight loss. Comparison: 11/1/2024 Technique: Axial CT images were obtained of the abdomen and pelvis without the administration of contrast. Reconstructed coronal and sagittal images were also obtained. Automated exposure control and iterative construction methods were used. Findings: Liver: Surgical changes of right hepatectomy. Several liver cysts noted. Further evaluation of the liver is limited without IV contrast. No biliary dilation is seen. Gallbladder: Absent. Pancreas: Unremarkable. Spleen: Unremarkable. Adrenal glands: Left adrenal gland is unremarkable. Right adrenal gland is not definitely visualized. Genitourinary tract: Kidneys are unremarkable. No hydronephrosis is seen. No urinary tract calculi are seen. The visualized portions of the ureters and urinary bladder appear unremarkable. Status post hysterectomy. Gastrointestinal tract: Limited evaluation of  the hollow viscera due to lack of IV contrast. Colonic diverticulosis is present. No findings to suggest bowel obstruction. Appendix: The appendix is not identified. Other findings: No free air or free fluid. Limited evaluation for lymphadenopathy on this noncontrast study. Vascular calcifications are present. Bones and soft tissues: No acute osseous lesion is identified. Bones are demineralized. There are degenerative changes within the spine. Superficial soft tissues demonstrate no acute abnormality. Lung bases: Trace right pleural effusion multiple lung base nodules noted, grossly similar since 11/1/2024..     Impression: Impression: 1.Examination is limited due to lack of IV contrast administration. 2.Given this limitation, no acute abnormality is identified within the abdomen or pelvis. 3.Trace right pleural effusion, new since 11/1/2024. Otherwise, no significant change since 11/1/2024, accounting for differences in technique. 4.Please see above for additional details. Electronically Signed: Herman Armstrong MD  12/11/2024 3:43 PM EST  Workstation ID: [id number]    CT Chest Without Contrast Diagnostic    Result Date: 12/11/2024  CT CHEST WO CONTRAST DIAGNOSTIC Date of Exam: 12/11/2024 12:11 PM EST Indication: fatigue/syncope/weight loss. Comparison: CT scan of the chest 11/1/2024 Technique: Axial CT images were obtained of the chest without contrast administration.  Reconstructed coronal and sagittal images were also obtained. Automated exposure control and iterative construction methods were used. Findings: Prior CT scan report describes stable scattered pulmonary nodules. No progressive or new chest disease. Image-by-image comparison of the right and left lungs with the 11/1/2024 exam shows no visible interval change in the patient's multiple small rounded pulmonary nodules. The irregular shaped nodule in the left lower lobe on image 56, however, appears further enlarged and mildly spiculated on today's exam,  previously 0.6 x 0.4 cm, today 1.0 x 0.6 cm. No other clearly enlarging nodules identified. On the earlier 8/26/2024 exam, the nodule appears to be only approximately 0.3 x 0.4 cm. Comparison with the older exam does appear to show very mild generalized progression in size of the pulmonary nodules over this longer interval, for instance the slightly spiculated rounded right middle lobe nodule on today's image 64 measures approximately 0.7 cm, previously approximately 0.5 cm. Right lower lobe nodule image 67 today measures 0.7 cm, previously 0.45 cm. There is recurrence of a small right effusion. No left effusion is seen. No airspace opacity is identified. The airways appear to be normally patent. Images the mediastinum show mildly enlarged preaortic lymph node image 33, previously 1.0 x 1.3 cm today  1.1 x 1.6 cm, with a suggestion of a necrotic center. No significant adenopathy is appreciated elsewhere. Moderate coronary artery calcifications again noted. Included images of the upper abdomen show previous right lobe liver resection and multiple left lobe hepatic cysts. Scarring at the operative site appears stable. No new or increasing upper abdominal node or mass or inflammatory changes seen. Bony structures appear to be intact.     Impression: Impression: 1.Mild interval enlargement of the patient's multiple small pulmonary nodules faintly suggested compared to 11/1/2024 exam, but more apparent compared to 8/26/2024 exam. 2.Focal greater interval enlargement of left lower lobe pulmonary spiculated nodule image 56 series 3 of today's exam. Mild interval enlargement of patient's preaortic lymph node. 3.Small right pleural effusion, new from 11/1/2024 exam. No other new chest disease is seen. Electronically Signed: Salvador Gill MD  12/11/2024 12:37 PM EST  Workstation ID: [id number]    CT Head Without Contrast    Result Date: 12/11/2024  CT HEAD WO CONTRAST Date of Exam: 12/11/2024 12:11 PM EST Indication: syncope.  Comparison: MRI brain from August 8, 2021 and CT head from November 9, 2015 Technique: Axial CT images were obtained of the head without contrast administration.  Automated exposure control and iterative construction methods were used. Findings: No acute intracranial hemorrhage or extra-axial collection is identified. The ventricles appear normal in caliber, with no evidence of mass effect or midline shift. The basal cisterns appear patent. The gray-white differentiation appears preserved. The calvarium appear intact. The paranasal sinuses are clear. The mastoid air cells are well-aerated.     Impression: Impression: No acute intracranial process identified. Electronically Signed: Con Norton MD  12/11/2024 12:24 PM EST  Workstation ID: [id number]    XR Chest 1 View    Result Date: 12/11/2024  XR CHEST 1 VW Date of Exam: 12/11/2024 11:53 AM EST Indication: syncope Comparison: Chest radiograph 5/24/2024 Findings: Right-sided Port-A-Cath is again seen, tip in the distal SVC. The heart mediastinum and pulmonary vasculature appear within normal limits. There is again chronic elevation left hemidiaphragm, and a right hemidiaphragm eventration. Mild chronic appearing interstitial lung changes are stable. There is a suggestion of a skinfold shadow superimposed over the left apex, seen between the posterior left third and fourth interspace. This resembles a small pneumothorax, but is more typical in appearance for skinfold shadow. Skinfold shadow is also seen superimposed over the lateral right lower chest. Bony structures appear to be intact. Previous left distal clavicle surgery is again noted.     Impression: Impression: 1.Suspected skinfold shadow superimposed over the left apex. A small pneumothorax is considered unlikely, but not entirely excluded. If patient has ongoing left chest symptoms, consider repeat radiograph. 2.No other evidence of active chest disease. Stable mild chronic appearing interstitial changes  compared to prior exam. Electronically Signed: Salvador Gill MD  12/11/2024 12:19 PM EST  Workstation ID: [id number]     Results for orders placed during the hospital encounter of 11/08/24    Adult Transthoracic Echo Complete W/ Cont if Necessary Per Protocol    Interpretation Summary    Left ventricular ejection fraction appears to be 51 - 55%.    Left ventricular diastolic function was indeterminate.    Mild mitral valve regurgitation is present.    The aortic valve exhibits sclerosis.    Trace tricuspid valve regurgitation is present. Estimated right ventricular systolic pressure from tricuspid regurgitation is normal (<35 mmHg).    There is no evidence of pericardial effusion.      Current medications:  Scheduled Meds:metoprolol tartrate, 25 mg, Oral, BID  pantoprazole, 40 mg, Oral, BID AC  sodium chloride, 10 mL, Intravenous, Q12H      Continuous Infusions:   PRN Meds:.  acetaminophen **OR** acetaminophen **OR** acetaminophen    albuterol sulfate HFA    senna-docusate sodium **AND** polyethylene glycol **AND** bisacodyl **AND** bisacodyl    ondansetron    ondansetron ODT    prochlorperazine    sodium chloride    sodium chloride    traMADol    Assessment & Plan   Assessment & Plan     Active Hospital Problems    Diagnosis  POA    **Vasovagal syncope [R55]  Yes    Weight loss [R63.4]  Yes    Nausea [R11.0]  Yes      Resolved Hospital Problems   No resolved problems to display.        Brief Hospital Course to date:  Sheree Hernandez is a 84 y.o. female w intrahepatic cholangiocarcinoma s/p chemo/radiation ([place]), paroxysmal Afib ([place]), 3 months of nausea and decreased intake who presents after syncope with lab draw today.     Vasovagal syncope in setting of lab draw and poor intake  -s/p IVF in ED, feels improved     Elevated troponin  -secondary to above, no active chest pain, very low concern for cardiac active issue  -negative stress 1/2023, repeat stress testing is negative today     Nausea, poor intake,  weight loss x 3 months  Intrahepatic cholangiocarcinoma s/p chemo/radiation  -Agree w/PCP evaluation and empiric PPI, likely dyspepsia/gastritis related  -GI consulted and planning EGD today     Paroxysmal Afib - metoprolol, patient reports to me she was not taking home eliquis. Hold for now, return to prior plan at DC     Expected Discharge Location and Transportation: Home  Expected Discharge pending EGD results  Expected Discharge Date: 12/12/2024; Expected Discharge Time:      VTE Prophylaxis:  Mechanical VTE prophylaxis orders are present.         AM-PAC 6 Clicks Score (PT): 23 (12/12/24 0800)    CODE STATUS:   Code Status and Medical Interventions: CPR (Attempt to Resuscitate); Full Support   Ordered at: 12/11/24 1002     Level Of Support Discussed With:    Patient     Code Status (Patient has no pulse and is not breathing):    CPR (Attempt to Resuscitate)     Medical Interventions (Patient has pulse or is breathing):    Full Support       Patti Ledbetter, DO  12/12/24

## 2024-12-13 ENCOUNTER — READMISSION MANAGEMENT (OUTPATIENT)
Dept: CALL CENTER | Facility: HOSPITAL | Age: 84
End: 2024-12-13
Payer: MEDICARE

## 2024-12-13 ENCOUNTER — TELEPHONE (OUTPATIENT)
Dept: FAMILY MEDICINE CLINIC | Facility: CLINIC | Age: 84
End: 2024-12-13
Payer: MEDICARE

## 2024-12-13 ENCOUNTER — ANESTHESIA (OUTPATIENT)
Dept: GASTROENTEROLOGY | Facility: HOSPITAL | Age: 84
End: 2024-12-13
Payer: MEDICARE

## 2024-12-13 ENCOUNTER — ANESTHESIA EVENT (OUTPATIENT)
Dept: GASTROENTEROLOGY | Facility: HOSPITAL | Age: 84
End: 2024-12-13
Payer: MEDICARE

## 2024-12-13 VITALS
TEMPERATURE: 98.3 F | HEIGHT: 64 IN | HEART RATE: 62 BPM | SYSTOLIC BLOOD PRESSURE: 141 MMHG | DIASTOLIC BLOOD PRESSURE: 69 MMHG | BODY MASS INDEX: 23.9 KG/M2 | OXYGEN SATURATION: 98 % | RESPIRATION RATE: 16 BRPM | WEIGHT: 139.99 LBS

## 2024-12-13 PROCEDURE — 43239 EGD BIOPSY SINGLE/MULTIPLE: CPT | Performed by: INTERNAL MEDICINE

## 2024-12-13 PROCEDURE — G0378 HOSPITAL OBSERVATION PER HR: HCPCS

## 2024-12-13 PROCEDURE — 25810000003 LACTATED RINGERS PER 1000 ML: Performed by: ANESTHESIOLOGY

## 2024-12-13 PROCEDURE — 25010000002 PROPOFOL 10 MG/ML EMULSION: Performed by: NURSE ANESTHETIST, CERTIFIED REGISTERED

## 2024-12-13 PROCEDURE — A9270 NON-COVERED ITEM OR SERVICE: HCPCS | Performed by: INTERNAL MEDICINE

## 2024-12-13 PROCEDURE — 63710000001 METOPROLOL TARTRATE 25 MG TABLET: Performed by: INTERNAL MEDICINE

## 2024-12-13 PROCEDURE — 25010000002 LIDOCAINE PF 1% 1 % SOLUTION: Performed by: NURSE ANESTHETIST, CERTIFIED REGISTERED

## 2024-12-13 PROCEDURE — 88305 TISSUE EXAM BY PATHOLOGIST: CPT | Performed by: INTERNAL MEDICINE

## 2024-12-13 PROCEDURE — 63710000001 PANTOPRAZOLE 40 MG TABLET DELAYED-RELEASE: Performed by: INTERNAL MEDICINE

## 2024-12-13 PROCEDURE — 25810000003 SODIUM CHLORIDE 0.9 % SOLUTION: Performed by: NURSE ANESTHETIST, CERTIFIED REGISTERED

## 2024-12-13 PROCEDURE — 63710000001 SUCRALFATE 1 G TABLET: Performed by: INTERNAL MEDICINE

## 2024-12-13 PROCEDURE — 99239 HOSP IP/OBS DSCHRG MGMT >30: CPT | Performed by: NURSE PRACTITIONER

## 2024-12-13 RX ORDER — POLYETHYLENE GLYCOL 3350 17 G/17G
17 POWDER, FOR SOLUTION ORAL DAILY PRN
Start: 2024-12-13

## 2024-12-13 RX ORDER — PANTOPRAZOLE SODIUM 40 MG/1
40 TABLET, DELAYED RELEASE ORAL
Qty: 60 TABLET | Refills: 0 | Status: SHIPPED | OUTPATIENT
Start: 2024-12-13

## 2024-12-13 RX ORDER — SUCRALFATE 1 G/1
1 TABLET ORAL
Status: DISCONTINUED | OUTPATIENT
Start: 2024-12-13 | End: 2024-12-13 | Stop reason: HOSPADM

## 2024-12-13 RX ORDER — ACETAMINOPHEN 325 MG/1
650 TABLET ORAL EVERY 4 HOURS PRN
Start: 2024-12-13

## 2024-12-13 RX ORDER — SUCRALFATE 1 G/1
1 TABLET ORAL
Qty: 80 TABLET | Refills: 0 | Status: SHIPPED | OUTPATIENT
Start: 2024-12-13 | End: 2025-01-02

## 2024-12-13 RX ORDER — PROPOFOL 10 MG/ML
VIAL (ML) INTRAVENOUS AS NEEDED
Status: DISCONTINUED | OUTPATIENT
Start: 2024-12-13 | End: 2024-12-13 | Stop reason: SURG

## 2024-12-13 RX ORDER — SODIUM CHLORIDE 9 MG/ML
INJECTION, SOLUTION INTRAVENOUS CONTINUOUS PRN
Status: DISCONTINUED | OUTPATIENT
Start: 2024-12-13 | End: 2024-12-13 | Stop reason: SURG

## 2024-12-13 RX ORDER — LIDOCAINE HYDROCHLORIDE 10 MG/ML
INJECTION, SOLUTION EPIDURAL; INFILTRATION; INTRACAUDAL; PERINEURAL AS NEEDED
Status: DISCONTINUED | OUTPATIENT
Start: 2024-12-13 | End: 2024-12-13 | Stop reason: SURG

## 2024-12-13 RX ORDER — FAMOTIDINE 10 MG/ML
20 INJECTION, SOLUTION INTRAVENOUS ONCE
Status: DISCONTINUED | OUTPATIENT
Start: 2024-12-13 | End: 2024-12-13 | Stop reason: HOSPADM

## 2024-12-13 RX ORDER — SODIUM CHLORIDE 0.9 % (FLUSH) 0.9 %
10 SYRINGE (ML) INJECTION EVERY 12 HOURS SCHEDULED
Status: DISCONTINUED | OUTPATIENT
Start: 2024-12-13 | End: 2024-12-13 | Stop reason: HOSPADM

## 2024-12-13 RX ORDER — SODIUM CHLORIDE 0.9 % (FLUSH) 0.9 %
10 SYRINGE (ML) INJECTION AS NEEDED
Status: DISCONTINUED | OUTPATIENT
Start: 2024-12-13 | End: 2024-12-13 | Stop reason: HOSPADM

## 2024-12-13 RX ORDER — SODIUM CHLORIDE, SODIUM LACTATE, POTASSIUM CHLORIDE, CALCIUM CHLORIDE 600; 310; 30; 20 MG/100ML; MG/100ML; MG/100ML; MG/100ML
9 INJECTION, SOLUTION INTRAVENOUS CONTINUOUS
Status: DISCONTINUED | OUTPATIENT
Start: 2024-12-14 | End: 2024-12-13 | Stop reason: HOSPADM

## 2024-12-13 RX ORDER — MIDAZOLAM HYDROCHLORIDE 1 MG/ML
0.5 INJECTION, SOLUTION INTRAMUSCULAR; INTRAVENOUS
Status: DISCONTINUED | OUTPATIENT
Start: 2024-12-13 | End: 2024-12-13 | Stop reason: HOSPADM

## 2024-12-13 RX ORDER — FAMOTIDINE 20 MG/1
20 TABLET, FILM COATED ORAL ONCE
Status: DISCONTINUED | OUTPATIENT
Start: 2024-12-13 | End: 2024-12-13 | Stop reason: HOSPADM

## 2024-12-13 RX ORDER — LIDOCAINE HYDROCHLORIDE 10 MG/ML
0.5 INJECTION, SOLUTION EPIDURAL; INFILTRATION; INTRACAUDAL; PERINEURAL ONCE AS NEEDED
Status: DISCONTINUED | OUTPATIENT
Start: 2024-12-13 | End: 2024-12-13 | Stop reason: HOSPADM

## 2024-12-13 RX ADMIN — PROPOFOL 30 MG: 10 INJECTION, EMULSION INTRAVENOUS at 13:01

## 2024-12-13 RX ADMIN — SUCRALFATE 1 G: 1 TABLET ORAL at 08:50

## 2024-12-13 RX ADMIN — LIDOCAINE HYDROCHLORIDE 100 MG: 10 INJECTION, SOLUTION EPIDURAL; INFILTRATION; INTRACAUDAL; PERINEURAL at 12:59

## 2024-12-13 RX ADMIN — Medication 10 ML: at 08:52

## 2024-12-13 RX ADMIN — SODIUM CHLORIDE, POTASSIUM CHLORIDE, SODIUM LACTATE AND CALCIUM CHLORIDE 9 ML/HR: 600; 310; 30; 20 INJECTION, SOLUTION INTRAVENOUS at 00:37

## 2024-12-13 RX ADMIN — METOPROLOL TARTRATE 25 MG: 25 TABLET, FILM COATED ORAL at 08:50

## 2024-12-13 RX ADMIN — PROPOFOL 50 MG: 10 INJECTION, EMULSION INTRAVENOUS at 12:59

## 2024-12-13 RX ADMIN — PANTOPRAZOLE SODIUM 40 MG: 40 TABLET, DELAYED RELEASE ORAL at 08:50

## 2024-12-13 RX ADMIN — SODIUM CHLORIDE: 9 INJECTION, SOLUTION INTRAVENOUS at 12:56

## 2024-12-13 RX ADMIN — PROPOFOL 30 MG: 10 INJECTION, EMULSION INTRAVENOUS at 13:02

## 2024-12-13 NOTE — DISCHARGE SUMMARY
[facility] Medicine Services  DISCHARGE SUMMARY    Patient Name: Sheree Hernandez  : 1940  MRN: [id number]    Date of Admission: 2024 11:05 AM  Date of Discharge:  2024  Primary Care Physician: Timi Conway DO    Consults       Date and Time Order Name Status Description    2024  4:49 PM Inpatient Gastroenterology Consult Completed     2024  3:08 PM Cardiology (on-call MD unless specified) Completed             Hospital Course     Presenting Problem: syncope w lab draw     Active Hospital Problems    Diagnosis  POA    **Vasovagal syncope [R55]  Yes    Weight loss [R63.4]  Yes    Nausea [R11.0]  Yes      Resolved Hospital Problems   No resolved problems to display.          Hospital Course:  Sheree Hernandez is a 84 y.o. female w intrahepatic cholangiocarcinoma s/p chemo/radiation ([place]), paroxysmal Afib ([place]), 3 months of nausea and decreased intake who presents after syncope with lab draw today.     Vasovagal syncope in setting of lab draw and poor intake  -s/p IVF in ED, feels improved     Elevated troponin  -secondary to above, no active chest pain, very low concern for cardiac active issue  --Cardiology consulted  -negative stress 2023, repeat stress testing is negative .  --Patient to follow-up with his primary cardiologist as scheduled in 2025, sooner if needed.     Nausea, poor intake, weight loss x 3 months  Intrahepatic cholangiocarcinoma s/p chemo/radiation  -Agree w/PCP evaluation and empiric PPI, likely dyspepsia/gastritis related  -GI consulted.  Patient underwent EGD today.  Findings:  Normal esophagus. - Normal stomach. Biopsied. - Non-bleeding duodenal ulcer with a clean ulcer base (Pedro Class III). Biopsied.  Recommendations:  - Await pathology results, they will notify. - PO Protonix 40 mg twice daily. - Carafate 1g QID for 21 days.  PCP and Oncology follow up as scheduled. - GI will remain available as  "needed     Paroxysmal Afib - metoprolol, patient reports she was not taking home eliquis. Held in hospital course pending EGD.  Has EGD negative for bleeding.  Had discussion with patient over the phone regarding why her Eliquis was held.  She reports that she actually quit taking her Eliquis, Synthroid and a few other \"medicines\" on her own account because she thought she was taking too many pills.  He reports that her cardiologist this admission told her she should resume it ASAP.    -- As she appears stable, will resume home Eliquis 2.5 mg twice daily on discharge.  Defer to PCP/cardiology outpatient for further management.      Seen resting up in bed this morning in no acute distress.  Friend at bedside.  She feels exhausted and weak but eager to go home today.  Addendum:  She is now post EGD.  No acute findings.  Asking for discharge home.  Hemodynamically stable and afebrile.  Cleared by all services for discharge.  Medications as below with follow-ups as noted.      Discharge Follow Up Recommendations for outpatient labs/diagnostics:  Pt is cleared for discharge home today by all services.  Going on medications as below with follow-ups as noted.    --PCP 1 week of discharge  --follow-up [facility] [place] [facility] 1 week of discharge  -- Follow-up with Dr. Kay with [facility] [place] cardiology as prior scheduled in February 2025  -- Follow-up with Dr. [place] with oncology as scheduled 12/31/2025    Day of Discharge     HPI:   Patient was seen this morning at 10:20 AM resting up in bed in no acute distress.  \"Friend\" was at bedside.  She reports she felt exhausted.  Weak.  Her friend stepped out of the room during exam.  Patient reports emergency meds so no nausea or vomiting.  Abdominal tenderness 0-7/10 scale.    Review of Systems  Gen- No fevers, chills  CV- No chest pain, palpitations  Resp- No cough, dyspnea  GI- No N/V/D, abd pain      Vital Signs:   Temp:  [96.6 °F (35.9 °C)-98.9 °F (37.2 °C)] 98.3 °F (36.8 " °C)  Heart Rate:  [54-78] 62  Resp:  [16-28] 16  BP: ()/(51-75) 141/69  Flow (L/min) (Oxygen Therapy):  [2] 2      Physical Exam:  Constitutional: No acute distress, awake, alert.  Resting up in bed.  Friend at bedside.  HENT: NCAT, mucous membranes moist  Respiratory: Clear to auscultation bilaterally, respiratory effort normal on room air.  Sats WNL.  Cardiovascular: RRR, no murmurs, rubs, or gallops  Gastrointestinal: soft, nontender, nondistended  Musculoskeletal: No bilateral ankle edema  Psychiatric: Flat affect, cooperative and calm.  Neurologic: Oriented x 3 but slightly flat affect and short-term memory deficits appreciated, speech clear, no focal deficits.  Follows commands.  Skin: No rashes    Pertinent  and/or Most Recent Results     LAB RESULTS:      Lab 12/12/24  0435 12/11/24  1259 12/11/24  1011   WBC 3.85 5.26 5.45   HEMOGLOBIN 9.4* 10.0* 10.4*   HEMATOCRIT 28.8* 30.0* 31.7*   PLATELETS 83* 93* 112*   NEUTROS ABS  --  3.34 3.52   IMMATURE GRANS (ABS)  --  0.04 0.03   LYMPHS ABS  --  0.67* 0.89   MONOS ABS  --  0.91* 0.66   EOS ABS  --  0.28 0.33   MCV 97.6* 97.4* 97.8*         Lab 12/11/24  1259 12/11/24  1011   SODIUM 135* 139   POTASSIUM 3.7 4.0   CHLORIDE 99 100   CO2 26.0 24.0   ANION GAP 10.0 15.0   BUN 15 15   CREATININE 0.98 1.01*   EGFR 56.7* 54.7*   GLUCOSE 112* 125*   CALCIUM 9.0 9.2   TSH  --  7.260*         Lab 12/11/24  1259 12/11/24  1011   TOTAL PROTEIN 6.6 6.7   ALBUMIN 3.7 3.8   GLOBULIN 2.9 2.9   ALT (SGPT) 14 14   AST (SGOT) 27 28   BILIRUBIN 0.5 0.5   ALK PHOS 176* 176*         Lab 12/11/24  1400 12/11/24  1259   PROBNP  --  306.4   HSTROP T 75* 83*             Lab 12/11/24  1011   IRON 127   IRON SATURATION (TSAT) 51*   TIBC 247*   TRANSFERRIN 166*   FERRITIN 755.00*   FOLATE 13.90   VITAMIN B 12 1,345*         Brief Urine Lab Results  (Last result in the past 365 days)        Color   Clarity   Blood   Leuk Est   Nitrite   Protein   CREAT   Urine HCG        09/28/24 0141  Yellow   Cloudy   Negative   Small (1+)   Negative   Trace                 Microbiology Results (last 10 days)       Procedure Component Value - Date/Time    COVID PRE-OP / PRE-PROCEDURE SCREENING ORDER (NO ISOLATION) - Swab, Nasopharynx [[id number]]  (Normal) Collected: 12/11/24 1259    Lab Status: Final result Specimen: Swab from Nasopharynx Updated: 12/11/24 1409    Narrative:      The following orders were created for panel order COVID PRE-OP / PRE-PROCEDURE SCREENING ORDER (NO ISOLATION) - Swab, Nasopharynx.  Procedure                               Abnormality         Status                     ---------                               -----------         ------                     Respiratory Panel PCR w/...[[id number]]  Normal              Final result                 Please view results for these tests on the individual orders.    Respiratory Panel PCR w/COVID-19(SARS-CoV-2) CAITLIN/[facility]ENDA/DEMETRA/PAD/COR/HANNA In-House, NP Swab in UTM/VTM, 2 HR TAT - Swab, Nasopharynx [[id number]]  (Normal) Collected: 12/11/24 1259    Lab Status: Final result Specimen: Swab from Nasopharynx Updated: 12/11/24 1409     ADENOVIRUS, PCR Not Detected     Coronavirus 229E Not Detected     Coronavirus HKU1 Not Detected     Coronavirus NL63 Not Detected     Coronavirus OC43 Not Detected     COVID19 Not Detected     Human Metapneumovirus Not Detected     Human Rhinovirus/Enterovirus Not Detected     Influenza A PCR Not Detected     Influenza B PCR Not Detected     Parainfluenza Virus 1 Not Detected     Parainfluenza Virus 2 Not Detected     Parainfluenza Virus 3 Not Detected     Parainfluenza Virus 4 Not Detected     RSV, PCR Not Detected     Bordetella pertussis pcr Not Detected     Bordetella parapertussis PCR Not Detected     Chlamydophila pneumoniae PCR Not Detected     Mycoplasma pneumo by PCR Not Detected    Narrative:      In the setting of a positive respiratory panel with a viral infection PLUS a negative procalcitonin without other  underlying concern for bacterial infection, consider observing off antibiotics or discontinuation of antibiotics and continue supportive care. If the respiratory panel is positive for atypical bacterial infection (Bordetella pertussis, Chlamydophila pneumoniae, or Mycoplasma pneumoniae), consider antibiotic de-escalation to target atypical bacterial infection.            Stress Test With Pet Myocardial Perfusion    Result Date: 12/12/2024    Myocardial perfusion imaging indicates a normal myocardial perfusion study with no evidence of ischemia. Impressions are consistent with a low risk study.   Left ventricular ejection fraction is hyperdynamic (Calculated EF > 70%).   REST:  79%EF STRESS:  79%EF.   Findings consistent with an equivocal ECG stress test.     CT Abdomen Pelvis Without Contrast    Result Date: 12/11/2024  CT ABDOMEN PELVIS WO CONTRAST Date of Exam: 12/11/2024 3:21 PM EST Indication: nausea/fatigue/weight loss. Comparison: 11/1/2024 Technique: Axial CT images were obtained of the abdomen and pelvis without the administration of contrast. Reconstructed coronal and sagittal images were also obtained. Automated exposure control and iterative construction methods were used. Findings: Liver: Surgical changes of right hepatectomy. Several liver cysts noted. Further evaluation of the liver is limited without IV contrast. No biliary dilation is seen. Gallbladder: Absent. Pancreas: Unremarkable. Spleen: Unremarkable. Adrenal glands: Left adrenal gland is unremarkable. Right adrenal gland is not definitely visualized. Genitourinary tract: Kidneys are unremarkable. No hydronephrosis is seen. No urinary tract calculi are seen. The visualized portions of the ureters and urinary bladder appear unremarkable. Status post hysterectomy. Gastrointestinal tract: Limited evaluation of the hollow viscera due to lack of IV contrast. Colonic diverticulosis is present. No findings to suggest bowel obstruction. Appendix: The  appendix is not identified. Other findings: No free air or free fluid. Limited evaluation for lymphadenopathy on this noncontrast study. Vascular calcifications are present. Bones and soft tissues: No acute osseous lesion is identified. Bones are demineralized. There are degenerative changes within the spine. Superficial soft tissues demonstrate no acute abnormality. Lung bases: Trace right pleural effusion multiple lung base nodules noted, grossly similar since 11/1/2024..     Impression: 1.Examination is limited due to lack of IV contrast administration. 2.Given this limitation, no acute abnormality is identified within the abdomen or pelvis. 3.Trace right pleural effusion, new since 11/1/2024. Otherwise, no significant change since 11/1/2024, accounting for differences in technique. 4.Please see above for additional details. Electronically Signed: Herman Armstrong MD  12/11/2024 3:43 PM EST  Workstation ID: [id number]    CT Chest Without Contrast Diagnostic    Result Date: 12/11/2024  CT CHEST WO CONTRAST DIAGNOSTIC Date of Exam: 12/11/2024 12:11 PM EST Indication: fatigue/syncope/weight loss. Comparison: CT scan of the chest 11/1/2024 Technique: Axial CT images were obtained of the chest without contrast administration.  Reconstructed coronal and sagittal images were also obtained. Automated exposure control and iterative construction methods were used. Findings: Prior CT scan report describes stable scattered pulmonary nodules. No progressive or new chest disease. Image-by-image comparison of the right and left lungs with the 11/1/2024 exam shows no visible interval change in the patient's multiple small rounded pulmonary nodules. The irregular shaped nodule in the left lower lobe on image 56, however, appears further enlarged and mildly spiculated on today's exam, previously 0.6 x 0.4 cm, today 1.0 x 0.6 cm. No other clearly enlarging nodules identified. On the earlier 8/26/2024 exam, the nodule appears to be  only approximately 0.3 x 0.4 cm. Comparison with the older exam does appear to show very mild generalized progression in size of the pulmonary nodules over this longer interval, for instance the slightly spiculated rounded right middle lobe nodule on today's image 64 measures approximately 0.7 cm, previously approximately 0.5 cm. Right lower lobe nodule image 67 today measures 0.7 cm, previously 0.45 cm. There is recurrence of a small right effusion. No left effusion is seen. No airspace opacity is identified. The airways appear to be normally patent. Images the mediastinum show mildly enlarged preaortic lymph node image 33, previously 1.0 x 1.3 cm today  1.1 x 1.6 cm, with a suggestion of a necrotic center. No significant adenopathy is appreciated elsewhere. Moderate coronary artery calcifications again noted. Included images of the upper abdomen show previous right lobe liver resection and multiple left lobe hepatic cysts. Scarring at the operative site appears stable. No new or increasing upper abdominal node or mass or inflammatory changes seen. Bony structures appear to be intact.     Impression: 1.Mild interval enlargement of the patient's multiple small pulmonary nodules faintly suggested compared to 11/1/2024 exam, but more apparent compared to 8/26/2024 exam. 2.Focal greater interval enlargement of left lower lobe pulmonary spiculated nodule image 56 series 3 of today's exam. Mild interval enlargement of patient's preaortic lymph node. 3.Small right pleural effusion, new from 11/1/2024 exam. No other new chest disease is seen. Electronically Signed: Salvador Gill MD  12/11/2024 12:37 PM EST  Workstation ID: [id number]    CT Head Without Contrast    Result Date: 12/11/2024  CT HEAD WO CONTRAST Date of Exam: 12/11/2024 12:11 PM EST Indication: syncope. Comparison: MRI brain from August 8, 2021 and CT head from November 9, 2015 Technique: Axial CT images were obtained of the head without contrast administration.   Automated exposure control and iterative construction methods were used. Findings: No acute intracranial hemorrhage or extra-axial collection is identified. The ventricles appear normal in caliber, with no evidence of mass effect or midline shift. The basal cisterns appear patent. The gray-white differentiation appears preserved. The calvarium appear intact. The paranasal sinuses are clear. The mastoid air cells are well-aerated.     Impression: No acute intracranial process identified. Electronically Signed: Con Norton MD  12/11/2024 12:24 PM EST  Workstation ID: [id number]    XR Chest 1 View    Result Date: 12/11/2024  XR CHEST 1 VW Date of Exam: 12/11/2024 11:53 AM EST Indication: syncope Comparison: Chest radiograph 5/24/2024 Findings: Right-sided Port-A-Cath is again seen, tip in the distal SVC. The heart mediastinum and pulmonary vasculature appear within normal limits. There is again chronic elevation left hemidiaphragm, and a right hemidiaphragm eventration. Mild chronic appearing interstitial lung changes are stable. There is a suggestion of a skinfold shadow superimposed over the left apex, seen between the posterior left third and fourth interspace. This resembles a small pneumothorax, but is more typical in appearance for skinfold shadow. Skinfold shadow is also seen superimposed over the lateral right lower chest. Bony structures appear to be intact. Previous left distal clavicle surgery is again noted.     Impression: 1.Suspected skinfold shadow superimposed over the left apex. A small pneumothorax is considered unlikely, but not entirely excluded. If patient has ongoing left chest symptoms, consider repeat radiograph. 2.No other evidence of active chest disease. Stable mild chronic appearing interstitial changes compared to prior exam. Electronically Signed: Salvador Gill MD  12/11/2024 12:19 PM EST  Workstation ID: [id number]     Results for orders placed during the hospital encounter of  11/16/18    Duplex Venous Lower Extremity - Bilateral CAR    Interpretation Summary  · No evidence of deep or superficial venous thrombosis of the right or left lower extremities.      Results for orders placed during the hospital encounter of 11/16/18    Duplex Venous Lower Extremity - Bilateral CAR    Interpretation Summary  · No evidence of deep or superficial venous thrombosis of the right or left lower extremities.      Results for orders placed during the hospital encounter of 11/08/24    Adult Transthoracic Echo Complete W/ Cont if Necessary Per Protocol    Interpretation Summary    Left ventricular ejection fraction appears to be 51 - 55%.    Left ventricular diastolic function was indeterminate.    Mild mitral valve regurgitation is present.    The aortic valve exhibits sclerosis.    Trace tricuspid valve regurgitation is present. Estimated right ventricular systolic pressure from tricuspid regurgitation is normal (<35 mmHg).    There is no evidence of pericardial effusion.      Plan for Follow-up of Pending Labs/Results:   Pending Labs       Order Current Status    Tissue Pathology Exam In process          Discharge Details        Discharge Medications        New Medications        Instructions Start Date   acetaminophen 325 MG tablet  Commonly known as: TYLENOL   650 mg, Oral, Every 4 Hours PRN      pantoprazole 40 MG EC tablet  Commonly known as: PROTONIX  Replaces: esomeprazole 40 MG capsule   40 mg, Oral, 2 Times Daily Before Meals      polyethylene glycol 17 g packet  Commonly known as: MIRALAX   17 g, Oral, Daily PRN      sucralfate 1 g tablet  Commonly known as: CARAFATE   1 g, Oral, 4 Times Daily Before Meals & Nightly             Changes to Medications        Instructions Start Date   albuterol sulfate  (90 Base) MCG/ACT inhaler  Commonly known as: PROVENTIL HFA;VENTOLIN HFA;PROAIR HFA  What changed: reasons to take this   1 puff, Inhalation, Every 4 Hours PRN             Continue These  Medications        Instructions Start Date   coenzyme Q10 50 MG capsule capsule   50 mg, Oral, Daily, OTC      Eliquis 2.5 MG tablet tablet  Generic drug: apixaban   2.5 mg, Oral, 2 Times Daily      Folic Acid 0.8 MG capsule   Take  by mouth.      ipratropium 0.06 % nasal spray  Commonly known as: ATROVENT   Administer 1 spray into each nostril twice daily      levothyroxine 25 MCG tablet  Commonly known as: SYNTHROID, LEVOTHROID   25 mcg, Oral, Every Early Morning      lidocaine-prilocaine 2.5-2.5 % cream  Commonly known as: EMLA   Please apply to port site 30 min prior to infusion and cover with Saran Wrap      magnesium oxide 400 MG tablet  Commonly known as: MAG-OX   400 mg, Daily      metoprolol tartrate 25 MG tablet  Commonly known as: LOPRESSOR   25 mg, Oral, 2 Times Daily      Omega-3 1000 MG capsule   Daily      ondansetron ODT 4 MG disintegrating tablet  Commonly known as: ZOFRAN-ODT   4 mg, Translingual, Every 8 Hours PRN      Stimulant Laxative 8.6-50 MG per tablet  Generic drug: sennosides-docusate   1 tablet, Oral, 2 Times Daily PRN      Vitamin B 12 500 MCG tablet   500 mcg, Oral, Daily, OTC      Vitamin D3 25 MCG (1000 UT) capsule   1,000 Units, Oral, Daily, OTC             Stop These Medications      esomeprazole 40 MG capsule  Commonly known as: nexIUM  Replaced by: pantoprazole 40 MG EC tablet              Allergies   Allergen Reactions    Pravastatin Myalgia         Discharge Disposition:  Home or Self Care    Diet:  Hospital:  Diet Order   Procedures    Diet: Regular/House, Gastrointestinal; Fiber-Restricted, Low Irritant; Texture: Soft to Chew (NDD 3); Soft to Chew: Whole Meat; Fluid Consistency: Thin (IDDSI 0)       Diet Instructions       Diet: Regular/House Diet, Cardiac Diets, Gastrointestinal Diets; Healthy Heart (2-3 Na+); Soft to Chew (NDD 3); Whole Meat; Thin (IDDSI 0); Low Irritant      Discharge Diet:  Regular/House Diet  Cardiac Diets  Gastrointestinal Diets       Cardiac Diet:  Healthy Heart (2-3 Na+)    Texture: Soft to Chew (NDD 3)    Soft to Chew: Whole Meat    Fluid Consistency: Thin (IDDSI 0)    Gastrointestinal Diet: Low Irritant             Activity:  Activity Instructions       Activity as Tolerated      Measure Blood Pressure              Restrictions or Other Recommendations:       CODE STATUS:    Code Status and Medical Interventions: CPR (Attempt to Resuscitate); Full Support   Ordered at: 12/11/24 1653     Level Of Support Discussed With:    Patient     Code Status (Patient has no pulse and is not breathing):    CPR (Attempt to Resuscitate)     Medical Interventions (Patient has pulse or is breathing):    Full Support       Future Appointments   Date Time Provider Department Center   12/31/2024  9:30 AM ACCESS AND LAB DRAW CHAIR 1  [facility]   12/31/2024 10:00 AM Jorje Montenegro MD [facility]   12/31/2024 11:00 AM CHAIR 13  [facility]   2/19/2025 10:45 AM Jacinda Hill APRN [facility]   2/25/2025  3:45 PM Carlos Kay MD [facility]       Additional Instructions for the Follow-ups that You Need to Schedule       Discharge Follow-up with PCP   As directed       Currently Documented PCP:    Timi Conway DO    PCP Phone Number:    [phone]     Follow Up Details: PCP 1 week discharge (please schedule appointment prior to DC)        Discharge Follow-up with Specialty: Keep prior scheduled follow-up with primary cardiologist Dr. Kay in February 2025   As directed      Specialty: Keep prior scheduled follow-up with primary cardiologist Dr. Kay in February 2025        Discharge Follow-up with Specified Provider: Follow-up [facility] 1 week of discharge (please schedule appointment prior to DC)   As directed      To: Follow-up [facility] 1 week of discharge (please schedule appointment prior to DC)        Discharge Follow-up with Specified Provider: Keep prior scheduled follow-up with   Jorje Montenegro 12/31/2024 as scheduled   As directed      To: Keep prior scheduled follow-up with Dr. Jorje Montenegro 12/31/2024 as scheduled                [facility]  12/13/24      Time Spent on Discharge:  I spent 40 minutes on this discharge activity which included: face-to-face encounter with the patient, reviewing the data in the system, coordination of the care with the nursing staff as well as consultants, documentation, and entering orders.

## 2024-12-13 NOTE — OUTREACH NOTE
Prep Survey      Flowsheet Row Responses   [facility] patient discharged from? [place]   Is LACE score < 7 ? Yes   Eligibility [facility]   Date of Admission 12/11/24   Date of Discharge 12/13/24   Discharge diagnosis Vasovagal syncope   Does the patient have one of the following disease processes/diagnoses(primary or secondary)? Other   Prep survey completed? Yes            Merary ALBA - Registered Nurse

## 2024-12-13 NOTE — CASE MANAGEMENT/SOCIAL WORK
Continued Stay Note   [place]     Patient Name: Sheree Hernandez  MRN: [id number]  Today's Date: 12/13/2024    Admit Date: 12/11/2024    Plan: Home at DC   Discharge Plan       Row Name 12/13/24 1004       Plan    Plan Home at DC    Patient/Family in Agreement with Plan yes    Plan Comments I spoke with the pt. She denies any DC needs at this time.    Final Discharge Disposition Code 01 - home or self-care                   Discharge Codes    No documentation.                 Expected Discharge Date and Time       Expected Discharge Date Expected Discharge Time    Dec 13, 2024               Shandra Davis RN

## 2024-12-13 NOTE — TELEPHONE ENCOUNTER
Caller: Sheree Hernandez    Relationship to patient: Self    Best call back number: [phone]    New or established patient?  [] New  [x] Established    Date of discharge: 12/13/2024    Facility discharged from: [facility]     Additional Details: ADMITTED 12/11/24 AND SCHEDULED [facility] FOLLOW UP ON 12/18/24 WITH PCP

## 2024-12-13 NOTE — ANESTHESIA PREPROCEDURE EVALUATION
" Anesthesia Evaluation     Patient summary reviewed and Nursing notes reviewed   NPO Solid Status: > 8 hours  NPO Liquid Status: > 2 hours           Airway   Mallampati: II  TM distance: >3 FB  Neck ROM: full  No difficulty expected  Dental - normal exam     Pulmonary - normal exam   (+) asthma,shortness of breath (FERREIRA), sleep apnea  Cardiovascular - normal exam    ECG reviewed  PT is on anticoagulation therapy  Patient on routine beta blocker    (+) hypertension, dysrhythmias Atrial Fib, hyperlipidemia  (-) CAD, angina, cardiac stents    ROS comment: ECG SB   CATH 2019 normal EF 60%    GXT PET yesterday Kusterer syncope (vagal) for high troponins  \"Normal MPS with no evidence of ischemia.  low risk study.EF > 70%  equivocal ECG stress test\".      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 progression in 2023 radiation in May 2023 as well as February 2024.     Wt Loss malnutrition     Musculoskeletal     Abdominal  - normal exam    Bowel sounds: normal.   Substance History - negative use     OB/GYN negative ob/gyn ROS         Other   arthritis,   history of cancer (LIVER)    ROS/Med Hx Other: Syncope due to ABLA HCT 29  PLTS <90K  Creat K  OK   No nausea today                 Anesthesia Plan    ASA 3     general     (PROPOFOL)  intravenous induction     Anesthetic plan, risks, benefits, and alternatives have been provided, discussed and informed consent has been obtained with: patient.    Plan discussed with CRNA.    CODE STATUS:    Level Of Support Discussed With: Patient  Code Status (Patient has no pulse and is not breathing): CPR (Attempt to Resuscitate)  Medical Interventions (Patient has pulse or is breathing): Full Support      "

## 2024-12-13 NOTE — ANESTHESIA POSTPROCEDURE EVALUATION
Patient: Sheree Hernandez    Procedure Summary       Date: 12/13/24 Room / Location:  [facility]    Anesthesia Start: 1254 Anesthesia Stop: 1318    Procedure: ESOPHAGOGASTRODUODENOSCOPY Diagnosis:     Surgeons: Mihir Encinas MD Provider: Sharath Weiner MD    Anesthesia Type: general ASA Status: 3            Anesthesia Type: general    Vitals  Vitals Value Taken Time   BP 95/51 12/13/24 1315   Temp     Pulse 57 12/13/24 1318   Resp     SpO2 99 % 12/13/24 1318   Vitals shown include unfiled device data.        Post Anesthesia Care and Evaluation    Patient location during evaluation: PACU  Patient participation: complete - patient participated  Level of consciousness: awake  Pain management: adequate    Airway patency: patent  Anesthetic complications: No anesthetic complications  PONV Status: none  Cardiovascular status: hemodynamically stable and acceptable  Respiratory status: nonlabored ventilation, acceptable and nasal cannula  Hydration status: acceptable

## 2024-12-16 ENCOUNTER — TRANSITIONAL CARE MANAGEMENT TELEPHONE ENCOUNTER (OUTPATIENT)
Dept: CALL CENTER | Facility: HOSPITAL | Age: 84
End: 2024-12-16
Payer: MEDICARE

## 2024-12-16 LAB
CYTO UR: NORMAL
LAB AP CASE REPORT: NORMAL
LAB AP CLINICAL INFORMATION: NORMAL
PATH REPORT.FINAL DX SPEC: NORMAL
PATH REPORT.GROSS SPEC: NORMAL

## 2024-12-16 NOTE — OUTREACH NOTE
Call Center TCM Note      Flowsheet Row Responses   [facility] patient discharged from? [place]   Does the patient have one of the following disease processes/diagnoses(primary or secondary)? Other   TCM attempt successful? Yes   Call start time 1017   Call end time 1020   Discharge diagnosis Vasovagal syncope   Meds reviewed with patient/caregiver? Yes   Is the patient having any side effects they believe may be caused by any medication additions or changes? No   Does the patient have all medications ordered at discharge? Yes   Is the patient taking all medications as directed (includes completed medication regime)? Yes   Medication comments Bleeding precautions discussed since patient was started back on Eliquis   Comments Hospital follow up appt with PCP Dr. Conway for 12/18   Does the patient have an appointment with their PCP within 7-14 days of discharge? Yes   Has home health visited the patient within 72 hours of discharge? N/A   Psychosocial issues? No   Did the patient receive a copy of their discharge instructions? Yes   Nursing interventions Reviewed instructions with patient   What is the patient's perception of their health status since discharge? Improving   Is the patient/caregiver able to teach back signs and symptoms related to disease process for when to call PCP? Yes   Is the patient/caregiver able to teach back signs and symptoms related to disease process for when to call 911? Yes   Is the patient/caregiver able to teach back the hierarchy of who to call/visit for symptoms/problems? PCP, Specialist, Home health nurse, Urgent Care, ED, 911 Yes   If the patient is a current smoker, are they able to teach back resources for cessation? Not a smoker   TCM call completed? Yes   Wrap up additional comments Doing well but states she is tired, denies any questions or concerns, confirmed hospital follow up appt with PCP Dr. Conway for 12/18.   Call end time 1020   Would this patient benefit from a  Referral to [facility] Social Work? No   Is the patient interested in additional calls from an ambulatory ? No            Colette Cruz RN    12/16/2024, 10:20 EST

## 2024-12-18 ENCOUNTER — OFFICE VISIT (OUTPATIENT)
Dept: FAMILY MEDICINE CLINIC | Facility: CLINIC | Age: 84
End: 2024-12-18
Payer: MEDICARE

## 2024-12-18 VITALS
HEIGHT: 64 IN | DIASTOLIC BLOOD PRESSURE: 68 MMHG | WEIGHT: 138 LBS | BODY MASS INDEX: 23.56 KG/M2 | HEART RATE: 74 BPM | SYSTOLIC BLOOD PRESSURE: 122 MMHG | OXYGEN SATURATION: 97 %

## 2024-12-18 DIAGNOSIS — D64.9 ANEMIA, UNSPECIFIED TYPE: ICD-10-CM

## 2024-12-18 DIAGNOSIS — R53.1 GENERALIZED WEAKNESS: ICD-10-CM

## 2024-12-18 DIAGNOSIS — K29.70 GASTRITIS WITHOUT BLEEDING, UNSPECIFIED CHRONICITY, UNSPECIFIED GASTRITIS TYPE: Primary | ICD-10-CM

## 2024-12-18 DIAGNOSIS — R53.82 CHRONIC FATIGUE: ICD-10-CM

## 2024-12-18 DIAGNOSIS — F33.9 EPISODE OF RECURRENT MAJOR DEPRESSIVE DISORDER, UNSPECIFIED DEPRESSION EPISODE SEVERITY: ICD-10-CM

## 2024-12-18 PROCEDURE — 1126F AMNT PAIN NOTED NONE PRSNT: CPT | Performed by: INTERNAL MEDICINE

## 2024-12-18 PROCEDURE — 3074F SYST BP LT 130 MM HG: CPT | Performed by: INTERNAL MEDICINE

## 2024-12-18 PROCEDURE — 1159F MED LIST DOCD IN RCRD: CPT | Performed by: INTERNAL MEDICINE

## 2024-12-18 PROCEDURE — 1111F DSCHRG MED/CURRENT MED MERGE: CPT | Performed by: INTERNAL MEDICINE

## 2024-12-18 PROCEDURE — 3078F DIAST BP <80 MM HG: CPT | Performed by: INTERNAL MEDICINE

## 2024-12-18 PROCEDURE — 1160F RVW MEDS BY RX/DR IN RCRD: CPT | Performed by: INTERNAL MEDICINE

## 2024-12-18 PROCEDURE — 99495 TRANSJ CARE MGMT MOD F2F 14D: CPT | Performed by: INTERNAL MEDICINE

## 2024-12-18 RX ORDER — DULOXETIN HYDROCHLORIDE 30 MG/1
30 CAPSULE, DELAYED RELEASE ORAL DAILY
Qty: 30 CAPSULE | Refills: 5 | Status: SHIPPED | OUTPATIENT
Start: 2024-12-18

## 2024-12-18 NOTE — PROGRESS NOTES
Chief Complaint   Patient presents with    Hospital Follow Up Visit     Admitted from 12/11/2024-12/13/2024, Vasovagal syncope   Current outpatient and discharge medications have been reconciled for the patient.  Reviewed by: Timi Conway DO      HPI:  Sheree Hernandez is a 84 y.o. female who presents today for follow-up gastritis and syncope.  Continues to have chronic fatigue and tiredness.  Increased oral intake and decreased abdominal pain since starting on antacids.    ROS:  Constitutional: no fevers, night sweats or unexplained weight loss  Eyes: no vision changes  ENT: no runny nose, ear pain, sore throat  Cardio: no chest pain, palpitations  Pulm: no shortness of breath, wheezing, or cough  GI: no abdominal pain or changes in bowel movements  : no difficulty urinating  MSK: no difficulty ambulating, no joint pain  Neuro: no weakness, dizziness or headache  Psych: no trouble sleeping  Endo: no change in appetite      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    Osteopenia 2018    PAF (paroxysmal atrial fibrillation) 07/15/2024    Plantar fasciitis     Chronic    PVC's (premature ventricular contractions)     Senile keratosis     Multiple    MANAN (stress urinary incontinence, female)     Syncope, near     with negative cardiovascular workup      Family History   Problem Relation Age of Onset    Heart attack Mother     Heart failure Mother     Dementia Mother     Stroke Mother     Arthritis Mother          Heart Attack    Heart disease Father     Hearing loss Father     Stroke Father     Arrhythmia Brother     Breast cancer Paternal Aunt       Social History     Socioeconomic History    Marital status:     Number of children: 2   Tobacco Use    Smoking status: Never     Passive exposure: Never    Smokeless tobacco: Never    Tobacco comments:     Exposed to secondhand smoke   Vaping Use    Vaping status: Never Used   Substance and Sexual Activity    Alcohol use: Never     Comment: Very seldom    Drug use: Never    Sexual activity: Not Currently     Partners: Male     Birth control/protection: None, Hysterectomy     Comment: Complete Hysterectomy      Allergies   Allergen Reactions    Pravastatin Myalgia      Immunization History   Administered Date(s) Administered    COVID-19 (PFIZER) Purple Cap Monovalent 02/15/2021, 03/06/2021    Fluzone High-Dose 65+YRS 11/03/2017, 09/11/2018, 10/15/2019    Fluzone High-Dose 65+yrs 10/10/2023    Influenza, Unspecified 10/10/2020, 10/07/2021    Pneumococcal Polysaccharide (PPSV23) 05/17/2013, 12/06/2016, 05/15/2018    TD Preservative Free (Tenivac) 11/09/2016    Td (TDVAX) 05/18/1997        PE:  Vitals:    12/18/24 1323   BP: 122/68   Pulse: 74   SpO2: 97%      Body mass index is 23.68 kg/m².    Gen Appearance: NAD  HEENT: Normocephalic, PERRLA, no thyromegaly, trache midline  Heart: RRR, normal S1 and S2, no murmur  Lungs: CTA b/l, no wheezing, no crackles  Abdomen: Soft, non-tender, non-distended, no guarding and BSx4  MSK: Moves all extremities well, normal gait, no peripheral edema  Pulses: Palpable and equal b/l  Lymph nodes: No palpable lymphadenopathy   Neuro: No focal deficits      Current Outpatient Medications   Medication Sig Dispense Refill    apixaban (ELIQUIS) 2.5 MG tablet tablet Take 1 tablet by mouth 2 (Two) Times a Day. 60 tablet 11    Cholecalciferol (Vitamin D3) 25 MCG (1000 UT) capsule Take 1 capsule by mouth Daily. OTC      coenzyme Q10 50 MG  capsule capsule Take 1 capsule by mouth Daily. OTC      Cyanocobalamin (Vitamin B 12) 500 MCG tablet Take 1 tablet by mouth Daily. OTC      Folic Acid 0.8 MG capsule Take  by mouth.      ipratropium (ATROVENT) 0.06 % nasal spray Administer 1 spray into each nostril twice daily 30 mL 3    lidocaine-prilocaine (EMLA) 2.5-2.5 % cream Please apply to port site 30 min prior to infusion and cover with Saran Wrap 30 g 3    magnesium oxide (MAG-OX) 400 MG tablet Take 1 tablet by mouth Daily.      metoprolol tartrate (LOPRESSOR) 25 MG tablet Take 1 tablet by mouth 2 (Two) Times a Day. 60 tablet 11    Omega-3 1000 MG capsule Take  by mouth Daily.      ondansetron ODT (ZOFRAN-ODT) 4 MG disintegrating tablet Place 1 tablet on the tongue Every 8 (Eight) Hours As Needed for Nausea or Vomiting. 60 tablet 3    pantoprazole (PROTONIX) 40 MG EC tablet Take 1 tablet by mouth 2 (Two) Times a Day Before Meals. 60 tablet 0    polyethylene glycol (MIRALAX) 17 g packet Take 17 g by mouth Daily As Needed (Use if senna-docusate is ineffective).      sennosides-docusate (senna-docusate sodium) 8.6-50 MG per tablet Take 1 tablet by mouth 2 (Two) Times a Day As Needed for Constipation. 60 tablet 0    sucralfate (CARAFATE) 1 g tablet Take 1 tablet by mouth 4 (Four) Times a Day Before Meals & at Bedtime 80 tablet 0    acetaminophen (TYLENOL) 325 MG tablet Take 2 tablets by mouth Every 4 (Four) Hours As Needed for Mild Pain.      albuterol sulfate  (90 Base) MCG/ACT inhaler Inhale 1 puff Every 4 (Four) Hours As Needed for Wheezing. 18 g 2    DULoxetine (Cymbalta) 30 MG capsule Take 1 capsule by mouth Daily. 30 capsule 5    levothyroxine (SYNTHROID, LEVOTHROID) 25 MCG tablet Take 1 tablet by mouth Every Morning. 30 tablet 3     No current facility-administered medications for this visit.      Continue antiacids, Protonix twice daily along with sucralfate.  Appetite improving.  Checking labs.    Is interested in starting on antidepressant.   Depression symptoms may be attributing to fatigue.    Requesting to see neurology for weakness and chronic fatigue.  Has had extensive workup in the past for chronic fatigue syndrome.    Diagnoses and all orders for this visit:    1. Gastritis without bleeding, unspecified chronicity, unspecified gastritis type (Primary)    2. Chronic fatigue    3. Episode of recurrent major depressive disorder, unspecified depression episode severity  -     DULoxetine (Cymbalta) 30 MG capsule; Take 1 capsule by mouth Daily.  Dispense: 30 capsule; Refill: 5    4. Generalized weakness         No follow-ups on file.     Dictated Utilizing Dragon Dictation    Please note that portions of this note were completed with a voice recognition program.    Part of this note may be an electronic transcription/translation of spoken language to printed text using the Dragon Dictation System.

## 2024-12-19 ENCOUNTER — PATIENT OUTREACH (OUTPATIENT)
Dept: CASE MANAGEMENT | Facility: OTHER | Age: 84
End: 2024-12-19
Payer: MEDICARE

## 2024-12-19 NOTE — PLAN OF CARE
Problem: Cancer Treatment Phase  Goal: Manage Fatigue (Tiredness)  Outcome: Not Progressing  Intervention: My Fatigue Management To Do List  Description:   Why is this important?  Cancer treatment and its side effects can drain your energy. It can keep you from doing things you would like to do.  There are many things that you can do to manage fatigue.    Flowsheets (Taken 12/19/2024 1255)  My Fatigue Management To Do List:   eat healthy   go outside each day if weather allows     Problem: Cancer Treatment Phase  Goal: Manage Fatigue (Tiredness)  Intervention: My Fatigue Management To Do List  Description:   Why is this important?  Cancer treatment and its side effects can drain your energy. It can keep you from doing things you would like to do.  There are many things that you can do to manage fatigue.    Flowsheets (Taken 12/19/2024 1255)  My Fatigue Management To Do List:   eat healthy   go outside each day if weather allows     Problem: Cancer Treatment Phase  Goal: Keep Pain Under Control  Outcome: Progressing     Problem: Cancer Treatment Phase  Goal: Optimal Care Coordination of Patient With Cancer: Treatment Phase  Intervention: Alleviate Barriers to Constipation Management  Flowsheets (Taken 12/19/2024 1255)  Alleviate Barriers to Constipation Management:   fluid status assessed and trended   response to pharmacologic therapy monitored     Problem: Cancer Treatment Phase  Goal: Optimal Care Coordination of Patient With Cancer: Treatment Phase  Intervention: Manage Fatigue and Maintain Strength  Flowsheets (Taken 12/19/2024 1255)  Manage Fatigue and Maintain Strength:   activity or exercise based on tolerance encouraged   alternating periods of activity with rest promoted

## 2024-12-19 NOTE — OUTREACH NOTE
AMBULATORY CASE MANAGEMENT NOTE    Names and Relationships of Patient/Support Persons: Contact: Sheree Hernandez; Relationship: Self -     Patient Outreach    Mrs. Hernandez was admitted to [facility] on 12/11/2024 and was discharged 12/13/2024: Vasovagal syncope, weight loss and nausea. Discharged home with instructions to visit  heart valve center in 1 week and Dr. Montenegro and cardiology as previously planned.  Discharge medications included: Tylenol 650 mg every 4 hours PRN, pantoprazole 40 mg BID, before meals, polyethylene glycol 17 G daily and sucralfate 1 G QID before meals and nightly. All medications reviewed with patient, taking all as prescribed except levothyroxine 25 mcg every morning. Patient complains of fatigued and spends most of her day on the couch. States she stopped taking her levothyroxine awhile back, she didn't think she needed it. Discharge instructions reviewed with patient to restart levothyroxine and take in the moring.    reports her stomach pain is improved with the addition of pantoprazole and sucralfate. Patient also states she is having regular bowel movements with addition of polyethylene glycol daily.  Mrs. Hernandez says she is able to eat now that stomach discomfort is relieved and is hydrating adequately.  Patient started on Duloxetine for depression, she voices concern of interfering with metoprolol tartrate.  Reviewed Dr. Kay's reply of monitoring heart rate. Education provided on reporting any dizziness or increased fatigue. Education also provided on taking pulse for 1 minute and notifying Dr. Kay of heart rate below 60 bpm.       Education Documentation  Medication Management - anti-depressant, levothyroxine, ondansetron, taught by Jenise Joseph, CHAPIN at 12/19/2024  1:02 PM.  Learner: Patient  Readiness: Acceptance  Method: Explanation  Response: Verbalizes Understanding    Addendum:  Patient called to reports she took duloxetine and vomited in approximately 30  minutes post administration. States she did not see the pill, instructed not to repeat dose today start tomorrow. Patient education provided on use of ondansetron for nausea.      Jenise CASTELLANOS  Ambulatory Case Management    12/19/2024, 13:02 EST

## 2024-12-20 DIAGNOSIS — C22.1 INTRAHEPATIC CHOLANGIOCARCINOMA: Primary | ICD-10-CM

## 2024-12-22 LAB
QT INTERVAL: 446 MS
QTC INTERVAL: 430 MS

## 2024-12-23 ENCOUNTER — TELEPHONE (OUTPATIENT)
Dept: PSYCHIATRY | Facility: CLINIC | Age: 84
End: 2024-12-23
Payer: MEDICARE

## 2024-12-23 DIAGNOSIS — C22.1 INTRAHEPATIC CHOLANGIOCARCINOMA: Primary | ICD-10-CM

## 2024-12-23 DIAGNOSIS — R53.83 FATIGUE, UNSPECIFIED TYPE: ICD-10-CM

## 2024-12-23 DIAGNOSIS — R63.4 WEIGHT LOSS: ICD-10-CM

## 2024-12-23 RX ORDER — METHYLPHENIDATE HYDROCHLORIDE 5 MG/1
5 TABLET ORAL 2 TIMES DAILY
Qty: 60 TABLET | Refills: 0 | Status: SHIPPED | OUTPATIENT
Start: 2024-12-23

## 2024-12-23 NOTE — TELEPHONE ENCOUNTER
I talked with Dr Montenegro and he said that he would be willing to write for ritalin to see if it helps with her fatigue until she sees Kin Jacques with [facility] will call patient and let her know that Dr Montenegro sent the script.

## 2024-12-23 NOTE — TELEPHONE ENCOUNTER
PATIENT CALLED AND STATED THAT SHE WOULD NEED A REFILL ON [facility]ITALIN. INFORMED PATIENT THAT BASSEM CALZADA WAS OUT OF OFFICE AND THAT A MESSAGE WOULD BE SENT TO THE PROVIDER. PATIENT VERBALIZED UNDERSTANDING AND SCHEDULED TELEHEALTH VISIT FOR 12/30. PATIENT WAS HOPING TO GET A REFILL BEFORE THE HOLIDAY. PLEASE ADVISE.

## 2024-12-30 RX ORDER — SODIUM CHLORIDE 9 MG/ML
20 INJECTION, SOLUTION INTRAVENOUS ONCE
OUTPATIENT
Start: 2024-12-31

## 2024-12-31 ENCOUNTER — HOSPITAL ENCOUNTER (OUTPATIENT)
Dept: ONCOLOGY | Facility: HOSPITAL | Age: 84
Discharge: HOME | End: 2024-12-31
Admitting: INTERNAL MEDICINE
Payer: MEDICARE

## 2024-12-31 ENCOUNTER — APPOINTMENT (OUTPATIENT)
Dept: ONCOLOGY | Facility: HOSPITAL | Age: 84
End: 2024-12-31
Payer: MEDICARE

## 2024-12-31 ENCOUNTER — DOCUMENTATION (OUTPATIENT)
Dept: OTHER | Facility: HOSPITAL | Age: 84
End: 2024-12-31
Payer: MEDICARE

## 2024-12-31 ENCOUNTER — TELEPHONE (OUTPATIENT)
Dept: ONCOLOGY | Facility: CLINIC | Age: 84
End: 2024-12-31

## 2024-12-31 ENCOUNTER — OFFICE VISIT (OUTPATIENT)
Dept: ONCOLOGY | Facility: CLINIC | Age: 84
End: 2024-12-31
Payer: MEDICARE

## 2024-12-31 VITALS
HEIGHT: 64 IN | OXYGEN SATURATION: 99 % | TEMPERATURE: 98 F | RESPIRATION RATE: 18 BRPM | HEART RATE: 78 BPM | SYSTOLIC BLOOD PRESSURE: 136 MMHG | WEIGHT: 135.9 LBS | DIASTOLIC BLOOD PRESSURE: 75 MMHG | BODY MASS INDEX: 23.2 KG/M2

## 2024-12-31 DIAGNOSIS — D64.9 SYMPTOMATIC ANEMIA: Primary | ICD-10-CM

## 2024-12-31 DIAGNOSIS — K29.70 GASTRITIS WITHOUT BLEEDING, UNSPECIFIED CHRONICITY, UNSPECIFIED GASTRITIS TYPE: ICD-10-CM

## 2024-12-31 DIAGNOSIS — C22.1 INTRAHEPATIC CHOLANGIOCARCINOMA: Primary | ICD-10-CM

## 2024-12-31 DIAGNOSIS — R53.1 WEAKNESS: ICD-10-CM

## 2024-12-31 DIAGNOSIS — R53.1 GENERALIZED WEAKNESS: ICD-10-CM

## 2024-12-31 DIAGNOSIS — D64.9 ANEMIA, UNSPECIFIED TYPE: ICD-10-CM

## 2024-12-31 DIAGNOSIS — F33.9 EPISODE OF RECURRENT MAJOR DEPRESSIVE DISORDER, UNSPECIFIED DEPRESSION EPISODE SEVERITY: ICD-10-CM

## 2024-12-31 DIAGNOSIS — R53.82 CHRONIC FATIGUE: ICD-10-CM

## 2024-12-31 LAB
ALBUMIN SERPL-MCNC: 3.4 G/DL (ref 3.5–5.2)
ALBUMIN/GLOB SERPL: 1.2 G/DL
ALP SERPL-CCNC: 149 U/L (ref 39–117)
ALT SERPL W P-5'-P-CCNC: 9 U/L (ref 1–33)
ANION GAP SERPL CALCULATED.3IONS-SCNC: 13 MMOL/L (ref 5–15)
AST SERPL-CCNC: 22 U/L (ref 1–32)
BASOPHILS # BLD AUTO: 0.03 10*3/MM3 (ref 0–0.2)
BASOPHILS NFR BLD AUTO: 0.6 % (ref 0–1.5)
BILIRUB SERPL-MCNC: 0.8 MG/DL (ref 0–1.2)
BUN SERPL-MCNC: 12 MG/DL (ref 8–23)
BUN/CREAT SERPL: 12.2 (ref 7–25)
CALCIUM SPEC-SCNC: 9.1 MG/DL (ref 8.6–10.5)
CANCER AG19-9 SERPL-ACNC: 20 U/ML
CHLORIDE SERPL-SCNC: 101 MMOL/L (ref 98–107)
CO2 SERPL-SCNC: 24 MMOL/L (ref 22–29)
CREAT SERPL-MCNC: 0.98 MG/DL (ref 0.57–1)
DEPRECATED RDW RBC AUTO: 63.2 FL (ref 37–54)
EGFRCR SERPLBLD CKD-EPI 2021: 56.7 ML/MIN/1.73
EOSINOPHIL # BLD AUTO: 0.19 10*3/MM3 (ref 0–0.4)
EOSINOPHIL NFR BLD AUTO: 3.7 % (ref 0.3–6.2)
ERYTHROCYTE [DISTWIDTH] IN BLOOD BY AUTOMATED COUNT: 17.6 % (ref 12.3–15.4)
FERRITIN SERPL-MCNC: 696 NG/ML (ref 13–150)
GLOBULIN UR ELPH-MCNC: 2.8 GM/DL
GLUCOSE SERPL-MCNC: 131 MG/DL (ref 65–99)
HCT VFR BLD AUTO: 26.3 % (ref 34–46.6)
HGB BLD-MCNC: 8.8 G/DL (ref 12–15.9)
IMM GRANULOCYTES # BLD AUTO: 0.02 10*3/MM3 (ref 0–0.05)
IMM GRANULOCYTES NFR BLD AUTO: 0.4 % (ref 0–0.5)
IRON 24H UR-MRATE: 71 MCG/DL (ref 37–145)
IRON SATN MFR SERPL: 29 % (ref 20–50)
LYMPHOCYTES # BLD AUTO: 0.68 10*3/MM3 (ref 0.7–3.1)
LYMPHOCYTES NFR BLD AUTO: 13.2 % (ref 19.6–45.3)
MCH RBC QN AUTO: 32.7 PG (ref 26.6–33)
MCHC RBC AUTO-ENTMCNC: 33.5 G/DL (ref 31.5–35.7)
MCV RBC AUTO: 97.8 FL (ref 79–97)
MONOCYTES # BLD AUTO: 0.94 10*3/MM3 (ref 0.1–0.9)
MONOCYTES NFR BLD AUTO: 18.3 % (ref 5–12)
NEUTROPHILS NFR BLD AUTO: 3.28 10*3/MM3 (ref 1.7–7)
NEUTROPHILS NFR BLD AUTO: 63.8 % (ref 42.7–76)
PLATELET # BLD AUTO: 101 10*3/MM3 (ref 140–450)
PMV BLD AUTO: 12.3 FL (ref 6–12)
POTASSIUM SERPL-SCNC: 3.5 MMOL/L (ref 3.5–5.2)
PROT SERPL-MCNC: 6.2 G/DL (ref 6–8.5)
RBC # BLD AUTO: 2.69 10*6/MM3 (ref 3.77–5.28)
SODIUM SERPL-SCNC: 138 MMOL/L (ref 136–145)
T4 FREE SERPL-MCNC: 1.65 NG/DL (ref 0.92–1.68)
TIBC SERPL-MCNC: 244 MCG/DL (ref 298–536)
TRANSFERRIN SERPL-MCNC: 164 MG/DL (ref 200–360)
TSH SERPL DL<=0.05 MIU/L-ACNC: 3.98 UIU/ML (ref 0.27–4.2)
WBC NRBC COR # BLD AUTO: 5.14 10*3/MM3 (ref 3.4–10.8)

## 2024-12-31 PROCEDURE — 3075F SYST BP GE 130 - 139MM HG: CPT | Performed by: INTERNAL MEDICINE

## 2024-12-31 PROCEDURE — 82728 ASSAY OF FERRITIN: CPT | Performed by: INTERNAL MEDICINE

## 2024-12-31 PROCEDURE — 99214 OFFICE O/P EST MOD 30 MIN: CPT | Performed by: INTERNAL MEDICINE

## 2024-12-31 PROCEDURE — 80053 COMPREHEN METABOLIC PANEL: CPT | Performed by: INTERNAL MEDICINE

## 2024-12-31 PROCEDURE — 36591 DRAW BLOOD OFF VENOUS DEVICE: CPT

## 2024-12-31 PROCEDURE — 84466 ASSAY OF TRANSFERRIN: CPT | Performed by: INTERNAL MEDICINE

## 2024-12-31 PROCEDURE — 3078F DIAST BP <80 MM HG: CPT | Performed by: INTERNAL MEDICINE

## 2024-12-31 PROCEDURE — 25010000002 HEPARIN LOCK FLUSH PER 10 UNITS: Performed by: INTERNAL MEDICINE

## 2024-12-31 PROCEDURE — 84443 ASSAY THYROID STIM HORMONE: CPT | Performed by: INTERNAL MEDICINE

## 2024-12-31 PROCEDURE — 84439 ASSAY OF FREE THYROXINE: CPT | Performed by: INTERNAL MEDICINE

## 2024-12-31 PROCEDURE — 1125F AMNT PAIN NOTED PAIN PRSNT: CPT | Performed by: INTERNAL MEDICINE

## 2024-12-31 PROCEDURE — 83540 ASSAY OF IRON: CPT | Performed by: INTERNAL MEDICINE

## 2024-12-31 PROCEDURE — 85025 COMPLETE CBC W/AUTO DIFF WBC: CPT | Performed by: INTERNAL MEDICINE

## 2024-12-31 PROCEDURE — 86301 IMMUNOASSAY TUMOR CA 19-9: CPT | Performed by: INTERNAL MEDICINE

## 2024-12-31 RX ORDER — HEPARIN SODIUM (PORCINE) LOCK FLUSH IV SOLN 100 UNIT/ML 100 UNIT/ML
500 SOLUTION INTRAVENOUS AS NEEDED
OUTPATIENT
Start: 2024-12-31

## 2024-12-31 RX ORDER — HEPARIN SODIUM (PORCINE) LOCK FLUSH IV SOLN 100 UNIT/ML 100 UNIT/ML
500 SOLUTION INTRAVENOUS AS NEEDED
Status: DISCONTINUED | OUTPATIENT
Start: 2024-12-31 | End: 2025-01-01 | Stop reason: HOSPADM

## 2024-12-31 RX ADMIN — Medication 500 UNITS: at 11:07

## 2024-12-31 NOTE — PROGRESS NOTES
LAURIE was consulted by MD to discuss support group information for pt. LAURIE spoke with pt's daughter and pt and educated on  support group at [facility]. LAURIE will email flyer to her. Dr. Fuentes ordered a rollator walker, therefore LAURIE sent required documentation to [facility] on this date. LAURIE contacted pt's daughter to rely above information and left voicemail with contact information should other needs arise.      Interventions:  Emotional Needs: support groups  Physical Needs: DME (Rollator walker order sent to [facility])

## 2024-12-31 NOTE — PROGRESS NOTES
Follow Up Office Visit      Date: 2024     Patient Name: Sheree Hernandez  MRN: [id number]  : 1940  Chief Complaint:  Follow-up for intrahepatic cholangiocarcinoma      History of Present Illness: Sheree Hernandez is a pleasant 79 y.o. female with a past medical history of hyperlipidemia and hypertension who presents today for evaluation of concern for hemochromatosis. The patient is accompanied by their self who contributes to the history of their care.  Patient states that over the past 2 years she has been having worsening fatigue especially with exertion.  Over the past several months this has become worse.  Patient states that she gets tired with basic activities including showering getting dressed in the morning and walking to and from her car from stores.  She states that her fatigue gets better after a few minutes of rest.  She has previously had an echocardiogram and cardiac catheterization within the past 2 years which did not reveal any cause of her fatigue with exertion.  She is also had an ultrasound of the liver which revealed stable cyst.  She was recently seen by her PCP who ordered iron studies which was notable for an elevated ferritin to 246.  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  All other mutations were negative.  She is currently up-to-date on her mammograms and colonoscopy with no active malignancies noted.  She is otherwise compliant with her statin and high blood pressure medicines.  Repeat abdominal imaging in 2021 concerning for enlarging liver lesion.  She was seen by Dr. Sharma and  is status post open right hepatectomy, cholecystectomy, right partial adrenalectomy on 2021.  Pathology showed a focal R1 resected margin.  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  0/4 lymph nodes were  positive for disease.  LVI and PNI were present.  Finished chemoXRT in January 2021.      Interval History:  Presents to clinic for follow-up.  Completed radiation in May 2023 and again in August 2023.  Completed 6 cycles of cisplatin/gemcitabine/Durvalumab in August 2024.  Treatment discontinued due to toxicity.  Currently on maintenance Durvalumab which has been on hold since October due to her significant fatigue and tiredness.  Was hospitalized in December 2024 due to continued abdominal pain and discomfort.  Underwent an EGD which showed a clean-based nonbleeding ulcer.  Started on PPI with some improvement of her symptoms.  Has noted 2 episodes of dark tarry stools but they were self-limiting.  Still having fatigue and tiredness.  Still having slight weight loss    Oncology History:    Oncology/Hematology History   Intrahepatic cholangiocarcinoma   8/17/2021 Initial Diagnosis    Intrahepatic cholangiocarcinoma (CMS/HCC)     8/17/2021 Cancer Staged    Staging form: Intrahepatic Bile Duct, AJCC 8th Edition  - Clinical: Stage IB (cT1b, cN0, cM0) - Signed by Jorje Montengero MD on 8/17/2021 11/5/2021 Cancer Staged    Staging form: Intrahepatic Bile Duct, AJCC 8th Edition  - Pathologic: Stage IIIA (pT3, pN0, cM0) - Signed by Jorje Montenegro MD on 11/5/2021 12/9/2021 - 1/6/2022 Chemotherapy    OP GALLBLADDER Capecitabine + XRT     12/9/2021 - 1/19/2022 Radiation    Radiation OncologyTreatment Course:  Sheree Hernandez received 5040 cGy in 28 fractions to liver via External Beam Radiation - EBRT.     4/25/2023 - 5/8/2023 Radiation    Radiation OncologyTreatment Course:  Sheree Hernandez received 3500 cGy in 5 fractions to liver mass and lymph nodes via Stereotactic Radiation Therapy - SRT.     8/1/2023 - 8/11/2023 Radiation    Radiation OncologyTreatment Course:  Sheree Hernandez received 3500 cGy in 5 fractions to abdomen via Stereotactic Radiation Therapy - SRT.     1/9/2024 - 1/23/2024 Radiation     Radiation OncologyTreatment Course:  Sheree Hernandez received 3000 cGy in 5 fractions to abdomen via Stereotactic Radiation Therapy - SRT.     1/11/2024 - 1/11/2024 Radiation    Radiation OncologyTreatment Course:  Sheree Hernandez received 754 cGy in 1 fractions to left lung via Stereotactic Radiation Therapy - SRT.     2/1/2024 - 2/1/2024 Radiation    Radiation OncologyTreatment Course:  Sheree Hernandez received 3000 cGy in 1 fractions to left lung via Stereotactic Radiation Therapy - SRT.     4/19/2024 - 8/16/2024 Chemotherapy    OP HEPATOBILIARY CISplatin + Gemcitabine Days 1,8 / Durvalumab Q21D     5/17/2024 -  Chemotherapy    OP CENTRAL VENOUS ACCESS DEVICE Access, Care, and Maintenance (CVAD)     9/6/2024 -  Chemotherapy    OP CHOLANGIOCARCINOMA Durvalumab 1500 mg         Subjective      Review of Systems:   Constitutional: Negative for fevers, chills, or weight loss  Eyes: Negative for blurred vision or discharge         Ear/Nose/Throat: Negative for difficulty swallowing, sore throat, LAD                                                       Respiratory: Negative for cough, SOA, wheezing                                                                                        Cardiovascular: Negative for chest pain or palpitations                                                                  Gastrointestinal: Negative for nausea, vomiting or diarrhea                                                                     Genitourinary: Negative for dysuria or hematuria                                                                                           Musculoskeletal: Negative for any joint pains or muscle aches                                                                        Neurologic: Negative for any weakness, headaches, dizziness                                                                         Hematologic: Negative for any easy bleeding or bruising                                                                                    Psychiatric: Negative for anxiety or depression                          Past Medical History/Past Surgical History/ Family History/ Social History: Reviewed by me and unchanged from my previous documentation done on December 2024.     Medications:     Current Outpatient Medications:     apixaban (ELIQUIS) 2.5 MG tablet tablet, Take 1 tablet by mouth 2 (Two) Times a Day., Disp: 60 tablet, Rfl: 11    ipratropium (ATROVENT) 0.06 % nasal spray, Administer 1 spray into each nostril twice daily, Disp: 30 mL, Rfl: 3    levothyroxine (SYNTHROID, LEVOTHROID) 25 MCG tablet, Take 1 tablet by mouth Every Morning., Disp: 30 tablet, Rfl: 3    lidocaine-prilocaine (EMLA) 2.5-2.5 % cream, Please apply to port site 30 min prior to infusion and cover with Saran Wrap, Disp: 30 g, Rfl: 3    methylphenidate (Ritalin) 5 MG tablet, Take 1 tablet by mouth 2 (Two) Times a Day., Disp: 60 tablet, Rfl: 0    metoprolol tartrate (LOPRESSOR) 25 MG tablet, Take 1 tablet by mouth 2 (Two) Times a Day., Disp: 60 tablet, Rfl: 11    ondansetron ODT (ZOFRAN-ODT) 4 MG disintegrating tablet, Place 1 tablet on the tongue Every 8 (Eight) Hours As Needed for Nausea or Vomiting., Disp: 60 tablet, Rfl: 3    pantoprazole (PROTONIX) 40 MG EC tablet, Take 1 tablet by mouth 2 (Two) Times a Day Before Meals., Disp: 60 tablet, Rfl: 0    sennosides-docusate (senna-docusate sodium) 8.6-50 MG per tablet, Take 1 tablet by mouth 2 (Two) Times a Day As Needed for Constipation., Disp: 60 tablet, Rfl: 0    sucralfate (CARAFATE) 1 g tablet, Take 1 tablet by mouth 4 (Four) Times a Day Before Meals & at Bedtime, Disp: 80 tablet, Rfl: 0  No current facility-administered medications for this visit.    Facility-Administered Medications Ordered in Other Visits:     heparin injection 500 Units, 500 Units, Intravenous, PRN, Jorje Montenegro MD, 500 Units at 12/31/24 1107    Allergies:   Allergies   Allergen Reactions    Pravastatin Myalgia  "      Objective     Physical Exam:  Vital Signs:   Vitals:    12/31/24 0959   BP: 136/75   Pulse: 78   Resp: 18   Temp: 98 °F (36.7 °C)   TempSrc: Temporal   SpO2: 99%   Weight: 61.6 kg (135 lb 14.4 oz)   Height: 162.6 cm (64.02\")   PainSc:   4   PainLoc: Comment: achy stomach     Pain Score    12/31/24 0959   PainSc:   4   PainLoc: Comment: achy stomach     ECOG Performance Status: 2 - Symptomatic, <50% confined to bed    Constitutional: NAD, ECOG 2  Eyes: PERRLA, scleral anicteric  ENT: No LAD, no thyromegaly  Respiratory: CTAB, no wheezing, rales, rhonchi  Cardiovascular: RRR, no murmurs, pulses 2+ bilaterally  Abdomen: soft, NT/ND, no HSM  Musculoskeletal: strength 5/5 bilaterally, no c/c/e  Neurologic: A&O x 3, CN II-XII intact grossly    Results Review:   Hospital Outpatient Visit on 12/31/2024   Component Date Value Ref Range Status    Glucose 12/31/2024 131 (H)  65 - 99 mg/dL Final    BUN 12/31/2024 12  8 - 23 mg/dL Final    Creatinine 12/31/2024 0.98  0.57 - 1.00 mg/dL Final    Sodium 12/31/2024 138  136 - 145 mmol/L Final    Potassium 12/31/2024 3.5  3.5 - 5.2 mmol/L Final    Chloride 12/31/2024 101  98 - 107 mmol/L Final    CO2 12/31/2024 24.0  22.0 - 29.0 mmol/L Final    Calcium 12/31/2024 9.1  8.6 - 10.5 mg/dL Final    Total Protein 12/31/2024 6.2  6.0 - 8.5 g/dL Final    Albumin 12/31/2024 3.4 (L)  3.5 - 5.2 g/dL Final    ALT (SGPT) 12/31/2024 9  1 - 33 U/L Final    AST (SGOT) 12/31/2024 22  1 - 32 U/L Final    Alkaline Phosphatase 12/31/2024 149 (H)  39 - 117 U/L Final    Total Bilirubin 12/31/2024 0.8  0.0 - 1.2 mg/dL Final    Globulin 12/31/2024 2.8  gm/dL Final    Calculated Result    A/G Ratio 12/31/2024 1.2  g/dL Final    BUN/Creatinine Ratio 12/31/2024 12.2  7.0 - 25.0 Final    Anion Gap 12/31/2024 13.0  5.0 - 15.0 mmol/L Final    eGFR 12/31/2024 56.7 (L)  >60.0 mL/min/1.73 Final    TSH 12/31/2024 3.980  0.270 - 4.200 uIU/mL Final    Free T4 12/31/2024 1.65  0.92 - 1.68 ng/dL Final    WBC " 12/31/2024 5.14  3.40 - 10.80 10*3/mm3 Final    RBC 12/31/2024 2.69 (L)  3.77 - 5.28 10*6/mm3 Final    Hemoglobin 12/31/2024 8.8 (L)  12.0 - 15.9 g/dL Final    Hematocrit 12/31/2024 26.3 (L)  34.0 - 46.6 % Final    MCV 12/31/2024 97.8 (H)  79.0 - 97.0 fL Final    MCH 12/31/2024 32.7  26.6 - 33.0 pg Final    MCHC 12/31/2024 33.5  31.5 - 35.7 g/dL Final    RDW 12/31/2024 17.6 (H)  12.3 - 15.4 % Final    RDW-SD 12/31/2024 63.2 (H)  37.0 - 54.0 fl Final    MPV 12/31/2024 12.3 (H)  6.0 - 12.0 fL Final    Platelets 12/31/2024 101 (L)  140 - 450 10*3/mm3 Final    Neutrophil % 12/31/2024 63.8  42.7 - 76.0 % Final    Lymphocyte % 12/31/2024 13.2 (L)  19.6 - 45.3 % Final    Monocyte % 12/31/2024 18.3 (H)  5.0 - 12.0 % Final    Eosinophil % 12/31/2024 3.7  0.3 - 6.2 % Final    Basophil % 12/31/2024 0.6  0.0 - 1.5 % Final    Immature Grans % 12/31/2024 0.4  0.0 - 0.5 % Final    Neutrophils, Absolute 12/31/2024 3.28  1.70 - 7.00 10*3/mm3 Final    Lymphocytes, Absolute 12/31/2024 0.68 (L)  0.70 - 3.10 10*3/mm3 Final    Monocytes, Absolute 12/31/2024 0.94 (H)  0.10 - 0.90 10*3/mm3 Final    Eosinophils, Absolute 12/31/2024 0.19  0.00 - 0.40 10*3/mm3 Final    Basophils, Absolute 12/31/2024 0.03  0.00 - 0.20 10*3/mm3 Final    Immature Grans, Absolute 12/31/2024 0.02  0.00 - 0.05 10*3/mm3 Final       Stress Test With Pet Myocardial Perfusion    Result Date: 12/12/2024  Narrative:   Myocardial perfusion imaging indicates a normal myocardial perfusion study with no evidence of ischemia. Impressions are consistent with a low risk study.   Left ventricular ejection fraction is hyperdynamic (Calculated EF > 70%).   REST:  79%EF STRESS:  79%EF.   Findings consistent with an equivocal ECG stress test.     CT Abdomen Pelvis Without Contrast    Result Date: 12/11/2024  Narrative: CT ABDOMEN PELVIS WO CONTRAST Date of Exam: 12/11/2024 3:21 PM EST Indication: nausea/fatigue/weight loss. Comparison: 11/1/2024 Technique: Axial CT images were  obtained of the abdomen and pelvis without the administration of contrast. Reconstructed coronal and sagittal images were also obtained. Automated exposure control and iterative construction methods were used. Findings: Liver: Surgical changes of right hepatectomy. Several liver cysts noted. Further evaluation of the liver is limited without IV contrast. No biliary dilation is seen. Gallbladder: Absent. Pancreas: Unremarkable. Spleen: Unremarkable. Adrenal glands: Left adrenal gland is unremarkable. Right adrenal gland is not definitely visualized. Genitourinary tract: Kidneys are unremarkable. No hydronephrosis is seen. No urinary tract calculi are seen. The visualized portions of the ureters and urinary bladder appear unremarkable. Status post hysterectomy. Gastrointestinal tract: Limited evaluation of the hollow viscera due to lack of IV contrast. Colonic diverticulosis is present. No findings to suggest bowel obstruction. Appendix: The appendix is not identified. Other findings: No free air or free fluid. Limited evaluation for lymphadenopathy on this noncontrast study. Vascular calcifications are present. Bones and soft tissues: No acute osseous lesion is identified. Bones are demineralized. There are degenerative changes within the spine. Superficial soft tissues demonstrate no acute abnormality. Lung bases: Trace right pleural effusion multiple lung base nodules noted, grossly similar since 11/1/2024..     Impression: Impression: 1.Examination is limited due to lack of IV contrast administration. 2.Given this limitation, no acute abnormality is identified within the abdomen or pelvis. 3.Trace right pleural effusion, new since 11/1/2024. Otherwise, no significant change since 11/1/2024, accounting for differences in technique. 4.Please see above for additional details. Electronically Signed: Herman Armstrong MD  12/11/2024 3:43 PM EST  Workstation ID: [id number]    CT Chest Without Contrast Diagnostic    Result  Date: 12/11/2024  Narrative: CT CHEST WO CONTRAST DIAGNOSTIC Date of Exam: 12/11/2024 12:11 PM EST Indication: fatigue/syncope/weight loss. Comparison: CT scan of the chest 11/1/2024 Technique: Axial CT images were obtained of the chest without contrast administration.  Reconstructed coronal and sagittal images were also obtained. Automated exposure control and iterative construction methods were used. Findings: Prior CT scan report describes stable scattered pulmonary nodules. No progressive or new chest disease. Image-by-image comparison of the right and left lungs with the 11/1/2024 exam shows no visible interval change in the patient's multiple small rounded pulmonary nodules. The irregular shaped nodule in the left lower lobe on image 56, however, appears further enlarged and mildly spiculated on today's exam, previously 0.6 x 0.4 cm, today 1.0 x 0.6 cm. No other clearly enlarging nodules identified. On the earlier 8/26/2024 exam, the nodule appears to be only approximately 0.3 x 0.4 cm. Comparison with the older exam does appear to show very mild generalized progression in size of the pulmonary nodules over this longer interval, for instance the slightly spiculated rounded right middle lobe nodule on today's image 64 measures approximately 0.7 cm, previously approximately 0.5 cm. Right lower lobe nodule image 67 today measures 0.7 cm, previously 0.45 cm. There is recurrence of a small right effusion. No left effusion is seen. No airspace opacity is identified. The airways appear to be normally patent. Images the mediastinum show mildly enlarged preaortic lymph node image 33, previously 1.0 x 1.3 cm today  1.1 x 1.6 cm, with a suggestion of a necrotic center. No significant adenopathy is appreciated elsewhere. Moderate coronary artery calcifications again noted. Included images of the upper abdomen show previous right lobe liver resection and multiple left lobe hepatic cysts. Scarring at the operative site  appears stable. No new or increasing upper abdominal node or mass or inflammatory changes seen. Bony structures appear to be intact.     Impression: Impression: 1.Mild interval enlargement of the patient's multiple small pulmonary nodules faintly suggested compared to 11/1/2024 exam, but more apparent compared to 8/26/2024 exam. 2.Focal greater interval enlargement of left lower lobe pulmonary spiculated nodule image 56 series 3 of today's exam. Mild interval enlargement of patient's preaortic lymph node. 3.Small right pleural effusion, new from 11/1/2024 exam. No other new chest disease is seen. Electronically Signed: Salvador Gill MD  12/11/2024 12:37 PM EST  Workstation ID: [id number]    CT Head Without Contrast    Result Date: 12/11/2024  Narrative: CT HEAD WO CONTRAST Date of Exam: 12/11/2024 12:11 PM EST Indication: syncope. Comparison: MRI brain from August 8, 2021 and CT head from November 9, 2015 Technique: Axial CT images were obtained of the head without contrast administration.  Automated exposure control and iterative construction methods were used. Findings: No acute intracranial hemorrhage or extra-axial collection is identified. The ventricles appear normal in caliber, with no evidence of mass effect or midline shift. The basal cisterns appear patent. The gray-white differentiation appears preserved. The calvarium appear intact. The paranasal sinuses are clear. The mastoid air cells are well-aerated.     Impression: Impression: No acute intracranial process identified. Electronically Signed: Con Norton MD  12/11/2024 12:24 PM EST  Workstation ID: [id number]    XR Chest 1 View    Result Date: 12/11/2024  Narrative: XR CHEST 1 VW Date of Exam: 12/11/2024 11:53 AM EST Indication: syncope Comparison: Chest radiograph 5/24/2024 Findings: Right-sided Port-A-Cath is again seen, tip in the distal SVC. The heart mediastinum and pulmonary vasculature appear within normal limits. There is again chronic elevation  left hemidiaphragm, and a right hemidiaphragm eventration. Mild chronic appearing interstitial lung changes are stable. There is a suggestion of a skinfold shadow superimposed over the left apex, seen between the posterior left third and fourth interspace. This resembles a small pneumothorax, but is more typical in appearance for skinfold shadow. Skinfold shadow is also seen superimposed over the lateral right lower chest. Bony structures appear to be intact. Previous left distal clavicle surgery is again noted.     Impression: Impression: 1.Suspected skinfold shadow superimposed over the left apex. A small pneumothorax is considered unlikely, but not entirely excluded. If patient has ongoing left chest symptoms, consider repeat radiograph. 2.No other evidence of active chest disease. Stable mild chronic appearing interstitial changes compared to prior exam. Electronically Signed: Salvador Gill MD  12/11/2024 12:19 PM EST  Workstation ID: [id number]     Assessment / Plan      Assessment/Plan:   1. Intrahepatic cholangiocarcinoma (CMS/HCC) (Primary)  -Noted during her most recent hospitalization with CT scans concerning for an enlarging liver lesion to 7.3 cm that was previously noted to be hemangioma  -Triple phase CT concerning for a solid tumor lesion  -Biopsy consistent with an adenocarcinoma  -CA 19-9 elevated to 84.7  -CT chest as well as the rest of the CT abdomen/pelvis not concerning for distant or metastatic disease  -Status post open right hepatectomy, cholecystectomy, right partial adrenalectomy on 9/23/2021 with Dr. 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  0/4 lymph nodes were positive for disease.  LVI and PNI were present.  Focal R1 resection margin  -Discussed with patient and her daughter that she would benefit from adjuvant chemoXRT using Xeloda.  Discussed side effects including but not limited  to immunosuppression, diarrhea, abdominal pain, nausea, vomiting, hand/foot syndrome  -Repeat CA 19-9 (22) in November 2021  -Completed chemo XRT with Xeloda in January 2021  -Repeat scans in March 2022 without any evidence of recurrent or metastatic disease  -Repeat CT C/A/P in June 2022 without evidence of recurrent or metastatic disease.  Did note some IVC stenosis which we will monitor with her next scans  -Repeat CT C/A/P in September 2022 reviewed without evidence of recurrent disease or metastatic disease.  IVC stenosis overall stable  -Repeat CT C/A/P in December 2022 reviewed without evidence of recurrent or metastatic disease.  CA 19-9 within normal limits  -Repeat CT C/A/P in March 2023 reviewed and notable for some slight enlarging retroperitoneal adenopathy.  CA 19-9 within normal limits  -PET/CT concerning for 1 cm left liver lesion that was only slightly PET avid as well as 2 lymph nodes that were also slightly PET avid  -Previously discussed her case at tumor board and with Dr. Sharma we agreed that she likely had disease progression  -Status post radiation completed in May 2023  -CA 19-9 in June 2022 within normal limits.    -CT C/A/P in July 2023 reviewed and showed some interval decrease in some lymph nodes as well as some slight enlargement of a couple lymph nodes.  -Completed radiation in August 2023  -CT C/A/P in October 2023 reviewed and only notable for slight increase in some pulmonary nodules about 1 mm each  -CT C/A/P December 2023 reviewed and notable for enlargement of her aortocaval lymph node.  CA 19-9 stable  -Completed radiation with Dr. Billings in February 2024  -CT C/A/P in April 2024 reviewed and showing continued slight enlargement of multiple pulmonary nodules as well as a liver lesion.  CA 19-9 stable  -CT C/A/P in June 2024 reviewed and showing overall stable disease  -CT C/A/P in August 2024 reviewed showing overall stable disease  -Completed 6 cycles of  cisplatin/gemcitabine/durvalumab in August 2024.  Discontinued chemotherapy due to toxicity  -CT C/A/P in November 2024 reviewed and showing overall stable disease  -Currently on maintenance Durvalumab and tolerating okay.  Not clinically appropriate for treatment again today.  Will plan to hold indefinitely until her symptoms significantly improve     Hemochromatosis carrier  -Noted on recent labs with an elevated ferritin to 246 hemochromatosis gene profile positive for heterozygosity of H63D.  Other genes negative.  Given patient's age and previous cardiac liver work-up showing no evidence of dysfunction, it is unlikely that she has had iron deposition all this time.  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  Treatment as above  -ECHO in July 2020 within normal limits  -Repeat iron studies in April 2021 stable with a ferritin of 229, iron level 100, transferrin saturation 27%  -Low concern for iron overload at this time.      Thyroid nodule  -Incidentally noted on CT scan but enlarging from previous CT  -Thyroid ultrasound in June 2022 only notable for goiter and small nodules nonconcerning for malignancy  -Has been seen by endocrinology with plan for repeat ultrasound in the summer of next year  -Currently on levothyroxine 25 mcg daily and tolerating well  -TSH in November 2024 within normal limits     Other fatigue   -Continued at this time  -Previously checked iron studies, vitamin B12, folate, thyroid studies within normal limits  -Was previously been seen by cardiology with a normal ECHO and stress test  -Holter monitor notable for atrial fibrillation for which she is on metoprolol and prophylactic apixaban     Anemia  -Likely related to her chemotherapy  -Iron  studies in June 2024 with only mild iron deficiency anemia.    -Iron studies in July 2024 within normal limits outside of an elevated ferritin  -Status post 1 unit PRBC in August 2024  -Slight worsening anemia since September.  Hemoglobin 8.8 today  - Reticulocyte count diminished, will plan for bone marrow biopsy     Nausea  -Stable with as needed Zofran.  Refilled ODT Zofran today     Anxiety  -Worsening  -Advised patient to contact Janice Sepulveda     Shortness of breath with exertion/orthopnea  -ECHO in November 2024 with a normal EF  -Stress test in December 2024 within normal limits      Abdominal pain/weight loss  -Continue abdominal discomfort.  Unlikely to be related to her malignancy given her stable CT scans last month  -EGD in December 2024 with a clean-based ulcer with no bleeding noted    Debility  -Worsening weakness and debility with increased risk for falls at home  -Have ordered a rollator today  -Have ordered home health with PT    Follow Up:   Follow-up after bone marrow biopsy     Jorje Montenegro MD  Hematology and Oncology     Please note that portions of this note may have been completed with a voice recognition program. Efforts were made to edit the dictations, but occasionally words are mistranscribed.

## 2025-01-06 ENCOUNTER — PATIENT MESSAGE (OUTPATIENT)
Dept: ONCOLOGY | Facility: CLINIC | Age: 85
End: 2025-01-06
Payer: MEDICARE

## 2025-01-07 ENCOUNTER — TELEPHONE (OUTPATIENT)
Dept: GASTROENTEROLOGY | Facility: CLINIC | Age: 85
End: 2025-01-07

## 2025-01-07 RX ORDER — DICYCLOMINE HCL 20 MG
20 TABLET ORAL EVERY 4 HOURS PRN
Qty: 30 TABLET | Refills: 2 | Status: SHIPPED | OUTPATIENT
Start: 2025-01-07

## 2025-01-07 NOTE — TELEPHONE ENCOUNTER
Caller: Sheree Hernandez    Relationship: Self    Best call back number:   Telephone Information:   Mobile [phone]       What is the best time to reach you: ANYTIME      What was the call regarding: PT CALLED WANTING PROVIDER TO KNOW HER MEDICATION SUCRALSATE HAS BEEN HURTING AND WAS WONDERING IF THERE IS ANY OTHER MEDICATION SHE CAN TAKE. PLEASE ADVISE.

## 2025-01-08 ENCOUNTER — TELEPHONE (OUTPATIENT)
Dept: ONCOLOGY | Facility: CLINIC | Age: 85
End: 2025-01-08

## 2025-01-08 RX ORDER — AMOXICILLIN 250 MG
1 CAPSULE ORAL 2 TIMES DAILY PRN
Qty: 60 TABLET | Refills: 0 | Status: CANCELLED | OUTPATIENT
Start: 2025-01-08

## 2025-01-08 RX ORDER — LEVOTHYROXINE SODIUM 25 UG/1
25 TABLET ORAL
Qty: 30 TABLET | Refills: 3 | Status: SHIPPED | OUTPATIENT
Start: 2025-01-08

## 2025-01-08 NOTE — TELEPHONE ENCOUNTER
Caller: OLIMPIA    Relationship: NURSE COORDINATOR WITH [employer]     Best call back number: [phone]    What is the best time to reach you: ANYTIME    Who are you requesting to speak with (clinical staff, provider,  specific staff member): CLINICAL      What was the call regarding: CALLER STATES THEY GOT THE REFERRAL ON 12/31 FROM OUR OFFICE BUT THEY ARE NOT ABLE TO  THIS PATIENT DUE TO THEIR STAFFING ISSUES AND SAYS THE PT'S INSURANCE IS OUT OF NETWORK.  SHE APOLOGIZED FOR THE LATE NOTICE, SHE WAS NOT MADE AWARE UNTIL NOW SO THAT SHE COULD LET DR TONY'S TEAM KNOW THAT THEY CAN NOT ACCEPT THIS REFERRAL.

## 2025-01-09 ENCOUNTER — OFFICE VISIT (OUTPATIENT)
Dept: GASTROENTEROLOGY | Facility: CLINIC | Age: 85
End: 2025-01-09
Payer: MEDICARE

## 2025-01-09 ENCOUNTER — HOSPITAL ENCOUNTER (OUTPATIENT)
Dept: GENERAL RADIOLOGY | Facility: HOSPITAL | Age: 85
Discharge: HOME | End: 2025-01-09
Admitting: NURSE PRACTITIONER
Payer: MEDICARE

## 2025-01-09 VITALS
BODY MASS INDEX: 22.91 KG/M2 | OXYGEN SATURATION: 99 % | DIASTOLIC BLOOD PRESSURE: 60 MMHG | HEIGHT: 64 IN | HEART RATE: 76 BPM | WEIGHT: 134.2 LBS | SYSTOLIC BLOOD PRESSURE: 110 MMHG

## 2025-01-09 DIAGNOSIS — D64.9 ANEMIA, UNSPECIFIED TYPE: ICD-10-CM

## 2025-01-09 DIAGNOSIS — R11.0 NAUSEA: ICD-10-CM

## 2025-01-09 DIAGNOSIS — R10.9 ABDOMINAL DISCOMFORT: ICD-10-CM

## 2025-01-09 DIAGNOSIS — Z86.0100 HISTORY OF COLONIC POLYPS: ICD-10-CM

## 2025-01-09 DIAGNOSIS — R14.2 BELCHING: ICD-10-CM

## 2025-01-09 DIAGNOSIS — K26.9 DUODENAL ULCER: Primary | ICD-10-CM

## 2025-01-09 PROBLEM — M81.0 AGE-RELATED OSTEOPOROSIS WITHOUT CURRENT PATHOLOGICAL FRACTURE: Status: ACTIVE | Noted: 2024-03-06

## 2025-01-09 PROBLEM — G47.33 OSA (OBSTRUCTIVE SLEEP APNEA): Status: ACTIVE | Noted: 2020-01-30

## 2025-01-09 PROBLEM — C22.1 INTRAHEPATIC CHOLANGIOCARCINOMA: Status: ACTIVE | Noted: 2021-08-17

## 2025-01-09 PROCEDURE — 74018 RADEX ABDOMEN 1 VIEW: CPT

## 2025-01-09 RX ORDER — AMOXICILLIN 250 MG
1 CAPSULE ORAL 2 TIMES DAILY PRN
Qty: 60 TABLET | Refills: 0 | Status: SHIPPED | OUTPATIENT
Start: 2025-01-09

## 2025-01-09 RX ORDER — PANTOPRAZOLE SODIUM 40 MG/1
40 TABLET, DELAYED RELEASE ORAL
Qty: 180 TABLET | Refills: 3 | Status: SHIPPED | OUTPATIENT
Start: 2025-01-09

## 2025-01-09 NOTE — PROGRESS NOTES
GASTROENTEROLOGY OFFICE NOTE    Sheree Hernandez  [id number]  1940    CARE TEAM  Patient Care Team:  Timi Conway DO as PCP - General (Internal Medicine)  Jorje Montenegro MD as Referring Physician (Hematology and Oncology)  Ricci Florentino MD (Rheumatology)  Mitzy Sepulveda APRN as Nurse Practitioner (Psychiatry)  Bunny Billings MD as Consulting Physician (Radiation Oncology)  Tucker Gonzalez MD as Consulting Physician (Cardiology)  Alexsander Peng MD as Consulting Physician (Endocrinology)  Carlos Kay MD as Consulting Physician (Cardiology)  Zayda Guevara APRN as Nurse Practitioner (Cardiology)  Champ Guillen MD as Consulting Physician (Otolaryngology)  Jenise Joseph RN as Ambulatory  (Oncology) (Population Health)    Referring Provider: Jorje Montenegro MD    Chief Complaint   Patient presents with    Abdominal Pain     New patient.         HISTORY OF PRESENT ILLNESS:   Sheree Hernandez is a 84 y.o. female Who presents for hospital follow-up.     Review of discharge summary revealed patient is admitted 12/11/2024 to 12/13/2024.  She has history of intrahepatic cholangiocarcinoma s/p chemo/radiation (Lan), paroxysmal Afib (Eliza).  It was documented she presented with 3 months of nausea and decreased intake,  syncope with lab draw 12/11/2024.  Review of discharge summary revealed patient underwent EGD and was found to have Pedro class III duodenal ulcer with clean base with biopsy obtained with recommendations to await pathology result, take pantoprazole 40 mg twice daily, Carafate 4 times daily for 21 days and follow-up with primary care provider and oncology.  Biopsy of duodenal ulcer revealed acute benign inflammation, biopsy of stomach revealed benign minimal inflammation.    Review of documentation by Trinidad Funez during hospital admission revealed She has known intrahepatic cholangiocarcinoma, initially diagnosed in  2021 and underwent open right hepatectomy, cholecystectomy and right partial adrenalectomy with Dr. Sharma at [facility] at that time.    She received adjuvant chemotherapy.  She was found to have disease progression in 2023 and received radiation in May 2023 as well as February 2024.   She completed 6 cycles of chemotherapy in August 2024 that was then discontinued due to toxicity.       History of Present Illness  The patient is an 84-year-old female who presents for a hospital follow-up and with gastrointestinal symptoms. She is accompanied by Bobby Huynh who provides some information during today's visit.     She reports persistent abdominal discomfort, described as a sensation of fullness rather than pain. She has been experiencing frequent belching over the past few days, which she finds particularly bothersome. This morning, she felt well upon waking but developed abdominal tightness and pain after taking her medication.     Her bowel movements have been irregular, with 4 satisfactory movements yesterday following a period of constipation. She is taking senna as needed    Her appetite has been poor, leading to decreased food intake.      She was prescribed dicyclomine on 01/07/2025 by Dr. Jorje Montenegro for abdominal cramping, which she found beneficial when taken late in the afternoon yesterday.     She has been on pantoprazole for her ulcer but is concerned that pantoprazole causes nausea. She took Carafate for 2 weeks then discontinued use due to concern for worsening GI symptoms     She recalls an episode of severe cramping and black stools lasting 2 days, which occurred approximately 1 week ago, but these symptoms have since resolved. She expresses concern about potential bleeding.      She has not taken any nonsteroidal anti-inflammatory drugs.     Bobby expresses concern that prior surgery for cholangiocarcinoma could contribute to GI symptoms.      She has been attempting to increase her water intake but finds  it exacerbates her stomach discomfort. She has received intravenous fluids twice in place of immunotherapy, which she found beneficial.    She has a history of mild sleep apnea and has tried using a machine, but it was ineffective. She has been diagnosed with chronic fatigue syndrome possibly due to sleep apnea.      She is scheduled for a bone marrow biopsy on Monday and may require a blood transfusion. She has anemia    Supplemental Information  She takes Ritalin as needed to calm her down when her mind gets racing. She took one dose this morning.        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    Osteopenia 2018    PAF (paroxysmal atrial fibrillation) 07/15/2024    Plantar fasciitis     Chronic    PVC's (premature ventricular contractions)     Senile keratosis     Multiple    MANAN (stress urinary incontinence, female)     Syncope, near     with negative cardiovascular workup        Past Surgical History:   Procedure Laterality Date    ADRENAL GLAND SURGERY  09/22/2021    ADRENALECTOMY  09/22/2021    CARDIAC CATHETERIZATION N/A 01/18/2019    Procedure: Left Heart Cath;  Surgeon: Tucker Gonzalez MD;  Location: [facility] CATH INVASIVE LOC[facility];  Service: Cardiovascular    CARDIAC CATHETERIZATION  1/18/2019    CATARACT EXTRACTION  04/2019    CHOLECYSTECTOMY  09/22/2021    COLONOSCOPY  07/15/2020    CYBERKNIFE  05/08/2023    Recurrent  liver mass/involved LNs    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    dicyclomine (BENTYL) 20 MG tablet Take 1 tablet by mouth Every 4 (Four) Hours As Needed for Abdominal Cramping    ipratropium (ATROVENT) 0.06 % nasal spray Administer 1 spray into each nostril twice daily    levothyroxine (SYNTHROID, LEVOTHROID) 25 MCG tablet Take 1 tablet by mouth Every Morning.    lidocaine-prilocaine (EMLA) 2.5-2.5 % cream Please apply to port site 30 min prior to infusion and cover with Saran Wrap    methylphenidate (Ritalin) 5 MG tablet Take 1 tablet by mouth 2 (Two) Times a Day.    metoprolol tartrate (LOPRESSOR) 25 MG tablet Take 1 tablet by mouth 2 (Two) Times a Day.    ondansetron ODT (ZOFRAN-ODT) 4 MG disintegrating tablet Place 1 tablet on the tongue Every 8 (Eight) Hours As Needed for Nausea or Vomiting.    sennosides-docusate (senna-docusate sodium) 8.6-50 MG per tablet Take 1 tablet by mouth 2 (Two) Times a Day As Needed for Constipation.    [DISCONTINUED] pantoprazole (PROTONIX) 40 MG EC tablet Take 1 tablet by mouth 2 (Two) Times a Day Before Meals.     No current facility-administered medications on file prior to visit.       Allergies   Allergen Reactions    Pravastatin Myalgia       Family History   Problem Relation Age of Onset    Heart attack Mother     Heart failure Mother     Dementia Mother     Stroke Mother     Arthritis Mother         Heart  "Attack    Heart disease Father     Hearing loss Father     Stroke Father     Arrhythmia Brother     Breast cancer Paternal Aunt     Colon cancer Neg Hx        Social History     Socioeconomic History    Marital status:     Number of children: 2   Tobacco Use    Smoking status: Never     Passive exposure: Never    Smokeless tobacco: Never    Tobacco comments:     Exposed to secondhand smoke   Vaping Use    Vaping status: Never Used   Substance and Sexual Activity    Alcohol use: Never     Comment: Very seldom    Drug use: Never    Sexual activity: Not Currently     Partners: Male     Birth control/protection: None, Hysterectomy     Comment: Complete Hysterectomy       PHYSICAL EXAM   /60 (BP Location: Left arm, Patient Position: Sitting, Cuff Size: Adult)   Pulse 76   Ht 162.6 cm (64\")   Wt 60.9 kg (134 lb 3.2 oz)   SpO2 99%   BMI 23.04 kg/m²   Physical Exam  Constitutional:       General: She is not in acute distress.     Appearance: She is not toxic-appearing.   HENT:      Head: Normocephalic and atraumatic. No contusion.      Right Ear: External ear normal.      Left Ear: External ear normal.   Eyes:      General: Lids are normal. No scleral icterus.        Right eye: No discharge.         Left eye: No discharge.      Extraocular Movements: Extraocular movements intact.   Neck:      Trachea: Trachea normal.      Comments: No visible mass  No visible adenopathy  Cardiovascular:      Rate and Rhythm: Normal rate.   Pulmonary:      Effort: No respiratory distress.      Comments: Symmetrical expansion    Abdominal:      Palpations: Abdomen is soft. There is no mass.   Musculoskeletal:      Right lower leg: No edema.      Left lower leg: No edema.      Comments: Symmetrical movement of upper extremities  Sitting in wheelchair during examination   Skin:     General: Skin is warm and dry.      Coloration: Skin is not jaundiced.   Neurological:      General: No focal deficit present.      Mental Status: " She is alert.   Psychiatric:         Mood and Affect: Mood normal.         Behavior: Behavior normal.         Thought Content: Thought content normal.         Results Review:  7/15/2020 colonoscopy per Dr. Horner revealed 3 hemorrhoidal tags, mild decreased sphincter tone, diverticulosis, difficult sigmoid colon with significant diverticuli and some scarring but no obvious inflammation, consider CT scan to determine if there is inflammation in 6 mm sessile polyp on edge of appendix cleanly removed with cold snare sent for pathology, remainder of colon unremarkable.  A letter to patient 7/28/2020 from [facility] revealed recommendation to repeat colonoscopy in 3 years.  A letter to patient from [facility] 8/11/2020 revealed no active inflammation called diverticulitis, hemangioma of right liver lobe noted and benign liver cysts    12/13/2024 EGD per Dr. Encinas due to abdominal pain revealed nonbleeding duodenal ulcer with clean base Pedro class III with recommendations to take pantoprazole 40 twice daily, Carafate 4 times daily for 21 days.  Biopsy of duodenum revealed acute inflammation and altered architecture without definitive evidence of ulceration identified and gastric biopsy revealed minimal chronic gastritis, negative for H. pylori.  It was recommended patient continue with proton pump inhibitor, oncology and primary care provider.    12/11/2024 CT chest, abdomen and pelvis revealed diverticulosis, trace right pleural effusion, multiple lung base nodules similar to imaging 11/1/2024.  CMP          12/3/2024    09:05 12/11/2024    10:11 12/11/2024    12:59 12/31/2024    09:55   CMP   Glucose 149  125  112  131    BUN 15  15  15  12    Creatinine 1.12  1.01  0.98  0.98    EGFR 48.3  54.7  56.7  56.7    Sodium 137  139  135  138    Potassium 3.6  4.0  3.7  3.5    Chloride 102  100  99  101    Calcium 8.8  9.2  9.0  9.1    Total Protein 6.1  6.7  6.6  6.2    Albumin 3.5  3.8  3.7  3.4    Globulin 2.6  2.9  2.9  2.8     Total Bilirubin 0.4  0.5  0.5  0.8    Alkaline Phosphatase 164  176  176  149    AST (SGOT) 21  28  27  22    ALT (SGPT) 13  14  14  9    Albumin/Globulin Ratio 1.3  1.3  1.3  1.2    BUN/Creatinine Ratio 13.4  14.9  15.3  12.2    Anion Gap 12.0  15.0  10.0  13.0      CBC          12/11/2024    10:11 12/11/2024    12:59 12/12/2024    04:35 12/31/2024    09:55   CBC   WBC 5.45  5.26  3.85  5.14    RBC 3.24  3.08  2.95  2.69    Hemoglobin 10.4  10.0  9.4  8.8    Hematocrit 31.7  30.0  28.8  26.3    MCV 97.8  97.4  97.6  97.8    MCH 32.1  32.5  31.9  32.7    MCHC 32.8  33.3  32.6  33.5    RDW 15.5  15.4  15.4  17.6    Platelets 112  93  83  101      Iron   Date Value Ref Range Status   12/31/2024 71 37 - 145 mcg/dL Final     Iron Saturation (TSAT)   Date Value Ref Range Status   12/31/2024 29 20 - 50 % Final     Transferrin   Date Value Ref Range Status   12/31/2024 164 (L) 200 - 360 mg/dL Final     TIBC   Date Value Ref Range Status   12/31/2024 244 (L) 298 - 536 mcg/dL Final     Ferritin   Date Value Ref Range Status   12/31/2024 696.00 (H) 13.00 - 150.00 ng/mL Final      Folate   Date Value Ref Range Status   12/11/2024 13.90 4.78 - 24.20 ng/mL Final     Vitamin B-12   Date Value Ref Range Status   12/11/2024 1,345 (H) 211 - 946 pg/mL Final         ASSESSMENT / PLAN    Assessment & Plan      1. Duodenal ulcer  2. Belching  3. Nausea  4. Abdominal discomfort  - KUB to evaluate stool load. If KUB is without retained stool, will likely recommend she take dicyclomine as needed.  If KUB reveals retained stool treat constipation.  She is currently taking senna as needed but I will likely recommend different treatment option  -Plan for repeat EGD to evaluate for ulcer healing to determine if continued ulcer could be source of symptoms.  EGD 12/2024 per Dr. Encinas with duodenal ulcer  -Continue pantoprazole 40 mg twice daily  -It seems as though she took Carafate for approximately 14 days before discontinuing use due to  expressed concern that Carafate could be aggravating GI symptoms.  We discussed she had nausea prior to starting Carafate and prior to starting pantoprazole lowering suspicion that Carafate and/or pantoprazole are source of nausea and abdominal discomfort  -She may have irritable bowel syndrome  -Try to push fluids  - XR Abdomen KUB; Future    5. Anemia, unspecified type  - continue follow up with Dr. Montenegro  6. History of colonic polyps  - surveillance colonoscopy was due 7/2023 per review of documentation per Dr. Horner. Consider repeat colonoscopy in the future for surveillance of polyp as well as GI symptoms if no improvment    Return for Follow-up after EGD.    Patient or patient representative verbalized consent for the use of Ambient Listening during the visit with  LEDY Sargent for chart documentation. 1/9/2025  10:54 EST    LEDY Sargent  01/09/2025

## 2025-01-10 ENCOUNTER — PREP FOR SURGERY (OUTPATIENT)
Dept: OTHER | Facility: HOSPITAL | Age: 85
End: 2025-01-10
Payer: MEDICARE

## 2025-01-10 ENCOUNTER — TELEPHONE (OUTPATIENT)
Dept: INFUSION THERAPY | Facility: HOSPITAL | Age: 85
End: 2025-01-10
Payer: MEDICARE

## 2025-01-10 RX ORDER — SODIUM CHLORIDE 0.9 % (FLUSH) 0.9 %
10 SYRINGE (ML) INJECTION AS NEEDED
Status: CANCELLED | OUTPATIENT
Start: 2025-01-10

## 2025-01-10 NOTE — TELEPHONE ENCOUNTER
Message left prior to planned BMB for 1/13/25. Message stated arrival time, location, nothing to eat or drink by mouth after midnight on Sunday, okay to take morning medications the day of procedure with a small sip of water,  needed, and if have any questions may contact outpatient prep and recovery at [phone].

## 2025-01-13 ENCOUNTER — TELEPHONE (OUTPATIENT)
Dept: GASTROENTEROLOGY | Facility: CLINIC | Age: 85
End: 2025-01-13

## 2025-01-13 ENCOUNTER — HOSPITAL ENCOUNTER (OUTPATIENT)
Dept: CT IMAGING | Facility: HOSPITAL | Age: 85
Discharge: HOME | End: 2025-01-13
Admitting: NURSE PRACTITIONER
Payer: MEDICARE

## 2025-01-13 VITALS
HEIGHT: 64 IN | HEART RATE: 65 BPM | SYSTOLIC BLOOD PRESSURE: 144 MMHG | RESPIRATION RATE: 16 BRPM | WEIGHT: 134 LBS | DIASTOLIC BLOOD PRESSURE: 81 MMHG | OXYGEN SATURATION: 95 % | BODY MASS INDEX: 22.88 KG/M2

## 2025-01-13 DIAGNOSIS — D64.9 SYMPTOMATIC ANEMIA: ICD-10-CM

## 2025-01-13 LAB
ANISOCYTOSIS BLD QL: NORMAL
BASOPHILS # BLD AUTO: 0.03 10*3/MM3 (ref 0–0.2)
BASOPHILS NFR BLD AUTO: 0.6 % (ref 0–1.5)
DEPRECATED RDW RBC AUTO: 68.7 FL (ref 37–54)
EOSINOPHIL # BLD AUTO: 0.14 10*3/MM3 (ref 0–0.4)
EOSINOPHIL NFR BLD AUTO: 2.8 % (ref 0.3–6.2)
ERYTHROCYTE [DISTWIDTH] IN BLOOD BY AUTOMATED COUNT: 19 % (ref 12.3–15.4)
HCT VFR BLD AUTO: 29.3 % (ref 34–46.6)
HGB BLD-MCNC: 9.7 G/DL (ref 12–15.9)
IMM GRANULOCYTES # BLD AUTO: 0.04 10*3/MM3 (ref 0–0.05)
IMM GRANULOCYTES NFR BLD AUTO: 0.8 % (ref 0–0.5)
LARGE PLATELETS: NORMAL
LYMPHOCYTES # BLD AUTO: 0.96 10*3/MM3 (ref 0.7–3.1)
LYMPHOCYTES NFR BLD AUTO: 19.1 % (ref 19.6–45.3)
MCH RBC QN AUTO: 32.3 PG (ref 26.6–33)
MCHC RBC AUTO-ENTMCNC: 33.1 G/DL (ref 31.5–35.7)
MCV RBC AUTO: 97.7 FL (ref 79–97)
MONOCYTES # BLD AUTO: 0.82 10*3/MM3 (ref 0.1–0.9)
MONOCYTES NFR BLD AUTO: 16.3 % (ref 5–12)
NEUTROPHILS NFR BLD AUTO: 3.03 10*3/MM3 (ref 1.7–7)
NEUTROPHILS NFR BLD AUTO: 60.4 % (ref 42.7–76)
NRBC BLD AUTO-RTO: 0 /100 WBC (ref 0–0.2)
OVALOCYTES BLD QL SMEAR: NORMAL
PLATELET # BLD AUTO: 114 10*3/MM3 (ref 140–450)
PMV BLD AUTO: 13.3 FL (ref 6–12)
RBC # BLD AUTO: 3 10*6/MM3 (ref 3.77–5.28)
WBC MORPH BLD: NORMAL
WBC NRBC COR # BLD AUTO: 5.02 10*3/MM3 (ref 3.4–10.8)

## 2025-01-13 PROCEDURE — 25010000002 LIDOCAINE 1 % SOLUTION: Performed by: INTERNAL MEDICINE

## 2025-01-13 PROCEDURE — 25010000002 FENTANYL CITRATE (PF) 50 MCG/ML SOLUTION: Performed by: NURSE PRACTITIONER

## 2025-01-13 PROCEDURE — 88342 IMHCHEM/IMCYTCHM 1ST ANTB: CPT | Performed by: INTERNAL MEDICINE

## 2025-01-13 PROCEDURE — 88341 IMHCHEM/IMCYTCHM EA ADD ANTB: CPT | Performed by: INTERNAL MEDICINE

## 2025-01-13 PROCEDURE — 77012 CT SCAN FOR NEEDLE BIOPSY: CPT

## 2025-01-13 PROCEDURE — 88360 TUMOR IMMUNOHISTOCHEM/MANUAL: CPT | Performed by: INTERNAL MEDICINE

## 2025-01-13 PROCEDURE — 88305 TISSUE EXAM BY PATHOLOGIST: CPT | Performed by: INTERNAL MEDICINE

## 2025-01-13 PROCEDURE — 85007 BL SMEAR W/DIFF WBC COUNT: CPT | Performed by: NURSE PRACTITIONER

## 2025-01-13 PROCEDURE — 85025 COMPLETE CBC W/AUTO DIFF WBC: CPT | Performed by: NURSE PRACTITIONER

## 2025-01-13 PROCEDURE — 88313 SPECIAL STAINS GROUP 2: CPT | Performed by: INTERNAL MEDICINE

## 2025-01-13 PROCEDURE — 88311 DECALCIFY TISSUE: CPT | Performed by: INTERNAL MEDICINE

## 2025-01-13 RX ORDER — FENTANYL CITRATE 50 UG/ML
INJECTION, SOLUTION INTRAMUSCULAR; INTRAVENOUS
Status: DISPENSED
Start: 2025-01-13 | End: 2025-01-13

## 2025-01-13 RX ORDER — FENTANYL CITRATE 50 UG/ML
INJECTION, SOLUTION INTRAMUSCULAR; INTRAVENOUS AS NEEDED
Status: COMPLETED | OUTPATIENT
Start: 2025-01-13 | End: 2025-01-13

## 2025-01-13 RX ORDER — SODIUM CHLORIDE 0.9 % (FLUSH) 0.9 %
10 SYRINGE (ML) INJECTION AS NEEDED
Status: DISCONTINUED | OUTPATIENT
Start: 2025-01-13 | End: 2025-01-14 | Stop reason: HOSPADM

## 2025-01-13 RX ORDER — LIDOCAINE HYDROCHLORIDE 10 MG/ML
10 INJECTION, SOLUTION INFILTRATION; PERINEURAL ONCE
Status: COMPLETED | OUTPATIENT
Start: 2025-01-13 | End: 2025-01-13

## 2025-01-13 RX ORDER — HYDROCODONE BITARTRATE AND ACETAMINOPHEN 5; 325 MG/1; MG/1
1 TABLET ORAL ONCE AS NEEDED
OUTPATIENT
Start: 2025-01-13

## 2025-01-13 RX ADMIN — FENTANYL CITRATE 50 MCG: 50 INJECTION, SOLUTION INTRAMUSCULAR; INTRAVENOUS at 08:50

## 2025-01-13 RX ADMIN — FENTANYL CITRATE 50 MCG: 50 INJECTION, SOLUTION INTRAMUSCULAR; INTRAVENOUS at 08:47

## 2025-01-13 RX ADMIN — LIDOCAINE HYDROCHLORIDE 10 ML: 10 INJECTION, SOLUTION INFILTRATION; PERINEURAL at 09:04

## 2025-01-13 NOTE — NURSING NOTE
CT guided bone marrow biopsy performed by Neisha VILLAFANA.  Sample obtained & taken to lab per CT tech. Patient tolerated well, received 100 MCG Fentanyl. Report called to ANDREW.

## 2025-01-13 NOTE — PRE-PROCEDURE NOTE
[facility]   Vascular Interventional Radiology  History & Physicial        Patient Name:Sheree Hernandez    : 1940    MRN: [id number]    Primary Care Physician: Timi Conway DO    Referring Physician: Jorje Montenegro MD     Date of admission: 2025    Subjective     Reason for Consult: Bone marrow biopsy/aspiration to evaluate anemia.    History of Present Illness     Sheree Hernandez is a 84 y.o. female referred to IR as noted above.      Active Hospital Problems:  There are no active hospital problems to display for this patient.      Personal History     Past Medical History:   Diagnosis Date    Age-related osteoporosis without current pathological fracture 3/6/2024    Arthritis 2017    Asthma     Atypical chest pain 2017    Cataract     Chronic constipation     Diverticulosis 2018    Fracture, pelvis closed 2015    x2    HL (hearing loss) 2018    Hearing aids    Hyperlipidemia 2017    Hypertension 2017    Intrahepatic cholangiocarcinoma 2021    Low bone mass     with elevated FRAX core    Lung nodule 2023    Mild obstructive sleep apnea 2020    Near syncope     negative cardiovascular workup     Nontoxic multinodular goiter 2022    Osteopenia 2018    PAF (paroxysmal atrial fibrillation) 07/15/2024    Plantar fasciitis     Chronic    PVC's (premature ventricular contractions)     Senile keratosis     Multiple    MANAN (stress urinary incontinence, female)     Syncope, near     with negative cardiovascular workup       Past Surgical History:   Procedure Laterality Date    ADRENAL GLAND SURGERY  2021    ADRENALECTOMY  2021    CARDIAC CATHETERIZATION N/A 2019    Procedure: Left Heart Cath;  Surgeon: Tucker Gonzalez MD;  Location: [facility] INVASIVE LOCATION;  Service: Cardiovascular    CARDIAC CATHETERIZATION  2019    CATARACT EXTRACTION  2019    CHOLECYSTECTOMY  2021    COLONOSCOPY  07/15/2020    CYBERKNIFE   "05/08/2023    Recurrent liver mass/involved LNs    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    ENDOSCOPY N/A 12/13/2024    Procedure: ESOPHAGOGASTRODUODENOSCOPY;  Surgeon: Mihir Encinas MD;  Location: [facility] ENDOSCOPY;  Service: Gastroenterology;  Laterality: N/A;    HYSTERECTOMY  1984    age 46 - ovarian cyst, endometriosis,  jorge/bso    LIVER SURGERY Right 09/22/2021    Removed    ROTATOR CUFF REPAIR Left 1989    Collar Bone Repair    VENOUS ACCESS DEVICE (PORT) INSERTION Right 05/10/2024       Family History: Her family history includes Arrhythmia in her brother; Arthritis in her mother; Breast cancer in her paternal aunt; Dementia in her mother; Hearing loss in her father; Heart attack in her mother; Heart disease in her father; Heart failure in her mother; Stroke in her father and mother.     Social History: She  reports that she has never smoked. She has never been exposed to tobacco smoke. She has never used smokeless tobacco. She reports that she does not drink alcohol and does not use drugs.    Home Medications:  apixaban, dicyclomine, ipratropium, levothyroxine, lidocaine-prilocaine, methylphenidate, metoprolol tartrate, ondansetron ODT, pantoprazole, and sennosides-docusate    Current Medications:    sodium chloride     Allergies:  Allergies   Allergen Reactions    Pravastatin Myalgia       Review of Systems    IR Procedure pertinent significant findings are mentioned in the PMH and PSH above.    Objective     Visit Vitals  /74   Pulse 75   Resp 18   Ht 162.6 cm (64\")   Wt 60.8 kg (134 lb)   SpO2 98%   BMI 23.00 kg/m²        Physical Exam    A&Ox3.   Able to communicate  No Apparent Distress  Average physique   CVS: VS as noted. Chart reviewed. Stable for the procedure.  Respiratory: Non labored breathing. No signs of respiratory compromise.    Result Review      I have personally reviewed the results from the time of this admission to 1/13/2025 08:15 EST and agree with these findings.  [x]  " "Laboratory  []  Microbiology  [x]  Radiology  []  EKG/Telemetry   []  Cardiology/Vascular   []  Pathology  []  Old records  []  Other:    Most notable findings include: As noted:    Results from last 7 days   Lab Units 01/13/25  0744   WBC 10*3/mm3 5.02   HEMOGLOBIN g/dL 9.7*   HEMATOCRIT % 29.3*   PLATELETS 10*3/mm3 114*                   Estimated Creatinine Clearance: 40.3 mL/min (by C-G formula based on SCr of 0.98 mg/dL). No results found for: \"CREATININE\"    COVID19   Date Value Ref Range Status   12/11/2024 Not Detected Not Detected - Ref. Range Final        No results found for: \"PREGTESTUR\", \"PREGSERUM\", \"HCG\", \"HCGQUANT\"     ASA SCALE ASSESSMENT (applicable ONLY if sedation planned):        MALLAMPATI CLASSIFICATION (applicable ONLY if sedation planned):       Assessment / Plan     Sheree Hernandez is a 84 y.o. female referred to the IR service with above problem.    Plan:   As above.    LEDY Mercado   Vascular Interventional Radiology  01/13/25   8:15 AM EST     "

## 2025-01-13 NOTE — TELEPHONE ENCOUNTER
Hub staff attempted to follow warm transfer process and was unsuccessful     Caller: Sheree Hernandez    Relationship to patient: Self    Best call back number: [phone]    Patient is needing: PATIENT HAS PROCEDURE SCHEDULED 01/14/2025.  NEEDS TO CANCEL AND WILL CALLBACK TO RESCHEDULE.

## 2025-01-13 NOTE — POST-PROCEDURE NOTE
Vascular Interventional Radiology  Procedure Note    Date: 01/13/25     Time: 08:17 EST     Pre-op Diagnosis: Bone Marrow Bx.    Post-op Diagnosis: Bone Marrow Bx.     Procedure: CT guided Bone Marrow Bx. Via PSIS.    Volume removed: 20 cc of BM aspirate.    Estimated Blood Loss (EBL): < 5 cc.    IVF: NA.    Findings: Above.    Specimens: Aspirate and Bone core.    Complications: NA.    Disposition: IR recovery.    LEDY Cerna    Vascular Interventional Radiology

## 2025-01-13 NOTE — DISCHARGE INSTRUCTIONS
Avoid any strenuous activities, pulling, tugging, straining or lifting anything over 10 pounds for the next 24 HOURS.    You may remove the bandage tomorrow and shower tomorrow; but you will need to avoid tub baths or any situation where your are submersed in water for the next 4 or 5 days to avoid the risk of infection. If you have any problems or concern please contact your physician's office.     DO NOT DRIVE ON THE DAY OF YOUR PROCEDURE.    If you have any problems or concern please contact your physician's office.

## 2025-01-14 ENCOUNTER — TELEPHONE (OUTPATIENT)
Dept: INFUSION THERAPY | Facility: HOSPITAL | Age: 85
End: 2025-01-14
Payer: MEDICARE

## 2025-01-16 LAB
CYTO UR: NORMAL
LAB AP ASPIRATE SMEAR: NORMAL
LAB AP CASE REPORT: NORMAL
LAB AP CBC AND DIFFERENTIAL: NORMAL
LAB AP CLINICAL INFORMATION: NORMAL
LAB AP CLOT SECTION: NORMAL
LAB AP CORE BIOPSY: NORMAL
LAB AP DIAGNOSIS COMMENT: NORMAL
LAB AP FLOW CYTOMETRY SUMMARY: NORMAL
PATH REPORT.FINAL DX SPEC: NORMAL
PATH REPORT.GROSS SPEC: NORMAL

## 2025-01-20 ENCOUNTER — HOSPITAL ENCOUNTER (OUTPATIENT)
Facility: HOSPITAL | Age: 85
Setting detail: OBSERVATION
Discharge: HOME | End: 2025-01-23
Attending: INTERNAL MEDICINE | Admitting: NURSE PRACTITIONER
Payer: MEDICARE

## 2025-01-20 ENCOUNTER — OFFICE VISIT (OUTPATIENT)
Dept: ONCOLOGY | Facility: CLINIC | Age: 85
End: 2025-01-20
Payer: MEDICARE

## 2025-01-20 ENCOUNTER — HOSPITAL ENCOUNTER (OUTPATIENT)
Dept: ONCOLOGY | Facility: HOSPITAL | Age: 85
Discharge: HOME | End: 2025-01-20
Admitting: INTERNAL MEDICINE
Payer: MEDICARE

## 2025-01-20 VITALS
RESPIRATION RATE: 18 BRPM | SYSTOLIC BLOOD PRESSURE: 107 MMHG | DIASTOLIC BLOOD PRESSURE: 64 MMHG | OXYGEN SATURATION: 100 % | HEIGHT: 64 IN | TEMPERATURE: 97.3 F | WEIGHT: 130.5 LBS | BODY MASS INDEX: 22.28 KG/M2 | HEART RATE: 65 BPM

## 2025-01-20 DIAGNOSIS — C85.80 MARGINAL ZONE LYMPHOMA: Primary | ICD-10-CM

## 2025-01-20 DIAGNOSIS — E86.0 DEHYDRATION: Primary | ICD-10-CM

## 2025-01-20 DIAGNOSIS — C22.1 INTRAHEPATIC CHOLANGIOCARCINOMA: ICD-10-CM

## 2025-01-20 DIAGNOSIS — C85.80 MARGINAL ZONE LYMPHOMA: ICD-10-CM

## 2025-01-20 PROBLEM — C85.90 LYMPHOMA: Status: ACTIVE | Noted: 2025-01-20

## 2025-01-20 LAB
HBV SURFACE AB SER RIA-ACNC: NORMAL
HBV SURFACE AB SER RIA-ACNC: NORMAL
HBV SURFACE AG SERPL QL IA: NORMAL
HBV SURFACE AG SERPL QL IA: NORMAL

## 2025-01-20 PROCEDURE — G0378 HOSPITAL OBSERVATION PER HR: HCPCS

## 2025-01-20 PROCEDURE — 86706 HEP B SURFACE ANTIBODY: CPT | Performed by: INTERNAL MEDICINE

## 2025-01-20 PROCEDURE — 25810000003 SODIUM CHLORIDE 0.9 % SOLUTION: Performed by: INTERNAL MEDICINE

## 2025-01-20 PROCEDURE — 86704 HEP B CORE ANTIBODY TOTAL: CPT | Performed by: INTERNAL MEDICINE

## 2025-01-20 PROCEDURE — 87340 HEPATITIS B SURFACE AG IA: CPT | Performed by: INTERNAL MEDICINE

## 2025-01-20 PROCEDURE — G0379 DIRECT REFER HOSPITAL OBSERV: HCPCS

## 2025-01-20 PROCEDURE — 1126F AMNT PAIN NOTED NONE PRSNT: CPT | Performed by: INTERNAL MEDICINE

## 2025-01-20 PROCEDURE — 96361 HYDRATE IV INFUSION ADD-ON: CPT

## 2025-01-20 PROCEDURE — 3078F DIAST BP <80 MM HG: CPT | Performed by: INTERNAL MEDICINE

## 2025-01-20 PROCEDURE — 99215 OFFICE O/P EST HI 40 MIN: CPT | Performed by: INTERNAL MEDICINE

## 2025-01-20 PROCEDURE — 96374 THER/PROPH/DIAG INJ IV PUSH: CPT

## 2025-01-20 PROCEDURE — 3074F SYST BP LT 130 MM HG: CPT | Performed by: INTERNAL MEDICINE

## 2025-01-20 PROCEDURE — 25010000002 HEPARIN LOCK FLUSH PER 10 UNITS: Performed by: INTERNAL MEDICINE

## 2025-01-20 PROCEDURE — 25010000002 ONDANSETRON PER 1 MG: Performed by: INTERNAL MEDICINE

## 2025-01-20 RX ORDER — SODIUM CHLORIDE 9 MG/ML
40 INJECTION, SOLUTION INTRAVENOUS AS NEEDED
Status: DISCONTINUED | OUTPATIENT
Start: 2025-01-20 | End: 2025-01-23 | Stop reason: HOSPADM

## 2025-01-20 RX ORDER — POLYETHYLENE GLYCOL 3350 17 G/17G
17 POWDER, FOR SOLUTION ORAL DAILY PRN
Status: DISCONTINUED | OUTPATIENT
Start: 2025-01-20 | End: 2025-01-22

## 2025-01-20 RX ORDER — MEPERIDINE HYDROCHLORIDE 25 MG/ML
25 INJECTION INTRAMUSCULAR; INTRAVENOUS; SUBCUTANEOUS
Status: CANCELLED | OUTPATIENT
Start: 2025-01-21

## 2025-01-20 RX ORDER — HYDROCORTISONE SODIUM SUCCINATE 100 MG/2ML
100 INJECTION INTRAMUSCULAR; INTRAVENOUS AS NEEDED
OUTPATIENT
Start: 2025-01-28

## 2025-01-20 RX ORDER — SODIUM CHLORIDE 9 MG/ML
500 INJECTION, SOLUTION INTRAVENOUS CONTINUOUS
Status: DISCONTINUED | OUTPATIENT
Start: 2025-01-20 | End: 2025-01-21 | Stop reason: HOSPADM

## 2025-01-20 RX ORDER — DICYCLOMINE HCL 20 MG
20 TABLET ORAL EVERY 4 HOURS PRN
Status: CANCELLED | OUTPATIENT
Start: 2025-01-20

## 2025-01-20 RX ORDER — ONDANSETRON 8 MG/1
8 TABLET, FILM COATED ORAL 3 TIMES DAILY PRN
Qty: 30 TABLET | Refills: 3 | Status: SHIPPED | OUTPATIENT
Start: 2025-01-20

## 2025-01-20 RX ORDER — FAMOTIDINE 10 MG/ML
20 INJECTION, SOLUTION INTRAVENOUS AS NEEDED
Status: CANCELLED | OUTPATIENT
Start: 2025-01-21

## 2025-01-20 RX ORDER — HEPARIN SODIUM (PORCINE) LOCK FLUSH IV SOLN 100 UNIT/ML 100 UNIT/ML
500 SOLUTION INTRAVENOUS AS NEEDED
Status: DISCONTINUED | OUTPATIENT
Start: 2025-01-20 | End: 2025-01-21 | Stop reason: HOSPADM

## 2025-01-20 RX ORDER — LEVOTHYROXINE SODIUM 25 UG/1
25 TABLET ORAL
Status: DISCONTINUED | OUTPATIENT
Start: 2025-01-21 | End: 2025-01-23 | Stop reason: HOSPADM

## 2025-01-20 RX ORDER — SODIUM CHLORIDE 9 MG/ML
500 INJECTION, SOLUTION INTRAVENOUS CONTINUOUS
Status: CANCELLED | OUTPATIENT
Start: 2025-01-20 | End: 2025-01-21

## 2025-01-20 RX ORDER — PANTOPRAZOLE SODIUM 40 MG/1
40 TABLET, DELAYED RELEASE ORAL
Status: DISCONTINUED | OUTPATIENT
Start: 2025-01-21 | End: 2025-01-23 | Stop reason: HOSPADM

## 2025-01-20 RX ORDER — ACETAMINOPHEN 325 MG/1
650 TABLET ORAL ONCE
Status: CANCELLED | OUTPATIENT
Start: 2025-01-21

## 2025-01-20 RX ORDER — FAMOTIDINE 10 MG/ML
20 INJECTION, SOLUTION INTRAVENOUS AS NEEDED
OUTPATIENT
Start: 2025-01-28

## 2025-01-20 RX ORDER — BISACODYL 5 MG/1
5 TABLET, DELAYED RELEASE ORAL DAILY PRN
Status: DISCONTINUED | OUTPATIENT
Start: 2025-01-20 | End: 2025-01-22

## 2025-01-20 RX ORDER — METOPROLOL TARTRATE 25 MG/1
25 TABLET, FILM COATED ORAL 2 TIMES DAILY
Status: DISCONTINUED | OUTPATIENT
Start: 2025-01-20 | End: 2025-01-23 | Stop reason: HOSPADM

## 2025-01-20 RX ORDER — SODIUM CHLORIDE 9 MG/ML
20 INJECTION, SOLUTION INTRAVENOUS ONCE
OUTPATIENT
Start: 2025-02-04

## 2025-01-20 RX ORDER — SODIUM CHLORIDE 9 MG/ML
1000 INJECTION, SOLUTION INTRAVENOUS ONCE
OUTPATIENT
Start: 2025-02-17 | End: 2025-02-17

## 2025-01-20 RX ORDER — PANTOPRAZOLE SODIUM 40 MG/1
40 TABLET, DELAYED RELEASE ORAL
Status: CANCELLED | OUTPATIENT
Start: 2025-01-20

## 2025-01-20 RX ORDER — AMOXICILLIN 250 MG
1 CAPSULE ORAL 2 TIMES DAILY PRN
Status: CANCELLED | OUTPATIENT
Start: 2025-01-20

## 2025-01-20 RX ORDER — FAMOTIDINE 10 MG/ML
20 INJECTION, SOLUTION INTRAVENOUS AS NEEDED
OUTPATIENT
Start: 2025-02-11

## 2025-01-20 RX ORDER — SODIUM CHLORIDE 0.9 % (FLUSH) 0.9 %
10 SYRINGE (ML) INJECTION AS NEEDED
Status: DISCONTINUED | OUTPATIENT
Start: 2025-01-20 | End: 2025-01-23 | Stop reason: HOSPADM

## 2025-01-20 RX ORDER — MEPERIDINE HYDROCHLORIDE 25 MG/ML
25 INJECTION INTRAMUSCULAR; INTRAVENOUS; SUBCUTANEOUS
OUTPATIENT
Start: 2025-02-11

## 2025-01-20 RX ORDER — SODIUM CHLORIDE 9 MG/ML
20 INJECTION, SOLUTION INTRAVENOUS ONCE
OUTPATIENT
Start: 2025-01-28

## 2025-01-20 RX ORDER — ONDANSETRON 2 MG/ML
4 INJECTION INTRAMUSCULAR; INTRAVENOUS EVERY 6 HOURS PRN
Status: CANCELLED | OUTPATIENT
Start: 2025-01-20

## 2025-01-20 RX ORDER — HYDROCORTISONE SODIUM SUCCINATE 100 MG/2ML
100 INJECTION INTRAMUSCULAR; INTRAVENOUS AS NEEDED
OUTPATIENT
Start: 2025-02-04

## 2025-01-20 RX ORDER — HYDROCORTISONE SODIUM SUCCINATE 100 MG/2ML
100 INJECTION INTRAMUSCULAR; INTRAVENOUS AS NEEDED
Status: CANCELLED | OUTPATIENT
Start: 2025-01-21

## 2025-01-20 RX ORDER — HYDROCORTISONE SODIUM SUCCINATE 100 MG/2ML
100 INJECTION INTRAMUSCULAR; INTRAVENOUS AS NEEDED
OUTPATIENT
Start: 2025-02-11

## 2025-01-20 RX ORDER — DIPHENHYDRAMINE HYDROCHLORIDE 50 MG/ML
50 INJECTION INTRAMUSCULAR; INTRAVENOUS AS NEEDED
OUTPATIENT
Start: 2025-02-11

## 2025-01-20 RX ORDER — ACETAMINOPHEN 325 MG/1
650 TABLET ORAL EVERY 6 HOURS PRN
Status: DISCONTINUED | OUTPATIENT
Start: 2025-01-20 | End: 2025-01-23 | Stop reason: HOSPADM

## 2025-01-20 RX ORDER — DIPHENHYDRAMINE HYDROCHLORIDE 50 MG/ML
50 INJECTION INTRAMUSCULAR; INTRAVENOUS AS NEEDED
Status: CANCELLED | OUTPATIENT
Start: 2025-01-21

## 2025-01-20 RX ORDER — METHYLPHENIDATE HYDROCHLORIDE 10 MG/1
5 TABLET ORAL 2 TIMES DAILY
Status: DISCONTINUED | OUTPATIENT
Start: 2025-01-20 | End: 2025-01-23 | Stop reason: HOSPADM

## 2025-01-20 RX ORDER — ONDANSETRON 2 MG/ML
4 INJECTION INTRAMUSCULAR; INTRAVENOUS EVERY 6 HOURS PRN
Status: DISCONTINUED | OUTPATIENT
Start: 2025-01-20 | End: 2025-01-23 | Stop reason: HOSPADM

## 2025-01-20 RX ORDER — SODIUM CHLORIDE 0.9 % (FLUSH) 0.9 %
10 SYRINGE (ML) INJECTION EVERY 12 HOURS SCHEDULED
Status: DISCONTINUED | OUTPATIENT
Start: 2025-01-20 | End: 2025-01-23 | Stop reason: HOSPADM

## 2025-01-20 RX ORDER — BISACODYL 10 MG
10 SUPPOSITORY, RECTAL RECTAL DAILY PRN
Status: DISCONTINUED | OUTPATIENT
Start: 2025-01-20 | End: 2025-01-22

## 2025-01-20 RX ORDER — MEPERIDINE HYDROCHLORIDE 25 MG/ML
25 INJECTION INTRAMUSCULAR; INTRAVENOUS; SUBCUTANEOUS
OUTPATIENT
Start: 2025-02-04

## 2025-01-20 RX ORDER — LEVOTHYROXINE SODIUM 25 UG/1
25 TABLET ORAL
Status: CANCELLED | OUTPATIENT
Start: 2025-01-21

## 2025-01-20 RX ORDER — MEPERIDINE HYDROCHLORIDE 25 MG/ML
25 INJECTION INTRAMUSCULAR; INTRAVENOUS; SUBCUTANEOUS
OUTPATIENT
Start: 2025-01-28

## 2025-01-20 RX ORDER — ACETAMINOPHEN 325 MG/1
650 TABLET ORAL ONCE
OUTPATIENT
Start: 2025-02-04

## 2025-01-20 RX ORDER — DIPHENHYDRAMINE HYDROCHLORIDE 50 MG/ML
50 INJECTION INTRAMUSCULAR; INTRAVENOUS AS NEEDED
OUTPATIENT
Start: 2025-01-28

## 2025-01-20 RX ORDER — ACETAMINOPHEN 325 MG/1
650 TABLET ORAL ONCE
OUTPATIENT
Start: 2025-02-11

## 2025-01-20 RX ORDER — AMOXICILLIN 250 MG
2 CAPSULE ORAL 2 TIMES DAILY PRN
Status: DISCONTINUED | OUTPATIENT
Start: 2025-01-20 | End: 2025-01-22

## 2025-01-20 RX ORDER — METOPROLOL TARTRATE 25 MG/1
25 TABLET, FILM COATED ORAL 2 TIMES DAILY
Status: CANCELLED | OUTPATIENT
Start: 2025-01-20

## 2025-01-20 RX ORDER — SODIUM CHLORIDE 9 MG/ML
20 INJECTION, SOLUTION INTRAVENOUS ONCE
OUTPATIENT
Start: 2025-02-11

## 2025-01-20 RX ORDER — FAMOTIDINE 10 MG/ML
20 INJECTION, SOLUTION INTRAVENOUS AS NEEDED
OUTPATIENT
Start: 2025-02-04

## 2025-01-20 RX ORDER — DIPHENHYDRAMINE HYDROCHLORIDE 50 MG/ML
50 INJECTION INTRAMUSCULAR; INTRAVENOUS AS NEEDED
OUTPATIENT
Start: 2025-02-04

## 2025-01-20 RX ORDER — ACETAMINOPHEN 325 MG/1
650 TABLET ORAL ONCE
OUTPATIENT
Start: 2025-01-28

## 2025-01-20 RX ORDER — SODIUM CHLORIDE 9 MG/ML
20 INJECTION, SOLUTION INTRAVENOUS ONCE
Status: CANCELLED | OUTPATIENT
Start: 2025-01-21

## 2025-01-20 RX ORDER — HEPARIN SODIUM (PORCINE) LOCK FLUSH IV SOLN 100 UNIT/ML 100 UNIT/ML
500 SOLUTION INTRAVENOUS AS NEEDED
OUTPATIENT
Start: 2025-01-20

## 2025-01-20 RX ORDER — ONDANSETRON 2 MG/ML
4 INJECTION INTRAMUSCULAR; INTRAVENOUS EVERY 6 HOURS PRN
Status: DISCONTINUED | OUTPATIENT
Start: 2025-01-20 | End: 2025-01-21 | Stop reason: HOSPADM

## 2025-01-20 RX ADMIN — SENNOSIDES AND DOCUSATE SODIUM 2 TABLET: 50; 8.6 TABLET ORAL at 21:17

## 2025-01-20 RX ADMIN — ACETAMINOPHEN 650 MG: 325 TABLET ORAL at 21:16

## 2025-01-20 RX ADMIN — APIXABAN 2.5 MG: 2.5 TABLET, FILM COATED ORAL at 21:16

## 2025-01-20 RX ADMIN — Medication 10 ML: at 21:18

## 2025-01-20 RX ADMIN — Medication 500 UNITS: at 11:37

## 2025-01-20 RX ADMIN — Medication 10 ML: at 15:00

## 2025-01-20 RX ADMIN — SODIUM CHLORIDE 500 ML/HR: 9 INJECTION, SOLUTION INTRAVENOUS at 10:25

## 2025-01-20 RX ADMIN — METOPROLOL TARTRATE 25 MG: 25 TABLET, FILM COATED ORAL at 21:17

## 2025-01-20 RX ADMIN — ONDANSETRON 4 MG: 2 INJECTION INTRAMUSCULAR; INTRAVENOUS at 10:37

## 2025-01-20 NOTE — SIGNIFICANT NOTE
Patient given handout for her and daughter to read. As they were reading, staff was discussing medication, side effects and possible untoward effects.  They kept the paper and verbalized understanding.

## 2025-01-20 NOTE — H&P
[facility]  HISTORY AND PHYSICAL    Patient Name: Sheree Hernandez  : 1940  MRN: [id number]  Primary Care Physician: Timi Conway DO  Date of admission: 2025      Subjective   Subjective     Chief Complaint:  fatigue     HPI:  Sheree Hernandez is a 84 y.o. female w HTN, HL, PAfib, and cholangiocarcinoma treated with surgery/chemotherapy/XRT.  She has had severe fatigue and malaise for months along with persistent thrombocytopenia nad anemia after chemotherapy completed.  She had bone marrow biopsy on 25 to work up her anemia, and was found to have marginal zone lymphoma.  She is admitted from Onc clinic to begin treatment with rituximab           Personal History     Past Medical History:   Diagnosis Date    Age-related osteoporosis without current pathological fracture 3/6/2024    Arthritis 2017    Asthma     Atypical chest pain 2017    Cataract     Chronic constipation     Diverticulosis 2018    Fracture, pelvis closed 2015    x2    HL (hearing loss) 2018    Hearing aids    Hyperlipidemia 2017    Hypertension 2017    Intrahepatic cholangiocarcinoma 2021    Low bone mass     with elevated FRAX core    Lung nodule 2023    Mild obstructive sleep apnea 2020    Near syncope     negative cardiovascular workup     Nontoxic multinodular goiter 2022    Osteopenia 2018    PAF (paroxysmal atrial fibrillation) 07/15/2024    Plantar fasciitis     Chronic    PVC's (premature ventricular contractions)     Senile keratosis     Multiple    MANAN (stress urinary incontinence, female)     Syncope, near     with negative cardiovascular workup         Oncology Problem List:  Marginal zone lymphoma (2025; Status: Active)  Intrahepatic cholangiocarcinoma (2021; Status: Active)  Intrahepatic cholangiocarcinoma (2021; Status: Active)    Oncology/Hematology History   Intrahepatic cholangiocarcinoma   2021  Initial Diagnosis    Intrahepatic cholangiocarcinoma (CMS/HCC)     8/17/2021 Cancer Staged    Staging form: Intrahepatic Bile Duct, AJCC 8th Edition  - Clinical: Stage IB (cT1b, cN0, cM0) - Signed by Jorje Montenegro MD on 8/17/2021 11/5/2021 Cancer Staged    Staging form: Intrahepatic Bile Duct, AJCC 8th Edition  - Pathologic: Stage IIIA (pT3, pN0, cM0) - Signed by Jorje Montenegro MD on 11/5/2021 12/9/2021 - 1/6/2022 Chemotherapy    OP GALLBLADDER Capecitabine + XRT     12/9/2021 - 1/19/2022 Radiation    Radiation OncologyTreatment Course:  Sheree Hernandez received 5040 cGy in 28 fractions to liver via External Beam Radiation - EBRT.     4/25/2023 - 5/8/2023 Radiation    Radiation OncologyTreatment Course:  Sheree Hernandez received 3500 cGy in 5 fractions to liver mass and lymph nodes via Stereotactic Radiation Therapy - SRT.     8/1/2023 - 8/11/2023 Radiation    Radiation OncologyTreatment Course:  Sheree Hernandez received 3500 cGy in 5 fractions to abdomen via Stereotactic Radiation Therapy - SRT.     1/9/2024 - 1/23/2024 Radiation    Radiation OncologyTreatment Course:  Sheree Hernandez received 3000 cGy in 5 fractions to abdomen via Stereotactic Radiation Therapy - SRT.     1/11/2024 - 1/11/2024 Radiation    Radiation OncologyTreatment Course:  Sheree Hernandez received 754 cGy in 1 fractions to left lung via Stereotactic Radiation Therapy - SRT.     2/1/2024 - 2/1/2024 Radiation    Radiation OncologyTreatment Course:  Sheree Hernandez received 3000 cGy in 1 fractions to left lung via Stereotactic Radiation Therapy - SRT.     4/19/2024 - 8/16/2024 Chemotherapy    OP HEPATOBILIARY CISplatin + Gemcitabine Days 1,8 / Durvalumab Q21D     5/17/2024 -  Chemotherapy    OP CENTRAL VENOUS ACCESS DEVICE Access, Care, and Maintenance (CVAD)     9/6/2024 -  Chemotherapy    OP CHOLANGIOCARCINOMA Durvalumab 1500 mg     Marginal zone lymphoma   1/20/2025 Initial Diagnosis    Marginal zone lymphoma       Past  Surgical History:   Procedure Laterality Date    ADRENAL GLAND SURGERY  09/22/2021    ADRENALECTOMY  09/22/2021    CARDIAC CATHETERIZATION N/A 01/18/2019    Procedure: Left Heart Cath;  Surgeon: Tucker Gonzalez MD;  Location:  [facility];  Service: Cardiovascular    CARDIAC CATHETERIZATION  1/18/2019    CATARACT EXTRACTION  04/2019    CHOLECYSTECTOMY  09/22/2021    COLONOSCOPY  07/15/2020    CYBERKNIFE  05/08/2023    Recurrent liver mass/involved LNs    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    ENDOSCOPY N/A 12/13/2024    Procedure: ESOPHAGOGASTRODUODENOSCOPY;  Surgeon: Mihir Encinas MD;  Location:  [facility];  Service: Gastroenterology;  Laterality: N/A;    HYSTERECTOMY  1984    age 46 - ovarian cyst, endometriosis,  jorge/bso    LIVER SURGERY Right 09/22/2021    Removed    ROTATOR CUFF REPAIR Left 1989    Collar Bone Repair    VENOUS ACCESS DEVICE (PORT) INSERTION Right 05/10/2024       Family History: family history includes Arrhythmia in her brother; Arthritis in her mother; Breast cancer in her paternal aunt; Dementia in her mother; Hearing loss in her father; Heart attack in her mother; Heart disease in her father; Heart failure in her mother; Stroke in her father and mother.     Social History:  reports that she has never smoked. She has never been exposed to tobacco smoke. She has never used smokeless tobacco. She reports that she does not drink alcohol and does not use drugs.  Social History     Social History Narrative    5/18:     since 2013    2 kids:    Best Gonzaleznandini - [place]    Tiananandini [place], [place] - medical POA    2 gk    Hobbies: volunteers [employer], involved in [employer]    Exercise: yes 2-5d/wk    Dental: utd    Eye: utd       Medications:  Available home medication information reviewed.  apixaban, dicyclomine, ipratropium, levothyroxine, lidocaine-prilocaine, methylphenidate, metoprolol tartrate, ondansetron, ondansetron ODT, pantoprazole, and  sennosides-docusate    Allergies   Allergen Reactions    Pravastatin Myalgia       Objective   Objective     Vital Signs:   Temp:  [97.3 °F (36.3 °C)-98 °F (36.7 °C)] 98 °F (36.7 °C)  Heart Rate:  [60-65] 60  Resp:  [16-18] 16  BP: (107-176)/(64-68) 176/68       Physical Exam   Gen:  WD/WN woman in NAD but appears tired .  Dtr present   Neuro: alert and oriented, clear speech, follows commands, grossly nonfocal  HEENT:  NC/AT   Neck:  Supple, no LAD  Heart RRR no murmur  Lungs on RA, not labored   Abd:  Soft, nontender,  Extrem:  No c/c/e      Result Review:  I have personally reviewed the results from the time of this admission to 1/20/2025 14:14 EST and agree with these findings:  [x]  Laboratory list / accordion  []  Microbiology  []  Radiology  []  EKG/Telemetry   []  Cardiology/Vascular   []  Pathology  [x]  Old records  []  Other:  Most notable findings include: recent WBC 5, hgb 10 plts 114, Cr 1.0 , BMBx with lymphoma , EKG 12/24 with sinus abelino       LAB RESULTS:                              UA          9/28/2024    01:41   Urinalysis   Squamous Epithelial Cells, UA 3-6    Specific Gravity, UA 1.018    Ketones, UA Negative    Blood, UA Negative    Leukocytes, UA Small (1+)    Nitrite, UA Negative    RBC, UA 0-2    WBC, UA 11-20    Bacteria, UA None Seen        Microbiology Results (last 10 days)       ** No results found for the last 240 hours. **            No radiology results from the last 24 hrs    Results for orders placed during the hospital encounter of 11/08/24    Adult Transthoracic Echo Complete W/ Cont if Necessary Per Protocol    Interpretation Summary    Left ventricular ejection fraction appears to be 51 - 55%.    Left ventricular diastolic function was indeterminate.    Mild mitral valve regurgitation is present.    The aortic valve exhibits sclerosis.    Trace tricuspid valve regurgitation is present. Estimated right ventricular systolic pressure from tricuspid regurgitation is normal (<35  mmHg).    There is no evidence of pericardial effusion.      Assessment & Plan   Assessment & Plan       * No active hospital problems. *    84 yr old woman with new diagnosis of marginal zone lymphoma    Lymphoma, fatigue   - referred for initiation of rituximab chemotherapy  - supportive care  - Dr Montenegro will follow   - tumor lysis labs for AM     cholangioCA  - diagnosed 2021, s/p surgery, XRT, chemotherapy    PAfib  - eliquis, metoprolol       VTE Prophylaxis:  No VTE prophylaxis order currently exists.          CODE STATUS:    There are no questions and answers to display.       Expected Discharge   Expected discharge date/ time has not been documented.     Quiana Weinberg MD  01/20/25

## 2025-01-20 NOTE — PROGRESS NOTES
Follow Up Office Visit      Date: 2025     Patient Name: Sheree Hernandez  MRN: [id number]  : 1940  Chief Complaint:  Follow-up for intrahepatic cholangiocarcinoma      History of Present Illness: Sheree Hernandez is a pleasant 79 y.o. female with a past medical history of hyperlipidemia and hypertension who presents today for evaluation of concern for hemochromatosis. The patient is accompanied by their self who contributes to the history of their care.  Patient states that over the past 2 years she has been having worsening fatigue especially with exertion.  Over the past several months this has become worse.  Patient states that she gets tired with basic activities including showering getting dressed in the morning and walking to and from her car from stores.  She states that her fatigue gets better after a few minutes of rest.  She has previously had an echocardiogram and cardiac catheterization within the past 2 years which did not reveal any cause of her fatigue with exertion.  She is also had an ultrasound of the liver which revealed stable cyst.  She was recently seen by her PCP who ordered iron studies which was notable for an elevated ferritin to 246.  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  All other mutations were negative.  She is currently up-to-date on her mammograms and colonoscopy with no active malignancies noted.  She is otherwise compliant with her statin and high blood pressure medicines.  Repeat abdominal imaging in 2021 concerning for enlarging liver lesion.  She was seen by Dr. Sharma and  is status post open right hepatectomy, cholecystectomy, right partial adrenalectomy on 2021.  Pathology showed a focal R1 resected margin.  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  0/4 lymph nodes were  positive for disease.  LVI and PNI were present.  Finished chemoXRT in January 2021.      Interval History:  Presents to clinic for follow-up.  Completed radiation in May 2023 and again in August 2023.  Completed 6 cycles of cisplatin/gemcitabine/Durvalumab in August 2024.  Treatment discontinued due to toxicity.  Currently on maintenance Durvalumab which has been on hold since October due to her significant fatigue and tiredness.  Was hospitalized in December 2024 due to continued abdominal pain and discomfort.  Underwent an EGD which showed a clean-based nonbleeding ulcer.  Started on PPI with some improvement of her symptoms.  Still having significant fatigue and generalized malaise.  Notes dizziness when standing up.  Denies any bleeding or bruising episodes.  Continues to have weight loss since lost about 5 pounds in the past 3 weeks    Oncology History:    Oncology/Hematology History   Intrahepatic cholangiocarcinoma   8/17/2021 Initial Diagnosis    Intrahepatic cholangiocarcinoma (CMS/HCC)     8/17/2021 Cancer Staged    Staging form: Intrahepatic Bile Duct, AJCC 8th Edition  - Clinical: Stage IB (cT1b, cN0, cM0) - Signed by Jorje Montenegro MD on 8/17/2021 11/5/2021 Cancer Staged    Staging form: Intrahepatic Bile Duct, AJCC 8th Edition  - Pathologic: Stage IIIA (pT3, pN0, cM0) - Signed by Jorje Montenegro MD on 11/5/2021 12/9/2021 - 1/6/2022 Chemotherapy    OP GALLBLADDER Capecitabine + XRT     12/9/2021 - 1/19/2022 Radiation    Radiation OncologyTreatment Course:  Sheree Hernandez received 5040 cGy in 28 fractions to liver via External Beam Radiation - EBRT.     4/25/2023 - 5/8/2023 Radiation    Radiation OncologyTreatment Course:  Sheree Hernandez received 3500 cGy in 5 fractions to liver mass and lymph nodes via Stereotactic Radiation Therapy - SRT.     8/1/2023 - 8/11/2023 Radiation    Radiation OncologyTreatment Course:  Sheree Hernandez received 3500 cGy in 5 fractions to abdomen via  Stereotactic Radiation Therapy - SRT.     1/9/2024 - 1/23/2024 Radiation    Radiation OncologyTreatment Course:  Sheree Hernandez received 3000 cGy in 5 fractions to abdomen via Stereotactic Radiation Therapy - SRT.     1/11/2024 - 1/11/2024 Radiation    Radiation OncologyTreatment Course:  Sheree Hernandez received 754 cGy in 1 fractions to left lung via Stereotactic Radiation Therapy - SRT.     2/1/2024 - 2/1/2024 Radiation    Radiation OncologyTreatment Course:  Sheree Hernandez received 3000 cGy in 1 fractions to left lung via Stereotactic Radiation Therapy - SRT.     4/19/2024 - 8/16/2024 Chemotherapy    OP HEPATOBILIARY CISplatin + Gemcitabine Days 1,8 / Durvalumab Q21D     5/17/2024 -  Chemotherapy    OP CENTRAL VENOUS ACCESS DEVICE Access, Care, and Maintenance (CVAD)     9/6/2024 -  Chemotherapy    OP CHOLANGIOCARCINOMA Durvalumab 1500 mg     Marginal zone lymphoma   1/20/2025 Initial Diagnosis    Marginal zone lymphoma     1/27/2025 Biopsy    OP LYMPHOMA RiTUXimab (Weekly X 4)  Plan Provider: Jorje Montenegro MD  Treatment goal: Curative  Line of treatment: [No plan line of treatment]         Subjective      Review of Systems:   Constitutional: Negative for fevers, chills, or weight loss  Eyes: Negative for blurred vision or discharge         Ear/Nose/Throat: Negative for difficulty swallowing, sore throat, LAD                                                       Respiratory: Negative for cough, SOA, wheezing                                                                                        Cardiovascular: Negative for chest pain or palpitations                                                                  Gastrointestinal: Negative for nausea, vomiting or diarrhea                                                                     Genitourinary: Negative for dysuria or hematuria                                                                                           Musculoskeletal: Negative for  any joint pains or muscle aches                                                                        Neurologic: Negative for any weakness, headaches, dizziness                                                                         Hematologic: Negative for any easy bleeding or bruising                                                                                   Psychiatric: Negative for anxiety or depression                          Past Medical History/Past Surgical History/ Family History/ Social History: Reviewed by me and unchanged from my previous documentation done on December 2024.     Medications:     Current Outpatient Medications:     apixaban (ELIQUIS) 2.5 MG tablet tablet, Take 1 tablet by mouth 2 (Two) Times a Day. (Patient taking differently: Take 1 tablet by mouth 2 (Two) Times a Day. Friday  1-10-25), Disp: 60 tablet, Rfl: 11    dicyclomine (BENTYL) 20 MG tablet, Take 1 tablet by mouth Every 4 (Four) Hours As Needed for Abdominal Cramping, Disp: 30 tablet, Rfl: 2    ipratropium (ATROVENT) 0.06 % nasal spray, Administer 1 spray into each nostril twice daily, Disp: 30 mL, Rfl: 3    levothyroxine (SYNTHROID, LEVOTHROID) 25 MCG tablet, Take 1 tablet by mouth Every Morning., Disp: 30 tablet, Rfl: 3    lidocaine-prilocaine (EMLA) 2.5-2.5 % cream, Please apply to port site 30 min prior to infusion and cover with Saran Wrap, Disp: 30 g, Rfl: 3    methylphenidate (Ritalin) 5 MG tablet, Take 1 tablet by mouth 2 (Two) Times a Day., Disp: 60 tablet, Rfl: 0    metoprolol tartrate (LOPRESSOR) 25 MG tablet, Take 1 tablet by mouth 2 (Two) Times a Day., Disp: 60 tablet, Rfl: 11    ondansetron ODT (ZOFRAN-ODT) 4 MG disintegrating tablet, Place 1 tablet on the tongue Every 8 (Eight) Hours As Needed for Nausea or Vomiting., Disp: 60 tablet, Rfl: 3    pantoprazole (PROTONIX) 40 MG EC tablet, Take 1 tablet by mouth 2 (Two) Times a Day Before Meals., Disp: 180 tablet, Rfl: 3    sennosides-docusate (Stimulant  "Laxative) 8.6-50 MG per tablet, Take 1 tablet by mouth 2 (Two) Times a Day As Needed for Constipation., Disp: 60 tablet, Rfl: 0    Allergies:   Allergies   Allergen Reactions    Pravastatin Myalgia       Objective     Physical Exam:  Vital Signs:   Vitals:    01/20/25 0922   BP: 107/64   Pulse: 65   Resp: 18   Temp: 97.3 °F (36.3 °C)   TempSrc: Temporal   SpO2: 100%   Weight: 59.2 kg (130 lb 8 oz)   Height: 162.6 cm (64.02\")   PainSc: 0-No pain     Pain Score    01/20/25 0922   PainSc: 0-No pain     ECOG Performance Status: 2 - Symptomatic, <50% confined to bed    Constitutional: NAD, ECOG 2  Eyes: PERRLA, scleral anicteric  ENT: No LAD, no thyromegaly  Respiratory: CTAB, no wheezing, rales, rhonchi  Cardiovascular: RRR, no murmurs, pulses 2+ bilaterally  Abdomen: soft, NT/ND, no HSM  Musculoskeletal: strength 5/5 bilaterally, no c/c/e  Neurologic: A&O x 3, CN II-XII intact grossly    Results Review:   Hospital Outpatient Visit on 01/13/2025   Component Date Value Ref Range Status    WBC 01/13/2025 5.02  3.40 - 10.80 10*3/mm3 Final    RBC 01/13/2025 3.00 (L)  3.77 - 5.28 10*6/mm3 Final    Hemoglobin 01/13/2025 9.7 (L)  12.0 - 15.9 g/dL Final    Hematocrit 01/13/2025 29.3 (L)  34.0 - 46.6 % Final    MCV 01/13/2025 97.7 (H)  79.0 - 97.0 fL Final    MCH 01/13/2025 32.3  26.6 - 33.0 pg Final    MCHC 01/13/2025 33.1  31.5 - 35.7 g/dL Final    RDW 01/13/2025 19.0 (H)  12.3 - 15.4 % Final    RDW-SD 01/13/2025 68.7 (H)  37.0 - 54.0 fl Final    MPV 01/13/2025 13.3 (H)  6.0 - 12.0 fL Final    Platelets 01/13/2025 114 (L)  140 - 450 10*3/mm3 Final    Neutrophil % 01/13/2025 60.4  42.7 - 76.0 % Final    Lymphocyte % 01/13/2025 19.1 (L)  19.6 - 45.3 % Final    Monocyte % 01/13/2025 16.3 (H)  5.0 - 12.0 % Final    Eosinophil % 01/13/2025 2.8  0.3 - 6.2 % Final    Basophil % 01/13/2025 0.6  0.0 - 1.5 % Final    Immature Grans % 01/13/2025 0.8 (H)  0.0 - 0.5 % Final    Neutrophils, Absolute 01/13/2025 3.03  1.70 - 7.00 10*3/mm3 " Final    Lymphocytes, Absolute 01/13/2025 0.96  0.70 - 3.10 10*3/mm3 Final    Monocytes, Absolute 01/13/2025 0.82  0.10 - 0.90 10*3/mm3 Final    Eosinophils, Absolute 01/13/2025 0.14  0.00 - 0.40 10*3/mm3 Final    Basophils, Absolute 01/13/2025 0.03  0.00 - 0.20 10*3/mm3 Final    Immature Grans, Absolute 01/13/2025 0.04  0.00 - 0.05 10*3/mm3 Final    nRBC 01/13/2025 0.0  0.0 - 0.2 /100 WBC Final    Anisocytosis 01/13/2025 Mod/2+  None Seen Final    Ovalocytes 01/13/2025 Slight/1+  None Seen Final    WBC Morphology 01/13/2025 Normal  Normal Final    Large Platelets 01/13/2025 Slight/1+  None Seen Final    Case Report 01/13/2025    Final                    Value:Surgical Pathology Report                         Case: [id number]                                  Authorizing Provider:  Jorje Montenegro MD      Collected:           01/13/2025 09:03 AM          Ordering Location:     [facility]   Received:            01/13/2025 09:15 AM                                 CT                                                                           Pathologist:           Jennifer Valenzuela MD                                                           Specimens:   1) - Iliac Crest, Right - Aspirate                                                                  2) - Iliac Crest, Right - Biopsy                                                           Clinical Information 01/13/2025    Final                    Value:Symptomatic anemia    Chart review shows that patient has history of intrahepatic cholangiocarcinoma diagnosed in 2021 treated with surgery and chemoradiation in 2023 and 2024.  Patient presents with anemia requiring transfusion and  recent EGD revealed duodenal ulcers. Spleen is unremarkable on imaging and liver shows several cysts with changes of surgical hepatectomy.      Final Diagnosis 01/13/2025    Final                    Value:BONE MARROW, ASPIRATE SMEARS, CLOT SECTION, AND CORE BIOPSY:  Small  B-cell lymphoma with nonspecific phenotype representing 50-60% of hypercellular bone marrow.  Maturing trilineage hematopoiesis with no evidence of dysplasia or increased blasts.  See comment.     PERIPHERAL BLOOD SMEAR:  Macrocytic anemia.  Thrombocytopenia.  Normal total WBC count and differential.        Comment 01/13/2025    Final                    Value:Flow cytometry shows a clonal B-cell population with nonspecific immunophenotype which represents approximately 50% of marrow cellularity.  The morphology and immunophenotype is not typical for follicular lymphoma, chronic lymphocytic leukemia, mantle cell lymphoma or hairy cell leukemia.  No plasmacytic differentiation is seen to suggest a lymphoplasmacytic lymphoma.  The overall nonspecific immunohistologic findings are suggestive of marginal zone lymphoma and staging studies for colton disease, as clinically indicated would be helpful.   Karyotype analysis is pending and will be reported in an addendum.  These findings were discussed with Dr. Montenegro by Dr. Valenzuela on 1/16/2025.        Gross Description 01/13/2025    Final                    Value:1. Iliac Crest, Right - Aspirate.  Received are smeared slides.  Also, received in formalin labeled as bone marrow is a portion of blood clot and possible embedded marrow particles. The specimen is filtered and submitted entirely in one cassette.    2. Iliac Crest, Right - Biopsy.  Received in formalin labeled as bone marrow biopsy is an apparent core biopsy of bone and associated blood which is submitted in toto in one cassette, following decalcification.       Microscopic Description 01/13/2025    Final                    Value:The slides are reviewed and demonstrate histopathologic features supporting the above rendered diagnosis.        Flow Cytometry Summary 01/13/2025    Final                    Value:Interpretation: ~37.3% kappa clonal B-cell population of small cell size and non-specific phenotype identified,  negative for CD5 and CD10.    The specimen viability is 98.7%. Flow cytometric analysis shows a heterogeneous cellular population. Blasts as indicated by low density CD45 antigen expression and CD34 expression are not increased (0.1%). Granulocytes show a normal immunophenotypic maturation pattern. Monocytes (3.6%) are immunophenotypically unremarkable. Lymphocytes are 47.9% of the total events.  There is a 37.3% monoclonal B-cell population of small cell size based on forward side light scatter analysis.  This population is positive for CD19, CD20, dim kappa,  and HLA-DR and is negative for CD5, CD10, CD23, CD38 CD49d, and .  T-cells (4.5%) show preserved expression of pan-T-cell markers and have a CD4:CD8 ratio of 0.3:1.  Hematogones are present at 0.4%.     The following antibodies were evaluated by flow cytometry: CD2, CD3, CD4, CD5, CD7, CD8, CD10, CD13, CD14,                           CD15, CD16, CD19, CD20, CD23, CD33, CD34, CD38, CD45, CD49d, CD56, CD57, , , , HLA-DR, Kappa, and Lambda. These tests use analyte specific reagents. The performance of these analyte specific reagents was determined by [facility] in [place], [place]. These tests have not been cleared or approved by the U.S. Food & Drug Administration (FDA). The FDA has determined that such approval is not necessary. These tests are used for clinical purposes and should not be considered experimental or research. Interpretation performed by Dr. Valenzuela.        CBC and Differential 01/13/2025    Final                    Value:Morphologic review of peripheral blood smear correlates well with CBC values.  Red blood cells show macrocytic anemia with occasional elliptocytes.  There is no increase in schistocytes population.  Normal total WBC count and differential.  Granulocytes show normal morphology and no circulating blasts identified.  Platelets are slightly decreased and show normal morphology.       Aspirate Smear 01/13/2025    Final                    Value:Aspirate smears are spicular and cellular.  There is a significantly increased lymphocyte population which comprises 48% on manual differential count.  The lymphocytes are small and mature with round to slightly irregular nuclear contours and show scant cytoplasm.  There is maturing granulopoiesis and erythropoiesis in the background with no overt dysplastic features.  There is no increase in blasts, monocytes or eosinophils.  Megakaryocytes are identified and show normal morphology.  An iron stain with appropriate control shows adequate stainable spicular iron and no ring sideroblasts identified.      Core Biopsy  01/13/2025    Final                    Value:The core biopsy is fragmented with aspiration artifact and measures approximately 6 mm in length.  In the intact regions, it shows hypercellular marrow with estimated cellularity of approximately 50%.  The hypercellularity is secondary to increased lymphocytes distributed throughout the marrow.  The cells are small and mature but it is difficult to assess the pattern due to extensive fragmentation.  There is maturing trilineage hematopoiesis in the background.  Two immunohistochemical stains are repeated on the core biopsy (charged only once) for precise estimation of lymphoid population.  CD20 identified increased B-cell population comprising approximately 50% of the intact cellular population.  CD3 stains background T cells.      Clot Section 01/13/2025    Final                    Value:The clot section contains large fragment of skin with occasional small marrow particles showing a cellularity of approximately 40% which is hypercellular for patient age.  There is increased population of small and mature lymphocytes distributed throughout the marrow cavity.  There is maturing granulopoiesis and erythropoiesis in the background.  Occasional megakaryocytes with normal morphology are seen.    Eight  immunohistochemical stains are performed. Some of these are also analyzed on flow cytometry but are necessary to repeat on tissue to evaluate lymphoid architecture and compared with antigens which are not available on flow cytometry.  CD20 identified increased B-cell population which comprises approximately 60% of the overall cellularity.  They are negative for CD23, cyclin D1 and SOX11.  CD3 stains background scattered T cells.  CD5 stains like CD3 and is negative on the increased B-cell population.  LEF1 stains scattered cells and appears to be negative on the increased                           B-cell population.   stains scattered plasma cells which comprise 1% of the overall cellularity with no atypical clustering.         CT Guided Biopsy Bone Marrow    Result Date: 1/14/2025  Narrative: CT GUIDED BIOPSY BONE MARROW  History: anemia  Radiation dose: 197 mGy-cm.    Estimated blood loss:  < 5 cc.        Technique: A thorough discussion of the procedure, including the risks, benefits, and alternatives were discussed with the patient. An informed written consent was obtained. The interventional radiology nurse monitored the patient at all times. The patient was placed in prone position on the CT table. A limited CT was performed and a site was chosen posteriorly over the right iliac crest. A preprocedural timeout was performed. IV Fentanyl was given for comfort during the procedure. The skin was then marked, prepped, and draped in the usual sterile fashion. The skin was anesthetized with 1% Lidocaine, and a small skin incision was made. Next, under CT guidance, an 11-gauge cdream network bone biopsy needle was advanced at the cortex of the posterior superior iliac spine. Approximately 20 cc of bone marrow aspirate was aspirated and given to the CT tech. The needle was then advanced another approximately 1 cm, using a motorized drill, into the bone marrow for a core biopsy and specimen given to the CT tech. The  needle was then removed. Hemostasis was achieved by manual compression. An aseptic dressing was applied. The patient tolerated the procedure well and after recovery was discharged from the department in stable condition.  Complications: None immediate. Specimen: Bone marrow aspirate and bone core.      Impression: Impression:                                                              Successful CT-guided bone marrow aspiration and bone biopsy using the right posterior superior iliac spine access as described above. I, South Kim MD, have personally reviewed the image(s) and the prepared and signed interpretation by Josiane Longoria NP.  Based on my review, I agree with the findings. Report dictated by: LEDY Cerna  I have personally reviewed this case and agree with the findings above: Electronically Signed: South Kim MD  1/14/2025 3:10 PM EST  Workstation ID: [id number]    XR Abdomen KUB    Result Date: 1/9/2025  Narrative: XR ABDOMEN KUB Date of Exam: 1/9/2025 11:07 AM EST Indication: evaluate stool load Comparison: CT abdomen and pelvis 12/11/2024 Findings: Partial visualization of right chest port. There are surgical clips in the medial right upper quadrant corresponding to hepatectomy. Nonobstructive, nonspecific bowel gas pattern. There are couple scattered phleboliths in the pelvis. No abnormal abdominal calcifications. There are calcifications of the costochondral junctions. Nonobstructive, nonspecific bowel gas pattern. No pneumoperitoneum although supine radiographs are insensitive for this finding. Redemonstration of a subcentimeter nodule in the right lower lobe. There is scattered stool in the right colon. No evidence of large colonic stool burden.     Impression: Impression: Nonobstructive, nonspecific bowel gas pattern. No evidence of large colonic stool burden. Right lower lobe lung nodule. Multiple scattered lung nodules are seen on prior CT and please refer to that report.  Electronically Signed: Oscar Disla MD  1/9/2025 12:47 PM EST  Workstation ID: [id number]     Assessment / Plan      Assessment/Plan:   1. Intrahepatic cholangiocarcinoma (CMS/HCC) (Primary)  -Noted during her most recent hospitalization with CT scans concerning for an enlarging liver lesion to 7.3 cm that was previously noted to be hemangioma  -Triple phase CT concerning for a solid tumor lesion  -Biopsy consistent with an adenocarcinoma  -CA 19-9 elevated to 84.7  -CT chest as well as the rest of the CT abdomen/pelvis not concerning for distant or metastatic disease  -Status post open right hepatectomy, cholecystectomy, right partial adrenalectomy on 9/23/2021 with Dr. 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  0/4 lymph nodes were positive for disease.  LVI and PNI were present.  Focal R1 resection margin  -Discussed with patient and her daughter that she would benefit from adjuvant chemoXRT using Xeloda.  Discussed side effects including but not limited to immunosuppression, diarrhea, abdominal pain, nausea, vomiting, hand/foot syndrome  -Repeat CA 19-9 (22) in November 2021  -Completed chemo XRT with Xeloda in January 2021  -Repeat scans in March 2022 without any evidence of recurrent or metastatic disease  -Repeat CT C/A/P in June 2022 without evidence of recurrent or metastatic disease.  Did note some IVC stenosis which we will monitor with her next scans  -Repeat CT C/A/P in September 2022 reviewed without evidence of recurrent disease or metastatic disease.  IVC stenosis overall stable  -Repeat CT C/A/P in December 2022 reviewed without evidence of recurrent or metastatic disease.  CA 19-9 within normal limits  -Repeat CT C/A/P in March 2023 reviewed and notable for some slight enlarging retroperitoneal adenopathy.  CA 19-9 within normal limits  -PET/CT concerning for 1 cm left liver lesion that was  only slightly PET avid as well as 2 lymph nodes that were also slightly PET avid  -Previously discussed her case at tumor board and with Dr. Sharma we agreed that she likely had disease progression  -Status post radiation completed in May 2023  -CA 19-9 in June 2022 within normal limits.    -CT C/A/P in July 2023 reviewed and showed some interval decrease in some lymph nodes as well as some slight enlargement of a couple lymph nodes.  -Completed radiation in August 2023  -CT C/A/P in October 2023 reviewed and only notable for slight increase in some pulmonary nodules about 1 mm each  -CT C/A/P December 2023 reviewed and notable for enlargement of her aortocaval lymph node.  CA 19-9 stable  -Completed radiation with Dr. Billings in February 2024  -CT C/A/P in April 2024 reviewed and showing continued slight enlargement of multiple pulmonary nodules as well as a liver lesion.  CA 19-9 stable  -CT C/A/P in June 2024 reviewed and showing overall stable disease  -CT C/A/P in August 2024 reviewed showing overall stable disease  -Completed 6 cycles of cisplatin/gemcitabine/durvalumab in August 2024.  Discontinued chemotherapy due to toxicity  -CT C/A/P in November 2024 reviewed and showing overall stable disease  -Currently on maintenance Durvalumab and tolerating okay.  Not clinically appropriate for treatment again today.  Will plan to hold indefinitely until her symptoms significantly improve    Marginal zone lymphoma  -Bone marrow biopsy in January 2025 concerning for B-cell lymphoma most consistent with a marginal zone lymphoma  -Given her significant symptoms, plan to admit today to begin weekly rituximab     Hemochromatosis carrier  -Noted on recent labs with an elevated ferritin to 246 hemochromatosis gene profile positive for heterozygosity of H63D.  Other genes negative.  Given patient's age and previous cardiac liver work-up showing no evidence of dysfunction, it is unlikely that she has had iron deposition all  this time.  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  Treatment as above  -ECHO in July 2020 within normal limits  -Repeat iron studies in April 2021 stable with a ferritin of 229, iron level 100, transferrin saturation 27%  -Low concern for iron overload at this time.      Thyroid nodule  -Incidentally noted on CT scan but enlarging from previous CT  -Thyroid ultrasound in June 2022 only notable for goiter and small nodules nonconcerning for malignancy  -Has been seen by endocrinology with plan for repeat ultrasound in the summer of next year  -Currently on levothyroxine 25 mcg daily and tolerating well  -TSH in November 2024 within normal limits     Other fatigue   -Continued at this time  -Previously checked iron studies, vitamin B12, folate, thyroid studies within normal limits  -Was previously been seen by cardiology with a normal ECHO and stress test  -Holter monitor notable for atrial fibrillation for which she is on metoprolol and prophylactic apixaban  -Likely secondary to lymphoma noted on bone marrow biopsy  - Management as above     Anemia  -Likely related to her chemotherapy  -Iron studies in June 2024 with only mild iron deficiency anemia.    -Iron studies in July 2024 within normal limits outside of an elevated ferritin  -Status post 1 unit PRBC in August 2024  -Slight worsening anemia since September.  Hemoglobin 8.8 today  -Bone marrow biopsy in January 2025 consistent with a likely marginal zone lymphoma  -Management as above     Nausea  -Stable with as needed Zofran.  Refilled ODT Zofran today     Anxiety  -Worsening  -Advised patient to contact Janice Sepulveda     Shortness of breath with exertion/orthopnea  -ECHO in November 2024 with a normal  EF  -Stress test in December 2024 within normal limits      Abdominal pain/weight loss  -Continue abdominal discomfort.  Unlikely to be related to her malignancy given her stable CT scans last month  -EGD in December 2024 with a clean-based ulcer with no bleeding noted     Debility  -Worsening weakness and debility with increased risk for falls at home  -Have previously ordered a rollator   -Have previously ordered home health with PT         Follow Up:   Follow-up  pending hospitalization     Jorje Montenegro MD  Hematology and Oncology     Please note that portions of this note may have been completed with a voice recognition program. Efforts were made to edit the dictations, but occasionally words are mistranscribed.

## 2025-01-20 NOTE — PLAN OF CARE
Goal Outcome Evaluation:Teaching compleated for assumed chemotherapy, to be done tomorrow.  Chemotherapy administration permit on chart. Not signed due to chemotherapy drug not written, goal of chemo not stated and treatment plan is not able to be opened. Limiting factor in administration tomorrow will be ABN count. Port in use. Continue to monitor.

## 2025-01-21 LAB
ABO GROUP BLD: NORMAL
ALBUMIN SERPL-MCNC: 3.3 G/DL (ref 3.5–5.2)
ALBUMIN/GLOB SERPL: 1.4 G/DL
ALP SERPL-CCNC: 109 U/L (ref 39–117)
ALT SERPL W P-5'-P-CCNC: 15 U/L (ref 1–33)
ANION GAP SERPL CALCULATED.3IONS-SCNC: 7 MMOL/L (ref 5–15)
ANISOCYTOSIS BLD QL: ABNORMAL
AST SERPL-CCNC: 21 U/L (ref 1–32)
BASOPHILS # BLD MANUAL: 0.07 10*3/MM3 (ref 0–0.2)
BASOPHILS NFR BLD MANUAL: 2 % (ref 0–1.5)
BILIRUB SERPL-MCNC: 0.8 MG/DL (ref 0–1.2)
BLD GP AB SCN SERPL QL: NEGATIVE
BUN SERPL-MCNC: 14 MG/DL (ref 8–23)
BUN/CREAT SERPL: 12.8 (ref 7–25)
CALCIUM SPEC-SCNC: 9.1 MG/DL (ref 8.6–10.5)
CHLORIDE SERPL-SCNC: 105 MMOL/L (ref 98–107)
CO2 SERPL-SCNC: 28 MMOL/L (ref 22–29)
CREAT SERPL-MCNC: 1.09 MG/DL (ref 0.57–1)
DACRYOCYTES BLD QL SMEAR: ABNORMAL
DEPRECATED RDW RBC AUTO: 70.6 FL (ref 37–54)
EGFRCR SERPLBLD CKD-EPI 2021: 49.9 ML/MIN/1.73
EOSINOPHIL # BLD MANUAL: 0.18 10*3/MM3 (ref 0–0.4)
EOSINOPHIL NFR BLD MANUAL: 5 % (ref 0.3–6.2)
ERYTHROCYTE [DISTWIDTH] IN BLOOD BY AUTOMATED COUNT: 19 % (ref 12.3–15.4)
GLOBULIN UR ELPH-MCNC: 2.3 GM/DL
GLUCOSE SERPL-MCNC: 96 MG/DL (ref 65–99)
HBV CORE AB SERPL QL IA: NEGATIVE
HCT VFR BLD AUTO: 24.5 % (ref 34–46.6)
HGB BLD-MCNC: 8 G/DL (ref 12–15.9)
LARGE PLATELETS: ABNORMAL
LYMPHOCYTES # BLD MANUAL: 1.24 10*3/MM3 (ref 0.7–3.1)
LYMPHOCYTES NFR BLD MANUAL: 10 % (ref 5–12)
MACROCYTES BLD QL SMEAR: ABNORMAL
MCH RBC QN AUTO: 32.8 PG (ref 26.6–33)
MCHC RBC AUTO-ENTMCNC: 32.7 G/DL (ref 31.5–35.7)
MCV RBC AUTO: 100.4 FL (ref 79–97)
MONOCYTES # BLD: 0.37 10*3/MM3 (ref 0.1–0.9)
NEUTROPHILS # BLD AUTO: 1.79 10*3/MM3 (ref 1.7–7)
NEUTROPHILS NFR BLD MANUAL: 44 % (ref 42.7–76)
NEUTS BAND NFR BLD MANUAL: 5 % (ref 0–5)
OVALOCYTES BLD QL SMEAR: ABNORMAL
PHOSPHATE SERPL-MCNC: 4.4 MG/DL (ref 2.5–4.5)
PLATELET # BLD AUTO: 88 10*3/MM3 (ref 140–450)
PMV BLD AUTO: 13.9 FL (ref 6–12)
POTASSIUM SERPL-SCNC: 4.5 MMOL/L (ref 3.5–5.2)
PROT SERPL-MCNC: 5.6 G/DL (ref 6–8.5)
RBC # BLD AUTO: 2.44 10*6/MM3 (ref 3.77–5.28)
RH BLD: POSITIVE
SODIUM SERPL-SCNC: 140 MMOL/L (ref 136–145)
T&S EXPIRATION DATE: NORMAL
URATE SERPL-MCNC: 6.2 MG/DL (ref 2.4–5.7)
VARIANT LYMPHS NFR BLD MANUAL: 28 % (ref 19.6–45.3)
VARIANT LYMPHS NFR BLD MANUAL: 6 % (ref 0–5)
WBC MORPH BLD: NORMAL
WBC NRBC COR # BLD AUTO: 3.65 10*3/MM3 (ref 3.4–10.8)

## 2025-01-21 PROCEDURE — 25010000002 PROCHLORPERAZINE 10 MG/2ML SOLUTION: Performed by: INTERNAL MEDICINE

## 2025-01-21 PROCEDURE — G0378 HOSPITAL OBSERVATION PER HR: HCPCS

## 2025-01-21 PROCEDURE — 25010000002 DIPHENHYDRAMINE PER 50 MG: Performed by: INTERNAL MEDICINE

## 2025-01-21 PROCEDURE — 96413 CHEMO IV INFUSION 1 HR: CPT

## 2025-01-21 PROCEDURE — 25010000002 RITUXIMAB 10 MG/ML SOLUTION 50 ML VIAL: Performed by: INTERNAL MEDICINE

## 2025-01-21 PROCEDURE — 84550 ASSAY OF BLOOD/URIC ACID: CPT | Performed by: INTERNAL MEDICINE

## 2025-01-21 PROCEDURE — 25010000002 ONDANSETRON PER 1 MG: Performed by: INTERNAL MEDICINE

## 2025-01-21 PROCEDURE — 86850 RBC ANTIBODY SCREEN: CPT | Performed by: INTERNAL MEDICINE

## 2025-01-21 PROCEDURE — 86923 COMPATIBILITY TEST ELECTRIC: CPT

## 2025-01-21 PROCEDURE — 25010000002 HYDROCORTISONE SOD SUC (PF) 100 MG RECONSTITUTED SOLUTION: Performed by: INTERNAL MEDICINE

## 2025-01-21 PROCEDURE — 85025 COMPLETE CBC W/AUTO DIFF WBC: CPT | Performed by: INTERNAL MEDICINE

## 2025-01-21 PROCEDURE — 25010000002 RITUXIMAB 10 MG/ML SOLUTION 10 ML VIAL: Performed by: INTERNAL MEDICINE

## 2025-01-21 PROCEDURE — 96375 TX/PRO/DX INJ NEW DRUG ADDON: CPT

## 2025-01-21 PROCEDURE — 86901 BLOOD TYPING SEROLOGIC RH(D): CPT | Performed by: INTERNAL MEDICINE

## 2025-01-21 PROCEDURE — 25810000003 SODIUM CHLORIDE 0.9 % SOLUTION 250 ML FLEX CONT: Performed by: INTERNAL MEDICINE

## 2025-01-21 PROCEDURE — 86900 BLOOD TYPING SEROLOGIC ABO: CPT | Performed by: INTERNAL MEDICINE

## 2025-01-21 PROCEDURE — 84100 ASSAY OF PHOSPHORUS: CPT | Performed by: INTERNAL MEDICINE

## 2025-01-21 PROCEDURE — 25810000003 SODIUM CHLORIDE 0.9 % SOLUTION: Performed by: INTERNAL MEDICINE

## 2025-01-21 PROCEDURE — 80053 COMPREHEN METABOLIC PANEL: CPT | Performed by: INTERNAL MEDICINE

## 2025-01-21 PROCEDURE — 96376 TX/PRO/DX INJ SAME DRUG ADON: CPT

## 2025-01-21 PROCEDURE — 97162 PT EVAL MOD COMPLEX 30 MIN: CPT

## 2025-01-21 PROCEDURE — 96415 CHEMO IV INFUSION ADDL HR: CPT

## 2025-01-21 RX ORDER — SODIUM CHLORIDE 9 MG/ML
20 INJECTION, SOLUTION INTRAVENOUS ONCE
Status: COMPLETED | OUTPATIENT
Start: 2025-01-21 | End: 2025-01-21

## 2025-01-21 RX ORDER — PROCHLORPERAZINE EDISYLATE 5 MG/ML
10 INJECTION INTRAMUSCULAR; INTRAVENOUS ONCE
Status: COMPLETED | OUTPATIENT
Start: 2025-01-21 | End: 2025-01-21

## 2025-01-21 RX ORDER — HYDROCORTISONE SODIUM SUCCINATE 100 MG/2ML
100 INJECTION INTRAMUSCULAR; INTRAVENOUS AS NEEDED
Status: COMPLETED | OUTPATIENT
Start: 2025-01-21 | End: 2025-01-21

## 2025-01-21 RX ORDER — DIPHENHYDRAMINE HYDROCHLORIDE 50 MG/ML
50 INJECTION INTRAMUSCULAR; INTRAVENOUS ONCE
Status: COMPLETED | OUTPATIENT
Start: 2025-01-21 | End: 2025-01-21

## 2025-01-21 RX ORDER — LORAZEPAM 1 MG/1
1 TABLET ORAL ONCE
Status: COMPLETED | OUTPATIENT
Start: 2025-01-21 | End: 2025-01-21

## 2025-01-21 RX ORDER — ACETAMINOPHEN 325 MG/1
650 TABLET ORAL ONCE
Status: COMPLETED | OUTPATIENT
Start: 2025-01-21 | End: 2025-01-21

## 2025-01-21 RX ORDER — FAMOTIDINE 10 MG/ML
20 INJECTION, SOLUTION INTRAVENOUS AS NEEDED
Status: COMPLETED | OUTPATIENT
Start: 2025-01-21 | End: 2025-01-21

## 2025-01-21 RX ORDER — DIPHENHYDRAMINE HYDROCHLORIDE 50 MG/ML
50 INJECTION INTRAMUSCULAR; INTRAVENOUS AS NEEDED
Status: COMPLETED | OUTPATIENT
Start: 2025-01-21 | End: 2025-01-21

## 2025-01-21 RX ORDER — MEPERIDINE HYDROCHLORIDE 25 MG/ML
25 INJECTION INTRAMUSCULAR; INTRAVENOUS; SUBCUTANEOUS
Status: DISCONTINUED | OUTPATIENT
Start: 2025-01-21 | End: 2025-01-23 | Stop reason: HOSPADM

## 2025-01-21 RX ADMIN — PROCHLORPERAZINE EDISYLATE 10 MG: 5 INJECTION INTRAMUSCULAR; INTRAVENOUS at 15:28

## 2025-01-21 RX ADMIN — Medication 10 ML: at 08:49

## 2025-01-21 RX ADMIN — RITUXIMAB 600 MG: 10 INJECTION, SOLUTION INTRAVENOUS at 13:29

## 2025-01-21 RX ADMIN — APIXABAN 2.5 MG: 2.5 TABLET, FILM COATED ORAL at 20:44

## 2025-01-21 RX ADMIN — DIPHENHYDRAMINE HYDROCHLORIDE 50 MG: 50 INJECTION INTRAMUSCULAR; INTRAVENOUS at 12:36

## 2025-01-21 RX ADMIN — ACETAMINOPHEN 650 MG: 325 TABLET ORAL at 12:36

## 2025-01-21 RX ADMIN — PANTOPRAZOLE SODIUM 40 MG: 40 TABLET, DELAYED RELEASE ORAL at 18:33

## 2025-01-21 RX ADMIN — HYDROCORTISONE SODIUM SUCCINATE 100 MG: 100 INJECTION, POWDER, FOR SOLUTION INTRAMUSCULAR; INTRAVENOUS at 14:55

## 2025-01-21 RX ADMIN — POLYETHYLENE GLYCOL 3350 17 G: 17 POWDER, FOR SOLUTION ORAL at 11:00

## 2025-01-21 RX ADMIN — APIXABAN 2.5 MG: 2.5 TABLET, FILM COATED ORAL at 08:48

## 2025-01-21 RX ADMIN — ONDANSETRON 4 MG: 2 INJECTION INTRAMUSCULAR; INTRAVENOUS at 12:36

## 2025-01-21 RX ADMIN — DIPHENHYDRAMINE HYDROCHLORIDE 50 MG: 50 INJECTION INTRAMUSCULAR; INTRAVENOUS at 14:55

## 2025-01-21 RX ADMIN — PANTOPRAZOLE SODIUM 40 MG: 40 TABLET, DELAYED RELEASE ORAL at 08:48

## 2025-01-21 RX ADMIN — Medication 10 ML: at 20:44

## 2025-01-21 RX ADMIN — METOPROLOL TARTRATE 25 MG: 25 TABLET, FILM COATED ORAL at 08:48

## 2025-01-21 RX ADMIN — FAMOTIDINE 20 MG: 10 INJECTION, SOLUTION INTRAVENOUS at 14:55

## 2025-01-21 RX ADMIN — SODIUM CHLORIDE 20 ML/HR: 9 INJECTION, SOLUTION INTRAVENOUS at 13:29

## 2025-01-21 RX ADMIN — LEVOTHYROXINE SODIUM 25 MCG: 25 TABLET ORAL at 05:58

## 2025-01-21 RX ADMIN — LORAZEPAM 1 MG: 1 TABLET ORAL at 15:28

## 2025-01-21 NOTE — THERAPY EVALUATION
Patient Name: Sheree Hernandez  : 1940    MRN: [id number]                              Today's Date: 2025       Admit Date: 2025    Visit Dx:     ICD-10-CM ICD-9-CM   1. Marginal zone lymphoma  C85.80 200.30     Patient Active Problem List   Diagnosis    Hypertension    Atypical chest pain    Dyslipidemia    Dyspnea on exertion    Allergic rhinitis    Abnormal stress test    Idiopathic osteoarthritis    BONNIE (obstructive sleep apnea)    Chronic fatigue    Hemochromatosis carrier    Erythrocytosis    Liver masses    Intrahepatic cholangiocarcinoma    Nausea    Dyspepsia    Nontoxic multinodular goiter    Lung nodule    PAF (paroxysmal atrial fibrillation)    Dehydration    Vasovagal syncope    Weight loss    Pain in right knee    Actinic keratosis    Age-related osteoporosis without current pathological fracture    Neoplasm of uncertain behavior of skin    Senile hyperkeratosis    Idiopathic osteoarthritis    Intrahepatic cholangiocarcinoma    BONNIE (obstructive sleep apnea)    Marginal zone lymphoma    Lymphoma     Past Medical History:   Diagnosis Date    Age-related osteoporosis without current pathological fracture 3/6/2024    Arthritis 2017    Asthma     Atypical chest pain 2017    Cataract     Chronic constipation     Diverticulosis 2018    Fracture, pelvis closed 2015    x2    HL (hearing loss) 2018    Hearing aids    Hyperlipidemia 2017    Hypertension 2017    Intrahepatic cholangiocarcinoma 2021    Low bone mass     with elevated FRAX core    Lung nodule 2023    Mild obstructive sleep apnea 2020    Near syncope     negative cardiovascular workup     Nontoxic multinodular goiter 2022    Osteopenia 2018    PAF (paroxysmal atrial fibrillation) 07/15/2024    Plantar fasciitis     Chronic    PVC's (premature ventricular contractions)     Senile keratosis     Multiple    MANAN (stress urinary incontinence, female)     Syncope, near     with negative  cardiovascular workup     Past Surgical History:   Procedure Laterality Date    ADRENAL GLAND SURGERY  09/22/2021    ADRENALECTOMY  09/22/2021    CARDIAC CATHETERIZATION N/A 01/18/2019    Procedure: Left Heart Cath;  Surgeon: Tucker Gonzalez MD;  Location:  [facility];  Service: Cardiovascular    CARDIAC CATHETERIZATION  1/18/2019    CATARACT EXTRACTION  04/2019    CHOLECYSTECTOMY  09/22/2021    COLONOSCOPY  07/15/2020    CYBERKNIFE  05/08/2023    Recurrent liver mass/involved LNs    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    ENDOSCOPY N/A 12/13/2024    Procedure: ESOPHAGOGASTRODUODENOSCOPY;  Surgeon: Mihir Encinas MD;  Location:  [facility];  Service: Gastroenterology;  Laterality: N/A;    HYSTERECTOMY  1984    age 46 - ovarian cyst, endometriosis,  jorge/bso    LIVER SURGERY Right 09/22/2021    Removed    ROTATOR CUFF REPAIR Left 1989    Collar Bone Repair    VENOUS ACCESS DEVICE (PORT) INSERTION Right 05/10/2024      General Information       Row Name 01/21/25 1438          Physical Therapy Time and Intention    Document Type evaluation  -ND     Mode of Treatment physical therapy  -ND       Row Name 01/21/25 1438          General Information    Patient Profile Reviewed yes  -ND     Prior Level of Function independent:;all household mobility;gait;transfer;bed mobility;using stairs  Pt reports functional mobility has recently been limited by dizziness and constant fatigue. Has RWx but does not use. No falls.  -ND     Existing Precautions/Restrictions fall  -ND     Barriers to Rehab previous functional deficit  -ND       Row Name 01/21/25 1438          Living Environment    People in Home alone  -ND       Row Name 01/21/25 1438          Home Main Entrance    Number of Stairs, Main Entrance one  -ND     Stair Railings, Main Entrance none  -ND       Row Name 01/21/25 1438          Stairs Within Home, Primary    Number of Stairs, Within Home, Primary other (see comments)  14  -ND      Stair Railings, Within Home, Primary railings safe and in good condition  -ND       Row Name 01/21/25 1438          Cognition    Orientation Status (Cognition) oriented x 3  -ND       Row Name 01/21/25 1438          Safety Issues/Impairments Affecting Functional Mobility    Safety Issues Affecting Function (Mobility) awareness of need for assistance;insight into deficits/self-awareness;safety precaution awareness;safety precautions follow-through/compliance  -ND     Impairments Affecting Function (Mobility) balance;endurance/activity tolerance;strength  -ND               User Key  (r) = Recorded By, (t) = Taken By, (c) = Cosigned By      Initials Name Provider Type    ND Becka Carmen, PT Physical Therapist                   Mobility       Row Name 01/21/25 1440          Bed Mobility    Bed Mobility supine-sit;sit-supine  -ND     Supine-Sit [place] (Bed Mobility) standby assist  -ND     Sit-Supine [place] (Bed Mobility) standby assist  -ND     Assistive Device (Bed Mobility) bed rails;head of bed elevated  -ND     Comment, (Bed Mobility) Denies dizziness with position change.  -ND       Row Name 01/21/25 1440          Sit-Stand Transfer    Sit-Stand [place] (Transfers) standby assist  -ND     Comment, (Sit-Stand Transfer) x1 from EOB.  -ND       Row Name 01/21/25 1440          Gait/Stairs (Locomotion)    [place] Level (Gait) contact guard;verbal cues  -ND     Patient was able to Ambulate yes  -ND     Distance in Feet (Gait) 70  -ND     Deviations/Abnormal Patterns (Gait) bilateral deviations;colby decreased;stride length decreased  -ND     Bilateral Gait Deviations forward flexed posture;heel strike decreased  -ND     Comment, (Gait/Stairs) Pt ambulates with decreased stride length and forward flexed posture with cues for forward gaze. Pt endorses onset of lightheadedness and reports this is her limiting factor at baseline. Overall distance limited by lightheadedness and fatigue.  -ND                User Key  (r) = Recorded By, (t) = Taken By, (c) = Cosigned By      Initials Name Provider Type    ND Becka Carmen PT Physical Therapist                   Obj/Interventions       Row Name 01/21/25 1442          Range of Motion Comprehensive    General Range of Motion bilateral lower extremity ROM WFL  -ND       Row Name 01/21/25 1442          Strength Comprehensive (MMT)    General Manual Muscle Testing (MMT) Assessment lower extremity strength deficits identified  -ND     Comment, General Manual Muscle Testing (MMT) Assessment BLE grossly 4/5.  -ND       Row Name 01/21/25 1442          Motor Skills    Motor Skills functional endurance  -ND     Functional Endurance Impaired.  -ND       Row Name 01/21/25 1442          Balance    Balance Assessment sitting static balance;sitting dynamic balance;standing static balance;standing dynamic balance  -ND     Static Sitting Balance standby assist  -ND     Dynamic Sitting Balance standby assist  -ND     Position, Sitting Balance unsupported;sitting edge of bed  -ND     Static Standing Balance standby assist  -ND     Dynamic Standing Balance contact guard  -ND     Position/Device Used, Standing Balance unsupported  -ND     Balance Interventions sitting;standing;sit to stand;supported;static;dynamic  -ND     Comment, Balance No knee buckling or LOB noted.  -ND       Row Name 01/21/25 1442          Sensory Assessment (Somatosensory)    Sensory Assessment (Somatosensory) LE sensation intact  -ND               User Key  (r) = Recorded By, (t) = Taken By, (c) = Cosigned By      Initials Name Provider Type    ND Becka Carmen PT Physical Therapist                   Goals/Plan       Row Name 01/21/25 1445          Bed Mobility Goal 1 (PT)    Activity/Assistive Device (Bed Mobility Goal 1, PT) sit to supine/supine to sit  -ND     [place] Level/Cues Needed (Bed Mobility Goal 1, PT) independent  -ND     Time Frame (Bed Mobility Goal 1, PT) short term goal  (STG);3 days  -ND       Row Name 01/21/25 1445          Transfer Goal 1 (PT)    Activity/Assistive Device (Transfer Goal 1, PT) sit-to-stand/stand-to-sit;bed-to-chair/chair-to-bed  -ND     [place] Level/Cues Needed (Transfer Goal 1, PT) independent  -ND     Time Frame (Transfer Goal 1, PT) long term goal (LTG);1 week  -ND       Row Name 01/21/25 1445          Gait Training Goal 1 (PT)    Activity/Assistive Device (Gait Training Goal 1, PT) gait (walking locomotion);increase endurance/gait distance;decrease fall risk  -ND     [place] Level (Gait Training Goal 1, PT) independent  -ND     Distance (Gait Training Goal 1, PT) 250  -ND     Time Frame (Gait Training Goal 1, PT) long term goal (LTG);1 week  -ND       Row Name 01/21/25 1445          Balance Goal 1 (PT)    Activity/Assistive Device (Balance Goal) with functional mobility activities  -ND     [place] Level/Cues Needed (Balance Goal 1, PT) independent  -ND     Time Frame (Balance Goal 1, PT) long-term goal (LTG);1 week  -ND       Row Name 01/21/25 1445          Therapy Assessment/Plan (PT)    Planned Therapy Interventions (PT) balance training;bed mobility training;gait training;home exercise program;patient/family education;transfer training;postural re-education;ROM (range of motion);strengthening  -ND               User Key  (r) = Recorded By, (t) = Taken By, (c) = Cosigned By      Initials Name Provider Type    ND Becka Carmen, PT Physical Therapist                   Clinical Impression       Row Name 01/21/25 1442          Pain    Pretreatment Pain Rating 0/10 - no pain  -ND     Posttreatment Pain Rating 0/10 - no pain  -ND       Row Name 01/21/25 1442          Plan of Care Review    Plan of Care Reviewed With patient  -ND     Outcome Evaluation PT evaluation completed. Pt presenting with deficits in activity tolerance, balance, and strength warranting IP PT services to address deficits and promote return to baseline. Recomend home with  assist and HHPT following d/c.  -ND       Row Name 01/21/25 1442          Therapy Assessment/Plan (PT)    Patient/Family Therapy Goals Statement (PT) Return to baseline.  -ND     Rehab Potential (PT) good  -ND     Criteria for Skilled Interventions Met (PT) yes;meets criteria;skilled treatment is necessary  -ND     Therapy Frequency (PT) daily  -ND     Predicted Duration of Therapy Intervention (PT) 1 week  -ND       Row Name 01/21/25 1442          Vital Signs    O2 Delivery Pre Treatment room air  -ND     O2 Delivery Intra Treatment room air  -ND     O2 Delivery Post Treatment room air  -ND     Pre Patient Position Supine  -ND     Intra Patient Position Standing  -ND     Post Patient Position Supine  -ND       Row Name 01/21/25 1442          Positioning and Restraints    Pre-Treatment Position in bed  -ND     Post Treatment Position bed  -ND     In Bed notified nsg;supine;call light within reach;encouraged to call for assist;exit alarm on  -ND               User Key  (r) = Recorded By, (t) = Taken By, (c) = Cosigned By      Initials Name Provider Type    Becka Ayala, PT Physical Therapist                   Outcome Measures       Row Name 01/21/25 1445 01/21/25 0800       How much help from another person do you currently need...    Turning from your back to your side while in flat bed without using bedrails? 4  -ND 4  -MA    Moving from lying on back to sitting on the side of a flat bed without bedrails? 4  -ND 4  -MA    Moving to and from a bed to a chair (including a wheelchair)? 3  -ND 4  -MA    Standing up from a chair using your arms (e.g., wheelchair, bedside chair)? 3  -ND 4  -MA    Climbing 3-5 steps with a railing? 3  -ND 3  -MA    To walk in hospital room? 3  -ND 4  -MA    AM-PAC 6 Clicks Score (PT) 20  -ND 23  -MA    Highest Level of Mobility Goal 6 --> Walk 10 steps or more  -ND 7 --> Walk 25 feet or more  -MA      Row Name 01/21/25 1445          Functional Assessment    Outcome Measure  Options AM-PAC 6 Clicks Basic Mobility (PT)  -ND               User Key  (r) = Recorded By, (t) = Taken By, (c) = Cosigned By      Initials Name Provider Type    Amor Rojas, RN Registered Nurse    Becka Ayala, PT Physical Therapist                                 Physical Therapy Education       Title: PT OT SLP Therapies (In Progress)       Topic: Physical Therapy (In Progress)       Point: Mobility training (Done)       Learning Progress Summary            Patient Acceptance, E, VU by ND at 1/21/2025 1447                      Point: Home exercise program (Not Started)       Learner Progress:  Not documented in this visit.              Point: Body mechanics (Done)       Learning Progress Summary            Patient Acceptance, E, VU by ND at 1/21/2025 1447                      Point: Precautions (Done)       Learning Progress Summary            Patient Acceptance, E, VU by ND at 1/21/2025 1447                                      User Key       Initials Effective Dates Name Provider Type Discipline    ND 11/16/23 -  Becka Carmen, KUN Physical Therapist PT                  PT Recommendation and Plan  Planned Therapy Interventions (PT): balance training, bed mobility training, gait training, home exercise program, patient/family education, transfer training, postural re-education, ROM (range of motion), strengthening  Outcome Evaluation: PT evaluation completed. Pt presenting with deficits in activity tolerance, balance, and strength warranting IP PT services to address deficits and promote return to baseline. Recomend home with assist and HHPT following d/c.     Time Calculation:   PT Evaluation Complexity  History, PT Evaluation Complexity: 3 or more personal factors and/or comorbidities  Examination of Body Systems (PT Eval Complexity): total of 4 or more elements  Clinical Presentation (PT Evaluation Complexity): evolving  Clinical Decision Making (PT Evaluation Complexity): moderate  complexity  Overall Complexity (PT Evaluation Complexity): moderate complexity     PT Charges       Row Name 01/21/25 1448             Time Calculation    Start Time 1057  -ND      PT Received On 01/21/25  -ND      PT Goal Re-Cert Due Date 01/31/25  -ND         Untimed Charges    PT Eval/Re-eval Minutes 33  -ND         Total Minutes    Untimed Charges Total Minutes 33  -ND       Total Minutes 33  -ND                User Key  (r) = Recorded By, (t) = Taken By, (c) = Cosigned By      Initials Name Provider Type    ND Becka Carmen, PT Physical Therapist                  Therapy Charges for Today       Code Description Service Date Service Provider Modifiers Qty    [id number] HC PT EVAL MOD COMPLEXITY 3 1/21/2025 Becka Carmen, PT GP 1            PT G-Codes  Outcome Measure Options: AM-PAC 6 Clicks Basic Mobility (PT)  AM-PAC 6 Clicks Score (PT): 20  PT Discharge Summary  Anticipated Discharge Disposition (PT): home with assist, home with home health    Becka Carmen, PT  1/21/2025

## 2025-01-21 NOTE — PLAN OF CARE
Problem: Adult Inpatient Plan of Care  Goal: Optimal Comfort and Wellbeing  Intervention: Provide Person-Centered Care  Recent Flowsheet Documentation  Taken 1/21/2025 0800 by Amor Hernandez, RN  Trust Relationship/Rapport:   care explained   choices provided   Goal Outcome Evaluation:  Plan of Care Reviewed With: patient, friend        Progress: no change          Chemotherapy started today; see nursing note of reaction events. Chemotherapy currently infusing at beginning rate and will stay throughout entire bag. Plan to infuse 1 unit RBC tonight after Chemotherapy completed. Complaints of constipation; PRN given without result thus far.

## 2025-01-21 NOTE — CASE MANAGEMENT/SOCIAL WORK
Discharge Planning Assessment  [facility]     Patient Name: Sheree Hernandez  MRN: [id number]  Today's Date: 1/21/2025    Admit Date: 1/20/2025    Plan: Ongoing   Discharge Needs Assessment       Row Name 01/21/25 1242       Living Environment    People in Home alone    Current Living Arrangements home    Potentially Unsafe Housing Conditions none    Primary Care Provided by self    Provides Primary Care For no one    Family Caregiver if Needed child(lillian), adult    Quality of Family Relationships involved;helpful       Resource/Environmental Concerns    Resource/Environmental Concerns none    Transportation Concerns none       Transportation Needs    In the past 12 months, has lack of transportation kept you from medical appointments or from getting medications? no    In the past 12 months, has lack of transportation kept you from meetings, work, or from getting things needed for daily living? No       Food Insecurity    Within the past 12 months, you worried that your food would run out before you got the money to buy more. Never true    Within the past 12 months, the food you bought just didn't last and you didn't have money to get more. Never true       Transition Planning    Transportation Anticipated family or friend will provide       Discharge Needs Assessment    Readmission Within the Last 30 Days no previous admission in last 30 days    Equipment Currently Used at Home walker, rolling    Do you want help finding or keeping work or a job? I do not need or want help    Do you want help with school or training? For example, starting or completing job training or getting a high school diploma, GED or equivalent No                   Discharge Plan       Row Name 01/21/25 1247       Plan    Plan Ongoing    Plan Comments 'er met with patient at bedside. Patient lives in [facility] alone. Patient is independent with ADL's, DME includes a walker and no HH. Patient explains  she has a Advance directives not in  [facility]. Patients PCP is Timi Conway. Patient has [employer] Replacement. Patient explains her daughter can be contacted [facility] [phone]. Plans is ongoing.                  Continued Care and Services - Admitted Since 1/20/2025    No active coordination exists for this encounter.       Selected Continued Care - Episodes Includes continued care and service providers with selected services from the active episodes listed below      High Risk Care Management Episode start date: 9/30/2024 (Paused)   There are no active outsourced providers for this episode.                    Demographic Summary       Row Name 01/21/25 1240       General Information    Admission Type inpatient    Referral Source admission list    Reason for Consult discharge planning                   Functional Status       Row Name 01/21/25 1240       Functional Status    Usual Activity Tolerance moderate    Current Activity Tolerance moderate       Physical Activity    On average, how many days per week do you engage in moderate to strenuous exercise (like a brisk walk)? 7 days    On average, how many minutes do you engage in exercise at this level? 30 min    Number of minutes of exercise per week 210       Assessment of Health Literacy    How often do you have someone help you read hospital materials? Occasionally    How often do you have problems learning about your medical condition because of difficulty understanding written information? Occasionally    How often do you have a problem understanding what is told to you about your medical condition? Occasionally    How confident are you filling out medical forms by yourself? Quite a bit    Health Literacy Good       Functional Status, IADL    Medications independent    Meal Preparation independent    Housekeeping independent    Laundry independent    Shopping independent    If for any reason you need help with day-to-day activities such as bathing, preparing meals, shopping, managing  finances, etc., do you get the help you need? I don't need any help       Employment/    Employment/ Comments [employer] Replacement                   Psychosocial       Row Name 01/21/25 1241       Values/Beliefs    Spiritual, Cultural Beliefs, Christian Practices, Values that Affect Care no       Mental Health    Little interest or pleasure in doing things Not at all    Feeling down, depressed, or hopeless Not at all    Why did the patient not complete the Depression Screen questions? Patient declined                   Abuse/Neglect       Row Name 01/21/25 1241       Personal Safety    Feels Unsafe at Home or Work/School no    Feels Threatened by Someone no    Does Anyone Try to Keep You From Having Contact with Others or Doing Things Outside Your Home? no    Physical Signs of Abuse Present no                   Legal       Row Name 01/21/25 1241       Financial Resource Strain    How hard is it for you to pay for the very basics like food, housing, medical care, and heating? Not very                   Substance Abuse       Row Name 01/21/25 1242       Substance Use    Substance Use Status never used       AUDIT-C (Alcohol Use Disorders Identification Test)    Q1: How often do you have a drink containing alcohol? Never    Q2: How many drinks containing alcohol do you have on a typical day when you are drinking? None    Q3: How often do you have six or more drinks on one occasion? Never    Audit-C Score 0                   Patient Forms    No documentation.                     Ayanna Winslow) EILEEN Cochran

## 2025-01-21 NOTE — PROGRESS NOTES
[facility] Medicine Services  PROGRESS NOTE    Patient Name: Sheree Hernandez  : 1940  MRN: [id number]    Date of Admission: 2025  Primary Care Physician: Timi Conway DO    Subjective   Subjective     CC:  lymphoma    HPI:  currently getting first dose of Rituxan.  No new complaints       Objective   Objective     Vital Signs:   Temp:  [97 °F (36.1 °C)-98 °F (36.7 °C)] 97.8 °F (36.6 °C)  Heart Rate:  [56-79] 65  Resp:  [16-18] 16  BP: ()/(49-73) 120/56     Physical Exam:  Gen:  WD/WN, tired appearing, NAD   Neuro: alert and oriented, clear speech, follows commands, grossly nonfocal  HEENT:  NC/AT   Neck:  Supple, no LAD  Heart RRR no murmur, rub, or gallop  Abd:  Soft, nontender, no rebound or guarding, pos BS  Extrem:  No c/c/e      Results Reviewed:  LAB RESULTS:      Lab 25  0731   WBC 3.65   HEMOGLOBIN 8.0*   HEMATOCRIT 24.5*   PLATELETS 88*   NEUTROS ABS 1.79   EOS ABS 0.18   .4*         Lab 25  0731   SODIUM 140   POTASSIUM 4.5   CHLORIDE 105   CO2 28.0   ANION GAP 7.0   BUN 14   CREATININE 1.09*   EGFR 49.9*   GLUCOSE 96   CALCIUM 9.1   PHOSPHORUS 4.4         Lab 25  0731   TOTAL PROTEIN 5.6*   ALBUMIN 3.3*   GLOBULIN 2.3   ALT (SGPT) 15   AST (SGOT) 21   BILIRUBIN 0.8   ALK PHOS 109                 Lab 25  1314   ABO TYPING O   RH TYPING Positive   ANTIBODY SCREEN Negative         Brief Urine Lab Results  (Last result in the past 365 days)        Color   Clarity   Blood   Leuk Est   Nitrite   Protein   CREAT   Urine HCG        24 0141 Yellow   Cloudy   Negative   Small (1+)   Negative   Trace                   Microbiology Results Abnormal       None            No radiology results from the last 24 hrs    Results for orders placed during the hospital encounter of 24    Adult Transthoracic Echo Complete W/ Cont if Necessary Per Protocol    Interpretation Summary    Left ventricular ejection fraction appears to  be 51 - 55%.    Left ventricular diastolic function was indeterminate.    Mild mitral valve regurgitation is present.    The aortic valve exhibits sclerosis.    Trace tricuspid valve regurgitation is present. Estimated right ventricular systolic pressure from tricuspid regurgitation is normal (<35 mmHg).    There is no evidence of pericardial effusion.      Current medications:  Scheduled Meds:apixaban, 2.5 mg, Oral, BID  levothyroxine, 25 mcg, Oral, Q AM  methylphenidate, 5 mg, Oral, BID  metoprolol tartrate, 25 mg, Oral, BID  pantoprazole, 40 mg, Oral, BID AC  sodium chloride, 10 mL, Intravenous, Q12H      Continuous Infusions:   PRN Meds:.  acetaminophen    senna-docusate sodium **AND** polyethylene glycol **AND** bisacodyl **AND** bisacodyl    diphenhydrAMINE    famotidine    Hydrocortisone Sod Suc (PF)    meperidine    ondansetron    sodium chloride    sodium chloride    Assessment & Plan   Assessment & Plan     Active Hospital Problems    Diagnosis  POA    **Lymphoma [C85.90]  Yes      Resolved Hospital Problems   No resolved problems to display.        Brief Hospital Course to date:  Sheree Hernandez is a 84 y.o. female with newly diagnosed lymphoma, currently getting first dose of Rituxan.  Plan weekly infusions    Marginal zone lymphoma  Pancytopenia, persistent  Fatigue  - Rituxan 1/21  - prbc one unit 1/21    Other issues:    Cholangiocarcinoma  HTN HL   PAFib on abixaban    Anticipate DC on 1/22       Expected Discharge Location and Transportation: home by car  Expected Discharge 1/22  Expected discharge date/ time has not been documented.     VTE Prophylaxis:  Pharmacologic VTE prophylaxis orders are present.         AM-PAC 6 Clicks Score (PT): 23 (01/21/25 0800)    CODE STATUS:   There are no questions and answers to display.       Quiana Weinberg MD  01/21/25

## 2025-01-21 NOTE — NURSING NOTE
States she feels better. Not chilling and less anxiety and nausea. Rituxan restarted at a beginning rate of 26 mls per hour at 16:08.

## 2025-01-21 NOTE — PLAN OF CARE
Goal Outcome Evaluation:  Plan of Care Reviewed With: patient           Outcome Evaluation: PT evaluation completed. Pt presenting with deficits in activity tolerance, balance, and strength warranting IP PT services to address deficits and promote return to baseline. Recomend home with assist and HHPT following d/c.    Anticipated Discharge Disposition (PT): home with assist, home with home health

## 2025-01-21 NOTE — PROGRESS NOTES
HEMATOLOGY/ONCOLOGY PROGRESS NOTE    Subjective      CC: B-cell lymphoma    SUBJECTIVE:   No acute events overnight.  Patient does note some worsening fatigue today.  Notes some increased constipation symptoms.  Scheduled to begin rituximab later this afternoon        Past Medical History, Past Surgical History, Social History, Family History have been reviewed and are without significant changes except as mentioned.      Medications:  The current medication list was reviewed in the EMR    ALLERGIES:   Allergies   Allergen Reactions    Pravastatin Myalgia       ROS:  A comprehensive 10-point review of systems was performed and was negative except as mentioned.      Objective      Vitals:    01/21/25 0359 01/21/25 0728 01/21/25 0848 01/21/25 1110   BP: 107/68 105/56 123/70 106/56   BP Location: Left arm Left arm  Left arm   Patient Position: Lying Lying  Lying   Pulse: 59 61 79 56   Resp: 16 16  16   Temp: 97.5 °F (36.4 °C) 97.7 °F (36.5 °C)  97.9 °F (36.6 °C)   TempSrc: Temporal Oral  Oral   SpO2: 98% 95%  95%   Weight:       Height:                General: well appearing, in no acute distress  HEENT: sclerae anicteric, oropharynx clear  Lymphatics: no cervical, supraclavicular, inguinal, or axillary adenopathy  Cardiovascular: regular rate and rhythm, no murmurs  Lungs: clear to auscultation bilaterally  Abdomen: soft, nontender, nondistended.  No palpable organomegaly  Extremities: no lower extremity edema  Skin: no rashes, lesions, bruising, or petechiae  Neuro: Alert and oriented x 3. Moves all extremities.    RECENT LABS:    Results from last 7 days   Lab Units 01/21/25  0731   WBC 10*3/mm3 3.65   HEMOGLOBIN g/dL 8.0*   PLATELETS 10*3/mm3 88*     Results from last 7 days   Lab Units 01/21/25  0731   SODIUM mmol/L 140   POTASSIUM mmol/L 4.5   CO2 mmol/L 28.0   BUN mg/dL 14   CREATININE mg/dL 1.09*   GLUCOSE mg/dL 96     Results from last 7 days   Lab Units 01/21/25  0731   AST (SGOT) U/L 21   ALT (SGPT) U/L 15    BILIRUBIN mg/dL 0.8   ALK PHOS U/L 109         No radiology results for the last 7 days        Assessment   ASSESSMENT & PLAN:  Marginal zone lymphoma  -Noted on bone marrow biopsy in January 2025  -Scheduled to begin weekly rituximab today.  Clinically appropriate for treatment.  Labs reviewed and appropriate for treatment    Symptomatic anemia  -Hemoglobin 8.0 today  -Patient with worsening fatigue and tiredness likely related to her anemia and lymphoma  -Will plan for 1 unit PRBC transfusion after she finishes her rituximab infusion today    History of intrahepatic cholangiocarcinoma  -Disease overall stable  -Has not received any maintenance Durvalumab in several months    Constipation  -Unclear etiology  -Currently receiving bowel regimen      Jorje Montenegro MD  Hematology and Oncology    1/21/2025  12:42 EST

## 2025-01-21 NOTE — NURSING NOTE
Shortly after increasing rate to 78 pt states felt very cold and had chills. Beginning of rigors noted. Pt anxious and co nausea. VS WDL.Infusion paused and rescue meds given. Pt. reassured. Dr. Montenegro notified and order received for nausea and anxiety. Infusion paused will reassess and begin a low continuous infusion rate for pt tolerance.

## 2025-01-21 NOTE — PLAN OF CARE
Goal Outcome Evaluation:  Plan of Care Reviewed With: patient        Progress: no change  Outcome Evaluation: pt rested well with no c/o, VSS, up ad shakir, home medications given per orders, prn tylenol x1, waiting for chemo treatment 1/21/25

## 2025-01-22 LAB
DEPRECATED RDW RBC AUTO: 66.3 FL (ref 37–54)
ERYTHROCYTE [DISTWIDTH] IN BLOOD BY AUTOMATED COUNT: 19.3 % (ref 12.3–15.4)
HCT VFR BLD AUTO: 29.3 % (ref 34–46.6)
HGB BLD-MCNC: 9.8 G/DL (ref 12–15.9)
MCH RBC QN AUTO: 32.1 PG (ref 26.6–33)
MCHC RBC AUTO-ENTMCNC: 33.4 G/DL (ref 31.5–35.7)
MCV RBC AUTO: 96.1 FL (ref 79–97)
PLATELET # BLD AUTO: 90 10*3/MM3 (ref 140–450)
PMV BLD AUTO: 13.5 FL (ref 6–12)
RBC # BLD AUTO: 3.05 10*6/MM3 (ref 3.77–5.28)
WBC NRBC COR # BLD AUTO: 6.78 10*3/MM3 (ref 3.4–10.8)

## 2025-01-22 PROCEDURE — G0378 HOSPITAL OBSERVATION PER HR: HCPCS

## 2025-01-22 PROCEDURE — 36430 TRANSFUSION BLD/BLD COMPNT: CPT

## 2025-01-22 PROCEDURE — P9016 RBC LEUKOCYTES REDUCED: HCPCS

## 2025-01-22 PROCEDURE — 86900 BLOOD TYPING SEROLOGIC ABO: CPT

## 2025-01-22 PROCEDURE — 96415 CHEMO IV INFUSION ADDL HR: CPT

## 2025-01-22 PROCEDURE — 85027 COMPLETE CBC AUTOMATED: CPT | Performed by: HOSPITALIST

## 2025-01-22 RX ORDER — AMOXICILLIN 250 MG
2 CAPSULE ORAL 2 TIMES DAILY
Status: DISCONTINUED | OUTPATIENT
Start: 2025-01-22 | End: 2025-01-23 | Stop reason: HOSPADM

## 2025-01-22 RX ORDER — POLYETHYLENE GLYCOL 3350 17 G/17G
17 POWDER, FOR SOLUTION ORAL DAILY PRN
Status: DISCONTINUED | OUTPATIENT
Start: 2025-01-22 | End: 2025-01-23 | Stop reason: HOSPADM

## 2025-01-22 RX ORDER — BISACODYL 10 MG
10 SUPPOSITORY, RECTAL RECTAL DAILY PRN
Status: DISCONTINUED | OUTPATIENT
Start: 2025-01-22 | End: 2025-01-23 | Stop reason: HOSPADM

## 2025-01-22 RX ORDER — BISACODYL 5 MG/1
5 TABLET, DELAYED RELEASE ORAL DAILY PRN
Status: DISCONTINUED | OUTPATIENT
Start: 2025-01-22 | End: 2025-01-23 | Stop reason: HOSPADM

## 2025-01-22 RX ADMIN — PANTOPRAZOLE SODIUM 40 MG: 40 TABLET, DELAYED RELEASE ORAL at 08:56

## 2025-01-22 RX ADMIN — SENNOSIDES AND DOCUSATE SODIUM 2 TABLET: 50; 8.6 TABLET ORAL at 12:37

## 2025-01-22 RX ADMIN — METOPROLOL TARTRATE 25 MG: 25 TABLET, FILM COATED ORAL at 08:56

## 2025-01-22 RX ADMIN — LEVOTHYROXINE SODIUM 25 MCG: 25 TABLET ORAL at 05:08

## 2025-01-22 RX ADMIN — Medication 10 ML: at 08:56

## 2025-01-22 RX ADMIN — METOPROLOL TARTRATE 25 MG: 25 TABLET, FILM COATED ORAL at 20:44

## 2025-01-22 RX ADMIN — POLYETHYLENE GLYCOL 3350 17 G: 17 POWDER, FOR SOLUTION ORAL at 11:49

## 2025-01-22 RX ADMIN — SENNOSIDES AND DOCUSATE SODIUM 2 TABLET: 50; 8.6 TABLET ORAL at 20:44

## 2025-01-22 RX ADMIN — APIXABAN 2.5 MG: 2.5 TABLET, FILM COATED ORAL at 08:56

## 2025-01-22 RX ADMIN — Medication 10 ML: at 20:48

## 2025-01-22 RX ADMIN — PANTOPRAZOLE SODIUM 40 MG: 40 TABLET, DELAYED RELEASE ORAL at 17:08

## 2025-01-22 RX ADMIN — APIXABAN 2.5 MG: 2.5 TABLET, FILM COATED ORAL at 20:44

## 2025-01-22 NOTE — PLAN OF CARE
Problem: Adult Inpatient Plan of Care  Goal: Optimal Comfort and Wellbeing  Outcome: Progressing  Intervention: Provide Person-Centered Care  Recent Flowsheet Documentation  Taken 1/22/2025 0800 by Amor Hernandez, RN  Trust Relationship/Rapport:   care explained   thoughts/feelings acknowledged   Goal Outcome Evaluation:  Plan of Care Reviewed With: patient        Progress: improving        Only voiced complaint is constipation; PRN medication as well as scheduled medication given with encouragement of increased fluid intake. Tolerated blood administration. Hopes to be able to go home tomorrow after having a BM.

## 2025-01-22 NOTE — PLAN OF CARE
Goal Outcome Evaluation:  Plan of Care Reviewed With: patient        Progress: no change  Outcome Evaluation: VSS, pt rested well, chemo continues, no c/o, no s/s of distress or chemo reaction. will continue to monitor

## 2025-01-22 NOTE — PROGRESS NOTES
HEMATOLOGY/ONCOLOGY PROGRESS NOTE    Subjective      CC: B-cell lymphoma    SUBJECTIVE:   Completed her rituximab infusion overnight.  Tolerated well with a low rate.  Denies any fevers or chills today.  Tolerated her blood infusion well.  Still notes some mild constipation        Past Medical History, Past Surgical History, Social History, Family History have been reviewed and are without significant changes except as mentioned.      Medications:  The current medication list was reviewed in the EMR    ALLERGIES:   Allergies   Allergen Reactions    Pravastatin Myalgia       ROS:  A comprehensive 10-point review of systems was performed and was negative except as mentioned.      Objective      Vitals:    01/22/25 0713 01/22/25 0820 01/22/25 0917 01/22/25 1004   BP: 150/66 127/72 136/65 169/74   BP Location: Left arm Left arm Left arm    Patient Position: Lying Lying Lying    Pulse: 74 66 68 65   Resp: 16 17 18 18   Temp: 97 °F (36.1 °C) 97.7 °F (36.5 °C) 97.3 °F (36.3 °C) 97 °F (36.1 °C)   TempSrc: Temporal Temporal Temporal Temporal   SpO2: 94% 95% 95% 93%   Weight:       Height:              General: well appearing, in no acute distress  HEENT: sclerae anicteric, oropharynx clear  Lymphatics: no cervical, supraclavicular, inguinal, or axillary adenopathy  Cardiovascular: regular rate and rhythm, no murmurs  Lungs: clear to auscultation bilaterally  Abdomen: soft, nontender, nondistended.  No palpable organomegaly  Extremities: no lower extremity edema  Skin: no rashes, lesions, bruising, or petechiae  Neuro: Alert and oriented x 3. Moves all extremities.    RECENT LABS:    Results from last 7 days   Lab Units 01/21/25  0731   WBC 10*3/mm3 3.65   HEMOGLOBIN g/dL 8.0*   PLATELETS 10*3/mm3 88*     Results from last 7 days   Lab Units 01/21/25  0731   SODIUM mmol/L 140   POTASSIUM mmol/L 4.5   CO2 mmol/L 28.0   BUN mg/dL 14   CREATININE mg/dL 1.09*   GLUCOSE mg/dL 96     Results from last 7 days   Lab Units  01/21/25  0731   AST (SGOT) U/L 21   ALT (SGPT) U/L 15   BILIRUBIN mg/dL 0.8   ALK PHOS U/L 109         No radiology results for the last 7 days        Assessment   ASSESSMENT & PLAN:  Marginal zone lymphoma  -Noted on bone marrow biopsy in January 2025  - Status post week 1 of weekly rituximab.  Had some chills with the infusion but no overt reaction  - Will plan for outpatient follow-up with me in 1 week for her next dose of rituximab     Symptomatic anemia  -Hemoglobin 8.0 on presentation  - Status post 1 unit PRBC transfusion on 1/22     History of intrahepatic cholangiocarcinoma  -Disease overall stable  -Has not received any maintenance Durvalumab in several months     Constipation  -Unclear etiology  -Currently receiving bowel regimen    Okay from an oncologic perspective for discharge once medically appropriate.  Will plan for outpatient follow-up with me on 1/28/2025 for her next round of treatment    Jorje Montenegro MD  Hematology and Oncology    1/22/2025  10:33 EST

## 2025-01-22 NOTE — PROGRESS NOTES
[facility] Medicine Services  PROGRESS NOTE    Patient Name: Sheree Hernandez  : 1940  MRN: [id number]    Date of Admission: 2025  Primary Care Physician: Timi Conway DO    Subjective   Subjective     CC:  F/U lymphoma    HPI:  Seen this morning. Feels tired. Complains of constipation, has been three days since last BM. She would like to keep working on that today prior to going home, we discussed plan for home tomorrow if we can get her bowels moving.       Objective   Objective     Vital Signs:   Temp:  [97 °F (36.1 °C)-98.4 °F (36.9 °C)] 98 °F (36.7 °C)  Heart Rate:  [62-74] 63  Resp:  [16-18] 17  BP: ()/(52-81) 137/81     Physical Exam:  Gen-no acute distress  HENT-NCAT, mucous membranes moist  CV-RRR, S1 S2 normal, no m/r/g  Resp-CTAB, no wheezes or rales  Abd-soft, NT, ND, +BS  Ext-no edema  Neuro-A&Ox3, no focal deficits  Skin-no rashes  Psych-appropriate mood      Results Reviewed:  LAB RESULTS:      Lab 25  1130 25  0731   WBC 6.78 3.65   HEMOGLOBIN 9.8* 8.0*   HEMATOCRIT 29.3* 24.5*   PLATELETS 90* 88*   NEUTROS ABS  --  1.79   EOS ABS  --  0.18   MCV 96.1 100.4*         Lab 25  0731   SODIUM 140   POTASSIUM 4.5   CHLORIDE 105   CO2 28.0   ANION GAP 7.0   BUN 14   CREATININE 1.09*   EGFR 49.9*   GLUCOSE 96   CALCIUM 9.1   PHOSPHORUS 4.4         Lab 25  0731   TOTAL PROTEIN 5.6*   ALBUMIN 3.3*   GLOBULIN 2.3   ALT (SGPT) 15   AST (SGOT) 21   BILIRUBIN 0.8   ALK PHOS 109                 Lab 25  1314   ABO TYPING O   RH TYPING Positive   ANTIBODY SCREEN Negative         Brief Urine Lab Results  (Last result in the past 365 days)        Color   Clarity   Blood   Leuk Est   Nitrite   Protein   CREAT   Urine HCG        24 0141 Yellow   Cloudy   Negative   Small (1+)   Negative   Trace                   Microbiology Results Abnormal       None            No radiology results from the last 24 hrs    Results for orders  placed during the hospital encounter of 11/08/24    Adult Transthoracic Echo Complete W/ Cont if Necessary Per Protocol    Interpretation Summary    Left ventricular ejection fraction appears to be 51 - 55%.    Left ventricular diastolic function was indeterminate.    Mild mitral valve regurgitation is present.    The aortic valve exhibits sclerosis.    Trace tricuspid valve regurgitation is present. Estimated right ventricular systolic pressure from tricuspid regurgitation is normal (<35 mmHg).    There is no evidence of pericardial effusion.      Current medications:  Scheduled Meds:apixaban, 2.5 mg, Oral, BID  levothyroxine, 25 mcg, Oral, Q AM  methylphenidate, 5 mg, Oral, BID  metoprolol tartrate, 25 mg, Oral, BID  pantoprazole, 40 mg, Oral, BID AC  senna-docusate sodium, 2 tablet, Oral, BID  sodium chloride, 10 mL, Intravenous, Q12H      Continuous Infusions:   PRN Meds:.  acetaminophen    senna-docusate sodium **AND** polyethylene glycol **AND** bisacodyl **AND** bisacodyl    meperidine    ondansetron    sodium chloride    sodium chloride    Assessment & Plan   Assessment & Plan     Active Hospital Problems    Diagnosis  POA    **Lymphoma [C85.90]  Yes      Resolved Hospital Problems   No resolved problems to display.        Brief Hospital Course to date:  Sheree Hernandez is a 84 y.o. female with hx of HTN, HLD, PAF, hypothyroidism, cholangiocarcinoma s/p surgery/chemo/XRT, and recent diagnosis of lymphoma, presents from Oncology clinic to begin treatment with Rituxan.    Marginal zone lymphoma  --Dr. Montenegro following, s/p week 1 of weekly Rituxan - plan for 4 weeks total and then reassess  --Has tolerated well, plan to F/U in 1 week for next dose    Symptomatic anemia  --Hb 8.0 on admission, s/p 1 unit PRBC 1/21/25 with improvement to 9.8  --Monitor as outpatient    Thrombocytopenia  --Stable, monitor as outpatient     Constipation  --Scheduled bowel regimen ordered    Hx of cholangiocarcinoma  --s/p  surgery/chemo/XRT  --Stable, has not had any therapy in several months  --Dr. Montenegro follows    HTN  HLD  PAF  --Continue Metoprolol, Eliquis  --Not on a statin    Hypothyroidism  --Continue Synthroid    Expected Discharge Location and Transportation: home  Expected Discharge tomorrow after she has had a BM  Expected Discharge Date: 1/23/2025; Expected Discharge Time:      VTE Prophylaxis:  Pharmacologic VTE prophylaxis orders are present.         AM-PAC 6 Clicks Score (PT): 21 (01/22/25 0800)    CODE STATUS:   Code Status and Medical Interventions: CPR (Attempt to Resuscitate); Full Support   Ordered at: 01/22/25 1152     Code Status (Patient has no pulse and is not breathing):    CPR (Attempt to Resuscitate)     Medical Interventions (Patient has pulse or is breathing):    Full Support       Cristal Daigle MD  01/22/25

## 2025-01-22 NOTE — CASE MANAGEMENT/SOCIAL WORK
"Continued Stay Note  Psychiatric     Patient Name: Sheree Hernandez  MRN: [id number]  Today's Date: 1/22/2025    Admit Date: 1/20/2025    Plan: Home with HH via family.   Discharge Plan       Row Name 01/22/25 0924       Plan    Plan Home with HH via family.    Plan Comments SW\"er met with patient at bedside. Patient receiving oncology treatment. Patient worked with PT who recommend HH at discharge; will explore options closer to discharge. SW'er called patients daughter Tiana who explains her mother needs more help at home. Tiana explains patients' friend (Bobby) helps with patient. SW'er inquire about sitter services handout; emailed to Tiana. Tiana is concerned about Anxiety/depression; would like to explore options. Will explore HH options. Plan is Home with HH via family.    Final Discharge Disposition Code 06 - home with home health care                   Discharge Codes    No documentation.                       EILEEN Gomez (Kay)    "

## 2025-01-23 ENCOUNTER — READMISSION MANAGEMENT (OUTPATIENT)
Dept: CALL CENTER | Facility: HOSPITAL | Age: 85
End: 2025-01-23
Payer: MEDICARE

## 2025-01-23 ENCOUNTER — TELEPHONE (OUTPATIENT)
Dept: ONCOLOGY | Facility: CLINIC | Age: 85
End: 2025-01-23
Payer: MEDICARE

## 2025-01-23 VITALS
OXYGEN SATURATION: 92 % | HEART RATE: 60 BPM | RESPIRATION RATE: 16 BRPM | WEIGHT: 130.07 LBS | SYSTOLIC BLOOD PRESSURE: 119 MMHG | TEMPERATURE: 98.2 F | BODY MASS INDEX: 22.21 KG/M2 | DIASTOLIC BLOOD PRESSURE: 58 MMHG | HEIGHT: 64 IN

## 2025-01-23 LAB
BH BB BLOOD EXPIRATION DATE: NORMAL
BH BB BLOOD TYPE BARCODE: 5100
BH BB DISPENSE STATUS: NORMAL
BH BB PRODUCT CODE: NORMAL
BH BB UNIT NUMBER: NORMAL
CROSSMATCH INTERPRETATION: NORMAL
CYTO UR: NORMAL
HBV CORE AB SERPL QL IA: NEGATIVE
LAB AP ASPIRATE SMEAR: NORMAL
LAB AP CASE REPORT: NORMAL
LAB AP CBC AND DIFFERENTIAL: NORMAL
LAB AP CLINICAL INFORMATION: NORMAL
LAB AP CLOT SECTION: NORMAL
LAB AP CORE BIOPSY: NORMAL
LAB AP DIAGNOSIS COMMENT: NORMAL
LAB AP FLOW CYTOMETRY SUMMARY: NORMAL
LAB AP INTEGRATED ONCOLOGY, ADDENDUM: NORMAL
PATH REPORT.FINAL DX SPEC: NORMAL
PATH REPORT.GROSS SPEC: NORMAL
UNIT  ABO: NORMAL
UNIT  RH: NORMAL

## 2025-01-23 PROCEDURE — 25010000002 HEPARIN LOCK FLUSH PER 10 UNITS: Performed by: INTERNAL MEDICINE

## 2025-01-23 PROCEDURE — G0378 HOSPITAL OBSERVATION PER HR: HCPCS

## 2025-01-23 RX ORDER — BISACODYL 10 MG
10 SUPPOSITORY, RECTAL RECTAL ONCE
Status: COMPLETED | OUTPATIENT
Start: 2025-01-23 | End: 2025-01-23

## 2025-01-23 RX ORDER — SODIUM PHOSPHATE,MONO-DIBASIC 19G-7G/118
1 ENEMA (ML) RECTAL ONCE AS NEEDED
Status: DISCONTINUED | OUTPATIENT
Start: 2025-01-23 | End: 2025-01-23 | Stop reason: HOSPADM

## 2025-01-23 RX ORDER — HEPARIN SODIUM (PORCINE) LOCK FLUSH IV SOLN 100 UNIT/ML 100 UNIT/ML
5 SOLUTION INTRAVENOUS AS NEEDED
Status: DISCONTINUED | OUTPATIENT
Start: 2025-01-23 | End: 2025-01-23 | Stop reason: HOSPADM

## 2025-01-23 RX ADMIN — HEPARIN 500 UNITS: 100 SYRINGE at 13:45

## 2025-01-23 RX ADMIN — Medication 10 ML: at 08:58

## 2025-01-23 RX ADMIN — SENNOSIDES AND DOCUSATE SODIUM 2 TABLET: 50; 8.6 TABLET ORAL at 08:57

## 2025-01-23 RX ADMIN — LEVOTHYROXINE SODIUM 25 MCG: 25 TABLET ORAL at 05:36

## 2025-01-23 RX ADMIN — PANTOPRAZOLE SODIUM 40 MG: 40 TABLET, DELAYED RELEASE ORAL at 08:56

## 2025-01-23 RX ADMIN — METOPROLOL TARTRATE 25 MG: 25 TABLET, FILM COATED ORAL at 08:56

## 2025-01-23 RX ADMIN — BISACODYL 10 MG: 10 SUPPOSITORY RECTAL at 12:00

## 2025-01-23 RX ADMIN — APIXABAN 2.5 MG: 2.5 TABLET, FILM COATED ORAL at 08:56

## 2025-01-23 NOTE — PLAN OF CARE
Goal Outcome Evaluation:  Plan of Care Reviewed With: patient        Progress: improving  Outcome Evaluation: VSS, pt rested well, no s/s of pain or discomfort, up with standby, tolerating PO, no BM this shift

## 2025-01-23 NOTE — TELEPHONE ENCOUNTER
Caller: LEI    Relationship to patient: 5 B NURSE    Best call back number: [phone] --THIS IS THE PT'S NUMBER PLEASE CALL THE PT TO ADVISE.     New or established patient?  [] New  [x] Established    Date of discharge: 01/23/25    Facility discharged from: [facility]    Diagnosis/Symptoms: LYMPHOMA    Length of stay (If applicable): 3 DAYS    Specialty Only: Did you see a [facility] provider?    [x] Yes  [] No  If so, who? CHAVO    Additional Details: PT STATE'S NOTE FROM DR TONY WOULD LIKE PT TO FOLLOW UP ON 01/28 WITH TREATMENT.

## 2025-01-23 NOTE — TELEPHONE ENCOUNTER
Call to Sheree to notify that she has an appointment at 8am with infusion on 01/28. Dr Montenegro will see her in infusion.  Sheree states understood

## 2025-01-23 NOTE — OUTREACH NOTE
Prep Survey      Flowsheet Row Responses   [facility] patient discharged from? [place]   Is LACE score < 7 ? No   Eligibility [facility]   Date of Admission 01/20/25   Date of Discharge 01/23/25   Discharge Disposition Home or Self Care   Discharge diagnosis Lymphoma, oncology treatment   Does the patient have one of the following disease processes/diagnoses(primary or secondary)? Other   Does the patient have Home health ordered? No   Is there a DME ordered? No   Prep survey completed? Yes            Bhavna WEN - Registered Nurse

## 2025-01-23 NOTE — CASE MANAGEMENT/SOCIAL WORK
Case Management Discharge Note      Final Note: Met with patient in the room. She is up ad shakir. Her plan is to return home. She does not know if she wants home health at this time. I explained to her that if she goes home and decides that she wants home health, she can call her PCP and they can set up home health for her. She verbalized understanding. No discharge needs voiced or identified.         Selected Continued Care - Admitted Since 1/20/2025       Destination    No services have been selected for the patient.                Durable Medical Equipment    No services have been selected for the patient.                Dialysis/Infusion    No services have been selected for the patient.                Home Medical Care    No services have been selected for the patient.                Therapy    No services have been selected for the patient.                Community Resources    No services have been selected for the patient.                Community & DME    No services have been selected for the patient.                    Selected Continued Care - Episodes Includes continued care and service providers with selected services from the active episodes listed below      High Risk Care Management Episode start date: 9/30/2024 (Paused)   There are no active outsourced providers for this episode.                      Final Discharge Disposition Code: 01 - home or self-care

## 2025-01-23 NOTE — DISCHARGE SUMMARY
[facility] Medicine Services  DISCHARGE SUMMARY    Patient Name: Sheree Hernandez  : 1940  MRN: [id number]    Date of Admission: 2025 12:06 PM  Date of Discharge:  2025  Primary Care Physician: Timi Conway DO    Consults       Date and Time Order Name Status Description    2025  2:15 PM Inpatient Hematology & Oncology Consult              Hospital Course     Presenting Problem: lymphoma    Active Hospital Problems    Diagnosis  POA    **Lymphoma [C85.90]  Yes      Resolved Hospital Problems   No resolved problems to display.          Hospital Course:  Sheree Hernandez is a 84 y.o. female with hx of HTN, HLD, PAF, hypothyroidism, cholangiocarcinoma s/p surgery/chemo/XRT, and recent diagnosis of lymphoma, presents from Oncology clinic to begin treatment with Rituxan.     Marginal zone lymphoma  --Dr. Montenegro following, s/p week 1 of weekly Rituxan - plan for 4 weeks total and then reassess  --Has tolerated well, plan to F/U in 1 week for next dose     Symptomatic anemia- resolved  --Hb 8.0 on admission, s/p 1 unit PRBC 25 with improvement to 9.8  --Monitor as outpatient     Thrombocytopenia  --Stable, monitor as outpatient      Constipation, chronic  --Scheduled bowel regimen ordered  --suppository today, as needed enema  -- trial linzess at NH     Hx of cholangiocarcinoma  --s/p surgery/chemo/XRT  --Stable, has not had any therapy in several months  --Dr. Montenegro follows     HTN  HLD  PAF  --Continue Metoprolol, Eliquis  --Not on a statin     Hypothyroidism  --Continue Synthroid      Discharge Follow Up Recommendations for outpatient labs/diagnostics:   Pcp follow up 1-2 weeks  Follow up with Dr. Montenegro  as scheduled    Day of Discharge     HPI:   No pain, n/v. Complains of constipation. Bowel regimen continued    Review of Systems  Gen- No fevers, chills  CV- No chest pain, palpitations  Resp- No cough, dyspnea  GI- No N/V/D, abd pain      Vital  Signs:   Temp:  [97.9 °F (36.6 °C)-98.6 °F (37 °C)] 98.2 °F (36.8 °C)  Heart Rate:  [60-66] 60  Resp:  [16-18] 16  BP: (119-144)/(58-68) 119/58      Physical Exam:  Constitutional: No acute distress, awake, alert  HENT: NCAT, mucous membranes moist  Respiratory: Clear to auscultation bilaterally, respiratory effort normal   Cardiovascular: RRR, no murmurs, rubs, or gallops  Gastrointestinal: Positive bowel sounds, soft, nontender, nondistended  Musculoskeletal: No bilateral ankle edema  Psychiatric: Appropriate affect, cooperative  Neurologic: Oriented x 3, strength symmetric in all extremities, Cranial Nerves grossly intact to confrontation, speech clear  Skin: No rashes      Pertinent  and/or Most Recent Results     LAB RESULTS:      Lab 01/22/25  1130 01/21/25  0731   WBC 6.78 3.65   HEMOGLOBIN 9.8* 8.0*   HEMATOCRIT 29.3* 24.5*   PLATELETS 90* 88*   NEUTROS ABS  --  1.79   EOS ABS  --  0.18   MCV 96.1 100.4*         Lab 01/21/25  0731   SODIUM 140   POTASSIUM 4.5   CHLORIDE 105   CO2 28.0   ANION GAP 7.0   BUN 14   CREATININE 1.09*   EGFR 49.9*   GLUCOSE 96   CALCIUM 9.1   PHOSPHORUS 4.4         Lab 01/21/25  0731   TOTAL PROTEIN 5.6*   ALBUMIN 3.3*   GLOBULIN 2.3   ALT (SGPT) 15   AST (SGOT) 21   BILIRUBIN 0.8   ALK PHOS 109                 Lab 01/21/25  1314   ABO TYPING O   RH TYPING Positive   ANTIBODY SCREEN Negative         Brief Urine Lab Results  (Last result in the past 365 days)        Color   Clarity   Blood   Leuk Est   Nitrite   Protein   CREAT   Urine HCG        09/28/24 0141 Yellow   Cloudy   Negative   Small (1+)   Negative   Trace                 Microbiology Results (last 10 days)       ** No results found for the last 240 hours. **            CT Guided Biopsy Bone Marrow    Result Date: 1/14/2025  CT GUIDED BIOPSY BONE MARROW  History: anemia  Radiation dose: 197 mGy-cm.    Estimated blood loss:  < 5 cc.        Technique: A thorough discussion of the procedure, including the risks, benefits,  and alternatives were discussed with the patient. An informed written consent was obtained. The interventional radiology nurse monitored the patient at all times. The patient was placed in prone position on the CT table. A limited CT was performed and a site was chosen posteriorly over the right iliac crest. A preprocedural timeout was performed. IV Fentanyl was given for comfort during the procedure. The skin was then marked, prepped, and draped in the usual sterile fashion. The skin was anesthetized with 1% Lidocaine, and a small skin incision was made. Next, under CT guidance, an 11-gauge Cliq bone biopsy needle was advanced at the cortex of the posterior superior iliac spine. Approximately 20 cc of bone marrow aspirate was aspirated and given to the CT tech. The needle was then advanced another approximately 1 cm, using a motorized drill, into the bone marrow for a core biopsy and specimen given to the CT tech. The needle was then removed. Hemostasis was achieved by manual compression. An aseptic dressing was applied. The patient tolerated the procedure well and after recovery was discharged from the department in stable condition.  Complications: None immediate. Specimen: Bone marrow aspirate and bone core.      Impression:                                                              Successful CT-guided bone marrow aspiration and bone biopsy using the right posterior superior iliac spine access as described above. I, South Kim MD, have personally reviewed the image(s) and the prepared and signed interpretation by Josiane Longoria NP.  Based on my review, I agree with the findings. Report dictated by: LEDY Cerna  I have personally reviewed this case and agree with the findings above: Electronically Signed: South Kim MD  1/14/2025 3:10 PM EST  Workstation ID: [id number]     Results for orders placed during the hospital encounter of 11/16/18    Duplex Venous Lower Extremity - Bilateral  CAR    Interpretation Summary  · No evidence of deep or superficial venous thrombosis of the right or left lower extremities.      Results for orders placed during the hospital encounter of 11/16/18    Duplex Venous Lower Extremity - Bilateral CAR    Interpretation Summary  · No evidence of deep or superficial venous thrombosis of the right or left lower extremities.      Results for orders placed during the hospital encounter of 11/08/24    Adult Transthoracic Echo Complete W/ Cont if Necessary Per Protocol    Interpretation Summary    Left ventricular ejection fraction appears to be 51 - 55%.    Left ventricular diastolic function was indeterminate.    Mild mitral valve regurgitation is present.    The aortic valve exhibits sclerosis.    Trace tricuspid valve regurgitation is present. Estimated right ventricular systolic pressure from tricuspid regurgitation is normal (<35 mmHg).    There is no evidence of pericardial effusion.      Plan for Follow-up of Pending Labs/Results:     Discharge Details        Discharge Medications        New Medications        Instructions Start Date   linaclotide 72 MCG capsule capsule  Commonly known as: Linzess   72 mcg, Oral, Every Morning Before Breakfast             Continue These Medications        Instructions Start Date   apixaban 2.5 MG tablet tablet  Commonly known as: ELIQUIS   2.5 mg, Oral, 2 Times Daily, Friday  1-10-25      dicyclomine 20 MG tablet  Commonly known as: BENTYL   Take 1 tablet by mouth Every 4 (Four) Hours As Needed for Abdominal Cramping      ipratropium 0.06 % nasal spray  Commonly known as: ATROVENT   Administer 1 spray into each nostril twice daily      levothyroxine 25 MCG tablet  Commonly known as: SYNTHROID, LEVOTHROID   25 mcg, Oral, Every Early Morning      lidocaine-prilocaine 2.5-2.5 % cream  Commonly known as: EMLA   Please apply to port site 30 min prior to infusion and cover with Saran Wrap      methylphenidate 5 MG tablet  Commonly known as:  Ritalin   5 mg, Oral, 2 Times Daily      metoprolol tartrate 25 MG tablet  Commonly known as: LOPRESSOR   25 mg, Oral, 2 Times Daily      ondansetron 8 MG tablet  Commonly known as: ZOFRAN   8 mg, Oral, 3 Times Daily PRN      pantoprazole 40 MG EC tablet  Commonly known as: PROTONIX   40 mg, Oral, 2 Times Daily Before Meals      Stimulant Laxative 8.6-50 MG per tablet  Generic drug: sennosides-docusate   1 tablet, Oral, 2 Times Daily PRN             Stop These Medications      ondansetron ODT 4 MG disintegrating tablet  Commonly known as: ZOFRAN-ODT              Allergies   Allergen Reactions    Pravastatin Myalgia         Discharge Disposition:  Home or Self Care    Diet:  Hospital:  Diet Order   Procedures    Diet: Regular/House; Fluid Consistency: Thin (IDDSI 0)       Diet Instructions       Diet: Regular/House Diet; Thin (IDDSI 0)      Discharge Diet: Regular/House Diet    Fluid Consistency: Thin (IDDSI 0)             Activity:  Activity Instructions       Activity as Tolerated              Restrictions or Other Recommendations:         CODE STATUS:    Code Status and Medical Interventions: CPR (Attempt to Resuscitate); Full Support   Ordered at: 01/22/25 1152     Code Status (Patient has no pulse and is not breathing):    CPR (Attempt to Resuscitate)     Medical Interventions (Patient has pulse or is breathing):    Full Support       Future Appointments   Date Time Provider Department Center   2/25/2025  3:45 PM Carlos Kay MD [facility]   4/25/2025  9:15 AM Jacinda Hill APRN [facility] [address] [facility]       Additional Instructions for the Follow-ups that You Need to Schedule       Discharge Follow-up with PCP   As directed       Currently Documented PCP:    Timi Conway DO    PCP Phone Number:    [phone]     Follow Up Details: 1-2 weeks        Discharge Follow-up with Specified Provider: Dr. Montenegro 1/28 as scheduled   As directed      To: Dr. Montenegro 1/28 as scheduled                       LEDY Gonzales  01/23/25      Time Spent on Discharge:  I spent  35  minutes on this discharge activity which included: face-to-face encounter with the patient, reviewing the data in the system, coordination of the care with the nursing staff as well as consultants, documentation, and entering orders.    Electronically signed by LEDY Gonzales, 01/23/25, 12:21 PM EST.

## 2025-01-24 ENCOUNTER — TRANSITIONAL CARE MANAGEMENT TELEPHONE ENCOUNTER (OUTPATIENT)
Dept: CALL CENTER | Facility: HOSPITAL | Age: 85
End: 2025-01-24
Payer: MEDICARE

## 2025-01-24 NOTE — OUTREACH NOTE
Call Center TCM Note      Flowsheet Row Responses   [facility] patient discharged from? [place]   Does the patient have one of the following disease processes/diagnoses(primary or secondary)? Other   TCM attempt successful? Yes  [VR from 2019 lists dtr]   Call start time 1556   Call end time 1601   Discharge diagnosis Lymphoma, oncology treatment   Meds reviewed with patient/caregiver? Yes   Is the patient having any side effects they believe may be caused by any medication additions or changes? No   Does the patient have all medications ordered at discharge? Yes   Is the patient taking all medications as directed (includes completed medication regime)? Yes   Medication comments Eliquis is 2.5mg BID pt v/u.   Comments 1/31/2025  1:15 PM  HOSPITAL FOLLOW UP   Does the patient have an appointment with their PCP within 7-14 days of discharge? Yes   Comments Denies N/V but having diarrhea today, approximately 10 liquid stools today pt reports. Patient denies abdominal pain, dizziness or weakness. Patient educated on importance of hydration, pt v/u and reports that she is taking in fluids as she can. Pt denies any other needs at this time.   Did the patient receive a copy of their discharge instructions? Yes   Nursing interventions Reviewed instructions with patient   What is the patient's perception of their health status since discharge? Improving   Is the patient/caregiver able to teach back signs and symptoms related to disease process for when to call PCP? Yes   Is the patient/caregiver able to teach back signs and symptoms related to disease process for when to call 911? Yes   TCM call completed? Yes   Wrap up additional comments .   Call end time 1601            Merle Beckford RN    1/24/2025, 16:02 EST

## 2025-01-27 ENCOUNTER — PATIENT OUTREACH (OUTPATIENT)
Dept: CASE MANAGEMENT | Facility: OTHER | Age: 85
End: 2025-01-27
Payer: MEDICARE

## 2025-01-27 NOTE — PLAN OF CARE
Problem: Cancer Treatment Phase  Goal: Manage Fatigue (Tiredness)  Outcome: Progressing     Problem: Cancer Treatment Phase  Goal: Keep Pain Under Control  Outcome: Progressing  Intervention: My Pain Control To Do List  Description:   Why is this important?  Day-to-day life can be hard when you have pain.  Even a small change in emotion or a physical problem can make pain better or worse.  Coping with pain depends on how the mind and body reacts to pain.  Pain medicine is just one piece of the treatment puzzle.  There are many tools to help manage pain. A combination of them can be used to best meet your needs.    Flowsheets (Taken 1/27/2025 1412)  My Pain Control To Do List:   keep track of prescription refills   learn relaxation techniques

## 2025-01-27 NOTE — OUTREACH NOTE
AMBULATORY CASE MANAGEMENT NOTE    Names and Relationships of Patient/Support Persons: Contact: David Sheree Bains; Relationship: Self -     Patient Outreach    Mrs. Hernandez was discharged from [facility] for lymphoma. HGB on admission was 8.0 - patient received 1 unit PRBCs. Mrs. Hernandez reports she is feeling better.  Medications reviewed with patient, states she did not fill the linaclotide due to the cost of $275 for a 39 day supply.  Patient states she has resumed taking levothyroxine. Mrs. Hernandez questions the discontinuation of ondansetron, she has an appointment with  tomorrow and I encouraged her to discuss with him then. Mrs Hernandez denies questions or concerns.    Education Documentation  Medication Management, taught by Jenise Joseph RN at 1/27/2025  2:16 PM.  Learner: Patient  Readiness: Acceptance  Method: Explanation  Response: Needs Reinforcement          Jenise CASTELLANOS  Ambulatory Case Management    1/27/2025, 14:16 EST

## 2025-01-28 ENCOUNTER — DOCUMENTATION (OUTPATIENT)
Dept: NUTRITION | Facility: HOSPITAL | Age: 85
End: 2025-01-28
Payer: MEDICARE

## 2025-01-28 ENCOUNTER — DOCUMENTATION (OUTPATIENT)
Dept: OTHER | Facility: HOSPITAL | Age: 85
End: 2025-01-28
Payer: MEDICARE

## 2025-01-28 ENCOUNTER — OFFICE VISIT (OUTPATIENT)
Dept: ONCOLOGY | Facility: CLINIC | Age: 85
End: 2025-01-28
Payer: MEDICARE

## 2025-01-28 ENCOUNTER — HOSPITAL ENCOUNTER (OUTPATIENT)
Dept: ONCOLOGY | Facility: HOSPITAL | Age: 85
Discharge: HOME | End: 2025-01-28
Admitting: INTERNAL MEDICINE
Payer: MEDICARE

## 2025-01-28 ENCOUNTER — READMISSION MANAGEMENT (OUTPATIENT)
Dept: CALL CENTER | Facility: HOSPITAL | Age: 85
End: 2025-01-28
Payer: MEDICARE

## 2025-01-28 VITALS
HEART RATE: 76 BPM | WEIGHT: 131 LBS | DIASTOLIC BLOOD PRESSURE: 63 MMHG | RESPIRATION RATE: 16 BRPM | TEMPERATURE: 98.3 F | HEIGHT: 64 IN | BODY MASS INDEX: 22.36 KG/M2 | SYSTOLIC BLOOD PRESSURE: 113 MMHG

## 2025-01-28 DIAGNOSIS — C22.1 INTRAHEPATIC CHOLANGIOCARCINOMA: Primary | ICD-10-CM

## 2025-01-28 DIAGNOSIS — C85.80 MARGINAL ZONE LYMPHOMA: ICD-10-CM

## 2025-01-28 LAB
BASOPHILS # BLD AUTO: 0.03 10*3/MM3 (ref 0–0.2)
BASOPHILS NFR BLD AUTO: 0.7 % (ref 0–1.5)
DEPRECATED RDW RBC AUTO: 65.2 FL (ref 37–54)
EOSINOPHIL # BLD AUTO: 0.06 10*3/MM3 (ref 0–0.4)
EOSINOPHIL NFR BLD AUTO: 1.4 % (ref 0.3–6.2)
ERYTHROCYTE [DISTWIDTH] IN BLOOD BY AUTOMATED COUNT: 17.8 % (ref 12.3–15.4)
HCT VFR BLD AUTO: 28.2 % (ref 34–46.6)
HGB BLD-MCNC: 9.5 G/DL (ref 12–15.9)
IMM GRANULOCYTES # BLD AUTO: 0.01 10*3/MM3 (ref 0–0.05)
IMM GRANULOCYTES NFR BLD AUTO: 0.2 % (ref 0–0.5)
LYMPHOCYTES # BLD AUTO: 0.92 10*3/MM3 (ref 0.7–3.1)
LYMPHOCYTES NFR BLD AUTO: 21.2 % (ref 19.6–45.3)
MCH RBC QN AUTO: 33.1 PG (ref 26.6–33)
MCHC RBC AUTO-ENTMCNC: 33.7 G/DL (ref 31.5–35.7)
MCV RBC AUTO: 98.3 FL (ref 79–97)
MONOCYTES # BLD AUTO: 0.88 10*3/MM3 (ref 0.1–0.9)
MONOCYTES NFR BLD AUTO: 20.3 % (ref 5–12)
NEUTROPHILS NFR BLD AUTO: 2.44 10*3/MM3 (ref 1.7–7)
NEUTROPHILS NFR BLD AUTO: 56.2 % (ref 42.7–76)
PLATELET # BLD AUTO: 112 10*3/MM3 (ref 140–450)
PMV BLD AUTO: 11.5 FL (ref 6–12)
RBC # BLD AUTO: 2.87 10*6/MM3 (ref 3.77–5.28)
WBC NRBC COR # BLD AUTO: 4.34 10*3/MM3 (ref 3.4–10.8)

## 2025-01-28 PROCEDURE — 96375 TX/PRO/DX INJ NEW DRUG ADDON: CPT

## 2025-01-28 PROCEDURE — 63710000001 ACETAMINOPHEN 325 MG TABLET: Performed by: INTERNAL MEDICINE

## 2025-01-28 PROCEDURE — 25010000002 DIPHENHYDRAMINE PER 50 MG: Performed by: INTERNAL MEDICINE

## 2025-01-28 PROCEDURE — 99214 OFFICE O/P EST MOD 30 MIN: CPT | Performed by: INTERNAL MEDICINE

## 2025-01-28 PROCEDURE — 25810000003 SODIUM CHLORIDE 0.9 % SOLUTION 250 ML FLEX CONT: Performed by: INTERNAL MEDICINE

## 2025-01-28 PROCEDURE — A9270 NON-COVERED ITEM OR SERVICE: HCPCS | Performed by: INTERNAL MEDICINE

## 2025-01-28 PROCEDURE — 25010000002 HEPARIN LOCK FLUSH PER 10 UNITS: Performed by: INTERNAL MEDICINE

## 2025-01-28 PROCEDURE — 1126F AMNT PAIN NOTED NONE PRSNT: CPT | Performed by: INTERNAL MEDICINE

## 2025-01-28 PROCEDURE — 96413 CHEMO IV INFUSION 1 HR: CPT

## 2025-01-28 PROCEDURE — 25010000002 RITUXIMAB 10 MG/ML SOLUTION 10 ML VIAL: Performed by: INTERNAL MEDICINE

## 2025-01-28 PROCEDURE — 85025 COMPLETE CBC W/AUTO DIFF WBC: CPT | Performed by: INTERNAL MEDICINE

## 2025-01-28 PROCEDURE — 25810000003 SODIUM CHLORIDE 0.9 % SOLUTION: Performed by: INTERNAL MEDICINE

## 2025-01-28 PROCEDURE — 96415 CHEMO IV INFUSION ADDL HR: CPT

## 2025-01-28 PROCEDURE — 25010000002 RITUXIMAB 10 MG/ML SOLUTION 50 ML VIAL: Performed by: INTERNAL MEDICINE

## 2025-01-28 RX ORDER — DIPHENHYDRAMINE HYDROCHLORIDE 50 MG/ML
25 INJECTION INTRAMUSCULAR; INTRAVENOUS ONCE
Status: COMPLETED | OUTPATIENT
Start: 2025-01-28 | End: 2025-01-28

## 2025-01-28 RX ORDER — MEPERIDINE HYDROCHLORIDE 25 MG/ML
25 INJECTION INTRAMUSCULAR; INTRAVENOUS; SUBCUTANEOUS
Status: DISCONTINUED | OUTPATIENT
Start: 2025-01-28 | End: 2025-01-29 | Stop reason: HOSPADM

## 2025-01-28 RX ORDER — SODIUM CHLORIDE 9 MG/ML
20 INJECTION, SOLUTION INTRAVENOUS ONCE
Status: COMPLETED | OUTPATIENT
Start: 2025-01-28 | End: 2025-01-28

## 2025-01-28 RX ORDER — HEPARIN SODIUM (PORCINE) LOCK FLUSH IV SOLN 100 UNIT/ML 100 UNIT/ML
500 SOLUTION INTRAVENOUS AS NEEDED
Status: DISCONTINUED | OUTPATIENT
Start: 2025-01-28 | End: 2025-01-29 | Stop reason: HOSPADM

## 2025-01-28 RX ORDER — ACETAMINOPHEN 325 MG/1
650 TABLET ORAL ONCE
Status: COMPLETED | OUTPATIENT
Start: 2025-01-28 | End: 2025-01-28

## 2025-01-28 RX ORDER — HEPARIN SODIUM (PORCINE) LOCK FLUSH IV SOLN 100 UNIT/ML 100 UNIT/ML
500 SOLUTION INTRAVENOUS AS NEEDED
OUTPATIENT
Start: 2025-01-28

## 2025-01-28 RX ORDER — DIPHENHYDRAMINE HYDROCHLORIDE 50 MG/ML
50 INJECTION INTRAMUSCULAR; INTRAVENOUS AS NEEDED
Status: DISCONTINUED | OUTPATIENT
Start: 2025-01-28 | End: 2025-01-29 | Stop reason: HOSPADM

## 2025-01-28 RX ORDER — HYDROCORTISONE SODIUM SUCCINATE 100 MG/2ML
100 INJECTION INTRAMUSCULAR; INTRAVENOUS AS NEEDED
Status: DISCONTINUED | OUTPATIENT
Start: 2025-01-28 | End: 2025-01-29 | Stop reason: HOSPADM

## 2025-01-28 RX ORDER — FAMOTIDINE 10 MG/ML
20 INJECTION, SOLUTION INTRAVENOUS AS NEEDED
Status: DISCONTINUED | OUTPATIENT
Start: 2025-01-28 | End: 2025-01-29 | Stop reason: HOSPADM

## 2025-01-28 RX ADMIN — ACETAMINOPHEN 650 MG: 325 TABLET ORAL at 08:36

## 2025-01-28 RX ADMIN — DIPHENHYDRAMINE HYDROCHLORIDE 25 MG: 50 INJECTION INTRAMUSCULAR; INTRAVENOUS at 08:36

## 2025-01-28 RX ADMIN — SODIUM CHLORIDE 600 MG: 9 INJECTION, SOLUTION INTRAVENOUS at 09:13

## 2025-01-28 RX ADMIN — HEPARIN 500 UNITS: 100 SYRINGE at 12:35

## 2025-01-28 RX ADMIN — SODIUM CHLORIDE 20 ML/HR: 9 INJECTION, SOLUTION INTRAVENOUS at 09:10

## 2025-01-28 NOTE — PROGRESS NOTES
Outpatient Oncology Nutrition     Reason for Visit: Oncology Nutrition Screening, Patient Education, and Follow Up Visit during patient's infusion clinic appointment.  Note patient with a new diagnosis of lymphoma and recently started on treatment.  Patient has been followed by my colleague, Columba Garza RD in the past with treatments for her cholagniocarcinoma.    Patient Name:  Sheree Hernandez    :  1940    MRN:  [id number]    Date of Encounter: 2025    Nutrition Assessment     Diagnosis: Marginal zone lymphoma / diagnosed 2025   Treatment: OP LYMPHOMA RiTUXimab - weekly x 4 (start date 25)    Onc History: Intrahepatic cholangiocarcinoma / diagnosed 2021  Surgery: open right hepatectomy, cholecystectomy, right partial adrenalectomy on 2021 - Dr. Sharma @   Chemotherapy: OP GALLBLADDER Capecitabine + XRT (completed 22)  Radiation: 5040 cGy in 28 fractions to liver via External Beam Radiation (completed 22)  Radiation: 3500 cGy in 5 fractions to liver mass and lymph nodes via Stereotactic Radiation Therapy (completed 23)  Radiation: 3500 cGy in 5 fractions to abdomen via Stereotactic Radiation Therapy (completed 23)  Radiation: 3000 cGy in 5 fractions to abdomen via Stereotactic Radiation Therapy (completed 24)  Radiation: 754 cGy in 1 fractions to left lung via Stereotactic Radiation Therapy (completed 24)  Radiation: 3000 cGy in 1 fractions to left lung via Stereotactic Radiation Therapy (completed 24)  Chemotherapy: OP HEPATOBILIARY CISplatin + Gemcitabine Days 1,8 / Durvalumab (completed 6 cycles 24)  Maintenance: OP CHOLANGIOCARCINOMA Durvalumab 1500 mg (1 cycle completed 24)      Patient Active Problem List:    Patient Active Problem List   Diagnosis    Hypertension    Atypical chest pain    Dyslipidemia    Dyspnea on exertion    Allergic rhinitis    Abnormal stress test    Idiopathic osteoarthritis    BONNIE (obstructive sleep apnea)     "Chronic fatigue    Hemochromatosis carrier    Erythrocytosis    Liver masses    Intrahepatic cholangiocarcinoma    Nausea    Dyspepsia    Nontoxic multinodular goiter    Lung nodule    PAF (paroxysmal atrial fibrillation)    Dehydration    Vasovagal syncope    Weight loss    Pain in right knee    Actinic keratosis    Age-related osteoporosis without current pathological fracture    Neoplasm of uncertain behavior of skin    Senile hyperkeratosis    Idiopathic osteoarthritis    Intrahepatic cholangiocarcinoma    BONNIE (obstructive sleep apnea)    Marginal zone lymphoma    Lymphoma       Food / Nutrition Related History   Discussed her weight loss as below and patient confirms.  She states she has not felt well for the past several months which has resulted in poor oral intake.  She has been supplementing her oral intake with Boost Max and / or Premier Protein drinks.     Hydration Status   Discussed the importance of hydration, reviewed hydrating fluids, and recommended she increase her intake.    Goal: ~64 ounces     Enteral Feeding       Anthropometric Measurements     Height:    Ht Readings from Last 1 Encounters:   01/28/25 162.6 cm (64.02\")       Weight:    Wt Readings from Last 1 Encounters:   01/28/25 59.4 kg (131 lb)       BMI: 22.5 - Normal    Weight Change:  ~30# (18.9%) weight loss x ~5 months     Review of Lab Data (Time Frame - 1 month / 2 month)   Labs reviewed - 1/21/25 & 1/28/25    Medication Review   MAR reviewed - Linzess, Zofran, Protonix, sennosides-docusate, and Bentyl noted     Nutrition Focused Physical Findings       Nutrition Impact Symptoms   Altered appetite  Taste changes  Nausea   Constipation     Physical Activity   Not my normal self, but able to be up and about with fairly normal activities    Current Nutritional Intake     Oral diet:  Regular     Oral nutritional supplements: Boost Max and / or Premier Protein     Intake: oral intake has been less than normal     Malnutrition Risk " Assessment     Recent weight loss over the past 6 months:  Yes    How much weight loss:  3 = 24-33 lbs    Eating poorly because of a decreased appetite:  1 = Yes    Malnutrition Screening Score:     MST = 2 more Patient at risk for malnutrition     Nutrition Diagnosis     Problem Severe malnutrition in the context of chronic illness    Etiology Newly diagnosed marginal zone lymphoma and nutrition impact symptoms    Signs / Symptoms ~30# (18.9%) weight loss x ~5 months and insufficient energy intake     Nutrition Intervention   Reviewed the importance of good nutrition during her treatment course focusing on adequate calorie, protein, nutrient and fluid intake.  Advised her to be consuming smaller more frequent meals/snacks throughout the day to aid with oral intake and nausea symptom management.  Emphasized the importance of protein and its role in the diet; reviewed high protein foods; and recommended she have a protein source at each meal/snack.  Offered several high protein snack ideas she may find more appealing at this time.  Discussed different types of ONS and their roles in the diet.  Provided samples of Ensure Complete (350 calories and 30 grams of protein) for her to try as they are more calorically dense than the ONS she has been consuming.  Offered tips to aid with ONS tolerance and flavor / nutrient enhancement.   Also discussed tips to aid with taste changes including using the baking soda / salt water mouth rinse before eating and as needed throughout the day.  Encouraged her to continue with her bowel regimen and increase fluid intake to aid with constipation management.      Written nutrition resources provided -  Taste and Smell Changes  Soft and Moist High Protein Menu Ideas     Goal   To achieve adequate nutritional and hydration intake to aid with weight maintenance / gain.  To aid with nutrition impact symptom management as needed.     Monitoring / Evaluation   Answered her questions and she  voiced understanding of information discussed.  RD's contact information provided and encouraged to call with questions.  Will monitor as needed during her treatment course.    Leslie Scanlon MS, RD, LD

## 2025-01-28 NOTE — OUTREACH NOTE
Medical Week 2 Survey      Flowsheet Row Responses   [facility] patient discharged from? [place]   Does the patient have one of the following disease processes/diagnoses(primary or secondary)? Other   Week 2 attempt successful? No   Unsuccessful attempts Attempt 1   Revoke Other transitional program            Nasima Partida Registered Nurse

## 2025-01-28 NOTE — PROGRESS NOTES
Follow Up Office Visit      Date: 2025     Patient Name: Sheree Hernandez  MRN: [id number]  : 1940  Chief Complaint:  Follow-up for intrahepatic cholangiocarcinoma      History of Present Illness: Sheree Hernandez is a pleasant 79 y.o. female with a past medical history of hyperlipidemia and hypertension who presents today for evaluation of concern for hemochromatosis. The patient is accompanied by their self who contributes to the history of their care.  Patient states that over the past 2 years she has been having worsening fatigue especially with exertion.  Over the past several months this has become worse.  Patient states that she gets tired with basic activities including showering getting dressed in the morning and walking to and from her car from stores.  She states that her fatigue gets better after a few minutes of rest.  She has previously had an echocardiogram and cardiac catheterization within the past 2 years which did not reveal any cause of her fatigue with exertion.  She is also had an ultrasound of the liver which revealed stable cyst.  She was recently seen by her PCP who ordered iron studies which was notable for an elevated ferritin to 246.  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  All other mutations were negative.  She is currently up-to-date on her mammograms and colonoscopy with no active malignancies noted.  She is otherwise compliant with her statin and high blood pressure medicines.  Repeat abdominal imaging in 2021 concerning for enlarging liver lesion.  She was seen by Dr. Sharma and  is status post open right hepatectomy, cholecystectomy, right partial adrenalectomy on 2021.  Pathology showed a focal R1 resected margin.  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  0/4 lymph nodes were  positive for disease.  LVI and PNI were present.  Finished chemoXRT in January 2021.      Interval History:  Presents to clinic for follow-up.  Completed radiation in May 2023 and again in August 2023.  Completed 6 cycles of cisplatin/gemcitabine/Durvalumab in August 2024.  Treatment discontinued due to toxicity.  Currently on maintenance Durvalumab which has been on hold since October due to her significant fatigue and tiredness.  Was hospitalized in December 2024 due to continued abdominal pain and discomfort.  Underwent an EGD which showed a clean-based nonbleeding ulcer.  Started on PPI with some improvement of her symptoms.  Bone marrow biopsy in January 2025 consistent with a B-cell lymphoma.  Started on weekly rituximab on 1/21.  Tolerated the infusion well.  Fatigue and tiredness overall stable today.  Still having some issues with constipation    Oncology History:    Oncology/Hematology History   Intrahepatic cholangiocarcinoma   8/17/2021 Initial Diagnosis    Intrahepatic cholangiocarcinoma (CMS/HCC)     8/17/2021 Cancer Staged    Staging form: Intrahepatic Bile Duct, AJCC 8th Edition  - Clinical: Stage IB (cT1b, cN0, cM0) - Signed by Jorje Montenegro MD on 8/17/2021 11/5/2021 Cancer Staged    Staging form: Intrahepatic Bile Duct, AJCC 8th Edition  - Pathologic: Stage IIIA (pT3, pN0, cM0) - Signed by Jorje Montenegro MD on 11/5/2021 12/9/2021 - 1/6/2022 Chemotherapy    OP GALLBLADDER Capecitabine + XRT     12/9/2021 - 1/19/2022 Radiation    Radiation OncologyTreatment Course:  Sheree Bains Gonzaleznandini received 5040 cGy in 28 fractions to liver via External Beam Radiation - EBRT.     4/25/2023 - 5/8/2023 Radiation    Radiation OncologyTreatment Course:  Sheree Hernandez received 3500 cGy in 5 fractions to liver mass and lymph nodes via Stereotactic Radiation Therapy - SRT.     8/1/2023 - 8/11/2023 Radiation    Radiation OncologyTreatment Course:  Sheree Hernandez received 3500 cGy in 5 fractions to abdomen  via Stereotactic Radiation Therapy - SRT.     1/9/2024 - 1/23/2024 Radiation    Radiation OncologyTreatment Course:  Sheree Hernandez received 3000 cGy in 5 fractions to abdomen via Stereotactic Radiation Therapy - SRT.     1/11/2024 - 1/11/2024 Radiation    Radiation OncologyTreatment Course:  Sheree Hernandez received 754 cGy in 1 fractions to left lung via Stereotactic Radiation Therapy - SRT.     2/1/2024 - 2/1/2024 Radiation    Radiation OncologyTreatment Course:  Sheree Hernandez received 3000 cGy in 1 fractions to left lung via Stereotactic Radiation Therapy - SRT.     4/19/2024 - 8/16/2024 Chemotherapy    OP HEPATOBILIARY CISplatin + Gemcitabine Days 1,8 / Durvalumab Q21D     5/17/2024 -  Chemotherapy    OP CENTRAL VENOUS ACCESS DEVICE Access, Care, and Maintenance (CVAD)     9/6/2024 -  Chemotherapy    OP CHOLANGIOCARCINOMA Durvalumab 1500 mg     Marginal zone lymphoma   1/20/2025 Initial Diagnosis    Marginal zone lymphoma     1/21/2025 Biopsy    OP LYMPHOMA RiTUXimab (Weekly X 4)  Plan Provider: Jorje Montenegro MD  Treatment goal: Curative  Line of treatment: [No plan line of treatment]         Subjective      Review of Systems:   Constitutional: Negative for fevers, chills, or weight loss  Eyes: Negative for blurred vision or discharge         Ear/Nose/Throat: Negative for difficulty swallowing, sore throat, LAD                                                       Respiratory: Negative for cough, SOA, wheezing                                                                                        Cardiovascular: Negative for chest pain or palpitations                                                                  Gastrointestinal: Negative for nausea, vomiting or diarrhea                                                                     Genitourinary: Negative for dysuria or hematuria                                                                                           Musculoskeletal: Negative  for any joint pains or muscle aches                                                                        Neurologic: Negative for any weakness, headaches, dizziness                                                                         Hematologic: Negative for any easy bleeding or bruising                                                                                   Psychiatric: Negative for anxiety or depression                          Past Medical History/Past Surgical History/ Family History/ Social History: Reviewed by me and unchanged from my previous documentation done on January 2025.     Medications:     Current Outpatient Medications:     apixaban (ELIQUIS) 2.5 MG tablet tablet, Take 1 tablet by mouth 2 (Two) Times a Day. Friday  1-10-25, Disp: , Rfl:     dicyclomine (BENTYL) 20 MG tablet, Take 1 tablet by mouth Every 4 (Four) Hours As Needed for Abdominal Cramping, Disp: 30 tablet, Rfl: 2    ipratropium (ATROVENT) 0.06 % nasal spray, Administer 1 spray into each nostril twice daily, Disp: 30 mL, Rfl: 3    levothyroxine (SYNTHROID, LEVOTHROID) 25 MCG tablet, Take 1 tablet by mouth Every Morning., Disp: 30 tablet, Rfl: 3    lidocaine-prilocaine (EMLA) 2.5-2.5 % cream, Please apply to port site 30 min prior to infusion and cover with Saran Wrap, Disp: 30 g, Rfl: 3    linaclotide (Linzess) 72 MCG capsule capsule, Take 1 capsule by mouth Every Morning Before Breakfast., Disp: 30 capsule, Rfl: 0    methylphenidate (Ritalin) 5 MG tablet, Take 1 tablet by mouth 2 (Two) Times a Day., Disp: 60 tablet, Rfl: 0    metoprolol tartrate (LOPRESSOR) 25 MG tablet, Take 1 tablet by mouth 2 (Two) Times a Day., Disp: 60 tablet, Rfl: 11    ondansetron (ZOFRAN) 8 MG tablet, Take 1 tablet by mouth 3 (Three) Times a Day As Needed for Nausea or Vomiting., Disp: 30 tablet, Rfl: 3    pantoprazole (PROTONIX) 40 MG EC tablet, Take 1 tablet by mouth 2 (Two) Times a Day Before Meals., Disp: 180 tablet, Rfl: 3     sennosides-docusate (Stimulant Laxative) 8.6-50 MG per tablet, Take 1 tablet by mouth 2 (Two) Times a Day As Needed for Constipation., Disp: 60 tablet, Rfl: 0    Allergies:   Allergies   Allergen Reactions    Pravastatin Myalgia       Objective     Physical Exam:  Vital Signs:   Vitals:    01/28/25 0758   PainSc: 0-No pain     Pain Score    01/28/25 0758   PainSc: 0-No pain     ECOG Performance Status: 1 - Symptomatic but completely ambulatory    Constitutional: NAD, ECOG 1  Eyes: PERRLA, scleral anicteric  ENT: No LAD, no thyromegaly  Respiratory: CTAB, no wheezing, rales, rhonchi  Cardiovascular: RRR, no murmurs, pulses 2+ bilaterally  Abdomen: soft, NT/ND, no HSM  Musculoskeletal: strength 5/5 bilaterally, no c/c/e  Neurologic: A&O x 3, CN II-XII intact grossly    Results Review:   Admission on 01/20/2025, Discharged on 01/23/2025   Component Date Value Ref Range Status    Hep B Core Total Ab 01/20/2025 Negative  Negative Final    Hep B S Ab 01/20/2025 Non-Reactive   Final    Hepatitis B Surface Ag 01/20/2025 Non-Reactive  Non-Reactive Final    Glucose 01/21/2025 96  65 - 99 mg/dL Final    BUN 01/21/2025 14  8 - 23 mg/dL Final    Creatinine 01/21/2025 1.09 (H)  0.57 - 1.00 mg/dL Final    Sodium 01/21/2025 140  136 - 145 mmol/L Final    Potassium 01/21/2025 4.5  3.5 - 5.2 mmol/L Final    Slight hemolysis detected by analyzer. Result may be falsely elevated.    Chloride 01/21/2025 105  98 - 107 mmol/L Final    CO2 01/21/2025 28.0  22.0 - 29.0 mmol/L Final    Calcium 01/21/2025 9.1  8.6 - 10.5 mg/dL Final    Total Protein 01/21/2025 5.6 (L)  6.0 - 8.5 g/dL Final    Albumin 01/21/2025 3.3 (L)  3.5 - 5.2 g/dL Final    ALT (SGPT) 01/21/2025 15  1 - 33 U/L Final    AST (SGOT) 01/21/2025 21  1 - 32 U/L Final    Alkaline Phosphatase 01/21/2025 109  39 - 117 U/L Final    Total Bilirubin 01/21/2025 0.8  0.0 - 1.2 mg/dL Final    Globulin 01/21/2025 2.3  gm/dL Final    Calculated Result    A/G Ratio 01/21/2025 1.4  g/dL Final     BUN/Creatinine Ratio 01/21/2025 12.8  7.0 - 25.0 Final    Anion Gap 01/21/2025 7.0  5.0 - 15.0 mmol/L Final    eGFR 01/21/2025 49.9 (L)  >60.0 mL/min/1.73 Final    WBC 01/21/2025 3.65  3.40 - 10.80 10*3/mm3 Final    RBC 01/21/2025 2.44 (L)  3.77 - 5.28 10*6/mm3 Final    Hemoglobin 01/21/2025 8.0 (L)  12.0 - 15.9 g/dL Final    Hematocrit 01/21/2025 24.5 (L)  34.0 - 46.6 % Final    MCV 01/21/2025 100.4 (H)  79.0 - 97.0 fL Final    MCH 01/21/2025 32.8  26.6 - 33.0 pg Final    MCHC 01/21/2025 32.7  31.5 - 35.7 g/dL Final    RDW 01/21/2025 19.0 (H)  12.3 - 15.4 % Final    RDW-SD 01/21/2025 70.6 (H)  37.0 - 54.0 fl Final    MPV 01/21/2025 13.9 (H)  6.0 - 12.0 fL Final    Platelets 01/21/2025 88 (L)  140 - 450 10*3/mm3 Final    Uric Acid 01/21/2025 6.2 (H)  2.4 - 5.7 mg/dL Final    Falsely depressed results may occur on samples drawn from patients receiving N-Acetylcysteine (NAC) or Metamizole.    Phosphorus 01/21/2025 4.4  2.5 - 4.5 mg/dL Final    Neutrophil % 01/21/2025 44.0  42.7 - 76.0 % Final    Lymphocyte % 01/21/2025 28.0  19.6 - 45.3 % Final    Monocyte % 01/21/2025 10.0  5.0 - 12.0 % Final    Eosinophil % 01/21/2025 5.0  0.3 - 6.2 % Final    Basophil % 01/21/2025 2.0 (H)  0.0 - 1.5 % Final    Bands %  01/21/2025 5.0  0.0 - 5.0 % Final    Atypical Lymphocyte % 01/21/2025 6.0 (H)  0.0 - 5.0 % Final    Neutrophils Absolute 01/21/2025 1.79  1.70 - 7.00 10*3/mm3 Final    Lymphocytes Absolute 01/21/2025 1.24  0.70 - 3.10 10*3/mm3 Final    Monocytes Absolute 01/21/2025 0.37  0.10 - 0.90 10*3/mm3 Final    Eosinophils Absolute 01/21/2025 0.18  0.00 - 0.40 10*3/mm3 Final    Basophils Absolute 01/21/2025 0.07  0.00 - 0.20 10*3/mm3 Final    Anisocytosis 01/21/2025 Mod/2+  None Seen Final    Dacrocytes 01/21/2025 Slight/1+  None Seen Final    Macrocytes 01/21/2025 Slight/1+  None Seen Final    Ovalocytes 01/21/2025 Slight/1+  None Seen Final    WBC Morphology 01/21/2025 Normal  Normal Final    Large Platelets 01/21/2025  Slight/1+  None Seen Final    Product Code 01/23/2025 [id number]   Final    Unit Number 01/23/2025 [id number]   Final    UNIT  ABO 01/23/2025 O   Final    UNIT  RH 01/23/2025 POS   Final    Crossmatch Interpretation 01/23/2025 Compatible   Final    Dispense Status 01/23/2025 PT   Final    Blood Expiration Date 01/23/2025 [id number]   Final    Blood Type Barcode 01/23/2025 5100   Final    ABO Type 01/21/2025 O   Final    RH type 01/21/2025 Positive   Final    Antibody Screen 01/21/2025 Negative   Final    T&S Expiration Date 01/21/2025 1/24/2025 11:59:59 PM   Final    WBC 01/22/2025 6.78  3.40 - 10.80 10*3/mm3 Final    RBC 01/22/2025 3.05 (L)  3.77 - 5.28 10*6/mm3 Final    Hemoglobin 01/22/2025 9.8 (L)  12.0 - 15.9 g/dL Final    Hematocrit 01/22/2025 29.3 (L)  34.0 - 46.6 % Final    MCV 01/22/2025 96.1  79.0 - 97.0 fL Final    MCH 01/22/2025 32.1  26.6 - 33.0 pg Final    MCHC 01/22/2025 33.4  31.5 - 35.7 g/dL Final    RDW 01/22/2025 19.3 (H)  12.3 - 15.4 % Final    RDW-SD 01/22/2025 66.3 (H)  37.0 - 54.0 fl Final    MPV 01/22/2025 13.5 (H)  6.0 - 12.0 fL Final    Platelets 01/22/2025 90 (L)  140 - 450 10*3/mm3 Final   Hospital Outpatient Visit on 01/20/2025   Component Date Value Ref Range Status    Hep B Core Total Ab 01/20/2025 Negative  Negative Final    Hepatitis B Surface Ag 01/20/2025 Non-Reactive  Non-Reactive Final    Hep B S Ab 01/20/2025 Non-Reactive   Final       CT Guided Biopsy Bone Marrow    Result Date: 1/14/2025  Narrative: CT GUIDED BIOPSY BONE MARROW  History: anemia  Radiation dose: 197 mGy-cm.    Estimated blood loss:  < 5 cc.        Technique: A thorough discussion of the procedure, including the risks, benefits, and alternatives were discussed with the patient. An informed written consent was obtained. The interventional radiology nurse monitored the patient at all times. The patient was placed in prone position on the CT table. A limited CT was performed and a site was chosen posteriorly  over the right iliac crest. A preprocedural timeout was performed. IV Fentanyl was given for comfort during the procedure. The skin was then marked, prepped, and draped in the usual sterile fashion. The skin was anesthetized with 1% Lidocaine, and a small skin incision was made. Next, under CT guidance, an 11-gauge OnCKonoz bone biopsy needle was advanced at the cortex of the posterior superior iliac spine. Approximately 20 cc of bone marrow aspirate was aspirated and given to the CT tech. The needle was then advanced another approximately 1 cm, using a motorized drill, into the bone marrow for a core biopsy and specimen given to the CT tech. The needle was then removed. Hemostasis was achieved by manual compression. An aseptic dressing was applied. The patient tolerated the procedure well and after recovery was discharged from the department in stable condition.  Complications: None immediate. Specimen: Bone marrow aspirate and bone core.      Impression: Impression:                                                              Successful CT-guided bone marrow aspiration and bone biopsy using the right posterior superior iliac spine access as described above. I, South Kim MD, have personally reviewed the image(s) and the prepared and signed interpretation by Josiane Longoria NP.  Based on my review, I agree with the findings. Report dictated by: LEDY Cerna  I have personally reviewed this case and agree with the findings above: Electronically Signed: South Kim MD  1/14/2025 3:10 PM EST  Workstation ID: [id number]    XR Abdomen KUB    Result Date: 1/9/2025  Narrative: XR ABDOMEN KUB Date of Exam: 1/9/2025 11:07 AM EST Indication: evaluate stool load Comparison: CT abdomen and pelvis 12/11/2024 Findings: Partial visualization of right chest port. There are surgical clips in the medial right upper quadrant corresponding to hepatectomy. Nonobstructive, nonspecific bowel gas pattern. There are couple  scattered phleboliths in the pelvis. No abnormal abdominal calcifications. There are calcifications of the costochondral junctions. Nonobstructive, nonspecific bowel gas pattern. No pneumoperitoneum although supine radiographs are insensitive for this finding. Redemonstration of a subcentimeter nodule in the right lower lobe. There is scattered stool in the right colon. No evidence of large colonic stool burden.     Impression: Impression: Nonobstructive, nonspecific bowel gas pattern. No evidence of large colonic stool burden. Right lower lobe lung nodule. Multiple scattered lung nodules are seen on prior CT and please refer to that report. Electronically Signed: Oscar Disla MD  1/9/2025 12:47 PM EST  Workstation ID: [id number]     Assessment / Plan      Assessment/Plan:   1. Intrahepatic cholangiocarcinoma (CMS/HCC) (Primary)  -Noted during her most recent hospitalization with CT scans concerning for an enlarging liver lesion to 7.3 cm that was previously noted to be hemangioma  -Triple phase CT concerning for a solid tumor lesion  -Biopsy consistent with an adenocarcinoma  -CA 19-9 elevated to 84.7  -CT chest as well as the rest of the CT abdomen/pelvis not concerning for distant or metastatic disease  -Status post open right hepatectomy, cholecystectomy, right partial adrenalectomy on 9/23/2021 with Dr. 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  0/4 lymph nodes were positive for disease.  LVI and PNI were present.  Focal R1 resection margin  -Discussed with patient and her daughter that she would benefit from adjuvant chemoXRT using Xeloda.  Discussed side effects including but not limited to immunosuppression, diarrhea, abdominal pain, nausea, vomiting, hand/foot syndrome  -Repeat CA 19-9 (22) in November 2021  -Completed chemo XRT with Xeloda in January 2021  -Repeat scans in March 2022 without any  evidence of recurrent or metastatic disease  -Repeat CT C/A/P in June 2022 without evidence of recurrent or metastatic disease.  Did note some IVC stenosis which we will monitor with her next scans  -Repeat CT C/A/P in September 2022 reviewed without evidence of recurrent disease or metastatic disease.  IVC stenosis overall stable  -Repeat CT C/A/P in December 2022 reviewed without evidence of recurrent or metastatic disease.  CA 19-9 within normal limits  -Repeat CT C/A/P in March 2023 reviewed and notable for some slight enlarging retroperitoneal adenopathy.  CA 19-9 within normal limits  -PET/CT concerning for 1 cm left liver lesion that was only slightly PET avid as well as 2 lymph nodes that were also slightly PET avid  -Previously discussed her case at tumor board and with Dr. Sharma we agreed that she likely had disease progression  -Status post radiation completed in May 2023  -CA 19-9 in June 2022 within normal limits.    -CT C/A/P in July 2023 reviewed and showed some interval decrease in some lymph nodes as well as some slight enlargement of a couple lymph nodes.  -Completed radiation in August 2023  -CT C/A/P in October 2023 reviewed and only notable for slight increase in some pulmonary nodules about 1 mm each  -CT C/A/P December 2023 reviewed and notable for enlargement of her aortocaval lymph node.  CA 19-9 stable  -Completed radiation with Dr. Billings in February 2024  -CT C/A/P in April 2024 reviewed and showing continued slight enlargement of multiple pulmonary nodules as well as a liver lesion.  CA 19-9 stable  -CT C/A/P in June 2024 reviewed and showing overall stable disease  -CT C/A/P in August 2024 reviewed showing overall stable disease  -Completed 6 cycles of cisplatin/gemcitabine/durvalumab in August 2024.  Discontinued chemotherapy due to toxicity  -CT C/A/P in November 2024 reviewed and showing overall stable disease  -Currently on maintenance Durvalumab and tolerating okay.  Not  clinically appropriate for treatment again today.  Will plan to hold indefinitely until her symptoms significantly improve     Marginal zone lymphoma  -Bone marrow biopsy in January 2025 concerning for B-cell lymphoma most consistent with a marginal zone lymphoma  -Started on weekly rituximab in January 2025.  Due for week 2 today.  Clinically appropriate for treatment.     Hemochromatosis carrier  -Noted on recent labs with an elevated ferritin to 246 hemochromatosis gene profile positive for heterozygosity of H63D.  Other genes negative.  Given patient's age and previous cardiac liver work-up showing no evidence of dysfunction, it is unlikely that she has had iron deposition all this time.  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  Treatment as above  -ECHO in July 2020 within normal limits  -Repeat iron studies in April 2021 stable with a ferritin of 229, iron level 100, transferrin saturation 27%  -Low concern for iron overload at this time.      Thyroid nodule  -Incidentally noted on CT scan but enlarging from previous CT  -Thyroid ultrasound in June 2022 only notable for goiter and small nodules nonconcerning for malignancy  -Has been seen by endocrinology with plan for repeat ultrasound in the summer of next year  -Currently on levothyroxine 25 mcg daily and tolerating well  -TSH in November 2024 within normal limits     Other fatigue   -Continued at this time  -Previously checked iron studies, vitamin B12, folate, thyroid studies within normal limits  -Was previously been seen by cardiology with a normal ECHO and stress test  -Holter monitor notable for atrial fibrillation for which she is on metoprolol and prophylactic apixaban  -Likely secondary to lymphoma  noted on bone marrow biopsy  - Management as above     Anemia  -Likely related to her chemotherapy  -Iron studies in June 2024 with only mild iron deficiency anemia.    -Iron studies in July 2024 within normal limits outside of an elevated ferritin  -Status post 1 unit PRBC in August 2024  -Slight worsening anemia since September.  Hemoglobin 8.8 today  -Bone marrow biopsy in January 2025 consistent with a likely marginal zone lymphoma  -Management as above     Nausea  -Stable with as needed Zofran.  Refilled ODT Zofran today     Anxiety  -Worsening  -Advised patient to contact Janice Sepulveda     Shortness of breath with exertion/orthopnea  -ECHO in November 2024 with a normal EF  -Stress test in December 2024 within normal limits      Abdominal pain/weight loss  -Continue abdominal discomfort.  Unlikely to be related to her malignancy given her stable CT scans last month  -EGD in December 2024 with a clean-based ulcer with no bleeding noted     Debility  -Worsening weakness and debility with increased risk for falls at home  -Have previously ordered a rollator   -Have previously ordered home health with PT         Follow Up:   Follow-up in 2 weeks     Jorje Montenegro MD  Hematology and Oncology     Please note that portions of this note may have been completed with a voice recognition program. Efforts were made to edit the dictations, but occasionally words are mistranscribed.

## 2025-01-28 NOTE — PROGRESS NOTES
"SW met with pt in infusion to provide support and assistance with psychosocial needs. Pt appeared to be nervous, but stated overall she is doing well, a \"bit nervous,\" and denied any needs at this time. Pt communicated to SW that she has good support at home. SW praised pt for having a good support system and normalized her emotions. SW provided education on role and resources available, and provided contact information should needs arise. Pt thanked SW for the visit and was agreeable to reach out ongoing.   "

## 2025-01-31 ENCOUNTER — TELEPHONE (OUTPATIENT)
Dept: CARDIOLOGY | Facility: CLINIC | Age: 85
End: 2025-01-31
Payer: MEDICARE

## 2025-01-31 RX ORDER — METOPROLOL TARTRATE 25 MG/1
12.5 TABLET, FILM COATED ORAL 2 TIMES DAILY
Start: 2025-01-31

## 2025-01-31 NOTE — TELEPHONE ENCOUNTER
Called patient and informed her Dr. Kay recommends she cut her Metoprolol tablet in half and adjust her dose to 12.5 mg 2x/day.     Advised she continue to monitor her BP and let us know if this helps with her hypotension. Patient verbalizes understanding and is agreeable.

## 2025-02-04 ENCOUNTER — HOSPITAL ENCOUNTER (OUTPATIENT)
Dept: ONCOLOGY | Facility: HOSPITAL | Age: 85
Discharge: HOME | End: 2025-02-04
Admitting: INTERNAL MEDICINE
Payer: MEDICARE

## 2025-02-04 VITALS
OXYGEN SATURATION: 99 % | TEMPERATURE: 98.8 F | WEIGHT: 129 LBS | BODY MASS INDEX: 22.02 KG/M2 | RESPIRATION RATE: 16 BRPM | HEIGHT: 64 IN | SYSTOLIC BLOOD PRESSURE: 114 MMHG | DIASTOLIC BLOOD PRESSURE: 68 MMHG | HEART RATE: 98 BPM

## 2025-02-04 DIAGNOSIS — C85.80 MARGINAL ZONE LYMPHOMA: ICD-10-CM

## 2025-02-04 DIAGNOSIS — C22.1 INTRAHEPATIC CHOLANGIOCARCINOMA: Primary | ICD-10-CM

## 2025-02-04 LAB
BASOPHILS # BLD AUTO: 0.03 10*3/MM3 (ref 0–0.2)
BASOPHILS NFR BLD AUTO: 0.6 % (ref 0–1.5)
DEPRECATED RDW RBC AUTO: 61.3 FL (ref 37–54)
EOSINOPHIL # BLD AUTO: 0.01 10*3/MM3 (ref 0–0.4)
EOSINOPHIL NFR BLD AUTO: 0.2 % (ref 0.3–6.2)
ERYTHROCYTE [DISTWIDTH] IN BLOOD BY AUTOMATED COUNT: 17 % (ref 12.3–15.4)
HCT VFR BLD AUTO: 23.8 % (ref 34–46.6)
HGB BLD-MCNC: 8.1 G/DL (ref 12–15.9)
IMM GRANULOCYTES # BLD AUTO: 0.01 10*3/MM3 (ref 0–0.05)
IMM GRANULOCYTES NFR BLD AUTO: 0.2 % (ref 0–0.5)
LYMPHOCYTES # BLD AUTO: 0.62 10*3/MM3 (ref 0.7–3.1)
LYMPHOCYTES NFR BLD AUTO: 11.8 % (ref 19.6–45.3)
MCH RBC QN AUTO: 32.9 PG (ref 26.6–33)
MCHC RBC AUTO-ENTMCNC: 34 G/DL (ref 31.5–35.7)
MCV RBC AUTO: 96.7 FL (ref 79–97)
MONOCYTES # BLD AUTO: 0.77 10*3/MM3 (ref 0.1–0.9)
MONOCYTES NFR BLD AUTO: 14.6 % (ref 5–12)
NEUTROPHILS NFR BLD AUTO: 3.82 10*3/MM3 (ref 1.7–7)
NEUTROPHILS NFR BLD AUTO: 72.6 % (ref 42.7–76)
PLATELET # BLD AUTO: 106 10*3/MM3 (ref 140–450)
PMV BLD AUTO: 12.8 FL (ref 6–12)
RBC # BLD AUTO: 2.46 10*6/MM3 (ref 3.77–5.28)
WBC NRBC COR # BLD AUTO: 5.26 10*3/MM3 (ref 3.4–10.8)

## 2025-02-04 PROCEDURE — 25010000002 RITUXIMAB 10 MG/ML SOLUTION 50 ML VIAL: Performed by: INTERNAL MEDICINE

## 2025-02-04 PROCEDURE — A9270 NON-COVERED ITEM OR SERVICE: HCPCS | Performed by: INTERNAL MEDICINE

## 2025-02-04 PROCEDURE — 85025 COMPLETE CBC W/AUTO DIFF WBC: CPT | Performed by: INTERNAL MEDICINE

## 2025-02-04 PROCEDURE — 96415 CHEMO IV INFUSION ADDL HR: CPT

## 2025-02-04 PROCEDURE — 96361 HYDRATE IV INFUSION ADD-ON: CPT

## 2025-02-04 PROCEDURE — 63710000001 DICYCLOMINE 10 MG CAPSULE: Performed by: INTERNAL MEDICINE

## 2025-02-04 PROCEDURE — 96413 CHEMO IV INFUSION 1 HR: CPT

## 2025-02-04 PROCEDURE — 63710000001 ACETAMINOPHEN 325 MG TABLET: Performed by: INTERNAL MEDICINE

## 2025-02-04 PROCEDURE — 25010000002 HEPARIN LOCK FLUSH PER 10 UNITS: Performed by: INTERNAL MEDICINE

## 2025-02-04 PROCEDURE — 25810000003 SODIUM CHLORIDE 0.9 % SOLUTION 250 ML FLEX CONT: Performed by: INTERNAL MEDICINE

## 2025-02-04 PROCEDURE — 25010000002 RITUXIMAB 10 MG/ML SOLUTION 10 ML VIAL: Performed by: INTERNAL MEDICINE

## 2025-02-04 PROCEDURE — 25010000002 DIPHENHYDRAMINE PER 50 MG: Performed by: INTERNAL MEDICINE

## 2025-02-04 PROCEDURE — 96375 TX/PRO/DX INJ NEW DRUG ADDON: CPT

## 2025-02-04 PROCEDURE — 25810000003 SODIUM CHLORIDE 0.9 % SOLUTION: Performed by: INTERNAL MEDICINE

## 2025-02-04 RX ORDER — HEPARIN SODIUM (PORCINE) LOCK FLUSH IV SOLN 100 UNIT/ML 100 UNIT/ML
500 SOLUTION INTRAVENOUS AS NEEDED
Status: DISCONTINUED | OUTPATIENT
Start: 2025-02-04 | End: 2025-02-05 | Stop reason: HOSPADM

## 2025-02-04 RX ORDER — SODIUM CHLORIDE 9 MG/ML
20 INJECTION, SOLUTION INTRAVENOUS ONCE
Status: DISCONTINUED | OUTPATIENT
Start: 2025-02-04 | End: 2025-02-05 | Stop reason: HOSPADM

## 2025-02-04 RX ORDER — DIPHENHYDRAMINE HYDROCHLORIDE 50 MG/ML
25 INJECTION INTRAMUSCULAR; INTRAVENOUS ONCE
Status: COMPLETED | OUTPATIENT
Start: 2025-02-04 | End: 2025-02-04

## 2025-02-04 RX ORDER — SODIUM CHLORIDE 9 MG/ML
1000 INJECTION, SOLUTION INTRAVENOUS ONCE
Status: COMPLETED | OUTPATIENT
Start: 2025-02-04 | End: 2025-02-04

## 2025-02-04 RX ORDER — DICYCLOMINE HYDROCHLORIDE 10 MG/1
20 CAPSULE ORAL ONCE
Status: CANCELLED
Start: 2025-02-04 | End: 2025-02-04

## 2025-02-04 RX ORDER — SODIUM CHLORIDE 9 MG/ML
1000 INJECTION, SOLUTION INTRAVENOUS ONCE
Status: CANCELLED
Start: 2025-02-04 | End: 2025-02-04

## 2025-02-04 RX ORDER — FAMOTIDINE 10 MG/ML
20 INJECTION, SOLUTION INTRAVENOUS AS NEEDED
Status: DISCONTINUED | OUTPATIENT
Start: 2025-02-04 | End: 2025-02-05 | Stop reason: HOSPADM

## 2025-02-04 RX ORDER — DIPHENHYDRAMINE HYDROCHLORIDE 50 MG/ML
50 INJECTION INTRAMUSCULAR; INTRAVENOUS AS NEEDED
Status: DISCONTINUED | OUTPATIENT
Start: 2025-02-04 | End: 2025-02-05 | Stop reason: HOSPADM

## 2025-02-04 RX ORDER — ACETAMINOPHEN 325 MG/1
650 TABLET ORAL ONCE
Status: COMPLETED | OUTPATIENT
Start: 2025-02-04 | End: 2025-02-04

## 2025-02-04 RX ORDER — HYDROCORTISONE SODIUM SUCCINATE 100 MG/2ML
100 INJECTION INTRAMUSCULAR; INTRAVENOUS AS NEEDED
Status: DISCONTINUED | OUTPATIENT
Start: 2025-02-04 | End: 2025-02-05 | Stop reason: HOSPADM

## 2025-02-04 RX ORDER — HEPARIN SODIUM (PORCINE) LOCK FLUSH IV SOLN 100 UNIT/ML 100 UNIT/ML
500 SOLUTION INTRAVENOUS AS NEEDED
OUTPATIENT
Start: 2025-02-04

## 2025-02-04 RX ORDER — DICYCLOMINE HYDROCHLORIDE 10 MG/1
20 CAPSULE ORAL ONCE
Status: COMPLETED | OUTPATIENT
Start: 2025-02-04 | End: 2025-02-04

## 2025-02-04 RX ADMIN — HEPARIN 500 UNITS: 100 SYRINGE at 12:30

## 2025-02-04 RX ADMIN — SODIUM CHLORIDE 600 MG: 9 INJECTION, SOLUTION INTRAVENOUS at 10:05

## 2025-02-04 RX ADMIN — SODIUM CHLORIDE 1000 ML/HR: 9 INJECTION, SOLUTION INTRAVENOUS at 09:24

## 2025-02-04 RX ADMIN — DIPHENHYDRAMINE HYDROCHLORIDE 25 MG: 50 INJECTION INTRAMUSCULAR; INTRAVENOUS at 09:25

## 2025-02-04 RX ADMIN — ACETAMINOPHEN 650 MG: 325 TABLET ORAL at 09:24

## 2025-02-04 RX ADMIN — DICYCLOMINE HYDROCHLORIDE 20 MG: 10 CAPSULE ORAL at 09:36

## 2025-02-05 ENCOUNTER — NURSE TRIAGE (OUTPATIENT)
Dept: CALL CENTER | Facility: HOSPITAL | Age: 85
End: 2025-02-05
Payer: MEDICARE

## 2025-02-06 ENCOUNTER — PATIENT MESSAGE (OUTPATIENT)
Dept: ONCOLOGY | Facility: CLINIC | Age: 85
End: 2025-02-06
Payer: MEDICARE

## 2025-02-06 NOTE — TELEPHONE ENCOUNTER
"Reason for Disposition  • [1] MILD weakness (i.e., does not interfere with ability to work, go to school, normal activities) AND [2] persists > 1 week    Additional Information  • Negative: SEVERE difficulty breathing (e.g., struggling for each breath, speaks in single words)  • Negative: Shock suspected (e.g., cold/pale/clammy skin, too weak to stand, low BP, rapid pulse)  • Negative: Difficult to awaken or acting confused (e.g., disoriented, slurred speech)  • Negative: [1] Fainted > 15 minutes ago AND [2] still feels too weak or dizzy to stand  • Negative: [1] SEVERE weakness (i.e., unable to walk or barely able to walk, requires support) AND [2] new-onset or worsening  • Negative: Sounds like a life-threatening emergency to the triager  • Negative: Weakness of the face, arm or leg on one side of the body  • Negative: [1] Diabetes mellitus AND [2] weakness from low blood sugar (i.e., < 60 mg/dl or 3.5 mmol/l)  • Negative: Heat exhaustion suspected (i.e., dehydration from heat exposure)  • Negative: Chest pain  • Negative: Vomiting is main symptom  • Negative: Diarrhea is main symptom  • Negative: Difficulty breathing  • Negative: Heart beating < 50 beats per minute OR > 140 beats per minute  • Negative: Extra heartbeats, irregular heart beating, or heart is beating very fast  (i.e., \"palpitations\")  • Negative: Follows large amount of bleeding (e.g., from vomiting, rectum, vagina  (Exception: Small brief weakness from sight of a small amount blood.)  • Negative: Black or tarry bowel movements  • Negative: [1] Drinking very little AND [2] dehydration suspected (e.g., no urine > 12 hours, very dry mouth, very lightheaded)  • Negative: Patient sounds very sick or weak to the triager  • Negative: [1] MODERATE weakness (i.e., interferes with work, school, normal activities) AND [2] cause unknown  (Exceptions: Weakness from acute minor illness or poor fluid intake; weakness is chronic and not worse.)  • Negative: [1] " "MODERATE weakness or fatigue AND [2] from poor fluid intake AND [3] no improvement after 2 hours of rest and fluids  • Negative: [1] Fever > 103 F (39.4 C) AND [2] not able to get the fever down using Fever Care Advice  • Negative: [1] Fever > 101 F (38.3 C) AND [2] age > 60 years  • Negative: [1] Fever > 100.0 F (37.8 C) AND [2] bedridden (e.g., CVA, chronic illness, recovering from surgery)  • Negative: [1] Fever > 100.0 F (37.8 C) AND [2] diabetes mellitus or weak immune system (e.g., HIV positive, cancer chemo, splenectomy, organ transplant, chronic steroids)  • Negative: Pale skin (pallor)  • Negative: [1] MODERATE weakness (i.e., interferes with work, school, normal activities) AND [2] persists > 3 days  • Negative: Taking a medicine that could cause weakness (e.g., blood pressure medications, diuretics)  • Negative: [1] Fatigue (i.e., tires easily, decreased energy) AND [2] persists > 1 week    Answer Assessment - Initial Assessment Questions  1. DESCRIPTION: \"Describe how you are feeling.\"      General weak  2. SEVERITY: \"How bad is it?\"  \"Can you stand and walk?\"    - MILD (0-3): Feels weak or tired, but does not interfere with work, school or normal activities.    - MODERATE (4-7): Able to stand and walk; weakness interferes with work, school, or normal activities.    - SEVERE (8-10): Unable to stand or walk; unable to do usual activities.      Mild to moderate  3. ONSET: \"When did these symptoms begin?\" (e.g., hours, days, weeks, months)      today  4. CAUSE: \"What do you think is causing the weakness or fatigue?\" (e.g., not drinking enough fluids, medical problem, trouble sleeping)      Lymphoma infusion today  5. NEW MEDICINES:  \"Have you started on any new medicines recently?\" (e.g., opioid pain medicines, benzodiazepines, muscle relaxants, antidepressants, antihistamines, neuroleptics, beta blockers)      denies  6. OTHER SYMPTOMS: \"Do you have any other symptoms?\" (e.g., chest pain, fever, cough, " "SOB, vomiting, diarrhea, bleeding, other areas of pain)      Slight temp at 100, and bp lower than usual and then all over the place  7. PREGNANCY: \"Is there any chance you are pregnant?\" \"When was your last menstrual period?\"      na    Protocols used: Weakness (Generalized) and Fatigue-ADULT-AH    "

## 2025-02-09 ENCOUNTER — APPOINTMENT (OUTPATIENT)
Dept: GENERAL RADIOLOGY | Facility: HOSPITAL | Age: 85
DRG: 811 | End: 2025-02-09
Payer: MEDICARE

## 2025-02-09 ENCOUNTER — HOSPITAL ENCOUNTER (INPATIENT)
Facility: HOSPITAL | Age: 85
LOS: 2 days | Discharge: HOME | DRG: 811 | End: 2025-02-14
Attending: EMERGENCY MEDICINE | Admitting: FAMILY MEDICINE
Payer: MEDICARE

## 2025-02-09 DIAGNOSIS — Z74.09 IMPAIRED MOBILITY AND ADLS: ICD-10-CM

## 2025-02-09 DIAGNOSIS — D64.9 SEVERE ANEMIA: Primary | ICD-10-CM

## 2025-02-09 DIAGNOSIS — D64.9 SYMPTOMATIC ANEMIA: ICD-10-CM

## 2025-02-09 DIAGNOSIS — Z78.9 IMPAIRED MOBILITY AND ADLS: ICD-10-CM

## 2025-02-09 DIAGNOSIS — C85.90 LYMPHOMA, UNSPECIFIED BODY REGION, UNSPECIFIED LYMPHOMA TYPE: ICD-10-CM

## 2025-02-09 LAB
ABO GROUP BLD: NORMAL
ALBUMIN SERPL-MCNC: 3.2 G/DL (ref 3.5–5.2)
ALBUMIN/GLOB SERPL: 1.3 G/DL
ALP SERPL-CCNC: 376 U/L (ref 39–117)
ALT SERPL W P-5'-P-CCNC: 13 U/L (ref 1–33)
ANION GAP SERPL CALCULATED.3IONS-SCNC: 14 MMOL/L (ref 5–15)
AST SERPL-CCNC: 22 U/L (ref 1–32)
BACTERIA UR QL AUTO: ABNORMAL /HPF
BASOPHILS # BLD AUTO: 0.02 10*3/MM3 (ref 0–0.2)
BASOPHILS NFR BLD AUTO: 0.3 % (ref 0–1.5)
BILIRUB SERPL-MCNC: 1.2 MG/DL (ref 0–1.2)
BILIRUB UR QL STRIP: NEGATIVE
BLD GP AB SCN SERPL QL: NEGATIVE
BUN SERPL-MCNC: 14 MG/DL (ref 8–23)
BUN/CREAT SERPL: 12.1 (ref 7–25)
CALCIUM SPEC-SCNC: 8.7 MG/DL (ref 8.6–10.5)
CHLORIDE SERPL-SCNC: 99 MMOL/L (ref 98–107)
CLARITY UR: ABNORMAL
CO2 SERPL-SCNC: 22 MMOL/L (ref 22–29)
COLOR UR: YELLOW
CREAT SERPL-MCNC: 1.16 MG/DL (ref 0.57–1)
DEPRECATED RDW RBC AUTO: 59.6 FL (ref 37–54)
DEVELOPER EXPIRATION DATE: NORMAL
DEVELOPER LOT NUMBER: NORMAL
EGFRCR SERPLBLD CKD-EPI 2021: 46.3 ML/MIN/1.73
EOSINOPHIL # BLD AUTO: 0.02 10*3/MM3 (ref 0–0.4)
EOSINOPHIL NFR BLD AUTO: 0.3 % (ref 0.3–6.2)
ERYTHROCYTE [DISTWIDTH] IN BLOOD BY AUTOMATED COUNT: 16.9 % (ref 12.3–15.4)
EXPIRATION DATE: NORMAL
FECAL OCCULT BLOOD SCREEN, POC: NEGATIVE
FLUAV RNA RESP QL NAA+PROBE: NOT DETECTED
FLUBV RNA RESP QL NAA+PROBE: NOT DETECTED
GEN 5 1HR TROPONIN T REFLEX: 29 NG/L
GLOBULIN UR ELPH-MCNC: 2.5 GM/DL
GLUCOSE SERPL-MCNC: 129 MG/DL (ref 65–99)
GLUCOSE UR STRIP-MCNC: NEGATIVE MG/DL
HCT VFR BLD AUTO: 21.3 % (ref 34–46.6)
HGB BLD-MCNC: 7.3 G/DL (ref 12–15.9)
HGB UR QL STRIP.AUTO: ABNORMAL
HOLD SPECIMEN: NORMAL
HYALINE CASTS UR QL AUTO: ABNORMAL /LPF
IMM GRANULOCYTES # BLD AUTO: 0.03 10*3/MM3 (ref 0–0.05)
IMM GRANULOCYTES NFR BLD AUTO: 0.5 % (ref 0–0.5)
KETONES UR QL STRIP: NEGATIVE
LEUKOCYTE ESTERASE UR QL STRIP.AUTO: ABNORMAL
LYMPHOCYTES # BLD AUTO: 0.7 10*3/MM3 (ref 0.7–3.1)
LYMPHOCYTES NFR BLD AUTO: 11.2 % (ref 19.6–45.3)
Lab: NORMAL
MAGNESIUM SERPL-MCNC: 2.2 MG/DL (ref 1.6–2.4)
MCH RBC QN AUTO: 33.2 PG (ref 26.6–33)
MCHC RBC AUTO-ENTMCNC: 34.3 G/DL (ref 31.5–35.7)
MCV RBC AUTO: 96.8 FL (ref 79–97)
MONOCYTES # BLD AUTO: 1.06 10*3/MM3 (ref 0.1–0.9)
MONOCYTES NFR BLD AUTO: 16.9 % (ref 5–12)
NEGATIVE CONTROL: NEGATIVE
NEUTROPHILS NFR BLD AUTO: 4.44 10*3/MM3 (ref 1.7–7)
NEUTROPHILS NFR BLD AUTO: 70.8 % (ref 42.7–76)
NITRITE UR QL STRIP: NEGATIVE
NRBC BLD AUTO-RTO: 0 /100 WBC (ref 0–0.2)
PH UR STRIP.AUTO: 6.5 [PH] (ref 5–8)
PLATELET # BLD AUTO: 107 10*3/MM3 (ref 140–450)
PMV BLD AUTO: 14.2 FL (ref 6–12)
POSITIVE CONTROL: POSITIVE
POTASSIUM SERPL-SCNC: 3.9 MMOL/L (ref 3.5–5.2)
PROT SERPL-MCNC: 5.7 G/DL (ref 6–8.5)
PROT UR QL STRIP: ABNORMAL
RBC # BLD AUTO: 2.2 10*6/MM3 (ref 3.77–5.28)
RBC # UR STRIP: ABNORMAL /HPF
REF LAB TEST METHOD: ABNORMAL
RH BLD: POSITIVE
SARS-COV-2 RNA RESP QL NAA+PROBE: NOT DETECTED
SODIUM SERPL-SCNC: 135 MMOL/L (ref 136–145)
SP GR UR STRIP: 1.01 (ref 1–1.03)
SQUAMOUS #/AREA URNS HPF: ABNORMAL /HPF
T&S EXPIRATION DATE: NORMAL
T4 FREE SERPL-MCNC: 1.62 NG/DL (ref 0.92–1.68)
TROPONIN T % DELTA: -12
TROPONIN T NUMERIC DELTA: -4 NG/L
TROPONIN T SERPL HS-MCNC: 33 NG/L
TSH SERPL DL<=0.05 MIU/L-ACNC: 2.79 UIU/ML (ref 0.27–4.2)
UROBILINOGEN UR QL STRIP: ABNORMAL
WBC # UR STRIP: ABNORMAL /HPF
WBC NRBC COR # BLD AUTO: 6.27 10*3/MM3 (ref 3.4–10.8)
WHOLE BLOOD HOLD COAG: NORMAL
WHOLE BLOOD HOLD SPECIMEN: NORMAL

## 2025-02-09 PROCEDURE — 86900 BLOOD TYPING SEROLOGIC ABO: CPT | Performed by: EMERGENCY MEDICINE

## 2025-02-09 PROCEDURE — 86850 RBC ANTIBODY SCREEN: CPT | Performed by: EMERGENCY MEDICINE

## 2025-02-09 PROCEDURE — 82270 OCCULT BLOOD FECES: CPT | Performed by: EMERGENCY MEDICINE

## 2025-02-09 PROCEDURE — 93005 ELECTROCARDIOGRAM TRACING: CPT | Performed by: EMERGENCY MEDICINE

## 2025-02-09 PROCEDURE — 80053 COMPREHEN METABOLIC PANEL: CPT | Performed by: EMERGENCY MEDICINE

## 2025-02-09 PROCEDURE — 84443 ASSAY THYROID STIM HORMONE: CPT | Performed by: EMERGENCY MEDICINE

## 2025-02-09 PROCEDURE — 25810000003 SODIUM CHLORIDE 0.9 % SOLUTION: Performed by: EMERGENCY MEDICINE

## 2025-02-09 PROCEDURE — 81001 URINALYSIS AUTO W/SCOPE: CPT | Performed by: EMERGENCY MEDICINE

## 2025-02-09 PROCEDURE — 86923 COMPATIBILITY TEST ELECTRIC: CPT

## 2025-02-09 PROCEDURE — 71045 X-RAY EXAM CHEST 1 VIEW: CPT

## 2025-02-09 PROCEDURE — 36415 COLL VENOUS BLD VENIPUNCTURE: CPT

## 2025-02-09 PROCEDURE — 86901 BLOOD TYPING SEROLOGIC RH(D): CPT | Performed by: EMERGENCY MEDICINE

## 2025-02-09 PROCEDURE — 84484 ASSAY OF TROPONIN QUANT: CPT | Performed by: EMERGENCY MEDICINE

## 2025-02-09 PROCEDURE — 83735 ASSAY OF MAGNESIUM: CPT | Performed by: EMERGENCY MEDICINE

## 2025-02-09 PROCEDURE — 87088 URINE BACTERIA CULTURE: CPT | Performed by: INTERNAL MEDICINE

## 2025-02-09 PROCEDURE — 87636 SARSCOV2 & INF A&B AMP PRB: CPT | Performed by: EMERGENCY MEDICINE

## 2025-02-09 PROCEDURE — 84439 ASSAY OF FREE THYROXINE: CPT | Performed by: EMERGENCY MEDICINE

## 2025-02-09 PROCEDURE — 85025 COMPLETE CBC W/AUTO DIFF WBC: CPT | Performed by: EMERGENCY MEDICINE

## 2025-02-09 PROCEDURE — 87086 URINE CULTURE/COLONY COUNT: CPT | Performed by: INTERNAL MEDICINE

## 2025-02-09 PROCEDURE — 87186 SC STD MICRODIL/AGAR DIL: CPT | Performed by: INTERNAL MEDICINE

## 2025-02-09 PROCEDURE — 99291 CRITICAL CARE FIRST HOUR: CPT

## 2025-02-09 RX ORDER — SODIUM CHLORIDE 0.9 % (FLUSH) 0.9 %
10 SYRINGE (ML) INJECTION AS NEEDED
Status: DISCONTINUED | OUTPATIENT
Start: 2025-02-09 | End: 2025-02-14 | Stop reason: HOSPADM

## 2025-02-09 RX ADMIN — SODIUM CHLORIDE 1000 ML: 9 INJECTION, SOLUTION INTRAVENOUS at 22:12

## 2025-02-09 NOTE — Clinical Note
Level of Care: Telemetry [5]   Diagnosis: Symptomatic anemia [[id number]]   Admitting Physician: JUAN DAVID LOPEZ III [[id number]]   Attending Physician: JUAN DAVID LOPEZ III [[id number]]   Bed Request Comments: tele obs not cdu

## 2025-02-10 ENCOUNTER — TELEPHONE (OUTPATIENT)
Dept: ONCOLOGY | Facility: CLINIC | Age: 85
End: 2025-02-10

## 2025-02-10 LAB
ALBUMIN SERPL-MCNC: 3 G/DL (ref 3.5–5.2)
ALBUMIN/GLOB SERPL: 1.2 G/DL
ALP SERPL-CCNC: 343 U/L (ref 39–117)
ALT SERPL W P-5'-P-CCNC: 12 U/L (ref 1–33)
ANION GAP SERPL CALCULATED.3IONS-SCNC: 11 MMOL/L (ref 5–15)
AST SERPL-CCNC: 25 U/L (ref 1–32)
BASOPHILS # BLD AUTO: 0.02 10*3/MM3 (ref 0–0.2)
BASOPHILS # BLD AUTO: 0.03 10*3/MM3 (ref 0–0.2)
BASOPHILS NFR BLD AUTO: 0.3 % (ref 0–1.5)
BASOPHILS NFR BLD AUTO: 0.3 % (ref 0–1.5)
BILIRUB SERPL-MCNC: 2.5 MG/DL (ref 0–1.2)
BUN SERPL-MCNC: 13 MG/DL (ref 8–23)
BUN/CREAT SERPL: 11.1 (ref 7–25)
CALCIUM SPEC-SCNC: 8.1 MG/DL (ref 8.6–10.5)
CHLORIDE SERPL-SCNC: 101 MMOL/L (ref 98–107)
CO2 SERPL-SCNC: 24 MMOL/L (ref 22–29)
CREAT SERPL-MCNC: 1.17 MG/DL (ref 0.57–1)
D-LACTATE SERPL-SCNC: 1.7 MMOL/L (ref 0.5–2)
DEPRECATED RDW RBC AUTO: 61.5 FL (ref 37–54)
DEPRECATED RDW RBC AUTO: 63.1 FL (ref 37–54)
EGFRCR SERPLBLD CKD-EPI 2021: 45.8 ML/MIN/1.73
EOSINOPHIL # BLD AUTO: 0.01 10*3/MM3 (ref 0–0.4)
EOSINOPHIL # BLD AUTO: 0.02 10*3/MM3 (ref 0–0.4)
EOSINOPHIL NFR BLD AUTO: 0.1 % (ref 0.3–6.2)
EOSINOPHIL NFR BLD AUTO: 0.2 % (ref 0.3–6.2)
ERYTHROCYTE [DISTWIDTH] IN BLOOD BY AUTOMATED COUNT: 18.2 % (ref 12.3–15.4)
ERYTHROCYTE [DISTWIDTH] IN BLOOD BY AUTOMATED COUNT: 18.7 % (ref 12.3–15.4)
GLOBULIN UR ELPH-MCNC: 2.5 GM/DL
GLUCOSE SERPL-MCNC: 138 MG/DL (ref 65–99)
HCT VFR BLD AUTO: 20.4 % (ref 34–46.6)
HCT VFR BLD AUTO: 25.4 % (ref 34–46.6)
HCT VFR BLD AUTO: 26.8 % (ref 34–46.6)
HGB BLD-MCNC: 7 G/DL (ref 12–15.9)
HGB BLD-MCNC: 8.9 G/DL (ref 12–15.9)
HGB BLD-MCNC: 9.1 G/DL (ref 12–15.9)
IMM GRANULOCYTES # BLD AUTO: 0.04 10*3/MM3 (ref 0–0.05)
IMM GRANULOCYTES # BLD AUTO: 0.04 10*3/MM3 (ref 0–0.05)
IMM GRANULOCYTES NFR BLD AUTO: 0.4 % (ref 0–0.5)
IMM GRANULOCYTES NFR BLD AUTO: 0.6 % (ref 0–0.5)
LYMPHOCYTES # BLD AUTO: 0.65 10*3/MM3 (ref 0.7–3.1)
LYMPHOCYTES # BLD AUTO: 0.66 10*3/MM3 (ref 0.7–3.1)
LYMPHOCYTES NFR BLD AUTO: 6.9 % (ref 19.6–45.3)
LYMPHOCYTES NFR BLD AUTO: 9.7 % (ref 19.6–45.3)
MAGNESIUM SERPL-MCNC: 3 MG/DL (ref 1.6–2.4)
MCH RBC QN AUTO: 31.5 PG (ref 26.6–33)
MCH RBC QN AUTO: 32 PG (ref 26.6–33)
MCHC RBC AUTO-ENTMCNC: 34 G/DL (ref 31.5–35.7)
MCHC RBC AUTO-ENTMCNC: 34.3 G/DL (ref 31.5–35.7)
MCV RBC AUTO: 92.7 FL (ref 79–97)
MCV RBC AUTO: 93.2 FL (ref 79–97)
MONOCYTES # BLD AUTO: 1.09 10*3/MM3 (ref 0.1–0.9)
MONOCYTES # BLD AUTO: 1.1 10*3/MM3 (ref 0.1–0.9)
MONOCYTES NFR BLD AUTO: 11.4 % (ref 5–12)
MONOCYTES NFR BLD AUTO: 16.4 % (ref 5–12)
NEUTROPHILS NFR BLD AUTO: 4.9 10*3/MM3 (ref 1.7–7)
NEUTROPHILS NFR BLD AUTO: 7.72 10*3/MM3 (ref 1.7–7)
NEUTROPHILS NFR BLD AUTO: 72.9 % (ref 42.7–76)
NEUTROPHILS NFR BLD AUTO: 80.8 % (ref 42.7–76)
NRBC BLD AUTO-RTO: 0 /100 WBC (ref 0–0.2)
NRBC BLD AUTO-RTO: 0 /100 WBC (ref 0–0.2)
PHOSPHATE SERPL-MCNC: 2.3 MG/DL (ref 2.5–4.5)
PLATELET # BLD AUTO: 120 10*3/MM3 (ref 140–450)
PLATELET # BLD AUTO: 94 10*3/MM3 (ref 140–450)
PMV BLD AUTO: 13.3 FL (ref 6–12)
PMV BLD AUTO: 13.6 FL (ref 6–12)
POTASSIUM SERPL-SCNC: 3.8 MMOL/L (ref 3.5–5.2)
PROCALCITONIN SERPL-MCNC: 0.76 NG/ML (ref 0–0.25)
PROT SERPL-MCNC: 5.5 G/DL (ref 6–8.5)
RBC # BLD AUTO: 2.19 10*6/MM3 (ref 3.77–5.28)
RBC # BLD AUTO: 2.89 10*6/MM3 (ref 3.77–5.28)
SODIUM SERPL-SCNC: 136 MMOL/L (ref 136–145)
WBC NRBC COR # BLD AUTO: 6.72 10*3/MM3 (ref 3.4–10.8)
WBC NRBC COR # BLD AUTO: 9.56 10*3/MM3 (ref 3.4–10.8)

## 2025-02-10 PROCEDURE — G0378 HOSPITAL OBSERVATION PER HR: HCPCS

## 2025-02-10 PROCEDURE — 25810000003 SODIUM CHLORIDE 0.9 % SOLUTION: Performed by: INTERNAL MEDICINE

## 2025-02-10 PROCEDURE — 80053 COMPREHEN METABOLIC PANEL: CPT | Performed by: INTERNAL MEDICINE

## 2025-02-10 PROCEDURE — 85025 COMPLETE CBC W/AUTO DIFF WBC: CPT | Performed by: INTERNAL MEDICINE

## 2025-02-10 PROCEDURE — 84100 ASSAY OF PHOSPHORUS: CPT | Performed by: INTERNAL MEDICINE

## 2025-02-10 PROCEDURE — P9016 RBC LEUKOCYTES REDUCED: HCPCS

## 2025-02-10 PROCEDURE — 25010000002 ONDANSETRON PER 1 MG: Performed by: INTERNAL MEDICINE

## 2025-02-10 PROCEDURE — 99223 1ST HOSP IP/OBS HIGH 75: CPT | Performed by: INTERNAL MEDICINE

## 2025-02-10 PROCEDURE — 85018 HEMOGLOBIN: CPT | Performed by: INTERNAL MEDICINE

## 2025-02-10 PROCEDURE — 86900 BLOOD TYPING SEROLOGIC ABO: CPT

## 2025-02-10 PROCEDURE — 83605 ASSAY OF LACTIC ACID: CPT | Performed by: INTERNAL MEDICINE

## 2025-02-10 PROCEDURE — 83735 ASSAY OF MAGNESIUM: CPT | Performed by: INTERNAL MEDICINE

## 2025-02-10 PROCEDURE — 99222 1ST HOSP IP/OBS MODERATE 55: CPT | Performed by: INTERNAL MEDICINE

## 2025-02-10 PROCEDURE — 25010000002 CEFTRIAXONE PER 250 MG: Performed by: INTERNAL MEDICINE

## 2025-02-10 PROCEDURE — 84145 PROCALCITONIN (PCT): CPT | Performed by: INTERNAL MEDICINE

## 2025-02-10 PROCEDURE — 87040 BLOOD CULTURE FOR BACTERIA: CPT | Performed by: INTERNAL MEDICINE

## 2025-02-10 PROCEDURE — 36430 TRANSFUSION BLD/BLD COMPNT: CPT

## 2025-02-10 PROCEDURE — 85014 HEMATOCRIT: CPT | Performed by: INTERNAL MEDICINE

## 2025-02-10 RX ORDER — SODIUM CHLORIDE 0.9 % (FLUSH) 0.9 %
10 SYRINGE (ML) INJECTION AS NEEDED
Status: DISCONTINUED | OUTPATIENT
Start: 2025-02-10 | End: 2025-02-14 | Stop reason: HOSPADM

## 2025-02-10 RX ORDER — SODIUM CHLORIDE 9 MG/ML
40 INJECTION, SOLUTION INTRAVENOUS AS NEEDED
Status: DISCONTINUED | OUTPATIENT
Start: 2025-02-10 | End: 2025-02-14 | Stop reason: HOSPADM

## 2025-02-10 RX ORDER — SODIUM CHLORIDE 9 MG/ML
75 INJECTION, SOLUTION INTRAVENOUS CONTINUOUS
Status: ACTIVE | OUTPATIENT
Start: 2025-02-10 | End: 2025-02-11

## 2025-02-10 RX ORDER — PANTOPRAZOLE SODIUM 40 MG/1
40 TABLET, DELAYED RELEASE ORAL
Status: DISCONTINUED | OUTPATIENT
Start: 2025-02-10 | End: 2025-02-14 | Stop reason: HOSPADM

## 2025-02-10 RX ORDER — NITROGLYCERIN 0.4 MG/1
0.4 TABLET SUBLINGUAL
Status: DISCONTINUED | OUTPATIENT
Start: 2025-02-10 | End: 2025-02-14 | Stop reason: HOSPADM

## 2025-02-10 RX ORDER — ACETAMINOPHEN 160 MG/5ML
650 SOLUTION ORAL EVERY 4 HOURS PRN
Status: DISCONTINUED | OUTPATIENT
Start: 2025-02-10 | End: 2025-02-14 | Stop reason: HOSPADM

## 2025-02-10 RX ORDER — NALOXONE HCL 0.4 MG/ML
0.4 VIAL (ML) INJECTION
Status: DISCONTINUED | OUTPATIENT
Start: 2025-02-10 | End: 2025-02-14 | Stop reason: HOSPADM

## 2025-02-10 RX ORDER — LEVOTHYROXINE SODIUM 25 UG/1
25 TABLET ORAL
Status: DISCONTINUED | OUTPATIENT
Start: 2025-02-10 | End: 2025-02-14 | Stop reason: HOSPADM

## 2025-02-10 RX ORDER — ACETAMINOPHEN 325 MG/1
650 TABLET ORAL EVERY 4 HOURS PRN
Status: DISCONTINUED | OUTPATIENT
Start: 2025-02-10 | End: 2025-02-14 | Stop reason: HOSPADM

## 2025-02-10 RX ORDER — METOPROLOL TARTRATE 25 MG/1
12.5 TABLET, FILM COATED ORAL 2 TIMES DAILY
Status: DISCONTINUED | OUTPATIENT
Start: 2025-02-10 | End: 2025-02-13

## 2025-02-10 RX ORDER — DICYCLOMINE HCL 20 MG
20 TABLET ORAL EVERY 4 HOURS PRN
Status: DISCONTINUED | OUTPATIENT
Start: 2025-02-10 | End: 2025-02-14 | Stop reason: HOSPADM

## 2025-02-10 RX ORDER — ACETAMINOPHEN 650 MG/1
650 SUPPOSITORY RECTAL EVERY 4 HOURS PRN
Status: DISCONTINUED | OUTPATIENT
Start: 2025-02-10 | End: 2025-02-14 | Stop reason: HOSPADM

## 2025-02-10 RX ORDER — HYDROCODONE BITARTRATE AND ACETAMINOPHEN 5; 325 MG/1; MG/1
1 TABLET ORAL EVERY 6 HOURS PRN
Status: DISCONTINUED | OUTPATIENT
Start: 2025-02-10 | End: 2025-02-14 | Stop reason: HOSPADM

## 2025-02-10 RX ORDER — MORPHINE SULFATE 2 MG/ML
2 INJECTION, SOLUTION INTRAMUSCULAR; INTRAVENOUS EVERY 4 HOURS PRN
Status: DISCONTINUED | OUTPATIENT
Start: 2025-02-10 | End: 2025-02-14 | Stop reason: HOSPADM

## 2025-02-10 RX ORDER — ONDANSETRON 2 MG/ML
4 INJECTION INTRAMUSCULAR; INTRAVENOUS EVERY 6 HOURS PRN
Status: DISCONTINUED | OUTPATIENT
Start: 2025-02-10 | End: 2025-02-14 | Stop reason: HOSPADM

## 2025-02-10 RX ORDER — SODIUM CHLORIDE 0.9 % (FLUSH) 0.9 %
10 SYRINGE (ML) INJECTION EVERY 12 HOURS SCHEDULED
Status: DISCONTINUED | OUTPATIENT
Start: 2025-02-10 | End: 2025-02-14 | Stop reason: HOSPADM

## 2025-02-10 RX ADMIN — SODIUM CHLORIDE 500 ML: 9 INJECTION, SOLUTION INTRAVENOUS at 10:30

## 2025-02-10 RX ADMIN — LEVOTHYROXINE SODIUM 25 MCG: 25 TABLET ORAL at 07:03

## 2025-02-10 RX ADMIN — SODIUM CHLORIDE 100 ML/HR: 9 INJECTION, SOLUTION INTRAVENOUS at 04:18

## 2025-02-10 RX ADMIN — SODIUM CHLORIDE 1000 MG: 900 INJECTION INTRAVENOUS at 08:37

## 2025-02-10 RX ADMIN — Medication 10 ML: at 10:31

## 2025-02-10 RX ADMIN — PANTOPRAZOLE SODIUM 40 MG: 40 TABLET, DELAYED RELEASE ORAL at 16:30

## 2025-02-10 RX ADMIN — APIXABAN 2.5 MG: 2.5 TABLET, FILM COATED ORAL at 08:38

## 2025-02-10 RX ADMIN — ACETAMINOPHEN 650 MG: 325 TABLET ORAL at 08:37

## 2025-02-10 RX ADMIN — LEVOTHYROXINE SODIUM 25 MCG: 25 TABLET ORAL at 08:37

## 2025-02-10 RX ADMIN — ONDANSETRON 4 MG: 2 INJECTION INTRAMUSCULAR; INTRAVENOUS at 16:30

## 2025-02-10 RX ADMIN — PANTOPRAZOLE SODIUM 40 MG: 40 TABLET, DELAYED RELEASE ORAL at 08:37

## 2025-02-10 RX ADMIN — DICYCLOMINE HYDROCHLORIDE 20 MG: 20 TABLET ORAL at 07:02

## 2025-02-10 RX ADMIN — ACETAMINOPHEN 650 MG: 325 TABLET ORAL at 19:12

## 2025-02-10 NOTE — ED NOTES
Sheree Hernandez    Nursing Report ED to Floor:  Mental status: A&OX4  Ambulatory status: SB  Oxygen Therapy:  RA  Cardiac Rhythm: NSR  Admitted from: HOME  Safety Concerns:  FALL RISK  Precautions: NA  Social Issues: CANCER PT   ED Room #:  06    ED Nurse Phone Extension - [phone] or may call [phone].      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    Osteopenia 2018    PAF (paroxysmal atrial fibrillation) 07/15/2024    Plantar fasciitis     Chronic    PVC's (premature ventricular contractions)     Senile keratosis     Multiple    MANAN (stress urinary incontinence, female)     Syncope, near     with negative cardiovascular workup        Past Surgical History:  Past Surgical History:   Procedure Laterality Date    ADRENAL GLAND SURGERY  09/22/2021    ADRENALECTOMY  09/22/2021    CARDIAC CATHETERIZATION N/A 01/18/2019    Procedure: Left Heart Cath;  Surgeon: Tucker Gonzalez MD;  Location: [facility] CATH [facility]VASIVE LOCATION;  Service: Cardiovascular    CARDIAC CATHETERIZATION  1/18/2019    CATARACT EXTRACTION  04/2019    CHOLECYSTECTOMY  09/22/2021    COLONOSCOPY  07/15/2020    CYBERKNIFE  05/08/2023    Recurrent liver mass/involved LNs    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    Consulting Provider(s):  Consults       No orders found from 1/11/2025 to 2/10/2025.             Admitting Diagnosis:   The primary encounter diagnosis was Severe anemia. Diagnoses of Symptomatic anemia and Lymphoma, unspecified body region, unspecified lymphoma type were also pertinent to this visit.    Most Recent Vitals:   Vitals:    02/09/25 2106 02/09/25 2117 02/09/25 2231   BP: 120/58 100/49 105/49   BP Location: Right arm     Patient Position: Lying     Pulse: 101 92 82   Resp: 18     Temp: 98.3 °F (36.8 °C)     TempSrc: Oral     SpO2: 95% 97% 99%   Weight: 59 kg (130 lb)     Height: 162.6 cm (64\")         Active LDAs/IV Access:   Lines, Drains & Airways       Active LDAs       Name Placement date Placement time Site Days    Peripheral IV 02/09/25 2119 Anterior;Right Forearm 02/09/25 2119  Forearm  less than 1                    Labs (abnormal labs have a star):   Labs Reviewed   COMPREHENSIVE METABOLIC PANEL - Abnormal; Notable for the following components:       Result Value    Glucose 129 (*)     Creatinine 1.16 (*)     Sodium 135 (*)     Total Protein 5.7 (*)     Albumin 3.2 (*)     Alkaline Phosphatase 376 (*)     eGFR 46.3 (*)     All other components within normal limits    Narrative:     GFR Categories in Chronic Kidney Disease (CKD)      GFR Category          GFR (mL/min/1.73)    Interpretation  G1                     90 or greater         Normal or high (1)  G2                      60-89                Mild decrease (1)  G3a                   45-59                Mild to moderate decrease  G3b               "     30-44                Moderate to severe decrease  G4                    15-29                Severe decrease  G5                    14 or less           Kidney failure          (1)In the absence of evidence of kidney disease, neither GFR category G1 or G2 fulfill the criteria for CKD.    eGFR calculation 2021 CKD-EPI creatinine equation, which does not include race as a factor   URINALYSIS W/ MICROSCOPIC IF INDICATED (NO CULTURE) - Abnormal; Notable for the following components:    Appearance, UA Turbid (*)     Blood, UA Small (1+) (*)     Protein, UA 30 mg/dL (1+) (*)     Leuk Esterase, UA Large (3+) (*)     All other components within normal limits   TROPONIN - Abnormal; Notable for the following components:    HS Troponin T 33 (*)     All other components within normal limits    Narrative:     High Sensitive Troponin T Reference Range:  <14.0 ng/L- Negative Female for AMI  <22.0 ng/L- Negative Male for AMI  >=14 - Abnormal Female indicating possible myocardial injury.  >=22 - Abnormal Male indicating possible myocardial injury.   Clinicians would have to utilize clinical acumen, EKG, Troponin, and serial changes to determine if it is an Acute Myocardial Infarction or myocardial injury due to an underlying chronic condition.        CBC WITH AUTO DIFFERENTIAL - Abnormal; Notable for the following components:    RBC 2.20 (*)     Hemoglobin 7.3 (*)     Hematocrit 21.3 (*)     MCH 33.2 (*)     RDW 16.9 (*)     RDW-SD 59.6 (*)     MPV 14.2 (*)     Platelets 107 (*)     Lymphocyte % 11.2 (*)     Monocyte % 16.9 (*)     Monocytes, Absolute 1.06 (*)     All other components within normal limits   COVID-19 AND FLU A/B, NP SWAB IN TRANSPORT MEDIA 1 HR TAT - Normal    Narrative:     Fact sheet for providers: https://www.fda.gov/media/514350/download    Fact sheet for patients: https://www.fda.gov/media/657051/download    Test performed by PCR.   MAGNESIUM - Normal   TSH - Normal   T4, FREE - Normal   POCT OCCULT BLOOD  STOOL (ED ONLY) - Normal   COVID PRE-OP / PRE-PROCEDURE SCREENING ORDER (NO ISOLATION)    Narrative:     The following orders were created for panel order COVID PRE-OP / PRE-PROCEDURE SCREENING ORDER (NO ISOLATION) - Swab, Nasopharynx.  Procedure                               Abnormality         Status                     ---------                               -----------         ------                     COVID-19 and FLU A/B PCR...[[id number]]  Normal              Final result                 Please view results for these tests on the individual orders.   RAINBOW DRAW    Narrative:     The following orders were created for panel order [place] Draw.  Procedure                               Abnormality         Status                     ---------                               -----------         ------                     Green Top (Gel)[[id number]]                                  Final result               Lavender Top[[id number]]                                     Final result               Gold Top - SST[[id number]]                                   Final result               Gray Top[[id number]]                                         Final result               Light Blue Top[[id number]]                                   Final result                 Please view results for these tests on the individual orders.   HIGH SENSITIVITIY TROPONIN T 1HR   URINALYSIS, MICROSCOPIC ONLY   TYPE AND SCREEN   PREPARE RBC   GREEN TOP   LAVENDER TOP   GOLD TOP - SST   GRAY TOP   LIGHT BLUE TOP   CBC AND DIFFERENTIAL    Narrative:     The following orders were created for panel order CBC & Differential.  Procedure                               Abnormality         Status                     ---------                               -----------         ------                     CBC Auto Differential[[id number]]        Abnormal            Final result                 Please view results for these tests on the individual orders.        Meds Given in ED:   Medications   sodium chloride 0.9 % flush 10 mL (has no administration in time range)   sodium chloride 0.9 % flush 10 mL (has no administration in time range)   sodium chloride 0.9 % bolus 1,000 mL (0 mL Intravenous Stopped 2/9/25 2312)           Last NIH score:                                                          Dysphagia screening results:  Patient Factors Component (Dysphagia:Stroke or Rule-out)  Best Eye Response: 4-->(E4) spontaneous (02/09/25 2109)  Best Motor Response: 6-->(M6) obeys commands (02/09/25 2109)  Best Verbal Response: 5-->(V5) oriented (02/09/25 2109)  Aisha Coma Scale Score: 15 (02/09/25 2109)     Aisha Coma Scale:  No data recorded     CIWA:        Restraint Type:            Isolation Status:  No active isolations

## 2025-02-10 NOTE — ED PROVIDER NOTES
[facility]    EMERGENCY DEPARTMENT ENCOUNTER      Pt Name: Sheree Hernandez  MRN: [id number]  YOB: 1940  Date of evaluation: 2/9/2025  Provider: Kaushal Eden MD    CHIEF COMPLAINT       Chief Complaint   Patient presents with    Fatigue         HISTORY OF PRESENT ILLNESS   Sheree Hernandez is a 84 y.o. female who presents to the emergency department for evaluation of labile blood pressures.  She states that the highest systolic blood pressures he has had at home has been 120 and the lowest it has been has been around 80.  She discussed this with her cardiologist and they stopped her metoprolol. She is currently being treated for liver cancer and lymphoma.  She has been trying to drink a lot of fluids to stay well-hydrated and increase the salt in her diet.    She has also been suffering from a lot of fatigue over a period of weeks to months.  She is worried about low blood counts due to a history of low blood counts and needing a blood transfusion in the past.  Her most recent chemotherapy was 5 days ago    REVIEW OF SYSTEMS     ROS:  A chief complaint appropriate review of systems was completed and is negative except as noted in the HPI.      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    Osteopenia 2018    PAF (paroxysmal atrial fibrillation) 07/15/2024    Plantar fasciitis     Chronic    PVC's (premature  ventricular contractions)     Senile keratosis     Multiple    MANAN (stress urinary incontinence, female)     Symptomatic anemia 2/9/2025    Syncope, near     with negative cardiovascular workup         SURGICAL HISTORY       Past Surgical History:   Procedure Laterality Date    ADRENAL GLAND SURGERY  09/22/2021    ADRENALECTOMY  09/22/2021    CARDIAC CATHETERIZATION N/A 01/18/2019    Procedure: Left Heart Cath;  Surgeon: Tucker Gonzalez MD;  Location:  [facility];  Service: Cardiovascular    CARDIAC CATHETERIZATION  1/18/2019    CATARACT EXTRACTION  04/2019    CHOLECYSTECTOMY  09/22/2021    COLONOSCOPY  07/15/2020    CYBERKNIFE  05/08/2023    Recurrent liver mass/involved LNs    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    ENDOSCOPY N/A 12/13/2024    Procedure: ESOPHAGOGASTRODUODENOSCOPY;  Surgeon: Mihir Encinas MD;  Location:  [facility];  Service: Gastroenterology;  Laterality: N/A;    HYSTERECTOMY  1984    age 46 - ovarian cyst, endometriosis,  jorge/bso    LIVER SURGERY Right 09/22/2021    Removed    ROTATOR CUFF REPAIR Left 1989    Collar Bone Repair    VENOUS ACCESS DEVICE (PORT) INSERTION Right 05/10/2024         CURRENT MEDICATIONS       Current Facility-Administered Medications:     acetaminophen (TYLENOL) tablet 650 mg, 650 mg, Oral, Q4H PRN **OR** acetaminophen (TYLENOL) 160 MG/5ML oral solution 650 mg, 650 mg, Oral, Q4H PRN **OR** acetaminophen (TYLENOL) suppository 650 mg, 650 mg, Rectal, Q4H PRN, Rj Gaviria III, DO    Calcium Replacement - Follow Nurse / BPA Driven Protocol, , Not Applicable, PRN, Rj Gaviria III, DO    HYDROcodone-acetaminophen (NORCO) 5-325 MG per tablet 1 tablet, 1 tablet, Oral, Q6H PRN, Rj Gaviria III, DO    Magnesium Standard Dose Replacement - Follow Nurse / BPA Driven Protocol, , Not Applicable, PRN, Rj Gaviria III, DO    melatonin tablet 5 mg, 5 mg, Oral, Nightly PRN, Rj Gaviria III,  DO    morphine injection 2 mg, 2 mg, Intravenous, Q4H PRN **AND** naloxone (NARCAN) injection 0.4 mg, 0.4 mg, Intravenous, Q5 Min PRN, Rj Gaviria III, DO    nitroglycerin (NITROSTAT) SL tablet 0.4 mg, 0.4 mg, Sublingual, Q5 Min PRN, Rj Gaviria III, DO    ondansetron (ZOFRAN) injection 4 mg, 4 mg, Intravenous, Q6H PRN, Rj Gaviria III, DO    Phosphorus Replacement - Follow Nurse / BPA Driven Protocol, , Not Applicable, PRN, Rj Gaviria III, DO    Potassium Replacement - Follow Nurse / BPA Driven Protocol, , Not Applicable, PRN, Rj Gaviria III, DO    [COMPLETED] Insert Peripheral IV, , , Once **AND** sodium chloride 0.9 % flush 10 mL, 10 mL, Intravenous, PRN, Kaushal Eden MD    [COMPLETED] Insert Peripheral IV, , , Once **AND** sodium chloride 0.9 % flush 10 mL, 10 mL, Intravenous, PRN, Kaushal Eden MD    sodium chloride 0.9 % flush 10 mL, 10 mL, Intravenous, Q12H, Rj Gaviria III, DO    sodium chloride 0.9 % flush 10 mL, 10 mL, Intravenous, PRN, Rj Gaviria III, DO    sodium chloride 0.9 % infusion 40 mL, 40 mL, Intravenous, PRN, Rj Gaviria III, DO    sodium chloride 0.9 % infusion, 100 mL/hr, Intravenous, Continuous, Rj Gaviria III, DO    Current Outpatient Medications:     apixaban (ELIQUIS) 2.5 MG tablet tablet, Take 1 tablet by mouth 2 (Two) Times a Day. Friday  1-10-25, Disp: , Rfl:     dicyclomine (BENTYL) 20 MG tablet, Take 1 tablet by mouth Every 4 (Four) Hours As Needed for Abdominal Cramping, Disp: 30 tablet, Rfl: 2    ipratropium (ATROVENT) 0.06 % nasal spray, Administer 1 spray into each nostril twice daily, Disp: 30 mL, Rfl: 3    levothyroxine (SYNTHROID, LEVOTHROID) 25 MCG tablet, Take 1 tablet by mouth Every Morning., Disp: 30 tablet, Rfl: 3    lidocaine-prilocaine (EMLA) 2.5-2.5 % cream, Please apply to port site 30 min prior to infusion and cover with Saran Wrap, Disp: 30 g, Rfl: 3     linaclotide (Linzess) 72 MCG capsule capsule, Take 1 capsule by mouth Every Morning Before Breakfast., Disp: 30 capsule, Rfl: 0    methylphenidate (Ritalin) 5 MG tablet, Take 1 tablet by mouth 2 (Two) Times a Day., Disp: 60 tablet, Rfl: 0    metoprolol tartrate (LOPRESSOR) 25 MG tablet, Take 0.5 tablets by mouth 2 (Two) Times a Day., Disp: , Rfl:     ondansetron (ZOFRAN) 8 MG tablet, Take 1 tablet by mouth 3 (Three) Times a Day As Needed for Nausea or Vomiting., Disp: 30 tablet, Rfl: 3    pantoprazole (PROTONIX) 40 MG EC tablet, Take 1 tablet by mouth 2 (Two) Times a Day Before Meals., Disp: 180 tablet, Rfl: 3    sennosides-docusate (Stimulant Laxative) 8.6-50 MG per tablet, Take 1 tablet by mouth 2 (Two) Times a Day As Needed for Constipation., Disp: 60 tablet, Rfl: 0    ALLERGIES     Pravastatin    FAMILY HISTORY       Family History   Problem Relation Age of Onset    Heart attack Mother     Heart failure Mother     Dementia Mother     Stroke Mother     Arthritis Mother         Heart Attack    Heart disease Father     Hearing loss Father     Stroke Father     Arrhythmia Brother     Breast cancer Paternal Aunt     Colon cancer Neg Hx           SOCIAL HISTORY       Social History     Socioeconomic History    Marital status:     Number of children: 2   Tobacco Use    Smoking status: Never     Passive exposure: Never    Smokeless tobacco: Never    Tobacco comments:     Exposed to secondhand smoke   Vaping Use    Vaping status: Never Used   Substance and Sexual Activity    Alcohol use: Never     Comment: Very seldom    Drug use: Never    Sexual activity: Not Currently     Partners: Male     Birth control/protection: None, Hysterectomy     Comment: Complete Hysterectomy         PHYSICAL EXAM    (up to 7 for level 4, 8 or more for level 5)     Vitals:    02/10/25 0217 02/10/25 0222 02/10/25 0227 02/10/25 0232   BP:   125/87    Pulse: 86 93 89 89   Resp:       Temp:       TempSrc:       SpO2: 98% 97% 100% 98%    Weight:       Height:           Physical Exam  Constitutional:       General: She is not in acute distress.  HENT:      Head: Normocephalic and atraumatic.      Mouth/Throat:      Mouth: Mucous membranes are moist.   Eyes:      Conjunctiva/sclera: Conjunctivae normal.      Pupils: Pupils are equal, round, and reactive to light.   Cardiovascular:      Rate and Rhythm: Normal rate and regular rhythm.      Pulses: Normal pulses.   Pulmonary:      Effort: Pulmonary effort is normal. No respiratory distress.   Abdominal:      General: Abdomen is flat. There is no distension.      Tenderness: There is no abdominal tenderness.   Musculoskeletal:         General: No swelling or deformity. Normal range of motion.   Skin:     General: Skin is warm and dry.      Capillary Refill: Capillary refill takes less than 2 seconds.      Coloration: Skin is pale.   Neurological:      General: No focal deficit present.      Mental Status: She is alert and oriented to person, place, and time.   Psychiatric:         Mood and Affect: Mood normal.         Behavior: Behavior normal.            DIAGNOSTIC RESULTS     EKG: All EKGs are interpreted by the Emergency Department Physician who either signs or Co-signs this chart in the absence of a cardiologist.    ECG 12 Lead Dyspnea   Preliminary Result   Test Reason : Dyspnea   Blood Pressure :   */*   mmHG   Vent. Rate :  93 BPM     Atrial Rate :  93 BPM      P-R Int : 140 ms          QRS Dur :  80 ms       QT Int : 380 ms       P-R-T Axes :  56  39  54 degrees     QTcB Int : 472 ms      Normal sinus rhythm   Normal ECG   When compared with ECG of 11-Dec-2024 11:51,   Vent. rate has increased by  37 bpm      Referred By: edmd           Confirmed By:             RADIOLOGY:   [x] Radiologist's Report Reviewed:  XR Chest 1 View   Final Result   Impression:   No acute cardiopulmonary process.               Electronically Signed: Mary Jo Johnson MD     2/9/2025 10:43 PM EST     Workstation ID:  [id number]          I ordered and independently reviewed the above noted radiographic studies.        LABS:  I independently interpreted all laboratory studies conducted during this ED visit.  The results of these studies can be seen below and my independent interpretation in the ED course      EMERGENCY DEPARTMENT COURSE and DIFFERENTIAL DIAGNOSIS/MDM:   Vitals:  AS OF 02:48 EST    BP - 125/87  HR - 89  TEMP - 98.2 °F (36.8 °C)  O2 SATS - 98%        Discussion below represents my analysis of pertinent findings related to patient's condition, differential diagnosis, treatment plan and final disposition.      Differential diagnosis:  The differential diagnosis associated with the patient's presentation includes: Symptomatic anemia, sequela of her known cancer, medication side effect from her chemotherapy, electrolyte derangement or renal dysfunction      Independent interpretations (ECG/rhythm strip/X-ray/US/CT scan): See ED course      Additional sources:  Discussed/obtained information from independent historians:   [] Spouse:   [] Parent:   [x] Friend: Patient's friend at the bedside provides some details of HPI   [] EMS:   [] Other:    External record review:  1/28/2025 reviewed most recent outpatient provider note, patient seen in oncology clinic for intrahepatic cholangiocarcinoma documents her history of recent fatigue worsening over a period of weeks to months.  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  Patient's fatigue is felt to be likely secondary to her lymphoma, this is at least the opinion of her oncologist.      Patient's care impacted by:   [] Diabetes   [] Hypertension   [] Coronary Artery Disease   [x] Cancer   [] Other:     Care significantly affected by Social Determinants of Health (housing and economic circumstances, unemployment)    [] Yes     [x] No   If yes, Patient's care significantly limited by  Social Determinants of Health including:    [] Inadequate housing    []  Low income    [] Alcoholism and drug addiction in family    [] Problems related to primary support group    [] Unemployment    [] Problems related to employment    [] Other Social Determinants of Health:     I considered prescription management with:    [] Pain medication:   [] Antiviral:   [] Antibiotic:   [] Other:    Additional orders considered but not ordered:  The following testing was considered but ultimately not selected: N/A    ED Course:    ED Course as of 02/10/25 0248   Sun Feb 09, 2025 2134 Twelve-lead ECG independently interpreted by myself demonstrates normal sinus rhythm, no ST segment elevation or depression.  Normal axis. [KB]   Mon Feb 10, 2025   0247 Laboratory workup independently interpreted by myself demonstrates severe anemia, downtrending compared with recent labs [KB]      ED Course User Index  [KB] Kaushal Eden MD         Diagnostic lab and imaging studies were conducted.    With identification of a severe anemia patient was started on packed red blood cell transfusion, 1 unit ordered in the emergency department.  Patient does not have any symptoms of acute bleeding or blood loss, her Hemoccult stool is negative and her stool is brown and so I do not have concern for GI bleeding.  I believe it is low because of her lymphoma and chemotherapy treatments.    Patient does have significant pyuria and a small degree of bacteriuria she does not have any symptoms of a urinary tract infection and will hold antibiotics at this time.    Will be admitted for further treatment          PROCEDURES:  Critical Care    Performed by: Kaushal Eden MD  Authorized by: Kaushal Eden MD    Critical care provider statement:     Critical care time (minutes):  35    Critical care time was exclusive of:  Separately billable procedures and treating other patients    Critical care was necessary to treat or prevent imminent or life-threatening deterioration of the following conditions: Severe anemia requiring  blood transfusion.    Critical care was time spent personally by me on the following activities:  Development of treatment plan with patient or surrogate, discussions with consultants, evaluation of patient's response to treatment, examination of patient, obtaining history from patient or surrogate, ordering and performing treatments and interventions, ordering and review of laboratory studies, ordering and review of radiographic studies, pulse oximetry, re-evaluation of patient's condition and review of old charts    I assumed direction of critical care for this patient from another provider in my specialty: no      Care discussed with: admitting provider        CRITICAL CARE TIME    35    CONSULTS   [facility] medicine consulted for admission    FINAL IMPRESSION      1. Severe anemia    2. Symptomatic anemia    3. Lymphoma, unspecified body region, unspecified lymphoma type          DISPOSITION/PLAN     ED Disposition       ED Disposition   Decision to Admit    Condition   --    Comment   Level of Care: Telemetry [5]   Diagnosis: Symptomatic anemia [5809253]   Admitting Physician: JUAN DAVID LOPEZ III [[id number]]   Attending Physician: JUAN DAVID LOPEZ III [[id number]]   Is patient appropriate for Inpatient Observation Unit?: No [0]   Bed Request Comments: tele obs not cdu                   Comment: Please note this report has been produced using speech recognition software.      Kaushal Eden MD  Attending Emergency Physician    Recent Results (from the past 24 hours)   Green Top (Gel)    Collection Time: 02/09/25  9:13 PM   Result Value Ref Range    Extra Tube Hold for add-ons.    Lavender Top    Collection Time: 02/09/25  9:13 PM   Result Value Ref Range    Extra Tube hold for add-on    Gold Top - SST    Collection Time: 02/09/25  9:13 PM   Result Value Ref Range    Extra Tube Hold for add-ons.    Gray Top    Collection Time: 02/09/25  9:13 PM   Result Value Ref Range    Extra Tube Hold for add-ons.     Light Blue Top    Collection Time: 02/09/25  9:13 PM   Result Value Ref Range    Extra Tube Hold for add-ons.    Comprehensive Metabolic Panel    Collection Time: 02/09/25  9:13 PM    Specimen: Blood   Result Value Ref Range    Glucose 129 (H) 65 - 99 mg/dL    BUN 14 8 - 23 mg/dL    Creatinine 1.16 (H) 0.57 - 1.00 mg/dL    Sodium 135 (L) 136 - 145 mmol/L    Potassium 3.9 3.5 - 5.2 mmol/L    Chloride 99 98 - 107 mmol/L    CO2 22.0 22.0 - 29.0 mmol/L    Calcium 8.7 8.6 - 10.5 mg/dL    Total Protein 5.7 (L) 6.0 - 8.5 g/dL    Albumin 3.2 (L) 3.5 - 5.2 g/dL    ALT (SGPT) 13 1 - 33 U/L    AST (SGOT) 22 1 - 32 U/L    Alkaline Phosphatase 376 (H) 39 - 117 U/L    Total Bilirubin 1.2 0.0 - 1.2 mg/dL    Globulin 2.5 gm/dL    A/G Ratio 1.3 g/dL    BUN/Creatinine Ratio 12.1 7.0 - 25.0    Anion Gap 14.0 5.0 - 15.0 mmol/L    eGFR 46.3 (L) >60.0 mL/min/1.73   High Sensitivity Troponin T    Collection Time: 02/09/25  9:13 PM    Specimen: Blood   Result Value Ref Range    HS Troponin T 33 (H) <14 ng/L   Magnesium    Collection Time: 02/09/25  9:13 PM    Specimen: Blood   Result Value Ref Range    Magnesium 2.2 1.6 - 2.4 mg/dL   TSH    Collection Time: 02/09/25  9:13 PM    Specimen: Blood   Result Value Ref Range    TSH 2.790 0.270 - 4.200 uIU/mL   T4, Free    Collection Time: 02/09/25  9:13 PM    Specimen: Blood   Result Value Ref Range    Free T4 1.62 0.92 - 1.68 ng/dL   CBC Auto Differential    Collection Time: 02/09/25  9:13 PM    Specimen: Blood   Result Value Ref Range    WBC 6.27 3.40 - 10.80 10*3/mm3    RBC 2.20 (L) 3.77 - 5.28 10*6/mm3    Hemoglobin 7.3 (L) 12.0 - 15.9 g/dL    Hematocrit 21.3 (L) 34.0 - 46.6 %    MCV 96.8 79.0 - 97.0 fL    MCH 33.2 (H) 26.6 - 33.0 pg    MCHC 34.3 31.5 - 35.7 g/dL    RDW 16.9 (H) 12.3 - 15.4 %    RDW-SD 59.6 (H) 37.0 - 54.0 fl    MPV 14.2 (H) 6.0 - 12.0 fL    Platelets 107 (L) 140 - 450 10*3/mm3    Neutrophil % 70.8 42.7 - 76.0 %    Lymphocyte % 11.2 (L) 19.6 - 45.3 %    Monocyte % 16.9 (H)  5.0 - 12.0 %    Eosinophil % 0.3 0.3 - 6.2 %    Basophil % 0.3 0.0 - 1.5 %    Immature Grans % 0.5 0.0 - 0.5 %    Neutrophils, Absolute 4.44 1.70 - 7.00 10*3/mm3    Lymphocytes, Absolute 0.70 0.70 - 3.10 10*3/mm3    Monocytes, Absolute 1.06 (H) 0.10 - 0.90 10*3/mm3    Eosinophils, Absolute 0.02 0.00 - 0.40 10*3/mm3    Basophils, Absolute 0.02 0.00 - 0.20 10*3/mm3    Immature Grans, Absolute 0.03 0.00 - 0.05 10*3/mm3    nRBC 0.0 0.0 - 0.2 /100 WBC   ECG 12 Lead Dyspnea    Collection Time: 02/09/25  9:17 PM   Result Value Ref Range    QT Interval 380 ms    QTC Interval 472 ms   COVID-19 and FLU A/B PCR, 1 HR TAT - Swab, Nasopharynx    Collection Time: 02/09/25 10:06 PM    Specimen: Nasopharynx; Swab   Result Value Ref Range    COVID19 Not Detected Not Detected - Ref. Range    Influenza A PCR Not Detected Not Detected    Influenza B PCR Not Detected Not Detected   Type & Screen    Collection Time: 02/09/25 10:07 PM    Specimen: Blood   Result Value Ref Range    ABO Type O     RH type Positive     Antibody Screen Negative     T&S Expiration Date 2/12/2025 11:59:59 PM    Urinalysis With Microscopic If Indicated (No Culture) - Urine, Clean Catch    Collection Time: 02/09/25 10:48 PM    Specimen: Urine, Clean Catch   Result Value Ref Range    Color, UA Yellow Yellow, Straw    Appearance, UA Turbid (A) Clear    pH, UA 6.5 5.0 - 8.0    Specific Gravity, UA 1.006 1.001 - 1.030    Glucose, UA Negative Negative    Ketones, UA Negative Negative    Bilirubin, UA Negative Negative    Blood, UA Small (1+) (A) Negative    Protein, UA 30 mg/dL (1+) (A) Negative    Leuk Esterase, UA Large (3+) (A) Negative    Nitrite, UA Negative Negative    Urobilinogen, UA 0.2 E.U./dL 0.2 - 1.0 E.U./dL   Urinalysis, Microscopic Only - Urine, Clean Catch    Collection Time: 02/09/25 10:48 PM    Specimen: Urine, Clean Catch   Result Value Ref Range    RBC, UA 3-5 (A) None Seen, 0-2 /HPF    WBC, UA Too Numerous to Count (A) None Seen, 0-2 /HPF     Bacteria, UA 1+ (A) None Seen, Trace /HPF    Squamous Epithelial Cells, UA 0-2 None Seen, 0-2 /HPF    Hyaline Casts, UA 0-6 0 - 6 /LPF    Methodology Manual Light Microscopy    POC Occult Blood Stool    Collection Time: 02/09/25 11:12 PM    Specimen: Stool   Result Value Ref Range    Fecal Occult Blood Negative     Lot Number [id number]     Expiration Date 04/01/2027     DEVELOPER LOT NUMBER [id number]     DEVELOPER EXPIRATION DATE 12/01/2027     Positive Control Positive     Negative Control Negative    High Sensitivity Troponin T 1Hr    Collection Time: 02/09/25 11:17 PM    Specimen: Blood   Result Value Ref Range    HS Troponin T 29 (H) <14 ng/L    Troponin T Numeric Delta -4 ng/L    Troponin T % Delta -12 Abnormal if >/= 20%   Prepare RBC, 1 Units    Collection Time: 02/10/25 12:23 AM   Result Value Ref Range    Product Code W0376K90     Unit Number [id number]     UNIT  ABO O     UNIT  RH POS     Crossmatch Interpretation Compatible     Dispense Status IS     Blood Expiration Date [id number]     Blood Type Barcode 5100      Note: In addition to lab results from this visit, the labs listed above may include labs taken at another facility or during a different encounter within the last 24 hours. Please correlate lab times with ED admission and discharge times for further clarification of the services performed during this visit.                 Kaushal Eden MD  02/10/25 0248

## 2025-02-10 NOTE — CASE MANAGEMENT/SOCIAL WORK
Discharge Planning Assessment  [facility]     Patient Name: Sheree Hernandez  MRN: [id number]  Today's Date: 2/10/2025    Admit Date: 2/9/2025    Plan: home   Discharge Needs Assessment       Row Name 02/10/25 1228       Living Environment    People in Home alone    Current Living Arrangements home    Potentially Unsafe Housing Conditions none    In the past 12 months has the electric, gas, oil, or water company threatened to shut off services in your home? No    Primary Care Provided by self    Provides Primary Care For no one    Family Caregiver if Needed child(lillian), adult    Quality of Family Relationships helpful;involved    Able to Return to Prior Arrangements yes       Resource/Environmental Concerns    Resource/Environmental Concerns none       Transportation Needs    In the past 12 months, has lack of transportation kept you from medical appointments or from getting medications? no    In the past 12 months, has lack of transportation kept you from meetings, work, or from getting things needed for daily living? No       Food Insecurity    Within the past 12 months, you worried that your food would run out before you got the money to buy more. Never true    Within the past 12 months, the food you bought just didn't last and you didn't have money to get more. Never true       Transition Planning    Patient/Family Anticipates Transition to home    Patient/Family Anticipated Services at Transition none    Transportation Anticipated family or friend will provide       Discharge Needs Assessment    Readmission Within the Last 30 Days no previous admission in last 30 days    Equipment Currently Used at Home walker, rolling    Concerns to be Addressed no discharge needs identified;denies needs/concerns at this time                   Discharge Plan       Row Name 02/10/25 1229       Plan    Plan home    Patient/Family in Agreement with Plan yes    Plan Comments Met with patient at the bedside to initiate discharge  planning. Patient lives alone in a house in [place]. At baseline, patient is independent with ADLs and has a rolling walker to assist with mobility. Patient denies any home health services or home oxygen use. Patient has [facility] Medicare with prescription benefits and prefers to fill scripts at the [facility] retail pharmacy. Patient's plan is home at discharge. Family will transport. PT/OT consulted. Will wait for therapy evaluation to determine discharge recommendations. CM will continue to follow.    Final Discharge Disposition Code 01 - home or self-care                  Continued Care and Services - Admitted Since 2/9/2025    No active coordination exists for this encounter.       Selected Continued Care - Episodes Includes continued care and service providers with selected services from the active episodes listed below      High Risk Care Management Episode start date: 9/30/2024   There are no active outsourced providers for this episode.                    Demographic Summary       Row Name 02/10/25 1228       General Information    Arrived From home    Referral Source physician    Reason for Consult discharge planning    Preferred Language English    General Information Comments PCP Timi Conway       Contact Information    Permission Granted to Share Info With family/designee    Contact Information Comments Tiana Olvera (Daughter)  [phone] (Mobile)                   Functional Status       Row Name 02/10/25 1228       Functional Status    Usual Activity Tolerance good    Current Activity Tolerance moderate       Functional Status, IADL    Medications independent    Meal Preparation independent    Housekeeping independent    Laundry independent    Shopping independent       Mental Status    General Appearance WDL WDL       Mental Status Summary    Recent Changes in Mental Status/Cognitive Functioning no changes       Employment/    Employment Status retired                   Psychosocial    No  documentation.                  Abuse/Neglect    No documentation.                  Legal    No documentation.                  Substance Abuse    No documentation.                  Patient Forms    No documentation.                     Joan Montejo RN

## 2025-02-10 NOTE — PROGRESS NOTES
2nd visit today. Admitted by partner earlier this morning    Symptomatic anemia  Lymphoma  -likely due to chemotherapy for lymphoma  -hgb 7.3 upon admission  **s/p 1 unit prbc, post-transfusion hgb 9.1  -trend/monitor, transfuse prn; repeat hgb at 1pm  -NS 500cc bolus over 2 hours  -courtesy consult w/ Oncology (Dr. Montenegro) as next chemo was scheduled today per patient  **addendum**: repeat hgb 7.0. will transfuse another 1 unit prbc (2nd total); recheck hgb 9pm and a.m.    UTI, poa  -CTX day #1, follow urine culture    Gen weakness  -multifactorial due to anemia, lymphoma, uti  -tsh wnl  -pt/ot evals  -orthostatics    Hx cholangiocardinoma  -s/p previous partial hepatectomy    Parox afib  -currently in sinus  -hold eliquis (on 2.5mg bid) for now (until hgb stable)  -holding low dose metoprolol 12.5mg bid     HL  -statin    Hypothyroidism  -tsh wnl  -continue levothyroxine    Hx PUD/GERD  -ppi    BONNIE  -noncompliant w/ cpap    Am labs ordered (& follow urine culture)    Dispo: TBD, pt/ot evals pending

## 2025-02-10 NOTE — H&P
"    [facility] Medicine Services  HISTORY AND PHYSICAL    Patient Name: Sheree Hernandez  : 1940  MRN: [id number]  Primary Care Physician: Timi Conway DO  Date of admission: 2025      Subjective   Subjective     Chief Complaint:  Fatigue    HPI:  Sheree Hernandez is a 84 y.o. female who states that she currently receives chemotherapy for diagnosis of lymphoma.  She states that at home earlier tonight () she was checking her blood pressure with a home monitor and had systolic readings in the 100-110s and diastolic readings in the 40s.  She felt these were low readings, and this concerned her.  She also states that for the last 7 days she has had increased generalized weakness, malaise, and fatigue to the point where she states that \"I have no energy at all doing anything, even walking to the house.\"  She denies any chest pain or shortness of breath.  She states she also has had occasional abdominal pain over the last several days, describes it as cramping, nonradiating, located all across the anterior aspect of the abdomen.  No nausea or emesis.  No bowel habit change or dark stools, no source of bleeding.  Workup in the ED here included lab studies, and her hemoglobin was found to be 7.3.  She was ordered a unit of PRBCs to be transfused in the ED.  She states her next chemotherapy treatment is supposed to be on Tuesday, and she has an appointment with her oncologist Dr. Montenegro on Monday 2/10.  Her medical history is significant for the aforementioned diagnosis of lymphoma, hyperlipidemia for which she states that she is not taking her prescribed statin, atrial fibrillation, hypothyroidism, peptic ulcer disease,, sleep apnea (she does not use a CPAP), liver cancer, and anxiety/depression for which she does not take any home medications.      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    Low bone mass     with elevated FRAX core    Lung nodule 07/12/2023    Mild obstructive sleep apnea 01/30/2020    Near syncope     negative cardiovascular workup     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    Osteopenia 2018    PAF (paroxysmal atrial fibrillation) 07/15/2024    Plantar fasciitis     Chronic    PVC's (premature ventricular contractions)     Senile keratosis     Multiple    MANAN (stress urinary incontinence, female)     Symptomatic anemia 2/9/2025    Syncope, near     with negative cardiovascular workup         Oncology Problem List:  Marginal zone lymphoma (01/20/2025; Status: Active)  Lymphoma (01/20/2025; Status: Active)  Intrahepatic cholangiocarcinoma (08/17/2021; Status: Active)  Intrahepatic cholangiocarcinoma (08/17/2021; Status: Active)  Oncology/Hematology History   Intrahepatic cholangiocarcinoma   8/17/2021 Initial Diagnosis    Intrahepatic cholangiocarcinoma (CMS/HCC)     8/17/2021 Cancer Staged    Staging form: Intrahepatic Bile Duct, AJCC 8th Edition  - Clinical: Stage IB (cT1b, cN0, cM0) - Signed by Jorje Montenegro MD on 8/17/2021 11/5/2021 Cancer Staged    Staging form: Intrahepatic Bile Duct, AJCC 8th Edition  - Pathologic: Stage IIIA (pT3, pN0, cM0) - Signed by Jorje Montenegro MD on 11/5/2021 12/9/2021 - 1/6/2022 Chemotherapy    OP GALLBLADDER Capecitabine + XRT     12/9/2021 - 1/19/2022 Radiation    Radiation OncologyTreatment Course:  Sheree Herson Hernandez received 5040 cGy in 28 fractions to liver via External Beam Radiation - EBRT.     4/25/2023 - 5/8/2023 Radiation    Radiation OncologyTreatment Course:  Sheree Hernandez received 3500 cGy in 5  fractions to liver mass and lymph nodes via Stereotactic Radiation Therapy - SRT.     8/1/2023 - 8/11/2023 Radiation    Radiation OncologyTreatment Course:  Sheree Hernandez received 3500 cGy in 5 fractions to abdomen via Stereotactic Radiation Therapy - SRT.     1/9/2024 - 1/23/2024 Radiation    Radiation OncologyTreatment Course:  Sheree Hernandez received 3000 cGy in 5 fractions to abdomen via Stereotactic Radiation Therapy - SRT.     1/11/2024 - 1/11/2024 Radiation    Radiation OncologyTreatment Course:  Sheree Hernandez received 754 cGy in 1 fractions to left lung via Stereotactic Radiation Therapy - SRT.     2/1/2024 - 2/1/2024 Radiation    Radiation OncologyTreatment Course:  Sheree Hernandez received 3000 cGy in 1 fractions to left lung via Stereotactic Radiation Therapy - SRT.     4/19/2024 - 8/16/2024 Chemotherapy    OP HEPATOBILIARY CISplatin + Gemcitabine Days 1,8 / Durvalumab Q21D     5/17/2024 -  Chemotherapy    OP CENTRAL VENOUS ACCESS DEVICE Access, Care, and Maintenance (CVAD)     9/6/2024 -  Chemotherapy    OP CHOLANGIOCARCINOMA Durvalumab 1500 mg     Marginal zone lymphoma   1/20/2025 Initial Diagnosis    Marginal zone lymphoma       Past Surgical History:   Procedure Laterality Date    ADRENAL GLAND SURGERY  09/22/2021    ADRENALECTOMY  09/22/2021    CARDIAC CATHETERIZATION N/A 01/18/2019    Procedure: Left Heart Cath;  Surgeon: Tucker Gonzalez MD;  Location:  [facility] CATH INVASIVE LOCATION;  Service: Cardiovascular    CARDIAC CATHETERIZATION  1/18/2019    CATARACT EXTRACTION  04/2019    CHOLECYSTECTOMY  09/22/2021    COLONOSCOPY  07/15/2020    CYBERKNIFE  05/08/2023    Recurrent liver mass/involved LNs    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    ENDOSCOPY N/A 12/13/2024    Procedure: ESOPHAGOGASTRODUODENOSCOPY;  Surgeon: Mihir Encinas MD;  Location:  [facility];  Service: Gastroenterology;  Laterality: N/A;    HYSTERECTOMY  1984    age 46 - ovarian cyst, endometriosis,  jorge/bso     LIVER SURGERY Right 09/22/2021    Removed    ROTATOR CUFF REPAIR Left 1989    Collar Bone Repair    VENOUS ACCESS DEVICE (PORT) INSERTION Right 05/10/2024       Family History: family history includes Arrhythmia in her brother; Arthritis in her mother; Breast cancer in her paternal aunt; Dementia in her mother; Hearing loss in her father; Heart attack in her mother; Heart disease in her father; Heart failure in her mother; Stroke in her father and mother.     Social History:  reports that she has never smoked. She has never been exposed to tobacco smoke. She has never used smokeless tobacco. She reports that she does not drink alcohol and does not use drugs.  Social History     Social History Narrative    5/18:     since 2013    2 kids:    Best Hernandez [facility]    Tiana Olvera [place], [place] - [facility] [address]    2 gk    Hobbies: volunteers [employer], involved in [employer]    Exercise: yes 2-5d/wk    Dental: utd    Eye: utd       Medications:  Available home medication information reviewed.  apixaban, dicyclomine, ipratropium, levothyroxine, lidocaine-prilocaine, linaclotide, methylphenidate, metoprolol tartrate, ondansetron, pantoprazole, and sennosides-docusate    Allergies   Allergen Reactions    Pravastatin Myalgia       Objective   Objective     Vital Signs:   Temp:  [98.2 °F (36.8 °C)-98.3 °F (36.8 °C)] 98.2 °F (36.8 °C)  Heart Rate:  [] 86  Resp:  [18] 18  BP: (100-120)/(49-59) 114/59       Physical Exam   Constitutional: Awake, alert, NAD, pleasant.  Eyes: PERRLA, sclerae anicteric, no conjunctival injection  HENT: NCAT, mucous membranes dry.  Neck: Supple, no thyromegaly, no lymphadenopathy, trachea midline  Respiratory: Clear to auscultation bilaterally, nonlabored respirations   Cardiovascular: RRR, no murmurs, rubs, or gallops, palpable pedal pulses bilaterally  Gastrointestinal: Positive bowel sounds, soft, nontender, nondistended  Musculoskeletal: No bilateral ankle edema, no clubbing or  cyanosis to extremities  Psychiatric: Appropriate affect, cooperative  Neurologic: Oriented x 3, strength symmetric in all extremities, Cranial Nerves grossly intact to confrontation, speech clear  Skin: No rashes, normal turgor.    Result Review:  I have personally reviewed the results from the time of this admission to 2/10/2025 00:40 EST and agree with these findings:  [x]  Laboratory list / accordion  []  Microbiology  [x]  Radiology  [x]  EKG/Telemetry   []  Cardiology/Vascular   []  Pathology  [x]  Old records  []  Other:  Most notable findings include: I reviewed chest x-ray which is a single AP view and by my read shows nothing acute.  I reviewed EKG which by my read shows sinus rhythm, ventricular just under 100 bpm, normal axis, no acute appearing ST/T wave changes.      LAB RESULTS:      Lab 02/09/25 2113 02/04/25  0838   WBC 6.27 5.26   HEMOGLOBIN 7.3* 8.1*   HEMATOCRIT 21.3* 23.8*   PLATELETS 107* 106*   NEUTROS ABS 4.44 3.82   IMMATURE GRANS (ABS) 0.03 0.01   LYMPHS ABS 0.70 0.62*   MONOS ABS 1.06* 0.77   EOS ABS 0.02 0.01   MCV 96.8 96.7         Lab 02/09/25 2113   SODIUM 135*   POTASSIUM 3.9   CHLORIDE 99   CO2 22.0   ANION GAP 14.0   BUN 14   CREATININE 1.16*   EGFR 46.3*   GLUCOSE 129*   CALCIUM 8.7   MAGNESIUM 2.2   TSH 2.790         Lab 02/09/25 2113   TOTAL PROTEIN 5.7*   ALBUMIN 3.2*   GLOBULIN 2.5   ALT (SGPT) 13   AST (SGOT) 22   BILIRUBIN 1.2   ALK PHOS 376*         Lab 02/09/25  2317 02/09/25 2113   HSTROP T 29* 33*             Lab 02/09/25 2207   ABO TYPING O   RH TYPING Positive   ANTIBODY SCREEN Negative         UA          9/28/2024    01:41 2/9/2025    22:48   Urinalysis   Squamous Epithelial Cells, UA 3-6  0-2    Specific Gravity, UA 1.018  1.006    Ketones, UA Negative  Negative    Blood, UA Negative  Small (1+)    Leukocytes, UA Small (1+)  Large (3+)    Nitrite, UA Negative  Negative    RBC, UA 0-2  3-5    WBC, UA 11-20  Too Numerous to Count    Bacteria, UA None Seen  1+         Microbiology Results (last 10 days)       Procedure Component Value - Date/Time    COVID PRE-OP / PRE-PROCEDURE SCREENING ORDER (NO ISOLATION) - Swab, Nasopharynx [[id number]]  (Normal) Collected: 02/09/25 2206    Lab Status: Final result Specimen: Swab from Nasopharynx Updated: 02/09/25 2302    Narrative:      The following orders were created for panel order COVID PRE-OP / PRE-PROCEDURE SCREENING ORDER (NO ISOLATION) - Swab, Nasopharynx.  Procedure                               Abnormality         Status                     ---------                               -----------         ------                     COVID-19 and FLU A/B PCR...[[id number]]  Normal              Final result                 Please view results for these tests on the individual orders.    COVID-19 and FLU A/B PCR, 1 HR TAT - Swab, Nasopharynx [[id number]]  (Normal) Collected: 02/09/25 2206    Lab Status: Final result Specimen: Swab from Nasopharynx Updated: 02/09/25 2302     COVID19 Not Detected     Influenza A PCR Not Detected     Influenza B PCR Not Detected    Narrative:      Fact sheet for providers: https://www.fda.gov/media/084322/download    Fact sheet for patients: https://www.fda.gov/media/367219/download    Test performed by PCR.            XR Chest 1 View    Result Date: 2/9/2025  XR CHEST 1 VW Date of Exam: 2/9/2025 9:37 PM EST Indication: short of breath Comparison: 12/11/2024. Findings: There is a right internal jugular Mediport with the tip in the distal SVC. There are no airspace consolidations. No pleural fluid. No pneumothorax. The pulmonary vasculature appears within normal limits. The cardiac and mediastinal silhouette appear unremarkable. No acute osseous abnormality identified.     Impression: Impression: No acute cardiopulmonary process. Electronically Signed: Mary Jo Johnson MD  2/9/2025 10:43 PM EST  Workstation ID: [id number]     Results for orders placed during the hospital encounter of 11/08/24    Adult  Transthoracic Echo Complete W/ Cont if Necessary Per Protocol    Interpretation Summary    Left ventricular ejection fraction appears to be 51 - 55%.    Left ventricular diastolic function was indeterminate.    Mild mitral valve regurgitation is present.    The aortic valve exhibits sclerosis.    Trace tricuspid valve regurgitation is present. Estimated right ventricular systolic pressure from tricuspid regurgitation is normal (<35 mmHg).    There is no evidence of pericardial effusion.      Assessment & Plan   Assessment & Plan       Symptomatic anemia      84F with symptomatic anemia    Symptomatic anemia  - Likely secondary to chemotherapy she receives for lymphoma.  - To receive 1 unit PRBCs, ordered by the ED.  - Will check follow-up H&H after transfusion.    Lymphoma  - Courtesy consult to her oncologist.  - She states she has an appointment on Monday 2/10 and next chemotherapy treatment due on Tuesday 2/11.  - Pain control with oral agents, IV if needed.  - Nausea control with as needed IV Zofran.  - Continue Bentyl from home regimen for abdominal cramping, can use pain meds as above if needed.    Atrial fibrillation  - Can continue Eliquis for now.  - Continue metoprolol from home regimen.  - Continue telemetry.    Hyperlipidemia  - She states she has not taken her statin for a long time, she actually has an allergy to pravastatin listed.    Hypothyroidism  - Continue Synthroid from home regimen.    History of peptic ulcer disease  - No current C/O hematemesis.  - Continue home PPI.    Sleep apnea  - She states she was prescribed a CPAP but does not use it.    History of cholangiocarcinoma  - She is followed by oncology service here.  - Receives chemotherapy for lymphoma, she is s/p partial hepatectomy.    Anxiety  - She states that she does not take any anxiolytics at this time.        Total time spent: 79 minutes  Time spent includes time reviewing chart, face-to-face time, counseling  patient/family/caregiver, ordering medications/tests/procedures, communicating with other health care professionals, documenting clinical information in the electronic health record, and coordination of care.     VTE Prophylaxis:  Mechanical VTE prophylaxis orders are present.          CODE STATUS:  full  Code Status and Medical Interventions: CPR (Attempt to Resuscitate); Full Support   Ordered at: 02/10/25 0040     Code Status (Patient has no pulse and is not breathing):    CPR (Attempt to Resuscitate)     Medical Interventions (Patient has pulse or is breathing):    Full Support       Expected Discharge   tbxi Gaviria,III, DO  02/10/25

## 2025-02-10 NOTE — TELEPHONE ENCOUNTER
Caller: Tiana Olvera - DAUGHTER    Relationship: Emergency Contact    Best call back number: [phone]    What is the best time to reach you: ASAP    Who are you requesting to speak with (clinical staff, provider,  specific staff member): DR. TONY     What was the call regarding: TIANA IS OUT OF STATE & VERY CONCERNED ABOUT HER MOM & IS HOPING DR. TONY OR HIS NURSE CAN CALL HER TO UPDATE AS PT IN HOSP.    Is it okay if the provider responds through MyChart: NO

## 2025-02-10 NOTE — CONSULTS
HEMATOLOGY/ONCOLOGY INPATIENT CONSULT    REASON FOR CONSULT: Marginal zone lymphoma, intrahepatic cholangiocarcinoma, anemia    Subjective   HISTORY OF PRESENT ILLNESS;   Mr. Hernandez is a 84-year-old lady with past medical history of marginal zone lymphoma, intrahepatic cholangiocarcinoma, asthma, arthritis, PVCs who presented to [facility] with worsening fatigue and tiredness.  Has been dealing with some mild hypotension over the past several days as well as worsening generalized malaise and fatigue.  She was seen in the ER where her hemoglobin was found to be 7.3.  She received 1 unit PRBC with improvement of her hemoglobin levels.  She had a UA checked which was notable for UTI and was started on ceftriaxone.  She is currently week 3 of 4 weeks of weekly rituximab.  Has been tolerating the infusions okay.  Denies any nausea or vomiting.  Denies any new bone pain or discomfort      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    Osteopenia 2018    PAF (paroxysmal atrial fibrillation) 07/15/2024    Plantar fasciitis     Chronic    PVC's (premature ventricular contractions)     Senile keratosis     Multiple    MANAN (stress urinary incontinence, female)     Symptomatic anemia 2/9/2025    Syncope, near     with negative cardiovascular workup     Past Surgical History:   Procedure Laterality Date    ADRENAL GLAND SURGERY  09/22/2021     ADRENALECTOMY  09/22/2021    CARDIAC CATHETERIZATION N/A 01/18/2019    Procedure: Left Heart Cath;  Surgeon: Tucker Gonzalez MD;  Location:  [facility];  Service: Cardiovascular    CARDIAC CATHETERIZATION  1/18/2019    CATARACT EXTRACTION  04/2019    CHOLECYSTECTOMY  09/22/2021    COLONOSCOPY  07/15/2020    CYBERKNIFE  05/08/2023    Recurrent liver mass/involved LNs    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    ENDOSCOPY N/A 12/13/2024    Procedure: ESOPHAGOGASTRODUODENOSCOPY;  Surgeon: Mihir Encinas MD;  Location:  [facility];  Service: Gastroenterology;  Laterality: N/A;    HYSTERECTOMY  1984    age 46 - ovarian cyst, endometriosis,  jorge/bso    LIVER SURGERY Right 09/22/2021    Removed    ROTATOR CUFF REPAIR Left 1989    Collar Bone Repair    VENOUS ACCESS DEVICE (PORT) INSERTION Right 05/10/2024       No current facility-administered medications on file prior to encounter.     Current Outpatient Medications on File Prior to Encounter   Medication Sig Dispense Refill    apixaban (ELIQUIS) 2.5 MG tablet tablet Take 1 tablet by mouth 2 (Two) Times a Day. Friday  1-10-25      dicyclomine (BENTYL) 20 MG tablet Take 1 tablet by mouth Every 4 (Four) Hours As Needed for Abdominal Cramping 30 tablet 2    ipratropium (ATROVENT) 0.06 % nasal spray Administer 1 spray into each nostril twice daily 30 mL 3    levothyroxine (SYNTHROID, LEVOTHROID) 25 MCG tablet Take 1 tablet by mouth Every Morning. 30 tablet 3    lidocaine-prilocaine (EMLA) 2.5-2.5 % cream Please apply to port site 30 min prior to infusion and cover with Saran Wrap 30 g 3    linaclotide (Linzess) 72 MCG capsule capsule Take 1 capsule by mouth Every Morning Before Breakfast. 30 capsule 0    methylphenidate (Ritalin) 5 MG tablet Take 1 tablet by mouth 2 (Two) Times a Day. 60 tablet 0    metoprolol tartrate (LOPRESSOR) 25 MG tablet Take 0.5 tablets by mouth 2 (Two) Times a Day.      ondansetron (ZOFRAN) 8 MG tablet Take 1 tablet by  "mouth 3 (Three) Times a Day As Needed for Nausea or Vomiting. 30 tablet 3    pantoprazole (PROTONIX) 40 MG EC tablet Take 1 tablet by mouth 2 (Two) Times a Day Before Meals. 180 tablet 3    sennosides-docusate (Stimulant Laxative) 8.6-50 MG per tablet Take 1 tablet by mouth 2 (Two) Times a Day As Needed for Constipation. 60 tablet 0       Allergies   Allergen Reactions    Pravastatin Myalgia       Social History     Socioeconomic History    Marital status:     Number of children: 2   Tobacco Use    Smoking status: Never     Passive exposure: Never    Smokeless tobacco: Never    Tobacco comments:     Exposed to secondhand smoke   Vaping Use    Vaping status: Never Used   Substance and Sexual Activity    Alcohol use: Never     Comment: Very seldom    Drug use: Never    Sexual activity: Not Currently     Partners: Male     Birth control/protection: None, Hysterectomy     Comment: Complete Hysterectomy       Family History   Problem Relation Age of Onset    Heart attack Mother     Heart failure Mother     Dementia Mother     Stroke Mother     Arthritis Mother         Heart Attack    Heart disease Father     Hearing loss Father     Stroke Father     Arrhythmia Brother     Breast cancer Paternal Aunt     Colon cancer Neg Hx          REVIEW OF SYSTEMS:  A 12-point review of systems was performed and is negative except as noted above.    Objective   PHYSICAL EXAM:    /47   Pulse 97   Temp 100.5 °F (38.1 °C) (Oral)   Resp 20   Ht 162.6 cm (64\")   Wt 59 kg (130 lb)   SpO2 94%   BMI 22.31 kg/m²     General: Laying in bed in no acute distress  HEENT: sclerae anicteric, oropharynx clear  Lymphatics: no cervical, supraclavicular, inguinal, or axillary adenopathy  Neck: Supple. No thyromegaly.  Cardiovascular: regular rate and rhythm, no murmurs  Lungs: clear to auscultation bilaterally. No respiratory distress  Abdomen: soft, nontender, nondistended.  No palpable organomegaly  Extremities: no lower extremity " edema, cyanosis, or clubbing  Skin: no rashes, lesions, bruising, or petechiae  Neuro: Alert and oriented x3. Moves all extremities.    Results:    Results from last 7 days   Lab Units 02/10/25  0627 02/09/25 2113 02/04/25  0838   WBC 10*3/mm3 9.56 6.27 5.26   HEMOGLOBIN g/dL 9.1* 7.3* 8.1*   PLATELETS 10*3/mm3 120* 107* 106*     Results from last 7 days   Lab Units 02/10/25  0708 02/09/25  2113   SODIUM mmol/L 136 135*   POTASSIUM mmol/L 3.8 3.9   CO2 mmol/L 24.0 22.0   BUN mg/dL 13 14   CREATININE mg/dL 1.17* 1.16*   GLUCOSE mg/dL 138* 129*     Results from last 7 days   Lab Units 02/10/25  0708 02/09/25 2113   AST (SGOT) U/L 25 22   ALT (SGPT) U/L 12 13   BILIRUBIN mg/dL 2.5* 1.2   ALK PHOS U/L 343* 376*         XR Chest 1 View    Result Date: 2/9/2025  Impression: No acute cardiopulmonary process. Electronically Signed: Mary Jo Johnson MD  2/9/2025 10:43 PM EST  Workstation ID: [id number]     Assessment    ASSESSMENT & PLAN:  Marginal zone lymphoma  -Diagnosed on bone marrow biopsy in January 2025  -Status post 3 weeks of weekly rituximab  -Will plan to hold week for at this time    Intrahepatic cholangiocarcinoma  -Has been off therapy for the past several months  -Bilirubin slightly increasing  -Will plan to check CT scans    NETO  -Likely secondary to dehydration  -Receiving IV fluids    UTI  -Noted on UA  -Currently on ceftriaxone    Anemia  -Status post 1 unit PRBC in the ER    Thank you for the consult.  Will continue to follow while inpatient    Jorje Montenegro MD  Hematology and Oncology    2/10/2025  10:25 EST

## 2025-02-11 ENCOUNTER — APPOINTMENT (OUTPATIENT)
Dept: CT IMAGING | Facility: HOSPITAL | Age: 85
DRG: 811 | End: 2025-02-11
Payer: MEDICARE

## 2025-02-11 LAB
ANION GAP SERPL CALCULATED.3IONS-SCNC: 10 MMOL/L (ref 5–15)
BH BB BLOOD EXPIRATION DATE: NORMAL
BH BB BLOOD EXPIRATION DATE: NORMAL
BH BB BLOOD TYPE BARCODE: 5100
BH BB BLOOD TYPE BARCODE: 5100
BH BB DISPENSE STATUS: NORMAL
BH BB DISPENSE STATUS: NORMAL
BH BB PRODUCT CODE: NORMAL
BH BB PRODUCT CODE: NORMAL
BH BB UNIT NUMBER: NORMAL
BH BB UNIT NUMBER: NORMAL
BUN SERPL-MCNC: 14 MG/DL (ref 8–23)
BUN/CREAT SERPL: 12 (ref 7–25)
CALCIUM SPEC-SCNC: 8 MG/DL (ref 8.6–10.5)
CHLORIDE SERPL-SCNC: 105 MMOL/L (ref 98–107)
CO2 SERPL-SCNC: 22 MMOL/L (ref 22–29)
CORTIS SERPL-MCNC: 14.12 MCG/DL
CREAT SERPL-MCNC: 1.17 MG/DL (ref 0.57–1)
CROSSMATCH INTERPRETATION: NORMAL
CROSSMATCH INTERPRETATION: NORMAL
DEPRECATED RDW RBC AUTO: 60.2 FL (ref 37–54)
EGFRCR SERPLBLD CKD-EPI 2021: 45.8 ML/MIN/1.73
ERYTHROCYTE [DISTWIDTH] IN BLOOD BY AUTOMATED COUNT: 18.5 % (ref 12.3–15.4)
GLUCOSE SERPL-MCNC: 96 MG/DL (ref 65–99)
HCT VFR BLD AUTO: 30.3 % (ref 34–46.6)
HGB BLD-MCNC: 10.6 G/DL (ref 12–15.9)
MAGNESIUM SERPL-MCNC: 2.1 MG/DL (ref 1.6–2.4)
MCH RBC QN AUTO: 31.7 PG (ref 26.6–33)
MCHC RBC AUTO-ENTMCNC: 35 G/DL (ref 31.5–35.7)
MCV RBC AUTO: 90.7 FL (ref 79–97)
PLATELET # BLD AUTO: 99 10*3/MM3 (ref 140–450)
PMV BLD AUTO: 13.6 FL (ref 6–12)
POTASSIUM SERPL-SCNC: 4 MMOL/L (ref 3.5–5.2)
QT INTERVAL: 380 MS
QTC INTERVAL: 472 MS
RBC # BLD AUTO: 3.34 10*6/MM3 (ref 3.77–5.28)
SODIUM SERPL-SCNC: 137 MMOL/L (ref 136–145)
UNIT  ABO: NORMAL
UNIT  ABO: NORMAL
UNIT  RH: NORMAL
UNIT  RH: NORMAL
WBC NRBC COR # BLD AUTO: 9.11 10*3/MM3 (ref 3.4–10.8)

## 2025-02-11 PROCEDURE — 99232 SBSQ HOSP IP/OBS MODERATE 35: CPT | Performed by: STUDENT IN AN ORGANIZED HEALTH CARE EDUCATION/TRAINING PROGRAM

## 2025-02-11 PROCEDURE — 85027 COMPLETE CBC AUTOMATED: CPT | Performed by: INTERNAL MEDICINE

## 2025-02-11 PROCEDURE — 25510000001 IOPAMIDOL 61 % SOLUTION: Performed by: INTERNAL MEDICINE

## 2025-02-11 PROCEDURE — 80048 BASIC METABOLIC PNL TOTAL CA: CPT | Performed by: INTERNAL MEDICINE

## 2025-02-11 PROCEDURE — 74177 CT ABD & PELVIS W/CONTRAST: CPT

## 2025-02-11 PROCEDURE — 25010000002 CEFTRIAXONE PER 250 MG: Performed by: INTERNAL MEDICINE

## 2025-02-11 PROCEDURE — 25810000003 SODIUM CHLORIDE 0.9 % SOLUTION: Performed by: INTERNAL MEDICINE

## 2025-02-11 PROCEDURE — G0378 HOSPITAL OBSERVATION PER HR: HCPCS

## 2025-02-11 PROCEDURE — 83735 ASSAY OF MAGNESIUM: CPT | Performed by: INTERNAL MEDICINE

## 2025-02-11 PROCEDURE — 82533 TOTAL CORTISOL: CPT | Performed by: STUDENT IN AN ORGANIZED HEALTH CARE EDUCATION/TRAINING PROGRAM

## 2025-02-11 PROCEDURE — 97162 PT EVAL MOD COMPLEX 30 MIN: CPT

## 2025-02-11 PROCEDURE — 97165 OT EVAL LOW COMPLEX 30 MIN: CPT | Performed by: OCCUPATIONAL THERAPIST

## 2025-02-11 PROCEDURE — 99232 SBSQ HOSP IP/OBS MODERATE 35: CPT | Performed by: INTERNAL MEDICINE

## 2025-02-11 PROCEDURE — 71260 CT THORAX DX C+: CPT

## 2025-02-11 RX ORDER — BISACODYL 10 MG
10 SUPPOSITORY, RECTAL RECTAL DAILY PRN
Status: DISCONTINUED | OUTPATIENT
Start: 2025-02-11 | End: 2025-02-14 | Stop reason: HOSPADM

## 2025-02-11 RX ORDER — BISACODYL 5 MG/1
5 TABLET, DELAYED RELEASE ORAL DAILY PRN
Status: DISCONTINUED | OUTPATIENT
Start: 2025-02-11 | End: 2025-02-14 | Stop reason: HOSPADM

## 2025-02-11 RX ORDER — IOPAMIDOL 612 MG/ML
75 INJECTION, SOLUTION INTRAVASCULAR
Status: COMPLETED | OUTPATIENT
Start: 2025-02-11 | End: 2025-02-11

## 2025-02-11 RX ORDER — POLYETHYLENE GLYCOL 3350 17 G/17G
17 POWDER, FOR SOLUTION ORAL DAILY PRN
Status: DISCONTINUED | OUTPATIENT
Start: 2025-02-11 | End: 2025-02-14 | Stop reason: HOSPADM

## 2025-02-11 RX ORDER — AMOXICILLIN 250 MG
2 CAPSULE ORAL 2 TIMES DAILY
Status: DISCONTINUED | OUTPATIENT
Start: 2025-02-11 | End: 2025-02-14 | Stop reason: HOSPADM

## 2025-02-11 RX ADMIN — PANTOPRAZOLE SODIUM 40 MG: 40 TABLET, DELAYED RELEASE ORAL at 17:09

## 2025-02-11 RX ADMIN — Medication 5 MG: at 21:36

## 2025-02-11 RX ADMIN — SODIUM CHLORIDE 75 ML/HR: 9 INJECTION, SOLUTION INTRAVENOUS at 05:05

## 2025-02-11 RX ADMIN — ACETAMINOPHEN 650 MG: 325 TABLET ORAL at 18:43

## 2025-02-11 RX ADMIN — LEVOTHYROXINE SODIUM 25 MCG: 25 TABLET ORAL at 05:10

## 2025-02-11 RX ADMIN — Medication 10 ML: at 08:27

## 2025-02-11 RX ADMIN — PANTOPRAZOLE SODIUM 40 MG: 40 TABLET, DELAYED RELEASE ORAL at 08:27

## 2025-02-11 RX ADMIN — SODIUM CHLORIDE 1000 MG: 900 INJECTION INTRAVENOUS at 08:27

## 2025-02-11 RX ADMIN — SENNOSIDES AND DOCUSATE SODIUM 2 TABLET: 50; 8.6 TABLET ORAL at 12:44

## 2025-02-11 RX ADMIN — IOPAMIDOL 75 ML: 612 INJECTION, SOLUTION INTRAVENOUS at 00:59

## 2025-02-11 RX ADMIN — Medication 10 ML: at 20:17

## 2025-02-11 NOTE — PROGRESS NOTES
[facility] Medicine Services  PROGRESS NOTE    Patient Name: Sheree Hernandez  : 1940  MRN: [id number]    Date of Admission: 2025  Primary Care Physician: Timi Conway DO    Subjective   Subjective     CC:  Anemia, UTI    HPI:  Seen with Dr. Montenegro.  Patient's friend at bedside.  Patient's daughter on speaker phone in the room.  Patient reports feeling awful today.  Per staff, patient was febrile to 102 yesterday evening, but this was not documented.      Objective   Objective     Vital Signs:   Temp:  [97.9 °F (36.6 °C)-100.1 °F (37.8 °C)] 98.8 °F (37.1 °C)  Heart Rate:  [] 87  Resp:  [17-20] 18  BP: ()/(39-67) 132/59     Physical Exam:  Constitutional: No acute distress, awake, alert, chronically ill-appearing, sitting up in bedside chair  HENT: NCAT, mucous membranes moist  Respiratory: Clear to auscultation bilaterally, respiratory effort normal   Cardiovascular: RRR  Gastrointestinal: Positive bowel sounds, soft, nontender, nondistended  Musculoskeletal: No bilateral ankle edema  Psychiatric: Appropriate affect, cooperative  Neurologic: Alert, symmetric facies, speech clear  Skin: No rashes      Results Reviewed:  LAB RESULTS:      Lab 25  0437 02/10/25  2039 02/10/25  1113 02/10/25  0708 02/10/25  0627 25  2317 25  2113 25  0838   WBC 9.11  --  6.72  --  9.56  --  6.27 5.26   HEMOGLOBIN 10.6* 8.9* 7.0*  --  9.1*  --  7.3* 8.1*   HEMATOCRIT 30.3* 25.4* 20.4*  --  26.8*  --  21.3* 23.8*   PLATELETS 99*  --  94*  --  120*  --  107* 106*   NEUTROS ABS  --   --  4.90  --  7.72*  --  4.44 3.82   IMMATURE GRANS (ABS)  --   --  0.04  --  0.04  --  0.03 0.01   LYMPHS ABS  --   --  0.65*  --  0.66*  --  0.70 0.62*   MONOS ABS  --   --  1.10*  --  1.09*  --  1.06* 0.77   EOS ABS  --   --  0.01  --  0.02  --  0.02 0.01   MCV 90.7  --  93.2  --  92.7  --  96.8 96.7   PROCALCITONIN  --   --   --  0.76*  --   --   --   --    LACTATE  --    --  1.7  --   --   --   --   --    HSTROP T  --   --   --   --   --  29* 33*  --          Lab 02/11/25  0437 02/10/25  0708 02/09/25 2113   SODIUM 137 136 135*   POTASSIUM 4.0 3.8 3.9   CHLORIDE 105 101 99   CO2 22.0 24.0 22.0   ANION GAP 10.0 11.0 14.0   BUN 14 13 14   CREATININE 1.17* 1.17* 1.16*   EGFR 45.8* 45.8* 46.3*   GLUCOSE 96 138* 129*   CALCIUM 8.0* 8.1* 8.7   MAGNESIUM 2.1 3.0* 2.2   PHOSPHORUS  --  2.3*  --    TSH  --   --  2.790         Lab 02/10/25  0708 02/09/25 2113   TOTAL PROTEIN 5.5* 5.7*   ALBUMIN 3.0* 3.2*   GLOBULIN 2.5 2.5   ALT (SGPT) 12 13   AST (SGOT) 25 22   BILIRUBIN 2.5* 1.2   ALK PHOS 343* 376*         Lab 02/09/25  2317 02/09/25 2113   HSTROP T 29* 33*             Lab 02/09/25 2207   ABO TYPING O   RH TYPING Positive   ANTIBODY SCREEN Negative         Brief Urine Lab Results  (Last result in the past 365 days)        Color   Clarity   Blood   Leuk Est   Nitrite   Protein   CREAT   Urine HCG        02/09/25 2248 Yellow   Turbid   Small (1+)   Large (3+)   Negative   30 mg/dL (1+)                   Microbiology Results Abnormal       None            XR Chest 1 View    Result Date: 2/9/2025  XR CHEST 1 VW Date of Exam: 2/9/2025 9:37 PM EST Indication: short of breath Comparison: 12/11/2024. Findings: There is a right internal jugular Mediport with the tip in the distal SVC. There are no airspace consolidations. No pleural fluid. No pneumothorax. The pulmonary vasculature appears within normal limits. The cardiac and mediastinal silhouette appear unremarkable. No acute osseous abnormality identified.     Impression: Impression: No acute cardiopulmonary process. Electronically Signed: Mary Jo Johnson MD  2/9/2025 10:43 PM EST  Workstation ID: [id number]     Results for orders placed during the hospital encounter of 11/08/24    Adult Transthoracic Echo Complete W/ Cont if Necessary Per Protocol    Interpretation Summary    Left ventricular ejection fraction appears to be 51 - 55%.    Left  ventricular diastolic function was indeterminate.    Mild mitral valve regurgitation is present.    The aortic valve exhibits sclerosis.    Trace tricuspid valve regurgitation is present. Estimated right ventricular systolic pressure from tricuspid regurgitation is normal (<35 mmHg).    There is no evidence of pericardial effusion.      Current medications:  Scheduled Meds:[Held by provider] apixaban, 2.5 mg, Oral, BID  cefTRIAXone, 1,000 mg, Intravenous, Q24H  levothyroxine, 25 mcg, Oral, Q AM  [Held by provider] metoprolol tartrate, 12.5 mg, Oral, BID  pantoprazole, 40 mg, Oral, BID AC  sodium chloride, 10 mL, Intravenous, Q12H      Continuous Infusions:sodium chloride, 75 mL/hr, Last Rate: 75 mL/hr (02/11/25 7585)      PRN Meds:.  acetaminophen **OR** acetaminophen **OR** acetaminophen    Calcium Replacement - Follow Nurse / BPA Driven Protocol    dicyclomine    HYDROcodone-acetaminophen    Magnesium Standard Dose Replacement - Follow Nurse / BPA Driven Protocol    melatonin    Morphine **AND** naloxone    nitroglycerin    ondansetron    Phosphorus Replacement - Follow Nurse / BPA Driven Protocol    Potassium Replacement - Follow Nurse / BPA Driven Protocol    [COMPLETED] Insert Peripheral IV **AND** sodium chloride    [COMPLETED] Insert Peripheral IV **AND** sodium chloride    sodium chloride    sodium chloride    Assessment & Plan   Assessment & Plan     Active Hospital Problems    Diagnosis  POA    **Symptomatic anemia [D64.9]  Yes      Resolved Hospital Problems   No resolved problems to display.        Brief Hospital Course to date:  Sheree Hernandez is a 84 y.o. female with history of lymphoma being treated with Rituxan, HLD, A-fib, hypothyroidism, PUD, sleep apnea, cholangiocarcinoma, anxiety, depression, who presented for evaluation of fatigue.  Found to have symptomatic anemia and UTI.    This patient's problems and plans were partially entered by my partner and updated as appropriate by me  02/11/25.    Symptomatic anemia  Lymphoma  -hgb 7.3 upon admission  -Transfused 2 units RBCs  -Monitor for any bleeding, a.m. labs     UTI, poa  -Follow-up urine culture  -Continue ceftriaxone     Gen weakness  -multifactorial due to anemia, lymphoma, uti  -tsh wnl, a.m. cortisol 14  -pt/ot evals  -orthostatics +, status post IVF     Hx cholangiocardinoma  -s/p previous partial hepatectomy  -Imaging shows recurrence, this was discussed with patient and her daughter by Dr. Montenegro follows in the room     Parox afib  -currently in sinus  -hold eliquis (on 2.5mg bid) for now; discussed with Dr. Montenegro, patient regarding possibility of holding Eliquis on discharge, discussed increased stroke risk with not taking it  -holding low dose metoprolol 12.5mg bid      HLD  -statin     Hypothyroidism  -tsh wnl  -continue levothyroxine     Hx PUD/GERD  -ppi     BONNIE  -noncompliant w/ cpap    Expected Discharge Location and Transportation: home  Expected Discharge   Expected discharge date/ time has not been documented.     VTE Prophylaxis:  Pharmacologic & mechanical VTE prophylaxis orders are present.         AM-PAC 6 Clicks Score (PT): 22 (02/10/25 2000)    CODE STATUS:   Code Status and Medical Interventions: CPR (Attempt to Resuscitate); Full Support   Ordered at: 02/10/25 0040     Code Status (Patient has no pulse and is not breathing):    CPR (Attempt to Resuscitate)     Medical Interventions (Patient has pulse or is breathing):    Full Support       Gretchen Smith MD  02/11/25       Render In Strict Bullet Format?: No Plan: OTC CloSYS Ultra Sensitive Mouthwash, Detail Level: Zone

## 2025-02-11 NOTE — PLAN OF CARE
Problem: Adult Inpatient Plan of Care  Goal: Plan of Care Review  Recent Flowsheet Documentation  Taken 2/11/2025 1023 by Ying Reyes OT  Progress: no change  Outcome Evaluation: OT IE completed. Pt. presents below functional baseline in ADL & functional mobility independence, strength, balance, and occupational endurance. Pt. reports having a 1/10 energy level & has difficulty doing ADL routine due to decreased energy. OT skilled services is warranted to return pt. to PLOF. Recommend home with assist & HHOT upon d/c.   Goal Outcome Evaluation:  Plan of Care Reviewed With: patient        Progress: no change  Outcome Evaluation: OT IE completed. Pt. presents below functional baseline in ADL & functional mobility independence, strength, balance, and occupational endurance. Pt. reports having a 1/10 energy level & has difficulty doing ADL routine due to decreased energy. OT skilled services is warranted to return pt. to PLOF. Recommend home with assist & HHOT upon d/c.    Anticipated Discharge Disposition (OT): home with assist, home with home health

## 2025-02-11 NOTE — PLAN OF CARE
Problem: Adult Inpatient Plan of Care  Goal: Plan of Care Review  Outcome: Progressing  Goal: Patient-Specific Goal (Individualized)  Outcome: Progressing  Goal: Absence of Hospital-Acquired Illness or Injury  Outcome: Progressing  Intervention: Identify and Manage Fall Risk  Description: Perform standard risk assessment on admission using a validated tool or comprehensive approach appropriate to the patient; reassess fall risk frequently, with change in status or transfer to another level of care.  Communicate risk to interprofessional healthcare team; ensure fall risk visible cue.  Determine need for increased observation, equipment and environmental modification, as well as use of supportive, nonskid footwear.  Adjust safety measures to individual needs and identified risk factors.  Reinforce the importance of active participation with fall risk prevention, safety, and physical activity with the patient and family.  Perform regular intentional rounding to assess need for position change, pain assessment and personal needs, including assistance with toileting.  Recent Flowsheet Documentation  Taken 2/11/2025 0400 by Belkys Mccrary, RN  Safety Promotion/Fall Prevention:   activity supervised   assistive device/personal items within reach   clutter free environment maintained   fall prevention program maintained   lighting adjusted   mobility aid in reach   nonskid shoes/slippers when out of bed   room organization consistent   safety round/check completed  Taken 2/11/2025 0200 by Belkys Mccrary, RN  Safety Promotion/Fall Prevention:   activity supervised   assistive device/personal items within reach   clutter free environment maintained   fall prevention program maintained   lighting adjusted   mobility aid in reach   nonskid shoes/slippers when out of bed   room organization consistent   safety round/check completed  Taken 2/11/2025 0000 by Belkys Mccrary, RN  Safety Promotion/Fall Prevention:   activity  supervised   assistive device/personal items within reach   clutter free environment maintained   fall prevention program maintained   lighting adjusted   mobility aid in reach   nonskid shoes/slippers when out of bed   room organization consistent   safety round/check completed  Taken 2/10/2025 2200 by Belkys Mccrary RN  Safety Promotion/Fall Prevention:   activity supervised   assistive device/personal items within reach   clutter free environment maintained   fall prevention program maintained   lighting adjusted   mobility aid in reach   nonskid shoes/slippers when out of bed   room organization consistent   safety round/check completed  Taken 2/10/2025 2000 by Belkys Mccrary RN  Safety Promotion/Fall Prevention:   activity supervised   assistive device/personal items within reach   clutter free environment maintained   fall prevention program maintained   lighting adjusted   mobility aid in reach   nonskid shoes/slippers when out of bed   room organization consistent   safety round/check completed  Intervention: Prevent Skin Injury  Description: Perform a screening for skin injury risk, such as pressure or moisture-associated skin damage on admission and at regular intervals throughout hospital stay.  Keep all areas of skin (especially folds) clean and dry.  Maintain adequate skin hydration.  Relieve and redistribute pressure and protect bony prominences and skin at risk for injury; implement measures based on patient-specific risk factors.  Match turning and repositioning schedule to clinical condition.  Encourage weight shift frequently; assist with reposition if unable to complete independently.  Float heels off bed; avoid pressure on the Achilles tendon.  Keep skin free from extended contact with medical devices.  Optimize nutrition and hydration.  Encourage functional activity and mobility, as early as tolerated.  Use aids (e.g., slide boards, mechanical lift) during transfer.  Recent Flowsheet  Documentation  Taken 2/11/2025 0400 by Belkys Mccrary RN  Body Position: position changed independently  Taken 2/11/2025 0200 by Belkys Mccrary RN  Body Position: position changed independently  Taken 2/11/2025 0000 by Belkys Mccrary RN  Body Position: position changed independently  Taken 2/10/2025 2200 by Belkys Mccrary RN  Body Position: position changed independently  Taken 2/10/2025 2000 by Belkys Mccrary RN  Body Position: position changed independently  Skin Protection:   transparent dressing maintained   skin sealant/moisture barrier applied   silicone foam dressing in place   incontinence pads utilized  Intervention: Prevent Infection  Description: Maintain skin and mucous membrane integrity; promote hand, oral and pulmonary hygiene.  Optimize fluid balance, nutrition, sleep and glycemic control to maximize infection resistance.  Identify potential sources of infection early to prevent or mitigate progression of infection (e.g., wound, lines, devices).  Evaluate ongoing need for invasive devices; remove promptly when no longer indicated.  Review vaccination status.  Recent Flowsheet Documentation  Taken 2/11/2025 0600 by Belkys Mccrary RN  Infection Prevention:   equipment surfaces disinfected   environmental surveillance performed   hand hygiene promoted   personal protective equipment utilized   rest/sleep promoted   visitors restricted/screened  Taken 2/10/2025 2000 by Belkys Mccrary RN  Infection Prevention:   environmental surveillance performed   hand hygiene promoted   equipment surfaces disinfected   personal protective equipment utilized   rest/sleep promoted  Goal: Optimal Comfort and Wellbeing  Outcome: Progressing  Intervention: Provide Person-Centered Care  Description: Use a family-focused approach to care; encourage support system presence and participation.  Develop trust and rapport by proactively providing information, encouraging questions, addressing concerns and offering  reassurance.  Acknowledge emotional response to hospitalization.  Recognize and utilize personal coping strategies and strengths; develop goals via shared decision-making.  Honor spiritual and cultural preferences.  Recent Flowsheet Documentation  Taken 2/10/2025 2000 by Belkys Mccrary RN  Trust Relationship/Rapport:   care explained   choices provided   emotional support provided   empathic listening provided   questions answered   questions encouraged   thoughts/feelings acknowledged  Goal: Readiness for Transition of Care  Outcome: Progressing     Problem: Sepsis/Septic Shock  Goal: Optimal Coping  Outcome: Progressing  Goal: Absence of Bleeding  Outcome: Progressing  Goal: Blood Glucose Level Within Target Range  Outcome: Progressing  Goal: Absence of Infection Signs and Symptoms  Outcome: Progressing  Intervention: Initiate Sepsis Management  Description: Provide fluid therapy, such as crystalloid or albumin, to increase intravascular volume, organ perfusion and oxygen delivery.  Provide respiratory support, such as oxygen therapy, noninvasive or invasive positive pressure ventilation, to achieve oxygenation and ventilation goal; avoid hyperoxemia.  Obtain cultures prior to initiating antimicrobial therapy when possible. Do not delay treatment for laboratory results in the presence of high suspicion or clinical indicators.  Administer intravenous broad-spectrum antimicrobial therapy promptly.  Implement hemodynamic monitoring to guide intravascular support based on individual targeted parameters.  Determine and address underlying source of infection aggressively; implement transmission-based precautions and isolation, as indicated.  Recent Flowsheet Documentation  Taken 2/11/2025 0600 by Belkys Mccrary, RN  Infection Prevention:   equipment surfaces disinfected   environmental surveillance performed   hand hygiene promoted   personal protective equipment utilized   rest/sleep promoted   visitors  restricted/screened  Taken 2/10/2025 2000 by Belkys Mccrary RN  Infection Prevention:   environmental surveillance performed   hand hygiene promoted   equipment surfaces disinfected   personal protective equipment utilized   rest/sleep promoted  Intervention: Promote Recovery  Description: Encourage pulmonary hygiene, such as cough-enhancement and airway-clearance techniques, that may include use of incentive spirometry, deep breathing and cough.  Encourage early rehabilitation and physical activity to optimize functional ability and activity tolerance, as well as minimize delirium.  Promote energy conservation; minimize oxygen demand and consumption by adjusting environment, decreasing stimulation, maintaining normothermia and treating pain.  Optimize fluid balance, nutrition intake, sleep and glycemic control to maintain tissue perfusion and enhance immune response.  Recent Flowsheet Documentation  Taken 2/11/2025 0400 by Belkys Mccrary RN  Activity Management: activity encouraged  Taken 2/11/2025 0200 by Belkys Mccrary RN  Activity Management: activity encouraged  Taken 2/11/2025 0000 by Belkys Mccrary RN  Activity Management: activity encouraged  Taken 2/10/2025 2200 by Belkys Mccrary, RN  Activity Management: activity encouraged  Taken 2/10/2025 2000 by Belkys Mccrary RN  Activity Management: activity encouraged  Goal: Optimal Nutrition Delivery  Outcome: Progressing     Problem: Fall Injury Risk  Goal: Absence of Fall and Fall-Related Injury  Outcome: Progressing  Intervention: Identify and Manage Contributors  Description: Develop a fall prevention plan, considering patient-centered interventions and family/caregiver involvement; identify and address patient's facilitators and barriers.  Provide reorientation, appropriate sensory stimulation and routines with changes in mental status to decrease risk of fall.  Promote use of personal vision and auditory aids.  Assess assistance level required for safe  and effective self-care; provide support as needed, such as toileting and mobilization. For age 65 and older, implement timed toileting with assistance.  Encourage physical activity, such as performance of mobility and self-care at highest level of patient ability, multicomponent exercise program and provision of appropriate assistive devices.  If fall occurs, assess the severity of injury; implement fall injury protocol. Determine the cause and revise fall injury prevention plan.  Regularly review and advocate for medication adjustment to decrease fall risk; consider administration times, polypharmacy and age.  Balance adequate pain management with potential for oversedation.  Recent Flowsheet Documentation  Taken 2/11/2025 0400 by Belkys Mccrary, RN  Medication Review/Management: medications reviewed  Taken 2/11/2025 0000 by Belkys Mccrary RN  Medication Review/Management: medications reviewed  Taken 2/10/2025 2200 by Belkys Mccrary, RN  Medication Review/Management: medications reviewed  Taken 2/10/2025 2000 by Belkys Mccrary, RN  Medication Review/Management: medications reviewed  Intervention: Promote Injury-Free Environment  Description: Provide a safe, barrier-free environment that encourages independent activity.  Keep care area uncluttered and well-lighted.  Determine need for increased observation or monitoring.  Avoid use of devices that minimize mobility, such as restraints or indwelling urinary catheter.  Recent Flowsheet Documentation  Taken 2/11/2025 0400 by Belkys Mccrary RN  Safety Promotion/Fall Prevention:   activity supervised   assistive device/personal items within reach   clutter free environment maintained   fall prevention program maintained   lighting adjusted   mobility aid in reach   nonskid shoes/slippers when out of bed   room organization consistent   safety round/check completed  Taken 2/11/2025 0200 by Belkys Mccrary, RN  Safety Promotion/Fall Prevention:   activity supervised    assistive device/personal items within reach   clutter free environment maintained   fall prevention program maintained   lighting adjusted   mobility aid in reach   nonskid shoes/slippers when out of bed   room organization consistent   safety round/check completed  Taken 2/11/2025 0000 by Belkys Mccrary RN  Safety Promotion/Fall Prevention:   activity supervised   assistive device/personal items within reach   clutter free environment maintained   fall prevention program maintained   lighting adjusted   mobility aid in reach   nonskid shoes/slippers when out of bed   room organization consistent   safety round/check completed  Taken 2/10/2025 2200 by Belkys Mccrary RN  Safety Promotion/Fall Prevention:   activity supervised   assistive device/personal items within reach   clutter free environment maintained   fall prevention program maintained   lighting adjusted   mobility aid in reach   nonskid shoes/slippers when out of bed   room organization consistent   safety round/check completed  Taken 2/10/2025 2000 by Belkys Mccrary, RN  Safety Promotion/Fall Prevention:   activity supervised   assistive device/personal items within reach   clutter free environment maintained   fall prevention program maintained   lighting adjusted   mobility aid in reach   nonskid shoes/slippers when out of bed   room organization consistent   safety round/check completed   Goal Outcome Evaluation:

## 2025-02-11 NOTE — CASE MANAGEMENT/SOCIAL WORK
Continued Stay Note   [place]     Patient Name: Sheree Hernandez  MRN: [id number]  Today's Date: 2/11/2025    Admit Date: 2/9/2025    Plan: Home   Discharge Plan       Row Name 02/11/25 0916       Plan    Plan Home    Patient/Family in Agreement with Plan yes    Plan Comments Spoke to patient at bedside. Plan is home. Patient denies any discharge needs at this time. CM will continue to follow.    Final Discharge Disposition Code 01 - home or self-care                   Discharge Codes    No documentation.                       Cristopher Shepard RN

## 2025-02-11 NOTE — PROGRESS NOTES
HEMATOLOGY/ONCOLOGY PROGRESS NOTE    Subjective      CC: Marginal zone lymphoma, intrahepatic cholangiocarcinoma    SUBJECTIVE:   Patient states she was febrile up to 103 last evening however this was not recorded.  Still feeling fatigued and tired today.  Abdominal pain overall stable        Past Medical History, Past Surgical History, Social History, Family History have been reviewed and are without significant changes except as mentioned.      Medications:  The current medication list was reviewed in the EMR    ALLERGIES:   Allergies   Allergen Reactions    Pravastatin Myalgia       ROS:  A comprehensive 10-point review of systems was performed and was negative except as mentioned.      Objective      Vitals:    02/10/25 2355 02/11/25 0400 02/11/25 0500 02/11/25 0826   BP: 103/52 123/49  132/59   BP Location: Right arm Right arm  Right arm   Patient Position: Lying Lying  Lying   Pulse: 73 72  87   Resp: 18 18  18   Temp: 98.8 °F (37.1 °C) 98.3 °F (36.8 °C) 98.2 °F (36.8 °C) 98.8 °F (37.1 °C)   TempSrc: Oral Oral Oral Oral   SpO2: 93% 97%  96%   Weight:       Height:              General: Sitting in chair, in no acute distress  HEENT: sclerae anicteric, oropharynx clear  Lymphatics: no cervical, supraclavicular, inguinal, or axillary adenopathy  Cardiovascular: regular rate and rhythm, no murmurs  Lungs: clear to auscultation bilaterally  Abdomen: soft, nontender, nondistended.  No palpable organomegaly  Extremities: no lower extremity edema  Skin: no rashes, lesions, bruising, or petechiae  Neuro: Alert and oriented x 3. Moves all extremities.    RECENT LABS:    Results from last 7 days   Lab Units 02/11/25  0437 02/10/25  2039 02/10/25  1113 02/10/25  0627   WBC 10*3/mm3 9.11  --  6.72 9.56   HEMOGLOBIN g/dL 10.6* 8.9* 7.0* 9.1*   PLATELETS 10*3/mm3 99*  --  94* 120*     Results from last 7 days   Lab Units 02/11/25  0437 02/10/25  0708 02/09/25  2113   SODIUM mmol/L 137 136 135*   POTASSIUM mmol/L 4.0 3.8 3.9    CO2 mmol/L 22.0 24.0 22.0   BUN mg/dL 14 13 14   CREATININE mg/dL 1.17* 1.17* 1.16*   GLUCOSE mg/dL 96 138* 129*     Results from last 7 days   Lab Units 02/10/25  0708 02/09/25  2113   AST (SGOT) U/L 25 22   ALT (SGPT) U/L 12 13   BILIRUBIN mg/dL 2.5* 1.2   ALK PHOS U/L 343* 376*         CT Chest With Contrast Diagnostic    Result Date: 2/11/2025  1. 2 stable mildly enlarged mediastinal lymph nodes. No new or increasing adenopathy. 2. Stable number of pulmonary parenchymal nodules bilaterally, with some nodules stable in size, most nodules slightly increased, by 1 to 2 mm in diameter. No clearly new nodules. 3. Small left and small-moderate right pleural effusions, new from 12/11/2024 and mild associated discoid atelectasis. CT SCAN OF THE ABDOMEN PELVIS WITH IV CONTRAST: Right hepatectomy, and multiple nonacute enhancing left lobe hepatic cysts are again noted. Heterogeneous hypoenhancement along the resection margin is more prominent, and more masslike on today's exam, initial axial image 32, delayed axial image 33. This overall area appears a little larger, previously 18 x 32 mm, today 23 x 35 mm, questionable for recurrence. Posterior left lobe liver lesion, today's image 43/2 has had a variable appearance over multiple prior studies, perhaps intermittently treated and occurring metastasis. On today's study it appears further increased, most recently measuring 1.8 cm, today 2.6 cm, and with initial heterogeneous internal enhancement, and delayed centripetal enhancement pattern, more typical for cholangiocarcinoma than for hemangioma. No new hepatic lesions are identified elsewhere. Spleen is normal in size 11 cm. Remaining left portal vein, superior mesenteric vein and splenic vein enhance normally with no evidence of thrombus. Postoperative fat stranding in the region of the salomon hepatis appears stable. There is some narrowing of the IVC presumably due to regional scarring but this is stable from the  contrast-enhanced study of 11/1/2024. Amorphous soft tissue density between the IVC and aorta and extending along the right lateral and anterior margins of the aorta, images 43 through 55/2 today's study appears stable to minimally increased. No evidence of new mass or adenopathy is seen elsewhere in the upper abdomen. No new abnormalities are seen of the pancreas or adrenal glands. Small bilateral renal cysts are noted. Subtle, heterogeneous area area in the right upper renal pole image 39/2 is of uncertain significance but appears unchanged back to at least 3/6/2023 and appears to show overlying cortical atrophy, perhaps related to prior pyelonephritis. There is no evidence of obstructive uropathy. Regarding the lower abdomen/pelvis, bowel loops are normal in caliber and grossly normal in appearance. Bladder is decompressed. There is mild generalized fat stranding in the lower abdomen/pelvis, including around the bladder, but this may represent incidental mild anasarca. Delayed venous phase images show normal contrast excretion by the kidneys. No acute bony abnormality is seen. Impression: 1. Multiple simple appearing hepatic cysts. 2. Posterior left lobe liver lesion, which has had a waxing and waning appearance over multiple prior studies, is larger and more heterogeneous today, with enhancement pattern concerning for metastatic glandular carcinoma. 3. Irregular appearing parenchymal along the hepatic resection margin appears larger and more masslike today, concerning for recurrence. 4. Stable to mildly increased amorphous residual soft tissue density along the margins of the IVC and aorta, and stable chronic focal narrowing of the IVC. 5. Subtle heterogeneous hypoenhancement of the right upper renal pole, but unchanged over multiple prior studies probably postinflammatory scarring. 6. Mild anasarca. Electronically Signed: Salvador Gill MD  2/11/2025 8:52 AM EST  Workstation ID: [id number]    CT Abdomen Pelvis With  Contrast    Result Date: 2/11/2025  1. 2 stable mildly enlarged mediastinal lymph nodes. No new or increasing adenopathy. 2. Stable number of pulmonary parenchymal nodules bilaterally, with some nodules stable in size, most nodules slightly increased, by 1 to 2 mm in diameter. No clearly new nodules. 3. Small left and small-moderate right pleural effusions, new from 12/11/2024 and mild associated discoid atelectasis. CT SCAN OF THE ABDOMEN PELVIS WITH IV CONTRAST: Right hepatectomy, and multiple nonacute enhancing left lobe hepatic cysts are again noted. Heterogeneous hypoenhancement along the resection margin is more prominent, and more masslike on today's exam, initial axial image 32, delayed axial image 33. This overall area appears a little larger, previously 18 x 32 mm, today 23 x 35 mm, questionable for recurrence. Posterior left lobe liver lesion, today's image 43/2 has had a variable appearance over multiple prior studies, perhaps intermittently treated and occurring metastasis. On today's study it appears further increased, most recently measuring 1.8 cm, today 2.6 cm, and with initial heterogeneous internal enhancement, and delayed centripetal enhancement pattern, more typical for cholangiocarcinoma than for hemangioma. No new hepatic lesions are identified elsewhere. Spleen is normal in size 11 cm. Remaining left portal vein, superior mesenteric vein and splenic vein enhance normally with no evidence of thrombus. Postoperative fat stranding in the region of the salomon hepatis appears stable. There is some narrowing of the IVC presumably due to regional scarring but this is stable from the contrast-enhanced study of 11/1/2024. Amorphous soft tissue density between the IVC and aorta and extending along the right lateral and anterior margins of the aorta, images 43 through 55/2 today's study appears stable to minimally increased. No evidence of new mass or adenopathy is seen elsewhere in the upper abdomen. No  new abnormalities are seen of the pancreas or adrenal glands. Small bilateral renal cysts are noted. Subtle, heterogeneous area area in the right upper renal pole image 39/2 is of uncertain significance but appears unchanged back to at least 3/6/2023 and appears to show overlying cortical atrophy, perhaps related to prior pyelonephritis. There is no evidence of obstructive uropathy. Regarding the lower abdomen/pelvis, bowel loops are normal in caliber and grossly normal in appearance. Bladder is decompressed. There is mild generalized fat stranding in the lower abdomen/pelvis, including around the bladder, but this may represent incidental mild anasarca. Delayed venous phase images show normal contrast excretion by the kidneys. No acute bony abnormality is seen. Impression: 1. Multiple simple appearing hepatic cysts. 2. Posterior left lobe liver lesion, which has had a waxing and waning appearance over multiple prior studies, is larger and more heterogeneous today, with enhancement pattern concerning for metastatic glandular carcinoma. 3. Irregular appearing parenchymal along the hepatic resection margin appears larger and more masslike today, concerning for recurrence. 4. Stable to mildly increased amorphous residual soft tissue density along the margins of the IVC and aorta, and stable chronic focal narrowing of the IVC. 5. Subtle heterogeneous hypoenhancement of the right upper renal pole, but unchanged over multiple prior studies probably postinflammatory scarring. 6. Mild anasarca. Electronically Signed: Salvador Gill MD  2/11/2025 8:52 AM EST  Workstation ID: [id number]    XR Chest 1 View    Result Date: 2/9/2025  Impression: No acute cardiopulmonary process. Electronically Signed: Mary Jo Johnson MD  2/9/2025 10:43 PM EST  Workstation ID: [id number]         Assessment   ASSESSMENT & PLAN:  Marginal zone lymphoma  -Diagnosed on bone marrow biopsy in January 2025  -Status post 3 weeks of weekly rituximab  -Will plan to  hold week 4 while inpatient and reschedule when she is appropriate for discharge     Intrahepatic cholangiocarcinoma  -Has been off therapy for the past several months  -Bilirubin slightly increasing  -CT C/A/P reviewed and notable for slight enlargement of her pulmonary nodules as well as her primary liver mass.  No significant new adenopathy noted.     NETO  -Likely secondary to dehydration  -Stable today  -Receiving IV fluids     UTI  -Noted on UA  -Cultures pending  -Currently on ceftriaxone     Anemia  -Improving     Thank you for the consult.  Will continue to follow while inpatient    Jorje Montenegro MD  Hematology and Oncology    2/11/2025  11:03 EST

## 2025-02-11 NOTE — THERAPY EVALUATION
Patient Name: Sheree Hernandez  : 1940    MRN: [id number]                              Today's Date: 2025       Admit Date: 2025    Visit Dx:     ICD-10-CM ICD-9-CM   1. Severe anemia  D64.9 285.9   2. Symptomatic anemia  D64.9 285.9   3. Lymphoma, unspecified body region, unspecified lymphoma type  C85.90 202.80     Patient Active Problem List   Diagnosis    Hypertension    Atypical chest pain    Dyslipidemia    Dyspnea on exertion    Allergic rhinitis    Abnormal stress test    Idiopathic osteoarthritis    BONNIE (obstructive sleep apnea)    Chronic fatigue    Hemochromatosis carrier    Erythrocytosis    Liver masses    Intrahepatic cholangiocarcinoma    Nausea    Dyspepsia    Nontoxic multinodular goiter    Lung nodule    PAF (paroxysmal atrial fibrillation)    Dehydration    Vasovagal syncope    Weight loss    Pain in right knee    Actinic keratosis    Age-related osteoporosis without current pathological fracture    Neoplasm of uncertain behavior of skin    Senile hyperkeratosis    Idiopathic osteoarthritis    Intrahepatic cholangiocarcinoma    BONNIE (obstructive sleep apnea)    Marginal zone lymphoma    Lymphoma    Symptomatic anemia     Past Medical History:   Diagnosis Date    Age-related osteoporosis without current pathological fracture 3/6/2024    Arthritis 2017    Asthma     Atypical chest pain 2017    Cataract     Chronic constipation     Diverticulosis 2018    Fracture, pelvis closed 2015    x2    HL (hearing loss) 2018    Hearing aids    Hyperlipidemia 2017    Hypertension 2017    Intrahepatic cholangiocarcinoma 2021    Low bone mass     with elevated FRAX core    Lung nodule 2023    Mild obstructive sleep apnea 2020    Near syncope     negative cardiovascular workup     Nontoxic multinodular goiter 2022    Osteopenia 2018    PAF (paroxysmal atrial fibrillation) 07/15/2024    Plantar fasciitis     Chronic    PVC's (premature ventricular  contractions)     Senile keratosis     Multiple    MANAN (stress urinary incontinence, female)     Symptomatic anemia 2/9/2025    Syncope, near     with negative cardiovascular workup     Past Surgical History:   Procedure Laterality Date    ADRENAL GLAND SURGERY  09/22/2021    ADRENALECTOMY  09/22/2021    CARDIAC CATHETERIZATION N/A 01/18/2019    Procedure: Left Heart Cath;  Surgeon: Tucker Gonzalez MD;  Location:  [facility];  Service: Cardiovascular    CARDIAC CATHETERIZATION  1/18/2019    CATARACT EXTRACTION  04/2019    CHOLECYSTECTOMY  09/22/2021    COLONOSCOPY  07/15/2020    CYBERKNIFE  05/08/2023    Recurrent liver mass/involved LNs    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    ENDOSCOPY N/A 12/13/2024    Procedure: ESOPHAGOGASTRODUODENOSCOPY;  Surgeon: Mihir Encinas MD;  Location:  [facility];  Service: Gastroenterology;  Laterality: N/A;    HYSTERECTOMY  1984    age 46 - ovarian cyst, endometriosis,  jorge/bso    LIVER SURGERY Right 09/22/2021    Removed    ROTATOR CUFF REPAIR Left 1989    Collar Bone Repair    VENOUS ACCESS DEVICE (PORT) INSERTION Right 05/10/2024      General Information       Row Name 02/11/25 0902          Physical Therapy Time and Intention    Document Type evaluation  -KW     Mode of Treatment individual therapy;physical therapy  -KW       Row Name 02/11/25 0902          General Information    Patient Profile Reviewed yes  -KW     Prior Level of Function independent:;all household mobility;community mobility;gait;transfer;bed mobility  occassionally uses rwx  -KW     Existing Precautions/Restrictions fall  -KW     Barriers to Rehab medically complex  -KW       Row Name 02/11/25 0902          Living Environment    People in Home alone  -KW       Row Name 02/11/25 0902          Home Main Entrance    Number of Stairs, Main Entrance one  -KW       Row Name 02/11/25 0902          Stairs Within Home, Primary    Number of Stairs, Within Home, Primary twelve  -KW      Stair Railings, Within Home, Primary railings safe and in good condition;railing on left side (ascending)  -KW       Row Name 02/11/25 0902          Cognition    Orientation Status (Cognition) oriented x 3  -KW       Row Name 02/11/25 0902          Safety Issues/Impairments Affecting Functional Mobility    Impairments Affecting Function (Mobility) balance;endurance/activity tolerance;pain;shortness of breath;strength  -KW               User Key  (r) = Recorded By, (t) = Taken By, (c) = Cosigned By      Initials Name Provider Type    Jesenia Caballero PT Physical Therapist                   Mobility       Row Name 02/11/25 0902          Bed Mobility    Bed Mobility supine-sit  -KW     Supine-Sit Harding (Bed Mobility) supervision  -KW     Assistive Device (Bed Mobility) bed rails;head of bed elevated  -KW       Row Name 02/11/25 0902          Sit-Stand Transfer    Sit-Stand Harding (Transfers) contact guard  -KW       Row Name 02/11/25 0902          Gait/Stairs (Locomotion)    Harding Level (Gait) contact guard  -KW     Distance in Feet (Gait) 10  -KW     Deviations/Abnormal Patterns (Gait) stride length decreased;colby decreased;gait speed decreased;base of support, narrow  -KW     Bilateral Gait Deviations forward flexed posture;heel strike decreased  -KW               User Key  (r) = Recorded By, (t) = Taken By, (c) = Cosigned By      Initials Name Provider Type    Jesenia Caballero PT Physical Therapist                   Obj/Interventions       Row Name 02/11/25 0902          Strength Comprehensive (MMT)    General Manual Muscle Testing (MMT) Assessment lower extremity strength deficits identified  -KW       Row Name 02/11/25 0902          Balance    Balance Assessment sitting static balance;sitting dynamic balance;standing static balance;standing dynamic balance  -KW     Static Sitting Balance standby assist  -KW     Dynamic Sitting Balance supervision  -KW     Position, Sitting  Balance unsupported;sitting edge of bed  -KW     Static Standing Balance contact guard  -KW     Dynamic Standing Balance contact guard  -KW     Position/Device Used, Standing Balance unsupported  -KW               User Key  (r) = Recorded By, (t) = Taken By, (c) = Cosigned By      Initials Name Provider Type    KW Jesenia Donahue, PT Physical Therapist                   Goals/Plan       Row Name 02/11/25 0902          Bed Mobility Goal 1 (PT)    Activity/Assistive Device (Bed Mobility Goal 1, PT) sit to supine;supine to sit  -KW     Swift Level/Cues Needed (Bed Mobility Goal 1, PT) independent  -KW     Time Frame (Bed Mobility Goal 1, PT) 5 days;short term goal (STG)  -KW       Row Name 02/11/25 0902          Transfer Goal 1 (PT)    Activity/Assistive Device (Transfer Goal 1, PT) sit-to-stand/stand-to-sit;bed-to-chair/chair-to-bed  -KW     Swift Level/Cues Needed (Transfer Goal 1, PT) independent  -KW     Time Frame (Transfer Goal 1, PT) 10 days  -KW       Row Name 02/11/25 0902          Gait Training Goal 1 (PT)    Activity/Assistive Device (Gait Training Goal 1, PT) assistive device use;decrease fall risk;gait (walking locomotion);diminish gait deviation;improve balance and speed;increase endurance/gait distance;walker, rolling  -KW     Swift Level (Gait Training Goal 1, PT) modified independence  -KW     Distance (Gait Training Goal 1, PT) 150  -KW     Time Frame (Gait Training Goal 1, PT) 10 days  -KW       Row Name 02/11/25 0902          Stairs Goal 1 (PT)    Activity/Assistive Device (Stairs Goal 1, PT) ascending stairs;descending stairs;using handrail, left;step-to-step;decrease fall risk;improve balance and speed  -KW     Swift Level/Cues Needed (Stairs Goal 1, PT) modified independence  -KW     Number of Stairs (Stairs Goal 1, PT) 12  -KW     Time Frame (Stairs Goal 1, PT) 10 days  -KW               User Key  (r) = Recorded By, (t) = Taken By, (c) = Cosigned By      Initials  Name Provider Type    KW Jesenia Donahue, PT Physical Therapist                   Clinical Impression       Row Name 02/11/25 0902          Pain    Pretreatment Pain Rating 5/10  -KW     Pain Location back  -KW     Pain Management Interventions exercise or physical activity utilized  -KW     Response to Pain Interventions activity participation with tolerable pain  -KW       Row Name 02/11/25 0902          Plan of Care Review    Plan of Care Reviewed With patient  -KW     Progress no change  -KW     Outcome Evaluation PT eval completed. Patient able to ambulate without AD howebver moves quickly with decreased safety awareness. Patient very fatigued and declined further ambulation. anticipate further ambulation ability when less tired.  Patient presents below baseline for functional mobility. Patient demonstrates decreased activity tolerance, deconditioning and reduced dynamic balance. Patient would continue to benefit from skilled PT services to improve dynamic balance with gait, transfers strength, and activity tolerance training in order to build endurance and reduce risk of falls.  -KW       Row Name 02/11/25 0902          Therapy Assessment/Plan (PT)    Patient/Family Therapy Goals Statement (PT) to return to baseline  -KW     Criteria for Skilled Interventions Met (PT) yes;skilled treatment is necessary  -KW     Therapy Frequency (PT) daily  -KW     Predicted Duration of Therapy Intervention (PT) 10 days  -KW       Row Name 02/11/25 0902          Vital Signs    Pre Systolic BP Rehab 132  -KW     Pre Treatment Diastolic BP 59  -KW     Pretreatment Heart Rate (beats/min) 94  -KW     Pre SpO2 (%) 94  -KW       Row Name 02/11/25 0902          Positioning and Restraints    Pre-Treatment Position in bed  -KW     Post Treatment Position chair  -KW     In Chair reclined;call light within reach;encouraged to call for assist;exit alarm on;legs elevated  -KW               User Key  (r) = Recorded By, (t) = Taken By,  (c) = Cosigned By      Initials Name Provider Type    Jesenia Caballero PT Physical Therapist                   Outcome Measures       Row Name 02/11/25 0902          How much help from another person do you currently need...    Turning from your back to your side while in flat bed without using bedrails? 4  -KW     Moving from lying on back to sitting on the side of a flat bed without bedrails? 4  -KW     Moving to and from a bed to a chair (including a wheelchair)? 3  -KW     Standing up from a chair using your arms (e.g., wheelchair, bedside chair)? 3  -KW     Climbing 3-5 steps with a railing? 3  -KW     To walk in hospital room? 3  -KW     AM-PAC 6 Clicks Score (PT) 20  -KW     Highest Level of Mobility Goal 6 --> Walk 10 steps or more  -KW       Row Name 02/11/25 0902          Functional Assessment    Outcome Measure Options AM-PAC 6 Clicks Basic Mobility (PT)  -KW               User Key  (r) = Recorded By, (t) = Taken By, (c) = Cosigned By      Initials Name Provider Type    Jesenia Caballero PT Physical Therapist                                 Physical Therapy Education       Title: PT OT SLP Therapies (Not Started)       Topic: Physical Therapy (Not Started)       Point: Mobility training (Not Started)       Learner Progress:  Not documented in this visit.              Point: Home exercise program (Not Started)       Learner Progress:  Not documented in this visit.              Point: Body mechanics (Not Started)       Learner Progress:  Not documented in this visit.              Point: Precautions (Not Started)       Learner Progress:  Not documented in this visit.                                  PT Recommendation and Plan     Progress: no change  Outcome Evaluation: PT monserrat completed. Patient able to ambulate without AD howebver moves quickly with decreased safety awareness. Patient very fatigued and declined further ambulation. anticipate further ambulation ability when less tired.   Patient presents below baseline for functional mobility. Patient demonstrates decreased activity tolerance, deconditioning and reduced dynamic balance. Patient would continue to benefit from skilled PT services to improve dynamic balance with gait, transfers strength, and activity tolerance training in order to build endurance and reduce risk of falls.     Time Calculation:   PT Evaluation Complexity  History, PT Evaluation Complexity: 3 or more personal factors and/or comorbidities  Examination of Body Systems (PT Eval Complexity): total of 3 or more elements  Clinical Presentation (PT Evaluation Complexity): evolving  Clinical Decision Making (PT Evaluation Complexity): moderate complexity  Overall Complexity (PT Evaluation Complexity): moderate complexity     PT Charges       Row Name 02/11/25 0902             Time Calculation    Start Time 0902  -KW      PT Received On 02/11/25  -KW      PT Goal Re-Cert Due Date 02/21/25  -KW         Untimed Charges    PT Eval/Re-eval Minutes 58  -KW         Total Minutes    Untimed Charges Total Minutes 58  -KW       Total Minutes 58  -KW                User Key  (r) = Recorded By, (t) = Taken By, (c) = Cosigned By      Initials Name Provider Type    Jesenia Caballero PT Physical Therapist                  Therapy Charges for Today       Code Description Service Date Service Provider Modifiers Qty    [id number] HC PT EVAL MOD COMPLEXITY 4 2/11/2025 Jesenia Donahue PT GP 1            PT G-Codes  Outcome Measure Options: AM-PAC 6 Clicks Basic Mobility (PT)  AM-PAC 6 Clicks Score (PT): 20  PT Discharge Summary  Anticipated Discharge Disposition (PT): home with home health    Jesenia Donahue PT  2/11/2025

## 2025-02-11 NOTE — PROGRESS NOTES
"          Clinical Nutrition Assessment     Patient Name: Sheree Hernandez  YOB: 1940  MRN: [id number]  Date of Encounter: 02/11/25 15:07 EST  Admission date: 2/9/2025  Reason for Visit: MST score 2+, \"Unsure\" unintentional weight loss    Assessment   Nutrition Assessment   Admission Diagnosis:  Symptomatic anemia [D64.9]    Problem List:    Symptomatic anemia      PMH:   She  has a past medical history of Age-related osteoporosis without current pathological fracture (3/6/2024), Arthritis (2017), Asthma, Atypical chest pain (03/09/2017), Cataract, Chronic constipation, Diverticulosis (2018), Fracture, pelvis closed (2015), HL (hearing loss) (2018), Hyperlipidemia (03/09/2017), Hypertension (03/09/2017), 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    PSH:  She  has a past surgical history that includes Rotator cuff repair (Left, 1989); Cardiac catheterization (N/A, 01/18/2019); Cataract extraction (04/2019); Hysterectomy (1984); Colonoscopy (07/15/2020); Cholecystectomy (09/22/2021); Liver surgery (Right, 09/22/2021); Adrenalectomy (09/22/2021); Adrenal gland surgery (09/22/2021); Cardiac catheterization (1/18/2019); Cyberknife (05/08/2023); Cyberknife (08/11/2023); Venous Access Device (Port) (Right, 05/10/2024); and Esophagogastroduodenoscopy (N/A, 12/13/2024).    Applicable Nutrition History:       Anthropometrics     Height: Height: 162.6 cm (64\")  Last Filed Weight: Weight: 59 kg (130 lb) (02/09/25 2106)  Method: Weight Method: Stated  BMI: BMI (Calculated): 22.3    UBW:  160lbs per EMR/pt report  Weight change: " weight loss of 30 lbs (18.3%%) over 6 month(s)    Significant?  Yes   Weight       Weight (kg) Weight (lbs) Weight Method Visit Report   8/9/2024 72.576 kg  160 lb   --    8/12/2024 76.204 kg  168 lb      8/16/2024 73.483 kg  162 lb      8/30/2024 72.122 kg  159 lb   --     72.122 kg  159 lb   --    9/6/2024 70.761 kg  156 lb      9/27/2024 73.483 kg  162 lb  Stated     10/4/2024 67.132 kg  148 lb   --    11/5/2024 64.864 kg  143 lb   --    11/8/2024 64.9 kg  143 lb 1.3 oz      12/3/2024 63.957 kg  141 lb   --    12/11/2024 63.504 kg  140 lb  Stated     12/12/2024 63.5 kg  139 lb 15.9 oz  Stated     12/13/2024 63.5 kg  139 lb 15.9 oz      12/18/2024 62.596 kg  138 lb   --    12/31/2024 61.644 kg  135 lb 14.4 oz   --    1/9/2025 60.873 kg  134 lb 3.2 oz   --    1/13/2025 60.782 kg  134 lb  Stated     1/20/2025 59 kg  130 lb 1.1 oz  Bed scale  --     59.194 kg  130 lb 8 oz   --    1/28/2025 59.421 kg  131 lb   --    2/4/2025 58.514 kg  129 lb      2/9/2025 58.968 kg  130 lb  Stated         Nutrition Focused Physical Exam    Date: 2/11    Patient meets criteria for malnutrition diagnosis, see MSA note.     Subjective   Reported/Observed/Food/Nutrition Related History:     Pt resting in bed at time of visit-endorses poor intake x 4 months w/subsequent 30lb wt loss. Pt states she doesn't have an appetite or hunger signals and suspects she is eating <50% of usual intake. Pt states she has not been doing ONS at home, states they upset her stomach but willing to try Boost while here. Pt denies dysphagia, NKFA.     Current Nutrition Prescription   PO: Diet: Cardiac; Healthy Heart (2-3 Na+); Fluid Consistency: Thin (IDDSI 0)  Oral Nutrition Supplement:   Intake: Insufficient data    Assessment & Plan   Nutrition Diagnosis   Date: 2/11             Updated:    Problem Malnutrition chronic severe   Etiology Loss of appetite, lymphoma   Signs/Symptoms Po intake <75% EEN x >/=1 month, wt loss >10% x 6 months   Status: New    Goal  / Objectives:   Nutrition to support treatment and Increase intake      Nutrition Intervention      Follow treatment progress, Care plan reviewed, Encourage intake, Supplement provided    Encourage po intake and supplement use  Ordered Boost (lorena) BID  Liberalized diet to promote po intake  Consider appetite stimulant unless contraindicated    Monitoring/Evaluation:   Per protocol, PO intake, Supplement intake, Weight    Estrellita Hobbs MS,RD,LD  Time Spent:30

## 2025-02-11 NOTE — THERAPY EVALUATION
Patient Name: Sheree Hernandez  : 1940    MRN: [id number]                              Today's Date: 2025       Admit Date: 2025    Visit Dx:     ICD-10-CM ICD-9-CM   1. Severe anemia  D64.9 285.9   2. Symptomatic anemia  D64.9 285.9   3. Lymphoma, unspecified body region, unspecified lymphoma type  C85.90 202.80   4. Impaired mobility and ADLs  Z74.09 V49.89    Z78.9      Patient Active Problem List   Diagnosis    Hypertension    Atypical chest pain    Dyslipidemia    Dyspnea on exertion    Allergic rhinitis    Abnormal stress test    Idiopathic osteoarthritis    BONNIE (obstructive sleep apnea)    Chronic fatigue    Hemochromatosis carrier    Erythrocytosis    Liver masses    Intrahepatic cholangiocarcinoma    Nausea    Dyspepsia    Nontoxic multinodular goiter    Lung nodule    PAF (paroxysmal atrial fibrillation)    Dehydration    Vasovagal syncope    Weight loss    Pain in right knee    Actinic keratosis    Age-related osteoporosis without current pathological fracture    Neoplasm of uncertain behavior of skin    Senile hyperkeratosis    Idiopathic osteoarthritis    Intrahepatic cholangiocarcinoma    BONNIE (obstructive sleep apnea)    Marginal zone lymphoma    Lymphoma    Symptomatic anemia     Past Medical History:   Diagnosis Date    Age-related osteoporosis without current pathological fracture 3/6/2024    Arthritis 2017    Asthma     Atypical chest pain 2017    Cataract     Chronic constipation     Diverticulosis 2018    Fracture, pelvis closed 2015    x2    HL (hearing loss) 2018    Hearing aids    Hyperlipidemia 2017    Hypertension 2017    Intrahepatic cholangiocarcinoma 2021    Low bone mass     with elevated FRAX core    Lung nodule 2023    Mild obstructive sleep apnea 2020    Near syncope     negative cardiovascular workup     Nontoxic multinodular goiter 2022    Osteopenia 2018    PAF (paroxysmal atrial fibrillation) 07/15/2024    Plantar fasciitis      Chronic    PVC's (premature ventricular contractions)     Senile keratosis     Multiple    MANAN (stress urinary incontinence, female)     Symptomatic anemia 2/9/2025    Syncope, near     with negative cardiovascular workup     Past Surgical History:   Procedure Laterality Date    ADRENAL GLAND SURGERY  09/22/2021    ADRENALECTOMY  09/22/2021    CARDIAC CATHETERIZATION N/A 01/18/2019    Procedure: Left Heart Cath;  Surgeon: Tucker Gonzalez MD;  Location:  [facility];  Service: Cardiovascular    CARDIAC CATHETERIZATION  1/18/2019    CATARACT EXTRACTION  04/2019    CHOLECYSTECTOMY  09/22/2021    COLONOSCOPY  07/15/2020    CYBERKNIFE  05/08/2023    Recurrent liver mass/involved LNs    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    ENDOSCOPY N/A 12/13/2024    Procedure: ESOPHAGOGASTRODUODENOSCOPY;  Surgeon: Mihir Encinas MD;  Location:  [facility];  Service: Gastroenterology;  Laterality: N/A;    HYSTERECTOMY  1984    age 46 - ovarian cyst, endometriosis,  jorge/bso    LIVER SURGERY Right 09/22/2021    Removed    ROTATOR CUFF REPAIR Left 1989    Collar Bone Repair    VENOUS ACCESS DEVICE (PORT) INSERTION Right 05/10/2024      General Information       Row Name 02/11/25 0942          OT Time and Intention    Document Type evaluation  -SD     Mode of Treatment occupational therapy  -SD     Patient Effort good  -SD     Symptoms Noted During/After Treatment fatigue  -SD       Row Name 02/11/25 0942          General Information    Patient Profile Reviewed yes  -SD     Prior Level of Function independent:;all household mobility;transfer;bed mobility;ADL's;feeding;grooming;dressing;bathing   pt.'s friend has been staying with her 24/7 for the past 6 weeks, due to pt.'s fatigue & increased need for assist  -SD     Existing Precautions/Restrictions fall;other (see comments)  pt. fatigues very quickly, stated that she stays 1/10 energy level throughout the day  -SD     Barriers to Rehab medically  complex  -SD       Row Name 02/11/25 0942          Living Environment    People in Home alone;other (see comments)  friend has been staying with her 24/7 for past 6 weeks  -SD       Row Name 02/11/25 0942          Home Main Entrance    Number of Stairs, Main Entrance none  -SD       Row Name 02/11/25 0942          Stairs Within Home, Primary    Number of Stairs, Within Home, Primary other (see comments)  14  -SD     Stair Railings, Within Home, Primary railing on left side (ascending)  -SD       Row Name 02/11/25 0942          Cognition    Orientation Status (Cognition) oriented x 3  -SD       Row Name 02/11/25 0942          Safety Issues/Impairments Affecting Functional Mobility    Safety Issues Affecting Function (Mobility) awareness of need for assistance;safety precaution awareness;safety precautions follow-through/compliance;insight into deficits/self-awareness  -SD     Impairments Affecting Function (Mobility) balance;endurance/activity tolerance;shortness of breath;strength  -SD               User Key  (r) = Recorded By, (t) = Taken By, (c) = Cosigned By      Initials Name Provider Type    Ying Faye OT Occupational Therapist                     Mobility/ADL's       [facility] Name 02/11/25 1019          Bed Mobility    Comment, (Bed Mobility) pt. up in chair upon therapist's arrival  -[facility] Name 02/11/25 1019          Transfers    Transfers toilet transfer;sit-stand transfer;stand-sit transfer  -[facility] Name 02/11/25 1019          Sit-Stand Transfer    Sit-Stand [place] (Transfers) contact guard;1 person assist  -SD     Assistive Device (Sit-Stand Transfers) other (see comments)  no AD  -SD       Row Name 02/11/25 1019          Stand-Sit Transfer    Stand-Sit [place] (Transfers) contact guard;1 person assist  -SD     Assistive Device (Stand-Sit Transfers) other (see comments)  no AD  -SD       Row Name 02/11/25 1019          Toilet Transfer    Type (Toilet Transfer)  sit-stand;stand-sit  -SD     [place] Level (Toilet Transfer) supervision  -SD     Assistive Device (Toilet Transfer) commode;grab bars/safety frame  -SD       Row Name 02/11/25 1019          Functional Mobility    Functional Mobility- Ind. Level contact guard assist;1 person  -SD     Functional Mobility- Device walker, front-wheeled  -SD     Functional Mobility-Distance (Feet) 30  -SD     Functional Mobility- Safety Issues balance decreased during turns  -SD     Patient was able to Ambulate yes  -SD       Row Name 02/11/25 1019          Activities of Daily Living    BADL Assessment/Intervention grooming;toileting;lower body dressing  -SD       Row Name 02/11/25 1019          Grooming Assessment/Training    [place]st Level (Grooming) wash face, hands;supervision  -SD     Position (Grooming) sink side;supported standing  -SD       Row Name 02/11/25 1019          Toileting Assessment/Training    [place] Level (Toileting) perform perineal hygiene;adjust/manage clothing;supervision  -SD     Assistive Devices (Toileting) commode;grab bar/safety frame  -SD     Position (Toileting) supported sitting  -SD       Row Name 02/11/25 1019          Lower Body Dressing Assessment/Training    [place] Level (Lower Body Dressing) socks;supervision;other (see comments)  pt. able to pull up socks from sitting position in chair  -SD     Position (Lower Body Dressing) unsupported sitting  -SD               User Key  (r) = Recorded By, (t) = Taken By, (c) = Cosigned By      Initials Name Provider Type    Ying Faye OT Occupational Therapist                   Obj/Interventions       Row Name 02/11/25 1022          Sensory Assessment (Somatosensory)    Sensory Assessment (Somatosensory) UE sensation intact  -SD       Row Name 02/11/25 1022          Vision Assessment/Intervention    Visual Impairment/Limitations WFL  -SD       Row Name 02/11/25 1022          Range of Motion Comprehensive    General Range of  Motion bilateral upper extremity ROM WFL  -SD       Row Name 02/11/25 1022          Strength Comprehensive (MMT)    General Manual Muscle Testing (MMT) Assessment upper extremity strength deficits identified  -SD     Comment, General Manual Muscle Testing (MMT) Assessment B UE Strength: 4/5 grossly  -SD       Row Name 02/11/25 1022          Balance    Static Sitting Balance standby assist  -SD     Dynamic Sitting Balance supervision  -SD     Position, Sitting Balance unsupported;sitting in chair  -SD     Static Standing Balance contact guard  -SD     Dynamic Standing Balance contact guard  -SD     Position/Device Used, Standing Balance unsupported  -SD     Balance Interventions standing;dynamic reaching;occupation based/functional task;UE activity with balance activity  -SD               User Key  (r) = Recorded By, (t) = Taken By, (c) = Cosigned By      Initials Name Provider Type    SD Ying Reyes, OT Occupational Therapist                   Goals/Plan       Row Name 02/11/25 1026          Transfer Goal 1 (OT)    Activity/Assistive Device (Transfer Goal 1, OT) sit-to-stand/stand-to-sit;bed-to-chair/chair-to-bed;toilet;rollator  -SD     [place] Level/Cues Needed (Transfer Goal 1, OT) standby assist  -SD     Time Frame (Transfer Goal 1, OT) short term goal (STG);5 days  -SD     Strategies/Barriers (Transfers Goal 1, OT) utilize pt.'s rollator to implement energy conservation techniques  -SD     Progress/Outcome (Transfer Goal 1, OT) goal ongoing  -SD       Row Name 02/11/25 1026          Bathing Goal 1 (OT)    Activity/Device (Bathing Goal 1, OT) bathing skills, all;hand-held shower spray hose;long-handled sponge;shower chair  -SD     [place] Level/Cues Needed (Bathing Goal 1, OT) standby assist  -SD     Time Frame (Bathing Goal 1, OT) short term goal (STG);5 days  -SD     Strategies/Barriers (Bathing Goal 1, OT) implementing energy conservation techniques  -SD     Progress/Outcomes (Bathing  Goal 1, OT) goal ongoing  -SD       Row Name 02/11/25 1026          Dressing Goal 1 (OT)    Activity/Device (Dressing Goal 1, OT) dressing skills, all  -SD     [place]/Cues Needed (Dressing Goal 1, OT) standby assist  -SD     Time Frame (Dressing Goal 1, OT) short term goal (STG);5 days  -SD     Strategies/Barriers (Dressing Goal 1, OT) implementing energy conservation techniques  -SD     Progress/Outcome (Dressing Goal 1, OT) goal ongoing  -SD       Row Name 02/11/25 1026          Therapy Assessment/Plan (OT)    Planned Therapy Interventions (OT) activity tolerance training;adaptive equipment training;BADL retraining;functional balance retraining;occupation/activity based interventions;patient/caregiver education/training;ROM/therapeutic exercise;strengthening exercise;transfer/mobility retraining  -SD               User Key  (r) = Recorded By, (t) = Taken By, (c) = Cosigned By      Initials Name Provider Type    Ying Faye, OT Occupational Therapist                   Clinical Impression       Row Name 02/11/25 1023          Pain Assessment    Pretreatment Pain Rating 3/10  -SD     Posttreatment Pain Rating 3/10  -SD     Pain Location back  -SD     Pain Side/Orientation generalized  -SD     Pain Management Interventions exercise or physical activity utilized;breathing exercises utilized  -SD     Response to Pain Interventions activity participation with tolerable pain  -SD       Row Name 02/11/25 1023          Plan of Care Review    Plan of Care Reviewed With patient  -SD     Progress no change  -SD     Outcome Evaluation OT IE completed. Pt. presents below functional baseline in ADL & functional mobility independence, strength, balance, and occupational endurance. Pt. reports having a 1/10 energy level & has difficulty doing ADL routine due to decreased energy. OT skilled services is warranted to return pt. to PLOF. Recommend home with assist & HHOT upon d/c.  -SD       Row Name 02/11/25 1023           Therapy Assessment/Plan (OT)    Rehab Potential (OT) good  -SD     Criteria for Skilled Therapeutic Interventions Met (OT) yes;meets criteria;skilled treatment is necessary  -SD     Therapy Frequency (OT) daily  -SD       Row Name 02/11/25 1023          Therapy Plan Review/Discharge Plan (OT)    Anticipated Discharge Disposition (OT) home with assist;home with home health  -SD       Row Name 02/11/25 1023          Vital Signs    Post Systolic BP Rehab 110  -SD     Post Treatment Diastolic BP 65  -SD     Posttreatment Heart Rate (beats/min) 88  -SD     O2 Delivery Pre Treatment room air  -SD     O2 Delivery Intra Treatment room air  -SD     Post SpO2 (%) 96  -SD     O2 Delivery Post Treatment room air  -SD     Pre Patient Position Sitting  -SD     Intra Patient Position Standing  -SD     Post Patient Position Sitting  -SD       Row Name 02/11/25 1023          Positioning and Restraints    Pre-Treatment Position sitting in chair/recliner  -SD     Post Treatment Position chair  -SD     In Chair notified nsg;reclined;call light within reach;encouraged to call for assist;exit alarm on;with family/caregiver;legs elevated;waffle cushion  -SD               User Key  (r) = Recorded By, (t) = Taken By, (c) = Cosigned By      Initials Name Provider Type    SD Ying Reyes, OT Occupational Therapist                   Outcome Measures       Row Name 02/11/25 0942          How much help from another is currently needed...    Putting on and taking off regular lower body clothing? 3  -SD     Bathing (including washing, rinsing, and drying) 3  -SD     Toileting (which includes using toilet bed pan or urinal) 3  -SD     Putting on and taking off regular upper body clothing 3  -SD     Taking care of personal grooming (such as brushing teeth) 4  -SD     Eating meals 4  -SD     AM-PAC 6 Clicks Score (OT) 20  -SD       Row Name 02/11/25 0902          How much help from another person do you currently need...    Turning  from your back to your side while in flat bed without using bedrails? 4  -KW     Moving from lying on back to sitting on the side of a flat bed without bedrails? 4  -KW     Moving to and from a bed to a chair (including a wheelchair)? 3  -KW     Standing up from a chair using your arms (e.g., wheelchair, bedside chair)? 3  -KW     Climbing 3-5 steps with a railing? 3  -KW     To walk in hospital room? 3  -KW     AM-PAC 6 Clicks Score (PT) 20  -KW     Highest Level of Mobility Goal 6 --> Walk 10 steps or more  -KW       Row Name 02/11/25 0942 02/11/25 0902       Functional Assessment    Outcome Measure Options AM-PAC 6 Clicks Daily Activity (OT)  -SD AM-PAC 6 Clicks Basic Mobility (PT)  -KW              User Key  (r) = Recorded By, (t) = Taken By, (c) = Cosigned By      Initials Name Provider Type    Ying Faye, OT Occupational Therapist    Jesenia Caballero, PT Physical Therapist                    Occupational Therapy Education       Title: PT OT SLP Therapies (In Progress)       Topic: Occupational Therapy (Done)       Point: ADL training (Done)       Description:   Instruct learner(s) on proper safety adaptation and remediation techniques during self care or transfers.   Instruct in proper use of assistive devices.                  Learning Progress Summary            Patient Acceptance, E, VU,NR by SD at 2/11/2025 1030    Comment: Pt. educated on role of OT & is agreeable with OT POC.                      Point: Home exercise program (Done)       Description:   Instruct learner(s) on appropriate technique for monitoring, assisting and/or progressing therapeutic exercises/activities.                  Learning Progress Summary            Patient Acceptance, E, VU,NR by SD at 2/11/2025 1030    Comment: Pt. educated on role of OT & is agreeable with OT POC.                      Point: Precautions (Done)       Description:   Instruct learner(s) on prescribed precautions during self-care and  functional transfers.                  Learning Progress Summary            Patient Acceptance, E, VU,NR by SD at 2/11/2025 1030    Comment: Pt. educated on role of OT & is agreeable with OT POC.                      Point: Body mechanics (Done)       Description:   Instruct learner(s) on proper positioning and spine alignment during self-care, functional mobility activities and/or exercises.                  Learning Progress Summary            Patient Acceptance, E, VU,NR by SD at 2/11/2025 1030    Comment: Pt. educated on role of OT & is agreeable with OT POC.                                      User Key       Initials Effective Dates Name Provider Type Discipline    SD 07/11/23 -  Ying Reyes, OT Occupational Therapist OT                  OT Recommendation and Plan  Planned Therapy Interventions (OT): activity tolerance training, adaptive equipment training, BADL retraining, functional balance retraining, occupation/activity based interventions, patient/caregiver education/training, ROM/therapeutic exercise, strengthening exercise, transfer/mobility retraining  Therapy Frequency (OT): daily  Plan of Care Review  Plan of Care Reviewed With: patient  Progress: no change  Outcome Evaluation: OT IE completed. Pt. presents below functional baseline in ADL & functional mobility independence, strength, balance, and occupational endurance. Pt. reports having a 1/10 energy level & has difficulty doing ADL routine due to decreased energy. OT skilled services is warranted to return pt. to PLOF. Recommend home with assist & HHOT upon d/c.     Time Calculation:   Evaluation Complexity (OT)  Review Occupational Profile/Medical/Therapy History Complexity: brief/low complexity  Assessment, Occupational Performance/Identification of Deficit Complexity: 1-3 performance deficits  Clinical Decision Making Complexity (OT): problem focused assessment/low complexity  Overall Complexity of Evaluation (OT): low  complexity     Time Calculation- OT       Row Name 02/11/25 1031             Time Calculation- OT    OT Received On 02/11/25  -SD      OT Goal Re-Cert Due Date 02/21/25  -SD         Untimed Charges    OT Eval/Re-eval Minutes 49  -SD         Total Minutes    Untimed Charges Total Minutes 49  -SD       Total Minutes 49  -SD                User Key  (r) = Recorded By, (t) = Taken By, (c) = Cosigned By      Initials Name Provider Type    Ying Faye OT Occupational Therapist                  Therapy Charges for Today       Code Description Service Date Service Provider Modifiers Qty    [id number] HC OT EVAL LOW COMPLEXITY 4 2/11/2025 Ying Reyes OT GO 1                 Ying Reyes OT  2/11/2025

## 2025-02-11 NOTE — PLAN OF CARE
Goal Outcome Evaluation:  Plan of Care Reviewed With: patient        Progress: no change  Outcome Evaluation: PT eval completed. Patient able to ambulate without AD howebver moves quickly with decreased safety awareness. Patient very fatigued and declined further ambulation. anticipate further ambulation ability when less tired.  Patient presents below baseline for functional mobility. Patient demonstrates decreased activity tolerance, deconditioning and reduced dynamic balance. Patient would continue to benefit from skilled PT services to improve dynamic balance with gait, transfers strength, and activity tolerance training in order to build endurance and reduce risk of falls.    Anticipated Discharge Disposition (PT): home with home health

## 2025-02-12 PROBLEM — E43 SEVERE PROTEIN-CALORIE MALNUTRITION: Status: ACTIVE | Noted: 2025-02-12

## 2025-02-12 LAB
ANION GAP SERPL CALCULATED.3IONS-SCNC: 7 MMOL/L (ref 5–15)
BACTERIA SPEC AEROBE CULT: ABNORMAL
BUN SERPL-MCNC: 13 MG/DL (ref 8–23)
BUN/CREAT SERPL: 10.7 (ref 7–25)
CALCIUM SPEC-SCNC: 7.8 MG/DL (ref 8.6–10.5)
CHLORIDE SERPL-SCNC: 105 MMOL/L (ref 98–107)
CO2 SERPL-SCNC: 24 MMOL/L (ref 22–29)
CREAT SERPL-MCNC: 1.21 MG/DL (ref 0.57–1)
DEPRECATED RDW RBC AUTO: 63.8 FL (ref 37–54)
EGFRCR SERPLBLD CKD-EPI 2021: 44 ML/MIN/1.73
ERYTHROCYTE [DISTWIDTH] IN BLOOD BY AUTOMATED COUNT: 18.4 % (ref 12.3–15.4)
GLUCOSE SERPL-MCNC: 90 MG/DL (ref 65–99)
HCT VFR BLD AUTO: 27.1 % (ref 34–46.6)
HGB BLD-MCNC: 9 G/DL (ref 12–15.9)
MCH RBC QN AUTO: 31.3 PG (ref 26.6–33)
MCHC RBC AUTO-ENTMCNC: 33.2 G/DL (ref 31.5–35.7)
MCV RBC AUTO: 94.1 FL (ref 79–97)
PLATELET # BLD AUTO: 98 10*3/MM3 (ref 140–450)
PMV BLD AUTO: 13 FL (ref 6–12)
POTASSIUM SERPL-SCNC: 3.5 MMOL/L (ref 3.5–5.2)
POTASSIUM SERPL-SCNC: 4.5 MMOL/L (ref 3.5–5.2)
RBC # BLD AUTO: 2.88 10*6/MM3 (ref 3.77–5.28)
SODIUM SERPL-SCNC: 136 MMOL/L (ref 136–145)
WBC NRBC COR # BLD AUTO: 6.12 10*3/MM3 (ref 3.4–10.8)

## 2025-02-12 PROCEDURE — 80048 BASIC METABOLIC PNL TOTAL CA: CPT | Performed by: STUDENT IN AN ORGANIZED HEALTH CARE EDUCATION/TRAINING PROGRAM

## 2025-02-12 PROCEDURE — 25810000003 SODIUM CHLORIDE 0.9 % SOLUTION: Performed by: FAMILY MEDICINE

## 2025-02-12 PROCEDURE — 84132 ASSAY OF SERUM POTASSIUM: CPT | Performed by: FAMILY MEDICINE

## 2025-02-12 PROCEDURE — 25010000002 MEROPENEM PER 100 MG: Performed by: FAMILY MEDICINE

## 2025-02-12 PROCEDURE — 85027 COMPLETE CBC AUTOMATED: CPT | Performed by: STUDENT IN AN ORGANIZED HEALTH CARE EDUCATION/TRAINING PROGRAM

## 2025-02-12 PROCEDURE — 25010000002 CEFTRIAXONE PER 250 MG: Performed by: INTERNAL MEDICINE

## 2025-02-12 PROCEDURE — 99232 SBSQ HOSP IP/OBS MODERATE 35: CPT | Performed by: FAMILY MEDICINE

## 2025-02-12 RX ORDER — POTASSIUM CHLORIDE 1500 MG/1
40 TABLET, EXTENDED RELEASE ORAL EVERY 4 HOURS
Status: COMPLETED | OUTPATIENT
Start: 2025-02-12 | End: 2025-02-12

## 2025-02-12 RX ADMIN — MEROPENEM 500 MG: 500 INJECTION, POWDER, FOR SOLUTION INTRAVENOUS at 15:13

## 2025-02-12 RX ADMIN — SODIUM CHLORIDE 1000 MG: 900 INJECTION INTRAVENOUS at 09:34

## 2025-02-12 RX ADMIN — MEROPENEM 500 MG: 500 INJECTION, POWDER, FOR SOLUTION INTRAVENOUS at 21:27

## 2025-02-12 RX ADMIN — PANTOPRAZOLE SODIUM 40 MG: 40 TABLET, DELAYED RELEASE ORAL at 07:43

## 2025-02-12 RX ADMIN — SODIUM CHLORIDE 500 ML: 9 INJECTION, SOLUTION INTRAVENOUS at 08:48

## 2025-02-12 RX ADMIN — Medication 5 MG: at 21:28

## 2025-02-12 RX ADMIN — SENNOSIDES AND DOCUSATE SODIUM 2 TABLET: 50; 8.6 TABLET ORAL at 21:28

## 2025-02-12 RX ADMIN — PANTOPRAZOLE SODIUM 40 MG: 40 TABLET, DELAYED RELEASE ORAL at 16:51

## 2025-02-12 RX ADMIN — POTASSIUM CHLORIDE 40 MEQ: 1500 TABLET, EXTENDED RELEASE ORAL at 12:14

## 2025-02-12 RX ADMIN — POTASSIUM CHLORIDE 40 MEQ: 1500 TABLET, EXTENDED RELEASE ORAL at 07:43

## 2025-02-12 RX ADMIN — LEVOTHYROXINE SODIUM 25 MCG: 25 TABLET ORAL at 05:41

## 2025-02-12 RX ADMIN — SENNOSIDES AND DOCUSATE SODIUM 2 TABLET: 50; 8.6 TABLET ORAL at 08:48

## 2025-02-12 RX ADMIN — Medication 10 ML: at 08:48

## 2025-02-12 RX ADMIN — Medication 10 ML: at 21:28

## 2025-02-12 NOTE — CASE MANAGEMENT/SOCIAL WORK
Continued Stay Note   [place]     Patient Name: Sheree Hernandez  MRN: [id number]  Today's Date: 2/12/2025    Admit Date: 2/9/2025    Plan: home with home health   Discharge Plan       Row Name 02/12/25 1040       Plan    Plan home with home health    Patient/Family in Agreement with Plan yes    Plan Comments Met with patient at the bedside to discuss discharge plan. Patient's plan is home. Patient is agreeable to therapy's recommendation for home health. CM will continue to follow.    Final Discharge Disposition Code 06 - home with home health care                   Discharge Codes    No documentation.                       Joan Montejo RN

## 2025-02-12 NOTE — PROGRESS NOTES
[facility]  PROGRESS NOTE    Patient Name: Sheree Hernandez  : 1940  MRN: [id number]    Date of Admission: 2025  Primary Care Physician: Timi Conway DO    Subjective   Subjective     CC:  Anemia, UTI    HPI:  Pt with minimal po intake and required bolus ivf this am due to symptomatic hypotension and tachycardia with ambulation.  Feels better now.  Urine culture grew ESBL E. coli      Objective   Objective     Vital Signs:   Temp:  [98.1 °F (36.7 °C)-98.6 °F (37 °C)] 98.1 °F (36.7 °C)  Heart Rate:  [81-99] 88  Resp:  [16-20] 18  BP: (108-161)/(54-68) 119/64     Physical Exam:  Constitutional: No acute distress, awake, alert, chronically ill-appearing, lying in the bed  HENT: NCAT, mucous membranes moist  Respiratory: Clear to auscultation bilaterally, respiratory effort normal   Cardiovascular: RRR  Gastrointestinal: Positive bowel sounds, soft, nontender, nondistended  Musculoskeletal: No bilateral ankle edema  Psychiatric: Appropriate affect, cooperative  Neurologic: Alert, oriented, speech clear  Skin: No rashes      Results Reviewed:  LAB RESULTS:      Lab 25  0506 25  0437 02/10/25  2039 02/10/25  1113 02/10/25  0708 02/10/25  0627 25  2317 25  2113   WBC 6.12 9.11  --  6.72  --  9.56  --  6.27   HEMOGLOBIN 9.0* 10.6* 8.9* 7.0*  --  9.1*  --  7.3*   HEMATOCRIT 27.1* 30.3* 25.4* 20.4*  --  26.8*  --  21.3*   PLATELETS 98* 99*  --  94*  --  120*  --  107*   NEUTROS ABS  --   --   --  4.90  --  7.72*  --  4.44   IMMATURE GRANS (ABS)  --   --   --  0.04  --  0.04  --  0.03   LYMPHS ABS  --   --   --  0.65*  --  0.66*  --  0.70   MONOS ABS  --   --   --  1.10*  --  1.09*  --  1.06*   EOS ABS  --   --   --  0.01  --  0.02  --  0.02   MCV 94.1 90.7  --  93.2  --  92.7  --  96.8   PROCALCITONIN  --   --   --   --  0.76*  --   --   --    LACTATE  --   --   --  1.7  --   --   --   --    HSTROP T  --   --   --   --   --   --  29* 33*          Lab 02/12/25  0507 02/11/25  0437 02/10/25  0708 02/09/25  2113   SODIUM 136 137 136 135*   POTASSIUM 3.5 4.0 3.8 3.9   CHLORIDE 105 105 101 99   CO2 24.0 22.0 24.0 22.0   ANION GAP 7.0 10.0 11.0 14.0   BUN 13 14 13 14   CREATININE 1.21* 1.17* 1.17* 1.16*   EGFR 44.0* 45.8* 45.8* 46.3*   GLUCOSE 90 96 138* 129*   CALCIUM 7.8* 8.0* 8.1* 8.7   MAGNESIUM  --  2.1 3.0* 2.2   PHOSPHORUS  --   --  2.3*  --    TSH  --   --   --  2.790         Lab 02/10/25  0708 02/09/25 2113   TOTAL PROTEIN 5.5* 5.7*   ALBUMIN 3.0* 3.2*   GLOBULIN 2.5 2.5   ALT (SGPT) 12 13   AST (SGOT) 25 22   BILIRUBIN 2.5* 1.2   ALK PHOS 343* 376*         Lab 02/09/25  2317 02/09/25 2113   HSTROP T 29* 33*             Lab 02/09/25 2207   ABO TYPING O   RH TYPING Positive   ANTIBODY SCREEN Negative         Brief Urine Lab Results  (Last result in the past 365 days)        Color   Clarity   Blood   Leuk Est   Nitrite   Protein   CREAT   Urine HCG        02/09/25 2248 Yellow   Turbid   Small (1+)   Large (3+)   Negative   30 mg/dL (1+)                   Microbiology Results Abnormal       Procedure Component Value - Date/Time    Urine Culture - Urine, Urine, Clean Catch [[id number]]  (Abnormal)  (Susceptibility) Collected: 02/09/25 2248    Lab Status: Final result Specimen: Urine, Clean Catch Updated: 02/12/25 1058     Urine Culture >100,000 CFU/mL Escherichia coli ESBL    Narrative:      Colonization of the urinary tract without infection is common. Treatment is discouraged unless the patient is symptomatic, pregnant, or undergoing an invasive urologic procedure.  Recent outcomes data supports the use of pip/tazo in the treatment of susceptible ESBL infections for uncomplicated UTI. Consider use of pip/tazo as a carbapenem-sparing regimen in applicable patients.    Susceptibility        Escherichia coli ESBL      POLLO      Ciprofloxacin Intermediate      Ertapenem Susceptible      Gentamicin Susceptible      Levofloxacin Intermediate       Meropenem Susceptible      Nitrofurantoin Susceptible      Piperacillin + Tazobactam Susceptible      Trimethoprim + Sulfamethoxazole Resistant                       Susceptibility Comments       Escherichia coli ESBL    For ESBL-producing infections, consider infectious disease consult. Susceptibility results may not correlate to clincal outcomes.                       CT Chest With Contrast Diagnostic    Result Date: 2/11/2025  CT CHEST W CONTRAST DIAGNOSTIC, CT ABDOMEN PELVIS W CONTRAST Date of Exam: 2/11/2025 12:55 AM EST Indication: lymphoma, cholangiocarcinoma. Comparison: 12/11/2024 chest abdomen pelvis CT scan Technique: Axial CT images were obtained of the chest, abdomen and pelvis after the uneventful intravenous administration of 75 mL Isovue-300.  Reconstructed coronal and sagittal images were also obtained. Automated exposure control and iterative construction methods were used. Findings: Previous exam reports from 12/11/2024 described no acute abnormality in the abdomen or pelvis. Mild interval enlargement of multiple pulmonary nodules. Small new right pleural effusion. CT SCAN OF THE CHEST WITH IV CONTRAST: Right upper paratracheal and left preaortic lymph nodes are stable in size and appearance. No new or enlarging nodes are identified. Thoracic aorta is normal in caliber. Exam has fairly good pulmonary artery contrast and no evidence of embolic disease. There are now small left and small-moderate free-flowing right pleural effusions and associated bibasilar discoid atelectasis. There is no significant pericardial effusion. In the right lung, patient's multiple small pulmonary nodules mostly show a slight increase in size from 12/1/2024, by between 1 and 2 mm. A few nodules appear stable. It is difficult to identify any definitely new pulmonary parenchymal nodule. On the left, there is similar pattern, with both stable nodules, and nodules that appear increased by 1 to 2 mm in diameter. As example,  anterior right upper lobe pulmonary nodule image 54 above the scan of the previous scan remains at 4.5 mm in diameter. Right middle lobe pulmonary nodule image 65 of today's scan, previous image 64 has increased from 6 mm to 7 mm. Bony structures appear to be intact. No lytic or blastic changes are identified.     Impression: 1. 2 stable mildly enlarged mediastinal lymph nodes. No new or increasing adenopathy. 2. Stable number of pulmonary parenchymal nodules bilaterally, with some nodules stable in size, most nodules slightly increased, by 1 to 2 mm in diameter. No clearly new nodules. 3. Small left and small-moderate right pleural effusions, new from 12/11/2024 and mild associated discoid atelectasis. CT SCAN OF THE ABDOMEN PELVIS WITH IV CONTRAST: Right hepatectomy, and multiple nonacute enhancing left lobe hepatic cysts are again noted. Heterogeneous hypoenhancement along the resection margin is more prominent, and more masslike on today's exam, initial axial image 32, delayed axial image 33. This overall area appears a little larger, previously 18 x 32 mm, today 23 x 35 mm, questionable for recurrence. Posterior left lobe liver lesion, today's image 43/2 has had a variable appearance over multiple prior studies, perhaps intermittently treated and occurring metastasis. On today's study it appears further increased, most recently measuring 1.8 cm, today 2.6 cm, and with initial heterogeneous internal enhancement, and delayed centripetal enhancement pattern, more typical for cholangiocarcinoma than for hemangioma. No new hepatic lesions are identified elsewhere. Spleen is normal in size 11 cm. Remaining left portal vein, superior mesenteric vein and splenic vein enhance normally with no evidence of thrombus. Postoperative fat stranding in the region of the salomon hepatis appears stable. There is some narrowing of the IVC presumably due to regional scarring but this is stable from the contrast-enhanced study of  11/1/2024. Amorphous soft tissue density between the IVC and aorta and extending along the right lateral and anterior margins of the aorta, images 43 through 55/2 today's study appears stable to minimally increased. No evidence of new mass or adenopathy is seen elsewhere in the upper abdomen. No new abnormalities are seen of the pancreas or adrenal glands. Small bilateral renal cysts are noted. Subtle, heterogeneous area area in the right upper renal pole image 39/2 is of uncertain significance but appears unchanged back to at least 3/6/2023 and appears to show overlying cortical atrophy, perhaps related to prior pyelonephritis. There is no evidence of obstructive uropathy. Regarding the lower abdomen/pelvis, bowel loops are normal in caliber and grossly normal in appearance. Bladder is decompressed. There is mild generalized fat stranding in the lower abdomen/pelvis, including around the bladder, but this may represent incidental mild anasarca. Delayed venous phase images show normal contrast excretion by the kidneys. No acute bony abnormality is seen. Impression: 1. Multiple simple appearing hepatic cysts. 2. Posterior left lobe liver lesion, which has had a waxing and waning appearance over multiple prior studies, is larger and more heterogeneous today, with enhancement pattern concerning for metastatic glandular carcinoma. 3. Irregular appearing parenchymal along the hepatic resection margin appears larger and more masslike today, concerning for recurrence. 4. Stable to mildly increased amorphous residual soft tissue density along the margins of the IVC and aorta, and stable chronic focal narrowing of the IVC. 5. Subtle heterogeneous hypoenhancement of the right upper renal pole, but unchanged over multiple prior studies probably postinflammatory scarring. 6. Mild anasarca. Electronically Signed: Salvador Gill MD  2/11/2025 8:52 AM EST  Workstation ID: [id number]    CT Abdomen Pelvis With Contrast    Result Date:  2/11/2025  CT CHEST W CONTRAST DIAGNOSTIC, CT ABDOMEN PELVIS W CONTRAST Date of Exam: 2/11/2025 12:55 AM EST Indication: lymphoma, cholangiocarcinoma. Comparison: 12/11/2024 chest abdomen pelvis CT scan Technique: Axial CT images were obtained of the chest, abdomen and pelvis after the uneventful intravenous administration of 75 mL Isovue-300.  Reconstructed coronal and sagittal images were also obtained. Automated exposure control and iterative construction methods were used. Findings: Previous exam reports from 12/11/2024 described no acute abnormality in the abdomen or pelvis. Mild interval enlargement of multiple pulmonary nodules. Small new right pleural effusion. CT SCAN OF THE CHEST WITH IV CONTRAST: Right upper paratracheal and left preaortic lymph nodes are stable in size and appearance. No new or enlarging nodes are identified. Thoracic aorta is normal in caliber. Exam has fairly good pulmonary artery contrast and no evidence of embolic disease. There are now small left and small-moderate free-flowing right pleural effusions and associated bibasilar discoid atelectasis. There is no significant pericardial effusion. In the right lung, patient's multiple small pulmonary nodules mostly show a slight increase in size from 12/1/2024, by between 1 and 2 mm. A few nodules appear stable. It is difficult to identify any definitely new pulmonary parenchymal nodule. On the left, there is similar pattern, with both stable nodules, and nodules that appear increased by 1 to 2 mm in diameter. As example, anterior right upper lobe pulmonary nodule image 54 above the scan of the previous scan remains at 4.5 mm in diameter. Right middle lobe pulmonary nodule image 65 of today's scan, previous image 64 has increased from 6 mm to 7 mm. Bony structures appear to be intact. No lytic or blastic changes are identified.     Impression: 1. 2 stable mildly enlarged mediastinal lymph nodes. No new or increasing adenopathy. 2. Stable  number of pulmonary parenchymal nodules bilaterally, with some nodules stable in size, most nodules slightly increased, by 1 to 2 mm in diameter. No clearly new nodules. 3. Small left and small-moderate right pleural effusions, new from 12/11/2024 and mild associated discoid atelectasis. CT SCAN OF THE ABDOMEN PELVIS WITH IV CONTRAST: Right hepatectomy, and multiple nonacute enhancing left lobe hepatic cysts are again noted. Heterogeneous hypoenhancement along the resection margin is more prominent, and more masslike on today's exam, initial axial image 32, delayed axial image 33. This overall area appears a little larger, previously 18 x 32 mm, today 23 x 35 mm, questionable for recurrence. Posterior left lobe liver lesion, today's image 43/2 has had a variable appearance over multiple prior studies, perhaps intermittently treated and occurring metastasis. On today's study it appears further increased, most recently measuring 1.8 cm, today 2.6 cm, and with initial heterogeneous internal enhancement, and delayed centripetal enhancement pattern, more typical for cholangiocarcinoma than for hemangioma. No new hepatic lesions are identified elsewhere. Spleen is normal in size 11 cm. Remaining left portal vein, superior mesenteric vein and splenic vein enhance normally with no evidence of thrombus. Postoperative fat stranding in the region of the salomon hepatis appears stable. There is some narrowing of the IVC presumably due to regional scarring but this is stable from the contrast-enhanced study of 11/1/2024. Amorphous soft tissue density between the IVC and aorta and extending along the right lateral and anterior margins of the aorta, images 43 through 55/2 today's study appears stable to minimally increased. No evidence of new mass or adenopathy is seen elsewhere in the upper abdomen. No new abnormalities are seen of the pancreas or adrenal glands. Small bilateral renal cysts are noted. Subtle, heterogeneous area  area in the right upper renal pole image 39/2 is of uncertain significance but appears unchanged back to at least 3/6/2023 and appears to show overlying cortical atrophy, perhaps related to prior pyelonephritis. There is no evidence of obstructive uropathy. Regarding the lower abdomen/pelvis, bowel loops are normal in caliber and grossly normal in appearance. Bladder is decompressed. There is mild generalized fat stranding in the lower abdomen/pelvis, including around the bladder, but this may represent incidental mild anasarca. Delayed venous phase images show normal contrast excretion by the kidneys. No acute bony abnormality is seen. Impression: 1. Multiple simple appearing hepatic cysts. 2. Posterior left lobe liver lesion, which has had a waxing and waning appearance over multiple prior studies, is larger and more heterogeneous today, with enhancement pattern concerning for metastatic glandular carcinoma. 3. Irregular appearing parenchymal along the hepatic resection margin appears larger and more masslike today, concerning for recurrence. 4. Stable to mildly increased amorphous residual soft tissue density along the margins of the IVC and aorta, and stable chronic focal narrowing of the IVC. 5. Subtle heterogeneous hypoenhancement of the right upper renal pole, but unchanged over multiple prior studies probably postinflammatory scarring. 6. Mild anasarca. Electronically Signed: Salvador Gill MD  2/11/2025 8:52 AM EST  Workstation ID: [id number]     Results for orders placed during the hospital encounter of 11/08/24    Adult Transthoracic Echo Complete W/ Cont if Necessary Per Protocol    Interpretation Summary    Left ventricular ejection fraction appears to be 51 - 55%.    Left ventricular diastolic function was indeterminate.    Mild mitral valve regurgitation is present.    The aortic valve exhibits sclerosis.    Trace tricuspid valve regurgitation is present. Estimated right ventricular systolic pressure from  tricuspid regurgitation is normal (<35 mmHg).    There is no evidence of pericardial effusion.      Current medications:  Scheduled Meds:apixaban, 2.5 mg, Oral, BID  levothyroxine, 25 mcg, Oral, Q AM  meropenem, 500 mg, Intravenous, Once  meropenem, 500 mg, Intravenous, Q8H  metoprolol tartrate, 12.5 mg, Oral, BID  pantoprazole, 40 mg, Oral, BID AC  senna-docusate sodium, 2 tablet, Oral, BID  sodium chloride, 10 mL, Intravenous, Q12H      Continuous Infusions:     PRN Meds:.  acetaminophen **OR** acetaminophen **OR** acetaminophen    senna-docusate sodium **AND** polyethylene glycol **AND** bisacodyl **AND** bisacodyl    Calcium Replacement - Follow Nurse / BPA Driven Protocol    dicyclomine    HYDROcodone-acetaminophen    Magnesium Standard Dose Replacement - Follow Nurse / BPA Driven Protocol    melatonin    Morphine **AND** naloxone    nitroglycerin    ondansetron    Phosphorus Replacement - Follow Nurse / BPA Driven Protocol    Potassium Replacement - Follow Nurse / BPA Driven Protocol    [COMPLETED] Insert Peripheral IV **AND** sodium chloride    [COMPLETED] Insert Peripheral IV **AND** sodium chloride    sodium chloride    sodium chloride    Assessment & Plan   Assessment & Plan     Active Hospital Problems    Diagnosis  POA    **Symptomatic anemia [D64.9]  Yes    Severe protein-calorie malnutrition [E43]  Yes    Dehydration [E86.0]  Yes      Resolved Hospital Problems   No resolved problems to display.        Brief Hospital Course to date:  Sheree Hernandez is a 84 y.o. female with history of lymphoma being treated with Rituxan, HLD, A-fib, hypothyroidism, PUD, sleep apnea, cholangiocarcinoma, anxiety, depression, who presented for evaluation of fatigue.  Found to have symptomatic anemia and UTI.    This patient's problems and plans were partially entered by my partner and updated as appropriate by me 02/12/25.    Symptomatic anemia  Lymphoma  -hgb 7.3 upon admission  -Transfused 2 units RBCs  -Monitor for any  bleeding, a.m. labs     UTI, poa  ESBL E. coli on urine culture  -Will treat with Merrem x 3 days  -Patient has lower abdominal symptoms     Gen weakness  -multifactorial due to anemia, lymphoma, uti  -tsh wnl, a.m. cortisol 14  -pt/ot evals  -orthostatics +, status post IVF     Hx cholangiocardinoma  -s/p previous partial hepatectomy  -Imaging shows recurrence, this was discussed with patient and her daughter by Dr. Lan young in the room     Parox afib  -currently in sinus  -Resumed Eliquis on 2/12/2025  -Resumed low dose metoprolol 12.5mg bid      HLD  -statin     Hypothyroidism  -tsh wnl  -continue levothyroxine     Hx PUD/GERD  -ppi     BONNIE  -noncompliant w/ cpap    Expected Discharge Location and Transportation: home  Expected Discharge   Expected Discharge Date: 2/15/2025; Expected Discharge Time:      VTE Prophylaxis:  Pharmacologic & mechanical VTE prophylaxis orders are present.         AM-PAC 6 Clicks Score (PT): 21 (02/11/25 2017)    CODE STATUS:   Code Status and Medical Interventions: CPR (Attempt to Resuscitate); Full Support   Ordered at: 02/10/25 0040     Code Status (Patient has no pulse and is not breathing):    CPR (Attempt to Resuscitate)     Medical Interventions (Patient has pulse or is breathing):    Full Support       Georgie Wahl MD  02/12/25

## 2025-02-12 NOTE — PROGRESS NOTES
Malnutrition Severity Assessment    Patient Name:  Sheree Hernandez  YOB: 1940  MRN: [id number]  Admit Date:  2/9/2025    Patient meets criteria for : Severe Malnutrition    Comments:  Pt meets criteria for severe malnutrition in the context of chronic illness indicated by po intake <75% EEN x >/=1 month, wt loss >10% x 6 months    Malnutrition Severity Assessment  Malnutrition Type: Chronic Disease - Related Malnutrition  Malnutrition Type (Last 8 Hours)       Malnutrition Severity Assessment       Row Name 02/11/25 2118       Malnutrition Severity Assessment    Malnutrition Type Chronic Disease - Related Malnutrition      Row Name 02/11/25 2118       Insufficient Energy Intake     Insufficient Energy Intake Findings Severe    Insufficient Energy Intake  <75% of est. energy requirement for > or equal to 1 month      Row Name 02/11/25 2118       Unintentional Weight Loss     Unintentional Weight Loss Findings Severe  29lb/18.3% x 6 months    Unintentional Weight Loss  Weight loss greater than 10% in six months      Row Name 02/11/25 2118       Muscle Loss    Loss of Muscle Mass Findings --    Jainism Region Moderate - slight depression      Row Name 02/11/25 2118       Fat Loss    Subcutaneous Fat Loss Findings --    Upper Arm Region Moderate - some fat tissue, not ample      Row Name 02/11/25 2118       Criteria Met (Must meet criteria for severity in at least 2 of these categories: M Wasting, Fat Loss, Fluid, Secondary Signs, Wt. Status, Intake)    Patient meets criteria for  Severe Malnutrition                    Electronically signed by:  Estrellita Hobbs, MS,RD,LD  02/11/25 21:20 EST

## 2025-02-12 NOTE — PAYOR COMM NOTE
"Sheree Reyna (84 y.o. Female)     [id number]    Ramila Martinez, CHAPIN  Utilization Review  Byjge-[phone]  [phone]        Date of Birth   1940    Social Security Number       Address   [address] [place] [address]    Home Phone   [phone]    MRN   [id number]       Anglican   [facility]    Marital Status                               Admission Date   25    Admission Type   Emergency    Admitting Provider   Georgie Wahl MD    Attending Provider   Georgie Wahl MD    Department, Room/Bed   [address], S581/1       Discharge Date       Discharge Disposition       Discharge Destination                                 Attending Provider: Georgie Wahl MD    Allergies: Pravastatin    Isolation: Contact   Infection: ESBL E coli (25)   Code Status: CPR    Ht: 162.6 cm (64\")   Wt: 59 kg (130 lb)    Admission Cmt: None   Principal Problem: Symptomatic anemia [D64.9]                   Active Insurance as of 2025       Primary Coverage       Payor Plan Insurance Group Employer/Plan Group    ANTHEM MEDICARE REPLACEMENT ANTHEM MED ADV PPO [id number]       Payor Plan Address Payor Plan Phone Number Payor Plan Fax Number Effective Dates    [id number] [phone]  2021 - None Entered    [facility] [address]         Subscriber Name Subscriber Birth Date Member ID       SHEREE REYNA 1940 [id number]                     Emergency Contacts        (Rel.) Home Phone Work Phone Mobile Phone    Wade,Tiana (Daughter) [phone] -- [phone]    Lino Hanna (SE)Bunny (Friend) [phone] -- [phone]                 History & Physical        Rj Gaviria III, DO at 02/10/25 0040              [facility] Medicine Services  HISTORY AND PHYSICAL    Patient Name: Sheree Reyna  : 1940  MRN: [id number]  Primary Care Physician: Timi Conway DO  Date of admission: " "2/9/2025      Subjective  Subjective     Chief Complaint:  Fatigue    HPI:  Sheree Hernandez is a 84 y.o. female who states that she currently receives chemotherapy for diagnosis of lymphoma.  She states that at home earlier tonight (Sunday 2/9) she was checking her blood pressure with a home monitor and had systolic readings in the 100-110s and diastolic readings in the 40s.  She felt these were low readings, and this concerned her.  She also states that for the last 7 days she has had increased generalized weakness, malaise, and fatigue to the point where she states that \"I have no energy at all doing anything, even walking to the house.\"  She denies any chest pain or shortness of breath.  She states she also has had occasional abdominal pain over the last several days, describes it as cramping, nonradiating, located all across the anterior aspect of the abdomen.  No nausea or emesis.  No bowel habit change or dark stools, no source of bleeding.  Workup in the ED here included lab studies, and her hemoglobin was found to be 7.3.  She was ordered a unit of PRBCs to be transfused in the ED.  She states her next chemotherapy treatment is supposed to be on Tuesday, and she has an appointment with her oncologist Dr. Montenegro on Monday 2/10.  Her medical history is significant for the aforementioned diagnosis of lymphoma, hyperlipidemia for which she states that she is not taking her prescribed statin, atrial fibrillation, hypothyroidism, peptic ulcer disease,, sleep apnea (she does not use a CPAP), liver cancer, and anxiety/depression for which she does not take any home medications.      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    Low bone mass     with elevated FRAX core    Lung nodule 07/12/2023    Mild obstructive sleep apnea 01/30/2020    Near syncope     negative cardiovascular workup     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    Osteopenia 2018    PAF (paroxysmal atrial fibrillation) 07/15/2024    Plantar fasciitis     Chronic    PVC's (premature ventricular contractions)     Senile keratosis     Multiple    MANAN (stress urinary incontinence, female)     Symptomatic anemia 2/9/2025    Syncope, near     with negative cardiovascular workup         Oncology Problem List:  Marginal zone lymphoma (01/20/2025; Status: Active)  Lymphoma (01/20/2025; Status: Active)  Intrahepatic cholangiocarcinoma (08/17/2021; Status: Active)  Intrahepatic cholangiocarcinoma (08/17/2021; Status: Active)  Oncology/Hematology History   Intrahepatic cholangiocarcinoma   8/17/2021 Initial Diagnosis    Intrahepatic cholangiocarcinoma (CMS/HCC)     8/17/2021 Cancer Staged    Staging form: Intrahepatic Bile Duct, AJCC 8th Edition  - Clinical: Stage IB (cT1b, cN0, cM0) - Signed by Jorje Montenegro MD on 8/17/2021 11/5/2021 Cancer Staged    Staging form: Intrahepatic Bile Duct, AJCC 8th Edition  - Pathologic: Stage IIIA (pT3, pN0, cM0) - Signed by Jorje Montenegro MD on 11/5/2021 12/9/2021 - 1/6/2022 Chemotherapy    OP GALLBLADDER Capecitabine + XRT     12/9/2021 - 1/19/2022 Radiation    Radiation OncologyTreatment Course:  Sheree Hernandez received 5040 cGy in 28 fractions to liver via External Beam Radiation - EBRT.     4/25/2023 - 5/8/2023 Radiation    Radiation OncologyTreatment Course:  Sheree Hernandez received 3500 cGy in 5 fractions to liver mass and lymph nodes via Stereotactic Radiation Therapy - SRT.     8/1/2023 - 8/11/2023 Radiation    Radiation OncologyTreatment Course:  Sheree Hernandez received 3500 cGy in 5 fractions to abdomen via  Stereotactic Radiation Therapy - SRT.     1/9/2024 - 1/23/2024 Radiation    Radiation OncologyTreatment Course:  Sheree Hernandez received 3000 cGy in 5 fractions to abdomen via Stereotactic Radiation Therapy - SRT.     1/11/2024 - 1/11/2024 Radiation    Radiation OncologyTreatment Course:  Sheree Hernandez received 754 cGy in 1 fractions to left lung via Stereotactic Radiation Therapy - SRT.     2/1/2024 - 2/1/2024 Radiation    Radiation OncologyTreatment Course:  Sheree Hernandez received 3000 cGy in 1 fractions to left lung via Stereotactic Radiation Therapy - SRT.     4/19/2024 - 8/16/2024 Chemotherapy    OP HEPATOBILIARY CISplatin + Gemcitabine Days 1,8 / Durvalumab Q21D     5/17/2024 -  Chemotherapy    OP CENTRAL VENOUS ACCESS DEVICE Access, Care, and Maintenance (CVAD)     9/6/2024 -  Chemotherapy    OP CHOLANGIOCARCINOMA Durvalumab 1500 mg     Marginal zone lymphoma   1/20/2025 Initial Diagnosis    Marginal zone lymphoma       Past Surgical History:   Procedure Laterality Date    ADRENAL GLAND SURGERY  09/22/2021    ADRENALECTOMY  09/22/2021    CARDIAC CATHETERIZATION N/A 01/18/2019    Procedure: Left Heart Cath;  Surgeon: Tucker Gonzalez MD;  Location: [email or web address] CATH INVASIVE LOCATION;  Service: Cardiovascular    CARDIAC CATHETERIZATION  1/18/2019    CATARACT EXTRACTION  04/2019    CHOLECYSTECTOMY  09/22/2021    COLONOSCOPY  07/15/2020    CYBERKNIFE  05/08/2023    Recurrent liver mass/involved LNs    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    ENDOSCOPY N/A 12/13/2024    Procedure: ESOPHAGOGASTRODUODENOSCOPY;  Surgeon: Mihir Encinas MD;  Location: [email or web address] ENDOSCOPY;  Service: Gastroenterology;  Laterality: N/A;    HYSTERECTOMY  1984    age 46 - ovarian cyst, endometriosis,  jorge/bso    LIVER SURGERY Right 09/22/2021    Removed    ROTATOR CUFF REPAIR Left 1989    Collar Bone Repair    VENOUS ACCESS DEVICE (PORT) INSERTION Right 05/10/2024       Family History: family history includes Arrhythmia in her  brother; Arthritis in her mother; Breast cancer in her paternal aunt; Dementia in her mother; Hearing loss in her father; Heart attack in her mother; Heart disease in her father; Heart failure in her mother; Stroke in her father and mother.     Social History:  reports that she has never smoked. She has never been exposed to tobacco smoke. She has never used smokeless tobacco. She reports that she does not drink alcohol and does not use drugs.  Social History     Social History Narrative    5/18:     since 2013    2 kids:    Best [facility]    Tiana [place], [place] - medical POA    2 gk    Hobbies: volunteers [employer], involved in [employer]    Exercise: yes 2-5d/wk    Dental: utd    Eye: utd       Medications:  Available home medication information reviewed.  apixaban, dicyclomine, ipratropium, levothyroxine, lidocaine-prilocaine, linaclotide, methylphenidate, metoprolol tartrate, ondansetron, pantoprazole, and sennosides-docusate    Allergies   Allergen Reactions    Pravastatin Myalgia       Objective  Objective     Vital Signs:   Temp:  [98.2 °F (36.8 °C)-98.3 °F (36.8 °C)] 98.2 °F (36.8 °C)  Heart Rate:  [] 86  Resp:  [18] 18  BP: (100-120)/(49-59) 114/59       Physical Exam   Constitutional: Awake, alert, NAD, pleasant.  Eyes: PERRLA, sclerae anicteric, no conjunctival injection  HENT: NCAT, mucous membranes dry.  Neck: Supple, no thyromegaly, no lymphadenopathy, trachea midline  Respiratory: Clear to auscultation bilaterally, nonlabored respirations   Cardiovascular: RRR, no murmurs, rubs, or gallops, palpable pedal pulses bilaterally  Gastrointestinal: Positive bowel sounds, soft, nontender, nondistended  Musculoskeletal: No bilateral ankle edema, no clubbing or cyanosis to extremities  Psychiatric: Appropriate affect, cooperative  Neurologic: Oriented x 3, strength symmetric in all extremities, Cranial Nerves grossly intact to confrontation, speech clear  Skin: No rashes, normal  kaykendra.    Result Review:  I have personally reviewed the results from the time of this admission to 2/10/2025 00:40 EST and agree with these findings:  [x]  Laboratory list / accordion  []  Microbiology  [x]  Radiology  [x]  EKG/Telemetry   []  Cardiology/Vascular   []  Pathology  [x]  Old records  []  Other:  Most notable findings include: I reviewed chest x-ray which is a single AP view and by my read shows nothing acute.  I reviewed EKG which by my read shows sinus rhythm, ventricular just under 100 bpm, normal axis, no acute appearing ST/T wave changes.      LAB RESULTS:      Lab 02/09/25 2113 02/04/25  0838   WBC 6.27 5.26   HEMOGLOBIN 7.3* 8.1*   HEMATOCRIT 21.3* 23.8*   PLATELETS 107* 106*   NEUTROS ABS 4.44 3.82   IMMATURE GRANS (ABS) 0.03 0.01   LYMPHS ABS 0.70 0.62*   MONOS ABS 1.06* 0.77   EOS ABS 0.02 0.01   MCV 96.8 96.7         Lab 02/09/25 2113   SODIUM 135*   POTASSIUM 3.9   CHLORIDE 99   CO2 22.0   ANION GAP 14.0   BUN 14   CREATININE 1.16*   EGFR 46.3*   GLUCOSE 129*   CALCIUM 8.7   MAGNESIUM 2.2   TSH 2.790         Lab 02/09/25 2113   TOTAL PROTEIN 5.7*   ALBUMIN 3.2*   GLOBULIN 2.5   ALT (SGPT) 13   AST (SGOT) 22   BILIRUBIN 1.2   ALK PHOS 376*         Lab 02/09/25  2317 02/09/25 2113   HSTROP T 29* 33*             Lab 02/09/25 2207   ABO TYPING O   RH TYPING Positive   ANTIBODY SCREEN Negative         UA          9/28/2024    01:41 2/9/2025    22:48   Urinalysis   Squamous Epithelial Cells, UA 3-6  0-2    Specific Gravity, UA 1.018  1.006    Ketones, UA Negative  Negative    Blood, UA Negative  Small (1+)    Leukocytes, UA Small (1+)  Large (3+)    Nitrite, UA Negative  Negative    RBC, UA 0-2  3-5    WBC, UA 11-20  Too Numerous to Count    Bacteria, UA None Seen  1+        Microbiology Results (last 10 days)       Procedure Component Value - Date/Time    COVID PRE-OP / PRE-PROCEDURE SCREENING ORDER (NO ISOLATION) - Swab, Nasopharynx [[id number]]  (Normal) Collected: 02/09/25 5776    Lab  Status: Final result Specimen: Swab from Nasopharynx Updated: 02/09/25 2302    Narrative:      The following orders were created for panel order COVID PRE-OP / PRE-PROCEDURE SCREENING ORDER (NO ISOLATION) - Swab, Nasopharynx.  Procedure                               Abnormality         Status                     ---------                               -----------         ------                     COVID-19 and FLU A/B PCR...[[id number]]  Normal              Final result                 Please view results for these tests on the individual orders.    COVID-19 and FLU A/B PCR, 1 HR TAT - Swab, Nasopharynx [[id number]]  (Normal) Collected: 02/09/25 2206    Lab Status: Final result Specimen: Swab from Nasopharynx Updated: 02/09/25 2302     COVID19 Not Detected     Influenza A PCR Not Detected     Influenza B PCR Not Detected    Narrative:      Fact sheet for providers: https://www.fda.gov/media/880689/download    Fact sheet for patients: https://www.fda.gov/media/122648/download    Test performed by PCR.            XR Chest 1 View    Result Date: 2/9/2025  XR CHEST 1 VW Date of Exam: 2/9/2025 9:37 PM EST Indication: short of breath Comparison: 12/11/2024. Findings: There is a right internal jugular Mediport with the tip in the distal SVC. There are no airspace consolidations. No pleural fluid. No pneumothorax. The pulmonary vasculature appears within normal limits. The cardiac and mediastinal silhouette appear unremarkable. No acute osseous abnormality identified.     Impression: Impression: No acute cardiopulmonary process. Electronically Signed: Mary Jo Johnson MD  2/9/2025 10:43 PM EST  Workstation ID: [id number]     Results for orders placed during the hospital encounter of 11/08/24    Adult Transthoracic Echo Complete W/ Cont if Necessary Per Protocol    Interpretation Summary    Left ventricular ejection fraction appears to be 51 - 55%.    Left ventricular diastolic function was indeterminate.    Mild mitral valve  regurgitation is present.    The aortic valve exhibits sclerosis.    Trace tricuspid valve regurgitation is present. Estimated right ventricular systolic pressure from tricuspid regurgitation is normal (<35 mmHg).    There is no evidence of pericardial effusion.      Assessment & Plan  Assessment & Plan       Symptomatic anemia      84F with symptomatic anemia    Symptomatic anemia  - Likely secondary to chemotherapy she receives for lymphoma.  - To receive 1 unit PRBCs, ordered by the ED.  - Will check follow-up H&H after transfusion.    Lymphoma  - Courtesy consult to her oncologist.  - She states she has an appointment on Monday 2/10 and next chemotherapy treatment due on Tuesday 2/11.  - Pain control with oral agents, IV if needed.  - Nausea control with as needed IV Zofran.  - Continue Bentyl from home regimen for abdominal cramping, can use pain meds as above if needed.    Atrial fibrillation  - Can continue Eliquis for now.  - Continue metoprolol from home regimen.  - Continue telemetry.    Hyperlipidemia  - She states she has not taken her statin for a long time, she actually has an allergy to pravastatin listed.    Hypothyroidism  - Continue Synthroid from home regimen.    History of peptic ulcer disease  - No current C/O hematemesis.  - Continue home PPI.    Sleep apnea  - She states she was prescribed a CPAP but does not use it.    History of cholangiocarcinoma  - She is followed by oncology service here.  - Receives chemotherapy for lymphoma, she is s/p partial hepatectomy.    Anxiety  - She states that she does not take any anxiolytics at this time.        Total time spent: 79 minutes  Time spent includes time reviewing chart, face-to-face time, counseling patient/family/caregiver, ordering medications/tests/procedures, communicating with other health care professionals, documenting clinical information in the electronic health record, and coordination of care.     VTE Prophylaxis:  Mechanical VTE  prophylaxis orders are present.          CODE STATUS:  full  Code Status and Medical Interventions: CPR (Attempt to Resuscitate); Full Support   Ordered at: 02/10/25 0040     Code Status (Patient has no pulse and is not breathing):    CPR (Attempt to Resuscitate)     Medical Interventions (Patient has pulse or is breathing):    Full Support       Expected Discharge   tbd      Rj GaviriaIII, DO  02/10/25     Electronically signed by Rj Gaviria III, DO at 02/10/25 0104          Emergency Department Notes        Ruthie Ram, RN at 02/10/25 0713           Sheree Hernandez    Nursing Report ED to Floor:  Mental status: A&OX4  Ambulatory status: SB  Oxygen Therapy:  RA  Cardiac Rhythm: NSR  Admitted from: HOME  Safety Concerns:  FALL RISK  Precautions: NA  Social Issues: CANCER PT   ED Room #:  06    ED Nurse Phone Extension - [phone] or may call .      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    Osteopenia 2018    PAF (paroxysmal atrial fibrillation) 07/15/2024    Plantar fasciitis     Chronic    PVC's (premature ventricular contractions)     Senile keratosis     Multiple    MANAN (stress urinary incontinence, female)     Syncope, near     with negative cardiovascular workup        Past Surgical History:  Past Surgical History:  "  Procedure Laterality Date    ADRENAL GLAND SURGERY  09/22/2021    ADRENALECTOMY  09/22/2021    CARDIAC CATHETERIZATION N/A 01/18/2019    Procedure: Left Heart Cath;  Surgeon: Tucker Gonzalez MD;  Location:  [facility] CATH INVASIVE LOCATION;  Service: Cardiovascular    CARDIAC CATHETERIZATION  1/18/2019    CATARACT EXTRACTION  04/2019    CHOLECYSTECTOMY  09/22/2021    COLONOSCOPY  07/15/2020    CYBERKNIFE  05/08/2023    Recurrent liver mass/involved LNs    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    ENDOSCOPY N/A 12/13/2024    Procedure: ESOPHAGOGASTRODUODENOSCOPY;  Surgeon: Mihir Encinas MD;  Location:  [facility]OS[facility];  Service: Gastroenterology;  Laterality: N/A;    HYSTERECTOMY  1984    age 46 - ovarian cyst, endometriosis,  jorge/bso    LIVER SURGERY Right 09/22/2021    Removed    ROTATOR CUFF REPAIR Left 1989    Collar Bone Repair    VENOUS ACCESS DEVICE (PORT) INSERTION Right 05/10/2024        Admitting Doctor:   No admitting provider for patient encounter.    Consulting Provider(s):  Consults       No orders found from 1/11/2025 to 2/10/2025.             Admitting Diagnosis:   The primary encounter diagnosis was Severe anemia. Diagnoses of Symptomatic anemia and Lymphoma, unspecified body region, unspecified lymphoma type were also pertinent to this visit.    Most Recent Vitals:   Vitals:    02/09/25 2106 02/09/25 2117 02/09/25 2231   BP: 120/58 100/49 105/49   BP Location: Right arm     Patient Position: Lying     Pulse: 101 92 82   Resp: 18     Temp: 98.3 °F (36.8 °C)     TempSrc: Oral     SpO2: 95% 97% 99%   Weight: 59 kg (130 lb)     Height: 162.6 cm (64\")         Active LDAs/IV Access:   Lines, Drains & Airways       Active LDAs       Name Placement date Placement time Site Days    Peripheral IV 02/09/25 2119 Anterior;Right Forearm 02/09/25 2119  Forearm  less than 1                    Labs (abnormal labs have a star):   Labs Reviewed   COMPREHENSIVE METABOLIC PANEL - Abnormal; Notable for the " following components:       Result Value    Glucose 129 (*)     Creatinine 1.16 (*)     Sodium 135 (*)     Total Protein 5.7 (*)     Albumin 3.2 (*)     Alkaline Phosphatase 376 (*)     eGFR 46.3 (*)     All other components within normal limits    Narrative:     GFR Categories in Chronic Kidney Disease (CKD)      GFR Category          GFR (mL/min/1.73)    Interpretation  G1                     90 or greater         Normal or high (1)  G2                      60-89                Mild decrease (1)  G3a                   45-59                Mild to moderate decrease  G3b                   30-44                Moderate to severe decrease  G4                    15-29                Severe decrease  G5                    14 or less           Kidney failure          (1)In the absence of evidence of kidney disease, neither GFR category G1 or G2 fulfill the criteria for CKD.    eGFR calculation 2021 CKD-EPI creatinine equation, which does not include race as a factor   URINALYSIS W/ MICROSCOPIC IF INDICATED (NO CULTURE) - Abnormal; Notable for the following components:    Appearance, UA Turbid (*)     Blood, UA Small (1+) (*)     Protein, UA 30 mg/dL (1+) (*)     Leuk Esterase, UA Large (3+) (*)     All other components within normal limits   TROPONIN - Abnormal; Notable for the following components:    HS Troponin T 33 (*)     All other components within normal limits    Narrative:     High Sensitive Troponin T Reference Range:  <14.0 ng/L- Negative Female for AMI  <22.0 ng/L- Negative Male for AMI  >=14 - Abnormal Female indicating possible myocardial injury.  >=22 - Abnormal Male indicating possible myocardial injury.   Clinicians would have to utilize clinical acumen, EKG, Troponin, and serial changes to determine if it is an Acute Myocardial Infarction or myocardial injury due to an underlying chronic condition.        CBC WITH AUTO DIFFERENTIAL - Abnormal; Notable for the following components:    RBC 2.20 (*)      Hemoglobin 7.3 (*)     Hematocrit 21.3 (*)     MCH 33.2 (*)     RDW 16.9 (*)     RDW-SD 59.6 (*)     MPV 14.2 (*)     Platelets 107 (*)     Lymphocyte % 11.2 (*)     Monocyte % 16.9 (*)     Monocytes, Absolute 1.06 (*)     All other components within normal limits   COVID-19 AND FLU A/B, NP SWAB IN TRANSPORT MEDIA 1 HR TAT - Normal    Narrative:     Fact sheet for providers: https://www.fda.gov/media/807686/download    Fact sheet for patients: https://www.fda.gov/media/201441/download    Test performed by PCR.   MAGNESIUM - Normal   TSH - Normal   T4, FREE - Normal   POCT OCCULT BLOOD STOOL (ED ONLY) - Normal   COVID PRE-OP / PRE-PROCEDURE SCREENING ORDER (NO ISOLATION)    Narrative:     The following orders were created for panel order COVID PRE-OP / PRE-PROCEDURE SCREENING ORDER (NO ISOLATION) - Swab, Nasopharynx.  Procedure                               Abnormality         Status                     ---------                               -----------         ------                     COVID-19 and FLU A/B PCR...[[id number]]  Normal              Final result                 Please view results for these tests on the individual orders.   RAINBOW DRAW    Narrative:     The following orders were created for panel order Chemult Draw.  Procedure                               Abnormality         Status                     ---------                               -----------         ------                     Green Top (Gel)[[id number]]                                  Final result               Lavender Top[[id number]]                                     Final result               Gold Top - SST[[id number]]                                   Final result               Gray Top[[id number]]                                         Final result               Light Blue Top[[id number]]                                   Final result                 Please view results for these tests on the individual orders.   HIGH SENSITIVITIY  TROPONIN T 1HR   URINALYSIS, MICROSCOPIC ONLY   TYPE AND SCREEN   PREPARE RBC   GREEN TOP   LAVENDER TOP   GOLD TOP - SST   GRAY TOP   LIGHT BLUE TOP   CBC AND DIFFERENTIAL    Narrative:     The following orders were created for panel order CBC & Differential.  Procedure                               Abnormality         Status                     ---------                               -----------         ------                     CBC Auto Differential[[id number]]        Abnormal            Final result                 Please view results for these tests on the individual orders.       Meds Given in ED:   Medications   sodium chloride 0.9 % flush 10 mL (has no administration in time range)   sodium chloride 0.9 % flush 10 mL (has no administration in time range)   sodium chloride 0.9 % bolus 1,000 mL (0 mL Intravenous Stopped 2/9/25 2312)           Last NIH score:                                                          Dysphagia screening results:  Patient Factors Component (Dysphagia:Stroke or Rule-out)  Best Eye Response: 4-->(E4) spontaneous (02/09/25 2109)  Best Motor Response: 6-->(M6) obeys commands (02/09/25 2109)  Best Verbal Response: 5-->(V5) oriented (02/09/25 2109)  [place] Coma Scale Score: 15 (02/09/25 2109)     [place]isha Coma Scale:  No data recorded     CIWA:        Restraint Type:            Isolation Status:  No active isolations          Electronically signed by Ruthie Ram RN at 02/10/25 0713       Kaushal Eden MD at 02/09/25 2105        Procedure Orders    1. Critical Care [[id number]] ordered by Kaushal Eden MD                   [place]    EMERGENCY DEPARTMENT ENCOUNTER      Pt Name: Sheree Hernandez  MRN: [id number]  YOB: 1940  Date of evaluation: 2/9/2025  Provider: Kaushal Eden MD    CHIEF COMPLAINT       Chief Complaint   Patient presents with    Fatigue         HISTORY OF PRESENT ILLNESS   Sheree Hernandez is a 84 y.o. female who presents to the emergency department  for evaluation of labile blood pressures.  She states that the highest systolic blood pressures he has had at home has been 120 and the lowest it has been has been around 80.  She discussed this with her cardiologist and they stopped her metoprolol. She is currently being treated for liver cancer and lymphoma.  She has been trying to drink a lot of fluids to stay well-hydrated and increase the salt in her diet.    She has also been suffering from a lot of fatigue over a period of weeks to months.  She is worried about low blood counts due to a history of low blood counts and needing a blood transfusion in the past.  Her most recent chemotherapy was 5 days ago    REVIEW OF SYSTEMS     ROS:  A chief complaint appropriate review of systems was completed and is negative except as noted in the HPI.      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    Osteopenia 2018    PAF (paroxysmal atrial fibrillation) 07/15/2024    Plantar fasciitis     Chronic    PVC's (premature ventricular contractions)     Senile keratosis     Multiple    MANAN (stress urinary incontinence, female)     Symptomatic anemia 2/9/2025    Syncope, near     with negative cardiovascular workup         SURGICAL HISTORY       Past Surgical History:   Procedure Laterality Date    ADRENAL GLAND SURGERY  09/22/2021    ADRENALECTOMY  09/22/2021    CARDIAC  CATHETERIZATION N/A 01/18/2019    Procedure: Left Heart Cath;  Surgeon: Tucker Gonzalez MD;  Location:  [facility];  Service: Cardiovascular    CARDIAC CATHETERIZATION  1/18/2019    CATARACT EXTRACTION  04/2019    CHOLECYSTECTOMY  09/22/2021    COLONOSCOPY  07/15/2020    CYBERKNIFE  05/08/2023    Recurrent liver mass/involved LNs    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    ENDOSCOPY N/A 12/13/2024    Procedure: ESOPHAGOGASTRODUODENOSCOPY;  Surgeon: Mihir Encinas MD;  Location:  [facility];  Service: Gastroenterology;  Laterality: N/A;    HYSTERECTOMY  1984    age 46 - ovarian cyst, endometriosis,  jorge/bso    LIVER SURGERY Right 09/22/2021    Removed    ROTATOR CUFF REPAIR Left 1989    Collar Bone Repair    VENOUS ACCESS DEVICE (PORT) INSERTION Right 05/10/2024         CURRENT MEDICATIONS       Current Facility-Administered Medications:     acetaminophen (TYLENOL) tablet 650 mg, 650 mg, Oral, Q4H PRN **OR** acetaminophen (TYLENOL) 160 MG/5ML oral solution 650 mg, 650 mg, Oral, Q4H PRN **OR** acetaminophen (TYLENOL) suppository 650 mg, 650 mg, Rectal, Q4H PRN, Rj Gaviria III, DO    Calcium Replacement - Follow Nurse / BPA Driven Protocol, , Not Applicable, PRN, Rj Gaviria III, DO    HYDROcodone-acetaminophen (NORCO) 5-325 MG per tablet 1 tablet, 1 tablet, Oral, Q6H PRN, Rj Gaviria III, DO    Magnesium Standard Dose Replacement - Follow Nurse / BPA Driven Protocol, , Not Applicable, PRN, Rj Gaviria III, DO    melatonin tablet 5 mg, 5 mg, Oral, Nightly PRN, Rj Gaviria III, DO    morphine injection 2 mg, 2 mg, Intravenous, Q4H PRN **AND** naloxone (NARCAN) injection 0.4 mg, 0.4 mg, Intravenous, Q5 Min PRN, Rj Gaviria III, DO    nitroglycerin (NITROSTAT) SL tablet 0.4 mg, 0.4 mg, Sublingual, Q5 Min PRN, Rj Gaviria III, DO    ondansetron (ZOFRAN) injection 4 mg, 4 mg, Intravenous, Q6H PRN, Everette  Rj Monahan III,     Phosphorus Replacement - Follow Nurse / BPA Driven Protocol, , Not Applicable, PRN, Rj Gaviria III, DO    Potassium Replacement - Follow Nurse / BPA Driven Protocol, , Not Applicable, PREverette PECK Arlie Campbell III, DO    [COMPLETED] Insert Peripheral IV, , , Once **AND** sodium chloride 0.9 % flush 10 mL, 10 mL, Intravenous, PRN, Kaushal Eden MD    [COMPLETED] Insert Peripheral IV, , , Once **AND** sodium chloride 0.9 % flush 10 mL, 10 mL, Intravenous, PRN, Kaushal Eden MD    sodium chloride 0.9 % flush 10 mL, 10 mL, Intravenous, Q12H, Rj Gaviria III, DO    sodium chloride 0.9 % flush 10 mL, 10 mL, Intravenous, PRN, Rj Gaviria III,     sodium chloride 0.9 % infusion 40 mL, 40 mL, Intravenous, PRN, Rj Gaviria III, DO    sodium chloride 0.9 % infusion, 100 mL/hr, Intravenous, Continuous, Rj Gaviria III, DO    Current Outpatient Medications:     apixaban (ELIQUIS) 2.5 MG tablet tablet, Take 1 tablet by mouth 2 (Two) Times a Day. Friday  1-10-25, Disp: , Rfl:     dicyclomine (BENTYL) 20 MG tablet, Take 1 tablet by mouth Every 4 (Four) Hours As Needed for Abdominal Cramping, Disp: 30 tablet, Rfl: 2    ipratropium (ATROVENT) 0.06 % nasal spray, Administer 1 spray into each nostril twice daily, Disp: 30 mL, Rfl: 3    levothyroxine (SYNTHROID, LEVOTHROID) 25 MCG tablet, Take 1 tablet by mouth Every Morning., Disp: 30 tablet, Rfl: 3    lidocaine-prilocaine (EMLA) 2.5-2.5 % cream, Please apply to port site 30 min prior to infusion and cover with Saran Wrap, Disp: 30 g, Rfl: 3    linaclotide (Linzess) 72 MCG capsule capsule, Take 1 capsule by mouth Every Morning Before Breakfast., Disp: 30 capsule, Rfl: 0    methylphenidate (Ritalin) 5 MG tablet, Take 1 tablet by mouth 2 (Two) Times a Day., Disp: 60 tablet, Rfl: 0    metoprolol tartrate (LOPRESSOR) 25 MG tablet, Take 0.5 tablets by mouth 2 (Two) Times a Day., Disp: , Rfl:      ondansetron (ZOFRAN) 8 MG tablet, Take 1 tablet by mouth 3 (Three) Times a Day As Needed for Nausea or Vomiting., Disp: 30 tablet, Rfl: 3    pantoprazole (PROTONIX) 40 MG EC tablet, Take 1 tablet by mouth 2 (Two) Times a Day Before Meals., Disp: 180 tablet, Rfl: 3    sennosides-docusate (Stimulant Laxative) 8.6-50 MG per tablet, Take 1 tablet by mouth 2 (Two) Times a Day As Needed for Constipation., Disp: 60 tablet, Rfl: 0    ALLERGIES     Pravastatin    FAMILY HISTORY       Family History   Problem Relation Age of Onset    Heart attack Mother     Heart failure Mother     Dementia Mother     Stroke Mother     Arthritis Mother         Heart Attack    Heart disease Father     Hearing loss Father     Stroke Father     Arrhythmia Brother     Breast cancer Paternal Aunt     Colon cancer Neg Hx           SOCIAL HISTORY       Social History     Socioeconomic History    Marital status:     Number of children: 2   Tobacco Use    Smoking status: Never     Passive exposure: Never    Smokeless tobacco: Never    Tobacco comments:     Exposed to secondhand smoke   Vaping Use    Vaping status: Never Used   Substance and Sexual Activity    Alcohol use: Never     Comment: Very seldom    Drug use: Never    Sexual activity: Not Currently     Partners: Male     Birth control/protection: None, Hysterectomy     Comment: Complete Hysterectomy         PHYSICAL EXAM    (up to 7 for level 4, 8 or more for level 5)     Vitals:    02/10/25 0217 02/10/25 0222 02/10/25 0227 02/10/25 0232   BP:   125/87    Pulse: 86 93 89 89   Resp:       Temp:       TempSrc:       SpO2: 98% 97% 100% 98%   Weight:       Height:           Physical Exam  Constitutional:       General: She is not in acute distress.  HENT:      Head: Normocephalic and atraumatic.      Mouth/Throat:      Mouth: Mucous membranes are moist.   Eyes:      Conjunctiva/sclera: Conjunctivae normal.      Pupils: Pupils are equal, round, and reactive to light.   Cardiovascular:       Rate and Rhythm: Normal rate and regular rhythm.      Pulses: Normal pulses.   Pulmonary:      Effort: Pulmonary effort is normal. No respiratory distress.   Abdominal:      General: Abdomen is flat. There is no distension.      Tenderness: There is no abdominal tenderness.   Musculoskeletal:         General: No swelling or deformity. Normal range of motion.   Skin:     General: Skin is warm and dry.      Capillary Refill: Capillary refill takes less than 2 seconds.      Coloration: Skin is pale.   Neurological:      General: No focal deficit present.      Mental Status: She is alert and oriented to person, place, and time.   Psychiatric:         Mood and Affect: Mood normal.         Behavior: Behavior normal.            DIAGNOSTIC RESULTS     EKG: All EKGs are interpreted by the Emergency Department Physician who either signs or Co-signs this chart in the absence of a cardiologist.    ECG 12 Lead Dyspnea   Preliminary Result   Test Reason : Dyspnea   Blood Pressure :   */*   mmHG   Vent. Rate :  93 BPM     Atrial Rate :  93 BPM      P-R Int : 140 ms          QRS Dur :  80 ms       QT Int : 380 ms       P-R-T Axes :  56  39  54 degrees     QTcB Int : 472 ms      Normal sinus rhythm   Normal ECG   When compared with ECG of 11-Dec-2024 11:51,   Vent. rate has increased by  37 bpm      Referred By: edmd           Confirmed By:             RADIOLOGY:   [x] Radiologist's Report Reviewed:  XR Chest 1 View   Final Result   Impression:   No acute cardiopulmonary process.               Electronically Signed: Mary Jo Johnson MD     2/9/2025 10:43 PM EST     Workstation ID: [id number]          I ordered and independently reviewed the above noted radiographic studies.        LABS:  I independently interpreted all laboratory studies conducted during this ED visit.  The results of these studies can be seen below and my independent interpretation in the ED course      EMERGENCY DEPARTMENT COURSE and DIFFERENTIAL DIAGNOSIS/MDM:    Vitals:  AS OF 02:48 EST    BP - 125/87  HR - 89  TEMP - 98.2 °F (36.8 °C)  O2 SATS - 98%        Discussion below represents my analysis of pertinent findings related to patient's condition, differential diagnosis, treatment plan and final disposition.      Differential diagnosis:  The differential diagnosis associated with the patient's presentation includes: Symptomatic anemia, sequela of her known cancer, medication side effect from her chemotherapy, electrolyte derangement or renal dysfunction      Independent interpretations (ECG/rhythm strip/X-ray/US/CT scan): See ED course      Additional sources:  Discussed/obtained information from independent historians:   [] Spouse:   [] Parent:   [x] Friend: Patient's friend at the bedside provides some details of HPI   [] EMS:   [] Other:    External record review:  1/28/2025 reviewed most recent outpatient provider note, patient seen in oncology clinic for intrahepatic cholangiocarcinoma documents her history of recent fatigue worsening over a period of weeks to months.  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  Patient's fatigue is felt to be likely secondary to her lymphoma, this is at least the opinion of her oncologist.      Patient's care impacted by:   [] Diabetes   [] Hypertension   [] Coronary Artery Disease   [x] Cancer   [] Other:     Care significantly affected by Social Determinants of Health (housing and economic circumstances, unemployment)    [] Yes     [x] No   If yes, Patient's care significantly limited by  Social Determinants of Health including:    [] Inadequate housing    [] Low income    [] Alcoholism and drug addiction in family    [] Problems related to primary support group    [] Unemployment    [] Problems related to employment    [] Other Social Determinants of Health:     I considered prescription management with:    [] Pain medication:   [] Antiviral:   [] Antibiotic:   [] Other:    Additional orders considered but  not ordered:  The following testing was considered but ultimately not selected: N/A    ED Course:    ED Course as of 02/10/25 0248   Sun Feb 09, 2025 2134 Twelve-lead ECG independently interpreted by myself demonstrates normal sinus rhythm, no ST segment elevation or depression.  Normal axis. [KB]   Mon Feb 10, 2025   0247 Laboratory workup independently interpreted by myself demonstrates severe anemia, downtrending compared with recent labs [KB]      ED Course User Index  [KB] Kaushal Eden MD         Diagnostic lab and imaging studies were conducted.    With identification of a severe anemia patient was started on packed red blood cell transfusion, 1 unit ordered in the emergency department.  Patient does not have any symptoms of acute bleeding or blood loss, her Hemoccult stool is negative and her stool is brown and so I do not have concern for GI bleeding.  I believe it is low because of her lymphoma and chemotherapy treatments.    Patient does have significant pyuria and a small degree of bacteriuria she does not have any symptoms of a urinary tract infection and will hold antibiotics at this time.    Will be admitted for further treatment          PROCEDURES:  Critical Care    Performed by: Kaushal Eden MD  Authorized by: Kaushal Eden MD    Critical care provider statement:     Critical care time (minutes):  35    Critical care time was exclusive of:  Separately billable procedures and treating other patients    Critical care was necessary to treat or prevent imminent or life-threatening deterioration of the following conditions: Severe anemia requiring blood transfusion.    Critical care was time spent personally by me on the following activities:  Development of treatment plan with patient or surrogate, discussions with consultants, evaluation of patient's response to treatment, examination of patient, obtaining history from patient or surrogate, ordering and performing treatments and interventions,  ordering and review of laboratory studies, ordering and review of radiographic studies, pulse oximetry, re-evaluation of patient's condition and review of old charts    I assumed direction of critical care for this patient from another provider in my specialty: no      Care discussed with: admitting provider        CRITICAL CARE TIME    35    CONSULTS   Hospital medicine consulted for admission    FINAL IMPRESSION      1. Severe anemia    2. Symptomatic anemia    3. Lymphoma, unspecified body region, unspecified lymphoma type          DISPOSITION/PLAN     ED Disposition       ED Disposition   Decision to Admit    Condition   --    Comment   Level of Care: Telemetry [5]   Diagnosis: Symptomatic anemia [[id number]]   Admitting Physician: JUAN DAVID LOPEZ III [[id number]]   Attending Physician: JUAN DAVID LOPEZ III [[id number]]   Is patient appropriate for Inpatient Observation Unit?: No [0]   Bed Request Comments: tele obs not cdu                   Comment: Please note this report has been produced using speech recognition software.      Kaushal Eden MD  Attending Emergency Physician    Recent Results (from the past 24 hours)   Green Top (Gel)    Collection Time: 02/09/25  9:13 PM   Result Value Ref Range    Extra Tube Hold for add-ons.    Lavender Top    Collection Time: 02/09/25  9:13 PM   Result Value Ref Range    Extra Tube hold for add-on    Gold Top - SST    Collection Time: 02/09/25  9:13 PM   Result Value Ref Range    Extra Tube Hold for add-ons.    Gray Top    Collection Time: 02/09/25  9:13 PM   Result Value Ref Range    Extra Tube Hold for add-ons.    Light Blue Top    Collection Time: 02/09/25  9:13 PM   Result Value Ref Range    Extra Tube Hold for add-ons.    Comprehensive Metabolic Panel    Collection Time: 02/09/25  9:13 PM    Specimen: Blood   Result Value Ref Range    Glucose 129 (H) 65 - 99 mg/dL    BUN 14 8 - 23 mg/dL    Creatinine 1.16 (H) 0.57 - 1.00 mg/dL    Sodium 135 (L) 136 - 145 mmol/L     Potassium 3.9 3.5 - 5.2 mmol/L    Chloride 99 98 - 107 mmol/L    CO2 22.0 22.0 - 29.0 mmol/L    Calcium 8.7 8.6 - 10.5 mg/dL    Total Protein 5.7 (L) 6.0 - 8.5 g/dL    Albumin 3.2 (L) 3.5 - 5.2 g/dL    ALT (SGPT) 13 1 - 33 U/L    AST (SGOT) 22 1 - 32 U/L    Alkaline Phosphatase 376 (H) 39 - 117 U/L    Total Bilirubin 1.2 0.0 - 1.2 mg/dL    Globulin 2.5 gm/dL    A/G Ratio 1.3 g/dL    BUN/Creatinine Ratio 12.1 7.0 - 25.0    Anion Gap 14.0 5.0 - 15.0 mmol/L    eGFR 46.3 (L) >60.0 mL/min/1.73   High Sensitivity Troponin T    Collection Time: 02/09/25  9:13 PM    Specimen: Blood   Result Value Ref Range    HS Troponin T 33 (H) <14 ng/L   Magnesium    Collection Time: 02/09/25  9:13 PM    Specimen: Blood   Result Value Ref Range    Magnesium 2.2 1.6 - 2.4 mg/dL   TSH    Collection Time: 02/09/25  9:13 PM    Specimen: Blood   Result Value Ref Range    TSH 2.790 0.270 - 4.200 uIU/mL   T4, Free    Collection Time: 02/09/25  9:13 PM    Specimen: Blood   Result Value Ref Range    Free T4 1.62 0.92 - 1.68 ng/dL   CBC Auto Differential    Collection Time: 02/09/25  9:13 PM    Specimen: Blood   Result Value Ref Range    WBC 6.27 3.40 - 10.80 10*3/mm3    RBC 2.20 (L) 3.77 - 5.28 10*6/mm3    Hemoglobin 7.3 (L) 12.0 - 15.9 g/dL    Hematocrit 21.3 (L) 34.0 - 46.6 %    MCV 96.8 79.0 - 97.0 fL    MCH 33.2 (H) 26.6 - 33.0 pg    MCHC 34.3 31.5 - 35.7 g/dL    RDW 16.9 (H) 12.3 - 15.4 %    RDW-SD 59.6 (H) 37.0 - 54.0 fl    MPV 14.2 (H) 6.0 - 12.0 fL    Platelets 107 (L) 140 - 450 10*3/mm3    Neutrophil % 70.8 42.7 - 76.0 %    Lymphocyte % 11.2 (L) 19.6 - 45.3 %    Monocyte % 16.9 (H) 5.0 - 12.0 %    Eosinophil % 0.3 0.3 - 6.2 %    Basophil % 0.3 0.0 - 1.5 %    Immature Grans % 0.5 0.0 - 0.5 %    Neutrophils, Absolute 4.44 1.70 - 7.00 10*3/mm3    Lymphocytes, Absolute 0.70 0.70 - 3.10 10*3/mm3    Monocytes, Absolute 1.06 (H) 0.10 - 0.90 10*3/mm3    Eosinophils, Absolute 0.02 0.00 - 0.40 10*3/mm3    Basophils, Absolute 0.02 0.00 - 0.20  10*3/mm3    Immature Grans, Absolute 0.03 0.00 - 0.05 10*3/mm3    nRBC 0.0 0.0 - 0.2 /100 WBC   ECG 12 Lead Dyspnea    Collection Time: 02/09/25  9:17 PM   Result Value Ref Range    QT Interval 380 ms    QTC Interval 472 ms   COVID-19 and FLU A/B PCR, 1 HR TAT - Swab, Nasopharynx    Collection Time: 02/09/25 10:06 PM    Specimen: Nasopharynx; Swab   Result Value Ref Range    COVID19 Not Detected Not Detected - Ref. Range    Influenza A PCR Not Detected Not Detected    Influenza B PCR Not Detected Not Detected   Type & Screen    Collection Time: 02/09/25 10:07 PM    Specimen: Blood   Result Value Ref Range    ABO Type O     RH type Positive     Antibody Screen Negative     T&S Expiration Date 2/12/2025 11:59:59 PM    Urinalysis With Microscopic If Indicated (No Culture) - Urine, Clean Catch    Collection Time: 02/09/25 10:48 PM    Specimen: Urine, Clean Catch   Result Value Ref Range    Color, UA Yellow Yellow, Straw    Appearance, UA Turbid (A) Clear    pH, UA 6.5 5.0 - 8.0    Specific Gravity, UA 1.006 1.001 - 1.030    Glucose, UA Negative Negative    Ketones, UA Negative Negative    Bilirubin, UA Negative Negative    Blood, UA Small (1+) (A) Negative    Protein, UA 30 mg/dL (1+) (A) Negative    Leuk Esterase, UA Large (3+) (A) Negative    Nitrite, UA Negative Negative    Urobilinogen, UA 0.2 E.U./dL 0.2 - 1.0 E.U./dL   Urinalysis, Microscopic Only - Urine, Clean Catch    Collection Time: 02/09/25 10:48 PM    Specimen: Urine, Clean Catch   Result Value Ref Range    RBC, UA 3-5 (A) None Seen, 0-2 /HPF    WBC, UA Too Numerous to Count (A) None Seen, 0-2 /HPF    Bacteria, UA 1+ (A) None Seen, Trace /HPF    Squamous Epithelial Cells, UA 0-2 None Seen, 0-2 /HPF    Hyaline Casts, UA 0-6 0 - 6 /LPF    Methodology Manual Light Microscopy    POC Occult Blood Stool    Collection Time: 02/09/25 11:12 PM    Specimen: Stool   Result Value Ref Range    Fecal Occult Blood Negative     Lot Number [id number]     Expiration Date  04/01/2027     DEVELOPER LOT NUMBER [id number]     DEVELOPER EXPIRATION DATE 12/01/2027     Positive Control Positive     Negative Control Negative    High Sensitivity Troponin T 1Hr    Collection Time: 02/09/25 11:17 PM    Specimen: Blood   Result Value Ref Range    HS Troponin T 29 (H) <14 ng/L    Troponin T Numeric Delta -4 ng/L    Troponin T % Delta -12 Abnormal if >/= 20%   Prepare RBC, 1 Units    Collection Time: 02/10/25 12:23 AM   Result Value Ref Range    Product Code R5849O23     Unit Number [id number]     UNIT  ABO O     UNIT  RH POS     Crossmatch Interpretation Compatible     Dispense Status IS     Blood Expiration Date [id number]     Blood Type Barcode 5100      Note: In addition to lab results from this visit, the labs listed above may include labs taken at another facility or during a different encounter within the last 24 hours. Please correlate lab times with ED admission and discharge times for further clarification of the services performed during this visit.                 Kaushal Eden MD  02/10/25 0248      Electronically signed by Kaushal Eden MD at 02/10/25 0248       Vital Signs (last 2 days)       Date/Time Temp Temp src Pulse Resp BP Patient Position SpO2    02/12/25 0741 -- -- 88 -- 119/64 Lying 96    02/12/25 0725 98.1 (36.7) Oral 99 18 161/68 Lying 95    02/12/25 0424 98.6 (37) Oral 91 18 124/56 Lying 94    02/11/25 2017 98.4 (36.9) Oral 81 16 108/54 Lying 94    02/11/25 1557 98.6 (37) Oral 87 20 111/59 Lying 95    02/11/25 1124 98.2 (36.8) Oral -- 18 115/59 Sitting --    02/11/25 0826 98.8 (37.1) Oral 87 18 132/59 Lying 96    02/11/25 0500 98.2 (36.8) Oral -- -- -- -- --    02/11/25 0400 98.3 (36.8) Oral 72 18 123/49 Lying 97    02/10/25 2355 98.8 (37.1) Oral 73 18 103/52 Lying 93    02/10/25 2045 -- -- 94 -- -- -- 91    02/10/25 2040 -- -- 94 -- -- -- 93    02/10/25 2035 -- -- 97 -- -- -- 93    02/10/25 2030 -- -- 97 17 106/39 Lying 94    02/10/25 2025 -- -- 96 -- -- -- --     02/10/25 2020 -- -- 96 -- -- -- --    02/10/25 2015 -- -- 94 -- -- -- --    02/10/25 2010 -- -- 100 -- -- -- --    02/10/25 2006 98.3 (36.8) Oral -- -- -- -- --    02/10/25 2005 -- -- 91 -- -- -- --    02/10/25 2000 -- -- 92 -- -- -- --    02/10/25 1955 -- -- 92 -- -- -- --    02/10/25 1950 -- -- 92 -- -- -- --    02/10/25 1945 -- -- 95 -- -- -- --    02/10/25 1940 -- -- 96 -- -- -- --    02/10/25 1935 -- -- 101 -- -- -- --    02/10/25 1930 -- -- 118 -- -- -- 96    02/10/25 1925 -- -- 99 -- -- -- 95    02/10/25 1920 -- -- 95 -- -- -- 97    02/10/25 1915 -- -- 95 -- -- -- 95    02/10/25 1910 -- -- 99 -- -- -- 97    02/10/25 1905 -- -- 92 -- -- -- 92    02/10/25 1900 -- -- 91 -- -- -- 92    02/10/25 1855 -- -- 92 -- -- -- 92    02/10/25 1850 -- -- 96 -- -- -- 94    02/10/25 1845 -- -- 95 -- -- -- 93    02/10/25 1840 -- -- 95 -- -- -- 97    02/10/25 1835 -- -- 94 -- -- -- 96    02/10/25 1833 100.1 (37.8) Oral -- 20 139/66 Lying --    02/10/25 1830 -- -- 95 -- 150/59 -- 97    02/10/25 1825 -- -- 95 -- -- -- 96    02/10/25 1820 -- -- 94 -- -- -- 97    02/10/25 1815 -- -- 97 -- -- -- 95    02/10/25 1810 -- -- 95 -- -- -- 95    02/10/25 1805 -- -- 94 -- -- -- 98    02/10/25 1800 -- -- 94 -- -- -- 94    02/10/25 1755 -- -- 94 -- -- -- 97    02/10/25 1750 -- -- 94 -- -- -- 89    02/10/25 1745 -- -- 95 -- -- -- 97    02/10/25 1740 -- -- 94 -- -- -- 98    02/10/25 1735 -- -- 95 -- -- -- 97    02/10/25 1730 -- -- 91 -- -- -- 64    02/10/25 1725 -- -- 109 -- 83/67 -- --    02/10/25 1720 -- -- 90 -- -- -- --    02/10/25 1715 -- -- 86 -- -- -- --    02/10/25 1710 -- -- 86 -- -- -- --    02/10/25 1705 -- -- 89 -- -- -- --    02/10/25 1700 -- -- 84 -- -- -- --    02/10/25 1655 -- -- 85 -- -- -- --    02/10/25 1650 -- -- 87 -- -- -- 98    02/10/25 1645 -- -- 77 -- -- -- 97    02/10/25 1640 -- -- 77 -- -- -- 99    02/10/25 1635 -- -- 83 -- -- -- 100    02/10/25 1630 -- -- 76 -- -- -- 98    02/10/25 1625 -- -- 76 -- -- -- 98     02/10/25 1620 -- -- 76 -- -- -- 99    02/10/25 1615 98 (36.7) Oral 75 18 121/53 -- 100    02/10/25 1610 -- -- 71 -- -- -- 98    02/10/25 1605 -- -- 72 -- -- -- 97    02/10/25 1600 -- -- 77 -- 117/55 -- 100    02/10/25 1345 97.9 (36.6) Oral -- 18 103/49 -- --    02/10/25 1330 -- -- 79 -- -- -- 97    02/10/25 1300 98 (36.7) Oral -- 18 107/44 Lying --    02/10/25 1209 98 (36.7) Oral -- 20 94/47 Lying --    02/10/25 1042 -- -- 80 -- 94/44 -- 93    02/10/25 0940 -- -- 94 -- 94/44 -- 95    02/10/25 0806 -- -- -- -- 112/47 -- --    02/10/25 0752 100.5 (38.1) Oral 97 20 126/49 Lying 94    02/10/25 0629 -- -- 111 -- 125/63 -- 96    02/10/25 0559 -- -- 104 -- 130/56 -- 95    02/10/25 0529 -- -- 106 -- 141/62 -- 94    02/10/25 0459 -- -- 111 -- 145/63 -- 94    02/10/25 0430 -- -- 105 -- 151/74 -- 93    02/10/25 0333 -- -- 101 -- -- -- 96    02/10/25 0332 -- -- 104 -- -- -- 95    02/10/25 0331 -- -- 103 -- -- -- 94    02/10/25 0330 -- -- 101 -- -- -- 94    02/10/25 0329 -- -- 99 -- -- -- 96    02/10/25 0313 99.2 (37.3) Oral 105 20 148/93 Lying 96    02/10/25 0309 -- -- 95 -- 148/93 -- 94    02/10/25 0232 -- -- 89 -- -- -- 98    02/10/25 0229 -- -- 88 -- 131/60 -- 97    02/10/25 0227 -- -- 89 -- 125/87 -- 100    02/10/25 0222 -- -- 93 -- -- -- 97    02/10/25 0217 -- -- 86 -- -- -- 98    02/10/25 0212 -- -- 82 -- -- -- 91    02/10/25 0207 -- -- 84 -- -- -- 96    02/10/25 0202 -- -- 84 -- 123/82 -- 99    02/10/25 0157 -- -- 84 -- -- -- 97    02/10/25 0152 -- -- 82 -- -- -- 97    02/10/25 0147 -- -- 85 -- -- -- 99    02/10/25 0142 -- -- 96 -- -- -- 99    02/10/25 0137 -- -- 105 -- -- -- --    02/10/25 0132 -- -- 84 -- 121/61 -- 98    02/10/25 0127 -- -- 81 -- -- -- 99    02/10/25 0122 -- -- 80 -- -- -- 96    02/10/25 0117 -- -- 80 -- -- -- 98    02/10/25 0051 98.2 (36.8) -- 84 18 115/50 -- 98    02/10/25 0049 -- -- 84 -- 115/50 -- 98    02/10/25 0039 -- -- 87 -- 113/50 -- 95    02/10/25 0030 -- -- 81 -- 102/65 -- 99    02/10/25  0000 98.2 (36.8) -- 86 -- 114/59 -- 97          Oxygen Therapy (last 2 days)       Date/Time SpO2 Device (Oxygen Therapy) Flow (L/min) (Oxygen Therapy) Oxygen Concentration (%) ETCO2 (mmHg)    02/12/25 0741 96 room air -- -- --    02/12/25 0725 95 room air -- -- --    02/12/25 0600 -- room air -- -- --    02/12/25 0424 94 room air -- -- --    02/12/25 0200 -- room air -- -- --    02/12/25 0000 -- room air -- -- --    02/11/25 2200 -- room air -- -- --    02/11/25 2017 94 room air -- -- --    02/11/25 1557 95 room air -- -- --    02/11/25 0826 96 room air -- -- --    02/11/25 0400 97 -- -- -- --    02/10/25 2355 93 -- -- -- --    02/10/25 2045 91 -- -- -- --    02/10/25 2040 93 -- -- -- --    02/10/25 2035 93 -- -- -- --    02/10/25 2030 94 room air -- -- --    02/10/25 1930 96 -- -- -- --    02/10/25 1925 95 -- -- -- --    02/10/25 1920 97 -- -- -- --    02/10/25 1915 95 -- -- -- --    02/10/25 1910 97 -- -- -- --    02/10/25 1905 92 -- -- -- --    02/10/25 1900 92 -- -- -- --    02/10/25 1855 92 -- -- -- --    02/10/25 1850 94 -- -- -- --    02/10/25 1845 93 -- -- -- --    02/10/25 1840 97 -- -- -- --    02/10/25 1835 96 -- -- -- --    02/10/25 1833 -- room air -- -- --    02/10/25 1830 97 -- -- -- --    02/10/25 1825 96 -- -- -- --    02/10/25 1820 97 -- -- -- --    02/10/25 1815 95 -- -- -- --    02/10/25 1810 95 -- -- -- --    02/10/25 1805 98 -- -- -- --    02/10/25 1800 94 -- -- -- --    02/10/25 1755 97 -- -- -- --    02/10/25 1750 89 -- -- -- --    02/10/25 1745 97 -- -- -- --    02/10/25 1740 98 -- -- -- --    02/10/25 1735 97 -- -- -- --    02/10/25 1730 64 -- -- -- --    02/10/25 1650 98 -- -- -- --    02/10/25 1645 97 -- -- -- --    02/10/25 1640 99 -- -- -- --    02/10/25 1635 100 -- -- -- --    02/10/25 1630 98 -- -- -- --    02/10/25 1625 98 -- -- -- --    02/10/25 1620 99 -- -- -- --    02/10/25 1615 100 room air -- -- --    02/10/25 1610 98 -- -- -- --    02/10/25 1605 97 -- -- -- --    02/10/25  1600 100 -- -- -- --    02/10/25 1345 -- room air -- -- --    02/10/25 1330 97 -- -- -- --    02/10/25 1300 -- room air -- -- --    02/10/25 1228 -- room air -- -- --    02/10/25 1209 -- room air -- -- --    02/10/25 1042 93 -- -- -- --    02/10/25 0940 95 -- -- -- --    02/10/25 0752 94 room air -- -- --    02/10/25 0629 96 -- -- -- --    02/10/25 0559 95 -- -- -- --    02/10/25 0529 94 -- -- -- --    02/10/25 0459 94 -- -- -- --    02/10/25 0430 93 -- -- -- --    02/10/25 0400 -- room air -- -- --    02/10/25 0333 96 -- -- -- --    02/10/25 0332 95 -- -- -- --    02/10/25 0331 94 -- -- -- --    02/10/25 0330 94 -- -- -- --    02/10/25 0329 96 -- -- -- --    02/10/25 0313 96 room air -- -- --    02/10/25 0309 94 -- -- -- --    02/10/25 0232 98 -- -- -- --    02/10/25 0229 97 -- -- -- --    02/10/25 0227 100 -- -- -- --    02/10/25 0222 97 -- -- -- --    02/10/25 0217 98 -- -- -- --    02/10/25 0212 91 -- -- -- --    02/10/25 0207 96 -- -- -- --    02/10/25 0202 99 -- -- -- --    02/10/25 0157 97 -- -- -- --    02/10/25 0152 97 -- -- -- --    02/10/25 0147 99 -- -- -- --    02/10/25 0142 99 -- -- -- --    02/10/25 0132 98 -- -- -- --    02/10/25 0127 99 -- -- -- --    02/10/25 0122 96 -- -- -- --    02/10/25 0117 98 -- -- -- --    02/10/25 0051 98 -- -- -- --    02/10/25 0049 98 -- -- -- --    02/10/25 0039 95 -- -- -- --    02/10/25 0030 99 -- -- -- --    02/10/25 0000 97 -- -- -- --          Lines, Drains & Airways       Active LDAs       Name Placement date Placement time Site Days    Peripheral IV Left Antecubital --  --  Antecubital  --                  Current Facility-Administered Medications   Medication Dose Route Frequency Provider Last Rate Last Admin    acetaminophen (TYLENOL) tablet 650 mg  650 mg Oral Q4H PRN Rj Gaviria III, DO   650 mg at 02/11/25 1843    Or    acetaminophen (TYLENOL) 160 MG/5ML oral solution 650 mg  650 mg Oral Q4H PRN Rj Gaviria III, DO        Or     acetaminophen (TYLENOL) suppository 650 mg  650 mg Rectal Q4H PRN Rj Gaviria III, DO        apixaban (ELIQUIS) tablet 2.5 mg  2.5 mg Oral BID Georgie Wahl MD   2.5 mg at 02/10/25 0838    sennosides-docusate (PERICOLACE) 8.6-50 MG per tablet 2 tablet  2 tablet Oral BID Gretchen Smith MD   2 tablet at 02/12/25 0848    And    polyethylene glycol (MIRALAX) packet 17 g  17 g Oral Daily PRN Gretchen Smith MD        And    bisacodyl (DULCOLAX) EC tablet 5 mg  5 mg Oral Daily PRN Gretchen Smith MD        And    bisacodyl (DULCOLAX) suppository 10 mg  10 mg Rectal Daily PRN Gretchen Smith MD        Calcium Replacement - Follow Nurse / BPA Driven Protocol   Not Applicable PRN Rj Gaviria III, DO        dicyclomine (BENTYL) tablet 20 mg  20 mg Oral Q4H PRN Rj Gaviria III, DO   20 mg at 02/10/25 0702    HYDROcodone-acetaminophen (NORCO) 5-325 MG per tablet 1 tablet  1 tablet Oral Q6H PRN Rj Gaviria III, DO        levothyroxine (SYNTHROID, LEVOTHROID) tablet 25 mcg  25 mcg Oral Q AM Rj Gaviria III, DO   25 mcg at 02/12/25 0541    Magnesium Standard Dose Replacement - Follow Nurse / BPA Driven Protocol   Not Applicable PRN Rj Gaviria III, DO        melatonin tablet 5 mg  5 mg Oral Nightly PRN Rj Gaviria III, DO   5 mg at 02/11/25 2136    meropenem (MERREM) 500 mg in sodium chloride 0.9 % 100 mL MBP  500 mg Intravenous Once Georgie Wahl MD        meropenem (MERREM) 500 mg in sodium chloride 0.9 % 100 mL MBP  500 mg Intravenous Q8H Georgie Wahl MD        metoprolol tartrate (LOPRESSOR) tablet 12.5 mg  12.5 mg Oral BID Georgie Wahl MD        morphine injection 2 mg  2 mg Intravenous Q4H PRN Rj Gaviria III, DO        And    naloxone (NARCAN) injection 0.4 mg  0.4 mg Intravenous Q5 Min PRN Rj Gaviria III, DO        nitroglycerin (NITROSTAT) SL tablet 0.4 mg  0.4 mg Sublingual Q5 Min PRN Rj Gaviria  Hero CAMPOS, DO        ondansetron (ZOFRAN) injection 4 mg  4 mg Intravenous Q6H PRN Rj Gaviria III, DO   4 mg at 02/10/25 1630    pantoprazole (PROTONIX) EC tablet 40 mg  40 mg Oral BID AC Rj Gaviria III, DO   40 mg at 02/12/25 0743    Phosphorus Replacement - Follow Nurse / BPA Driven Protocol   Not Applicable PRN Rj Gaviria III, DO        Potassium Replacement - Follow Nurse / BPA Driven Protocol   Not Applicable PRN Rj Gaviria III, DO        sodium chloride 0.9 % flush 10 mL  10 mL Intravenous PRN Kaushal Eden MD        sodium chloride 0.9 % flush 10 mL  10 mL Intravenous PRN Kaushal Eden MD        sodium chloride 0.9 % flush 10 mL  10 mL Intravenous Q12H Rj Gaviria III, DO   10 mL at 02/12/25 0848    sodium chloride 0.9 % flush 10 mL  10 mL Intravenous PRN Rj Gaviria III, DO        sodium chloride 0.9 % infusion 40 mL  40 mL Intravenous PRN Rj Gaviria III, DO         Blood Administration Record (From admission, onward)      Completed transfusions       Ordered     Start    02/10/25 1236  Transfuse RBC Infuse Each Unit Over: 3.5H  Transfusion        Released Time Blood Unit Number Status   02/10/25 1304   24  [id number]  [id number] Completed 02/10/25 1629       02/10/25 1236    02/09/25 2303  Transfuse RBC Infuse Each Unit Over: 3.5H  Transfusion        Released Time Blood Unit Number Status   02/10/25 0014   24  [id number]  [id number] Completed 02/10/25 0314       02/09/25 2302                  Lab Results (last 48 hours)       Procedure Component Value Units Date/Time    Blood Culture - Blood, Arm, Right [[id number]]  (Normal) Collected: 02/10/25 1113    Specimen: Blood from Arm, Right Updated: 02/12/25 1200     Blood Culture No growth at 2 days    Narrative:      Less than seven (7) mL's of blood was collected.  Insufficient quantity may yield false negative results.    Blood Culture - Blood, Arm, Left [[id number]]   (Normal) Collected: 02/10/25 1113    Specimen: Blood from Arm, Left Updated: 02/12/25 1200     Blood Culture No growth at 2 days    Narrative:      Less than seven (7) mL's of blood was collected.  Insufficient quantity may yield false negative results.    Urine Culture - Urine, Urine, Clean Catch [[id number]]  (Abnormal)  (Susceptibility) Collected: 02/09/25 2248    Specimen: Urine, Clean Catch Updated: 02/12/25 1058     Urine Culture >100,000 CFU/mL Escherichia coli ESBL    Narrative:      Colonization of the urinary tract without infection is common. Treatment is discouraged unless the patient is symptomatic, pregnant, or undergoing an invasive urologic procedure.  Recent outcomes data supports the use of pip/tazo in the treatment of susceptible ESBL infections for uncomplicated UTI. Consider use of pip/tazo as a carbapenem-sparing regimen in applicable patients.    Susceptibility        Escherichia coli ESBL      POLLO      Ciprofloxacin Intermediate      Ertapenem Susceptible      Gentamicin Susceptible      Levofloxacin Intermediate      Meropenem Susceptible      Nitrofurantoin Susceptible      Piperacillin + Tazobactam Susceptible      Trimethoprim + Sulfamethoxazole Resistant                       Susceptibility Comments       Escherichia coli ESBL    For ESBL-producing infections, consider infectious disease consult. Susceptibility results may not correlate to clincal outcomes.               Basic Metabolic Panel [[id number]]  (Abnormal) Collected: 02/12/25 0507    Specimen: Blood Updated: 02/12/25 0631     Glucose 90 mg/dL      BUN 13 mg/dL      Creatinine 1.21 mg/dL      Sodium 136 mmol/L      Potassium 3.5 mmol/L      Chloride 105 mmol/L      CO2 24.0 mmol/L      Calcium 7.8 mg/dL      BUN/Creatinine Ratio 10.7     Anion Gap 7.0 mmol/L      eGFR 44.0 mL/min/1.73     Narrative:      GFR Categories in Chronic Kidney Disease (CKD)      GFR Category          GFR (mL/min/1.73)    Interpretation  G1                      90 or greater         Normal or high (1)  G2                      60-89                Mild decrease (1)  G3a                   45-59                Mild to moderate decrease  G3b                   30-44                Moderate to severe decrease  G4                    15-29                Severe decrease  G5                    14 or less           Kidney failure          (1)In the absence of evidence of kidney disease, neither GFR category G1 or G2 fulfill the criteria for CKD.    eGFR calculation 2021 CKD-EPI creatinine equation, which does not include race as a factor    CBC (No Diff) [[id number]]  (Abnormal) Collected: 02/12/25 0506    Specimen: Blood Updated: 02/12/25 0606     WBC 6.12 10*3/mm3      RBC 2.88 10*6/mm3      Hemoglobin 9.0 g/dL      Hematocrit 27.1 %      MCV 94.1 fL      MCH 31.3 pg      MCHC 33.2 g/dL      RDW 18.4 %      RDW-SD 63.8 fl      MPV 13.0 fL      Platelets 98 10*3/mm3     Cortisol [[id number]] Collected: 02/11/25 0437    Specimen: Blood Updated: 02/11/25 1159     Cortisol 14.12 mcg/dL     Narrative:      Cortisol Reference Ranges:    Cortisol 6AM - 10AM Range: 6.02-18.40 mcg/dl  Cortisol 4PM - 8PM Range: 2.68-10.50 mcg/dl      Results may be falsely increased if patient taking Biotin.      Magnesium [[id number]]  (Normal) Collected: 02/11/25 0437    Specimen: Blood Updated: 02/11/25 0623     Magnesium 2.1 mg/dL     Basic Metabolic Panel [[id number]]  (Abnormal) Collected: 02/11/25 0437    Specimen: Blood Updated: 02/11/25 0611     Glucose 96 mg/dL      BUN 14 mg/dL      Creatinine 1.17 mg/dL      Sodium 137 mmol/L      Potassium 4.0 mmol/L      Chloride 105 mmol/L      CO2 22.0 mmol/L      Calcium 8.0 mg/dL      BUN/Creatinine Ratio 12.0     Anion Gap 10.0 mmol/L      eGFR 45.8 mL/min/1.73     Narrative:      GFR Categories in Chronic Kidney Disease (CKD)      GFR Category          GFR (mL/min/1.73)    Interpretation  G1                     90 or greater         Normal or high  (1)  G2                      60-89                Mild decrease (1)  G3a                   45-59                Mild to moderate decrease  G3b                   30-44                Moderate to severe decrease  G4                    15-29                Severe decrease  G5                    14 or less           Kidney failure          (1)In the absence of evidence of kidney disease, neither GFR category G1 or G2 fulfill the criteria for CKD.    eGFR calculation 2021 CKD-EPI creatinine equation, which does not include race as a factor    CBC (No Diff) [[id number]]  (Abnormal) Collected: 02/11/25 0437    Specimen: Blood Updated: 02/11/25 0535     WBC 9.11 10*3/mm3      RBC 3.34 10*6/mm3      Hemoglobin 10.6 g/dL      Hematocrit 30.3 %      MCV 90.7 fL      MCH 31.7 pg      MCHC 35.0 g/dL      RDW 18.5 %      RDW-SD 60.2 fl      MPV 13.6 fL      Platelets 99 10*3/mm3     Hemoglobin & Hematocrit, Blood [[id number]]  (Abnormal) Collected: 02/10/25 2039    Specimen: Blood Updated: 02/10/25 2055     Hemoglobin 8.9 g/dL      Hematocrit 25.4 %           Imaging Results (Last 48 Hours)       Procedure Component Value Units Date/Time    CT Chest With Contrast Diagnostic [[id number]] Collected: 02/11/25 0800     Updated: 02/11/25 0856    Narrative:      CT CHEST W CONTRAST DIAGNOSTIC, CT ABDOMEN PELVIS W CONTRAST    Date of Exam: 2/11/2025 12:55 AM EST    Indication: lymphoma, cholangiocarcinoma.    Comparison: 12/11/2024 chest abdomen pelvis CT scan    Technique: Axial CT images were obtained of the chest, abdomen and pelvis after the uneventful intravenous administration of 75 mL Isovue-300.  Reconstructed coronal and sagittal images were also obtained. Automated exposure control and iterative   construction methods were used.      Findings:  Previous exam reports from 12/11/2024 described no acute abnormality in the abdomen or pelvis. Mild interval enlargement of multiple pulmonary nodules. Small new right pleural  effusion.    CT SCAN OF THE CHEST WITH IV CONTRAST: Right upper paratracheal and left preaortic lymph nodes are stable in size and appearance. No new or enlarging nodes are identified. Thoracic aorta is normal in caliber. Exam has fairly good pulmonary artery   contrast and no evidence of embolic disease. There are now small left and small-moderate free-flowing right pleural effusions and associated bibasilar discoid atelectasis. There is no significant pericardial effusion.    In the right lung, patient's multiple small pulmonary nodules mostly show a slight increase in size from 12/1/2024, by between 1 and 2 mm. A few nodules appear stable. It is difficult to identify any definitely new pulmonary parenchymal nodule.    On the left, there is similar pattern, with both stable nodules, and nodules that appear increased by 1 to 2 mm in diameter.    As example, anterior right upper lobe pulmonary nodule image 54 above the scan of the previous scan remains at 4.5 mm in diameter. Right middle lobe pulmonary nodule image 65 of today's scan, previous image 64 has increased from 6 mm to 7 mm.    Bony structures appear to be intact. No lytic or blastic changes are identified.      Impression:      1. 2 stable mildly enlarged mediastinal lymph nodes. No new or increasing adenopathy.    2. Stable number of pulmonary parenchymal nodules bilaterally, with some nodules stable in size, most nodules slightly increased, by 1 to 2 mm in diameter. No clearly new nodules.    3. Small left and small-moderate right pleural effusions, new from 12/11/2024 and mild associated discoid atelectasis.        CT SCAN OF THE ABDOMEN PELVIS WITH IV CONTRAST: Right hepatectomy, and multiple nonacute enhancing left lobe hepatic cysts are again noted. Heterogeneous hypoenhancement along the resection margin is more prominent, and more masslike on today's exam,   initial axial image 32, delayed axial image 33. This overall area appears a little larger,  previously 18 x 32 mm, today 23 x 35 mm, questionable for recurrence.    Posterior left lobe liver lesion, today's image 43/2 has had a variable appearance over multiple prior studies, perhaps intermittently treated and occurring metastasis. On today's study it appears further increased, most recently measuring 1.8 cm, today   2.6 cm, and with initial heterogeneous internal enhancement, and delayed centripetal enhancement pattern, more typical for cholangiocarcinoma than for hemangioma.     No new hepatic lesions are identified elsewhere. Spleen is normal in size 11 cm. Remaining left portal vein, superior mesenteric vein and splenic vein enhance normally with no evidence of thrombus.    Postoperative fat stranding in the region of the salomon hepatis appears stable. There is some narrowing of the IVC presumably due to regional scarring but this is stable from the contrast-enhanced study of 11/1/2024. Amorphous soft tissue density between   the IVC and aorta and extending along the right lateral and anterior margins of the aorta, images 43 through 55/2 today's study appears stable to minimally increased. No evidence of new mass or adenopathy is seen elsewhere in the upper abdomen.    No new abnormalities are seen of the pancreas or adrenal glands. Small bilateral renal cysts are noted. Subtle, heterogeneous area area in the right upper renal pole image 39/2 is of uncertain significance but appears unchanged back to at least 3/6/2023   and appears to show overlying cortical atrophy, perhaps related to prior pyelonephritis. There is no evidence of obstructive uropathy.    Regarding the lower abdomen/pelvis, bowel loops are normal in caliber and grossly normal in appearance. Bladder is decompressed. There is mild generalized fat stranding in the lower abdomen/pelvis, including around the bladder, but this may represent   incidental mild anasarca.    Delayed venous phase images show normal contrast excretion by the  kidneys. No acute bony abnormality is seen.    Impression:    1. Multiple simple appearing hepatic cysts.    2. Posterior left lobe liver lesion, which has had a waxing and waning appearance over multiple prior studies, is larger and more heterogeneous today, with enhancement pattern concerning for metastatic glandular carcinoma.    3. Irregular appearing parenchymal along the hepatic resection margin appears larger and more masslike today, concerning for recurrence.    4. Stable to mildly increased amorphous residual soft tissue density along the margins of the IVC and aorta, and stable chronic focal narrowing of the IVC.      5. Subtle heterogeneous hypoenhancement of the right upper renal pole, but unchanged over multiple prior studies probably postinflammatory scarring.    6. Mild anasarca.      Electronically Signed: Salvador Gill MD    2/11/2025 8:52 AM EST    Workstation ID: [id number]    CT Abdomen Pelvis With Contrast [[id number]] Collected: 02/11/25 0800     Updated: 02/11/25 0856    Narrative:      CT CHEST W CONTRAST DIAGNOSTIC, CT ABDOMEN PELVIS W CONTRAST    Date of Exam: 2/11/2025 12:55 AM EST    Indication: lymphoma, cholangiocarcinoma.    Comparison: 12/11/2024 chest abdomen pelvis CT scan    Technique: Axial CT images were obtained of the chest, abdomen and pelvis after the uneventful intravenous administration of 75 mL Isovue-300.  Reconstructed coronal and sagittal images were also obtained. Automated exposure control and iterative   construction methods were used.      Findings:  Previous exam reports from 12/11/2024 described no acute abnormality in the abdomen or pelvis. Mild interval enlargement of multiple pulmonary nodules. Small new right pleural effusion.    CT SCAN OF THE CHEST WITH IV CONTRAST: Right upper paratracheal and left preaortic lymph nodes are stable in size and appearance. No new or enlarging nodes are identified. Thoracic aorta is normal in caliber. Exam has fairly good pulmonary  artery   contrast and no evidence of embolic disease. There are now small left and small-moderate free-flowing right pleural effusions and associated bibasilar discoid atelectasis. There is no significant pericardial effusion.    In the right lung, patient's multiple small pulmonary nodules mostly show a slight increase in size from 12/1/2024, by between 1 and 2 mm. A few nodules appear stable. It is difficult to identify any definitely new pulmonary parenchymal nodule.    On the left, there is similar pattern, with both stable nodules, and nodules that appear increased by 1 to 2 mm in diameter.    As example, anterior right upper lobe pulmonary nodule image 54 above the scan of the previous scan remains at 4.5 mm in diameter. Right middle lobe pulmonary nodule image 65 of today's scan, previous image 64 has increased from 6 mm to 7 mm.    Bony structures appear to be intact. No lytic or blastic changes are identified.      Impression:      1. 2 stable mildly enlarged mediastinal lymph nodes. No new or increasing adenopathy.    2. Stable number of pulmonary parenchymal nodules bilaterally, with some nodules stable in size, most nodules slightly increased, by 1 to 2 mm in diameter. No clearly new nodules.    3. Small left and small-moderate right pleural effusions, new from 12/11/2024 and mild associated discoid atelectasis.        CT SCAN OF THE ABDOMEN PELVIS WITH IV CONTRAST: Right hepatectomy, and multiple nonacute enhancing left lobe hepatic cysts are again noted. Heterogeneous hypoenhancement along the resection margin is more prominent, and more masslike on today's exam,   initial axial image 32, delayed axial image 33. This overall area appears a little larger, previously 18 x 32 mm, today 23 x 35 mm, questionable for recurrence.    Posterior left lobe liver lesion, today's image 43/2 has had a variable appearance over multiple prior studies, perhaps intermittently treated and occurring metastasis. On today's  study it appears further increased, most recently measuring 1.8 cm, today   2.6 cm, and with initial heterogeneous internal enhancement, and delayed centripetal enhancement pattern, more typical for cholangiocarcinoma than for hemangioma.     No new hepatic lesions are identified elsewhere. Spleen is normal in size 11 cm. Remaining left portal vein, superior mesenteric vein and splenic vein enhance normally with no evidence of thrombus.    Postoperative fat stranding in the region of the salomon hepatis appears stable. There is some narrowing of the IVC presumably due to regional scarring but this is stable from the contrast-enhanced study of 11/1/2024. Amorphous soft tissue density between   the IVC and aorta and extending along the right lateral and anterior margins of the aorta, images 43 through 55/2 today's study appears stable to minimally increased. No evidence of new mass or adenopathy is seen elsewhere in the upper abdomen.    No new abnormalities are seen of the pancreas or adrenal glands. Small bilateral renal cysts are noted. Subtle, heterogeneous area area in the right upper renal pole image 39/2 is of uncertain significance but appears unchanged back to at least 3/6/2023   and appears to show overlying cortical atrophy, perhaps related to prior pyelonephritis. There is no evidence of obstructive uropathy.    Regarding the lower abdomen/pelvis, bowel loops are normal in caliber and grossly normal in appearance. Bladder is decompressed. There is mild generalized fat stranding in the lower abdomen/pelvis, including around the bladder, but this may represent   incidental mild anasarca.    Delayed venous phase images show normal contrast excretion by the kidneys. No acute bony abnormality is seen.    Impression:    1. Multiple simple appearing hepatic cysts.    2. Posterior left lobe liver lesion, which has had a waxing and waning appearance over multiple prior studies, is larger and more heterogeneous today,  with enhancement pattern concerning for metastatic glandular carcinoma.    3. Irregular appearing parenchymal along the hepatic resection margin appears larger and more masslike today, concerning for recurrence.    4. Stable to mildly increased amorphous residual soft tissue density along the margins of the IVC and aorta, and stable chronic focal narrowing of the IVC.      5. Subtle heterogeneous hypoenhancement of the right upper renal pole, but unchanged over multiple prior studies probably postinflammatory scarring.    6. Mild anasarca.      Electronically Signed: Salvador Gill MD    2025 8:52 AM EST    Workstation ID: [id number]          ECG/EMG Results (last 48 hours)       Procedure Component Value Units Date/Time    ECG 12 Lead Dyspnea [[id number]] Collected: 25     Updated: 25     QT Interval 380 ms      QTC Interval 472 ms     Narrative:      Test Reason : Dyspnea  Blood Pressure :   */*   mmHG  Vent. Rate :  93 BPM     Atrial Rate :  93 BPM     P-R Int : 140 ms          QRS Dur :  80 ms      QT Int : 380 ms       P-R-T Axes :  56  39  54 degrees    QTcB Int : 472 ms    Normal sinus rhythm  Normal ECG  When compared with ECG of 11-Dec-2024 11:51,  Vent. rate has increased by  37 bpm  Confirmed by MD AVILEZ KYLE ([id number]) on 2025 11:24:16 PM    Referred By: edmd           Confirmed By: ROSA M AVILEZ MD             Physician Progress Notes (last 72 hours)        Georgie Wahl MD at 25 1423              [facility]  PROGRESS NOTE    Patient Name: Sheree Hernandez  : 1940  MRN: [id number]    Date of Admission: 2025  Primary Care Physician: Timi Conway DO    Subjective   Subjective     CC:  Anemia, UTI    HPI:  Pt with minimal po intake and required bolus ivf this am due to symptomatic hypotension and tachycardia with ambulation.  Feels better now.  Urine culture grew ESBL E. coli      Objective   Objective     Vital  Signs:   Temp:  [98.1 °F (36.7 °C)-98.6 °F (37 °C)] 98.1 °F (36.7 °C)  Heart Rate:  [81-99] 88  Resp:  [16-20] 18  BP: (108-161)/(54-68) 119/64     Physical Exam:  Constitutional: No acute distress, awake, alert, chronically ill-appearing, lying in the bed  HENT: NCAT, mucous membranes moist  Respiratory: Clear to auscultation bilaterally, respiratory effort normal   Cardiovascular: RRR  Gastrointestinal: Positive bowel sounds, soft, nontender, nondistended  Musculoskeletal: No bilateral ankle edema  Psychiatric: Appropriate affect, cooperative  Neurologic: Alert, oriented, speech clear  Skin: No rashes      Results Reviewed:  LAB RESULTS:      Lab 02/12/25  0506 02/11/25  0437 02/10/25  2039 02/10/25  1113 02/10/25  0708 02/10/25  0627 02/09/25  2317 02/09/25  2113   WBC 6.12 9.11  --  6.72  --  9.56  --  6.27   HEMOGLOBIN 9.0* 10.6* 8.9* 7.0*  --  9.1*  --  7.3*   HEMATOCRIT 27.1* 30.3* 25.4* 20.4*  --  26.8*  --  21.3*   PLATELETS 98* 99*  --  94*  --  120*  --  107*   NEUTROS ABS  --   --   --  4.90  --  7.72*  --  4.44   IMMATURE GRANS (ABS)  --   --   --  0.04  --  0.04  --  0.03   LYMPHS ABS  --   --   --  0.65*  --  0.66*  --  0.70   MONOS ABS  --   --   --  1.10*  --  1.09*  --  1.06*   EOS ABS  --   --   --  0.01  --  0.02  --  0.02   MCV 94.1 90.7  --  93.2  --  92.7  --  96.8   PROCALCITONIN  --   --   --   --  0.76*  --   --   --    LACTATE  --   --   --  1.7  --   --   --   --    HSTROP T  --   --   --   --   --   --  29* 33*         Lab 02/12/25  0507 02/11/25  0437 02/10/25  0708 02/09/25  2113   SODIUM 136 137 136 135*   POTASSIUM 3.5 4.0 3.8 3.9   CHLORIDE 105 105 101 99   CO2 24.0 22.0 24.0 22.0   ANION GAP 7.0 10.0 11.0 14.0   BUN 13 14 13 14   CREATININE 1.21* 1.17* 1.17* 1.16*   EGFR 44.0* 45.8* 45.8* 46.3*   GLUCOSE 90 96 138* 129*   CALCIUM 7.8* 8.0* 8.1* 8.7   MAGNESIUM  --  2.1 3.0* 2.2   PHOSPHORUS  --   --  2.3*  --    TSH  --   --   --  2.790         Lab 02/10/25  0708 02/09/25  2113    TOTAL PROTEIN 5.5* 5.7*   ALBUMIN 3.0* 3.2*   GLOBULIN 2.5 2.5   ALT (SGPT) 12 13   AST (SGOT) 25 22   BILIRUBIN 2.5* 1.2   ALK PHOS 343* 376*         Lab 02/09/25  2317 02/09/25  2113   HSTROP T 29* 33*             Lab 02/09/25  2207   ABO TYPING O   RH TYPING Positive   ANTIBODY SCREEN Negative         Brief Urine Lab Results  (Last result in the past 365 days)        Color   Clarity   Blood   Leuk Est   Nitrite   Protein   CREAT   Urine HCG        02/09/25 2248 Yellow   Turbid   Small (1+)   Large (3+)   Negative   30 mg/dL (1+)                   Microbiology Results Abnormal       Procedure Component Value - Date/Time    Urine Culture - Urine, Urine, Clean Catch [[id number]]  (Abnormal)  (Susceptibility) Collected: 02/09/25 2248    Lab Status: Final result Specimen: Urine, Clean Catch Updated: 02/12/25 1058     Urine Culture >100,000 CFU/mL Escherichia coli ESBL    Narrative:      Colonization of the urinary tract without infection is common. Treatment is discouraged unless the patient is symptomatic, pregnant, or undergoing an invasive urologic procedure.  Recent outcomes data supports the use of pip/tazo in the treatment of susceptible ESBL infections for uncomplicated UTI. Consider use of pip/tazo as a carbapenem-sparing regimen in applicable patients.    Susceptibility        Escherichia coli ESBL      POLLO      Ciprofloxacin Intermediate      Ertapenem Susceptible      Gentamicin Susceptible      Levofloxacin Intermediate      Meropenem Susceptible      Nitrofurantoin Susceptible      Piperacillin + Tazobactam Susceptible      Trimethoprim + Sulfamethoxazole Resistant                       Susceptibility Comments       Escherichia coli ESBL    For ESBL-producing infections, consider infectious disease consult. Susceptibility results may not correlate to clincal outcomes.                       CT Chest With Contrast Diagnostic    Result Date: 2/11/2025  CT CHEST W CONTRAST DIAGNOSTIC, CT ABDOMEN PELVIS W  CONTRAST Date of Exam: 2/11/2025 12:55 AM EST Indication: lymphoma, cholangiocarcinoma. Comparison: 12/11/2024 chest abdomen pelvis CT scan Technique: Axial CT images were obtained of the chest, abdomen and pelvis after the uneventful intravenous administration of 75 mL Isovue-300.  Reconstructed coronal and sagittal images were also obtained. Automated exposure control and iterative construction methods were used. Findings: Previous exam reports from 12/11/2024 described no acute abnormality in the abdomen or pelvis. Mild interval enlargement of multiple pulmonary nodules. Small new right pleural effusion. CT SCAN OF THE CHEST WITH IV CONTRAST: Right upper paratracheal and left preaortic lymph nodes are stable in size and appearance. No new or enlarging nodes are identified. Thoracic aorta is normal in caliber. Exam has fairly good pulmonary artery contrast and no evidence of embolic disease. There are now small left and small-moderate free-flowing right pleural effusions and associated bibasilar discoid atelectasis. There is no significant pericardial effusion. In the right lung, patient's multiple small pulmonary nodules mostly show a slight increase in size from 12/1/2024, by between 1 and 2 mm. A few nodules appear stable. It is difficult to identify any definitely new pulmonary parenchymal nodule. On the left, there is similar pattern, with both stable nodules, and nodules that appear increased by 1 to 2 mm in diameter. As example, anterior right upper lobe pulmonary nodule image 54 above the scan of the previous scan remains at 4.5 mm in diameter. Right middle lobe pulmonary nodule image 65 of today's scan, previous image 64 has increased from 6 mm to 7 mm. Bony structures appear to be intact. No lytic or blastic changes are identified.     Impression: 1. 2 stable mildly enlarged mediastinal lymph nodes. No new or increasing adenopathy. 2. Stable number of pulmonary parenchymal nodules bilaterally, with some  nodules stable in size, most nodules slightly increased, by 1 to 2 mm in diameter. No clearly new nodules. 3. Small left and small-moderate right pleural effusions, new from 12/11/2024 and mild associated discoid atelectasis. CT SCAN OF THE ABDOMEN PELVIS WITH IV CONTRAST: Right hepatectomy, and multiple nonacute enhancing left lobe hepatic cysts are again noted. Heterogeneous hypoenhancement along the resection margin is more prominent, and more masslike on today's exam, initial axial image 32, delayed axial image 33. This overall area appears a little larger, previously 18 x 32 mm, today 23 x 35 mm, questionable for recurrence. Posterior left lobe liver lesion, today's image 43/2 has had a variable appearance over multiple prior studies, perhaps intermittently treated and occurring metastasis. On today's study it appears further increased, most recently measuring 1.8 cm, today 2.6 cm, and with initial heterogeneous internal enhancement, and delayed centripetal enhancement pattern, more typical for cholangiocarcinoma than for hemangioma. No new hepatic lesions are identified elsewhere. Spleen is normal in size 11 cm. Remaining left portal vein, superior mesenteric vein and splenic vein enhance normally with no evidence of thrombus. Postoperative fat stranding in the region of the salomon hepatis appears stable. There is some narrowing of the IVC presumably due to regional scarring but this is stable from the contrast-enhanced study of 11/1/2024. Amorphous soft tissue density between the IVC and aorta and extending along the right lateral and anterior margins of the aorta, images 43 through 55/2 today's study appears stable to minimally increased. No evidence of new mass or adenopathy is seen elsewhere in the upper abdomen. No new abnormalities are seen of the pancreas or adrenal glands. Small bilateral renal cysts are noted. Subtle, heterogeneous area area in the right upper renal pole image 39/2 is of uncertain  significance but appears unchanged back to at least 3/6/2023 and appears to show overlying cortical atrophy, perhaps related to prior pyelonephritis. There is no evidence of obstructive uropathy. Regarding the lower abdomen/pelvis, bowel loops are normal in caliber and grossly normal in appearance. Bladder is decompressed. There is mild generalized fat stranding in the lower abdomen/pelvis, including around the bladder, but this may represent incidental mild anasarca. Delayed venous phase images show normal contrast excretion by the kidneys. No acute bony abnormality is seen. Impression: 1. Multiple simple appearing hepatic cysts. 2. Posterior left lobe liver lesion, which has had a waxing and waning appearance over multiple prior studies, is larger and more heterogeneous today, with enhancement pattern concerning for metastatic glandular carcinoma. 3. Irregular appearing parenchymal along the hepatic resection margin appears larger and more masslike today, concerning for recurrence. 4. Stable to mildly increased amorphous residual soft tissue density along the margins of the IVC and aorta, and stable chronic focal narrowing of the IVC. 5. Subtle heterogeneous hypoenhancement of the right upper renal pole, but unchanged over multiple prior studies probably postinflammatory scarring. 6. Mild anasarca. Electronically Signed: Salvador Gill MD  2/11/2025 8:52 AM EST  Workstation ID: [id number]    CT Abdomen Pelvis With Contrast    Result Date: 2/11/2025  CT CHEST W CONTRAST DIAGNOSTIC, CT ABDOMEN PELVIS W CONTRAST Date of Exam: 2/11/2025 12:55 AM EST Indication: lymphoma, cholangiocarcinoma. Comparison: 12/11/2024 chest abdomen pelvis CT scan Technique: Axial CT images were obtained of the chest, abdomen and pelvis after the uneventful intravenous administration of 75 mL Isovue-300.  Reconstructed coronal and sagittal images were also obtained. Automated exposure control and iterative construction methods were used.  Findings: Previous exam reports from 12/11/2024 described no acute abnormality in the abdomen or pelvis. Mild interval enlargement of multiple pulmonary nodules. Small new right pleural effusion. CT SCAN OF THE CHEST WITH IV CONTRAST: Right upper paratracheal and left preaortic lymph nodes are stable in size and appearance. No new or enlarging nodes are identified. Thoracic aorta is normal in caliber. Exam has fairly good pulmonary artery contrast and no evidence of embolic disease. There are now small left and small-moderate free-flowing right pleural effusions and associated bibasilar discoid atelectasis. There is no significant pericardial effusion. In the right lung, patient's multiple small pulmonary nodules mostly show a slight increase in size from 12/1/2024, by between 1 and 2 mm. A few nodules appear stable. It is difficult to identify any definitely new pulmonary parenchymal nodule. On the left, there is similar pattern, with both stable nodules, and nodules that appear increased by 1 to 2 mm in diameter. As example, anterior right upper lobe pulmonary nodule image 54 above the scan of the previous scan remains at 4.5 mm in diameter. Right middle lobe pulmonary nodule image 65 of today's scan, previous image 64 has increased from 6 mm to 7 mm. Bony structures appear to be intact. No lytic or blastic changes are identified.     Impression: 1. 2 stable mildly enlarged mediastinal lymph nodes. No new or increasing adenopathy. 2. Stable number of pulmonary parenchymal nodules bilaterally, with some nodules stable in size, most nodules slightly increased, by 1 to 2 mm in diameter. No clearly new nodules. 3. Small left and small-moderate right pleural effusions, new from 12/11/2024 and mild associated discoid atelectasis. CT SCAN OF THE ABDOMEN PELVIS WITH IV CONTRAST: Right hepatectomy, and multiple nonacute enhancing left lobe hepatic cysts are again noted. Heterogeneous hypoenhancement along the resection  margin is more prominent, and more masslike on today's exam, initial axial image 32, delayed axial image 33. This overall area appears a little larger, previously 18 x 32 mm, today 23 x 35 mm, questionable for recurrence. Posterior left lobe liver lesion, today's image 43/2 has had a variable appearance over multiple prior studies, perhaps intermittently treated and occurring metastasis. On today's study it appears further increased, most recently measuring 1.8 cm, today 2.6 cm, and with initial heterogeneous internal enhancement, and delayed centripetal enhancement pattern, more typical for cholangiocarcinoma than for hemangioma. No new hepatic lesions are identified elsewhere. Spleen is normal in size 11 cm. Remaining left portal vein, superior mesenteric vein and splenic vein enhance normally with no evidence of thrombus. Postoperative fat stranding in the region of the salomon hepatis appears stable. There is some narrowing of the IVC presumably due to regional scarring but this is stable from the contrast-enhanced study of 11/1/2024. Amorphous soft tissue density between the IVC and aorta and extending along the right lateral and anterior margins of the aorta, images 43 through 55/2 today's study appears stable to minimally increased. No evidence of new mass or adenopathy is seen elsewhere in the upper abdomen. No new abnormalities are seen of the pancreas or adrenal glands. Small bilateral renal cysts are noted. Subtle, heterogeneous area area in the right upper renal pole image 39/2 is of uncertain significance but appears unchanged back to at least 3/6/2023 and appears to show overlying cortical atrophy, perhaps related to prior pyelonephritis. There is no evidence of obstructive uropathy. Regarding the lower abdomen/pelvis, bowel loops are normal in caliber and grossly normal in appearance. Bladder is decompressed. There is mild generalized fat stranding in the lower abdomen/pelvis, including around the  bladder, but this may represent incidental mild anasarca. Delayed venous phase images show normal contrast excretion by the kidneys. No acute bony abnormality is seen. Impression: 1. Multiple simple appearing hepatic cysts. 2. Posterior left lobe liver lesion, which has had a waxing and waning appearance over multiple prior studies, is larger and more heterogeneous today, with enhancement pattern concerning for metastatic glandular carcinoma. 3. Irregular appearing parenchymal along the hepatic resection margin appears larger and more masslike today, concerning for recurrence. 4. Stable to mildly increased amorphous residual soft tissue density along the margins of the IVC and aorta, and stable chronic focal narrowing of the IVC. 5. Subtle heterogeneous hypoenhancement of the right upper renal pole, but unchanged over multiple prior studies probably postinflammatory scarring. 6. Mild anasarca. Electronically Signed: Salvador Gill MD  2/11/2025 8:52 AM EST  Workstation ID: [id number]     Results for orders placed during the hospital encounter of 11/08/24    Adult Transthoracic Echo Complete W/ Cont if Necessary Per Protocol    Interpretation Summary    Left ventricular ejection fraction appears to be 51 - 55%.    Left ventricular diastolic function was indeterminate.    Mild mitral valve regurgitation is present.    The aortic valve exhibits sclerosis.    Trace tricuspid valve regurgitation is present. Estimated right ventricular systolic pressure from tricuspid regurgitation is normal (<35 mmHg).    There is no evidence of pericardial effusion.      Current medications:  Scheduled Meds:apixaban, 2.5 mg, Oral, BID  levothyroxine, 25 mcg, Oral, Q AM  meropenem, 500 mg, Intravenous, Once  meropenem, 500 mg, Intravenous, Q8H  metoprolol tartrate, 12.5 mg, Oral, BID  pantoprazole, 40 mg, Oral, BID AC  senna-docusate sodium, 2 tablet, Oral, BID  sodium chloride, 10 mL, Intravenous, Q12H      Continuous Infusions:     PRN  Meds:.  acetaminophen **OR** acetaminophen **OR** acetaminophen    senna-docusate sodium **AND** polyethylene glycol **AND** bisacodyl **AND** bisacodyl    Calcium Replacement - Follow Nurse / BPA Driven Protocol    dicyclomine    HYDROcodone-acetaminophen    Magnesium Standard Dose Replacement - Follow Nurse / BPA Driven Protocol    melatonin    Morphine **AND** naloxone    nitroglycerin    ondansetron    Phosphorus Replacement - Follow Nurse / BPA Driven Protocol    Potassium Replacement - Follow Nurse / BPA Driven Protocol    [COMPLETED] Insert Peripheral IV **AND** sodium chloride    [COMPLETED] Insert Peripheral IV **AND** sodium chloride    sodium chloride    sodium chloride    Assessment & Plan   Assessment & Plan     Active Hospital Problems    Diagnosis  POA    **Symptomatic anemia [D64.9]  Yes    Severe protein-calorie malnutrition [E43]  Yes    Dehydration [E86.0]  Yes      Resolved Hospital Problems   No resolved problems to display.        Brief Hospital Course to date:  Sheree Hernandez is a 84 y.o. female with history of lymphoma being treated with Rituxan, HLD, A-fib, hypothyroidism, PUD, sleep apnea, cholangiocarcinoma, anxiety, depression, who presented for evaluation of fatigue.  Found to have symptomatic anemia and UTI.    This patient's problems and plans were partially entered by my partner and updated as appropriate by me 02/12/25.    Symptomatic anemia  Lymphoma  -hgb 7.3 upon admission  -Transfused 2 units RBCs  -Monitor for any bleeding, a.m. labs     UTI, poa  ESBL E. coli on urine culture  -Will treat with Merrem x 3 days  -Patient has lower abdominal symptoms     Gen weakness  -multifactorial due to anemia, lymphoma, uti  -tsh wnl, a.m. cortisol 14  -pt/ot evals  -orthostatics +, status post IVF     Hx cholangiocardinoma  -s/p previous partial hepatectomy  -Imaging shows recurrence, this was discussed with patient and her daughter by Dr. Lan young in the room     Parox  afib  -currently in sinus  -Resumed Eliquis on 2/12/2025  -Resumed low dose metoprolol 12.5mg bid      HLD  -statin     Hypothyroidism  -tsh wnl  -continue levothyroxine     Hx PUD/GERD  -ppi     BONNIE  -noncompliant w/ cpap    Expected Discharge Location and Transportation: home  Expected Discharge   Expected Discharge Date: 2/15/2025; Expected Discharge Time:      VTE Prophylaxis:  Pharmacologic & mechanical VTE prophylaxis orders are present.         AM-PAC 6 Clicks Score (PT): 21 (02/11/25 2017)    CODE STATUS:   Code Status and Medical Interventions: CPR (Attempt to Resuscitate); Full Support   Ordered at: 02/10/25 0040     Code Status (Patient has no pulse and is not breathing):    CPR (Attempt to Resuscitate)     Medical Interventions (Patient has pulse or is breathing):    Full Support       Georgie Wahl MD  02/12/25        Electronically signed by Georgie Wahl MD at 02/12/25 1429       Jorje Montenegro MD at 02/11/25 1103          HEMATOLOGY/ONCOLOGY PROGRESS NOTE    Subjective      CC: Marginal zone lymphoma, intrahepatic cholangiocarcinoma    SUBJECTIVE:   Patient states she was febrile up to 103 last evening however this was not recorded.  Still feeling fatigued and tired today.  Abdominal pain overall stable        Past Medical History, Past Surgical History, Social History, Family History have been reviewed and are without significant changes except as mentioned.      Medications:  The current medication list was reviewed in the EMR    ALLERGIES:   Allergies   Allergen Reactions    Pravastatin Myalgia       ROS:  A comprehensive 10-point review of systems was performed and was negative except as mentioned.      Objective      Vitals:    02/10/25 2355 02/11/25 0400 02/11/25 0500 02/11/25 0826   BP: 103/52 123/49  132/59   BP Location: Right arm Right arm  Right arm   Patient Position: Lying Lying  Lying   Pulse: 73 72  87   Resp: 18 18  18   Temp: 98.8 °F (37.1 °C) 98.3 °F (36.8 °C) 98.2 °F  (36.8 °C) 98.8 °F (37.1 °C)   TempSrc: Oral Oral Oral Oral   SpO2: 93% 97%  96%   Weight:       Height:              General: Sitting in chair, in no acute distress  HEENT: sclerae anicteric, oropharynx clear  Lymphatics: no cervical, supraclavicular, inguinal, or axillary adenopathy  Cardiovascular: regular rate and rhythm, no murmurs  Lungs: clear to auscultation bilaterally  Abdomen: soft, nontender, nondistended.  No palpable organomegaly  Extremities: no lower extremity edema  Skin: no rashes, lesions, bruising, or petechiae  Neuro: Alert and oriented x 3. Moves all extremities.    RECENT LABS:    Results from last 7 days   Lab Units 02/11/25  0437 02/10/25  2039 02/10/25  1113 02/10/25  0627   WBC 10*3/mm3 9.11  --  6.72 9.56   HEMOGLOBIN g/dL 10.6* 8.9* 7.0* 9.1*   PLATELETS 10*3/mm3 99*  --  94* 120*     Results from last 7 days   Lab Units 02/11/25  0437 02/10/25  0708 02/09/25  2113   SODIUM mmol/L 137 136 135*   POTASSIUM mmol/L 4.0 3.8 3.9   CO2 mmol/L 22.0 24.0 22.0   BUN mg/dL 14 13 14   CREATININE mg/dL 1.17* 1.17* 1.16*   GLUCOSE mg/dL 96 138* 129*     Results from last 7 days   Lab Units 02/10/25  0708 02/09/25  2113   AST (SGOT) U/L 25 22   ALT (SGPT) U/L 12 13   BILIRUBIN mg/dL 2.5* 1.2   ALK PHOS U/L 343* 376*         CT Chest With Contrast Diagnostic    Result Date: 2/11/2025  1. 2 stable mildly enlarged mediastinal lymph nodes. No new or increasing adenopathy. 2. Stable number of pulmonary parenchymal nodules bilaterally, with some nodules stable in size, most nodules slightly increased, by 1 to 2 mm in diameter. No clearly new nodules. 3. Small left and small-moderate right pleural effusions, new from 12/11/2024 and mild associated discoid atelectasis. CT SCAN OF THE ABDOMEN PELVIS WITH IV CONTRAST: Right hepatectomy, and multiple nonacute enhancing left lobe hepatic cysts are again noted. Heterogeneous hypoenhancement along the resection margin is more prominent, and more masslike on today's  exam, initial axial image 32, delayed axial image 33. This overall area appears a little larger, previously 18 x 32 mm, today 23 x 35 mm, questionable for recurrence. Posterior left lobe liver lesion, today's image 43/2 has had a variable appearance over multiple prior studies, perhaps intermittently treated and occurring metastasis. On today's study it appears further increased, most recently measuring 1.8 cm, today 2.6 cm, and with initial heterogeneous internal enhancement, and delayed centripetal enhancement pattern, more typical for cholangiocarcinoma than for hemangioma. No new hepatic lesions are identified elsewhere. Spleen is normal in size 11 cm. Remaining left portal vein, superior mesenteric vein and splenic vein enhance normally with no evidence of thrombus. Postoperative fat stranding in the region of the salomon hepatis appears stable. There is some narrowing of the IVC presumably due to regional scarring but this is stable from the contrast-enhanced study of 11/1/2024. Amorphous soft tissue density between the IVC and aorta and extending along the right lateral and anterior margins of the aorta, images 43 through 55/2 today's study appears stable to minimally increased. No evidence of new mass or adenopathy is seen elsewhere in the upper abdomen. No new abnormalities are seen of the pancreas or adrenal glands. Small bilateral renal cysts are noted. Subtle, heterogeneous area area in the right upper renal pole image 39/2 is of uncertain significance but appears unchanged back to at least 3/6/2023 and appears to show overlying cortical atrophy, perhaps related to prior pyelonephritis. There is no evidence of obstructive uropathy. Regarding the lower abdomen/pelvis, bowel loops are normal in caliber and grossly normal in appearance. Bladder is decompressed. There is mild generalized fat stranding in the lower abdomen/pelvis, including around the bladder, but this may represent incidental mild anasarca.  Delayed venous phase images show normal contrast excretion by the kidneys. No acute bony abnormality is seen. Impression: 1. Multiple simple appearing hepatic cysts. 2. Posterior left lobe liver lesion, which has had a waxing and waning appearance over multiple prior studies, is larger and more heterogeneous today, with enhancement pattern concerning for metastatic glandular carcinoma. 3. Irregular appearing parenchymal along the hepatic resection margin appears larger and more masslike today, concerning for recurrence. 4. Stable to mildly increased amorphous residual soft tissue density along the margins of the IVC and aorta, and stable chronic focal narrowing of the IVC. 5. Subtle heterogeneous hypoenhancement of the right upper renal pole, but unchanged over multiple prior studies probably postinflammatory scarring. 6. Mild anasarca. Electronically Signed: Salvador Gill MD  2/11/2025 8:52 AM EST  Workstation ID: [id number]    CT Abdomen Pelvis With Contrast    Result Date: 2/11/2025  1. 2 stable mildly enlarged mediastinal lymph nodes. No new or increasing adenopathy. 2. Stable number of pulmonary parenchymal nodules bilaterally, with some nodules stable in size, most nodules slightly increased, by 1 to 2 mm in diameter. No clearly new nodules. 3. Small left and small-moderate right pleural effusions, new from 12/11/2024 and mild associated discoid atelectasis. CT SCAN OF THE ABDOMEN PELVIS WITH IV CONTRAST: Right hepatectomy, and multiple nonacute enhancing left lobe hepatic cysts are again noted. Heterogeneous hypoenhancement along the resection margin is more prominent, and more masslike on today's exam, initial axial image 32, delayed axial image 33. This overall area appears a little larger, previously 18 x 32 mm, today 23 x 35 mm, questionable for recurrence. Posterior left lobe liver lesion, today's image 43/2 has had a variable appearance over multiple prior studies, perhaps intermittently treated and  occurring metastasis. On today's study it appears further increased, most recently measuring 1.8 cm, today 2.6 cm, and with initial heterogeneous internal enhancement, and delayed centripetal enhancement pattern, more typical for cholangiocarcinoma than for hemangioma. No new hepatic lesions are identified elsewhere. Spleen is normal in size 11 cm. Remaining left portal vein, superior mesenteric vein and splenic vein enhance normally with no evidence of thrombus. Postoperative fat stranding in the region of the salomon hepatis appears stable. There is some narrowing of the IVC presumably due to regional scarring but this is stable from the contrast-enhanced study of 11/1/2024. Amorphous soft tissue density between the IVC and aorta and extending along the right lateral and anterior margins of the aorta, images 43 through 55/2 today's study appears stable to minimally increased. No evidence of new mass or adenopathy is seen elsewhere in the upper abdomen. No new abnormalities are seen of the pancreas or adrenal glands. Small bilateral renal cysts are noted. Subtle, heterogeneous area area in the right upper renal pole image 39/2 is of uncertain significance but appears unchanged back to at least 3/6/2023 and appears to show overlying cortical atrophy, perhaps related to prior pyelonephritis. There is no evidence of obstructive uropathy. Regarding the lower abdomen/pelvis, bowel loops are normal in caliber and grossly normal in appearance. Bladder is decompressed. There is mild generalized fat stranding in the lower abdomen/pelvis, including around the bladder, but this may represent incidental mild anasarca. Delayed venous phase images show normal contrast excretion by the kidneys. No acute bony abnormality is seen. Impression: 1. Multiple simple appearing hepatic cysts. 2. Posterior left lobe liver lesion, which has had a waxing and waning appearance over multiple prior studies, is larger and more heterogeneous today,  with enhancement pattern concerning for metastatic glandular carcinoma. 3. Irregular appearing parenchymal along the hepatic resection margin appears larger and more masslike today, concerning for recurrence. 4. Stable to mildly increased amorphous residual soft tissue density along the margins of the IVC and aorta, and stable chronic focal narrowing of the IVC. 5. Subtle heterogeneous hypoenhancement of the right upper renal pole, but unchanged over multiple prior studies probably postinflammatory scarring. 6. Mild anasarca. Electronically Signed: Salvador Gill MD  2025 8:52 AM EST  Workstation ID: [id number]    XR Chest 1 View    Result Date: 2025  Impression: No acute cardiopulmonary process. Electronically Signed: Mary Jo Johnson MD  2025 10:43 PM EST  Workstation ID: [id number]         Assessment   ASSESSMENT & PLAN:  Marginal zone lymphoma  -Diagnosed on bone marrow biopsy in 2025  -Status post 3 weeks of weekly rituximab  -Will plan to hold week 4 while inpatient and reschedule when she is appropriate for discharge     Intrahepatic cholangiocarcinoma  -Has been off therapy for the past several months  -Bilirubin slightly increasing  -CT C/A/P reviewed and notable for slight enlargement of her pulmonary nodules as well as her primary liver mass.  No significant new adenopathy noted.     NETO  -Likely secondary to dehydration  -Stable today  -Receiving IV fluids     UTI  -Noted on UA  -Cultures pending  -Currently on ceftriaxone     Anemia  -Improving     Thank you for the consult.  Will continue to follow while inpatient    Jorje Montenegro MD  Hematology and Oncology    2025  11:03 EST                          Electronically signed by Jorje Montenegro MD at 25 1105       Gretchen Smith MD at 25 0832              [facility] Medicine Services  PROGRESS NOTE    Patient Name: Sheree Hernandez  : 1940  MRN: [id number]    Date of Admission: 2025  Primary  Care Physician: Timi Conway DO    Subjective   Subjective     CC:  Anemia, UTI    HPI:  Seen with Dr. Montenegro.  Patient's friend at bedside.  Patient's daughter on speaker phone in the room.  Patient reports feeling awful today.  Per staff, patient was febrile to 102 yesterday evening, but this was not documented.      Objective   Objective     Vital Signs:   Temp:  [97.9 °F (36.6 °C)-100.1 °F (37.8 °C)] 98.8 °F (37.1 °C)  Heart Rate:  [] 87  Resp:  [17-20] 18  BP: ()/(39-67) 132/59     Physical Exam:  Constitutional: No acute distress, awake, alert, chronically ill-appearing, sitting up in bedside chair  HENT: NCAT, mucous membranes moist  Respiratory: Clear to auscultation bilaterally, respiratory effort normal   Cardiovascular: RRR  Gastrointestinal: Positive bowel sounds, soft, nontender, nondistended  Musculoskeletal: No bilateral ankle edema  Psychiatric: Appropriate affect, cooperative  Neurologic: Alert, symmetric facies, speech clear  Skin: No rashes      Results Reviewed:  LAB RESULTS:      Lab 02/11/25  0437 02/10/25  2039 02/10/25  1113 02/10/25  0708 02/10/25  0627 02/09/25  2317 02/09/25  2113 02/04/25  0838   WBC 9.11  --  6.72  --  9.56  --  6.27 5.26   HEMOGLOBIN 10.6* 8.9* 7.0*  --  9.1*  --  7.3* 8.1*   HEMATOCRIT 30.3* 25.4* 20.4*  --  26.8*  --  21.3* 23.8*   PLATELETS 99*  --  94*  --  120*  --  107* 106*   NEUTROS ABS  --   --  4.90  --  7.72*  --  4.44 3.82   IMMATURE GRANS (ABS)  --   --  0.04  --  0.04  --  0.03 0.01   LYMPHS ABS  --   --  0.65*  --  0.66*  --  0.70 0.62*   MONOS ABS  --   --  1.10*  --  1.09*  --  1.06* 0.77   EOS ABS  --   --  0.01  --  0.02  --  0.02 0.01   MCV 90.7  --  93.2  --  92.7  --  96.8 96.7   PROCALCITONIN  --   --   --  0.76*  --   --   --   --    LACTATE  --   --  1.7  --   --   --   --   --    HSTROP T  --   --   --   --   --  29* 33*  --          Lab 02/11/25  0437 02/10/25  0708 02/09/25  5713   SODIUM 137 136 135*   POTASSIUM 4.0  3.8 3.9   CHLORIDE 105 101 99   CO2 22.0 24.0 22.0   ANION GAP 10.0 11.0 14.0   BUN 14 13 14   CREATININE 1.17* 1.17* 1.16*   EGFR 45.8* 45.8* 46.3*   GLUCOSE 96 138* 129*   CALCIUM 8.0* 8.1* 8.7   MAGNESIUM 2.1 3.0* 2.2   PHOSPHORUS  --  2.3*  --    TSH  --   --  2.790         Lab 02/10/25  0708 02/09/25  2113   TOTAL PROTEIN 5.5* 5.7*   ALBUMIN 3.0* 3.2*   GLOBULIN 2.5 2.5   ALT (SGPT) 12 13   AST (SGOT) 25 22   BILIRUBIN 2.5* 1.2   ALK PHOS 343* 376*         Lab 02/09/25  2317 02/09/25 2113   HSTROP T 29* 33*             Lab 02/09/25  2207   ABO TYPING O   RH TYPING Positive   ANTIBODY SCREEN Negative         Brief Urine Lab Results  (Last result in the past 365 days)        Color   Clarity   Blood   Leuk Est   Nitrite   Protein   CREAT   Urine HCG        02/09/25 2248 Yellow   Turbid   Small (1+)   Large (3+)   Negative   30 mg/dL (1+)                   Microbiology Results Abnormal       None            XR Chest 1 View    Result Date: 2/9/2025  XR CHEST 1 VW Date of Exam: 2/9/2025 9:37 PM EST Indication: short of breath Comparison: 12/11/2024. Findings: There is a right internal jugular Mediport with the tip in the distal SVC. There are no airspace consolidations. No pleural fluid. No pneumothorax. The pulmonary vasculature appears within normal limits. The cardiac and mediastinal silhouette appear unremarkable. No acute osseous abnormality identified.     Impression: Impression: No acute cardiopulmonary process. Electronically Signed: Mary Jo Johnson MD  2/9/2025 10:43 PM EST  Workstation ID: [id number]     Results for orders placed during the hospital encounter of 11/08/24    Adult Transthoracic Echo Complete W/ Cont if Necessary Per Protocol    Interpretation Summary    Left ventricular ejection fraction appears to be 51 - 55%.    Left ventricular diastolic function was indeterminate.    Mild mitral valve regurgitation is present.    The aortic valve exhibits sclerosis.    Trace tricuspid valve regurgitation is  present. Estimated right ventricular systolic pressure from tricuspid regurgitation is normal (<35 mmHg).    There is no evidence of pericardial effusion.      Current medications:  Scheduled Meds:[Held by provider] apixaban, 2.5 mg, Oral, BID  cefTRIAXone, 1,000 mg, Intravenous, Q24H  levothyroxine, 25 mcg, Oral, Q AM  [Held by provider] metoprolol tartrate, 12.5 mg, Oral, BID  pantoprazole, 40 mg, Oral, BID AC  sodium chloride, 10 mL, Intravenous, Q12H      Continuous Infusions:sodium chloride, 75 mL/hr, Last Rate: 75 mL/hr (02/11/25 0505)      PRN Meds:.  acetaminophen **OR** acetaminophen **OR** acetaminophen    Calcium Replacement - Follow Nurse / BPA Driven Protocol    dicyclomine    HYDROcodone-acetaminophen    Magnesium Standard Dose Replacement - Follow Nurse / BPA Driven Protocol    melatonin    Morphine **AND** naloxone    nitroglycerin    ondansetron    Phosphorus Replacement - Follow Nurse / BPA Driven Protocol    Potassium Replacement - Follow Nurse / BPA Driven Protocol    [COMPLETED] Insert Peripheral IV **AND** sodium chloride    [COMPLETED] Insert Peripheral IV **AND** sodium chloride    sodium chloride    sodium chloride    Assessment & Plan   Assessment & Plan     Active Hospital Problems    Diagnosis  POA    **Symptomatic anemia [D64.9]  Yes      Resolved Hospital Problems   No resolved problems to display.        Brief Hospital Course to date:  Sheree Hernandez is a 84 y.o. female with history of lymphoma being treated with Rituxan, HLD, A-fib, hypothyroidism, PUD, sleep apnea, cholangiocarcinoma, anxiety, depression, who presented for evaluation of fatigue.  Found to have symptomatic anemia and UTI.    This patient's problems and plans were partially entered by my partner and updated as appropriate by me 02/11/25.    Symptomatic anemia  Lymphoma  -hgb 7.3 upon admission  -Transfused 2 units RBCs  -Monitor for any bleeding, a.m. labs     UTI, poa  -Follow-up urine culture  -Continue  ceftriaxone     Gen weakness  -multifactorial due to anemia, lymphoma, uti  -tsh wnl, a.m. cortisol 14  -pt/ot evals  -orthostatics +, status post IVF     Hx cholangiocardinoma  -s/p previous partial hepatectomy  -Imaging shows recurrence, this was discussed with patient and her daughter by Dr. Montenegro follows in the room     Parox afib  -currently in sinus  -hold eliquis (on 2.5mg bid) for now; discussed with Dr. Montenegro, patient regarding possibility of holding Eliquis on discharge, discussed increased stroke risk with not taking it  -holding low dose metoprolol 12.5mg bid      HLD  -statin     Hypothyroidism  -tsh wnl  -continue levothyroxine     Hx PUD/GERD  -ppi     BONNIE  -noncompliant w/ cpap    Expected Discharge Location and Transportation: home  Expected Discharge   Expected discharge date/ time has not been documented.     VTE Prophylaxis:  Pharmacologic & mechanical VTE prophylaxis orders are present.         AM-PAC 6 Clicks Score (PT): 22 (02/10/25 2000)    CODE STATUS:   Code Status and Medical Interventions: CPR (Attempt to Resuscitate); Full Support   Ordered at: 02/10/25 0040     Code Status (Patient has no pulse and is not breathing):    CPR (Attempt to Resuscitate)     Medical Interventions (Patient has pulse or is breathing):    Full Support       Gretchen Smith MD  02/11/25        Electronically signed by Gretchen Smith MD at 02/11/25 2937       HudginsJorge MD at 02/10/25 0777          2nd visit today. Admitted by partner earlier this morning    Symptomatic anemia  Lymphoma  -likely due to chemotherapy for lymphoma  -hgb 7.3 upon admission  **s/p 1 unit prbc, post-transfusion hgb 9.1  -trend/monitor, transfuse prn; repeat hgb at 1pm  -NS 500cc bolus over 2 hours  -courtesy consult w/ Oncology (Dr. Montenegro) as next chemo was scheduled today per patient  **addendum**: repeat hgb 7.0. will transfuse another 1 unit prbc (2nd total); recheck hgb 9pm and a.m.    UTI, poa  -CTX day #1, follow  urine culture    Gen weakness  -multifactorial due to anemia, lymphoma, uti  -tsh wnl  -pt/ot evals  -orthostatics    Hx cholangiocardinoma  -s/p previous partial hepatectomy    Parox afib  -currently in sinus  -hold eliquis (on 2.5mg bid) for now (until hgb stable)  -holding low dose metoprolol 12.5mg bid     HL  -statin    Hypothyroidism  -tsh wnl  -continue levothyroxine    Hx PUD/GERD  -ppi    BONNIE  -noncompliant w/ cpap    Am labs ordered (& follow urine culture)    Dispo: TBD, pt/ot evals pending            Electronically signed by Jorge Kenney MD at 02/10/25 1239          Consult Notes (last 72 hours)        Jorje Montenegro MD at 02/10/25 1025        Consult Orders    1. Inpatient Hematology & Oncology Consult [[id number]] ordered by Rj Gaviria III, DO at 02/10/25 0040                 HEMATOLOGY/ONCOLOGY INPATIENT CONSULT    REASON FOR CONSULT: Marginal zone lymphoma, intrahepatic cholangiocarcinoma, anemia    Subjective   HISTORY OF PRESENT ILLNESS;   Mr. Hernandez is a 84-year-old lady with past medical history of marginal zone lymphoma, intrahepatic cholangiocarcinoma, asthma, arthritis, PVCs who presented to [facility] with worsening fatigue and tiredness.  Has been dealing with some mild hypotension over the past several days as well as worsening generalized malaise and fatigue.  She was seen in the ER where her hemoglobin was found to be 7.3.  She received 1 unit PRBC with improvement of her hemoglobin levels.  She had a UA checked which was notable for UTI and was started on ceftriaxone.  She is currently week 3 of 4 weeks of weekly rituximab.  Has been tolerating the infusions okay.  Denies any nausea or vomiting.  Denies any new bone pain or discomfort      Past Medical History:   Diagnosis Date    Age-related osteoporosis without current pathological fracture 3/6/2024    Arthritis 2017    Asthma     Atypical chest pain 03/09/2017    Cataract     Chronic constipation      Diverticulosis 2018    Fracture, pelvis closed 2015    x2    HL (hearing loss) 2018    Hearing aids    Hyperlipidemia 03/09/2017    Hypertension 03/09/2017    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    Low bone mass     with elevated FRAX core    Lung nodule 07/12/2023    Mild obstructive sleep apnea 01/30/2020    Near syncope     negative cardiovascular workup     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    Osteopenia 2018    PAF (paroxysmal atrial fibrillation) 07/15/2024    Plantar fasciitis     Chronic    PVC's (premature ventricular contractions)     Senile keratosis     Multiple    MANAN (stress urinary incontinence, female)     Symptomatic anemia 2/9/2025    Syncope, near     with negative cardiovascular workup     Past Surgical History:   Procedure Laterality Date    ADRENAL GLAND SURGERY  09/22/2021    ADRENALECTOMY  09/22/2021    CARDIAC CATHETERIZATION N/A 01/18/2019    Procedure: Left Heart Cath;  Surgeon: Tucker Gonzalez MD;  Location:  [facility];  Service: Cardiovascular    CARDIAC CATHETERIZATION  1/18/2019    CATARACT EXTRACTION  04/2019    CHOLECYSTECTOMY  09/22/2021    COLONOSCOPY  07/15/2020    CYBERKNIFE  05/08/2023    Recurrent liver mass/involved LNs    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    ENDOSCOPY N/A 12/13/2024    Procedure: ESOPHAGOGASTRODUODENOSCOPY;  Surgeon: Mihir Encinas MD;  Location:  [facility];  Service: Gastroenterology;  Laterality: N/A;    HYSTERECTOMY  1984    age 46 - ovarian cyst, endometriosis,  jorge/bso    LIVER SURGERY Right 09/22/2021    Removed    ROTATOR CUFF REPAIR Left 1989    Collar Bone Repair    VENOUS ACCESS DEVICE (PORT) INSERTION Right 05/10/2024       No current facility-administered medications on file prior to encounter.     Current Outpatient Medications on File Prior to Encounter   Medication Sig Dispense Refill    apixaban (ELIQUIS) 2.5 MG tablet tablet Take 1 tablet by mouth 2 (Two) Times a Day. Friday  1-10-25       dicyclomine (BENTYL) 20 MG tablet Take 1 tablet by mouth Every 4 (Four) Hours As Needed for Abdominal Cramping 30 tablet 2    ipratropium (ATROVENT) 0.06 % nasal spray Administer 1 spray into each nostril twice daily 30 mL 3    levothyroxine (SYNTHROID, LEVOTHROID) 25 MCG tablet Take 1 tablet by mouth Every Morning. 30 tablet 3    lidocaine-prilocaine (EMLA) 2.5-2.5 % cream Please apply to port site 30 min prior to infusion and cover with Saran Wrap 30 g 3    linaclotide (Linzess) 72 MCG capsule capsule Take 1 capsule by mouth Every Morning Before Breakfast. 30 capsule 0    methylphenidate (Ritalin) 5 MG tablet Take 1 tablet by mouth 2 (Two) Times a Day. 60 tablet 0    metoprolol tartrate (LOPRESSOR) 25 MG tablet Take 0.5 tablets by mouth 2 (Two) Times a Day.      ondansetron (ZOFRAN) 8 MG tablet Take 1 tablet by mouth 3 (Three) Times a Day As Needed for Nausea or Vomiting. 30 tablet 3    pantoprazole (PROTONIX) 40 MG EC tablet Take 1 tablet by mouth 2 (Two) Times a Day Before Meals. 180 tablet 3    sennosides-docusate (Stimulant Laxative) 8.6-50 MG per tablet Take 1 tablet by mouth 2 (Two) Times a Day As Needed for Constipation. 60 tablet 0       Allergies   Allergen Reactions    Pravastatin Myalgia       Social History     Socioeconomic History    Marital status:     Number of children: 2   Tobacco Use    Smoking status: Never     Passive exposure: Never    Smokeless tobacco: Never    Tobacco comments:     Exposed to secondhand smoke   Vaping Use    Vaping status: Never Used   Substance and Sexual Activity    Alcohol use: Never     Comment: Very seldom    Drug use: Never    Sexual activity: Not Currently     Partners: Male     Birth control/protection: None, Hysterectomy     Comment: Complete Hysterectomy       Family History   Problem Relation Age of Onset    Heart attack Mother     Heart failure Mother     Dementia Mother     Stroke Mother     Arthritis Mother         Heart Attack    Heart disease  "Father     Hearing loss Father     Stroke Father     Arrhythmia Brother     Breast cancer Paternal Aunt     Colon cancer Neg Hx          REVIEW OF SYSTEMS:  A 12-point review of systems was performed and is negative except as noted above.    Objective   PHYSICAL EXAM:    /47   Pulse 97   Temp 100.5 °F (38.1 °C) (Oral)   Resp 20   Ht 162.6 cm (64\")   Wt 59 kg (130 lb)   SpO2 94%   BMI 22.31 kg/m²     General: Laying in bed in no acute distress  HEENT: sclerae anicteric, oropharynx clear  Lymphatics: no cervical, supraclavicular, inguinal, or axillary adenopathy  Neck: Supple. No thyromegaly.  Cardiovascular: regular rate and rhythm, no murmurs  Lungs: clear to auscultation bilaterally. No respiratory distress  Abdomen: soft, nontender, nondistended.  No palpable organomegaly  Extremities: no lower extremity edema, cyanosis, or clubbing  Skin: no rashes, lesions, bruising, or petechiae  Neuro: Alert and oriented x3. Moves all extremities.    Results:    Results from last 7 days   Lab Units 02/10/25  0627 02/09/25 2113 02/04/25  0838   WBC 10*3/mm3 9.56 6.27 5.26   HEMOGLOBIN g/dL 9.1* 7.3* 8.1*   PLATELETS 10*3/mm3 120* 107* 106*     Results from last 7 days   Lab Units 02/10/25  0708 02/09/25 2113   SODIUM mmol/L 136 135*   POTASSIUM mmol/L 3.8 3.9   CO2 mmol/L 24.0 22.0   BUN mg/dL 13 14   CREATININE mg/dL 1.17* 1.16*   GLUCOSE mg/dL 138* 129*     Results from last 7 days   Lab Units 02/10/25  0708 02/09/25 2113   AST (SGOT) U/L 25 22   ALT (SGPT) U/L 12 13   BILIRUBIN mg/dL 2.5* 1.2   ALK PHOS U/L 343* 376*         XR Chest 1 View    Result Date: 2/9/2025  Impression: No acute cardiopulmonary process. Electronically Signed: Mary Jo Johnson MD  2/9/2025 10:43 PM EST  Workstation ID: [id number]     Assessment    ASSESSMENT & PLAN:  Marginal zone lymphoma  -Diagnosed on bone marrow biopsy in January 2025  -Status post 3 weeks of weekly rituximab  -Will plan to hold week for at this time    Intrahepatic " cholangiocarcinoma  -Has been off therapy for the past several months  -Bilirubin slightly increasing  -Will plan to check CT scans    NETO  -Likely secondary to dehydration  -Receiving IV fluids    UTI  -Noted on UA  -Currently on ceftriaxone    Anemia  -Status post 1 unit PRBC in the ER    Thank you for the consult.  Will continue to follow while inpatient    Jorje Montenegro MD  Hematology and Oncology    2/10/2025  10:25 EST                 Electronically signed by Jorje Montenegro MD at 02/10/25 3386

## 2025-02-13 ENCOUNTER — TELEPHONE (OUTPATIENT)
Dept: FAMILY MEDICINE CLINIC | Facility: CLINIC | Age: 85
End: 2025-02-13
Payer: MEDICARE

## 2025-02-13 ENCOUNTER — TELEPHONE (OUTPATIENT)
Dept: ONCOLOGY | Facility: CLINIC | Age: 85
End: 2025-02-13

## 2025-02-13 LAB
ANION GAP SERPL CALCULATED.3IONS-SCNC: 11 MMOL/L (ref 5–15)
BASOPHILS # BLD AUTO: 0.01 10*3/MM3 (ref 0–0.2)
BASOPHILS NFR BLD AUTO: 0.2 % (ref 0–1.5)
BUN SERPL-MCNC: 12 MG/DL (ref 8–23)
BUN/CREAT SERPL: 12.4 (ref 7–25)
CALCIUM SPEC-SCNC: 8.4 MG/DL (ref 8.6–10.5)
CHLORIDE SERPL-SCNC: 107 MMOL/L (ref 98–107)
CO2 SERPL-SCNC: 21 MMOL/L (ref 22–29)
CREAT SERPL-MCNC: 0.97 MG/DL (ref 0.57–1)
DEPRECATED RDW RBC AUTO: 63.3 FL (ref 37–54)
EGFRCR SERPLBLD CKD-EPI 2021: 57.4 ML/MIN/1.73
EOSINOPHIL # BLD AUTO: 0.07 10*3/MM3 (ref 0–0.4)
EOSINOPHIL NFR BLD AUTO: 1.5 % (ref 0.3–6.2)
ERYTHROCYTE [DISTWIDTH] IN BLOOD BY AUTOMATED COUNT: 18.2 % (ref 12.3–15.4)
GLUCOSE SERPL-MCNC: 118 MG/DL (ref 65–99)
HCT VFR BLD AUTO: 28.1 % (ref 34–46.6)
HGB BLD-MCNC: 9.3 G/DL (ref 12–15.9)
IMM GRANULOCYTES # BLD AUTO: 0.02 10*3/MM3 (ref 0–0.05)
IMM GRANULOCYTES NFR BLD AUTO: 0.4 % (ref 0–0.5)
LYMPHOCYTES # BLD AUTO: 0.75 10*3/MM3 (ref 0.7–3.1)
LYMPHOCYTES NFR BLD AUTO: 15.9 % (ref 19.6–45.3)
MCH RBC QN AUTO: 31.4 PG (ref 26.6–33)
MCHC RBC AUTO-ENTMCNC: 33.1 G/DL (ref 31.5–35.7)
MCV RBC AUTO: 94.9 FL (ref 79–97)
MONOCYTES # BLD AUTO: 0.73 10*3/MM3 (ref 0.1–0.9)
MONOCYTES NFR BLD AUTO: 15.5 % (ref 5–12)
NEUTROPHILS NFR BLD AUTO: 3.14 10*3/MM3 (ref 1.7–7)
NEUTROPHILS NFR BLD AUTO: 66.5 % (ref 42.7–76)
NRBC BLD AUTO-RTO: 0 /100 WBC (ref 0–0.2)
PLATELET # BLD AUTO: 99 10*3/MM3 (ref 140–450)
PMV BLD AUTO: 13 FL (ref 6–12)
POTASSIUM SERPL-SCNC: 4.1 MMOL/L (ref 3.5–5.2)
RBC # BLD AUTO: 2.96 10*6/MM3 (ref 3.77–5.28)
SODIUM SERPL-SCNC: 139 MMOL/L (ref 136–145)
WBC NRBC COR # BLD AUTO: 4.72 10*3/MM3 (ref 3.4–10.8)

## 2025-02-13 PROCEDURE — 99222 1ST HOSP IP/OBS MODERATE 55: CPT

## 2025-02-13 PROCEDURE — 97530 THERAPEUTIC ACTIVITIES: CPT

## 2025-02-13 PROCEDURE — 99232 SBSQ HOSP IP/OBS MODERATE 35: CPT | Performed by: FAMILY MEDICINE

## 2025-02-13 PROCEDURE — 80048 BASIC METABOLIC PNL TOTAL CA: CPT | Performed by: FAMILY MEDICINE

## 2025-02-13 PROCEDURE — 25010000002 ERTAPENEM PER 500 MG: Performed by: FAMILY MEDICINE

## 2025-02-13 PROCEDURE — 25010000002 MEROPENEM PER 100 MG: Performed by: FAMILY MEDICINE

## 2025-02-13 PROCEDURE — 85025 COMPLETE CBC W/AUTO DIFF WBC: CPT | Performed by: FAMILY MEDICINE

## 2025-02-13 RX ORDER — METOPROLOL TARTRATE 25 MG/1
12.5 TABLET, FILM COATED ORAL 2 TIMES DAILY
Status: DISCONTINUED | OUTPATIENT
Start: 2025-02-13 | End: 2025-02-14 | Stop reason: HOSPADM

## 2025-02-13 RX ORDER — MEGESTROL ACETATE 20 MG/1
20 TABLET ORAL 2 TIMES DAILY
Status: DISCONTINUED | OUTPATIENT
Start: 2025-02-13 | End: 2025-02-14 | Stop reason: HOSPADM

## 2025-02-13 RX ADMIN — LEVOTHYROXINE SODIUM 25 MCG: 25 TABLET ORAL at 05:26

## 2025-02-13 RX ADMIN — Medication 10 ML: at 20:45

## 2025-02-13 RX ADMIN — SENNOSIDES AND DOCUSATE SODIUM 2 TABLET: 50; 8.6 TABLET ORAL at 09:07

## 2025-02-13 RX ADMIN — ERTAPENEM 1000 MG: 1 INJECTION INTRAMUSCULAR; INTRAVENOUS at 13:28

## 2025-02-13 RX ADMIN — PANTOPRAZOLE SODIUM 40 MG: 40 TABLET, DELAYED RELEASE ORAL at 17:23

## 2025-02-13 RX ADMIN — SENNOSIDES AND DOCUSATE SODIUM 2 TABLET: 50; 8.6 TABLET ORAL at 20:45

## 2025-02-13 RX ADMIN — PANTOPRAZOLE SODIUM 40 MG: 40 TABLET, DELAYED RELEASE ORAL at 09:07

## 2025-02-13 RX ADMIN — MEROPENEM 500 MG: 500 INJECTION, POWDER, FOR SOLUTION INTRAVENOUS at 05:26

## 2025-02-13 RX ADMIN — Medication 10 ML: at 09:10

## 2025-02-13 RX ADMIN — Medication 5 MG: at 20:45

## 2025-02-13 NOTE — CONSULTS
[facility] Cardiology  Consultation H&P    Patient: Sheree Hernandez  1940    [facility] [facility] [id number]    PCP:  Timi Conway DO   Treatment Team:   Attending Provider: Georgie Wahl MD  Consulting Physician: Jorje Montenegro MD  Consulting Physician: Carlos Kay MD  Admitting Provider: Georgie Wahl MD   2/9/2025    Primary cardiologist: Carlos Kay MD    DATE OF CONSULTATION: 2/13/2025 09:17 EST     IDENTIFICATION: A 84 y.o. female     REASON FOR CONSULTATION: Eliquis recommendations    PROBLEM LIST:    Symptomatic anemia    Dehydration    Severe protein-calorie malnutrition    Past Medical History:  PAF/PVCs - CV 4  6/21 Holter: showing occasional PACs, no atrial arrhythmias, 5 episodes of SVT  6/24 Holter: showing new onset atrial fibrillation with RVR, longest episode 10 hours, atrial fibrillation burden 9.2%, ventricular bigeminy and nonsustained runs of VT present with the longest 8 beats.  PVC burden less than 1%, patient started on Eliquis  11/24 Echo: normal LVEF, mild MR  12/24 Stress PET: no ischemia, low risk study, ECG portion equivocal, no significant coronary calcification   12/24 noncompliance with Eliquis and amiodarone  Hypertension  Abnormal stress test-data deficit  Left heart catheterization 2019: RCA 20% stenosis, otherwise normal coronaries, EF 60%  Echocardiogram 7/24/2020: EF 65%  Exercise stress test 1/4/2023: LVEF greater than 70%, normal myocardial perfusion study with no evidence of ischemia, CT portion of the exam with no significant coronary artery calcification.  Incidentally detected dilated loops of bowel noted, surgical clips noted in the liver.  Echocardiogram 1/13/2023: LVEF 61-65%, mild calcification of the aortic valve, hypertrophy of the intra-atrial septum noted, IVSD 0.8 cm  Echocardiogram 5/22/2024: LVEF 56 to 60%, GLS -19.5%, moderate left pleural effusion, trace MR  Residual class I  symptoms  Hyperlipidemia; intolerant to pravastatin  Intrahepatic cholangiocarcinoma   Status post right hepatectomy cholecystectomy right partial adrenalectomy   Status post chemotherapy and radiation, patient completed Xeloda  CT chest/abdomen/pelvis July 2023 showing mild increase in retroperitoneal lymph nodes, and new small 6mm LLL nodule and minimal enlargement of nodule in RML. Additional numerous tiny pulmonary nodules stable. CT concerning for metastatic disease  Completed radiation August 2023  Completed radiation February 2024  CT chest/abdomen/pelvis April 2024: Continued slight enlargement of multiple pulmonary nodules as well as liver lesion, CA 19-9 stable  Status post cycle 1 cisplatin/gemcitabine/durvalumab on 4/19/2024, complicated by severe neutropenia and thrombocytopenia, ANC 70, platelets 8, dose reduced 8% for cycle 2.  2/2025 CT C/A/P reviewed and notable for slight enlargement of her pulmonary nodules as well as her primary liver mass   Hemochromatosis carrier  Obstructive sleep apnea, not on CPAP  EGD 12/13/2024: non-bleeding duodenal ulcer  Thyroid nodule  COVID November 2023.  Lymphoma, dx on bone marrow biopsy 1/2025  Surgical history:   Adrenal gland surgery  Cholecystectomy   LEIDY  Left collar bone repair      Allergies  Allergies   Allergen Reactions    Pravastatin Myalgia       Home Medications:  Current Outpatient Medications   Medication Instructions    apixaban (ELIQUIS) 2.5 mg, Oral, 2 Times Daily, Friday  1-10-25    dicyclomine (BENTYL) 20 MG tablet Take 1 tablet by mouth Every 4 (Four) Hours As Needed for Abdominal Cramping    ipratropium (ATROVENT) 0.06 % nasal spray Administer 1 spray into each nostril twice daily    levothyroxine (SYNTHROID, LEVOTHROID) 25 mcg, Oral, Every Early Morning    lidocaine-prilocaine (EMLA) 2.5-2.5 % cream Please apply to port site 30 min prior to infusion and cover with Saran Wrap    linaclotide (LINZESS) 72 mcg, Oral, Every Morning Before  Breakfast    methylphenidate (RITALIN) 5 mg, Oral, 2 Times Daily    metoprolol tartrate (LOPRESSOR) 12.5 mg, Oral, 2 Times Daily    ondansetron (ZOFRAN) 8 mg, Oral, 3 Times Daily PRN    pantoprazole (PROTONIX) 40 mg, Oral, 2 Times Daily Before Meals    sennosides-docusate (Stimulant Laxative) 8.6-50 MG per tablet 1 tablet, Oral, 2 Times Daily PRN        Current Medications  Scheduled Meds:  [Held by provider] apixaban, 2.5 mg, Oral, BID  ertapenem (INVanz) 1,000 mg in sodium chloride 0.9 % 100 mL MBP, 1,000 mg, Intravenous, Q24H  levothyroxine, 25 mcg, Oral, Q AM  [Held by provider] metoprolol tartrate, 12.5 mg, Oral, BID  pantoprazole, 40 mg, Oral, BID AC  senna-docusate sodium, 2 tablet, Oral, BID  sodium chloride, 10 mL, Intravenous, Q12H      Continuous Infusions:     PRN Meds:    acetaminophen **OR** acetaminophen **OR** acetaminophen    senna-docusate sodium **AND** polyethylene glycol **AND** bisacodyl **AND** bisacodyl    Calcium Replacement - Follow Nurse / BPA Driven Protocol    dicyclomine    HYDROcodone-acetaminophen    Magnesium Standard Dose Replacement - Follow Nurse / BPA Driven Protocol    melatonin    Morphine **AND** naloxone    nitroglycerin    ondansetron    Phosphorus Replacement - Follow Nurse / BPA Driven Protocol    Potassium Replacement - Follow Nurse / BPA Driven Protocol    [COMPLETED] Insert Peripheral IV **AND** sodium chloride    [COMPLETED] Insert Peripheral IV **AND** sodium chloride    sodium chloride    sodium chloride      History of Present Illness   Sheree Hernandez is a 84 y.o. year old female with a past medical history significant for PAF, PVCs, HTN, lymphoma on rituximab, HLD, hypothyroidism, PUD, BONNIE, and cholangiocarcinoma who presented to [facility] 2/9/2025 with fatigue, weakness. She was found to have symptomatic anemia and UTI. She has received 2 u PRBCs. Cardiology has been consulted regarding anticoagulation for PAF. PAF noted on holter monitor 6/2024, ~9% burden.  She has occasional fluttering/fast heart rates when she is up moving around while in hospital but no atrial fibrillation has been seen on telemetry. She was not taking Eliquis regularly at home due to fear of bleeding. She has had orthostatic hypotension this admission and received IVF. She is on room air in no acute distress.     ROS  Review of Systems   Constitutional: Positive for malaise/fatigue.   Cardiovascular:         Fast heart rates   Gastrointestinal:  Positive for anorexia.   Genitourinary:  Positive for dysuria.   Neurological:  Positive for light-headedness and weakness.   All other systems reviewed and are negative.      SOCIAL HX  Social History     Socioeconomic History    Marital status:     Number of children: 2   Tobacco Use    Smoking status: Never     Passive exposure: Never    Smokeless tobacco: Never    Tobacco comments:     Exposed to secondhand smoke   Vaping Use    Vaping status: Never Used   Substance and Sexual Activity    Alcohol use: Never     Comment: Very seldom    Drug use: Never    Sexual activity: Not Currently     Partners: Male     Birth control/protection: None, Hysterectomy     Comment: Complete Hysterectomy       FAMILY HX  Family History   Problem Relation Age of Onset    Heart attack Mother     Heart failure Mother     Dementia Mother     Stroke Mother     Arthritis Mother         Heart Attack    Heart disease Father     Hearing loss Father     Stroke Father     Arrhythmia Brother     Breast cancer Paternal Aunt     Colon cancer Neg Hx        OBJECTIVE:  Vitals:    02/12/25 1510 02/12/25 2001 02/13/25 0441 02/13/25 0906   BP: 120/62 112/58 117/61 123/81   BP Location: Right arm Right arm Right arm Right arm   Patient Position: Lying Lying Lying Lying   Pulse: 83 82 86 87   Resp: 18 16 18 20   Temp: 98.4 °F (36.9 °C) 98.6 °F (37 °C) 97.7 °F (36.5 °C) 97.3 °F (36.3 °C)   TempSrc: Oral Oral Oral Oral   SpO2:  95% 95% 94%   Weight:       Height:         I/O last 3  completed shifts:  In: 720 [P.O.:720]  Out: -   No intake/output data recorded.  Intake & Output (last 3 days)         02/10 0701  02/11 0700 02/11 0701  02/12 0700 02/12 0701  02/13 0700 02/13 0701  02/14 0700    P.O. 480 720 720     Blood 206.3       Total Intake(mL/kg) 686.3 (11.6) 720 (12.2) 720 (12.2)     Net +686.3 +720 +720             Urine Unmeasured Occurrence 3 x 7 x 4 x     Stool Unmeasured Occurrence  1 x 2 x              PHYSICAL EXAMINATION:  Vitals reviewed.   Constitutional:       General: Not in acute distress.  Pulmonary:      Effort: Pulmonary effort is normal.      Breath sounds: Bibasilar Rales present.      Comments: Diminished right  Chest:      Chest wall: Not tender to palpatation.   Cardiovascular:      Normal rate. Occasional ectopic beats. Regular rhythm. Normal S1. Normal S2.       Murmurs: There is no murmur.   Pulses:     Intact distal pulses.   Edema:     Pretibial: bilateral trace edema of the pretibial area.  Skin:     General: Skin is warm and dry.   Neurological:      General: No focal deficit present.      Mental Status: Alert and oriented to person, place and time.       Telemetry: SR with PVCs    Diagnostic Data:  Lab Results (last 24 hours)       Procedure Component Value Units Date/Time    Potassium [[id number]]  (Normal) Collected: 02/12/25 1655    Specimen: Blood Updated: 02/12/25 1726     Potassium 4.5 mmol/L     Blood Culture - Blood, Arm, Right [[id number]]  (Normal) Collected: 02/10/25 1113    Specimen: Blood from Arm, Right Updated: 02/12/25 1200     Blood Culture No growth at 2 days    Narrative:      Less than seven (7) mL's of blood was collected.  Insufficient quantity may yield false negative results.    Blood Culture - Blood, Arm, Left [[id number]]  (Normal) Collected: 02/10/25 1113    Specimen: Blood from Arm, Left Updated: 02/12/25 1200     Blood Culture No growth at 2 days    Narrative:      Less than seven (7) mL's of blood was collected.  Insufficient quantity  may yield false negative results.    Urine Culture - Urine, Urine, Clean Catch [[id number]]  (Abnormal)  (Susceptibility) Collected: 02/09/25 2248    Specimen: Urine, Clean Catch Updated: 02/12/25 1058     Urine Culture >100,000 CFU/mL Escherichia coli ESBL    Narrative:      Colonization of the urinary tract without infection is common. Treatment is discouraged unless the patient is symptomatic, pregnant, or undergoing an invasive urologic procedure.  Recent outcomes data supports the use of pip/tazo in the treatment of susceptible ESBL infections for uncomplicated UTI. Consider use of pip/tazo as a carbapenem-sparing regimen in applicable patients.    Susceptibility        Escherichia coli ESBL      POLLO      Ciprofloxacin Intermediate      Ertapenem Susceptible      Gentamicin Susceptible      Levofloxacin Intermediate      Meropenem Susceptible      Nitrofurantoin Susceptible      Piperacillin + Tazobactam Susceptible      Trimethoprim + Sulfamethoxazole Resistant                       Susceptibility Comments       Escherichia coli ESBL    For ESBL-producing infections, consider infectious disease consult. Susceptibility results may not correlate to clincal outcomes.                     ECG 12 Lead Dyspnea   Final Result   Test Reason : Dyspnea   Blood Pressure :   */*   mmHG   Vent. Rate :  93 BPM     Atrial Rate :  93 BPM      P-R Int : 140 ms          QRS Dur :  80 ms       QT Int : 380 ms       P-R-T Axes :  56  39  54 degrees     QTcB Int : 472 ms      Normal sinus rhythm   Normal ECG   When compared with ECG of 11-Dec-2024 11:51,   Vent. rate has increased by  37 bpm   Confirmed by MD FATMATA, ROSA M ([id number]) on 2/11/2025 11:24:16 PM      Referred By: edmd           Confirmed By: ROSA M AVILEZ MD        Results for orders placed during the hospital encounter of 11/08/24    Adult Transthoracic Echo Complete W/ Cont if Necessary Per Protocol    Interpretation Summary    Left ventricular ejection fraction appears  to be 51 - 55%.    Left ventricular diastolic function was indeterminate.    Mild mitral valve regurgitation is present.    The aortic valve exhibits sclerosis.    Trace tricuspid valve regurgitation is present. Estimated right ventricular systolic pressure from tricuspid regurgitation is normal (<35 mmHg).    There is no evidence of pericardial effusion.       ASSESSMENT/PLAN:    Symptomatic anemia    Dehydration    Severe protein-calorie malnutrition    PAF, CV 4 - maintaining SR   - Eliquis deferred in December due to anemia  - She is hesitant to resume due to fear of bleeding with ongoing GI complaints  - She required 2 units PRBCs this admission.  - Reasonable to remain off Eliquis at current.  - She did reveal to me that she is considering a palliative approach if cancer has recurred.  - Resume low dose metoprolol  - Will reschedule follow up in office in 4 weeks with Dr. Kay.       Abby Bravo, LEDY. 2/13/2025  09:17 EST

## 2025-02-13 NOTE — THERAPY TREATMENT NOTE
Patient Name: Sheree Hernandez  : 1940    MRN: [id number]                              Today's Date: 2025       Admit Date: 2025    Visit Dx:     ICD-10-CM ICD-9-CM   1. Severe anemia  D64.9 285.9   2. Symptomatic anemia  D64.9 285.9   3. Lymphoma, unspecified body region, unspecified lymphoma type  C85.90 202.80   4. Impaired mobility and ADLs  Z74.09 V49.89    Z78.9      Patient Active Problem List   Diagnosis    Hypertension    Atypical chest pain    Dyslipidemia    Dyspnea on exertion    Allergic rhinitis    Abnormal stress test    Idiopathic osteoarthritis    BONNIE (obstructive sleep apnea)    Chronic fatigue    Hemochromatosis carrier    Erythrocytosis    Liver masses    Intrahepatic cholangiocarcinoma    Nausea    Dyspepsia    Nontoxic multinodular goiter    Lung nodule    PAF (paroxysmal atrial fibrillation)    Dehydration    Vasovagal syncope    Weight loss    Pain in right knee    Actinic keratosis    Age-related osteoporosis without current pathological fracture    Neoplasm of uncertain behavior of skin    Senile hyperkeratosis    Idiopathic osteoarthritis    Intrahepatic cholangiocarcinoma    BONNIE (obstructive sleep apnea)    Marginal zone lymphoma    Lymphoma    Symptomatic anemia    Severe protein-calorie malnutrition     Past Medical History:   Diagnosis Date    Age-related osteoporosis without current pathological fracture 3/6/2024    Arthritis 2017    Asthma     Atypical chest pain 2017    Cataract     Chronic constipation     Diverticulosis 2018    Fracture, pelvis closed 2015    x2    HL (hearing loss) 2018    Hearing aids    Hyperlipidemia 2017    Hypertension 2017    Intrahepatic cholangiocarcinoma 2021    Low bone mass     with elevated FRAX core    Lung nodule 2023    Mild obstructive sleep apnea 2020    Near syncope     negative cardiovascular workup     Nontoxic multinodular goiter 2022    Osteopenia 2018    PAF (paroxysmal atrial  fibrillation) 07/15/2024    Plantar fasciitis     Chronic    PVC's (premature ventricular contractions)     Senile keratosis     Multiple    MANAN (stress urinary incontinence, female)     Symptomatic anemia 2/9/2025    Syncope, near     with negative cardiovascular workup     Past Surgical History:   Procedure Laterality Date    ADRENAL GLAND SURGERY  09/22/2021    ADRENALECTOMY  09/22/2021    CARDIAC CATHETERIZATION N/A 01/18/2019    Procedure: Left Heart Cath;  Surgeon: Tucker Gonzalez MD;  Location:  [facility];  Service: Cardiovascular    CARDIAC CATHETERIZATION  1/18/2019    CATARACT EXTRACTION  04/2019    CHOLECYSTECTOMY  09/22/2021    COLONOSCOPY  07/15/2020    CYBERKNIFE  05/08/2023    Recurrent liver mass/involved LNs    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    ENDOSCOPY N/A 12/13/2024    Procedure: ESOPHAGOGASTRODUODENOSCOPY;  Surgeon: Mihir Encinas MD;  Location:  [facility];  Service: Gastroenterology;  Laterality: N/A;    HYSTERECTOMY  1984    age 46 - ovarian cyst, endometriosis,  jorge/bso    LIVER SURGERY Right 09/22/2021    Removed    ROTATOR CUFF REPAIR Left 1989    Collar Bone Repair    VENOUS ACCESS DEVICE (PORT) INSERTION Right 05/10/2024      General Information       Row Name 02/13/25 1604          Physical Therapy Time and Intention    Document Type therapy note (daily note)  -AE     Mode of Treatment physical therapy  -AE       Row Name 02/13/25 1604          General Information    Patient Profile Reviewed yes  -AE     Existing Precautions/Restrictions fall  -AE     Barriers to Rehab medically complex  -AE       Row Name 02/13/25 1604          Cognition    Orientation Status (Cognition) oriented x 3  -AE       Row Name 02/13/25 1604          Safety Issues/Impairments Affecting Functional Mobility    Safety Issues Affecting Function (Mobility) awareness of need for assistance;insight into deficits/self-awareness;safety precaution awareness;safety precautions  follow-through/compliance;sequencing abilities  -AE     Impairments Affecting Function (Mobility) balance;endurance/activity tolerance;shortness of breath;strength  -AE               User Key  (r) = Recorded By, (t) = Taken By, (c) = Cosigned By      Initials Name Provider Type    AE Kenan Johansen PT Physical Therapist                   Mobility       Row Name 02/13/25 1604          Bed Mobility    Bed Mobility supine-sit;sit-supine  -AE     Supine-Sit [place] (Bed Mobility) standby assist  -AE     Sit-Supine [place] (Bed Mobility) standby assist  -AE     Assistive Device (Bed Mobility) head of bed elevated;bed rails  -AE     Comment, (Bed Mobility) No cues needed for bed mobility.  -AE       Row Name 02/13/25 1604          Transfers    Comment, (Transfers) No cues for STS. No LOB noted.  -AE       Row Name 02/13/25 1604          Sit-Stand Transfer    Sit-Stand [place] (Transfers) contact guard;verbal cues  -AE       Row Name 02/13/25 1604          Gait/Stairs (Locomotion)    [place] Level (Gait) contact guard;verbal cues  -AE     Distance in Feet (Gait) 325  -AE     Deviations/Abnormal Patterns (Gait) stride length decreased;colby decreased;gait speed decreased;base of support, narrow  -AE     Bilateral Gait Deviations forward flexed posture;heel strike decreased  -AE     Comment, (Gait/Stairs) Pt demo step through gait pattern with quick pace and decreased activity tolerance. Pt required one standing rest break d/t increased fatigue and SOA. Pt reports baseline decreased activity tolerance. Further distance limited by fatigue.  -AE               User Key  (r) = Recorded By, (t) = Taken By, (c) = Cosigned By      Initials Name Provider Type    AE Kenan Johansen PT Physical Therapist                   Obj/Interventions       Row Name 02/13/25 1600          Balance    Balance Assessment sitting static balance;sitting dynamic balance;standing static balance;standing dynamic balance  -AE      Static Sitting Balance standby assist  -AE     Dynamic Sitting Balance contact guard  -AE     Position, Sitting Balance unsupported;sitting edge of bed  -AE     Static Standing Balance contact guard  -AE     Dynamic Standing Balance contact guard  -AE     Position/Device Used, Standing Balance unsupported  -AE               User Key  (r) = Recorded By, (t) = Taken By, (c) = Cosigned By      Initials Name Provider Type    AE Kenan Johansen PT Physical Therapist                   Goals/Plan    No documentation.                  Clinical Impression       Row Name 02/13/25 1609          Pain    Pretreatment Pain Rating 0/10 - no pain  -AE     Posttreatment Pain Rating 0/10 - no pain  -AE       Row Name 02/13/25 1609          Plan of Care Review    Plan of Care Reviewed With patient  -AE     Progress improving  -AE     Outcome Evaluation Pt continues to present with decreased functional mobility and decreased activity tolerance with ambulation. Pt ambulated 325ft with CGA, unsupported. Continue to progress per pt tolerance.  -AE       Row Name 02/13/25 1609          Vital Signs    Pre Systolic BP Rehab 116  -AE     Pre Treatment Diastolic BP 59  -AE     O2 Delivery Pre Treatment room air  -AE     O2 Delivery Intra Treatment room air  -AE     O2 Delivery Post Treatment room air  -AE     Pre Patient Position Supine  -AE     Intra Patient Position Standing  -AE     Post Patient Position Supine  -AE       Row Name 02/13/25 1609          Positioning and Restraints    Pre-Treatment Position in bed  -AE     Post Treatment Position bed  -AE     In Bed notified nsg;fowlers;call light within reach;encouraged to call for assist;side rails up x2;legs elevated  -AE               User Key  (r) = Recorded By, (t) = Taken By, (c) = Cosigned By      Initials Name Provider Type    AE Kenan Johansen, PT Physical Therapist                   Outcome Measures       Row Name 02/13/25 1611          How much help from another person do  you currently need...    Turning from your back to your side while in flat bed without using bedrails? 4  -AE     Moving from lying on back to sitting on the side of a flat bed without bedrails? 4  -AE     Moving to and from a bed to a chair (including a wheelchair)? 4  -AE     Standing up from a chair using your arms (e.g., wheelchair, bedside chair)? 4  -AE     Climbing 3-5 steps with a railing? 3  -AE     To walk in hospital room? 3  -AE     AM-PAC 6 Clicks Score (PT) 22  -AE     Highest Level of Mobility Goal 7 --> Walk 25 feet or more  -AE       Row Name 02/13/25 1611          Functional Assessment    Outcome Measure Options AM-PAC 6 Clicks Basic Mobility (PT)  -AE               User Key  (r) = Recorded By, (t) = Taken By, (c) = Cosigned By      Initials Name Provider Type    AE Kenan Johansen, PT Physical Therapist                                 Physical Therapy Education       Title: PT OT SLP Therapies (In Progress)       Topic: Physical Therapy (In Progress)       Point: Mobility training (In Progress)       Learning Progress Summary            Patient Acceptance, E, NR by AE at 2/13/2025 1524                      Point: Home exercise program (Not Started)       Learner Progress:  Not documented in this visit.              Point: Body mechanics (In Progress)       Learning Progress Summary            Patient Acceptance, E, NR by AE at 2/13/2025 1524                      Point: Precautions (In Progress)       Learning Progress Summary            Patient Acceptance, E, NR by AE at 2/13/2025 1524                                      User Key       Initials Effective Dates Name Provider Type Discipline    AE 09/21/21 -  Kenan Johansen, PT Physical Therapist PT                  PT Recommendation and Plan     Progress: improving  Outcome Evaluation: Pt continues to present with decreased functional mobility and decreased activity tolerance with ambulation. Pt ambulated 325ft with CGA, unsupported. Continue  to progress per pt tolerance.     Time Calculation:         PT Charges       Row Name 02/13/25 1612             Time Calculation    Start Time 1524  -AE      PT Received On 02/13/25  -AE      PT Goal Re-Cert Due Date 02/21/25  -AE         Timed Charges    31871 - PT Therapeutic Activity Minutes 12  -AE         Total Minutes    Timed Charges Total Minutes 12  -AE       Total Minutes 12  -AE                User Key  (r) = Recorded By, (t) = Taken By, (c) = Cosigned By      Initials Name Provider Type    AE Kenan Johansen, PT Physical Therapist                  Therapy Charges for Today       Code Description Service Date Service Provider Modifiers Qty    [id number] HC PT THERAPEUTIC ACT EA 15 MIN 2/13/2025 Kenan Johansen, PT GP 1            PT G-Codes  Outcome Measure Options: AM-PAC 6 Clicks Basic Mobility (PT)  AM-PAC 6 Clicks Score (PT): 22  AM-PAC 6 Clicks Score (OT): 20  PT Discharge Summary  Anticipated Discharge Disposition (PT): home with assist, home with outpatient therapy services    Kenan Johansen PT  2/13/2025

## 2025-02-13 NOTE — TELEPHONE ENCOUNTER
Caller: Tiana Olvera    Relationship: Emergency Contact    Best call back number: [phone]     What is the best time to reach you: ANYTIME    Who are you requesting to speak with (clinical staff, provider,  specific staff member): DR TONY        What was the call regarding: CALLER IS ASKING TO SPEAK WITH DR TONY WHEN AVAILABLE, SHE LIVES IN [place] AND HAS SOME MEDICAL QUESTIONS ABOUT THE PATIENT TO DISCUSS.  OK TO LEAVE VM IF NO ANSWER.

## 2025-02-13 NOTE — PROGRESS NOTES
[facility] Medicine Services  PROGRESS NOTE    Patient Name: Sheree Hernandez  : 1940  MRN: [id number]    Date of Admission: 2025  Primary Care Physician: Timi Conway, DO    Subjective   Subjective     CC:  Anemia, UTI    HPI:  2 - Pt with minimal po intake and required bolus ivf this am due to symptomatic hypotension and tachycardia with ambulation.  Feels better now.  Urine culture grew ESBL E. Coli     - Pts urine culture grew ESBL and discussed with pharmacy to treat with merrem, now ertapenem x 3 days total.  Patient requested cardiology consult regarding medication management for her blood thinner, discussed with Dr. Kay via [employer] chat yesterday.  Patient continues to report no appetite and struggles to eat any kind of solid food.  We discussed potential options for medical marijuana and/or Megace.  She would like to try Megace.      Objective   Objective     Vital Signs:   Temp:  [97.3 °F (36.3 °C)-98.6 °F (37 °C)] 97.3 °F (36.3 °C)  Heart Rate:  [82-87] 87  Resp:  [16-20] 20  BP: (112-123)/(58-81) 123/81     Physical Exam:  Constitutional: No acute distress, awake, alert, chronically ill-appearing, lying in the bed  HENT: NCAT, mucous membranes moist  Respiratory: Clear to auscultation bilaterally, respiratory effort normal   Cardiovascular: RRR  Gastrointestinal: Positive bowel sounds, soft, nontender, nondistended  Musculoskeletal: No bilateral ankle edema  Psychiatric: Appropriate affect, cooperative  Neurologic: Alert, oriented, speech clear  Skin: No rashes      Results Reviewed:  LAB RESULTS:      Lab 25  0506 25  0437 02/10/25  2039 02/10/25  1113 02/10/25  0708 02/10/25  0627 25  2317 25  2113   WBC 6.12 9.11  --  6.72  --  9.56  --  6.27   HEMOGLOBIN 9.0* 10.6* 8.9* 7.0*  --  9.1*  --  7.3*   HEMATOCRIT 27.1* 30.3* 25.4* 20.4*  --  26.8*  --  21.3*   PLATELETS 98* 99*  --  94*  --  120*  --  107*   NEUTROS ABS  --    --   --  4.90  --  7.72*  --  4.44   IMMATURE GRANS (ABS)  --   --   --  0.04  --  0.04  --  0.03   LYMPHS ABS  --   --   --  0.65*  --  0.66*  --  0.70   MONOS ABS  --   --   --  1.10*  --  1.09*  --  1.06*   EOS ABS  --   --   --  0.01  --  0.02  --  0.02   MCV 94.1 90.7  --  93.2  --  92.7  --  96.8   PROCALCITONIN  --   --   --   --  0.76*  --   --   --    LACTATE  --   --   --  1.7  --   --   --   --    HSTROP T  --   --   --   --   --   --  29* 33*         Lab 02/12/25  1655 02/12/25  0507 02/11/25  0437 02/10/25  0708 02/09/25 2113   SODIUM  --  136 137 136 135*   POTASSIUM 4.5 3.5 4.0 3.8 3.9   CHLORIDE  --  105 105 101 99   CO2  --  24.0 22.0 24.0 22.0   ANION GAP  --  7.0 10.0 11.0 14.0   BUN  --  13 14 13 14   CREATININE  --  1.21* 1.17* 1.17* 1.16*   EGFR  --  44.0* 45.8* 45.8* 46.3*   GLUCOSE  --  90 96 138* 129*   CALCIUM  --  7.8* 8.0* 8.1* 8.7   MAGNESIUM  --   --  2.1 3.0* 2.2   PHOSPHORUS  --   --   --  2.3*  --    TSH  --   --   --   --  2.790         Lab 02/10/25  0708 02/09/25 2113   TOTAL PROTEIN 5.5* 5.7*   ALBUMIN 3.0* 3.2*   GLOBULIN 2.5 2.5   ALT (SGPT) 12 13   AST (SGOT) 25 22   BILIRUBIN 2.5* 1.2   ALK PHOS 343* 376*         Lab 02/09/25 2317 02/09/25 2113   HSTROP T 29* 33*             Lab 02/09/25 2207   ABO TYPING O   RH TYPING Positive   ANTIBODY SCREEN Negative         Brief Urine Lab Results  (Last result in the past 365 days)        Color   Clarity   Blood   Leuk Est   Nitrite   Protein   CREAT   Urine HCG        02/09/25 2248 Yellow   Turbid   Small (1+)   Large (3+)   Negative   30 mg/dL (1+)                   Microbiology Results Abnormal       Procedure Component Value - Date/Time    Urine Culture - Urine, Urine, Clean Catch [[id number]]  (Abnormal)  (Susceptibility) Collected: 02/09/25 2248    Lab Status: Final result Specimen: Urine, Clean Catch Updated: 02/12/25 1058     Urine Culture >100,000 CFU/mL Escherichia coli ESBL    Narrative:      Colonization of the urinary  tract without infection is common. Treatment is discouraged unless the patient is symptomatic, pregnant, or undergoing an invasive urologic procedure.  Recent outcomes data supports the use of pip/tazo in the treatment of susceptible ESBL infections for uncomplicated UTI. Consider use of pip/tazo as a carbapenem-sparing regimen in applicable patients.    Susceptibility        Escherichia coli ESBL      POLLO      Ciprofloxacin Intermediate      Ertapenem Susceptible      Gentamicin Susceptible      Levofloxacin Intermediate      Meropenem Susceptible      Nitrofurantoin Susceptible      Piperacillin + Tazobactam Susceptible      Trimethoprim + Sulfamethoxazole Resistant                       Susceptibility Comments       Escherichia coli ESBL    For ESBL-producing infections, consider infectious disease consult. Susceptibility results may not correlate to clincal outcomes.                       No radiology results from the last 24 hrs    Results for orders placed during the hospital encounter of 11/08/24    Adult Transthoracic Echo Complete W/ Cont if Necessary Per Protocol    Interpretation Summary    Left ventricular ejection fraction appears to be 51 - 55%.    Left ventricular diastolic function was indeterminate.    Mild mitral valve regurgitation is present.    The aortic valve exhibits sclerosis.    Trace tricuspid valve regurgitation is present. Estimated right ventricular systolic pressure from tricuspid regurgitation is normal (<35 mmHg).    There is no evidence of pericardial effusion.      Current medications:  Scheduled Meds:[Held by provider] apixaban, 2.5 mg, Oral, BID  ertapenem (INVanz) 1,000 mg in sodium chloride 0.9 % 100 mL MBP, 1,000 mg, Intravenous, Q24H  levothyroxine, 25 mcg, Oral, Q AM  [Held by provider] metoprolol tartrate, 12.5 mg, Oral, BID  pantoprazole, 40 mg, Oral, BID AC  senna-docusate sodium, 2 tablet, Oral, BID  sodium chloride, 10 mL, Intravenous, Q12H      Continuous Infusions:      PRN Meds:.  acetaminophen **OR** acetaminophen **OR** acetaminophen    senna-docusate sodium **AND** polyethylene glycol **AND** bisacodyl **AND** bisacodyl    Calcium Replacement - Follow Nurse / BPA Driven Protocol    dicyclomine    HYDROcodone-acetaminophen    Magnesium Standard Dose Replacement - Follow Nurse / BPA Driven Protocol    melatonin    Morphine **AND** naloxone    nitroglycerin    ondansetron    Phosphorus Replacement - Follow Nurse / BPA Driven Protocol    Potassium Replacement - Follow Nurse / BPA Driven Protocol    [COMPLETED] Insert Peripheral IV **AND** sodium chloride    [COMPLETED] Insert Peripheral IV **AND** sodium chloride    sodium chloride    sodium chloride    Assessment & Plan   Assessment & Plan     Active Hospital Problems    Diagnosis  POA    **Symptomatic anemia [D64.9]  Yes    Severe protein-calorie malnutrition [E43]  Yes    Dehydration [E86.0]  Yes      Resolved Hospital Problems   No resolved problems to display.        Brief Hospital Course to date:  Sheree Hernandez is a 84 y.o. female with history of lymphoma being treated with Rituxan, HLD, A-fib, hypothyroidism, PUD, sleep apnea, cholangiocarcinoma, anxiety, depression, who presented for evaluation of fatigue.  Found to have symptomatic anemia and UTI.    This patient's problems and plans were partially entered by my partner and updated as appropriate by me 02/13/25.    Symptomatic anemia  Lymphoma  -hgb 7.3 upon admission  -Transfused 2 units RBCs  -Monitor for any bleeding     UTI, poa  ESBL E. coli on urine culture  -Will treat with Merrem/ertapenem x 3 days  -Patient has lower abdominal symptoms     Gen weakness  -multifactorial due to anemia, lymphoma, uti  -tsh wnl, a.m. cortisol 14  -pt/ot evals  -orthostatics +, status post IVF     Hx cholangiocardinoma  Anorexia  Malnutrition  -s/p previous partial hepatectomy  -Imaging shows recurrence, this was discussed with patient and her daughter by Dr. Lan young in  the room  -Trial of Megace     Parox afib  -currently in sinus  -Cardiology consult for discussion of continuation of anticoagulation versus and metoprolol     HLD  -statin     Hypothyroidism  -tsh wnl  -continue levothyroxine     Hx PUD/GERD  -ppi     BONNIE  -noncompliant w/ cpap    Expected Discharge Location and Transportation: home  Expected Discharge   Expected Discharge Date: 2/15/2025; Expected Discharge Time:      VTE Prophylaxis:  Pharmacologic & mechanical VTE prophylaxis orders are present.         AM-PAC 6 Clicks Score (PT): 23 (02/12/25 2001)    CODE STATUS:   Code Status and Medical Interventions: CPR (Attempt to Resuscitate); Full Support   Ordered at: 02/10/25 0040     Code Status (Patient has no pulse and is not breathing):    CPR (Attempt to Resuscitate)     Medical Interventions (Patient has pulse or is breathing):    Full Support       Georgie Wahl MD  02/13/25

## 2025-02-13 NOTE — TELEPHONE ENCOUNTER
Caller: [facility]    Relationship: Provider    Best call back number: [phone]     What is the best time to reach you: ANY    Who are you requesting to speak with (clinical staff, provider,  specific staff member): CLINICAL    Do you know the name of the person who called: SELF    What was the call regarding: PROVIDER NEEDS TO VERIFY DR. CROWE WILL FOLLOW WITH HOME HEALTH ORDERS.    Is it okay if the provider responds through MyChart: NO

## 2025-02-13 NOTE — PLAN OF CARE
Goal Outcome Evaluation:  Plan of Care Reviewed With: patient        Progress: improving  Outcome Evaluation: Pt continues to present with decreased functional mobility and decreased activity tolerance with ambulation. Pt ambulated 325ft with CGA, unsupported. Continue to progress per pt tolerance.    Anticipated Discharge Disposition (PT): home with assist, home with outpatient therapy services

## 2025-02-13 NOTE — PROGRESS NOTES
Patient is on anticoagulation prior to admission, however therapy is currently held.    Anticoagulant: Apixaban (Eliquis)  Indication: Atrial fibrillation   Reason for hold: Temporary / permanent discontinuation    Daily update 2/13/2025: placed on hold by hospitalist    - She is hesitant to resume due to fear of bleeding with ongoing GI complaints  - She required 2 units PRBCs this admission.  - Reasonable to remain off Eliquis at current.  - She did reveal to me that she is considering a palliative approach if cancer has recurred.

## 2025-02-14 ENCOUNTER — READMISSION MANAGEMENT (OUTPATIENT)
Dept: CALL CENTER | Facility: HOSPITAL | Age: 85
End: 2025-02-14
Payer: MEDICARE

## 2025-02-14 VITALS
DIASTOLIC BLOOD PRESSURE: 65 MMHG | HEART RATE: 80 BPM | BODY MASS INDEX: 22.2 KG/M2 | SYSTOLIC BLOOD PRESSURE: 133 MMHG | RESPIRATION RATE: 16 BRPM | OXYGEN SATURATION: 96 % | HEIGHT: 64 IN | WEIGHT: 130 LBS | TEMPERATURE: 97.5 F

## 2025-02-14 PROBLEM — I95.1 ORTHOSTATIC HYPOTENSION: Status: ACTIVE | Noted: 2025-02-14

## 2025-02-14 PROCEDURE — 99239 HOSP IP/OBS DSCHRG MGMT >30: CPT | Performed by: FAMILY MEDICINE

## 2025-02-14 PROCEDURE — 99232 SBSQ HOSP IP/OBS MODERATE 35: CPT | Performed by: INTERNAL MEDICINE

## 2025-02-14 PROCEDURE — 25010000002 ERTAPENEM PER 500 MG: Performed by: FAMILY MEDICINE

## 2025-02-14 RX ADMIN — LEVOTHYROXINE SODIUM 25 MCG: 25 TABLET ORAL at 06:40

## 2025-02-14 RX ADMIN — SENNOSIDES AND DOCUSATE SODIUM 2 TABLET: 50; 8.6 TABLET ORAL at 09:30

## 2025-02-14 RX ADMIN — ERTAPENEM 1000 MG: 1 INJECTION INTRAMUSCULAR; INTRAVENOUS at 11:39

## 2025-02-14 RX ADMIN — PANTOPRAZOLE SODIUM 40 MG: 40 TABLET, DELAYED RELEASE ORAL at 09:30

## 2025-02-14 NOTE — PROGRESS NOTES
HEMATOLOGY/ONCOLOGY PROGRESS NOTE    Subjective      CC: B-cell lymphoma, intrahepatic cholangiocarcinoma    SUBJECTIVE:   No acute events overnight.  Fatigue overall stable to slightly improved.  Tolerating antibiotics well for her ESBL.  Denies any new pain or discomfort        Past Medical History, Past Surgical History, Social History, Family History have been reviewed and are without significant changes except as mentioned.      Medications:  The current medication list was reviewed in the EMR    ALLERGIES:   Allergies   Allergen Reactions    Pravastatin Myalgia       ROS:  A comprehensive 10-point review of systems was performed and was negative except as mentioned.      Objective      Vitals:    02/13/25 1537 02/13/25 2046 02/14/25 0430 02/14/25 0927   BP: 139/71 130/70 119/66 133/65   BP Location: Right arm Right arm Right arm Right arm   Patient Position: Lying Lying Lying Lying   Pulse:  76  80   Resp: 18 16 16 16   Temp: 97.1 °F (36.2 °C) 98.3 °F (36.8 °C) 98.3 °F (36.8 °C) 97.5 °F (36.4 °C)   TempSrc: Oral Oral Oral Oral   SpO2:  97%  96%   Weight:       Height:              General: well appearing, in no acute distress  HEENT: sclerae anicteric, oropharynx clear  Lymphatics: no cervical, supraclavicular, inguinal, or axillary adenopathy  Cardiovascular: regular rate and rhythm, no murmurs  Lungs: clear to auscultation bilaterally  Abdomen: soft, nontender, nondistended.  No palpable organomegaly  Extremities: no lower extremity edema  Skin: no rashes, lesions, bruising, or petechiae  Neuro: Alert and oriented x 3. Moves all extremities.    RECENT LABS:    Results from last 7 days   Lab Units 02/13/25  1113 02/12/25  0506 02/11/25  0437   WBC 10*3/mm3 4.72 6.12 9.11   HEMOGLOBIN g/dL 9.3* 9.0* 10.6*   PLATELETS 10*3/mm3 99* 98* 99*     Results from last 7 days   Lab Units 02/13/25  1113 02/12/25  1655 02/12/25  0507 02/11/25  0437   SODIUM mmol/L 139  --  136 137   POTASSIUM mmol/L 4.1 4.5 3.5 4.0   CO2  mmol/L 21.0*  --  24.0 22.0   BUN mg/dL 12  --  13 14   CREATININE mg/dL 0.97  --  1.21* 1.17*   GLUCOSE mg/dL 118*  --  90 96     Results from last 7 days   Lab Units 02/10/25  0708 02/09/25  2113   AST (SGOT) U/L 25 22   ALT (SGPT) U/L 12 13   BILIRUBIN mg/dL 2.5* 1.2   ALK PHOS U/L 343* 376*         CT Chest With Contrast Diagnostic    Result Date: 2/11/2025  1. 2 stable mildly enlarged mediastinal lymph nodes. No new or increasing adenopathy. 2. Stable number of pulmonary parenchymal nodules bilaterally, with some nodules stable in size, most nodules slightly increased, by 1 to 2 mm in diameter. No clearly new nodules. 3. Small left and small-moderate right pleural effusions, new from 12/11/2024 and mild associated discoid atelectasis. CT SCAN OF THE ABDOMEN PELVIS WITH IV CONTRAST: Right hepatectomy, and multiple nonacute enhancing left lobe hepatic cysts are again noted. Heterogeneous hypoenhancement along the resection margin is more prominent, and more masslike on today's exam, initial axial image 32, delayed axial image 33. This overall area appears a little larger, previously 18 x 32 mm, today 23 x 35 mm, questionable for recurrence. Posterior left lobe liver lesion, today's image 43/2 has had a variable appearance over multiple prior studies, perhaps intermittently treated and occurring metastasis. On today's study it appears further increased, most recently measuring 1.8 cm, today 2.6 cm, and with initial heterogeneous internal enhancement, and delayed centripetal enhancement pattern, more typical for cholangiocarcinoma than for hemangioma. No new hepatic lesions are identified elsewhere. Spleen is normal in size 11 cm. Remaining left portal vein, superior mesenteric vein and splenic vein enhance normally with no evidence of thrombus. Postoperative fat stranding in the region of the salomon hepatis appears stable. There is some narrowing of the IVC presumably due to regional scarring but this is stable  from the contrast-enhanced study of 11/1/2024. Amorphous soft tissue density between the IVC and aorta and extending along the right lateral and anterior margins of the aorta, images 43 through 55/2 today's study appears stable to minimally increased. No evidence of new mass or adenopathy is seen elsewhere in the upper abdomen. No new abnormalities are seen of the pancreas or adrenal glands. Small bilateral renal cysts are noted. Subtle, heterogeneous area area in the right upper renal pole image 39/2 is of uncertain significance but appears unchanged back to at least 3/6/2023 and appears to show overlying cortical atrophy, perhaps related to prior pyelonephritis. There is no evidence of obstructive uropathy. Regarding the lower abdomen/pelvis, bowel loops are normal in caliber and grossly normal in appearance. Bladder is decompressed. There is mild generalized fat stranding in the lower abdomen/pelvis, including around the bladder, but this may represent incidental mild anasarca. Delayed venous phase images show normal contrast excretion by the kidneys. No acute bony abnormality is seen. Impression: 1. Multiple simple appearing hepatic cysts. 2. Posterior left lobe liver lesion, which has had a waxing and waning appearance over multiple prior studies, is larger and more heterogeneous today, with enhancement pattern concerning for metastatic glandular carcinoma. 3. Irregular appearing parenchymal along the hepatic resection margin appears larger and more masslike today, concerning for recurrence. 4. Stable to mildly increased amorphous residual soft tissue density along the margins of the IVC and aorta, and stable chronic focal narrowing of the IVC. 5. Subtle heterogeneous hypoenhancement of the right upper renal pole, but unchanged over multiple prior studies probably postinflammatory scarring. 6. Mild anasarca. Electronically Signed: Salvador Gill MD  2/11/2025 8:52 AM EST  Workstation ID: [id number]    CT Abdomen  Pelvis With Contrast    Result Date: 2/11/2025  1. 2 stable mildly enlarged mediastinal lymph nodes. No new or increasing adenopathy. 2. Stable number of pulmonary parenchymal nodules bilaterally, with some nodules stable in size, most nodules slightly increased, by 1 to 2 mm in diameter. No clearly new nodules. 3. Small left and small-moderate right pleural effusions, new from 12/11/2024 and mild associated discoid atelectasis. CT SCAN OF THE ABDOMEN PELVIS WITH IV CONTRAST: Right hepatectomy, and multiple nonacute enhancing left lobe hepatic cysts are again noted. Heterogeneous hypoenhancement along the resection margin is more prominent, and more masslike on today's exam, initial axial image 32, delayed axial image 33. This overall area appears a little larger, previously 18 x 32 mm, today 23 x 35 mm, questionable for recurrence. Posterior left lobe liver lesion, today's image 43/2 has had a variable appearance over multiple prior studies, perhaps intermittently treated and occurring metastasis. On today's study it appears further increased, most recently measuring 1.8 cm, today 2.6 cm, and with initial heterogeneous internal enhancement, and delayed centripetal enhancement pattern, more typical for cholangiocarcinoma than for hemangioma. No new hepatic lesions are identified elsewhere. Spleen is normal in size 11 cm. Remaining left portal vein, superior mesenteric vein and splenic vein enhance normally with no evidence of thrombus. Postoperative fat stranding in the region of the salomon hepatis appears stable. There is some narrowing of the IVC presumably due to regional scarring but this is stable from the contrast-enhanced study of 11/1/2024. Amorphous soft tissue density between the IVC and aorta and extending along the right lateral and anterior margins of the aorta, images 43 through 55/2 today's study appears stable to minimally increased. No evidence of new mass or adenopathy is seen elsewhere in the upper  abdomen. No new abnormalities are seen of the pancreas or adrenal glands. Small bilateral renal cysts are noted. Subtle, heterogeneous area area in the right upper renal pole image 39/2 is of uncertain significance but appears unchanged back to at least 3/6/2023 and appears to show overlying cortical atrophy, perhaps related to prior pyelonephritis. There is no evidence of obstructive uropathy. Regarding the lower abdomen/pelvis, bowel loops are normal in caliber and grossly normal in appearance. Bladder is decompressed. There is mild generalized fat stranding in the lower abdomen/pelvis, including around the bladder, but this may represent incidental mild anasarca. Delayed venous phase images show normal contrast excretion by the kidneys. No acute bony abnormality is seen. Impression: 1. Multiple simple appearing hepatic cysts. 2. Posterior left lobe liver lesion, which has had a waxing and waning appearance over multiple prior studies, is larger and more heterogeneous today, with enhancement pattern concerning for metastatic glandular carcinoma. 3. Irregular appearing parenchymal along the hepatic resection margin appears larger and more masslike today, concerning for recurrence. 4. Stable to mildly increased amorphous residual soft tissue density along the margins of the IVC and aorta, and stable chronic focal narrowing of the IVC. 5. Subtle heterogeneous hypoenhancement of the right upper renal pole, but unchanged over multiple prior studies probably postinflammatory scarring. 6. Mild anasarca. Electronically Signed: Salvador Gill MD  2/11/2025 8:52 AM EST  Workstation ID: [id number]    XR Chest 1 View    Result Date: 2/9/2025  Impression: No acute cardiopulmonary process. Electronically Signed: Mary Jo Johnson MD  2/9/2025 10:43 PM EST  Workstation ID: [id number]         Assessment   ASSESSMENT & PLAN:  Marginal zone lymphoma  -Diagnosed on bone marrow biopsy in January 2025  -Status post 3 weeks of weekly rituximab  -  Per goals of care discussion with patient today, will plan to hold further rituximab therapy     Intrahepatic cholangiocarcinoma  -Has been off therapy for the past several months  -Bilirubin slightly increasing  -CT C/A/P reviewed and notable for slight enlargement of her pulmonary nodules as well as her primary liver mass.  No significant new adenopathy noted.  -Per goals of care discussion with patient, would continue to forego further systemic chemotherapy or immunotherapy for her cholangiocarcinoma  - Will continue goals of care discussion as an outpatient and she would likely benefit from hospice at some point in the near future     NETO  -Now resolved with IV fluids     ESBL UTI  -Noted on UA  -Cultures growing ESBL  -Currently on ertapenem     Anemia  - Stable yesterday     Thank you for the consult.  Per patient, plan for discharge this afternoon.  Will plan for outpatient follow-up with me in 2 weeks    Jorje Montenegro MD  Hematology and Oncology    2/14/2025  12:36 EST

## 2025-02-14 NOTE — CASE MANAGEMENT/SOCIAL WORK
Case Management Discharge Note      Final Note: Met with patient at the bedside to finalize discharge plan. Patient's plan is home with [facility] today 2/14. Ludmila, liaison for [facility], notified of patient's discharge. Patient given resources on how to apply for Medical Marijuana card, per doctor's request. Family will transport. No further discharge needs identified.         Selected Continued Care - Admitted Since 2/9/2025       Destination    No services have been selected for the patient.                Durable Medical Equipment    No services have been selected for the patient.                Dialysis/Infusion    No services have been selected for the patient.                Home Medical Care Coordination complete.      Service Provider Services Address Phone Fax Patient Preferred    [facility] [place] [facility], Home Health Services, Home Rehabilitation [address] [place] [address] [address] [address] [phone] -- --              Therapy    No services have been selected for the patient.                Community Resources    No services have been selected for the patient.                Community & [facility]    No services have been selected for the patient.                    Selected Continued Care - Episodes Includes continued care and service providers with selected services from the active episodes listed below      High Risk Care Management Episode start date: 9/30/2024 (Paused)   There are no active outsourced providers for this episode.                      Final Discharge Disposition Code: 06 - home with home health care

## 2025-02-14 NOTE — DISCHARGE SUMMARY
[facility] Medicine Services  DISCHARGE SUMMARY    Patient Name: Sheree Hernandez  : 1940  MRN: [id number]    Date of Admission: 2025  9:03 PM  Date of Discharge:  25    Primary Care Physician: Timi Conway DO    Consults       Date and Time Order Name Status Description    2025  4:02 PM Inpatient Cardiology Consult Completed     2/10/2025 12:40 AM Inpatient Hematology & Oncology Consult Completed             Hospital Course     Presenting Problem: anemia    Active Hospital Problems    Diagnosis  POA   • **Symptomatic anemia [D64.9]  Yes   • Orthostatic hypotension [I95.1]  Yes   • Severe protein-calorie malnutrition [E43]  Yes   • 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          Hospital Course:  Sheree Hernandez is a 84 y.o. female  with history of lymphoma being treated with Rituxan, HLD, A-fib, hypothyroidism, PUD, sleep apnea, cholangiocarcinoma, anxiety, depression, who presented for evaluation of fatigue.  Found to have symptomatic anemia and ESBL E. coli UTI.     This patient's problems and plans were partially entered by my partner and updated as appropriate by me 25.     Symptomatic anemia  Lymphoma  -hgb 7.3 upon admission  -Transfused 2 units RBCs  -Stopped Eliquis and hemoglobin stable     UTI, poa  ESBL E. coli on urine culture  -Will treat with Merrem/ertapenem x 3 days  -Patient has lower abdominal symptoms     Gen weakness  Orthostatic hypotension  -multifactorial due to anemia, lymphoma, uti  -tsh wnl, a.m. cortisol 14  -pt/ot evals  -orthostatics +, status post IVF  -Patient declining to take metoprolol     Hx cholangiocardinoma  Anorexia  Malnutrition  -s/p previous partial hepatectomy  -Imaging shows recurrence,  this was discussed with patient and her daughter by Dr. Montenegro   -Trial of Megace-patient declined it  -Considering palliative direction     Parox afib  -currently in sinus  -Cardiology consulted and recommended continue metoprolol however patient declined to take it and will wait to discuss with Dr. Kay  -Okay to stop anticoagulation for now     HLD  -statin     Hypothyroidism  -tsh wnl  -continue levothyroxine     Hx PUD/GERD  -ppi     BONNIE  -noncompliant w/ cpap      Discharge Follow Up Recommendations for outpatient labs/diagnostics:  Follow-up with PCP in 1 week to check labs and review meds  Follow-up with Dr. Kay as scheduled    Day of Discharge     HPI:   Symptoms of orthostatic hypotension have improved after getting fluids.  She still has no appetite and is afraid to eat.    Review of Systems  No fever, generally weak    Vital Signs:   Temp:  [97.1 °F (36.2 °C)-98.3 °F (36.8 °C)] 97.5 °F (36.4 °C)  Heart Rate:  [76-80] 80  Resp:  [16-18] 16  BP: (116-139)/(59-71) 133/65      Physical Exam:  Constitutional: No acute distress, awake, alert, chronically ill-appearing, lying in the bed  HENT: NCAT, mucous membranes moist  Respiratory: Clear to auscultation bilaterally, respiratory effort normal   Cardiovascular: RRR  Gastrointestinal: Positive bowel sounds, soft, nontender, nondistended  Musculoskeletal: No bilateral ankle edema  Psychiatric: Appropriate affect, cooperative  Neurologic: Alert, oriented, speech clear  Skin: No rashes    Pertinent  and/or Most Recent Results     LAB RESULTS:      Lab 02/13/25  1113 02/12/25  0506 02/11/25  0437 02/10/25  2039 02/10/25  1113 02/10/25  0708 02/10/25  0627 02/09/25  2113   WBC 4.72 6.12 9.11  --  6.72  --  9.56 6.27   HEMOGLOBIN 9.3* 9.0* 10.6* 8.9* 7.0*  --  9.1* 7.3*   HEMATOCRIT 28.1* 27.1* 30.3* 25.4* 20.4*  --  26.8* 21.3*   PLATELETS 99* 98* 99*  --  94*  --  120* 107*   NEUTROS ABS 3.14  --   --   --  4.90  --  7.72* 4.44   IMMATURE GRANS (ABS)  0.02  --   --   --  0.04  --  0.04 0.03   LYMPHS ABS 0.75  --   --   --  0.65*  --  0.66* 0.70   MONOS ABS 0.73  --   --   --  1.10*  --  1.09* 1.06*   EOS ABS 0.07  --   --   --  0.01  --  0.02 0.02   MCV 94.9 94.1 90.7  --  93.2  --  92.7 96.8   PROCALCITONIN  --   --   --   --   --  0.76*  --   --    LACTATE  --   --   --   --  1.7  --   --   --          Lab 02/13/25  1113 02/12/25  1655 02/12/25  0507 02/11/25  0437 02/10/25  0708 02/09/25 2113   SODIUM 139  --  136 137 136 135*   POTASSIUM 4.1 4.5 3.5 4.0 3.8 3.9   CHLORIDE 107  --  105 105 101 99   CO2 21.0*  --  24.0 22.0 24.0 22.0   ANION GAP 11.0  --  7.0 10.0 11.0 14.0   BUN 12  --  13 14 13 14   CREATININE 0.97  --  1.21* 1.17* 1.17* 1.16*   EGFR 57.4*  --  44.0* 45.8* 45.8* 46.3*   GLUCOSE 118*  --  90 96 138* 129*   CALCIUM 8.4*  --  7.8* 8.0* 8.1* 8.7   MAGNESIUM  --   --   --  2.1 3.0* 2.2   PHOSPHORUS  --   --   --   --  2.3*  --    TSH  --   --   --   --   --  2.790         Lab 02/10/25  0708 02/09/25 2113   TOTAL PROTEIN 5.5* 5.7*   ALBUMIN 3.0* 3.2*   GLOBULIN 2.5 2.5   ALT (SGPT) 12 13   AST (SGOT) 25 22   BILIRUBIN 2.5* 1.2   ALK PHOS 343* 376*         Lab 02/09/25 2317 02/09/25 2113   HSTROP T 29* 33*             Lab 02/09/25 2207   ABO TYPING O   RH TYPING Positive   ANTIBODY SCREEN Negative         Brief Urine Lab Results  (Last result in the past 365 days)        Color   Clarity   Blood   Leuk Est   Nitrite   Protein   CREAT   Urine HCG        02/09/25 2248 Yellow   Turbid   Small (1+)   Large (3+)   Negative   30 mg/dL (1+)                 Microbiology Results (last 10 days)       Procedure Component Value - Date/Time    Blood Culture - Blood, Arm, Left [[id number]]  (Normal) Collected: 02/10/25 1113    Lab Status: Preliminary result Specimen: Blood from Arm, Left Updated: 02/13/25 1200     Blood Culture No growth at 3 days    Narrative:      Less than seven (7) mL's of blood was collected.  Insufficient quantity may yield false  negative results.    Blood Culture - Blood, Arm, Right [[id number]]  (Normal) Collected: 02/10/25 1113    Lab Status: Preliminary result Specimen: Blood from Arm, Right Updated: 02/13/25 1200     Blood Culture No growth at 3 days    Narrative:      Less than seven (7) mL's of blood was collected.  Insufficient quantity may yield false negative results.    Urine Culture - Urine, Urine, Clean Catch [[id number]]  (Abnormal)  (Susceptibility) Collected: 02/09/25 2248    Lab Status: Final result Specimen: Urine, Clean Catch Updated: 02/12/25 1058     Urine Culture >100,000 CFU/mL Escherichia coli ESBL    Narrative:      Colonization of the urinary tract without infection is common. Treatment is discouraged unless the patient is symptomatic, pregnant, or undergoing an invasive urologic procedure.  Recent outcomes data supports the use of pip/tazo in the treatment of susceptible ESBL infections for uncomplicated UTI. Consider use of pip/tazo as a carbapenem-sparing regimen in applicable patients.    Susceptibility        Escherichia coli ESBL      POLLO      Ciprofloxacin Intermediate      Ertapenem Susceptible      Gentamicin Susceptible      Levofloxacin Intermediate      Meropenem Susceptible      Nitrofurantoin Susceptible      Piperacillin + Tazobactam Susceptible      Trimethoprim + Sulfamethoxazole Resistant                       Susceptibility Comments       Escherichia coli ESBL    For ESBL-producing infections, consider infectious disease consult. Susceptibility results may not correlate to clincal outcomes.               COVID PRE-OP / PRE-PROCEDURE SCREENING ORDER (NO ISOLATION) - Swab, Nasopharynx [[id number]]  (Normal) Collected: 02/09/25 2206    Lab Status: Final result Specimen: Swab from Nasopharynx Updated: 02/09/25 2302    Narrative:      The following orders were created for panel order COVID PRE-OP / PRE-PROCEDURE SCREENING ORDER (NO ISOLATION) - Swab, Nasopharynx.  Procedure                                Abnormality         Status                     ---------                               -----------         ------                     COVID-19 and FLU A/B PCR...[[id number]]  Normal              Final result                 Please view results for these tests on the individual orders.    COVID-19 and FLU A/B PCR, 1 HR TAT - Swab, Nasopharynx [[id number]]  (Normal) Collected: 02/09/25 2206    Lab Status: Final result Specimen: Swab from Nasopharynx Updated: 02/09/25 2302     COVID19 Not Detected     Influenza A PCR Not Detected     Influenza B PCR Not Detected    Narrative:      Fact sheet for providers: https://www.fda.gov/media/387798/download    Fact sheet for patients: https://www.fda.gov/media/632475/download    Test performed by PCR.            CT Chest With Contrast Diagnostic    Result Date: 2/11/2025  CT CHEST W CONTRAST DIAGNOSTIC, CT ABDOMEN PELVIS W CONTRAST Date of Exam: 2/11/2025 12:55 AM EST Indication: lymphoma, cholangiocarcinoma. Comparison: 12/11/2024 chest abdomen pelvis CT scan Technique: Axial CT images were obtained of the chest, abdomen and pelvis after the uneventful intravenous administration of 75 mL Isovue-300.  Reconstructed coronal and sagittal images were also obtained. Automated exposure control and iterative construction methods were used. Findings: Previous exam reports from 12/11/2024 described no acute abnormality in the abdomen or pelvis. Mild interval enlargement of multiple pulmonary nodules. Small new right pleural effusion. CT SCAN OF THE CHEST WITH IV CONTRAST: Right upper paratracheal and left preaortic lymph nodes are stable in size and appearance. No new or enlarging nodes are identified. Thoracic aorta is normal in caliber. Exam has fairly good pulmonary artery contrast and no evidence of embolic disease. There are now small left and small-moderate free-flowing right pleural effusions and associated bibasilar discoid atelectasis. There is no significant pericardial  effusion. In the right lung, patient's multiple small pulmonary nodules mostly show a slight increase in size from 12/1/2024, by between 1 and 2 mm. A few nodules appear stable. It is difficult to identify any definitely new pulmonary parenchymal nodule. On the left, there is similar pattern, with both stable nodules, and nodules that appear increased by 1 to 2 mm in diameter. As example, anterior right upper lobe pulmonary nodule image 54 above the scan of the previous scan remains at 4.5 mm in diameter. Right middle lobe pulmonary nodule image 65 of today's scan, previous image 64 has increased from 6 mm to 7 mm. Bony structures appear to be intact. No lytic or blastic changes are identified.     1. 2 stable mildly enlarged mediastinal lymph nodes. No new or increasing adenopathy. 2. Stable number of pulmonary parenchymal nodules bilaterally, with some nodules stable in size, most nodules slightly increased, by 1 to 2 mm in diameter. No clearly new nodules. 3. Small left and small-moderate right pleural effusions, new from 12/11/2024 and mild associated discoid atelectasis. CT SCAN OF THE ABDOMEN PELVIS WITH IV CONTRAST: Right hepatectomy, and multiple nonacute enhancing left lobe hepatic cysts are again noted. Heterogeneous hypoenhancement along the resection margin is more prominent, and more masslike on today's exam, initial axial image 32, delayed axial image 33. This overall area appears a little larger, previously 18 x 32 mm, today 23 x 35 mm, questionable for recurrence. Posterior left lobe liver lesion, today's image 43/2 has had a variable appearance over multiple prior studies, perhaps intermittently treated and occurring metastasis. On today's study it appears further increased, most recently measuring 1.8 cm, today 2.6 cm, and with initial heterogeneous internal enhancement, and delayed centripetal enhancement pattern, more typical for cholangiocarcinoma than for hemangioma. No new hepatic lesions are  identified elsewhere. Spleen is normal in size 11 cm. Remaining left portal vein, superior mesenteric vein and splenic vein enhance normally with no evidence of thrombus. Postoperative fat stranding in the region of the salomon hepatis appears stable. There is some narrowing of the IVC presumably due to regional scarring but this is stable from the contrast-enhanced study of 11/1/2024. Amorphous soft tissue density between the IVC and aorta and extending along the right lateral and anterior margins of the aorta, images 43 through 55/2 today's study appears stable to minimally increased. No evidence of new mass or adenopathy is seen elsewhere in the upper abdomen. No new abnormalities are seen of the pancreas or adrenal glands. Small bilateral renal cysts are noted. Subtle, heterogeneous area area in the right upper renal pole image 39/2 is of uncertain significance but appears unchanged back to at least 3/6/2023 and appears to show overlying cortical atrophy, perhaps related to prior pyelonephritis. There is no evidence of obstructive uropathy. Regarding the lower abdomen/pelvis, bowel loops are normal in caliber and grossly normal in appearance. Bladder is decompressed. There is mild generalized fat stranding in the lower abdomen/pelvis, including around the bladder, but this may represent incidental mild anasarca. Delayed venous phase images show normal contrast excretion by the kidneys. No acute bony abnormality is seen. Impression: 1. Multiple simple appearing hepatic cysts. 2. Posterior left lobe liver lesion, which has had a waxing and waning appearance over multiple prior studies, is larger and more heterogeneous today, with enhancement pattern concerning for metastatic glandular carcinoma. 3. Irregular appearing parenchymal along the hepatic resection margin appears larger and more masslike today, concerning for recurrence. 4. Stable to mildly increased amorphous residual soft tissue density along the margins  of the IVC and aorta, and stable chronic focal narrowing of the IVC. 5. Subtle heterogeneous hypoenhancement of the right upper renal pole, but unchanged over multiple prior studies probably postinflammatory scarring. 6. Mild anasarca. Electronically Signed: Salvador Gill MD  2/11/2025 8:52 AM EST  Workstation ID: [id number]    CT Abdomen Pelvis With Contrast    Result Date: 2/11/2025  CT CHEST W CONTRAST DIAGNOSTIC, CT ABDOMEN PELVIS W CONTRAST Date of Exam: 2/11/2025 12:55 AM EST Indication: lymphoma, cholangiocarcinoma. Comparison: 12/11/2024 chest abdomen pelvis CT scan Technique: Axial CT images were obtained of the chest, abdomen and pelvis after the uneventful intravenous administration of 75 mL Isovue-300.  Reconstructed coronal and sagittal images were also obtained. Automated exposure control and iterative construction methods were used. Findings: Previous exam reports from 12/11/2024 described no acute abnormality in the abdomen or pelvis. Mild interval enlargement of multiple pulmonary nodules. Small new right pleural effusion. CT SCAN OF THE CHEST WITH IV CONTRAST: Right upper paratracheal and left preaortic lymph nodes are stable in size and appearance. No new or enlarging nodes are identified. Thoracic aorta is normal in caliber. Exam has fairly good pulmonary artery contrast and no evidence of embolic disease. There are now small left and small-moderate free-flowing right pleural effusions and associated bibasilar discoid atelectasis. There is no significant pericardial effusion. In the right lung, patient's multiple small pulmonary nodules mostly show a slight increase in size from 12/1/2024, by between 1 and 2 mm. A few nodules appear stable. It is difficult to identify any definitely new pulmonary parenchymal nodule. On the left, there is similar pattern, with both stable nodules, and nodules that appear increased by 1 to 2 mm in diameter. As example, anterior right upper lobe pulmonary nodule image 54  above the scan of the previous scan remains at 4.5 mm in diameter. Right middle lobe pulmonary nodule image 65 of today's scan, previous image 64 has increased from 6 mm to 7 mm. Bony structures appear to be intact. No lytic or blastic changes are identified.     1. 2 stable mildly enlarged mediastinal lymph nodes. No new or increasing adenopathy. 2. Stable number of pulmonary parenchymal nodules bilaterally, with some nodules stable in size, most nodules slightly increased, by 1 to 2 mm in diameter. No clearly new nodules. 3. Small left and small-moderate right pleural effusions, new from 12/11/2024 and mild associated discoid atelectasis. CT SCAN OF THE ABDOMEN PELVIS WITH IV CONTRAST: Right hepatectomy, and multiple nonacute enhancing left lobe hepatic cysts are again noted. Heterogeneous hypoenhancement along the resection margin is more prominent, and more masslike on today's exam, initial axial image 32, delayed axial image 33. This overall area appears a little larger, previously 18 x 32 mm, today 23 x 35 mm, questionable for recurrence. Posterior left lobe liver lesion, today's image 43/2 has had a variable appearance over multiple prior studies, perhaps intermittently treated and occurring metastasis. On today's study it appears further increased, most recently measuring 1.8 cm, today 2.6 cm, and with initial heterogeneous internal enhancement, and delayed centripetal enhancement pattern, more typical for cholangiocarcinoma than for hemangioma. No new hepatic lesions are identified elsewhere. Spleen is normal in size 11 cm. Remaining left portal vein, superior mesenteric vein and splenic vein enhance normally with no evidence of thrombus. Postoperative fat stranding in the region of the salomon hepatis appears stable. There is some narrowing of the IVC presumably due to regional scarring but this is stable from the contrast-enhanced study of 11/1/2024. Amorphous soft tissue density between the IVC and aorta  and extending along the right lateral and anterior margins of the aorta, images 43 through 55/2 today's study appears stable to minimally increased. No evidence of new mass or adenopathy is seen elsewhere in the upper abdomen. No new abnormalities are seen of the pancreas or adrenal glands. Small bilateral renal cysts are noted. Subtle, heterogeneous area area in the right upper renal pole image 39/2 is of uncertain significance but appears unchanged back to at least 3/6/2023 and appears to show overlying cortical atrophy, perhaps related to prior pyelonephritis. There is no evidence of obstructive uropathy. Regarding the lower abdomen/pelvis, bowel loops are normal in caliber and grossly normal in appearance. Bladder is decompressed. There is mild generalized fat stranding in the lower abdomen/pelvis, including around the bladder, but this may represent incidental mild anasarca. Delayed venous phase images show normal contrast excretion by the kidneys. No acute bony abnormality is seen. Impression: 1. Multiple simple appearing hepatic cysts. 2. Posterior left lobe liver lesion, which has had a waxing and waning appearance over multiple prior studies, is larger and more heterogeneous today, with enhancement pattern concerning for metastatic glandular carcinoma. 3. Irregular appearing parenchymal along the hepatic resection margin appears larger and more masslike today, concerning for recurrence. 4. Stable to mildly increased amorphous residual soft tissue density along the margins of the IVC and aorta, and stable chronic focal narrowing of the IVC. 5. Subtle heterogeneous hypoenhancement of the right upper renal pole, but unchanged over multiple prior studies probably postinflammatory scarring. 6. Mild anasarca. Electronically Signed: Salvador Gill MD  2/11/2025 8:52 AM EST  Workstation ID: [id number]    XR Chest 1 View    Result Date: 2/9/2025  XR CHEST 1 VW Date of Exam: 2/9/2025 9:37 PM EST Indication: short of breath  Comparison: 12/11/2024. Findings: There is a right internal jugular Mediport with the tip in the distal SVC. There are no airspace consolidations. No pleural fluid. No pneumothorax. The pulmonary vasculature appears within normal limits. The cardiac and mediastinal silhouette appear unremarkable. No acute osseous abnormality identified.     Impression: No acute cardiopulmonary process. Electronically Signed: Mary Jo Johnson MD  2/9/2025 10:43 PM EST  Workstation ID: [id number]     Results for orders placed during the hospital encounter of 11/16/18    Duplex Venous Lower Extremity - Bilateral CAR    Interpretation Summary  · No evidence of deep or superficial venous thrombosis of the right or left lower extremities.      Results for orders placed during the hospital encounter of 11/16/18    Duplex Venous Lower Extremity - Bilateral CAR    Interpretation Summary  · No evidence of deep or superficial venous thrombosis of the right or left lower extremities.      Results for orders placed during the hospital encounter of 11/08/24    Adult Transthoracic Echo Complete W/ Cont if Necessary Per Protocol    Interpretation Summary  •  Left ventricular ejection fraction appears to be 51 - 55%.  •  Left ventricular diastolic function was indeterminate.  •  Mild mitral valve regurgitation is present.  •  The aortic valve exhibits sclerosis.  •  Trace tricuspid valve regurgitation is present. Estimated right ventricular systolic pressure from tricuspid regurgitation is normal (<35 mmHg).  •  There is no evidence of pericardial effusion.      Plan for Follow-up of Pending Labs/Results: Final blood cultures are pending  Pending Labs       Order Current Status    Blood Culture - Blood, Arm, Left Preliminary result    Blood Culture - Blood, Arm, Right Preliminary result          Discharge Details        Discharge Medications        New Medications        Instructions Start Date   metoprolol tartrate 25 MG tablet  Commonly known as:  LOPRESSOR   12.5 mg, Oral, 2 Times Daily             Continue These Medications        Instructions Start Date   dicyclomine 20 MG tablet  Commonly known as: BENTYL   Take 1 tablet by mouth Every 4 (Four) Hours As Needed for Abdominal Cramping      ipratropium 0.06 % nasal spray  Commonly known as: ATROVENT   Administer 1 spray into each nostril twice daily      levothyroxine 25 MCG tablet  Commonly known as: SYNTHROID, LEVOTHROID   25 mcg, Oral, Every Early Morning      lidocaine-prilocaine 2.5-2.5 % cream  Commonly known as: EMLA   Please apply to port site 30 min prior to infusion and cover with Saran Wrap      methylphenidate 5 MG tablet  Commonly known as: Ritalin   5 mg, Oral, 2 Times Daily      ondansetron 8 MG tablet  Commonly known as: ZOFRAN   8 mg, Oral, 3 Times Daily PRN      pantoprazole 40 MG EC tablet  Commonly known as: PROTONIX   40 mg, Oral, 2 Times Daily Before Meals      Stimulant Laxative 8.6-50 MG per tablet  Generic drug: sennosides-docusate   1 tablet, Oral, 2 Times Daily PRN             Stop These Medications      apixaban 2.5 MG tablet tablet  Commonly known as: ELIQUIS     linaclotide 72 MCG capsule capsule  Commonly known as: Linzess              Allergies   Allergen Reactions   • Pravastatin Myalgia         Discharge Disposition: Home  Home or Self Care    Diet:  Hospital:  Diet Order   Procedures   • Diet: Cardiac; Healthy Heart (2-3 Na+); Fluid Consistency: Thin (IDDSI 0)       Diet Instructions       Diet: Cardiac Diets; Healthy Heart (2-3 Na+); Thin (IDDSI 0)      Discharge Diet: Cardiac Diets    Cardiac Diet: Healthy Heart (2-3 Na+)    Fluid Consistency: Thin (IDDSI 0)             Activity: As tolerated  Activity Instructions       Activity as Tolerated              Restrictions or Other Recommendations:  No restrictions       CODE STATUS:    Code Status and Medical Interventions: CPR (Attempt to Resuscitate); Full Support   Ordered at: 02/10/25 0040     Code Status (Patient has no  pulse and is not breathing):    CPR (Attempt to Resuscitate)     Medical Interventions (Patient has pulse or is breathing):    Full Support       Future Appointments   Date Time Provider Department Center   3/19/2025  8:40 AM Zayda Guevara APRN [facility]   4/25/2025  9:15 AM Jacinda Hill APRN [facility]       Additional Instructions for the Follow-ups that You Need to Schedule       Ambulatory Referral to Home Health   As directed      Face to Face Visit Date: 2/12/2025   Follow-up provider for Plan of Care?: I treated the patient in an acute care facility and will not continue treatment after discharge.   Follow-up provider: TIMI CONWAY [[id number]]   Reason/Clinical Findings: Symptomatic anemia   Describe mobility limitations that make leaving home difficult: Impaired functional mobility, gait, endurance, and balance   Nursing/Therapeutic Services Requested: Physical Therapy Occupational Therapy Skilled Nursing   Skilled nursing orders: Cardiopulmonary assessments Neurovascular assessments   PT orders: Therapeutic exercise Gait Training Transfer training Strengthening Home safety assessment   Weight Bearing Status: As Tolerated   Occupational orders: Activities of daily living Energy conservation Strengthening Fine motor Home safety assessment   Frequency: 1 Week 1        Discharge Follow-up with PCP   As directed       Currently Documented PCP:    Timi Conway DO    PCP Phone Number:    [phone]     Follow Up Details: 1 week to review meds and check cbc                      Georgie Wahl MD  02/14/25      Time Spent on Discharge:  I spent  34  minutes on this discharge activity which included: face-to-face encounter with the patient, reviewing the data in the system, coordination of the care with the nursing staff as well as consultants, documentation, and entering orders.

## 2025-02-14 NOTE — PROGRESS NOTES
Enter Query Response Below      Query Response: Symptomatic anemia due to chemotherapy   Electronically signed by Georgie Wahl MD, 02/14/25, 10:28 AM EST.               If applicable, please update the problem list.

## 2025-02-14 NOTE — TELEPHONE ENCOUNTER
Caller: HASEEB [facility]    Relationship: Provider    Best call back number: [phone]    What is the best time to reach you: ANYTIME    Who are you requesting to speak with (clinical staff, provider,  specific staff member): CLINICAL    Do you know the name of the person who called: HASEEB     What was the call regarding: CALLER IS CHECKING THE STATUS OF THE HOME HEALTH ORDERS.  PATIENT IS BEING RELEASED TODAY, 2.14.25 AND THEY WOULD LIKE TO START HOME HEALTH ON MONDAY.    Is it okay if the provider responds through [facility]: NO

## 2025-02-14 NOTE — OUTREACH NOTE
Prep Survey      Flowsheet Row Responses   [facility] patient discharged from? [place]   Is LACE score < 7 ? No   Eligibility [facility]   Date of Admission 02/09/25   Date of Discharge 02/14/25   Discharge Disposition Home-Health Care Sv   Discharge diagnosis Symptomatic anemia   Does the patient have one of the following disease processes/diagnoses(primary or secondary)? Other   Does the patient have Home health ordered? Yes   What is the Home health agency?  [facility]   Prep survey completed? Yes            Merary ALBA - Registered Nurse

## 2025-02-15 ENCOUNTER — TRANSITIONAL CARE MANAGEMENT TELEPHONE ENCOUNTER (OUTPATIENT)
Dept: CALL CENTER | Facility: HOSPITAL | Age: 85
End: 2025-02-15
Payer: MEDICARE

## 2025-02-15 LAB
BACTERIA SPEC AEROBE CULT: NORMAL
BACTERIA SPEC AEROBE CULT: NORMAL

## 2025-02-15 NOTE — OUTREACH NOTE
Call Center TCM Note      Flowsheet Row Responses   [facility] patient discharged from? [place]   Does the patient have one of the following disease processes/diagnoses(primary or secondary)? Other   TCM attempt successful? Yes   Call start time 1545   Call end time 1556   Discharge diagnosis Symptomatic anemia, orthostatic hypotension, ESBL E. Coli UTI, PAF   Is patient permission given to speak with other caregiver? Yes   Person spoke with today (if not patient) and relationship daughter, Tiana Olvera   Meds reviewed with patient/caregiver? Yes   Does the patient have all medications ordered at discharge? Yes   Is the patient taking all medications as directed (includes completed medication regime)? No   What is preventing the patient from taking all medications as directed? Other  [Daughter reports that patient is not going to take the metoprolol.  Patients eliquis and linzess were also stopped at discharge.]   Comments PCP Dr Conway. Hospital follow up appt in place for 2/19  115pm with PCP. Daughter would like for PCP to recheck urine culture if possible.   Does the patient have an appointment with their PCP within 7-14 days of discharge? Yes   What is the Home health agency?  [facility]   Has home health visited the patient within 72 hours of discharge? Call prior to 72 hours   Psychosocial issues? No   Did the patient receive a copy of their discharge instructions? Yes   Nursing interventions Reviewed instructions with patient  [daughter]   What is the patient's perception of their health status since discharge? Improving   Is the patient/caregiver able to teach back signs and symptoms related to disease process for when to call PCP? Yes   Is the patient/caregiver able to teach back signs and symptoms related to disease process for when to call 911? Yes   Is the patient/caregiver able to teach back the hierarchy of who to call/visit for symptoms/problems? PCP, Specialist, Home  health nurse, Urgent Care, ED, 911 Yes   If the patient is a current smoker, are they able to teach back resources for cessation? Not a smoker   TCM call completed? Yes   Call end time 8450   Would this patient benefit from a Referral to [facility] Social Work? No   Is the patient interested in additional calls from an ambulatory ? No            Bee Carrero RN    2/15/2025, 16:00 EST

## 2025-02-18 ENCOUNTER — TELEPHONE (OUTPATIENT)
Dept: FAMILY MEDICINE CLINIC | Facility: CLINIC | Age: 85
End: 2025-02-18
Payer: MEDICARE

## 2025-02-18 NOTE — TELEPHONE ENCOUNTER
Caller: [facility]    Relationship:     Best call back number: [phone]     What orders are you requesting (i.e. lab or imaging): CONTINUE HOME HOME FOR PHYSICAL THERAPY. 2 TIMES A WEEK BEGINNING NEXT WEEK AND 1 TIME A WEEK AFTER THAT FOR THREE WEEKS.     In what timeframe would the patient need to come in: ASAP    Where will you receive your lab/imaging services: IN HOME    Additional notes: VERBAL ORDERS NEEDED

## 2025-02-19 ENCOUNTER — LAB (OUTPATIENT)
Dept: LAB | Facility: HOSPITAL | Age: 85
End: 2025-02-19
Payer: MEDICARE

## 2025-02-19 ENCOUNTER — OFFICE VISIT (OUTPATIENT)
Dept: FAMILY MEDICINE CLINIC | Facility: CLINIC | Age: 85
End: 2025-02-19
Payer: MEDICARE

## 2025-02-19 VITALS
SYSTOLIC BLOOD PRESSURE: 124 MMHG | HEART RATE: 70 BPM | WEIGHT: 130.8 LBS | BODY MASS INDEX: 22.33 KG/M2 | DIASTOLIC BLOOD PRESSURE: 62 MMHG | HEIGHT: 64 IN | OXYGEN SATURATION: 96 %

## 2025-02-19 DIAGNOSIS — K29.70 GASTRITIS WITHOUT BLEEDING, UNSPECIFIED CHRONICITY, UNSPECIFIED GASTRITIS TYPE: ICD-10-CM

## 2025-02-19 DIAGNOSIS — D50.9 IRON DEFICIENCY ANEMIA, UNSPECIFIED IRON DEFICIENCY ANEMIA TYPE: ICD-10-CM

## 2025-02-19 DIAGNOSIS — R53.1 GENERALIZED WEAKNESS: ICD-10-CM

## 2025-02-19 DIAGNOSIS — K27.9 PEPTIC ULCER: ICD-10-CM

## 2025-02-19 DIAGNOSIS — D50.9 IRON DEFICIENCY ANEMIA, UNSPECIFIED IRON DEFICIENCY ANEMIA TYPE: Primary | ICD-10-CM

## 2025-02-19 DIAGNOSIS — R53.82 CHRONIC FATIGUE: ICD-10-CM

## 2025-02-19 DIAGNOSIS — F33.9 EPISODE OF RECURRENT MAJOR DEPRESSIVE DISORDER, UNSPECIFIED DEPRESSION EPISODE SEVERITY: ICD-10-CM

## 2025-02-19 DIAGNOSIS — D64.9 ANEMIA, UNSPECIFIED TYPE: ICD-10-CM

## 2025-02-19 LAB
ALBUMIN SERPL-MCNC: 3.5 G/DL (ref 3.5–5.2)
ALBUMIN/GLOB SERPL: 1.3 G/DL
ALP SERPL-CCNC: 246 U/L (ref 39–117)
ALT SERPL W P-5'-P-CCNC: 18 U/L (ref 1–33)
ANION GAP SERPL CALCULATED.3IONS-SCNC: 8.4 MMOL/L (ref 5–15)
AST SERPL-CCNC: 36 U/L (ref 1–32)
BASOPHILS # BLD AUTO: 0.03 10*3/MM3 (ref 0–0.2)
BASOPHILS NFR BLD AUTO: 0.8 % (ref 0–1.5)
BILIRUB SERPL-MCNC: 0.7 MG/DL (ref 0–1.2)
BUN SERPL-MCNC: 11 MG/DL (ref 8–23)
BUN/CREAT SERPL: 10.5 (ref 7–25)
CALCIUM SPEC-SCNC: 9 MG/DL (ref 8.6–10.5)
CHLORIDE SERPL-SCNC: 102 MMOL/L (ref 98–107)
CO2 SERPL-SCNC: 27.6 MMOL/L (ref 22–29)
CREAT SERPL-MCNC: 1.05 MG/DL (ref 0.57–1)
DEPRECATED RDW RBC AUTO: 55.3 FL (ref 37–54)
EGFRCR SERPLBLD CKD-EPI 2021: 52.2 ML/MIN/1.73
EOSINOPHIL # BLD AUTO: 0.03 10*3/MM3 (ref 0–0.4)
EOSINOPHIL NFR BLD AUTO: 0.8 % (ref 0.3–6.2)
ERYTHROCYTE [DISTWIDTH] IN BLOOD BY AUTOMATED COUNT: 16.2 % (ref 12.3–15.4)
GLOBULIN UR ELPH-MCNC: 2.7 GM/DL
GLUCOSE SERPL-MCNC: 113 MG/DL (ref 65–99)
HCT VFR BLD AUTO: 30.4 % (ref 34–46.6)
HGB BLD-MCNC: 10.4 G/DL (ref 12–15.9)
IMM GRANULOCYTES # BLD AUTO: 0.01 10*3/MM3 (ref 0–0.05)
IMM GRANULOCYTES NFR BLD AUTO: 0.3 % (ref 0–0.5)
LYMPHOCYTES # BLD AUTO: 0.74 10*3/MM3 (ref 0.7–3.1)
LYMPHOCYTES NFR BLD AUTO: 20.1 % (ref 19.6–45.3)
MCH RBC QN AUTO: 31.9 PG (ref 26.6–33)
MCHC RBC AUTO-ENTMCNC: 34.2 G/DL (ref 31.5–35.7)
MCV RBC AUTO: 93.3 FL (ref 79–97)
MONOCYTES # BLD AUTO: 0.72 10*3/MM3 (ref 0.1–0.9)
MONOCYTES NFR BLD AUTO: 19.6 % (ref 5–12)
NEUTROPHILS NFR BLD AUTO: 2.15 10*3/MM3 (ref 1.7–7)
NEUTROPHILS NFR BLD AUTO: 58.4 % (ref 42.7–76)
NRBC BLD AUTO-RTO: 0 /100 WBC (ref 0–0.2)
PLATELET # BLD AUTO: 124 10*3/MM3 (ref 140–450)
PMV BLD AUTO: 13.5 FL (ref 6–12)
POTASSIUM SERPL-SCNC: 4 MMOL/L (ref 3.5–5.2)
PROT SERPL-MCNC: 6.2 G/DL (ref 6–8.5)
RBC # BLD AUTO: 3.26 10*6/MM3 (ref 3.77–5.28)
SODIUM SERPL-SCNC: 138 MMOL/L (ref 136–145)
WBC NRBC COR # BLD AUTO: 3.68 10*3/MM3 (ref 3.4–10.8)

## 2025-02-19 PROCEDURE — 80053 COMPREHEN METABOLIC PANEL: CPT

## 2025-02-19 PROCEDURE — 1111F DSCHRG MED/CURRENT MED MERGE: CPT | Performed by: INTERNAL MEDICINE

## 2025-02-19 PROCEDURE — 3074F SYST BP LT 130 MM HG: CPT | Performed by: INTERNAL MEDICINE

## 2025-02-19 PROCEDURE — 1126F AMNT PAIN NOTED NONE PRSNT: CPT | Performed by: INTERNAL MEDICINE

## 2025-02-19 PROCEDURE — 3078F DIAST BP <80 MM HG: CPT | Performed by: INTERNAL MEDICINE

## 2025-02-19 PROCEDURE — 1159F MED LIST DOCD IN RCRD: CPT | Performed by: INTERNAL MEDICINE

## 2025-02-19 PROCEDURE — 99495 TRANSJ CARE MGMT MOD F2F 14D: CPT | Performed by: INTERNAL MEDICINE

## 2025-02-19 PROCEDURE — 1160F RVW MEDS BY RX/DR IN RCRD: CPT | Performed by: INTERNAL MEDICINE

## 2025-02-19 PROCEDURE — 85025 COMPLETE CBC W/AUTO DIFF WBC: CPT

## 2025-02-19 NOTE — TELEPHONE ENCOUNTER
Timi Conway DO [facility] Clinical Pool3 minutes ago (9:04 AM)       Okay with me   Mounika smith.

## 2025-02-19 NOTE — PROGRESS NOTES
Chief Complaint   Patient presents with    Hospital Follow Up Visit     Admitted from 02/09/2025-02/14/2025, symptomatic anemia. Feeling very weak. Concerned about blood pressure, medication dosage.    Current outpatient and discharge medications have been reconciled for the patient.  Reviewed by: Timi Conway DO      HPI:  Sheree Hernandez is a 84 y.o. female who presents today for hospital follow-up anemia.  Continues to feel weak.  Would like to discuss blood pressure    ROS:  Constitutional: no fevers, night sweats or unexplained weight loss  Eyes: no vision changes  ENT: no runny nose, ear pain, sore throat  Cardio: no chest pain, palpitations  Pulm: no shortness of breath, wheezing, or cough  GI: no abdominal pain or changes in bowel movements  : no difficulty urinating  MSK: no difficulty ambulating, no joint pain  Neuro: no weakness, dizziness or headache  Psych: no trouble sleeping  Endo: no change in appetite      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    Osteopenia 2018    PAF (paroxysmal atrial fibrillation) 07/15/2024    Plantar fasciitis     Chronic    PVC's (premature ventricular contractions)     Senile keratosis     Multiple    MANAN (stress urinary incontinence, female)     Symptomatic anemia 2/9/2025    Syncope, near     with negative cardiovascular workup      Family History   Problem Relation Age of Onset     Heart attack Mother     Heart failure Mother     Dementia Mother     Stroke Mother     Arthritis Mother         Heart Attack    Heart disease Father     Hearing loss Father     Stroke Father     Arrhythmia Brother     Breast cancer Paternal Aunt     Colon cancer Neg Hx       Social History     Socioeconomic History    Marital status:     Number of children: 2   Tobacco Use    Smoking status: Never     Passive exposure: Never    Smokeless tobacco: Never    Tobacco comments:     Exposed to secondhand smoke   Vaping Use    Vaping status: Never Used   Substance and Sexual Activity    Alcohol use: Never     Comment: Very seldom    Drug use: Never    Sexual activity: Not Currently     Partners: Male     Birth control/protection: None, Hysterectomy     Comment: Complete Hysterectomy      Allergies   Allergen Reactions    Pravastatin Myalgia      Immunization History   Administered Date(s) Administered    COVID-19 (PFIZER) Purple Cap Monovalent 02/15/2021, 03/06/2021    Fluzone High-Dose 65+YRS 11/03/2017, 09/11/2018, 10/15/2019    Fluzone High-Dose 65+yrs 10/10/2023    Influenza, Unspecified 10/10/2020, 10/07/2021    Pneumococcal Polysaccharide (PPSV23) 05/17/2013, 12/06/2016, 05/15/2018    TD Preservative Free (Tenivac) 11/09/2016    Td (TDVAX) 05/18/1997        PE:  Vitals:    02/19/25 1258   BP: 124/62   Pulse: 70   SpO2: 96%      Body mass index is 22.44 kg/m².    Gen Appearance: NAD  HEENT: Normocephalic, PERRLA, no thyromegaly, trache midline  Heart: RRR, normal S1 and S2, no murmur  Lungs: CTA b/l, no wheezing, no crackles  Abdomen: Soft, non-tender, non-distended, no guarding and BSx4  MSK: Moves all extremities well, normal gait, no peripheral edema  Pulses: Palpable and equal b/l  Lymph nodes: No palpable lymphadenopathy   Neuro: No focal deficits      Current Outpatient Medications   Medication Sig Dispense Refill    dicyclomine (BENTYL) 20 MG tablet Take 1 tablet by mouth Every 4 (Four) Hours As Needed  for Abdominal Cramping 30 tablet 2    ipratropium (ATROVENT) 0.06 % nasal spray Administer 1 spray into each nostril twice daily 30 mL 3    levothyroxine (SYNTHROID, LEVOTHROID) 25 MCG tablet Take 1 tablet by mouth Every Morning. 30 tablet 3    lidocaine-prilocaine (EMLA) 2.5-2.5 % cream Please apply to port site 30 min prior to infusion and cover with Saran Wrap 30 g 3    methylphenidate (Ritalin) 5 MG tablet Take 1 tablet by mouth 2 (Two) Times a Day. 60 tablet 0    metoprolol tartrate (LOPRESSOR) 25 MG tablet Take 0.5 tablets by mouth 2 (Two) Times a Day.      pantoprazole (PROTONIX) 40 MG EC tablet Take 1 tablet by mouth 2 (Two) Times a Day Before Meals. 180 tablet 3    sennosides-docusate (Stimulant Laxative) 8.6-50 MG per tablet Take 1 tablet by mouth 2 (Two) Times a Day As Needed for Constipation. 60 tablet 0     No current facility-administered medications for this visit.      Has been taking metoprolol every other day, continue to monitor heart rate and blood pressure over the next week.  Hold metoprolol for now.  Continues to have weakness.  Recheck labs today.    Continue pantoprazole twice daily, still having some epigastric pain at night.  May take Pepcid AC as needed.    Diagnoses and all orders for this visit:    1. Iron deficiency anemia, unspecified iron deficiency anemia type (Primary)  -     CBC No Differential; Future  -     Comprehensive Metabolic Panel; Future    2. Peptic ulcer  -     CBC No Differential; Future  -     Comprehensive Metabolic Panel; Future    3. Chronic fatigue    4. Generalized weakness         Return in about 3 months (around 5/19/2025) for Medicare Wellness.     Dictated Utilizing Dragon Dictation    Please note that portions of this note were completed with a voice recognition program.    Part of this note may be an electronic transcription/translation of spoken language to printed text using the Dragon Dictation System.

## 2025-02-26 ENCOUNTER — READMISSION MANAGEMENT (OUTPATIENT)
Dept: CALL CENTER | Facility: HOSPITAL | Age: 85
End: 2025-02-26
Payer: MEDICARE

## 2025-02-26 NOTE — OUTREACH NOTE
Medical Week 2 Survey      Flowsheet Row Responses   [facility] patient discharged from? [place]   Does the patient have one of the following disease processes/diagnoses(primary or secondary)? Other   Week 2 attempt successful? No   Unsuccessful attempts Attempt 2   Revoke Other transitional program            Nasima WEN - Registered Nurse

## 2025-02-27 ENCOUNTER — TELEPHONE (OUTPATIENT)
Dept: FAMILY MEDICINE CLINIC | Facility: CLINIC | Age: 85
End: 2025-02-27

## 2025-02-27 NOTE — TELEPHONE ENCOUNTER
Caller: YAIMA VANEGAS    Relationship to patient:     Best call back number: [phone]     ROMINA STATES THAT HE NEEDS TO CLARIFY THAT METOPROLOL HAS BEEN DISCONTINUED.  PLEASE ADVISE.

## 2025-02-28 ENCOUNTER — PATIENT OUTREACH (OUTPATIENT)
Dept: CASE MANAGEMENT | Facility: OTHER | Age: 85
End: 2025-02-28
Payer: MEDICARE

## 2025-02-28 NOTE — PLAN OF CARE
Problem: Cancer Treatment Phase  Goal: Manage Fatigue (Tiredness)  Outcome: Not Progressing  Goal: Keep Nausea and Vomiting Under Control  Outcome: Not Progressing     Problem: Cancer Treatment Phase  Goal: Optimal Care Coordination of Patient With Cancer: Treatment Phase  Intervention: Alleviate Barriers to Anorexia, Nausea and Vomiting Management  Flowsheets (Taken 2/28/2025 1322)  Alleviate Barriers to Anorexia, Nausea and Vomiting Management:   diet adjustment recommended   fluid status assessed and trended   medication side effects managed

## 2025-02-28 NOTE — OUTREACH NOTE
AMBULATORY CASE MANAGEMENT NOTE    Names and Relationships of Patient/Support Persons: Contact: Sheree Hernandez Herson; Relationship: Self -     Patient Outreach    Patient discharged from [facility] 02/14/2025: Intrahepatic cholangiocarcinoma, PAF, + others. Instructions include follow ups for labs and diagnostics.  Patient prescribed metoprolol tartrate 12.5 mg take 0.5 tablet BID. Mrs. Hernandez reports she is tired and fatigued, able to perform ADLS with rest periods.  Mrs. Hernandez is attempting to increase oral intake but feels her nausea is caused by metoprolol. Medications reviewed with administration instructions. Patient taking blood pressure twice daily. Denies needs or concerns at this time.    Education Documentation  Symptom Management, taught by Jenise Joseph, RN at 2/28/2025  1:22 PM.  Learner: Patient  Readiness: Acceptance  Method: Explanation  Response: Verbalizes Understanding    Fluid/Food Intake, taught by Jenise Joseph, RN at 2/28/2025  1:22 PM.  Learner: Patient  Readiness: Acceptance  Method: Explanation  Response: Verbalizes Understanding          Jenise CASTELLANOS  Ambulatory Case Management    2/28/2025, 13:24 EST

## 2025-03-05 ENCOUNTER — TELEPHONE (OUTPATIENT)
Dept: ONCOLOGY | Facility: CLINIC | Age: 85
End: 2025-03-05
Payer: MEDICARE

## 2025-03-05 ENCOUNTER — PATIENT MESSAGE (OUTPATIENT)
Dept: ONCOLOGY | Facility: CLINIC | Age: 85
End: 2025-03-05
Payer: MEDICARE

## 2025-03-05 NOTE — TELEPHONE ENCOUNTER
Patient's daughter left a voicemail states patient's BP is running low wants to know if she could come in and get some blood and fluids that always seems to help? Please call.

## 2025-03-05 NOTE — TELEPHONE ENCOUNTER
Return call to Tiana.  Tiana states that Sheree called her reporting she feels like she needs fluid and blood.  Sheree sent a [facility] message at the same time.  Advised that we are unable to get her in infusion today, but can tomorrow at 8 AM.  Will check labs including a type and screen and give a liter of fluids.  Daughter also asking about palliative versus hospice.  Tiana states she will call in during visit with Dr Montenegro on Tuesday

## 2025-03-06 ENCOUNTER — TELEPHONE (OUTPATIENT)
Dept: ONCOLOGY | Facility: CLINIC | Age: 85
End: 2025-03-06
Payer: MEDICARE

## 2025-03-06 ENCOUNTER — HOSPITAL ENCOUNTER (OUTPATIENT)
Dept: ONCOLOGY | Facility: HOSPITAL | Age: 85
Discharge: HOME | End: 2025-03-06
Payer: MEDICARE

## 2025-03-06 ENCOUNTER — PATIENT MESSAGE (OUTPATIENT)
Dept: ONCOLOGY | Facility: CLINIC | Age: 85
End: 2025-03-06
Payer: MEDICARE

## 2025-03-06 VITALS
HEART RATE: 65 BPM | BODY MASS INDEX: 21.34 KG/M2 | HEIGHT: 64 IN | RESPIRATION RATE: 18 BRPM | TEMPERATURE: 97.8 F | DIASTOLIC BLOOD PRESSURE: 66 MMHG | WEIGHT: 125 LBS | SYSTOLIC BLOOD PRESSURE: 107 MMHG

## 2025-03-06 DIAGNOSIS — E86.0 DEHYDRATION: Primary | ICD-10-CM

## 2025-03-06 DIAGNOSIS — C22.1 INTRAHEPATIC CHOLANGIOCARCINOMA: ICD-10-CM

## 2025-03-06 DIAGNOSIS — C85.80 MARGINAL ZONE LYMPHOMA: Primary | ICD-10-CM

## 2025-03-06 DIAGNOSIS — D64.9 SYMPTOMATIC ANEMIA: Primary | ICD-10-CM

## 2025-03-06 LAB
BASOPHILS # BLD AUTO: 0.03 10*3/MM3 (ref 0–0.2)
BASOPHILS NFR BLD AUTO: 0.6 % (ref 0–1.5)
DEPRECATED RDW RBC AUTO: 57.3 FL (ref 37–54)
EOSINOPHIL # BLD AUTO: 0.06 10*3/MM3 (ref 0–0.4)
EOSINOPHIL NFR BLD AUTO: 1.3 % (ref 0.3–6.2)
ERYTHROCYTE [DISTWIDTH] IN BLOOD BY AUTOMATED COUNT: 16.5 % (ref 12.3–15.4)
HCT VFR BLD AUTO: 26.6 % (ref 34–46.6)
HGB BLD-MCNC: 8.6 G/DL (ref 12–15.9)
IMM GRANULOCYTES # BLD AUTO: 0.03 10*3/MM3 (ref 0–0.05)
IMM GRANULOCYTES NFR BLD AUTO: 0.6 % (ref 0–0.5)
LYMPHOCYTES # BLD AUTO: 0.78 10*3/MM3 (ref 0.7–3.1)
LYMPHOCYTES NFR BLD AUTO: 16.4 % (ref 19.6–45.3)
MCH RBC QN AUTO: 31.5 PG (ref 26.6–33)
MCHC RBC AUTO-ENTMCNC: 32.3 G/DL (ref 31.5–35.7)
MCV RBC AUTO: 97.4 FL (ref 79–97)
MONOCYTES # BLD AUTO: 0.88 10*3/MM3 (ref 0.1–0.9)
MONOCYTES NFR BLD AUTO: 18.5 % (ref 5–12)
NEUTROPHILS NFR BLD AUTO: 2.97 10*3/MM3 (ref 1.7–7)
NEUTROPHILS NFR BLD AUTO: 62.6 % (ref 42.7–76)
PLATELET # BLD AUTO: 143 10*3/MM3 (ref 140–450)
PMV BLD AUTO: 12.4 FL (ref 6–12)
RBC # BLD AUTO: 2.73 10*6/MM3 (ref 3.77–5.28)
WBC NRBC COR # BLD AUTO: 4.75 10*3/MM3 (ref 3.4–10.8)

## 2025-03-06 PROCEDURE — 85025 COMPLETE CBC W/AUTO DIFF WBC: CPT | Performed by: INTERNAL MEDICINE

## 2025-03-06 PROCEDURE — 96360 HYDRATION IV INFUSION INIT: CPT

## 2025-03-06 PROCEDURE — 25010000002 HEPARIN LOCK FLUSH PER 10 UNITS: Performed by: INTERNAL MEDICINE

## 2025-03-06 PROCEDURE — 25810000003 SODIUM CHLORIDE 0.9 % SOLUTION: Performed by: INTERNAL MEDICINE

## 2025-03-06 RX ORDER — SODIUM CHLORIDE 9 MG/ML
1000 INJECTION, SOLUTION INTRAVENOUS ONCE
Status: COMPLETED | OUTPATIENT
Start: 2025-03-06 | End: 2025-03-06

## 2025-03-06 RX ORDER — HEPARIN SODIUM (PORCINE) LOCK FLUSH IV SOLN 100 UNIT/ML 100 UNIT/ML
500 SOLUTION INTRAVENOUS AS NEEDED
Status: DISCONTINUED | OUTPATIENT
Start: 2025-03-06 | End: 2025-03-07 | Stop reason: HOSPADM

## 2025-03-06 RX ORDER — HEPARIN SODIUM (PORCINE) LOCK FLUSH IV SOLN 100 UNIT/ML 100 UNIT/ML
500 SOLUTION INTRAVENOUS AS NEEDED
OUTPATIENT
Start: 2025-03-06

## 2025-03-06 RX ADMIN — HEPARIN 500 UNITS: 100 SYRINGE at 09:47

## 2025-03-06 RX ADMIN — SODIUM CHLORIDE 1000 ML/HR: 9 INJECTION, SOLUTION INTRAVENOUS at 08:32

## 2025-03-06 NOTE — TELEPHONE ENCOUNTER
Caller: Tiana Olvera  (ON  [facility])    Relationship: Emergency Contact    Best call back number: [phone]      What was the call regarding: PATIENTS DAUGHTER WANTED TO SEE IF DR TONY WOULD START THE REFERRAL FOR PALLIATIVE CARE      [facility] IS THE FACILITY THEY WANT TO BE REFERRED TO UNLESS DR TONY WANTS TO REFER TO A CERTAIN PLACE

## 2025-03-06 NOTE — TELEPHONE ENCOUNTER
Return call to Tiana.  Advised that referral has been placed to palliative care with [facility] care navigators.  Tiana states understood

## 2025-03-10 RX ORDER — AMOXICILLIN 250 MG
1 CAPSULE ORAL 2 TIMES DAILY PRN
Qty: 60 TABLET | Refills: 0 | Status: SHIPPED | OUTPATIENT
Start: 2025-03-10

## 2025-03-10 NOTE — ADDENDUM NOTE
Encounter addended by: Leslie Sheth RN on: 3/10/2025 8:28 AM   Actions taken: Patient MAR flagged for correction, MAR administration edited, MAR administration accepted

## 2025-03-10 NOTE — PROGRESS NOTES
Palliative Clinic Note      Name: Sheree Hernandez  Age: 84 y.o.  Sex: female  : 1940  MRN: [id number]  Date of Service: 2025   Referring Physician: Jorje Montenegro MD    Subjective:    Chief Complaint: Fatigue, constipation, anxiety    History of Present Illness: Sheree Hernandez is a 84 y.o. female with past medical history significant for lymphoma, HLD, A-fib, hypothyroidism, PUD, sleep apnea, cholangiocarcinoma, anxiety, depression  who presents to the palliative clinic today to establish care.     Treatment summary:   Intrahepatic cholangiocarcinoma: Patient underwent right hepatectomy, cholecystectomy, right partial adrenalectomy in 2021. Pathology was consistent with intrahepatic cholangiocarcinoma. Patient has received several rounds of chemo and radiation. Currently on maintenance durvalumab which has been on hold since 10/2024 due to her significant fatigue and tiredness.     B-cell Lymphoma: Patient diagnosed in 2025. Patient started on rituximab in 2025. Plan to hold further therapy for now. Hospitalized from -25 for symptomatic anemia and a urinary tract infection.  Scheduled to follow-up with Dr. Montenegro later today.    Symptoms: The patient complains of severe fatigue with occasional lightheadedness and dyspnea.  The symptoms have been present for several years.  She has undergone extensive workups past many specialties.  Patient is currently working with physical therapy and home health.  She reports making minimal progress.  Patient spends most of her day in the recliner.  She has noticed slight improvement in the fatigue with IV fluids last week.  No impactful improvement with trial of Ritalin in the past.  The patient complains of chronic constipation.  Patient goes 3 to 4 days between bowel movements.  Patient takes Lynnette-Colace twice daily.  She uses mag citrate, suppositories and enemas when she has gone several days without a bowel movement.  No efficacy with  MiraLAX in the past.  Patient reports eating numerous small meals throughout the day that include sources of protein.  She also supplements with protein shakes daily.  No complaints of nausea or vomiting.  No issues with sleep to report.    Pyschosocial: The patient presents the clinic with her son, Best.  Best lives in [place].  He comes down every few weeks to visit her.  Patient has a daughter who lives in [place] that is involved in her care.  Patient lives by herself however has a friend that is with her 24/7.  Patient complains of significant anxiety.  She was started on an antidepressant by her PCP several years ago but experienced side effects and did not continue.  The patient was referred to Janice Sepulveda in 2024 but does not currently follow with her.    Goals: Maximize comfort, optimize function & psychosocial wellbeing, and promote advanced care planning.    The following portions of the patient's history were reviewed and updated as appropriate: allergies, current medications, past family history, past medical history, past social history, past surgical history and problem list.    Decisional capacity:Full  ORT-R: Low risk  PHQ-9: 10-14 (Moderate Depression)  GAETANO: 12  ECOG: (2) Ambulatory and capable of self care, unable to carry out work activity, up and about > 50% or waking hours     Objective:    /81   Pulse 118   Temp 98.3 °F (36.8 °C) (Temporal)   Resp 16   Wt 57.1 kg (125 lb 12.8 oz)   SpO2 93%   BMI 21.59 kg/m²     Constitutional: Awake, alert, using wheelchair, lying supine on exam bed  Eyes: PERRLA, EOMS intact  HENT: NCAT, face symmetric  Neck: Supple, trachea midline  Respiratory: Nonlabored respirations  Cardiovascular: No edema observed  Gastrointestinal: Soft, no guarding  Musculoskeletal: Moves all extremities   Psychiatric: Appropriate affect, cooperative  Neurologic: Oriented x 3, Cranial Nerves grossly intact to confrontation, speech clear  Skin: Cool dry, no rashes  or wounds appreciated     Medication Counts: Instructed to bring controlled medications to all appointments.   I have reviewed the patient's [place] PDMP. SPENSER Req #[id number] .   UDS: Collected today. Results pending.    Assessment & Plan:    1. Intrahepatic cholangiocarcinoma  2. Marginal zone lymphoma  3. Neoplastic malignant related fatigue  - We explained what palliative care is and what it can offer the patient. Reinforced that palliative care is provided in collaboration with primary care provider and any specialty care providers.  Encouraged patient to continue to seek emergency medical treatment as needed for acute illness or injury. We discussed short-term goals which include improving quality of life and daily functioning. The patient understands that we will likely need hospice care at some point and I will help guide her and family through the process.      4. Therapeutic drug monitoring  - Urine Drug Screen per new patient visit.  Results are pending.    5. Constipation, chronic  - Continue adequate hydration and diet modifications.  Continue Lynnette-Colace twice daily.  Recommend escalating bowel regimen with mag citrate daily or every other day.  Laxative includes lactulose.  May consider trial of Linzess.    6. Anxiety  - Start trial of busPIRone (BUSPAR) 5 MG tablet; Take 1 tablet by mouth 3 (Three) Times a Day As Needed (Anxiety).  Dispense: 30 tablet; Refill: 0  - Ambulatory Referral to Behavioral Health    Code status: FULL   Advanced directives: No.    Return in about 1 month (around 4/11/2025) for Office Visit.    I spent 60 minutes caring for Sheree Hernandez on this date of service. This time includes time spent by me in the following activities: preparing for the visit, reviewing tests, obtaining and/or reviewing a separately obtained history, performing a medically appropriate examination and/or evaluation , counseling and educating the patient/family/caregiver, ordering medications, tests, or  procedures, documenting information in the medical record, independently interpreting results and communicating that information with the patient/family/caregiver, and care coordination    Angélica Arauz PA-C  03/11/2025    Medication Date Filled # Filled Count Used # Days  TROY   Ritalin 5 (Lan) 12/23/24 60 -- -- -- --   Tramadol 50 9/28/24 6 -- -- -- --

## 2025-03-11 ENCOUNTER — OFFICE VISIT (OUTPATIENT)
Dept: ONCOLOGY | Facility: CLINIC | Age: 85
End: 2025-03-11
Payer: MEDICARE

## 2025-03-11 ENCOUNTER — OFFICE VISIT (OUTPATIENT)
Dept: PALLIATIVE CARE | Facility: CLINIC | Age: 85
End: 2025-03-11
Payer: MEDICARE

## 2025-03-11 ENCOUNTER — LAB (OUTPATIENT)
Dept: LAB | Facility: HOSPITAL | Age: 85
End: 2025-03-11
Payer: MEDICARE

## 2025-03-11 ENCOUNTER — HOSPITAL ENCOUNTER (OUTPATIENT)
Dept: ONCOLOGY | Facility: HOSPITAL | Age: 85
Discharge: HOME | End: 2025-03-11
Payer: MEDICARE

## 2025-03-11 VITALS
WEIGHT: 126 LBS | HEART RATE: 118 BPM | SYSTOLIC BLOOD PRESSURE: 130 MMHG | HEIGHT: 64 IN | OXYGEN SATURATION: 93 % | DIASTOLIC BLOOD PRESSURE: 81 MMHG | TEMPERATURE: 98.3 F | BODY MASS INDEX: 21.51 KG/M2 | RESPIRATION RATE: 16 BRPM

## 2025-03-11 VITALS
TEMPERATURE: 98.3 F | SYSTOLIC BLOOD PRESSURE: 130 MMHG | HEART RATE: 118 BPM | RESPIRATION RATE: 16 BRPM | OXYGEN SATURATION: 93 % | DIASTOLIC BLOOD PRESSURE: 81 MMHG | WEIGHT: 125.8 LBS | BODY MASS INDEX: 21.59 KG/M2

## 2025-03-11 DIAGNOSIS — K59.09 CONSTIPATION, CHRONIC: ICD-10-CM

## 2025-03-11 DIAGNOSIS — C22.1 INTRAHEPATIC CHOLANGIOCARCINOMA: ICD-10-CM

## 2025-03-11 DIAGNOSIS — C85.80 MARGINAL ZONE LYMPHOMA: ICD-10-CM

## 2025-03-11 DIAGNOSIS — D64.9 SYMPTOMATIC ANEMIA: ICD-10-CM

## 2025-03-11 DIAGNOSIS — Z51.81 THERAPEUTIC DRUG MONITORING: Primary | ICD-10-CM

## 2025-03-11 DIAGNOSIS — F41.9 ANXIETY: ICD-10-CM

## 2025-03-11 DIAGNOSIS — C22.1 INTRAHEPATIC CHOLANGIOCARCINOMA: Primary | ICD-10-CM

## 2025-03-11 DIAGNOSIS — R53.0 NEOPLASTIC MALIGNANT RELATED FATIGUE: ICD-10-CM

## 2025-03-11 LAB
AMPHET+METHAMPHET UR QL: NEGATIVE
AMPHETAMINES UR QL: NEGATIVE
BARBITURATES UR QL SCN: NEGATIVE
BASOPHILS # BLD AUTO: 0.03 10*3/MM3 (ref 0–0.2)
BASOPHILS NFR BLD AUTO: 0.5 % (ref 0–1.5)
BENZODIAZ UR QL SCN: NEGATIVE
BUPRENORPHINE SERPL-MCNC: NEGATIVE NG/ML
CANNABINOIDS SERPL QL: NEGATIVE
COCAINE UR QL: NEGATIVE
DEPRECATED RDW RBC AUTO: 67.8 FL (ref 37–54)
EOSINOPHIL # BLD AUTO: 0.07 10*3/MM3 (ref 0–0.4)
EOSINOPHIL NFR BLD AUTO: 1.2 % (ref 0.3–6.2)
ERYTHROCYTE [DISTWIDTH] IN BLOOD BY AUTOMATED COUNT: 19.2 % (ref 12.3–15.4)
FENTANYL UR-MCNC: NEGATIVE NG/ML
HCT VFR BLD AUTO: 29 % (ref 34–46.6)
HGB BLD-MCNC: 9.5 G/DL (ref 12–15.9)
IMM GRANULOCYTES # BLD AUTO: 0.02 10*3/MM3 (ref 0–0.05)
IMM GRANULOCYTES NFR BLD AUTO: 0.3 % (ref 0–0.5)
LYMPHOCYTES # BLD AUTO: 0.8 10*3/MM3 (ref 0.7–3.1)
LYMPHOCYTES NFR BLD AUTO: 13.4 % (ref 19.6–45.3)
MCH RBC QN AUTO: 32.3 PG (ref 26.6–33)
MCHC RBC AUTO-ENTMCNC: 32.8 G/DL (ref 31.5–35.7)
MCV RBC AUTO: 98.6 FL (ref 79–97)
METHADONE UR QL SCN: NEGATIVE
MONOCYTES # BLD AUTO: 0.93 10*3/MM3 (ref 0.1–0.9)
MONOCYTES NFR BLD AUTO: 15.5 % (ref 5–12)
NEUTROPHILS NFR BLD AUTO: 4.14 10*3/MM3 (ref 1.7–7)
NEUTROPHILS NFR BLD AUTO: 69.1 % (ref 42.7–76)
OPIATES UR QL: NEGATIVE
OXYCODONE UR QL SCN: NEGATIVE
PCP UR QL SCN: NEGATIVE
PLATELET # BLD AUTO: 152 10*3/MM3 (ref 140–450)
PMV BLD AUTO: 11.7 FL (ref 6–12)
RBC # BLD AUTO: 2.94 10*6/MM3 (ref 3.77–5.28)
TRICYCLICS UR QL SCN: NEGATIVE
WBC NRBC COR # BLD AUTO: 5.99 10*3/MM3 (ref 3.4–10.8)

## 2025-03-11 PROCEDURE — 1159F MED LIST DOCD IN RCRD: CPT | Performed by: PHYSICIAN ASSISTANT

## 2025-03-11 PROCEDURE — 99214 OFFICE O/P EST MOD 30 MIN: CPT | Performed by: INTERNAL MEDICINE

## 2025-03-11 PROCEDURE — 3075F SYST BP GE 130 - 139MM HG: CPT | Performed by: PHYSICIAN ASSISTANT

## 2025-03-11 PROCEDURE — 1125F AMNT PAIN NOTED PAIN PRSNT: CPT | Performed by: PHYSICIAN ASSISTANT

## 2025-03-11 PROCEDURE — 36415 COLL VENOUS BLD VENIPUNCTURE: CPT

## 2025-03-11 PROCEDURE — 85025 COMPLETE CBC W/AUTO DIFF WBC: CPT

## 2025-03-11 PROCEDURE — 3079F DIAST BP 80-89 MM HG: CPT | Performed by: PHYSICIAN ASSISTANT

## 2025-03-11 PROCEDURE — 80307 DRUG TEST PRSMV CHEM ANLYZR: CPT | Performed by: PHYSICIAN ASSISTANT

## 2025-03-11 PROCEDURE — 1160F RVW MEDS BY RX/DR IN RCRD: CPT | Performed by: PHYSICIAN ASSISTANT

## 2025-03-11 PROCEDURE — 99205 OFFICE O/P NEW HI 60 MIN: CPT | Performed by: PHYSICIAN ASSISTANT

## 2025-03-11 PROCEDURE — 3075F SYST BP GE 130 - 139MM HG: CPT | Performed by: INTERNAL MEDICINE

## 2025-03-11 PROCEDURE — 1125F AMNT PAIN NOTED PAIN PRSNT: CPT | Performed by: INTERNAL MEDICINE

## 2025-03-11 PROCEDURE — 3079F DIAST BP 80-89 MM HG: CPT | Performed by: INTERNAL MEDICINE

## 2025-03-11 RX ORDER — BUSPIRONE HYDROCHLORIDE 5 MG/1
5 TABLET ORAL 3 TIMES DAILY PRN
Qty: 30 TABLET | Refills: 0 | Status: SHIPPED | OUTPATIENT
Start: 2025-03-11

## 2025-03-11 NOTE — PROGRESS NOTES
Follow Up Office Visit      Date: 2025     Patient Name: Sheree Hernandez  MRN: [id number]  : 1940  Chief Complaint:  Follow-up for intrahepatic cholangiocarcinoma      History of Present Illness: Sheree Hernandez is a pleasant 79 y.o. female with a past medical history of hyperlipidemia and hypertension who presents today for evaluation of concern for hemochromatosis. The patient is accompanied by their self who contributes to the history of their care.  Patient states that over the past 2 years she has been having worsening fatigue especially with exertion.  Over the past several months this has become worse.  Patient states that she gets tired with basic activities including showering getting dressed in the morning and walking to and from her car from stores.  She states that her fatigue gets better after a few minutes of rest.  She has previously had an echocardiogram and cardiac catheterization within the past 2 years which did not reveal any cause of her fatigue with exertion.  She is also had an ultrasound of the liver which revealed stable cyst.  She was recently seen by her PCP who ordered iron studies which was notable for an elevated ferritin to 246.  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  All other mutations were negative.  She is currently up-to-date on her mammograms and colonoscopy with no active malignancies noted.  She is otherwise compliant with her statin and high blood pressure medicines.  Repeat abdominal imaging in 2021 concerning for enlarging liver lesion.  She was seen by Dr. Sharma and  is status post open right hepatectomy, cholecystectomy, right partial adrenalectomy on 2021.  Pathology showed a focal R1 resected margin.  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  0/4 lymph nodes were  positive for disease.  LVI and PNI were present.  Finished chemoXRT in January 2021.      Interval History:  Presents to clinic for follow-up.  Completed radiation in May 2023 and again in August 2023.  Completed 6 cycles of cisplatin/gemcitabine/Durvalumab in August 2024.  Treatment discontinued due to toxicity.  Currently on maintenance Durvalumab which has been on hold since October due to her significant fatigue and tiredness.  Was hospitalized in December 2024 due to continued abdominal pain and discomfort.  Underwent an EGD which showed a clean-based nonbleeding ulcer.  Started on PPI with some improvement of her symptoms.  Bone marrow biopsy in January 2025 consistent with a B-cell lymphoma.  Completed 3 weeks of weekly rituximab before being hospitalized for failure to thrive.  Still having significant fatigue and tiredness at home.  Weight generally stable.  Denies any unexplained fevers or chills.  Denies any shortness of breath or chest pain.  Does note intermittent tachycardia and hypotensive episodes    Oncology History:    Oncology/Hematology History   Intrahepatic cholangiocarcinoma   8/17/2021 Initial Diagnosis    Intrahepatic cholangiocarcinoma (CMS/HCC)     8/17/2021 Cancer Staged    Staging form: Intrahepatic Bile Duct, AJCC 8th Edition  - Clinical: Stage IB (cT1b, cN0, cM0) - Signed by Jorje Montenegro MD on 8/17/2021 11/5/2021 Cancer Staged    Staging form: Intrahepatic Bile Duct, AJCC 8th Edition  - Pathologic: Stage IIIA (pT3, pN0, cM0) - Signed by Jorje Montenegro MD on 11/5/2021 12/9/2021 - 1/6/2022 Chemotherapy    OP GALLBLADDER Capecitabine + XRT     12/9/2021 - 1/19/2022 Radiation    Radiation OncologyTreatment Course:  Sheree Hernandez received 5040 cGy in 28 fractions to liver via External Beam Radiation - EBRT.     4/25/2023 - 5/8/2023 Radiation    Radiation OncologyTreatment Course:  Sheree Hernandez received 3500 cGy in 5 fractions to liver mass and lymph nodes via  Stereotactic Radiation Therapy - SRT.     8/1/2023 - 8/11/2023 Radiation    Radiation OncologyTreatment Course:  Sheree Hernandez received 3500 cGy in 5 fractions to abdomen via Stereotactic Radiation Therapy - SRT.     1/9/2024 - 1/23/2024 Radiation    Radiation OncologyTreatment Course:  Sheree Hernandez received 3000 cGy in 5 fractions to abdomen via Stereotactic Radiation Therapy - SRT.     1/11/2024 - 1/11/2024 Radiation    Radiation OncologyTreatment Course:  Sheree Hernandez received 754 cGy in 1 fractions to left lung via Stereotactic Radiation Therapy - SRT.     2/1/2024 - 2/1/2024 Radiation    Radiation OncologyTreatment Course:  Sheree Hernandez received 3000 cGy in 1 fractions to left lung via Stereotactic Radiation Therapy - SRT.     4/19/2024 - 8/16/2024 Chemotherapy    OP HEPATOBILIARY CISplatin + Gemcitabine Days 1,8 / Durvalumab Q21D     5/17/2024 -  Chemotherapy    OP CENTRAL VENOUS ACCESS DEVICE Access, Care, and Maintenance (CVAD)     9/6/2024 -  Chemotherapy    OP CHOLANGIOCARCINOMA Durvalumab 1500 mg     Marginal zone lymphoma   1/20/2025 Initial Diagnosis    Marginal zone lymphoma     1/21/2025 - 2/4/2025 Biopsy    OP LYMPHOMA RiTUXimab (Weekly X 4)  Plan Provider: Jorje Montenegro MD  Treatment goal: Curative  Line of treatment: [No plan line of treatment]         Subjective      Review of Systems:   Constitutional: Negative for fevers, chills, or weight loss  Eyes: Negative for blurred vision or discharge         Ear/Nose/Throat: Negative for difficulty swallowing, sore throat, LAD                                                       Respiratory: Negative for cough, SOA, wheezing                                                                                        Cardiovascular: Negative for chest pain or palpitations                                                                  Gastrointestinal: Negative for nausea, vomiting or diarrhea                                                                      Genitourinary: Negative for dysuria or hematuria                                                                                           Musculoskeletal: Negative for any joint pains or muscle aches                                                                        Neurologic: Negative for any weakness, headaches, dizziness                                                                         Hematologic: Negative for any easy bleeding or bruising                                                                                   Psychiatric: Negative for anxiety or depression                          Past Medical History/Past Surgical History/ Family History/ Social History: Reviewed by me and unchanged from my previous documentation done on February 2025.     Medications:     Current Outpatient Medications:     busPIRone (BUSPAR) 5 MG tablet, Take 1 tablet by mouth 3 (Three) Times a Day As Needed (Anxiety)., Disp: 30 tablet, Rfl: 0    dicyclomine (BENTYL) 20 MG tablet, Take 1 tablet by mouth Every 4 (Four) Hours As Needed for Abdominal Cramping, Disp: 30 tablet, Rfl: 2    ipratropium (ATROVENT) 0.06 % nasal spray, Administer 1 spray into each nostril twice daily, Disp: 30 mL, Rfl: 3    levothyroxine (SYNTHROID, LEVOTHROID) 25 MCG tablet, Take 1 tablet by mouth Every Morning., Disp: 30 tablet, Rfl: 3    lidocaine-prilocaine (EMLA) 2.5-2.5 % cream, Please apply to port site 30 min prior to infusion and cover with Saran Wrap, Disp: 30 g, Rfl: 3    pantoprazole (PROTONIX) 40 MG EC tablet, Take 1 tablet by mouth 2 (Two) Times a Day Before Meals., Disp: 180 tablet, Rfl: 3    sennosides-docusate (Stimulant Laxative) 8.6-50 MG per tablet, Take 1 tablet by mouth 2 (Two) Times a Day As Needed for Constipation., Disp: 60 tablet, Rfl: 0    Allergies:   Allergies   Allergen Reactions    Pravastatin Myalgia       Objective     Physical Exam:  Vital Signs:   Vitals:    03/11/25 1419   BP: 130/81   Pulse:  "118   Resp: 16   Temp: 98.3 °F (36.8 °C)   TempSrc: Temporal   SpO2: 93%   Weight: 57.2 kg (126 lb)   Height: 162.6 cm (64.02\")   PainSc: 0-No pain     Pain Score    03/11/25 1419   PainSc: 0-No pain     ECOG Performance Status: 3 - Symptomatic, >50% confined to bed    Constitutional: NAD, ECOG 3  Eyes: PERRLA, scleral anicteric  ENT: No LAD, no thyromegaly  Respiratory: CTAB, no wheezing, rales, rhonchi  Cardiovascular: RRR, no murmurs, pulses 2+ bilaterally  Abdomen: soft, NT/ND, no HSM  Musculoskeletal: strength 5/5 bilaterally, no c/c/e  Neurologic: A&O x 3, CN II-XII intact grossly    Results Review:   Office Visit on 03/11/2025   Component Date Value Ref Range Status    THC, Screen, Urine 03/11/2025 Negative  Negative Final    Phencyclidine (PCP), Urine 03/11/2025 Negative  Negative Final    Cocaine Screen, Urine 03/11/2025 Negative  Negative Final    Methamphetamine, Ur 03/11/2025 Negative  Negative Final    Opiate Screen 03/11/2025 Negative  Negative Final    Amphetamine Screen, Urine 03/11/2025 Negative  Negative Final    Benzodiazepine Screen, Urine 03/11/2025 Negative  Negative Final    Tricyclic Antidepressants Screen 03/11/2025 Negative  Negative Final    Methadone Screen, Urine 03/11/2025 Negative  Negative Final    Barbiturates Screen, Urine 03/11/2025 Negative  Negative Final    Oxycodone Screen, Urine 03/11/2025 Negative  Negative Final    Buprenorphine, Screen, Urine 03/11/2025 Negative  Negative Final    Fentanyl, Urine 03/11/2025 Negative  Negative Final   Lab on 03/11/2025   Component Date Value Ref Range Status    WBC 03/11/2025 5.99  3.40 - 10.80 10*3/mm3 Final    RBC 03/11/2025 2.94 (L)  3.77 - 5.28 10*6/mm3 Final    Hemoglobin 03/11/2025 9.5 (L)  12.0 - 15.9 g/dL Final    Hematocrit 03/11/2025 29.0 (L)  34.0 - 46.6 % Final    MCV 03/11/2025 98.6 (H)  79.0 - 97.0 fL Final    MCH 03/11/2025 32.3  26.6 - 33.0 pg Final    MCHC 03/11/2025 32.8  31.5 - 35.7 g/dL Final    RDW 03/11/2025 19.2 (H)  " 12.3 - 15.4 % Final    RDW-SD 03/11/2025 67.8 (H)  37.0 - 54.0 fl Final    MPV 03/11/2025 11.7  6.0 - 12.0 fL Final    Platelets 03/11/2025 152  140 - 450 10*3/mm3 Final    Neutrophil % 03/11/2025 69.1  42.7 - 76.0 % Final    Lymphocyte % 03/11/2025 13.4 (L)  19.6 - 45.3 % Final    Monocyte % 03/11/2025 15.5 (H)  5.0 - 12.0 % Final    Eosinophil % 03/11/2025 1.2  0.3 - 6.2 % Final    Basophil % 03/11/2025 0.5  0.0 - 1.5 % Final    Immature Grans % 03/11/2025 0.3  0.0 - 0.5 % Final    Neutrophils, Absolute 03/11/2025 4.14  1.70 - 7.00 10*3/mm3 Final    Lymphocytes, Absolute 03/11/2025 0.80  0.70 - 3.10 10*3/mm3 Final    Monocytes, Absolute 03/11/2025 0.93 (H)  0.10 - 0.90 10*3/mm3 Final    Eosinophils, Absolute 03/11/2025 0.07  0.00 - 0.40 10*3/mm3 Final    Basophils, Absolute 03/11/2025 0.03  0.00 - 0.20 10*3/mm3 Final    Immature Grans, Absolute 03/11/2025 0.02  0.00 - 0.05 10*3/mm3 Final   Hospital Outpatient Visit on 03/06/2025   Component Date Value Ref Range Status    WBC 03/06/2025 4.75  3.40 - 10.80 10*3/mm3 Final    RBC 03/06/2025 2.73 (L)  3.77 - 5.28 10*6/mm3 Final    Hemoglobin 03/06/2025 8.6 (L)  12.0 - 15.9 g/dL Final    Hematocrit 03/06/2025 26.6 (L)  34.0 - 46.6 % Final    MCV 03/06/2025 97.4 (H)  79.0 - 97.0 fL Final    MCH 03/06/2025 31.5  26.6 - 33.0 pg Final    MCHC 03/06/2025 32.3  31.5 - 35.7 g/dL Final    RDW 03/06/2025 16.5 (H)  12.3 - 15.4 % Final    RDW-SD 03/06/2025 57.3 (H)  37.0 - 54.0 fl Final    MPV 03/06/2025 12.4 (H)  6.0 - 12.0 fL Final    Platelets 03/06/2025 143  140 - 450 10*3/mm3 Final    Neutrophil % 03/06/2025 62.6  42.7 - 76.0 % Final    Lymphocyte % 03/06/2025 16.4 (L)  19.6 - 45.3 % Final    Monocyte % 03/06/2025 18.5 (H)  5.0 - 12.0 % Final    Eosinophil % 03/06/2025 1.3  0.3 - 6.2 % Final    Basophil % 03/06/2025 0.6  0.0 - 1.5 % Final    Immature Grans % 03/06/2025 0.6 (H)  0.0 - 0.5 % Final    Neutrophils, Absolute 03/06/2025 2.97  1.70 - 7.00 10*3/mm3 Final     Lymphocytes, Absolute 03/06/2025 0.78  0.70 - 3.10 10*3/mm3 Final    Monocytes, Absolute 03/06/2025 0.88  0.10 - 0.90 10*3/mm3 Final    Eosinophils, Absolute 03/06/2025 0.06  0.00 - 0.40 10*3/mm3 Final    Basophils, Absolute 03/06/2025 0.03  0.00 - 0.20 10*3/mm3 Final    Immature Grans, Absolute 03/06/2025 0.03  0.00 - 0.05 10*3/mm3 Final       CT Chest With Contrast Diagnostic  Result Date: 2/11/2025  Narrative: CT CHEST W CONTRAST DIAGNOSTIC, CT ABDOMEN PELVIS W CONTRAST Date of Exam: 2/11/2025 12:55 AM EST Indication: lymphoma, cholangiocarcinoma. Comparison: 12/11/2024 chest abdomen pelvis CT scan Technique: Axial CT images were obtained of the chest, abdomen and pelvis after the uneventful intravenous administration of 75 mL Isovue-300.  Reconstructed coronal and sagittal images were also obtained. Automated exposure control and iterative construction methods were used. Findings: Previous exam reports from 12/11/2024 described no acute abnormality in the abdomen or pelvis. Mild interval enlargement of multiple pulmonary nodules. Small new right pleural effusion. CT SCAN OF THE CHEST WITH IV CONTRAST: Right upper paratracheal and left preaortic lymph nodes are stable in size and appearance. No new or enlarging nodes are identified. Thoracic aorta is normal in caliber. Exam has fairly good pulmonary artery contrast and no evidence of embolic disease. There are now small left and small-moderate free-flowing right pleural effusions and associated bibasilar discoid atelectasis. There is no significant pericardial effusion. In the right lung, patient's multiple small pulmonary nodules mostly show a slight increase in size from 12/1/2024, by between 1 and 2 mm. A few nodules appear stable. It is difficult to identify any definitely new pulmonary parenchymal nodule. On the left, there is similar pattern, with both stable nodules, and nodules that appear increased by 1 to 2 mm in diameter. As example, anterior right  upper lobe pulmonary nodule image 54 above the scan of the previous scan remains at 4.5 mm in diameter. Right middle lobe pulmonary nodule image 65 of today's scan, previous image 64 has increased from 6 mm to 7 mm. Bony structures appear to be intact. No lytic or blastic changes are identified.     Impression: 1. 2 stable mildly enlarged mediastinal lymph nodes. No new or increasing adenopathy. 2. Stable number of pulmonary parenchymal nodules bilaterally, with some nodules stable in size, most nodules slightly increased, by 1 to 2 mm in diameter. No clearly new nodules. 3. Small left and small-moderate right pleural effusions, new from 12/11/2024 and mild associated discoid atelectasis. CT SCAN OF THE ABDOMEN PELVIS WITH IV CONTRAST: Right hepatectomy, and multiple nonacute enhancing left lobe hepatic cysts are again noted. Heterogeneous hypoenhancement along the resection margin is more prominent, and more masslike on today's exam, initial axial image 32, delayed axial image 33. This overall area appears a little larger, previously 18 x 32 mm, today 23 x 35 mm, questionable for recurrence. Posterior left lobe liver lesion, today's image 43/2 has had a variable appearance over multiple prior studies, perhaps intermittently treated and occurring metastasis. On today's study it appears further increased, most recently measuring 1.8 cm, today 2.6 cm, and with initial heterogeneous internal enhancement, and delayed centripetal enhancement pattern, more typical for cholangiocarcinoma than for hemangioma. No new hepatic lesions are identified elsewhere. Spleen is normal in size 11 cm. Remaining left portal vein, superior mesenteric vein and splenic vein enhance normally with no evidence of thrombus. Postoperative fat stranding in the region of the salomon hepatis appears stable. There is some narrowing of the IVC presumably due to regional scarring but this is stable from the contrast-enhanced study of 11/1/2024.  Amorphous soft tissue density between the IVC and aorta and extending along the right lateral and anterior margins of the aorta, images 43 through 55/2 today's study appears stable to minimally increased. No evidence of new mass or adenopathy is seen elsewhere in the upper abdomen. No new abnormalities are seen of the pancreas or adrenal glands. Small bilateral renal cysts are noted. Subtle, heterogeneous area area in the right upper renal pole image 39/2 is of uncertain significance but appears unchanged back to at least 3/6/2023 and appears to show overlying cortical atrophy, perhaps related to prior pyelonephritis. There is no evidence of obstructive uropathy. Regarding the lower abdomen/pelvis, bowel loops are normal in caliber and grossly normal in appearance. Bladder is decompressed. There is mild generalized fat stranding in the lower abdomen/pelvis, including around the bladder, but this may represent incidental mild anasarca. Delayed venous phase images show normal contrast excretion by the kidneys. No acute bony abnormality is seen. Impression: 1. Multiple simple appearing hepatic cysts. 2. Posterior left lobe liver lesion, which has had a waxing and waning appearance over multiple prior studies, is larger and more heterogeneous today, with enhancement pattern concerning for metastatic glandular carcinoma. 3. Irregular appearing parenchymal along the hepatic resection margin appears larger and more masslike today, concerning for recurrence. 4. Stable to mildly increased amorphous residual soft tissue density along the margins of the IVC and aorta, and stable chronic focal narrowing of the IVC. 5. Subtle heterogeneous hypoenhancement of the right upper renal pole, but unchanged over multiple prior studies probably postinflammatory scarring. 6. Mild anasarca. Electronically Signed: Salvador Gill MD  2/11/2025 8:52 AM EST  Workstation ID: [id number]    CT Abdomen Pelvis With Contrast  Result Date:  2/11/2025  Narrative: CT CHEST W CONTRAST DIAGNOSTIC, CT ABDOMEN PELVIS W CONTRAST Date of Exam: 2/11/2025 12:55 AM EST Indication: lymphoma, cholangiocarcinoma. Comparison: 12/11/2024 chest abdomen pelvis CT scan Technique: Axial CT images were obtained of the chest, abdomen and pelvis after the uneventful intravenous administration of 75 mL Isovue-300.  Reconstructed coronal and sagittal images were also obtained. Automated exposure control and iterative construction methods were used. Findings: Previous exam reports from 12/11/2024 described no acute abnormality in the abdomen or pelvis. Mild interval enlargement of multiple pulmonary nodules. Small new right pleural effusion. CT SCAN OF THE CHEST WITH IV CONTRAST: Right upper paratracheal and left preaortic lymph nodes are stable in size and appearance. No new or enlarging nodes are identified. Thoracic aorta is normal in caliber. Exam has fairly good pulmonary artery contrast and no evidence of embolic disease. There are now small left and small-moderate free-flowing right pleural effusions and associated bibasilar discoid atelectasis. There is no significant pericardial effusion. In the right lung, patient's multiple small pulmonary nodules mostly show a slight increase in size from 12/1/2024, by between 1 and 2 mm. A few nodules appear stable. It is difficult to identify any definitely new pulmonary parenchymal nodule. On the left, there is similar pattern, with both stable nodules, and nodules that appear increased by 1 to 2 mm in diameter. As example, anterior right upper lobe pulmonary nodule image 54 above the scan of the previous scan remains at 4.5 mm in diameter. Right middle lobe pulmonary nodule image 65 of today's scan, previous image 64 has increased from 6 mm to 7 mm. Bony structures appear to be intact. No lytic or blastic changes are identified.     Impression: 1. 2 stable mildly enlarged mediastinal lymph nodes. No new or increasing adenopathy.  2. Stable number of pulmonary parenchymal nodules bilaterally, with some nodules stable in size, most nodules slightly increased, by 1 to 2 mm in diameter. No clearly new nodules. 3. Small left and small-moderate right pleural effusions, new from 12/11/2024 and mild associated discoid atelectasis. CT SCAN OF THE ABDOMEN PELVIS WITH IV CONTRAST: Right hepatectomy, and multiple nonacute enhancing left lobe hepatic cysts are again noted. Heterogeneous hypoenhancement along the resection margin is more prominent, and more masslike on today's exam, initial axial image 32, delayed axial image 33. This overall area appears a little larger, previously 18 x 32 mm, today 23 x 35 mm, questionable for recurrence. Posterior left lobe liver lesion, today's image 43/2 has had a variable appearance over multiple prior studies, perhaps intermittently treated and occurring metastasis. On today's study it appears further increased, most recently measuring 1.8 cm, today 2.6 cm, and with initial heterogeneous internal enhancement, and delayed centripetal enhancement pattern, more typical for cholangiocarcinoma than for hemangioma. No new hepatic lesions are identified elsewhere. Spleen is normal in size 11 cm. Remaining left portal vein, superior mesenteric vein and splenic vein enhance normally with no evidence of thrombus. Postoperative fat stranding in the region of the salomon hepatis appears stable. There is some narrowing of the IVC presumably due to regional scarring but this is stable from the contrast-enhanced study of 11/1/2024. Amorphous soft tissue density between the IVC and aorta and extending along the right lateral and anterior margins of the aorta, images 43 through 55/2 today's study appears stable to minimally increased. No evidence of new mass or adenopathy is seen elsewhere in the upper abdomen. No new abnormalities are seen of the pancreas or adrenal glands. Small bilateral renal cysts are noted. Subtle,  heterogeneous area area in the right upper renal pole image 39/2 is of uncertain significance but appears unchanged back to at least 3/6/2023 and appears to show overlying cortical atrophy, perhaps related to prior pyelonephritis. There is no evidence of obstructive uropathy. Regarding the lower abdomen/pelvis, bowel loops are normal in caliber and grossly normal in appearance. Bladder is decompressed. There is mild generalized fat stranding in the lower abdomen/pelvis, including around the bladder, but this may represent incidental mild anasarca. Delayed venous phase images show normal contrast excretion by the kidneys. No acute bony abnormality is seen. Impression: 1. Multiple simple appearing hepatic cysts. 2. Posterior left lobe liver lesion, which has had a waxing and waning appearance over multiple prior studies, is larger and more heterogeneous today, with enhancement pattern concerning for metastatic glandular carcinoma. 3. Irregular appearing parenchymal along the hepatic resection margin appears larger and more masslike today, concerning for recurrence. 4. Stable to mildly increased amorphous residual soft tissue density along the margins of the IVC and aorta, and stable chronic focal narrowing of the IVC. 5. Subtle heterogeneous hypoenhancement of the right upper renal pole, but unchanged over multiple prior studies probably postinflammatory scarring. 6. Mild anasarca. Electronically Signed: Salvador Gill MD  2/11/2025 8:52 AM EST  Workstation ID: [id number]    XR Chest 1 View  Result Date: 2/9/2025  Narrative: XR CHEST 1 VW Date of Exam: 2/9/2025 9:37 PM EST Indication: short of breath Comparison: 12/11/2024. Findings: There is a right internal jugular Mediport with the tip in the distal SVC. There are no airspace consolidations. No pleural fluid. No pneumothorax. The pulmonary vasculature appears within normal limits. The cardiac and mediastinal silhouette appear unremarkable. No acute osseous abnormality  identified.     Impression: Impression: No acute cardiopulmonary process. Electronically Signed: Mary Jo Johnson MD  2/9/2025 10:43 PM EST  Workstation ID: [id number]      Assessment / Plan      Assessment/Plan:   1. Intrahepatic cholangiocarcinoma (CMS/HCC) (Primary)  -Noted during her most recent hospitalization with CT scans concerning for an enlarging liver lesion to 7.3 cm that was previously noted to be hemangioma  -Triple phase CT concerning for a solid tumor lesion  -Biopsy consistent with an adenocarcinoma  -CA 19-9 elevated to 84.7  -CT chest as well as the rest of the CT abdomen/pelvis not concerning for distant or metastatic disease  -Status post open right hepatectomy, cholecystectomy, right partial adrenalectomy on 9/23/2021 with Dr. 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  0/4 lymph nodes were positive for disease.  LVI and PNI were present.  Focal R1 resection margin  -Discussed with patient and her daughter that she would benefit from adjuvant chemoXRT using Xeloda.  Discussed side effects including but not limited to immunosuppression, diarrhea, abdominal pain, nausea, vomiting, hand/foot syndrome  -Repeat CA 19-9 (22) in November 2021  -Completed chemo XRT with Xeloda in January 2021  -Repeat scans in March 2022 without any evidence of recurrent or metastatic disease  -Repeat CT C/A/P in June 2022 without evidence of recurrent or metastatic disease.  Did note some IVC stenosis which we will monitor with her next scans  -Repeat CT C/A/P in September 2022 reviewed without evidence of recurrent disease or metastatic disease.  IVC stenosis overall stable  -Repeat CT C/A/P in December 2022 reviewed without evidence of recurrent or metastatic disease.  CA 19-9 within normal limits  -Repeat CT C/A/P in March 2023 reviewed and notable for some slight enlarging retroperitoneal adenopathy.  CA 19-9  within normal limits  -PET/CT concerning for 1 cm left liver lesion that was only slightly PET avid as well as 2 lymph nodes that were also slightly PET avid  -Previously discussed her case at tumor board and with Dr. Sharma we agreed that she likely had disease progression  -Status post radiation completed in May 2023  -CA 19-9 in June 2022 within normal limits.    -CT C/A/P in July 2023 reviewed and showed some interval decrease in some lymph nodes as well as some slight enlargement of a couple lymph nodes.  -Completed radiation in August 2023  -CT C/A/P in October 2023 reviewed and only notable for slight increase in some pulmonary nodules about 1 mm each  -CT C/A/P December 2023 reviewed and notable for enlargement of her aortocaval lymph node.  CA 19-9 stable  -Completed radiation with Dr. Billings in February 2024  -CT C/A/P in April 2024 reviewed and showing continued slight enlargement of multiple pulmonary nodules as well as a liver lesion.  CA 19-9 stable  -CT C/A/P in June 2024 reviewed and showing overall stable disease  -CT C/A/P in August 2024 reviewed showing overall stable disease  -Completed 6 cycles of cisplatin/gemcitabine/durvalumab in August 2024.  Discontinued chemotherapy due to toxicity  -CT C/A/P in November 2024 reviewed and showing overall stable disease  -Has been off therapy for the past several months  -CT C/A/P in February 2025 consistent with disease progression.  Patient would like to forego further systemic therapy     Marginal zone lymphoma  -Bone marrow biopsy in January 2025 concerning for B-cell lymphoma most consistent with a marginal zone lymphoma  -Completed 3 weeks of weekly rituximab in February 2025.  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  Other genes negative.  Given  patient's age and previous cardiac liver work-up showing no evidence of dysfunction, it is unlikely that she has had iron deposition all this time.  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  Treatment as above  -ECHO in July 2020 within normal limits  -Repeat iron studies in April 2021 stable with a ferritin of 229, iron level 100, transferrin saturation 27%  -Low concern for iron overload at this time.      Thyroid nodule  -Incidentally noted on CT scan but enlarging from previous CT  -Thyroid ultrasound in June 2022 only notable for goiter and small nodules nonconcerning for malignancy  -Has been seen by endocrinology with plan for repeat ultrasound in the summer of next year  -Currently on levothyroxine 25 mcg daily and tolerating well  -TSH in November 2024 within normal limits     Other fatigue   -Continued at this time  -Previously checked iron studies, vitamin B12, folate, thyroid studies within normal limits  -Was previously been seen by cardiology with a normal ECHO and stress test  -Holter monitor notable for atrial fibrillation for which she is on metoprolol and prophylactic apixaban  -Likely secondary to lymphoma noted on bone marrow biopsy  -Management as above     Anemia  -Likely related to her chemotherapy  -Iron studies in June 2024 with only mild iron deficiency anemia.    -Iron studies in July 2024 within normal limits outside of an elevated ferritin  -Status post 1 unit PRBC in August 2024  -Slight worsening anemia since September.  Hemoglobin 8.8 today  -Bone marrow biopsy in January 2025 consistent with a likely marginal zone lymphoma  -Management as above     Nausea  -Stable with as needed Zofran.  Refilled ODT Zofran today      Anxiety  -Worsening  -A started on BuSpar by palliative today  -Referred to an outside psychiatrist     Shortness of breath with exertion/orthopnea  -ECHO in November 2024 with a normal EF  -Stress test in December 2024 within normal limits      Abdominal pain/weight loss  -Continue abdominal discomfort.  Unlikely to be related to her malignancy given her stable CT scans last month  -EGD in December 2024 with a clean-based ulcer with no bleeding noted     Debility  -Worsening weakness and debility with increased risk for falls at home  -Have previously ordered a rollator   -Have previously ordered home health with PT    Goals of care  -Patient deferring on further systemic therapy for her multiple malignancies which I think is reasonable given her continued failure to thrive  -Offered hospice services however patient would like to defer on hospice at this time  -Follow with palliative as an outpatient  -Will plan for IV fluids every 2 weeks and if she has no significant benefit, would consider transfer to hospice  -Okay to transfuse for hemoglobin less than 8    Follow Up:   Follow-up in 6 weeks     Jorje Montenegro MD  Hematology and Oncology     Please note that portions of this note may have been completed with a voice recognition program. Efforts were made to edit the dictations, but occasionally words are mistranscribed.

## 2025-03-13 ENCOUNTER — TELEPHONE (OUTPATIENT)
Dept: ONCOLOGY | Facility: CLINIC | Age: 85
End: 2025-03-13
Payer: MEDICARE

## 2025-03-13 ENCOUNTER — PATIENT ROUNDING (BHMG ONLY) (OUTPATIENT)
Dept: PALLIATIVE CARE | Facility: CLINIC | Age: 85
End: 2025-03-13
Payer: MEDICARE

## 2025-03-13 NOTE — PROGRESS NOTES
A My-Chart message has been sent to the patient [place] [facility] [place]

## 2025-03-13 NOTE — TELEPHONE ENCOUNTER
Caller: [facility]    Relationship:     Best call back number: [phone] OPTION 1 THEN OPTION 1 AND ASK FOR DASHAWN    Do you know the name of the person who called: DASHAWN     What was the call regarding: RECEIVED A VERBAL ORDER TO START PATIENT ON HYDRATION. WHEN DOES THIS NEED TO START?    CALL TO ADVISE

## 2025-03-13 NOTE — TELEPHONE ENCOUNTER
Return call to Micaela at [facility].  Provided clarification on fluid orders.  To be administered every other week.

## 2025-04-08 ENCOUNTER — TELEPHONE (OUTPATIENT)
Age: 85
End: 2025-04-08

## 2025-04-08 ENCOUNTER — OFFICE VISIT (OUTPATIENT)
Age: 85
End: 2025-04-08
Payer: MEDICARE

## 2025-04-08 DIAGNOSIS — Z53.21 PATIENT LEFT WITHOUT BEING SEEN: Primary | ICD-10-CM

## 2025-04-08 NOTE — TELEPHONE ENCOUNTER
Client came in for her scheduled appointment.  Client provided history of medical conditions to include cancer, chemotherapy, and ongoing struggles with some moments of pain and fatigue specifically.  Client reported she was prescribed through her PCP and anxiety medication that is helpful to her degree.  Client discussed that her referral was to meet with a psychiatrist and she did not understand why she was in a session with this clinician.  Clinician provided psychoeducation on getting an appointment with the APRN here at [facility] via telehealth to discuss other options to include different medication management options and to have a place to talk about the signs and symptoms she is currently experiencing.  Client was unable to create any goals or objectives and reported that previous interaction with talk therapy was unhelpful and did not see any reason that this would be helpful as well.  Client is not appropriate for talk therapy at this time due to the signs and symptoms being related primarily to medical conditions that according to the client cannot be resolved.  Palliative care said that they would not be able to help, she has had home health and physical therapy did not seem to be helpful, and therefore until symptoms are more under control client was referred to psych medication management, which was the only option the client was agreeable to.

## 2025-04-08 NOTE — PROGRESS NOTES
Client came in for her scheduled appointment.  Client provided history of medical conditions to include cancer, chemotherapy, and ongoing struggles with some moments of pain and fatigue specifically.  Client reported she was prescribed through her PCP and anxiety medication that is helpful to a degree.  Client discussed that her referral was to ment to be with a psychiatrist and she did not understand why she was in a session with this clinician.  Clinician provided psychoeducation on getting an appointment with the APRN here at [facility] via telehealth to discuss other options to include different medication management options and to have a place to talk about the signs and symptoms she is currently experiencing.  Client was unable to create any goals or objectives and reported that previous interaction with talk therapy was unhelpful and did not see any reason that this would be helpful either.  Client is not appropriate for talk therapy at this time due to the signs and symptoms being related primarily to medical conditions that according to the client cannot be resolved.  Palliative care said that they would not be able to help, she has had home health and physical therapy did not seem to be helpful, and therefore until symptoms are more under control client was referred to psych medication management, which was the only option the client was agreeable to.

## 2025-04-09 ENCOUNTER — TELEPHONE (OUTPATIENT)
Dept: ONCOLOGY | Facility: CLINIC | Age: 85
End: 2025-04-09
Payer: MEDICARE

## 2025-04-09 NOTE — TELEPHONE ENCOUNTER
Bruno from [facility] called on behalf of patient. He stated that she told him that she needed a new prescription for home health care. I told him we would look into it.    Please reach out to patient to discuss. The number for [facility] is

## 2025-04-09 NOTE — TELEPHONE ENCOUNTER
Returned call to Bruno, no answer, left voicemail.  Advised that most recent home health ordered by hospitalist to [facility].

## 2025-04-15 ENCOUNTER — TELEMEDICINE (OUTPATIENT)
Age: 85
End: 2025-04-15
Payer: MEDICARE

## 2025-04-15 DIAGNOSIS — D64.9 ANEMIA, UNSPECIFIED TYPE: ICD-10-CM

## 2025-04-15 DIAGNOSIS — R53.83 FATIGUE, UNSPECIFIED TYPE: ICD-10-CM

## 2025-04-15 DIAGNOSIS — R45.89 ANXIETY ABOUT HEALTH: Primary | ICD-10-CM

## 2025-04-15 DIAGNOSIS — C22.1 INTRAHEPATIC CHOLANGIOCARCINOMA: Primary | ICD-10-CM

## 2025-04-15 RX ORDER — BUPROPION HYDROCHLORIDE 75 MG/1
75 TABLET ORAL DAILY
Qty: 30 TABLET | Refills: 2 | Status: SHIPPED | OUTPATIENT
Start: 2025-04-15

## 2025-04-15 NOTE — PROGRESS NOTES
New Patient Office Visit      Date: 04/15/2025  Patient Name: Sheree Hernandez  : 1940   MRN: [id number]     Referring Provider: Timi Conway DO    Chief Complaint:      ICD-10-CM ICD-9-CM   1. Anxiety about health  R45.89 799.29   2. Fatigue, unspecified type  R53.83 780.79          History of Present Illness:   Sheree Hernandez is a 84 y.o. female who is here today to establish care. This is the patient's initial encounter with this provider.    History of Present Illness  The patient presents for evaluation of anxiety and depression.    She reports she has experienced increased fatigue since , progressively worsening over time. Despite a physical examination in , no underlying cause was identified. In , a diagnosis of liver cancer was made, leading to a right hepatectomy. Post-surgery, treatment was received, and stability was maintained for approximately 1.5 years. However, a subsequent spot was detected at the surgical site, necessitating radiation therapy. The following year, another spot was identified, leading to chemotherapy. This treatment left her feeling lifeless and unable to perform daily activities. In 2025, a diagnosis of lymphoma was made, and treatment to boost her blood count was received. Further treatment has been declined by her due to her age and the belief that chemotherapy would be more detrimental than the cancer itself. No pain from the cancer is reported, but severe exhaustion is noted. Regardless of sleep duration, exhaustion is felt upon waking, often necessitating rest after using the bathroom. Daily activities are limited to dressing and sitting in a recliner. Mild exercise has been attempted but found unhelpful. Over the past year, over 40 pounds have been lost, including 30 pounds within a 4-month period and an additional 20 pounds since then.    Previously, an antidepressant was prescribed by PCP but was discontinued due to side effects,  including nausea and bed rest. She describes herself as a nervous person and is unsure if she is depressed or merely discouraged.  She has attended several doctors appointments with no answers on why she is so fatigued.  Panic attacks, anger outbursts, or lashing out have not been experienced. Occasional crying occurs due to the inability to travel or engage in other activities because of the terminal cancer diagnosis. Chronic fatigue syndrome, exacerbated by chemotherapy, is suspected. Racing thoughts and discouragement rather than depression are reported.  She reports she still has the interest to do things but physically cannot.  She reports her sleep can vary from 1 hour to 10 hours. Suicidal ideation, self-harm, or hallucinations are not endorsed. Difficulty being away from family on a daily basis is noted, but reports regular phone calls help.  She reports her  passed away 12 years ago due to the same type of cancer she has.      Current Medications for MHI:    Buspar 5 mg PO TID PRN    Current Treatments/Therapy for MHI:   Denies    Subjective      Review of Systems:     Denies seizure, focal weakness, changes in vision, paresthesia’s, or numbness, headache, and neck stiffness  Denies cough, sputum, wheezing, hemoptysis   Denies chest pain, palpitations, orthopnea. Reports from chemo she developed Afib and messed up her thyroid and follows up with oncologist  Reports abdominal pain, nausea, vomiting, diarrhea, and constipation and pain on left side.  Denies dysuria, urgency, changes in frequency and hematuria   Denies fever and chills   Denies skin rash, hair loss, and edema   Denies ecchymoses and bleeding   Denies heat or cold intolerance, polydipsia, and polyuria  Denies abnormal movements, tics, and tremors  Report weight loss of 40lbs in the past year    Depression Screening:  Patient screened positive for depression based on a PHQ-9 score of 11 on 3/11/2025. Follow-up recommendations include:  "Prescribed antidepressant medication treatment.    SUICIDE RISK ASSESSMENT/CSSRS:  1. Does client have thoughts /of suicide? no  2. Does client have intent for suicide? no  3. Does client have a current plan for suicide? no  4. History of suicide attempts: no  5. Family history of suicide or attempts: yes, paternal uncle completed   6. History of violent behaviors towards others or property or thoughts of committing suicide: no  7. History of sexual aggression toward others: no  8. Access to firearms or weapons: no    Past Psychiatric History:   History of outpatient psychiatrist: no  Diagnoses: Denies   History of outpatient therapy: no  Previous Inpatient hospitalizations: no  Previous medication trials: \"not sure of the name\"  History of suicide/self harm attempts: no    Review of Psychiatric Systems:  Mood (depression, andreas/hypomania): Depressive symptoms,  Psychosis/thought disturbances: Denies  Anxiety/panic: Increased anxiety  Obsessions and compulsions: Denies  Abuse (verbal/physical/sexual) /Trauma: Adult Denies and Childhood Denies  Dissociation: Denies  Somatic concerns: Reports current health concerns  Appetite: decreased  Sleep pattern: 6 hours of inconsistent sleep per night  Personality disorders: Denies    Abuse/trauma History:              Physical: no              Sexual: no              Emotional/Neglect: no              Significant death/loss: , grandson at 24 y/o              Other trauma: Denies               Head Injury/Seizures:no  Triggers: (Persons/Places/Things/Events/Thought/Emotions): weird dreams    Substance Abuse History/Last use:              Alcohol: no               Tobacco/Vape: no               Illicit Drugs: no               Caffeine: yes Occasional              Seizures:no    Obstetrics:    Hysterectomy at 44 years old       Legal History:   No legal history noted today.      Educational and Occupational History:               Highest level of education obtained: 12th " grade               History? 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    Interpersonal/Relational:              Marital Status:                 Support system: strained support system- children live far away, and many friends have passed away     Social History:  Where was patient born: [place], [place]  Upbringing: Mother and Father  Where does patient currently live: [place], [place]  Living situation: lives alone   Leisure and Recreation: [employer], family   Yazidi: Sikh   Developmental history: All milestones met no    Family History:   Family History   Problem Relation Age of Onset    Heart attack Mother     Heart failure Mother     Dementia Mother     Stroke Mother     Arthritis Mother         Heart Attack    Heart disease Father     Hearing loss Father     Stroke Father     Arrhythmia Brother     Breast cancer Paternal Aunt     Colon cancer Neg Hx        Family Psychiatric History:   Psych Diagnosis: Denies   History of suicide/self harm attempts: Yes, paternal uncle completed   History of Substance abuse: Denies     Past Medical History:   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    Osteopenia 2018    PAF (paroxysmal atrial fibrillation) 07/15/2024    Plantar fasciitis     Chronic    PVC's (premature ventricular contractions)     Senile keratosis     Multiple    MANAN (stress urinary  incontinence, female)     Symptomatic anemia 2/9/2025    Syncope, near     with negative cardiovascular workup       Past Surgical History:   Past Surgical History:   Procedure Laterality Date    ADRENAL GLAND SURGERY  09/22/2021    ADRENALECTOMY  09/22/2021    CARDIAC CATHETERIZATION N/A 01/18/2019    Procedure: Left Heart Cath;  Surgeon: Tucker Gonzalez MD;  Location:  [facility];  Service: Cardiovascular    CARDIAC CATHETERIZATION  1/18/2019    CATARACT EXTRACTION  04/2019    CHOLECYSTECTOMY  09/22/2021    COLONOSCOPY  07/15/2020    CYBERKNIFE  05/08/2023    Recurrent liver mass/involved LNs    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    ENDOSCOPY N/A 12/13/2024    Procedure: ESOPHAGOGASTRODUODENOSCOPY;  Surgeon: Mihir Encinas MD;  Location:  [facility];  Service: Gastroenterology;  Laterality: N/A;    HYSTERECTOMY  1984    age 46 - ovarian cyst, endometriosis,  jorge/bso    LIVER SURGERY Right 09/22/2021    Removed    ROTATOR CUFF REPAIR Left 1989    Collar Bone Repair    VENOUS ACCESS DEVICE (PORT) INSERTION Right 05/10/2024       Medications:     Current Outpatient Medications:     bisacodyl 5 MG EC tablet, Take 1 tablet by mouth Daily As Needed for Constipation., Disp: 10 tablet, Rfl: 0    buPROPion (WELLBUTRIN) 75 MG tablet, Take 1 tablet by mouth Daily., Disp: 30 tablet, Rfl: 2    busPIRone (BUSPAR) 5 MG tablet, Take 1 tablet by mouth 3 (Three) Times a Day As Needed (Anxiety)., Disp: 30 tablet, Rfl: 3    dicyclomine (BENTYL) 20 MG tablet, Take 1 tablet by mouth Every 4 (Four) Hours As Needed for Abdominal Cramping, Disp: 30 tablet, Rfl: 2    ipratropium (ATROVENT) 0.06 % nasal spray, Administer 1 spray into each nostril twice daily, Disp: 30 mL, Rfl: 3    levothyroxine (SYNTHROID, LEVOTHROID) 25 MCG tablet, Take 1 tablet by mouth Every Morning., Disp: 30 tablet, Rfl: 3    lidocaine-prilocaine (EMLA) 2.5-2.5 % cream, Please apply to port site 30 min prior to infusion and cover  with Saran Wrap, Disp: 30 g, Rfl: 3    pantoprazole (PROTONIX) 40 MG EC tablet, Take 1 tablet by mouth 2 (Two) Times a Day Before Meals., Disp: 180 tablet, Rfl: 3    sennosides-docusate (Stimulant Laxative) 8.6-50 MG per tablet, Take 1 tablet by mouth 2 (Two) Times a Day As Needed for Constipation., Disp: 60 tablet, Rfl: 0    Medication Considerations:  SPENSER reviewed and appropriate.      Herbals and supplements: Multi Vit     Allergies:   Allergies   Allergen Reactions    Pravastatin Myalgia       Objective     Physical Exam:  Vital Signs: There were no vitals filed for this visit.  The patient was seen remotely today via a MyChart Video Visit through [facility].  Unable to obtain vital signs due to nature of remote visit.  Height stated at 5'4 inches.  Weight stated at 119.2 pounds.     Mental Status Exam:   MENTAL STATUS EXAM   General Appearance:  Cleanly groomed and dressed and well developed  Eye Contact:  Good eye contact  Attitude:  Cooperative and polite  Motor Activity:  Other  Other Comment:  XIOM[facility] video visit  Muscle Strength:  Other  Other Comment:  XIOM[facility] video visit  Speech:  Normal rate, tone, volume  Language:  Spontaneous  Mood and affect:  Anxious and frustrated  Hopelessness:  5  Loneliness: 5  Thought Process:  Logical  Associations/ Thought Content:  No delusions  Hallucinations:  None  Suicidal Ideations:  Not present  Homicidal Ideation:  Not present  Sensorium:  Alert and clear  Orientation:  Person, place, time and situation  Immediate Recall, Recent, and Remote Memory:  Intact  Attention Span/ Concentration:  Good  Fund of Knowledge:  Appropriate for age and educational level  Intellectual Functioning:  Average range  Insight:  Good  Judgement:  Good  Reliability:  Good  Impulse Control:  Fair       @RESULASTCBCDIFFPANEL,TSH,LABLIPI,QPBNNLKH05,XOXUFMZR35,MG,FOLATE,PROLACTIN,CRPRESULT,CMP,[id number])@    Lab Results   Component Value Date    GLUCOSE 113 (H) 02/19/2025    BUN 11 02/19/2025     CREATININE 1.05 (H) 02/19/2025    EGFR 52.2 (L) 02/19/2025    BCR 10.5 02/19/2025    K 4.0 02/19/2025    CO2 27.6 02/19/2025    CALCIUM 9.0 02/19/2025    ALBUMIN 3.5 02/19/2025    BILITOT 0.7 02/19/2025    AST 36 (H) 02/19/2025    ALT 18 02/19/2025       Lab Results   Component Value Date    WBC 5.99 03/11/2025    HGB 9.5 (L) 03/11/2025    HCT 29.0 (L) 03/11/2025    MCV 98.6 (H) 03/11/2025     03/11/2025       Lab Results   Component Value Date    CHOL 207 (H) 08/07/2023    TRIG 96 08/07/2023    HDL 79 (H) 08/07/2023     (H) 08/07/2023       The following data was reviewed by: LEDY Soliz on 04/15/2025:         Assessment / Plan      Visit Diagnosis/Orders Placed This Visit:  Diagnoses and all orders for this visit:    1. Anxiety about health (Primary)  -     buPROPion (WELLBUTRIN) 75 MG tablet; Take 1 tablet by mouth Daily.  Dispense: 30 tablet; Refill: 2    2. Fatigue, unspecified type        Assessment & Plan  Anxiety  She reports ongoing struggles with anxiety and chronic fatigue. She reports that BuSpar is helping to manage her anxiety symptoms when she takes it, although she continues to experience racing thoughts when she doesn't. The patient does not identify as being depressed but feels discouraged by her health limitations.      Prognosis: Good with Ongoing Treatment  Patient's diagnoses include anxiety and fatigue. Unique factors influencing symptom alleviation/remission include: pre-existing conditions, symptom chronicity, symptom severity, degree of impairment, social support, financial security, motivation, patient engagement and medication adherence. Prognosis is largely dependent on patient's adherence to medication treatment plan, follow up appointments and willingness to engage in psychotherapy      Functional Status: Mild impairment     Impression/Formulation: Patient appeared alert and oriented. Patient's major concerns for today's visit to establish care for increased  symptoms of anxiety and fatigue.      Treatment and medication options discussed during today's visit.  Opportunity provided for any necessary clarification and patient questions. Patient acknowledges and verbally consents to proceed with mutually agreed upon treatment plan. Patient is voluntarily requesting to begin outpatient psychiatric treatment at [facility] [address] Patient is receptive to assistance with maintaining a stable lifestyle. Patient presents with history of     ICD-10-CM ICD-9-CM   1. Anxiety about health  R45.89 799.29   2. Fatigue, unspecified type  R53.83 780.79   .    Reviewed patient's previous provider notes. Reviewed most recent labs. Patient meets DSM V diagnostic criteria for diagnoses. Diagnoses may be updated as more information becomes available.       Differential diagnoses include: MDD     Treatment Plan:     Initiate Wellbutrin SR 75mg PO qam   Initiate Buspar 5mg PO BID. Prescription order was previously prescribed TID as needed, and changed twice daily.  Patient reports difficulty taking 3 times a day.  Patient and provider discussed taking medication at least 1 time a day at the same time every day.  Patient verbalized understanding.    Collaborated with patient's oncologist Jorje Montenegro MD with patient's verbal consent about Wellbutrin medication. No contraindications   Encourage psychotherapy   Follow-up in 6 weeks and as needed    Patient will pursue supportive psychotherapy efforts and medications as prescribed. Provider instructed patient to obtain psychiatric medication from this provider only to prevent polypharmacy and possible overprescribing or unsafe medication combinations. Clinic will obtain release of information for current treatment team for continuity of care as needed. Patient will contact this office, call 911 or present to the nearest emergency room should suicidal or homicidal ideations occur. Discussed medication options  and treatment plan of prescribed medication(s) as well as the risks, benefits, and potential side effects. Patient acknowledged and verbally consented to continue with current treatment plan and was educated on the importance of compliance with treatment and follow-up appointments.      MEDICATION Treatment: Discussed medication treatment options and plan of prescribed medication. Potential risks, benefits, and side effects including but not limited to the following reviewed: Black Box warnings, worsening symptoms, SI, sedation, GI side effects, metabolic alterations and blood pressure fluctuations. Patient is reminded to refrain from illicit substance use, including alcohol and THC while taking medications. Also advised to refrain from activity requiring alertness until sedative effects of medication are assessed.     Pt has no previous history of seizure or current eating disorder, which would be a contraindication for use. The possibility of activation with initiation was discussed and patient verbalizes understanding.       Short-term goals: Patient will adhere to medication regimen and experience continued improvement in symptoms over the next 3 months.   Long-term goals: Patient will adhere to medication treatment plan and report improvement in symptoms over the next 6 months    Quality Measures:     TOBACCO USE:  Tobacco Use: Low Risk  (4/15/2025)    Patient History     Smoking Tobacco Use: Never     Smokeless Tobacco Use: Never     Passive Exposure: Never      Never smoker    I advised Sheree of the risks of tobacco use.     Follow Up:   Return in about 6 weeks (around 5/27/2025).    Patient or patient representative verbalized consent for the use of Ambient Listening during the visit with  LEDY Soliz for chart documentation. 4/15/2025  12:23 EDT    LEDY Soliz, [facility] [address]

## 2025-04-21 ENCOUNTER — TELEPHONE (OUTPATIENT)
Dept: ONCOLOGY | Facility: CLINIC | Age: 85
End: 2025-04-21
Payer: MEDICARE

## 2025-04-21 DIAGNOSIS — C85.80 MARGINAL ZONE LYMPHOMA: ICD-10-CM

## 2025-04-21 DIAGNOSIS — R53.1 WEAKNESS: ICD-10-CM

## 2025-04-21 DIAGNOSIS — E86.0 DEHYDRATION: Primary | ICD-10-CM

## 2025-04-21 DIAGNOSIS — R63.4 WEIGHT LOSS: Primary | ICD-10-CM

## 2025-04-21 NOTE — TELEPHONE ENCOUNTER
Caller: LINDA Partida [facility]    Best call back number: [phone] - [phone]    What orders are you requesting (i.e. lab or imaging): CATH CARE ORDERS & CLARIFICATION ON HYDRATION ORDERS    In what timeframe would the patient need to come in: ASAP    Where will you receive your lab/imaging services: [facility]     Additional notes: PLEASE FAX ORDERS -[phone]         Statement Selected

## 2025-04-21 NOTE — TELEPHONE ENCOUNTER
I called Parvin at [facility] back and confirmed with Dr Montenegro that she is to receive one liter of normal saline once a week every other week. I told her that they can flush the port just when she is getting fluids. I faxed new order successfully.

## 2025-04-22 ENCOUNTER — LAB (OUTPATIENT)
Dept: LAB | Facility: HOSPITAL | Age: 85
End: 2025-04-22
Payer: MEDICARE

## 2025-04-22 ENCOUNTER — HOSPITAL ENCOUNTER (OUTPATIENT)
Dept: ONCOLOGY | Facility: HOSPITAL | Age: 85
Discharge: HOME | End: 2025-04-22
Payer: MEDICARE

## 2025-04-22 ENCOUNTER — OFFICE VISIT (OUTPATIENT)
Dept: ONCOLOGY | Facility: CLINIC | Age: 85
End: 2025-04-22
Payer: MEDICARE

## 2025-04-22 VITALS
TEMPERATURE: 97.3 F | HEART RATE: 107 BPM | OXYGEN SATURATION: 99 % | WEIGHT: 117 LBS | DIASTOLIC BLOOD PRESSURE: 76 MMHG | SYSTOLIC BLOOD PRESSURE: 122 MMHG | RESPIRATION RATE: 16 BRPM | HEIGHT: 64 IN | BODY MASS INDEX: 19.97 KG/M2

## 2025-04-22 DIAGNOSIS — E86.0 DEHYDRATION: Primary | ICD-10-CM

## 2025-04-22 DIAGNOSIS — R63.4 WEIGHT LOSS: ICD-10-CM

## 2025-04-22 DIAGNOSIS — D64.9 ANEMIA, UNSPECIFIED TYPE: ICD-10-CM

## 2025-04-22 DIAGNOSIS — C22.1 INTRAHEPATIC CHOLANGIOCARCINOMA: ICD-10-CM

## 2025-04-22 DIAGNOSIS — E86.0 DEHYDRATION: ICD-10-CM

## 2025-04-22 DIAGNOSIS — C85.80 MARGINAL ZONE LYMPHOMA: ICD-10-CM

## 2025-04-22 DIAGNOSIS — C22.1 INTRAHEPATIC CHOLANGIOCARCINOMA: Primary | ICD-10-CM

## 2025-04-22 DIAGNOSIS — R53.1 WEAKNESS: ICD-10-CM

## 2025-04-22 LAB
ALBUMIN SERPL-MCNC: 4.2 G/DL (ref 3.5–5.2)
ALBUMIN/GLOB SERPL: 1.8 G/DL
ALP SERPL-CCNC: 255 U/L (ref 39–117)
ALT SERPL W P-5'-P-CCNC: 34 U/L (ref 1–33)
ANION GAP SERPL CALCULATED.3IONS-SCNC: 14 MMOL/L (ref 5–15)
AST SERPL-CCNC: 43 U/L (ref 1–32)
BASOPHILS # BLD AUTO: 0.03 10*3/MM3 (ref 0–0.2)
BASOPHILS NFR BLD AUTO: 0.4 % (ref 0–1.5)
BILIRUB SERPL-MCNC: 0.5 MG/DL (ref 0–1.2)
BUN SERPL-MCNC: 37 MG/DL (ref 8–23)
BUN/CREAT SERPL: 30.1 (ref 7–25)
CALCIUM SPEC-SCNC: 9.4 MG/DL (ref 8.6–10.5)
CHLORIDE SERPL-SCNC: 98 MMOL/L (ref 98–107)
CO2 SERPL-SCNC: 24 MMOL/L (ref 22–29)
CREAT SERPL-MCNC: 1.23 MG/DL (ref 0.57–1)
DEPRECATED RDW RBC AUTO: 64.5 FL (ref 37–54)
EGFRCR SERPLBLD CKD-EPI 2021: 43.2 ML/MIN/1.73
EOSINOPHIL # BLD AUTO: 0.1 10*3/MM3 (ref 0–0.4)
EOSINOPHIL NFR BLD AUTO: 1.4 % (ref 0.3–6.2)
ERYTHROCYTE [DISTWIDTH] IN BLOOD BY AUTOMATED COUNT: 16.8 % (ref 12.3–15.4)
GLOBULIN UR ELPH-MCNC: 2.4 GM/DL
GLUCOSE SERPL-MCNC: 107 MG/DL (ref 65–99)
HCT VFR BLD AUTO: 35.2 % (ref 34–46.6)
HGB BLD-MCNC: 11.8 G/DL (ref 12–15.9)
IMM GRANULOCYTES # BLD AUTO: 0.01 10*3/MM3 (ref 0–0.05)
IMM GRANULOCYTES NFR BLD AUTO: 0.1 % (ref 0–0.5)
LYMPHOCYTES # BLD AUTO: 0.81 10*3/MM3 (ref 0.7–3.1)
LYMPHOCYTES NFR BLD AUTO: 11.4 % (ref 19.6–45.3)
MCH RBC QN AUTO: 34.5 PG (ref 26.6–33)
MCHC RBC AUTO-ENTMCNC: 33.5 G/DL (ref 31.5–35.7)
MCV RBC AUTO: 102.9 FL (ref 79–97)
MONOCYTES # BLD AUTO: 0.95 10*3/MM3 (ref 0.1–0.9)
MONOCYTES NFR BLD AUTO: 13.4 % (ref 5–12)
NEUTROPHILS NFR BLD AUTO: 5.2 10*3/MM3 (ref 1.7–7)
NEUTROPHILS NFR BLD AUTO: 73.3 % (ref 42.7–76)
PLATELET # BLD AUTO: 157 10*3/MM3 (ref 140–450)
PMV BLD AUTO: 11.6 FL (ref 6–12)
POTASSIUM SERPL-SCNC: 4.3 MMOL/L (ref 3.5–5.2)
PROT SERPL-MCNC: 6.6 G/DL (ref 6–8.5)
RBC # BLD AUTO: 3.42 10*6/MM3 (ref 3.77–5.28)
SODIUM SERPL-SCNC: 136 MMOL/L (ref 136–145)
T4 FREE SERPL-MCNC: 1.68 NG/DL (ref 0.92–1.68)
TSH SERPL DL<=0.05 MIU/L-ACNC: 3.07 UIU/ML (ref 0.27–4.2)
WBC NRBC COR # BLD AUTO: 7.1 10*3/MM3 (ref 3.4–10.8)

## 2025-04-22 PROCEDURE — 80053 COMPREHEN METABOLIC PANEL: CPT

## 2025-04-22 PROCEDURE — 96360 HYDRATION IV INFUSION INIT: CPT

## 2025-04-22 PROCEDURE — 25810000003 SODIUM CHLORIDE 0.9 % SOLUTION: Performed by: INTERNAL MEDICINE

## 2025-04-22 PROCEDURE — 85025 COMPLETE CBC W/AUTO DIFF WBC: CPT

## 2025-04-22 PROCEDURE — 25010000002 HEPARIN LOCK FLUSH PER 10 UNITS: Performed by: INTERNAL MEDICINE

## 2025-04-22 PROCEDURE — 84439 ASSAY OF FREE THYROXINE: CPT

## 2025-04-22 PROCEDURE — 36415 COLL VENOUS BLD VENIPUNCTURE: CPT

## 2025-04-22 PROCEDURE — 84443 ASSAY THYROID STIM HORMONE: CPT

## 2025-04-22 RX ORDER — SODIUM CHLORIDE 9 MG/ML
1000 INJECTION, SOLUTION INTRAVENOUS ONCE
Status: COMPLETED | OUTPATIENT
Start: 2025-04-22 | End: 2025-04-22

## 2025-04-22 RX ORDER — SODIUM CHLORIDE 9 MG/ML
1000 INJECTION, SOLUTION INTRAVENOUS ONCE
Status: CANCELLED | OUTPATIENT
Start: 2025-04-22 | End: 2025-04-22

## 2025-04-22 RX ORDER — SODIUM CHLORIDE 9 MG/ML
1000 INJECTION, SOLUTION INTRAVENOUS ONCE
OUTPATIENT
Start: 2025-05-27 | End: 2025-05-27

## 2025-04-22 RX ORDER — HEPARIN SODIUM (PORCINE) LOCK FLUSH IV SOLN 100 UNIT/ML 100 UNIT/ML
500 SOLUTION INTRAVENOUS AS NEEDED
OUTPATIENT
Start: 2025-04-22

## 2025-04-22 RX ORDER — ONDANSETRON 8 MG/1
8 TABLET, ORALLY DISINTEGRATING ORAL EVERY 8 HOURS PRN
Qty: 30 TABLET | Refills: 5 | Status: SHIPPED | OUTPATIENT
Start: 2025-04-22

## 2025-04-22 RX ORDER — HEPARIN SODIUM (PORCINE) LOCK FLUSH IV SOLN 100 UNIT/ML 100 UNIT/ML
500 SOLUTION INTRAVENOUS AS NEEDED
Status: DISCONTINUED | OUTPATIENT
Start: 2025-04-22 | End: 2025-04-23 | Stop reason: HOSPADM

## 2025-04-22 RX ADMIN — SODIUM CHLORIDE 1000 ML/HR: 9 INJECTION, SOLUTION INTRAVENOUS at 14:55

## 2025-04-22 RX ADMIN — HEPARIN 500 UNITS: 100 SYRINGE at 15:58

## 2025-04-22 NOTE — PROGRESS NOTES
Follow Up Office Visit      Date: 2025     Patient Name: Sheree Hernandez  MRN: [id number]  : 1940  Chief Complaint:  Follow-up for intrahepatic cholangiocarcinoma      History of Present Illness: Sheree Hernandez is a pleasant 79 y.o. female with a past medical history of hyperlipidemia and hypertension who presents today for evaluation of concern for hemochromatosis. The patient is accompanied by their self who contributes to the history of their care.  Patient states that over the past 2 years she has been having worsening fatigue especially with exertion.  Over the past several months this has become worse.  Patient states that she gets tired with basic activities including showering getting dressed in the morning and walking to and from her car from stores.  She states that her fatigue gets better after a few minutes of rest.  She has previously had an echocardiogram and cardiac catheterization within the past 2 years which did not reveal any cause of her fatigue with exertion.  She is also had an ultrasound of the liver which revealed stable cyst.  She was recently seen by her PCP who ordered iron studies which was notable for an elevated ferritin to 246.  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  All other mutations were negative.  She is currently up-to-date on her mammograms and colonoscopy with no active malignancies noted.  She is otherwise compliant with her statin and high blood pressure medicines.  Repeat abdominal imaging in 2021 concerning for enlarging liver lesion.  She was seen by Dr. Sharma and  is status post open right hepatectomy, cholecystectomy, right partial adrenalectomy on 2021.  Pathology showed a focal R1 resected margin.  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  0/4 lymph nodes were  positive for disease.  LVI and PNI were present.  Finished chemoXRT in January 2021.      Interval History:  Presents to clinic for follow-up.  Completed radiation in May 2023 and again in August 2023.  Completed 6 cycles of cisplatin/gemcitabine/Durvalumab in August 2024.  Treatment discontinued due to toxicity.  Currently on maintenance Durvalumab which has been on hold since October due to her significant fatigue and tiredness.  Was hospitalized in December 2024 due to continued abdominal pain and discomfort.  Underwent an EGD which showed a clean-based nonbleeding ulcer.  Started on PPI with some improvement of her symptoms.  Bone marrow biopsy in January 2025 consistent with a B-cell lymphoma.  Completed 3 weeks of weekly rituximab before being hospitalized for failure to thrive.  Was transition to more palliative based care in March 2025.  Has not been able to get home health involved for IV fluids.  Still having continued weakness and fatigue.  Was seen in the ER and [place] which showed a large amount of stool burden as well as concerns for progression of disease of her cholangiocarcinoma.  Has lost about 9 pounds since her last visit with me.    Oncology History:    Oncology/Hematology History   Intrahepatic cholangiocarcinoma   8/17/2021 Initial Diagnosis    Intrahepatic cholangiocarcinoma (CMS/HCC)     8/17/2021 Cancer Staged    Staging form: Intrahepatic Bile Duct, AJCC 8th Edition  - Clinical: Stage IB (cT1b, cN0, cM0) - Signed by Jorje Montenegro MD on 8/17/2021 11/5/2021 Cancer Staged    Staging form: Intrahepatic Bile Duct, AJCC 8th Edition  - Pathologic: Stage IIIA (pT3, pN0, cM0) - Signed by Jorje Montenegro MD on 11/5/2021 12/9/2021 - 1/6/2022 Chemotherapy    OP GALLBLADDER Capecitabine + XRT     12/9/2021 - 1/19/2022 Radiation    Radiation OncologyTreatment Course:  Sheree Hernandez received 5040 cGy in 28 fractions to liver via External Beam Radiation - EBRT.     4/25/2023 -  5/8/2023 Radiation    Radiation OncologyTreatment Course:  Sheree Hernandez received 3500 cGy in 5 fractions to liver mass and lymph nodes via Stereotactic Radiation Therapy - SRT.     8/1/2023 - 8/11/2023 Radiation    Radiation OncologyTreatment Course:  Sheree Hernandez received 3500 cGy in 5 fractions to abdomen via Stereotactic Radiation Therapy - SRT.     1/9/2024 - 1/23/2024 Radiation    Radiation OncologyTreatment Course:  Sheree Hernandez received 3000 cGy in 5 fractions to abdomen via Stereotactic Radiation Therapy - SRT.     1/11/2024 - 1/11/2024 Radiation    Radiation OncologyTreatment Course:  Sheree Hernandez received 754 cGy in 1 fractions to left lung via Stereotactic Radiation Therapy - SRT.     2/1/2024 - 2/1/2024 Radiation    Radiation OncologyTreatment Course:  Sheree Hernandez received 3000 cGy in 1 fractions to left lung via Stereotactic Radiation Therapy - SRT.     4/19/2024 - 8/16/2024 Chemotherapy    OP HEPATOBILIARY CISplatin + Gemcitabine Days 1,8 / Durvalumab Q21D     5/17/2024 -  Chemotherapy    OP CENTRAL VENOUS ACCESS DEVICE Access, Care, and Maintenance (CVAD)     9/6/2024 -  Chemotherapy    OP CHOLANGIOCARCINOMA Durvalumab 1500 mg     Marginal zone lymphoma   1/20/2025 Initial Diagnosis    Marginal zone lymphoma     1/21/2025 - 2/4/2025 Biopsy    OP LYMPHOMA RiTUXimab (Weekly X 4)  Plan Provider: Jorje Montenegro MD  Treatment goal: Curative  Line of treatment: [No plan line of treatment]         Subjective      Review of Systems:   Constitutional: Negative for fevers, chills, or weight loss  Eyes: Negative for blurred vision or discharge         Ear/Nose/Throat: Negative for difficulty swallowing, sore throat, LAD                                                       Respiratory: Negative for cough, SOA, wheezing                                                                                        Cardiovascular: Negative for chest pain or palpitations                                                                   Gastrointestinal: Negative for nausea, vomiting or diarrhea                                                                     Genitourinary: Negative for dysuria or hematuria                                                                                           Musculoskeletal: Negative for any joint pains or muscle aches                                                                        Neurologic: Negative for any weakness, headaches, dizziness                                                                         Hematologic: Negative for any easy bleeding or bruising                                                                                   Psychiatric: Negative for anxiety or depression                          Past Medical History/Past Surgical History/ Family History/ Social History: Reviewed by me and unchanged from my previous documentation done on March 2025.     Medications:     Current Outpatient Medications:     bisacodyl 5 MG EC tablet, Take 1 tablet by mouth Daily As Needed for Constipation., Disp: 10 tablet, Rfl: 0    buPROPion (WELLBUTRIN) 75 MG tablet, Take 1 tablet by mouth Daily., Disp: 30 tablet, Rfl: 2    busPIRone (BUSPAR) 5 MG tablet, Take 1 tablet by mouth 3 (Three) Times a Day As Needed (Anxiety)., Disp: 30 tablet, Rfl: 3    dicyclomine (BENTYL) 20 MG tablet, Take 1 tablet by mouth Every 4 (Four) Hours As Needed for Abdominal Cramping, Disp: 30 tablet, Rfl: 2    ipratropium (ATROVENT) 0.06 % nasal spray, Administer 1 spray into each nostril twice daily, Disp: 30 mL, Rfl: 3    levothyroxine (SYNTHROID, LEVOTHROID) 25 MCG tablet, Take 1 tablet by mouth Every Morning., Disp: 30 tablet, Rfl: 3    lidocaine-prilocaine (EMLA) 2.5-2.5 % cream, Please apply to port site 30 min prior to infusion and cover with Saran Wrap, Disp: 30 g, Rfl: 3    pantoprazole (PROTONIX) 40 MG EC tablet, Take 1 tablet by mouth 2 (Two) Times a Day Before Meals., Disp: 180  "tablet, Rfl: 3    sennosides-docusate (Stimulant Laxative) 8.6-50 MG per tablet, Take 1 tablet by mouth 2 (Two) Times a Day As Needed for Constipation., Disp: 60 tablet, Rfl: 0    ondansetron ODT (ZOFRAN-ODT) 8 MG disintegrating tablet, Place 1 tablet on the tongue Every 8 (Eight) Hours As Needed for Nausea or Vomiting., Disp: 30 tablet, Rfl: 5    Allergies:   Allergies   Allergen Reactions    Pravastatin Myalgia and Anaphylaxis       Objective     Physical Exam:  Vital Signs:   Vitals:    04/22/25 1317   BP: 122/76   Pulse: 107   Resp: 16   Temp: 97.3 °F (36.3 °C)   TempSrc: Temporal   SpO2: 99%   Weight: 53.1 kg (117 lb)   Height: 162.6 cm (64.02\")   PainSc: 6    PainLoc: Comment: stomach discomfort     Pain Score    04/22/25 1317   PainSc: 6    PainLoc: Comment: stomach discomfort     ECOG Performance Status: 3 - Symptomatic, >50% confined to bed    Constitutional: NAD, ECOG 3  Eyes: PERRLA, scleral anicteric  ENT: No LAD, no thyromegaly  Respiratory: CTAB, no wheezing, rales, rhonchi  Cardiovascular: RRR, no murmurs, pulses 2+ bilaterally  Abdomen: soft, NT/ND, no HSM  Musculoskeletal: strength 5/5 bilaterally, no c/c/e  Neurologic: A&O x 3, CN II-XII intact grossly    Results Review:   Lab on 04/22/2025   Component Date Value Ref Range Status    TSH 04/22/2025 3.070  0.270 - 4.200 uIU/mL Final    Free T4 04/22/2025 1.68  0.92 - 1.68 ng/dL Final    Glucose 04/22/2025 107 (H)  65 - 99 mg/dL Final    BUN 04/22/2025 37 (H)  8 - 23 mg/dL Final    Creatinine 04/22/2025 1.23 (H)  0.57 - 1.00 mg/dL Final    Sodium 04/22/2025 136  136 - 145 mmol/L Final    Potassium 04/22/2025 4.3  3.5 - 5.2 mmol/L Final    Chloride 04/22/2025 98  98 - 107 mmol/L Final    CO2 04/22/2025 24.0  22.0 - 29.0 mmol/L Final    Calcium 04/22/2025 9.4  8.6 - 10.5 mg/dL Final    Total Protein 04/22/2025 6.6  6.0 - 8.5 g/dL Final    Albumin 04/22/2025 4.2  3.5 - 5.2 g/dL Final    ALT (SGPT) 04/22/2025 34 (H)  1 - 33 U/L Final    AST (SGOT) " 04/22/2025 43 (H)  1 - 32 U/L Final    Alkaline Phosphatase 04/22/2025 255 (H)  39 - 117 U/L Final    Total Bilirubin 04/22/2025 0.5  0.0 - 1.2 mg/dL Final    Globulin 04/22/2025 2.4  gm/dL Final    Calculated Result    A/G Ratio 04/22/2025 1.8  g/dL Final    BUN/Creatinine Ratio 04/22/2025 30.1 (H)  7.0 - 25.0 Final    Anion Gap 04/22/2025 14.0  5.0 - 15.0 mmol/L Final    eGFR 04/22/2025 43.2 (L)  >60.0 mL/min/1.73 Final    WBC 04/22/2025 7.10  3.40 - 10.80 10*3/mm3 Final    RBC 04/22/2025 3.42 (L)  3.77 - 5.28 10*6/mm3 Final    Hemoglobin 04/22/2025 11.8 (L)  12.0 - 15.9 g/dL Final    Hematocrit 04/22/2025 35.2  34.0 - 46.6 % Final    MCV 04/22/2025 102.9 (H)  79.0 - 97.0 fL Final    MCH 04/22/2025 34.5 (H)  26.6 - 33.0 pg Final    MCHC 04/22/2025 33.5  31.5 - 35.7 g/dL Final    RDW 04/22/2025 16.8 (H)  12.3 - 15.4 % Final    RDW-SD 04/22/2025 64.5 (H)  37.0 - 54.0 fl Final    MPV 04/22/2025 11.6  6.0 - 12.0 fL Final    Platelets 04/22/2025 157  140 - 450 10*3/mm3 Final    Neutrophil % 04/22/2025 73.3  42.7 - 76.0 % Final    Lymphocyte % 04/22/2025 11.4 (L)  19.6 - 45.3 % Final    Monocyte % 04/22/2025 13.4 (H)  5.0 - 12.0 % Final    Eosinophil % 04/22/2025 1.4  0.3 - 6.2 % Final    Basophil % 04/22/2025 0.4  0.0 - 1.5 % Final    Immature Grans % 04/22/2025 0.1  0.0 - 0.5 % Final    Neutrophils, Absolute 04/22/2025 5.20  1.70 - 7.00 10*3/mm3 Final    Lymphocytes, Absolute 04/22/2025 0.81  0.70 - 3.10 10*3/mm3 Final    Monocytes, Absolute 04/22/2025 0.95 (H)  0.10 - 0.90 10*3/mm3 Final    Eosinophils, Absolute 04/22/2025 0.10  0.00 - 0.40 10*3/mm3 Final    Basophils, Absolute 04/22/2025 0.03  0.00 - 0.20 10*3/mm3 Final    Immature Grans, Absolute 04/22/2025 0.01  0.00 - 0.05 10*3/mm3 Final       No results found.    Assessment / Plan      Assessment/Plan:   1. Intrahepatic cholangiocarcinoma (CMS/HCC) (Primary)  -Noted during her most recent hospitalization with CT scans concerning for an enlarging liver lesion to  7.3 cm that was previously noted to be hemangioma  -Triple phase CT concerning for a solid tumor lesion  -Biopsy consistent with an adenocarcinoma  -CA 19-9 elevated to 84.7  -CT chest as well as the rest of the CT abdomen/pelvis not concerning for distant or metastatic disease  -Status post open right hepatectomy, cholecystectomy, right partial adrenalectomy on 9/23/2021 with Dr. 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  0/4 lymph nodes were positive for disease.  LVI and PNI were present.  Focal R1 resection margin  -Discussed with patient and her daughter that she would benefit from adjuvant chemoXRT using Xeloda.  Discussed side effects including but not limited to immunosuppression, diarrhea, abdominal pain, nausea, vomiting, hand/foot syndrome  -Repeat CA 19-9 (22) in November 2021  -Completed chemo XRT with Xeloda in January 2021  -Repeat scans in March 2022 without any evidence of recurrent or metastatic disease  -Repeat CT C/A/P in June 2022 without evidence of recurrent or metastatic disease.  Did note some IVC stenosis which we will monitor with her next scans  -Repeat CT C/A/P in September 2022 reviewed without evidence of recurrent disease or metastatic disease.  IVC stenosis overall stable  -Repeat CT C/A/P in December 2022 reviewed without evidence of recurrent or metastatic disease.  CA 19-9 within normal limits  -Repeat CT C/A/P in March 2023 reviewed and notable for some slight enlarging retroperitoneal adenopathy.  CA 19-9 within normal limits  -PET/CT concerning for 1 cm left liver lesion that was only slightly PET avid as well as 2 lymph nodes that were also slightly PET avid  -Previously discussed her case at tumor board and with Dr. Sharma we agreed that she likely had disease progression  -Status post radiation completed in May 2023  -CA 19-9 in June 2022 within normal limits.    -CT  C/A/P in July 2023 reviewed and showed some interval decrease in some lymph nodes as well as some slight enlargement of a couple lymph nodes.  -Completed radiation in August 2023  -CT C/A/P in October 2023 reviewed and only notable for slight increase in some pulmonary nodules about 1 mm each  -CT C/A/P December 2023 reviewed and notable for enlargement of her aortocaval lymph node.  CA 19-9 stable  -Completed radiation with Dr. Billings in February 2024  -CT C/A/P in April 2024 reviewed and showing continued slight enlargement of multiple pulmonary nodules as well as a liver lesion.  CA 19-9 stable  -CT C/A/P in June 2024 reviewed and showing overall stable disease  -CT C/A/P in August 2024 reviewed showing overall stable disease  -Completed 6 cycles of cisplatin/gemcitabine/durvalumab in August 2024.  Discontinued chemotherapy due to toxicity  -CT C/A/P in November 2024 reviewed and showing overall stable disease  -Has been off therapy for the past several months  -CT C/A/P in February 2025 consistent with disease progression.  Patient would like to forego further systemic therapy  -Prolonged goals of care discussion with patient and daughter today.  She would like to continue holding off on further systemic therapy.  Offered hospice however she would like to defer on hospice at this time.     Marginal zone lymphoma  -Bone marrow biopsy in January 2025 concerning for B-cell lymphoma most consistent with a marginal zone lymphoma  -Completed 3 weeks of weekly rituximab in February 2025.  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  Other genes negative.  Given patient's age and previous cardiac liver work-up showing no evidence of dysfunction, it is unlikely that she has had iron deposition all this time.  The  heterozygosity of H63D makes her carrier for hemochromatosis however does not mean that she has active disease  -CT A/P in July 2020 with no liver dysfunction but was notable for hemangioma which has now been confirmed to be a intrahepatic cholangiocarcinoma and a status post resection.  Treatment as above  -ECHO in July 2020 within normal limits  -Repeat iron studies in April 2021 stable with a ferritin of 229, iron level 100, transferrin saturation 27%  -Low concern for iron overload at this time.      Thyroid nodule  -Incidentally noted on CT scan but enlarging from previous CT  -Thyroid ultrasound in June 2022 only notable for goiter and small nodules nonconcerning for malignancy  -Has been seen by endocrinology with plan for repeat ultrasound in the summer of next year  -Currently on levothyroxine 25 mcg daily and tolerating well  -TSH in November 2024 within normal limits  -TSH in April 2025 within normal limits     Other fatigue   -Continued at this time  -Previously checked iron studies, vitamin B12, folate, thyroid studies within normal limits  -Was previously been seen by cardiology with a normal ECHO and stress test  -Holter monitor notable for atrial fibrillation for which she is on metoprolol and prophylactic apixaban  -Likely secondary to lymphoma noted on bone marrow biopsy  -Management as above     Anemia  -Likely related to her chemotherapy  -Iron studies in June 2024 with only mild iron deficiency anemia.    -Iron studies in July 2024 within normal limits outside of an elevated ferritin  -Status post 1 unit PRBC in August 2024  -Slight worsening anemia since September.  Hemoglobin 8.8 today  -Bone marrow biopsy in January 2025 consistent with a likely marginal zone lymphoma  -Management as above     Nausea  -Stable with as needed Zofran.  Refilled ODT Zofran today     Anxiety  -Stable with Wellbutrin     Shortness of breath with exertion/orthopnea  -ECHO in November 2024 with a normal EF  -Stress  test in December 2024 within normal limits      Abdominal pain/weight loss  -Continue abdominal discomfort.  Unlikely to be related to her malignancy given her stable CT scans last month  -EGD in December 2024 with a clean-based ulcer with no bleeding noted     Debility  -Worsening weakness and debility with increased risk for falls at home secondary to metastatic disease  -Patient has been discharged from home health     Goals of care  -Patient deferring on further systemic therapy for her multiple malignancies which I think is reasonable given her continued failure to thrive  -Offered hospice services however patient would like to defer on hospice at this time  -Will plan to follow-up with me for palliative based services  -Plan for 1 L of IV fluids today and will give another 1 next month         Follow Up:   Follow-up in 1 month     Jorje Montenegro MD  Hematology and Oncology     Please note that portions of this note may have been completed with a voice recognition program. Efforts were made to edit the dictations, but occasionally words are mistranscribed.

## 2025-04-23 ENCOUNTER — TELEPHONE (OUTPATIENT)
Dept: ONCOLOGY | Facility: CLINIC | Age: 85
End: 2025-04-23
Payer: MEDICARE

## 2025-04-23 NOTE — TELEPHONE ENCOUNTER
Pharmacy Name:  [facility]       Pharmacy representative name: LINDA       Pharmacy representative phone number: [phone]        What question does the pharmacy have:     HAVE ORDERS FROM DR TONY TO DO A LITER OF HYDRATION EVERY TWO WEEKS AND PORT FLUSH    BUT PATIENT CALLED AND STATED SUPPOSED TO HAVE ORDER FOR HYDRATION EVERY 5 WEEKS    CALLING TO CLARIFY  ON ORDERS    Who is the provider that prescribed the medication: DR TONY

## 2025-04-24 NOTE — TELEPHONE ENCOUNTER
Return call to Parvin.  Clarified order.  Suggested fluids every other week, but if Sheree does not wish to have the infusion is fine to not administer.  Will do port care every 6 weeks.

## 2025-04-30 ENCOUNTER — PATIENT OUTREACH (OUTPATIENT)
Dept: CASE MANAGEMENT | Facility: OTHER | Age: 85
End: 2025-04-30
Payer: MEDICARE

## 2025-05-05 ENCOUNTER — PATIENT MESSAGE (OUTPATIENT)
Dept: ONCOLOGY | Facility: CLINIC | Age: 85
End: 2025-05-05
Payer: MEDICARE

## 2025-05-05 RX ORDER — LIDOCAINE AND PRILOCAINE 25; 25 MG/G; MG/G
CREAM TOPICAL
Qty: 30 G | Refills: 3 | Status: SHIPPED | OUTPATIENT
Start: 2025-05-05

## 2025-05-15 RX ORDER — AMOXICILLIN 250 MG
1 CAPSULE ORAL 2 TIMES DAILY PRN
Qty: 60 TABLET | Refills: 0 | Status: SHIPPED | OUTPATIENT
Start: 2025-05-15

## 2025-05-23 ENCOUNTER — PATIENT MESSAGE (OUTPATIENT)
Dept: ONCOLOGY | Facility: CLINIC | Age: 85
End: 2025-05-23
Payer: MEDICARE

## 2025-05-27 ENCOUNTER — HOSPITAL ENCOUNTER (OUTPATIENT)
Dept: ONCOLOGY | Facility: HOSPITAL | Age: 85
Discharge: HOME | End: 2025-05-27
Admitting: INTERNAL MEDICINE
Payer: MEDICARE

## 2025-05-27 ENCOUNTER — OFFICE VISIT (OUTPATIENT)
Dept: ONCOLOGY | Facility: CLINIC | Age: 85
End: 2025-05-27
Payer: MEDICARE

## 2025-05-27 VITALS
OXYGEN SATURATION: 98 % | HEART RATE: 97 BPM | WEIGHT: 116.6 LBS | RESPIRATION RATE: 16 BRPM | DIASTOLIC BLOOD PRESSURE: 75 MMHG | BODY MASS INDEX: 19.91 KG/M2 | HEIGHT: 64 IN | SYSTOLIC BLOOD PRESSURE: 152 MMHG | TEMPERATURE: 97.7 F

## 2025-05-27 DIAGNOSIS — E86.0 DEHYDRATION: Primary | ICD-10-CM

## 2025-05-27 DIAGNOSIS — C22.1 INTRAHEPATIC CHOLANGIOCARCINOMA: Primary | ICD-10-CM

## 2025-05-27 DIAGNOSIS — C22.1 INTRAHEPATIC CHOLANGIOCARCINOMA: ICD-10-CM

## 2025-05-27 LAB
ALBUMIN SERPL-MCNC: 3.3 G/DL (ref 3.5–5.2)
ALBUMIN/GLOB SERPL: 1.2 G/DL
ALP SERPL-CCNC: 1574 U/L (ref 39–117)
ALT SERPL W P-5'-P-CCNC: 241 U/L (ref 1–33)
ANION GAP SERPL CALCULATED.3IONS-SCNC: 9 MMOL/L (ref 5–15)
AST SERPL-CCNC: 209 U/L (ref 1–32)
BASOPHILS # BLD AUTO: 0.03 10*3/MM3 (ref 0–0.2)
BASOPHILS NFR BLD AUTO: 0.6 % (ref 0–1.5)
BILIRUB SERPL-MCNC: 7.2 MG/DL (ref 0–1.2)
BUN SERPL-MCNC: 21.9 MG/DL (ref 8–23)
BUN/CREAT SERPL: 23.8 (ref 7–25)
CALCIUM SPEC-SCNC: 9 MG/DL (ref 8.6–10.5)
CANCER AG19-9 SERPL-ACNC: 112 U/ML
CHLORIDE SERPL-SCNC: 99 MMOL/L (ref 98–107)
CO2 SERPL-SCNC: 25 MMOL/L (ref 22–29)
CREAT SERPL-MCNC: 0.92 MG/DL (ref 0.57–1)
DEPRECATED RDW RBC AUTO: 53.7 FL (ref 37–54)
EGFRCR SERPLBLD CKD-EPI 2021: 61.1 ML/MIN/1.73
EOSINOPHIL # BLD AUTO: 0.05 10*3/MM3 (ref 0–0.4)
EOSINOPHIL NFR BLD AUTO: 1 % (ref 0.3–6.2)
ERYTHROCYTE [DISTWIDTH] IN BLOOD BY AUTOMATED COUNT: 14.2 % (ref 12.3–15.4)
GLOBULIN UR ELPH-MCNC: 2.8 GM/DL
GLUCOSE SERPL-MCNC: 115 MG/DL (ref 65–99)
HCT VFR BLD AUTO: 34.2 % (ref 34–46.6)
HGB BLD-MCNC: 11.8 G/DL (ref 12–15.9)
IMM GRANULOCYTES # BLD AUTO: 0.02 10*3/MM3 (ref 0–0.05)
IMM GRANULOCYTES NFR BLD AUTO: 0.4 % (ref 0–0.5)
LYMPHOCYTES # BLD AUTO: 0.4 10*3/MM3 (ref 0.7–3.1)
LYMPHOCYTES NFR BLD AUTO: 8 % (ref 19.6–45.3)
MCH RBC QN AUTO: 35 PG (ref 26.6–33)
MCHC RBC AUTO-ENTMCNC: 34.5 G/DL (ref 31.5–35.7)
MCV RBC AUTO: 101.5 FL (ref 79–97)
MONOCYTES # BLD AUTO: 0.65 10*3/MM3 (ref 0.1–0.9)
MONOCYTES NFR BLD AUTO: 12.9 % (ref 5–12)
NEUTROPHILS NFR BLD AUTO: 3.88 10*3/MM3 (ref 1.7–7)
NEUTROPHILS NFR BLD AUTO: 77.1 % (ref 42.7–76)
PLATELET # BLD AUTO: 148 10*3/MM3 (ref 140–450)
PMV BLD AUTO: 12.6 FL (ref 6–12)
POTASSIUM SERPL-SCNC: 3.8 MMOL/L (ref 3.5–5.2)
PROT SERPL-MCNC: 6.1 G/DL (ref 6–8.5)
RBC # BLD AUTO: 3.37 10*6/MM3 (ref 3.77–5.28)
SODIUM SERPL-SCNC: 133 MMOL/L (ref 136–145)
WBC NRBC COR # BLD AUTO: 5.03 10*3/MM3 (ref 3.4–10.8)

## 2025-05-27 PROCEDURE — 3077F SYST BP >= 140 MM HG: CPT | Performed by: INTERNAL MEDICINE

## 2025-05-27 PROCEDURE — 99215 OFFICE O/P EST HI 40 MIN: CPT | Performed by: INTERNAL MEDICINE

## 2025-05-27 PROCEDURE — 80053 COMPREHEN METABOLIC PANEL: CPT | Performed by: INTERNAL MEDICINE

## 2025-05-27 PROCEDURE — 3078F DIAST BP <80 MM HG: CPT | Performed by: INTERNAL MEDICINE

## 2025-05-27 PROCEDURE — 25010000002 HEPARIN LOCK FLUSH PER 10 UNITS: Performed by: INTERNAL MEDICINE

## 2025-05-27 PROCEDURE — 25810000003 SODIUM CHLORIDE 0.9 % SOLUTION: Performed by: INTERNAL MEDICINE

## 2025-05-27 PROCEDURE — 1126F AMNT PAIN NOTED NONE PRSNT: CPT | Performed by: INTERNAL MEDICINE

## 2025-05-27 PROCEDURE — 85025 COMPLETE CBC W/AUTO DIFF WBC: CPT | Performed by: INTERNAL MEDICINE

## 2025-05-27 PROCEDURE — 86301 IMMUNOASSAY TUMOR CA 19-9: CPT | Performed by: INTERNAL MEDICINE

## 2025-05-27 PROCEDURE — 96360 HYDRATION IV INFUSION INIT: CPT

## 2025-05-27 RX ORDER — SODIUM CHLORIDE 9 MG/ML
INJECTION, SOLUTION INTRAVENOUS
COMMUNITY
Start: 2025-04-08

## 2025-05-27 RX ORDER — HEPARIN SODIUM (PORCINE) LOCK FLUSH IV SOLN 100 UNIT/ML 100 UNIT/ML
500 SOLUTION INTRAVENOUS AS NEEDED
Status: DISCONTINUED | OUTPATIENT
Start: 2025-05-27 | End: 2025-05-28 | Stop reason: HOSPADM

## 2025-05-27 RX ORDER — SODIUM CHLORIDE 9 MG/ML
1000 INJECTION, SOLUTION INTRAVENOUS ONCE
Status: COMPLETED | OUTPATIENT
Start: 2025-05-27 | End: 2025-05-27

## 2025-05-27 RX ORDER — OXYCODONE HYDROCHLORIDE 5 MG/1
5 TABLET ORAL EVERY 8 HOURS PRN
Qty: 90 TABLET | Refills: 0 | Status: SHIPPED | OUTPATIENT
Start: 2025-05-27

## 2025-05-27 RX ADMIN — SODIUM CHLORIDE 1000 ML/HR: 900 INJECTION, SOLUTION INTRAVENOUS at 13:45

## 2025-05-27 RX ADMIN — HEPARIN 500 UNITS: 100 SYRINGE at 14:51

## 2025-05-27 NOTE — PROGRESS NOTES
Follow Up Office Visit      Date: 2025     Patient Name: Sheree Hernandez  MRN: [id number]  : 1940  Chief Complaint:  Follow-up for intrahepatic cholangiocarcinoma      History of Present Illness: Sheree Hernandez is a pleasant 79 y.o. female with a past medical history of hyperlipidemia and hypertension who presents today for evaluation of concern for hemochromatosis. The patient is accompanied by their self who contributes to the history of their care.  Patient states that over the past 2 years she has been having worsening fatigue especially with exertion.  Over the past several months this has become worse.  Patient states that she gets tired with basic activities including showering getting dressed in the morning and walking to and from her car from stores.  She states that her fatigue gets better after a few minutes of rest.  She has previously had an echocardiogram and cardiac catheterization within the past 2 years which did not reveal any cause of her fatigue with exertion.  She is also had an ultrasound of the liver which revealed stable cyst.  She was recently seen by her PCP who ordered iron studies which was notable for an elevated ferritin to 246.  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  All other mutations were negative.  She is currently up-to-date on her mammograms and colonoscopy with no active malignancies noted.  She is otherwise compliant with her statin and high blood pressure medicines.  Repeat abdominal imaging in 2021 concerning for enlarging liver lesion.  She was seen by Dr. Sharma and  is status post open right hepatectomy, cholecystectomy, right partial adrenalectomy on 2021.  Pathology showed a focal R1 resected margin.  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  0/4 lymph nodes were  positive for disease.  LVI and PNI were present.  Finished chemoXRT in January 2021.      Interval History:  Presents to clinic for follow-up.  Completed radiation in May 2023 and again in August 2023.  Completed 6 cycles of cisplatin/gemcitabine/Durvalumab in August 2024.  Treatment discontinued due to toxicity.  Currently on maintenance Durvalumab which has been on hold since October due to her significant fatigue and tiredness.  Was hospitalized in December 2024 due to continued abdominal pain and discomfort.  Underwent an EGD which showed a clean-based nonbleeding ulcer.  Started on PPI with some improvement of her symptoms.  Bone marrow biopsy in January 2025 consistent with a B-cell lymphoma.  Completed 3 weeks of weekly rituximab before being hospitalized for failure to thrive.  Was transition to more palliative based care in March 2025.  Continues to have abdominal pain and discomfort.  Lasting throughout the day.  Has not been started on any narcotic medicine.  Has been having issues with home health in terms of getting IV fluids.    Oncology History:    Oncology/Hematology History   Intrahepatic cholangiocarcinoma   8/17/2021 Initial Diagnosis    Intrahepatic cholangiocarcinoma (CMS/HCC)     8/17/2021 Cancer Staged    Staging form: Intrahepatic Bile Duct, AJCC 8th Edition  - Clinical: Stage IB (cT1b, cN0, cM0) - Signed by Jorje Montenegro MD on 8/17/2021 11/5/2021 Cancer Staged    Staging form: Intrahepatic Bile Duct, AJCC 8th Edition  - Pathologic: Stage IIIA (pT3, pN0, cM0) - Signed by Jorje Montenegro MD on 11/5/2021 12/9/2021 - 1/6/2022 Chemotherapy    OP GALLBLADDER Capecitabine + XRT     12/9/2021 - 1/19/2022 Radiation    Radiation OncologyTreatment Course:  Sheree Hernandez received 5040 cGy in 28 fractions to liver via External Beam Radiation - EBRT.     4/25/2023 - 5/8/2023 Radiation    Radiation OncologyTreatment Course:  Sheree Hernandez received 3500 cGy in 5 fractions to liver mass  and lymph nodes via Stereotactic Radiation Therapy - SRT.     8/1/2023 - 8/11/2023 Radiation    Radiation OncologyTreatment Course:  Sheree Hernandez received 3500 cGy in 5 fractions to abdomen via Stereotactic Radiation Therapy - SRT.     1/9/2024 - 1/23/2024 Radiation    Radiation OncologyTreatment Course:  Sheree Hernandez received 3000 cGy in 5 fractions to abdomen via Stereotactic Radiation Therapy - SRT.     1/11/2024 - 1/11/2024 Radiation    Radiation OncologyTreatment Course:  Sheree Hernandez received 754 cGy in 1 fractions to left lung via Stereotactic Radiation Therapy - SRT.     2/1/2024 - 2/1/2024 Radiation    Radiation OncologyTreatment Course:  Sheree Hernandez received 3000 cGy in 1 fractions to left lung via Stereotactic Radiation Therapy - SRT.     4/19/2024 - 8/16/2024 Chemotherapy    OP HEPATOBILIARY CISplatin + Gemcitabine Days 1,8 / Durvalumab Q21D     5/17/2024 -  Chemotherapy    OP CENTRAL VENOUS ACCESS DEVICE Access, Care, and Maintenance (CVAD)     9/6/2024 -  Chemotherapy    OP CHOLANGIOCARCINOMA Durvalumab 1500 mg     Marginal zone lymphoma   1/20/2025 Initial Diagnosis    Marginal zone lymphoma     1/21/2025 - 2/4/2025 Biopsy    OP LYMPHOMA RiTUXimab (Weekly X 4)  Plan Provider: Jorje Montenegro MD  Treatment goal: Curative  Line of treatment: [No plan line of treatment]         Subjective      Review of Systems:   Constitutional: Negative for fevers, chills, or weight loss  Eyes: Negative for blurred vision or discharge         Ear/Nose/Throat: Negative for difficulty swallowing, sore throat, LAD                                                       Respiratory: Negative for cough, SOA, wheezing                                                                                        Cardiovascular: Negative for chest pain or palpitations                                                                  Gastrointestinal: Negative for nausea, vomiting or diarrhea                                                                      Genitourinary: Negative for dysuria or hematuria                                                                                           Musculoskeletal: Negative for any joint pains or muscle aches                                                                        Neurologic: Negative for any weakness, headaches, dizziness                                                                         Hematologic: Negative for any easy bleeding or bruising                                                                                   Psychiatric: Negative for anxiety or depression                          Past Medical History/Past Surgical History/ Family History/ Social History: Reviewed by me and unchanged from my previous documentation done on April 2025.     Medications:     Current Outpatient Medications:     bisacodyl 5 MG EC tablet, Take 1 tablet by mouth Daily As Needed for Constipation., Disp: 10 tablet, Rfl: 0    busPIRone (BUSPAR) 5 MG tablet, Take 1 tablet by mouth 3 (Three) Times a Day As Needed (Anxiety)., Disp: 30 tablet, Rfl: 3    dicyclomine (BENTYL) 20 MG tablet, Take 1 tablet by mouth Every 4 (Four) Hours As Needed for Abdominal Cramping, Disp: 30 tablet, Rfl: 2    ipratropium (ATROVENT) 0.06 % nasal spray, Administer 1 spray into each nostril twice daily, Disp: 30 mL, Rfl: 3    levothyroxine (SYNTHROID, LEVOTHROID) 25 MCG tablet, Take 1 tablet by mouth Every Morning., Disp: 30 tablet, Rfl: 3    lidocaine-prilocaine (EMLA) 2.5-2.5 % cream, Please apply to port site 30 min prior to infusion and cover with Saran Wrap, Disp: 30 g, Rfl: 3    ondansetron ODT (ZOFRAN-ODT) 8 MG disintegrating tablet, Place 1 tablet on the tongue Every 8 (Eight) Hours As Needed for Nausea or Vomiting., Disp: 30 tablet, Rfl: 5    pantoprazole (PROTONIX) 40 MG EC tablet, Take 1 tablet by mouth 2 (Two) Times a Day Before Meals., Disp: 180 tablet, Rfl: 3    sennosides-docusate (Stimulant  "Laxative) 8.6-50 MG per tablet, Take 1 tablet by mouth 2 (Two) Times a Day As Needed for Constipation., Disp: 60 tablet, Rfl: 0    sodium chloride 0.9 % solution, , Disp: , Rfl:     buPROPion (WELLBUTRIN) 75 MG tablet, Take 1 tablet by mouth Daily. (Patient not taking: Reported on 5/27/2025), Disp: 30 tablet, Rfl: 2    oxyCODONE (Roxicodone) 5 MG immediate release tablet, Take 1 tablet by mouth Every 8 (Eight) Hours As Needed for Moderate Pain., Disp: 90 tablet, Rfl: 0  No current facility-administered medications for this visit.    Facility-Administered Medications Ordered in Other Visits:     heparin injection 500 Units, 500 Units, Intravenous, PRN, Jorje Montenegro MD    Allergies:   Allergies   Allergen Reactions    Pravastatin Myalgia and Anaphylaxis       Objective     Physical Exam:  Vital Signs:   Vitals:    05/27/25 1253   BP: 152/75   Pulse: 97   Resp: 16   Temp: 97.7 °F (36.5 °C)   TempSrc: Temporal   SpO2: 98%   Weight: 52.9 kg (116 lb 9.6 oz)   Height: 162.6 cm (64.02\")   PainSc: 0-No pain     Pain Score    05/27/25 1253   PainSc: 0-No pain     ECOG Performance Status: 2 - Symptomatic, <50% confined to bed    Constitutional: NAD, ECOG 2  Eyes: PERRLA, scleral anicteric  ENT: No LAD, no thyromegaly  Respiratory: CTAB, no wheezing, rales, rhonchi  Cardiovascular: RRR, no murmurs, pulses 2+ bilaterally  Abdomen: soft, NT/ND, no HSM  Musculoskeletal: strength 5/5 bilaterally, no c/c/e  Neurologic: A&O x 3, CN II-XII intact grossly    Results Review:   Hospital Outpatient Visit on 05/27/2025   Component Date Value Ref Range Status    Glucose 05/27/2025 115 (H)  65 - 99 mg/dL Final    BUN 05/27/2025 21.9  8.0 - 23.0 mg/dL Final    Creatinine 05/27/2025 0.92  0.57 - 1.00 mg/dL Final    Sodium 05/27/2025 133 (L)  136 - 145 mmol/L Final    Potassium 05/27/2025 3.8  3.5 - 5.2 mmol/L Final    Chloride 05/27/2025 99  98 - 107 mmol/L Final    CO2 05/27/2025 25.0  22.0 - 29.0 mmol/L Final    Calcium 05/27/2025 9.0  " 8.6 - 10.5 mg/dL Final    Total Protein 05/27/2025 6.1  6.0 - 8.5 g/dL Final    Albumin 05/27/2025 3.3 (L)  3.5 - 5.2 g/dL Final    ALT (SGPT) 05/27/2025 241 (H)  1 - 33 U/L Final    AST (SGOT) 05/27/2025 209 (H)  1 - 32 U/L Final    Alkaline Phosphatase 05/27/2025 1,574 (H)  39 - 117 U/L Final    Total Bilirubin 05/27/2025 7.2 (H)  0.0 - 1.2 mg/dL Final    Globulin 05/27/2025 2.8  gm/dL Final    Calculated Result    A/G Ratio 05/27/2025 1.2  g/dL Final    BUN/Creatinine Ratio 05/27/2025 23.8  7.0 - 25.0 Final    Anion Gap 05/27/2025 9.0  5.0 - 15.0 mmol/L Final    eGFR 05/27/2025 61.1  >60.0 mL/min/1.73 Final    WBC 05/27/2025 5.03  3.40 - 10.80 10*3/mm3 Final    RBC 05/27/2025 3.37 (L)  3.77 - 5.28 10*6/mm3 Final    Hemoglobin 05/27/2025 11.8 (L)  12.0 - 15.9 g/dL Final    Hematocrit 05/27/2025 34.2  34.0 - 46.6 % Final    MCV 05/27/2025 101.5 (H)  79.0 - 97.0 fL Final    MCH 05/27/2025 35.0 (H)  26.6 - 33.0 pg Final    MCHC 05/27/2025 34.5  31.5 - 35.7 g/dL Final    RDW 05/27/2025 14.2  12.3 - 15.4 % Final    RDW-SD 05/27/2025 53.7  37.0 - 54.0 fl Final    MPV 05/27/2025 12.6 (H)  6.0 - 12.0 fL Final    Platelets 05/27/2025 148  140 - 450 10*3/mm3 Final    Neutrophil % 05/27/2025 77.1 (H)  42.7 - 76.0 % Final    Lymphocyte % 05/27/2025 8.0 (L)  19.6 - 45.3 % Final    Monocyte % 05/27/2025 12.9 (H)  5.0 - 12.0 % Final    Eosinophil % 05/27/2025 1.0  0.3 - 6.2 % Final    Basophil % 05/27/2025 0.6  0.0 - 1.5 % Final    Immature Grans % 05/27/2025 0.4  0.0 - 0.5 % Final    Neutrophils, Absolute 05/27/2025 3.88  1.70 - 7.00 10*3/mm3 Final    Lymphocytes, Absolute 05/27/2025 0.40 (L)  0.70 - 3.10 10*3/mm3 Final    Monocytes, Absolute 05/27/2025 0.65  0.10 - 0.90 10*3/mm3 Final    Eosinophils, Absolute 05/27/2025 0.05  0.00 - 0.40 10*3/mm3 Final    Basophils, Absolute 05/27/2025 0.03  0.00 - 0.20 10*3/mm3 Final    Immature Grans, Absolute 05/27/2025 0.02  0.00 - 0.05 10*3/mm3 Final       No results found.    Assessment  / Plan      Assessment/Plan:   1. Intrahepatic cholangiocarcinoma (CMS/HCC) (Primary)  -Noted during her most recent hospitalization with CT scans concerning for an enlarging liver lesion to 7.3 cm that was previously noted to be hemangioma  -Triple phase CT concerning for a solid tumor lesion  -Biopsy consistent with an adenocarcinoma  -CA 19-9 elevated to 84.7  -CT chest as well as the rest of the CT abdomen/pelvis not concerning for distant or metastatic disease  -Status post open right hepatectomy, cholecystectomy, right partial adrenalectomy on 9/23/2021 with Dr. 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  0/4 lymph nodes were positive for disease.  LVI and PNI were present.  Focal R1 resection margin  -Discussed with patient and her daughter that she would benefit from adjuvant chemoXRT using Xeloda.  Discussed side effects including but not limited to immunosuppression, diarrhea, abdominal pain, nausea, vomiting, hand/foot syndrome  -Repeat CA 19-9 (22) in November 2021  -Completed chemo XRT with Xeloda in January 2021  -Repeat scans in March 2022 without any evidence of recurrent or metastatic disease  -Repeat CT C/A/P in June 2022 without evidence of recurrent or metastatic disease.  Did note some IVC stenosis which we will monitor with her next scans  -Repeat CT C/A/P in September 2022 reviewed without evidence of recurrent disease or metastatic disease.  IVC stenosis overall stable  -Repeat CT C/A/P in December 2022 reviewed without evidence of recurrent or metastatic disease.  CA 19-9 within normal limits  -Repeat CT C/A/P in March 2023 reviewed and notable for some slight enlarging retroperitoneal adenopathy.  CA 19-9 within normal limits  -PET/CT concerning for 1 cm left liver lesion that was only slightly PET avid as well as 2 lymph nodes that were also slightly PET avid  -Previously discussed  her case at tumor board and with Dr. Sharma we agreed that she likely had disease progression  -Status post radiation completed in May 2023  -CA 19-9 in June 2022 within normal limits.    -CT C/A/P in July 2023 reviewed and showed some interval decrease in some lymph nodes as well as some slight enlargement of a couple lymph nodes.  -Completed radiation in August 2023  -CT C/A/P in October 2023 reviewed and only notable for slight increase in some pulmonary nodules about 1 mm each  -CT C/A/P December 2023 reviewed and notable for enlargement of her aortocaval lymph node.  CA 19-9 stable  -Completed radiation with Dr. Billings in February 2024  -CT C/A/P in April 2024 reviewed and showing continued slight enlargement of multiple pulmonary nodules as well as a liver lesion.  CA 19-9 stable  -CT C/A/P in June 2024 reviewed and showing overall stable disease  -CT C/A/P in August 2024 reviewed showing overall stable disease  -Completed 6 cycles of cisplatin/gemcitabine/durvalumab in August 2024.  Discontinued chemotherapy due to toxicity  -CT C/A/P in November 2024 reviewed and showing overall stable disease  -Has been off therapy for the past several months  -CT C/A/P in February 2025 consistent with disease progression.  Patient would like to forego further systemic therapy  -Prolonged goals of care discussion with patient and daughter today.  She would like to continue holding off on further systemic therapy.  Patient more willing to discuss hospice.  Referral placed     Marginal zone lymphoma  -Bone marrow biopsy in January 2025 concerning for B-cell lymphoma most consistent with a marginal zone lymphoma  -Completed 3 weeks of weekly rituximab in February 2025.  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  Other genes  negative.  Given patient's age and previous cardiac liver work-up showing no evidence of dysfunction, it is unlikely that she has had iron deposition all this time.  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  Treatment as above  -ECHO in July 2020 within normal limits  -Repeat iron studies in April 2021 stable with a ferritin of 229, iron level 100, transferrin saturation 27%  -Low concern for iron overload at this time.      Thyroid nodule  -Incidentally noted on CT scan but enlarging from previous CT  -Thyroid ultrasound in June 2022 only notable for goiter and small nodules nonconcerning for malignancy  -Has been seen by endocrinology with plan for repeat ultrasound in the summer of next year  -Currently on levothyroxine 25 mcg daily and tolerating well  -TSH in November 2024 within normal limits  -TSH in April 2025 within normal limits     Other fatigue   -Continued at this time  -Previously checked iron studies, vitamin B12, folate, thyroid studies within normal limits  -Was previously been seen by cardiology with a normal ECHO and stress test  -Holter monitor notable for atrial fibrillation for which she is on metoprolol and prophylactic apixaban  -Likely secondary to lymphoma noted on bone marrow biopsy  -Management as above     Anemia  -Likely related to her chemotherapy  -Iron studies in June 2024 with only mild iron deficiency anemia.    -Iron studies in July 2024 within normal limits outside of an elevated ferritin  -Status post 1 unit PRBC in August 2024  -Slight worsening anemia since September.  Hemoglobin 8.8 today  -Bone marrow biopsy in January 2025 consistent with a likely marginal zone lymphoma  -Management as above     Nausea  -Stable with as needed  Zofran.  Refilled ODT Zofran today     Anxiety  -Stable with Wellbutrin     Shortness of breath with exertion/orthopnea  -ECHO in November 2024 with a normal EF  -Stress test in December 2024 within normal limits      Abdominal pain/weight loss  -Continue abdominal discomfort.  Unlikely to be related to her malignancy given her stable CT scans last month  -EGD in December 2024 with a clean-based ulcer with no bleeding noted  -Have started oxycodone 5 mg every 8 hours as needed     Debility  -Worsening weakness and debility with increased risk for falls at home secondary to metastatic disease  -Patient has been discharged from home health     Goals of care  -Patient deferring on further systemic therapy for her multiple malignancies which I think is reasonable given her continued failure to thrive  -Patient more accepting of discussing hospice today.  Referral placed  -Plan for 1 L IV NS fluid today         Follow Up:   Follow-up in 3 weeks     Jorje Montenegro MD  Hematology and Oncology     Please note that portions of this note may have been completed with a voice recognition program. Efforts were made to edit the dictations, but occasionally words are mistranscribed.

## 2025-05-29 ENCOUNTER — TELEMEDICINE (OUTPATIENT)
Age: 85
End: 2025-05-29
Payer: MEDICARE

## 2025-05-29 DIAGNOSIS — R53.83 FATIGUE, UNSPECIFIED TYPE: ICD-10-CM

## 2025-05-29 DIAGNOSIS — R45.89 ANXIETY ABOUT HEALTH: Primary | ICD-10-CM

## 2025-05-29 RX ORDER — BUSPIRONE HYDROCHLORIDE 5 MG/1
5 TABLET ORAL 2 TIMES DAILY
Qty: 180 TABLET | Refills: 0 | Status: SHIPPED | OUTPATIENT
Start: 2025-05-29

## 2025-05-29 NOTE — PROGRESS NOTES
Video Visit      Patient Name: Sheree Hernandez  : 1940   MRN: [id number]     Referring Provider: Timi Conway DO    Chief Complaint:      ICD-10-CM ICD-9-CM   1. Anxiety about health  R45.89 799.29   2. Fatigue, unspecified type  R53.83 780.79          History of Present Illness:   Sheree Hernandez is a 84 y.o. female who is being seen by a video visit today for follow up and medication management.           Subjective   Patient Reports:   History of Present Illness  She reports a suboptimal response to her current regimen of BuSpar. She has been prescribed BuSpar for anxiety, which she takes once daily, occasionally supplementing with an additional dose in the evening if necessary. She has not experienced any panic attacks. She also reports no suicidal ideation or self-harm tendencies. She expresses a preference for minimizing medication use. She attempted to contact the office via [employer] regarding her Wellbutrin medication but was unable to send message through. She discontinued Wellbutrin after 3 days due to adverse effects, including hyperactivity and restlessness.    She consulted her oncologist physician on Tuesday, who prescribed analgesics that have since alleviated her fatigue. However, she continues to experience persistent nausea. Her oncologist suspects that her fatigue may be cancer-related. She has been advised by her oncologist to consider hospice care and plans to discuss this further today. She has a supportive network of friends and family, including her son who will be moving in with her in the coming weeks. She has previously received home health visits for a duration of 9 weeks, but reports this was not beneficial for her needs.     Her sleep quality is generally poor, although she did experience one night of good sleep following the administration of oxycodone, which also temporarily relieved her stomach discomfort. Despite ongoing stomach issues, she has been able to  maintain her weight through careful eating and hydration.    MEDICATIONS  Current: BuSpar, Wellbutrin (discontinued), oxycodone    Review of Systems:   Review of Systems   Constitutional:  Positive for appetite change and fatigue. Negative for unexpected weight change.   Eyes:  Negative for visual disturbance.   Respiratory:  Negative for chest tightness and shortness of breath.    Cardiovascular:  Negative for chest pain.   Musculoskeletal:  Negative for gait problem.   Skin:  Negative for rash and wound.   Neurological:  Negative for dizziness, tremors, seizures, weakness, light-headedness and headaches.   Psychiatric/Behavioral:  Positive for dysphoric mood and sleep disturbance. Negative for agitation, behavioral problems, confusion, decreased concentration, hallucinations, self-injury and suicidal ideas. The patient is nervous/anxious. The patient is not hyperactive.    Sleep pattern: 6 hours of inconsistent sleep per night   Appetite: decreased       RISK ASSESSMENT:  Patient denies any thoughts of suicide or intent today. Patient denies any suicidal or homicidal ideation today. Patient denies any high risk factors today.     Medications:     Current Outpatient Medications:     busPIRone (BUSPAR) 5 MG tablet, Take 1 tablet by mouth 2 (Two) Times a Day., Disp: 180 tablet, Rfl: 0    bisacodyl 5 MG EC tablet, Take 1 tablet by mouth Daily As Needed for Constipation., Disp: 10 tablet, Rfl: 0    dicyclomine (BENTYL) 20 MG tablet, Take 1 tablet by mouth Every 4 (Four) Hours As Needed for Abdominal Cramping, Disp: 30 tablet, Rfl: 2    ipratropium (ATROVENT) 0.06 % nasal spray, Administer 1 spray into each nostril twice daily, Disp: 30 mL, Rfl: 3    levothyroxine (SYNTHROID, LEVOTHROID) 25 MCG tablet, Take 1 tablet by mouth Every Morning., Disp: 30 tablet, Rfl: 3    lidocaine-prilocaine (EMLA) 2.5-2.5 % cream, Please apply to port site 30 min prior to infusion and cover with Saran Wrap, Disp: 30 g, Rfl: 3     ondansetron ODT (ZOFRAN-ODT) 8 MG disintegrating tablet, Place 1 tablet on the tongue Every 8 (Eight) Hours As Needed for Nausea or Vomiting., Disp: 30 tablet, Rfl: 5    oxyCODONE (Roxicodone) 5 MG immediate release tablet, Take 1 tablet by mouth Every 8 (Eight) Hours As Needed for Moderate Pain., Disp: 90 tablet, Rfl: 0    pantoprazole (PROTONIX) 40 MG EC tablet, Take 1 tablet by mouth 2 (Two) Times a Day Before Meals., Disp: 180 tablet, Rfl: 3    sennosides-docusate (Stimulant Laxative) 8.6-50 MG per tablet, Take 1 tablet by mouth 2 (Two) Times a Day As Needed for Constipation., Disp: 60 tablet, Rfl: 0    sodium chloride 0.9 % solution, , Disp: , Rfl:     Medication Considerations:  SPENSER reviewed and appropriate.      Allergies:   Allergies   Allergen Reactions    Pravastatin Myalgia and Anaphylaxis       Objective     Physical Exam:  Vital Signs: There were no vitals filed for this visit.  There is no height or weight on file to calculate BMI.   The patient [facility] today [facility].  Unable to obtain vital signs due to nature of remote visit.  Height stated at 64 inches.  Weight stated at 117 pounds.     Mental Status Exam:   MENTAL STATUS EXAM   General Appearance:  Cleanly groomed and dressed and well developed  Eye Contact:  Good eye contact  Attitude:  Cooperative and polite  Motor Activity:  Other  Other Comment:  [facility] video visit  Muscle Strength:  Other  Other Comment:  [facility] video visit  Speech:  Normal rate, tone, volume  Language:  Spontaneous  Mood and affect:  Anxious and depressed  Hopelessness:  5  Loneliness: 5  Thought Process:  Logical  Associations/ Thought Content:  No delusions  Hallucinations:  None  Suicidal Ideations:  Not present  Homicidal Ideation:  Not present  Sensorium:  Alert and clear  Orientation:  Person, place, time and situation  Immediate Recall, Recent, and Remote Memory:  Intact  Attention Span/ Concentration:  Good  Fund of Knowledge:   Appropriate for age and educational level  Intellectual Functioning:  Average range  Insight:  Good  Judgement:  Good  Reliability:  Good  Impulse Control:  Fair       @RESULASTCBCDIFFPANEL,TSH,LABLIPI,DBZNURNL43,VJFGWITI72,MG,FOLATE,PROLACTIN,CRPRESULT,CMP,I4OCLSIYIMG)@    Lab Results   Component Value Date    GLUCOSE 115 (H) 05/27/2025    BUN 21.9 05/27/2025    CREATININE 0.92 05/27/2025    EGFR 61.1 05/27/2025    BCR 23.8 05/27/2025    K 3.8 05/27/2025    CO2 25.0 05/27/2025    CALCIUM 9.0 05/27/2025    ALBUMIN 3.3 (L) 05/27/2025    BILITOT 7.2 (H) 05/27/2025     (H) 05/27/2025     (H) 05/27/2025       Lab Results   Component Value Date    WBC 5.03 05/27/2025    HGB 11.8 (L) 05/27/2025    HCT 34.2 05/27/2025    .5 (H) 05/27/2025     05/27/2025       Lab Results   Component Value Date    CHOL 207 (H) 08/07/2023    TRIG 96 08/07/2023    HDL 79 (H) 08/07/2023     (H) 08/07/2023       Assessment / Plan      Visit Diagnosis/Orders Placed This Visit:  Diagnoses and all orders for this visit:    1. Anxiety about health (Primary)  -     busPIRone (BUSPAR) 5 MG tablet; Take 1 tablet by mouth 2 (Two) Times a Day.  Dispense: 180 tablet; Refill: 0    2. Fatigue, unspecified type       Assessment & Plan  1. Anxiety.  She reports that the Wellbutrin was causing hyperactivity and restlessness and discontinued medication after 3 days. She is currently taking BuSpar 5 mg once daily for anxiety. She is advised to continue with BuSpar 5 mg once daily. She will inform the provider about her hospice consultation outcomes and any subsequent decisions regarding medication adjustments.    2. Fatigue.  Her oncologist suggests that the fatigue may be related to her cancer. She has been advised by oncologist to consider hospice care based on recent blood work results. She will discuss hospice options this afternoon and will update the provider on the outcome.      Functional Status: Moderate  impairment    Prognosis: Fair with Ongoing Treatment    Impression/Formulation:  Patient appeared alert and oriented.  Patient is voluntarily requesting to continue outpatient psychiatric treatment at [facility].  Patient is receptive to assistance with maintaining a stable lifestyle.  Patient presents with history of     ICD-10-CM ICD-9-CM   1. Anxiety about health  R45.89 799.29   2. Fatigue, unspecified type  R53.83 780.79   . Reviewed patient's previous provider notes. Reviewed most recent labs. Patient meets DSM V diagnostic criteria for diagnoses. Diagnoses may be updated as more information becomes available.     Treatment Plan:   Discontinue Wellbutrin SR 75mg PO qam (negative side effects)  Continue Buspar 5mg PO BID. Refilled prescription   Encouraged psychotherapy  Follow up in 6 weeks and as needed. She is meeting with Hospice this afternoon and will update writer.    Patient will continue supportive psychotherapy efforts and medications as indicated. Clinic will obtain release of information for current treatment team for continuity of care as needed. Patient will contact this office, call 911 or present to the nearest emergency room should suicidal or homicidal ideations occur. Discussed medication options and treatment plan of prescribed medication(s) as well as the risks, benefits, and potential side effects. Patient ackowledged and verbally consented to continue with current treatment plan and was educated on the importance of compliance with treatment and follow-up appointments.     Quality Measures:   Never smoker    I advised Sheree Hernandez of the risks of tobacco use.       Follow Up:   Return in about 6 weeks (around 7/10/2025).    Patient or patient representative verbalized consent for the use of Ambient Listening during the visit with  LEDY Soliz for chart documentation. 5/29/2025  10:49 EDT    LEDY Soliz, [facility] [phone]

## (undated) DEVICE — SYR LUERLOK 50ML

## (undated) DEVICE — SINGLE-USE BIOPSY FORCEPS: Brand: RADIAL JAW 4

## (undated) DEVICE — SOLIDIFIER LIQ PREMISORB 1500CC

## (undated) DEVICE — FIRST STEP BEDSIDE ADD WATER KIT - RESEALABLE STAND-UP POUCH, ENDOSCOPIC CLEANING PAD - 1 POUCH: Brand: FIRST STEP BEDSIDE ADD WATER KIT - RESEALABLE STAND-UP POUCH, ENDOSCOPIC CLEANIN

## (undated) DEVICE — SOL IRR H2O BO 1000ML STRL

## (undated) DEVICE — LUBE JELLY FOIL PACK 1.4 OZ: Brand: MEDLINE INDUSTRIES, INC.

## (undated) DEVICE — MODEL AT P65, P/N 701554-001KIT CONTENTS: HAND CONTROLLER, 3-WAY HIGH-PRESSURE STOPCOCK WITH ROTATING END AND PREMIUM HIGH-PRESSURE TUBING: Brand: ANGIOTOUCH® KIT

## (undated) DEVICE — TUBING, SUCTION, 1/4" X 10', STRAIGHT: Brand: MEDLINE

## (undated) DEVICE — CONTN GRAD MEAS TRIANG 32OZ BLK

## (undated) DEVICE — INTRO SHEATH PRELUDE IDEAL SPRNG COIL 021 6F 23X80CM

## (undated) DEVICE — INTRO ACCSR BLNT TP

## (undated) DEVICE — MODEL BT2000 P/N 700287-012KIT CONTENTS: MANIFOLD WITH SALINE AND CONTRAST PORTS, SALINE TUBING WITH SPIKE AND HAND SYRINGE, TRANSDUCER: Brand: BT2000 AUTOMATED MANIFOLD KIT

## (undated) DEVICE — THE BITE BLOCK MAXI, LATEX FREE STRAP IS USED TO PROTECT THE ENDOSCOPE INSERTION TUBE FROM BEING BITTEN BY THE PATIENT.

## (undated) DEVICE — SAFELINER SUCTION CANISTER 1000CC: Brand: DEROYAL

## (undated) DEVICE — PK CATH CARD 10

## (undated) DEVICE — KT ORCA ORCAPOD DISP STRL

## (undated) DEVICE — GW INQWIRE FC PTFE STD J/1.5 .035 260

## (undated) DEVICE — AIR/WATER CLEANING VALVES: Brand: AIR/WATER CLEANING VALVES

## (undated) DEVICE — CATH DIAG EXPO M/ PK 5F FL4/FR4 PIG

## (undated) DEVICE — DEV COMP RAD PRELUDESYNC 24CM

## (undated) DEVICE — HYBRID CO2 TUBING/CAP SET FOR OLYMPUS® SCOPES & CO2 SOURCE: Brand: ERBE

## (undated) DEVICE — CATH DIAG EXPO .045 FL3  5F 100CM